# Patient Record
Sex: MALE | Race: WHITE | NOT HISPANIC OR LATINO | URBAN - METROPOLITAN AREA
[De-identification: names, ages, dates, MRNs, and addresses within clinical notes are randomized per-mention and may not be internally consistent; named-entity substitution may affect disease eponyms.]

---

## 2017-04-11 ENCOUNTER — EMERGENCY (EMERGENCY)
Facility: HOSPITAL | Age: 77
LOS: 0 days | Discharge: HOME | End: 2017-04-11

## 2017-05-31 PROBLEM — Z00.00 ENCOUNTER FOR PREVENTIVE HEALTH EXAMINATION: Status: ACTIVE | Noted: 2017-05-31

## 2017-06-27 DIAGNOSIS — Z79.899 OTHER LONG TERM (CURRENT) DRUG THERAPY: ICD-10-CM

## 2017-06-27 DIAGNOSIS — I10 ESSENTIAL (PRIMARY) HYPERTENSION: ICD-10-CM

## 2017-06-27 DIAGNOSIS — R53.1 WEAKNESS: ICD-10-CM

## 2017-07-13 ENCOUNTER — APPOINTMENT (OUTPATIENT)
Dept: CARDIOLOGY | Facility: CLINIC | Age: 77
End: 2017-07-13

## 2019-04-16 ENCOUNTER — OUTPATIENT (OUTPATIENT)
Dept: OUTPATIENT SERVICES | Facility: HOSPITAL | Age: 79
LOS: 1 days | Discharge: HOME | End: 2019-04-16
Payer: COMMERCIAL

## 2019-04-16 DIAGNOSIS — R05 COUGH: ICD-10-CM

## 2019-04-16 PROCEDURE — 71046 X-RAY EXAM CHEST 2 VIEWS: CPT | Mod: 26

## 2019-05-14 ENCOUNTER — OUTPATIENT (OUTPATIENT)
Dept: OUTPATIENT SERVICES | Facility: HOSPITAL | Age: 79
LOS: 1 days | Discharge: HOME | End: 2019-05-14
Payer: COMMERCIAL

## 2019-05-14 DIAGNOSIS — K76.9 LIVER DISEASE, UNSPECIFIED: ICD-10-CM

## 2019-05-14 PROCEDURE — 76700 US EXAM ABDOM COMPLETE: CPT | Mod: 26

## 2020-01-12 ENCOUNTER — INPATIENT (INPATIENT)
Facility: HOSPITAL | Age: 80
LOS: 59 days | Discharge: NURSING, REHAB OR LONG-TERM CARE | End: 2020-03-12
Attending: THORACIC SURGERY (CARDIOTHORACIC VASCULAR SURGERY) | Admitting: THORACIC SURGERY (CARDIOTHORACIC VASCULAR SURGERY)
Payer: MEDICARE

## 2020-01-12 VITALS
TEMPERATURE: 99 F | DIASTOLIC BLOOD PRESSURE: 136 MMHG | HEART RATE: 82 BPM | RESPIRATION RATE: 18 BRPM | OXYGEN SATURATION: 95 % | SYSTOLIC BLOOD PRESSURE: 177 MMHG

## 2020-01-12 DIAGNOSIS — Z90.49 ACQUIRED ABSENCE OF OTHER SPECIFIED PARTS OF DIGESTIVE TRACT: Chronic | ICD-10-CM

## 2020-01-12 LAB
ALBUMIN SERPL ELPH-MCNC: 4.4 G/DL — SIGNIFICANT CHANGE UP (ref 3.5–5.2)
ALP SERPL-CCNC: 100 U/L — SIGNIFICANT CHANGE UP (ref 30–115)
ALT FLD-CCNC: 24 U/L — SIGNIFICANT CHANGE UP (ref 0–41)
ANION GAP SERPL CALC-SCNC: 21 MMOL/L — HIGH (ref 7–14)
APPEARANCE UR: CLEAR — SIGNIFICANT CHANGE UP
APTT BLD: 30.1 SEC — SIGNIFICANT CHANGE UP (ref 27–39.2)
AST SERPL-CCNC: 34 U/L — SIGNIFICANT CHANGE UP (ref 0–41)
BACTERIA # UR AUTO: NEGATIVE — SIGNIFICANT CHANGE UP
BASE EXCESS BLDV CALC-SCNC: 1.1 MMOL/L — SIGNIFICANT CHANGE UP (ref -2–2)
BASOPHILS # BLD AUTO: 0.1 K/UL — SIGNIFICANT CHANGE UP (ref 0–0.2)
BASOPHILS NFR BLD AUTO: 0.9 % — SIGNIFICANT CHANGE UP (ref 0–1)
BILIRUB SERPL-MCNC: 0.7 MG/DL — SIGNIFICANT CHANGE UP (ref 0.2–1.2)
BILIRUB UR-MCNC: NEGATIVE — SIGNIFICANT CHANGE UP
BUN SERPL-MCNC: 26 MG/DL — HIGH (ref 10–20)
CA-I SERPL-SCNC: 1.16 MMOL/L — SIGNIFICANT CHANGE UP (ref 1.12–1.3)
CALCIUM SERPL-MCNC: 9.1 MG/DL — SIGNIFICANT CHANGE UP (ref 8.5–10.1)
CHLORIDE SERPL-SCNC: 97 MMOL/L — LOW (ref 98–110)
CO2 SERPL-SCNC: 20 MMOL/L — SIGNIFICANT CHANGE UP (ref 17–32)
COLOR SPEC: SIGNIFICANT CHANGE UP
CREAT SERPL-MCNC: 1.1 MG/DL — SIGNIFICANT CHANGE UP (ref 0.7–1.5)
DIFF PNL FLD: SIGNIFICANT CHANGE UP
EOSINOPHIL # BLD AUTO: 0.48 K/UL — SIGNIFICANT CHANGE UP (ref 0–0.7)
EOSINOPHIL NFR BLD AUTO: 4.4 % — SIGNIFICANT CHANGE UP (ref 0–8)
EPI CELLS # UR: 2 /HPF — SIGNIFICANT CHANGE UP (ref 0–5)
ETHANOL SERPL-MCNC: <10 MG/DL — SIGNIFICANT CHANGE UP
GAS PNL BLDV: 139 MMOL/L — SIGNIFICANT CHANGE UP (ref 136–145)
GAS PNL BLDV: SIGNIFICANT CHANGE UP
GIANT PLATELETS BLD QL SMEAR: PRESENT — SIGNIFICANT CHANGE UP
GLUCOSE SERPL-MCNC: 190 MG/DL — HIGH (ref 70–99)
GLUCOSE UR QL: NEGATIVE — SIGNIFICANT CHANGE UP
HCO3 BLDV-SCNC: 28 MMOL/L — SIGNIFICANT CHANGE UP (ref 22–29)
HCT VFR BLD CALC: 45.1 % — SIGNIFICANT CHANGE UP (ref 42–52)
HCT VFR BLDA CALC: 36.8 % — SIGNIFICANT CHANGE UP (ref 34–44)
HGB BLD CALC-MCNC: 12 G/DL — LOW (ref 14–18)
HGB BLD-MCNC: 14.2 G/DL — SIGNIFICANT CHANGE UP (ref 14–18)
HYALINE CASTS # UR AUTO: 5 /LPF — SIGNIFICANT CHANGE UP (ref 0–7)
INR BLD: 1.58 RATIO — HIGH (ref 0.65–1.3)
KETONES UR-MCNC: NEGATIVE — SIGNIFICANT CHANGE UP
LACTATE BLDV-MCNC: 3.9 MMOL/L — HIGH (ref 0.5–1.6)
LACTATE SERPL-SCNC: 4.9 MMOL/L — CRITICAL HIGH (ref 0.7–2)
LEUKOCYTE ESTERASE UR-ACNC: NEGATIVE — SIGNIFICANT CHANGE UP
LIDOCAIN IGE QN: 108 U/L — HIGH (ref 7–60)
LYMPHOCYTES # BLD AUTO: 1.74 K/UL — SIGNIFICANT CHANGE UP (ref 1.2–3.4)
LYMPHOCYTES # BLD AUTO: 15.9 % — LOW (ref 20.5–51.1)
MAGNESIUM SERPL-MCNC: 1.9 MG/DL — SIGNIFICANT CHANGE UP (ref 1.8–2.4)
MANUAL SMEAR VERIFICATION: SIGNIFICANT CHANGE UP
MCHC RBC-ENTMCNC: 31.5 G/DL — LOW (ref 32–37)
MCHC RBC-ENTMCNC: 31.8 PG — HIGH (ref 27–31)
MCV RBC AUTO: 101.1 FL — HIGH (ref 80–94)
METAMYELOCYTES # FLD: 0.9 % — HIGH (ref 0–0)
MONOCYTES # BLD AUTO: 0.97 K/UL — HIGH (ref 0.1–0.6)
MONOCYTES NFR BLD AUTO: 8.8 % — SIGNIFICANT CHANGE UP (ref 1.7–9.3)
MYELOCYTES NFR BLD: 0.9 % — HIGH (ref 0–0)
NEUTROPHILS # BLD AUTO: 6.02 K/UL — SIGNIFICANT CHANGE UP (ref 1.4–6.5)
NEUTROPHILS NFR BLD AUTO: 54 % — SIGNIFICANT CHANGE UP (ref 42.2–75.2)
NEUTS BAND # BLD: 0.9 % — SIGNIFICANT CHANGE UP (ref 0–6)
NITRITE UR-MCNC: NEGATIVE — SIGNIFICANT CHANGE UP
NRBC # BLD: 1 /100 — HIGH (ref 0–0)
NRBC # BLD: SIGNIFICANT CHANGE UP /100 WBCS (ref 0–0)
NT-PROBNP SERPL-SCNC: 1767 PG/ML — HIGH (ref 0–300)
PCO2 BLDV: 56 MMHG — HIGH (ref 41–51)
PH BLDV: 7.31 — SIGNIFICANT CHANGE UP (ref 7.26–7.43)
PH UR: 6.5 — SIGNIFICANT CHANGE UP (ref 5–8)
PLAT MORPH BLD: NORMAL — SIGNIFICANT CHANGE UP
PLATELET # BLD AUTO: 218 K/UL — SIGNIFICANT CHANGE UP (ref 130–400)
PO2 BLDV: 69 MMHG — HIGH (ref 20–40)
POLYCHROMASIA BLD QL SMEAR: SLIGHT — SIGNIFICANT CHANGE UP
POTASSIUM BLDV-SCNC: 4.2 MMOL/L — SIGNIFICANT CHANGE UP (ref 3.3–5.6)
POTASSIUM SERPL-MCNC: 4.4 MMOL/L — SIGNIFICANT CHANGE UP (ref 3.5–5)
POTASSIUM SERPL-SCNC: 4.4 MMOL/L — SIGNIFICANT CHANGE UP (ref 3.5–5)
PROT SERPL-MCNC: 7.6 G/DL — SIGNIFICANT CHANGE UP (ref 6–8)
PROT UR-MCNC: ABNORMAL
PROTHROM AB SERPL-ACNC: 18.1 SEC — HIGH (ref 9.95–12.87)
RBC # BLD: 4.46 M/UL — LOW (ref 4.7–6.1)
RBC # FLD: 14.7 % — HIGH (ref 11.5–14.5)
RBC BLD AUTO: ABNORMAL
RBC CASTS # UR COMP ASSIST: 2 /HPF — SIGNIFICANT CHANGE UP (ref 0–4)
SAO2 % BLDV: 92 % — SIGNIFICANT CHANGE UP
SMUDGE CELLS # BLD: PRESENT — SIGNIFICANT CHANGE UP
SODIUM SERPL-SCNC: 138 MMOL/L — SIGNIFICANT CHANGE UP (ref 135–146)
SP GR SPEC: 1.01 — SIGNIFICANT CHANGE UP (ref 1.01–1.02)
TROPONIN T SERPL-MCNC: <0.01 NG/ML — SIGNIFICANT CHANGE UP
UROBILINOGEN FLD QL: SIGNIFICANT CHANGE UP
VARIANT LYMPHS # BLD: 13.3 % — HIGH (ref 0–5)
WBC # BLD: 10.97 K/UL — HIGH (ref 4.8–10.8)
WBC # FLD AUTO: 10.97 K/UL — HIGH (ref 4.8–10.8)
WBC UR QL: 3 /HPF — SIGNIFICANT CHANGE UP (ref 0–5)

## 2020-01-12 PROCEDURE — 99291 CRITICAL CARE FIRST HOUR: CPT | Mod: 25

## 2020-01-12 PROCEDURE — 71045 X-RAY EXAM CHEST 1 VIEW: CPT | Mod: 26

## 2020-01-12 PROCEDURE — 93010 ELECTROCARDIOGRAM REPORT: CPT

## 2020-01-12 PROCEDURE — 36556 INSERT NON-TUNNEL CV CATH: CPT

## 2020-01-12 PROCEDURE — 31500 INSERT EMERGENCY AIRWAY: CPT

## 2020-01-12 PROCEDURE — 99232 SBSQ HOSP IP/OBS MODERATE 35: CPT

## 2020-01-12 PROCEDURE — 93010 ELECTROCARDIOGRAM REPORT: CPT | Mod: 77

## 2020-01-12 PROCEDURE — 70450 CT HEAD/BRAIN W/O DYE: CPT | Mod: 26

## 2020-01-12 PROCEDURE — 99292 CRITICAL CARE ADDL 30 MIN: CPT | Mod: 25

## 2020-01-12 PROCEDURE — 71045 X-RAY EXAM CHEST 1 VIEW: CPT | Mod: 26,77

## 2020-01-12 RX ORDER — FENTANYL CITRATE 50 UG/ML
0.5 INJECTION INTRAVENOUS
Qty: 2500 | Refills: 0 | Status: DISCONTINUED | OUTPATIENT
Start: 2020-01-12 | End: 2020-01-14

## 2020-01-12 RX ORDER — ATROPINE SULFATE 0.1 MG/ML
1 SYRINGE (ML) INJECTION ONCE
Refills: 0 | Status: COMPLETED | OUTPATIENT
Start: 2020-01-12 | End: 2020-01-12

## 2020-01-12 RX ORDER — MAGNESIUM SULFATE 500 MG/ML
2 VIAL (ML) INJECTION ONCE
Refills: 0 | Status: COMPLETED | OUTPATIENT
Start: 2020-01-12 | End: 2020-01-12

## 2020-01-12 RX ORDER — NOREPINEPHRINE BITARTRATE/D5W 8 MG/250ML
0.05 PLASTIC BAG, INJECTION (ML) INTRAVENOUS
Qty: 16 | Refills: 0 | Status: DISCONTINUED | OUTPATIENT
Start: 2020-01-12 | End: 2020-01-13

## 2020-01-12 RX ORDER — PANTOPRAZOLE SODIUM 20 MG/1
40 TABLET, DELAYED RELEASE ORAL ONCE
Refills: 0 | Status: COMPLETED | OUTPATIENT
Start: 2020-01-12 | End: 2020-01-13

## 2020-01-12 RX ORDER — MIDAZOLAM HYDROCHLORIDE 1 MG/ML
0.02 INJECTION, SOLUTION INTRAMUSCULAR; INTRAVENOUS
Qty: 100 | Refills: 0 | Status: DISCONTINUED | OUTPATIENT
Start: 2020-01-12 | End: 2020-01-13

## 2020-01-12 RX ORDER — ROCURONIUM BROMIDE 10 MG/ML
100 VIAL (ML) INTRAVENOUS ONCE
Refills: 0 | Status: COMPLETED | OUTPATIENT
Start: 2020-01-12 | End: 2020-01-12

## 2020-01-12 RX ORDER — ETOMIDATE 2 MG/ML
20 INJECTION INTRAVENOUS ONCE
Refills: 0 | Status: COMPLETED | OUTPATIENT
Start: 2020-01-12 | End: 2020-01-12

## 2020-01-12 RX ORDER — SODIUM CHLORIDE 9 MG/ML
250 INJECTION, SOLUTION INTRAVENOUS ONCE
Refills: 0 | Status: COMPLETED | OUTPATIENT
Start: 2020-01-12 | End: 2020-01-12

## 2020-01-12 RX ORDER — SODIUM CHLORIDE 9 MG/ML
1000 INJECTION, SOLUTION INTRAVENOUS
Refills: 0 | Status: DISCONTINUED | OUTPATIENT
Start: 2020-01-12 | End: 2020-01-13

## 2020-01-12 RX ADMIN — Medication 100 MILLIGRAM(S): at 17:13

## 2020-01-12 RX ADMIN — FENTANYL CITRATE 4.99 MICROGRAM(S)/KG/HR: 50 INJECTION INTRAVENOUS at 18:39

## 2020-01-12 RX ADMIN — MIDAZOLAM HYDROCHLORIDE 2 MG/KG/HR: 1 INJECTION, SOLUTION INTRAMUSCULAR; INTRAVENOUS at 18:39

## 2020-01-12 RX ADMIN — ETOMIDATE 20 MILLIGRAM(S): 2 INJECTION INTRAVENOUS at 17:13

## 2020-01-12 RX ADMIN — Medication 4.68 MICROGRAM(S)/KG/MIN: at 21:38

## 2020-01-12 RX ADMIN — Medication 50 GRAM(S): at 21:38

## 2020-01-12 RX ADMIN — Medication 1 MILLIGRAM(S): at 19:02

## 2020-01-12 NOTE — H&P ADULT - NSHPPHYSICALEXAM_GEN_ALL_CORE
PHYSICAL EXAM:  GEN: No acute distress  HEENT: No sclera icterus.   LUNGS: Clear to auscultation bilaterally. Breathing thru vent.   HEART: S1/S2 present. RRR. No murmur.   ABD: Soft, non-tender, non-distended. Bowel sounds present.   EXT: No pitting edema.   NEURO: sedated.

## 2020-01-12 NOTE — ED PROVIDER NOTE - PROGRESS NOTE DETAILS
rpt EKG showing diffuse STD and elevation in AVR, STEMI code called cardiology fellow bedside,  Dr. Gr, STEMI code cancelled. VSS.

## 2020-01-12 NOTE — ED PROVIDER NOTE - OBJECTIVE STATEMENT
80 y/o M PMHx HTN, DM, neuropathy presents to ED s/p cardiac arrest. Per EMS pt had syncopal episode at home, 911 was called for unresponsiveness. Witnessed cardiac arrest, initial rhythm showing PEA and then asystole. ROSC achieved after 1 round of compressions and epinephrine. Unable to obtain further history, pt was unresponsive on arrival.

## 2020-01-12 NOTE — ED PROVIDER NOTE - PHYSICAL EXAMINATION
CONSTITUTIONAL: unresponsive s/p cardiac arrest  SKIN: warm, dry  HEAD: Normocephalic; atraumatic.  EYES: no conjunctival injection. EOMI.   ENT: increased secretions   NECK: Supple; non tender.  CARD: S1, S2 normal; no murmurs, gallops, or rubs. code STEMI activated for diffusely depressed ST waves and ST elevation in AVR.    RESP: No wheezes, rales or rhonchi.  ABD: soft ntnd.   EXT: No LE edema. Distal pulses intact b/l.   LYMPH: No acute cervical adenopathy.  NEURO: GCS3. unresponsive.

## 2020-01-12 NOTE — H&P ADULT - NSHPLABSRESULTS_GEN_ALL_CORE
14.2   10.97 )-----------( 218      ( 2020 17:20 )             45.1                 138  |  97<L>  |  26<H>  ----------------------------<  190<H>  4.4   |  20  |  1.1    Ca    9.1      2020 17:20  Mg     1.9         TPro  7.6  /  Alb  4.4  /  TBili  0.7  /  DBili  x   /  AST  34  /  ALT  24  /  AlkPhos  100      LIVER FUNCTIONS - ( 2020 17:20 )  Alb: 4.4 g/dL / Pro: 7.6 g/dL / ALK PHOS: 100 U/L / ALT: 24 U/L / AST: 34 U/L / GGT: x         PT/INR - ( 2020 18:05 )   PT: 18.10 sec;   INR: 1.58 ratio      PTT - ( 2020 18:05 )  PTT:30.1 sec  CARDIAC MARKERS ( 2020 17:20 )  x  / <0.01 ng/mL / x     / x     / x        ABG - ( 2020 20:07 )  pH, Arterial: 7.42  pH, Blood: x     /  pCO2: 45    /  pO2: 237   / HCO3: 29    / Base Excess: ?4.0  /  SaO2: ?98.8     Urinalysis Basic - ( 2020 17:20 )    Color: Light Yellow / Appearance: Clear / S.012 / pH: x  Gluc: x / Ketone: Negative  / Bili: Negative / Urobili: <2 mg/dL   Blood: x / Protein: 100 mg/dL / Nitrite: Negative   Leuk Esterase: Negative / RBC: 2 /HPF / WBC 3 /HPF   Sq Epi: x / Non Sq Epi: 2 /HPF / Bacteria: Negative    Serum Pro-Brain Natriuretic Peptide: 1767 pg/mL (20 @ 17:20)

## 2020-01-12 NOTE — H&P ADULT - NSHPSOCIALHISTORY_GEN_ALL_CORE
Alcohol - a glass of martini per day.   Smoke - 1ppd for 15 years (quitted 40 yrs ago).   Illicit drug - Denied.

## 2020-01-12 NOTE — ED PROVIDER NOTE - CLINICAL SUMMARY MEDICAL DECISION MAKING FREE TEXT BOX
80yo M history of HTN DL neuropathy BIBEMS with cardiac arrest. Per EMS and family, pt was in normal state of health this AM, then this afternoon went up to wife c/o "not feeling well." Witnessed cardiac arrest. +ROSC after 1 round of compressions/epi, initial rhythm PEA then asystole. Arrested again en route but ROSC obtained. Upon arrival, pt not protecting airway, intubated for airway protection. labs ekg imaging reviewed. stemi code cancelled. alton Wright, approved for ICU under Dr. Russell. VSS 80yo M history of HTN DL neuropathy BIBEMS with cardiac arrest. Per EMS and family, pt was in normal state of health this AM, then this afternoon went up to wife c/o "not feeling well." Witnessed cardiac arrest. +ROSC after 1 round of compressions/epi, initial rhythm PEA then asystole. Arrested again en route but ROSC obtained. Upon arrival, pt not protecting airway, intubated for airway protection. labs ekg imaging reviewed. stemi code cancelled. dw Dr. Wright, approved for ICU under Dr. Russell. VSS. alton Wright

## 2020-01-12 NOTE — ED PROVIDER NOTE - ATTENDING CONTRIBUTION TO CARE
78yo M history of HTN DL neuropathy BIBEMS with cardiac arrest. Per EMS and family, pt was in normal state of health this AM, then this afternoon went up to wife c/o "not feeling well" 80yo M history of HTN DL neuropathy BIBEMS with cardiac arrest. Per EMS and family, pt was in normal state of health this AM, then this afternoon went up to wife c/o "not feeling well." Witnessed cardiac arrest. +ROSC after 1 round of compressions/epi, initial rhythm PEA then asystole. Arrested again en route but ROSC obtained. Upon arrival, pt not protecting airway, intubated for airway protection.   Constitutional: intubated  Eyes: PERRLA. Extraocular movements intact, no entrapment. Conjunctiva normal.   ENT: No nasal discharge. Moist mucus membranes. ETT in place  Neck: Supple, non tender, full range of motion.  CV: RRR no murmurs, rubs, or gallops. +S1S2.   Pulm: Clear to auscultation bilaterally with bagging.   Abd: soft  ND +BS.   Ext:  no edema.   Skin: Warm, dry, no rash

## 2020-01-12 NOTE — H&P ADULT - NSICDXPASTMEDICALHX_GEN_ALL_CORE_FT
PAST MEDICAL HISTORY:  Atrial fibrillation     HLD (hyperlipidemia)     HTN (hypertension)     Neuropathy

## 2020-01-12 NOTE — H&P ADULT - ATTENDING COMMENTS
78 yo M with hx of HTN, A.fib (Eliquis), CHF, HLD brought in by EMS post cardiac arrest- now in CCU on vent on pressors with herminia cardiogenic shock    Pt seen and examined in CCU- on vent, sedated, on levo    chart reviewed- agree with above and cardiology note    safe vent settings    assess mental status    daily weaning assessment    pressors to maintain MAP 65    echo    cardiology f/u    plan as per CCU team    DVT proph    skin care- decubitus prevention    clarify goals of care    PCP Dr Durand updated    call or text with any questions or updates

## 2020-01-12 NOTE — H&P ADULT - HISTORY OF PRESENT ILLNESS
80 yo M with hx of HTN, A.fib (Eliquis), CHF, HLD brought in by EMS post cardiac arrest. As per wife at bedside, patient at the basement watching movie and was doing fine. Few mins later, patient came up and told the wife that he wasn't feeling right and that he felt funny. Then he crashed. Wife called the EMS who arrived 5 mins later. Upon EMS arrival, patient was found to be in PEA, s/p resuscitation for 10-15 mins and ROSC achieved. While on the way to hospital, patient had another cardiac arrest in the ambulent s/p CPR and ROSC achieved few mins later. As per family, patient was doing welll and at baseline prior to the episode. As baseline patient is fully functional. Denied any recent infection, recent hospitalization, recent ED visit, fever, chills. 80 yo M with hx of HTN, A.fib (Eliquis), CHF, HLD brought in by EMS post cardiac arrest. As per wife at bedside, patient at the basement watching movie and was doing fine. Few mins later, patient came up and told the wife that he wasn't feeling right and that he felt funny. Then he crashed. Wife called the EMS who arrived 5 mins later. Upon EMS arrival, patient was found to be in PEA then asystole, s/p resuscitation for ~ 10 mins and ROSC achieved after 1 round of compression and epi. While on the way to hospital, patient had another cardiac arrest on the ambulant s/p CPR and ROSC achieved few mins later. As per family, patient was doing welll and at baseline prior to the episode. As baseline patient is fully functional. Denied any recent infection, recent hospitalization, recent ED visit, fever, chills. 80 yo M with hx of HTN, A.fib (Eliquis), CHF, HLD brought in by EMS post cardiac arrest. As per wife at bedside, patient at the basement watching movie and was doing fine. Few mins later, patient came up and told the wife that he wasn't feeling right and that he felt funny. Then he crashed. Wife called the EMS who arrived 5 mins later. Upon EMS arrival, patient was found to be in PEA then asystole, s/p resuscitation for ~ 10 mins and ROSC achieved after 1 round of compression and epi. While on the way to hospital, patient had another cardiac arrest on the ambulant s/p CPR and ROSC achieved few mins later. As per family, patient was doing welll and at baseline prior to the episode. As baseline patient is fully functional. Denied any recent infection, recent hospitalization, recent ED visit, fever, chills.     In ED, patient was found to bradycardic and hypotensive. s/p 1 dose of atropine and patient was started on low dose Levophed with improvement in HR and BP.

## 2020-01-12 NOTE — H&P ADULT - ASSESSMENT
78 yo M with hx of HTN, A.fib (Eliquis), CHF, HLD brought in by EMS post cardiac arrest. As per wife at bedside, patient at the basement watching movie and was doing fine. Few mins later, patient came up and told the wife that he wasn't feeling right and that he felt funny. Then he crashed. Wife called the EMS who arrived 5 mins later. Upon EMS arrival, patient was found to be in PEA, s/p resuscitation for 10-15 mins and ROSC achieved. While on the way to hospital, patient had another cardiac arrest in the ambulent s/p CPR and ROSC achieved few mins later. As per family, patient was doing welll and at baseline prior to the episode. As baseline patient is fully functional. Denied any recent infection, recent hospitalization, recent ED visit, fever, chills.      Cardiac arrest 2/2 PEA  - from unknown etiology   - patient is intubated and sedated.   - check 2nd CE  - check 2d Echo, venous Duplex b/l LE  - check TSH, Cortisol   - monitor electrolytes   - admit to ICU    A.fib  - on Eliquis   - check TSH   - check 2d Echo    HTN  - BP stable curently.     HLD  - check lipid panel     DVT ppx: on Eliquis  GI ppx: PPI   Activity: Best rest  Diet: NPO for now 80 yo M with hx of HTN, A.fib (Eliquis), CHF, HLD brought in by EMS post cardiac arrest. As per wife at bedside, patient at the basement watching movie and was doing fine. Few mins later, patient came up and told the wife that he wasn't feeling right and that he felt funny. Then he crashed. Wife called the EMS who arrived 5 mins later. Upon EMS arrival, patient was found to be in PEA, s/p resuscitation for 10-15 mins and ROSC achieved. While on the way to hospital, patient had another cardiac arrest in the ambulent s/p CPR and ROSC achieved few mins later. As per family, patient was doing welll and at baseline prior to the episode. As baseline patient is fully functional. Denied any recent infection, recent hospitalization, recent ED visit, fever, chills.      Cardiac arrest 2/2 PEA  - from unknown etiology   - patient is intubated and sedated with fentanyl and versed.   - check 2nd and 3rd CE  - check 2d Echo, venous Duplex b/l LE  - check TSH, Cortisol   - monitor electrolytes   - admit to ICU    A.fib  - on Eliquis   - check TSH   - check 2d Echo    HTN  - BP stable currently.     HLD  - check lipid panel     DVT ppx: on Eliquis  GI ppx: PPI   Activity: Best rest  Diet: NPO for now 78 yo M with hx of HTN, A.fib (Eliquis), CHF, HLD brought in by EMS post cardiac arrest. As per wife at bedside, patient at the basement watching movie and was doing fine. Few mins later, patient came up and told the wife that he wasn't feeling right and that he felt funny. Then he crashed. Wife called the EMS who arrived 5 mins later. Upon EMS arrival, patient was found to be in PEA, s/p resuscitation for 10-15 mins and ROSC achieved. While on the way to hospital, patient had another cardiac arrest in the ambulent s/p CPR and ROSC achieved few mins later. As per family, patient was doing welll and at baseline prior to the episode. As baseline patient is fully functional. Denied any recent infection, recent hospitalization, recent ED visit, fever, chills.      Cardiac arrest 2/2 PEA   - from unknown etiology   CTH: No evidence of acute intracranial pathology. Chronic microvascular ischemic changes. 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  - patient is intubated and sedated with fentanyl and versed.   - check 2nd and 3rd CE  - check 2d Echo, venous Duplex b/l LE  - check TSH, Cortisol   - monitor electrolytes   - start on IVF LR @ 200cc/hr   - admit to ICU    A.fib  - on Eliquis   - check TSH   - check 2d Echo    HTN  - BP stable currently.     HLD  - check lipid panel     DVT ppx: on Eliquis  GI ppx: PPI   Activity: Best rest  Diet: NPO for now 78 yo M with hx of HTN, A.fib (Eliquis), CHF, HLD brought in by EMS post cardiac arrest. As per wife at bedside, patient at the basement watching movie and was doing fine. Few mins later, patient came up and told the wife that he wasn't feeling right and that he felt funny. Then he crashed. Wife called the EMS who arrived 5 mins later. Upon EMS arrival, patient was found to be in PEA, s/p resuscitation for 10-15 mins and ROSC achieved. While on the way to hospital, patient had another cardiac arrest in the ambulent s/p CPR and ROSC achieved few mins later. As per family, patient was doing welll and at baseline prior to the episode. As baseline patient is fully functional. Denied any recent infection, recent hospitalization, recent ED visit, fever, chills.      Cardiac arrest 2/2 PEA   - from unknown etiology   CTH: No evidence of acute intracranial pathology. Chronic microvascular ischemic changes. 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  - patient is intubated and sedated with fentanyl and versed.   - check 2nd and 3rd CE  - check 2d Echo, venous Duplex b/l LE  - check TSH, Cortisol   - monitor electrolytes   - start on IVF LR @ 100cc/hr   - admit to ICU    A.fib  - on Eliquis   - check TSH   - check 2d Echo    HTN  - BP stable currently.     HLD  - check lipid panel     DVT ppx: on Eliquis  GI ppx: PPI   Activity: Best rest  Diet: NPO for now 80 yo M with hx of HTN, A.fib (Eliquis), CHF, HLD brought in by EMS post cardiac arrest. As per wife at bedside, patient at the basement watching movie and was doing fine. Few mins later, patient came up and told the wife that he wasn't feeling right and that he felt funny. Then he crashed. Wife called the EMS who arrived 5 mins later. Upon EMS arrival, patient was found to be in PEA, s/p resuscitation for 10-15 mins and ROSC achieved. While on the way to hospital, patient had another cardiac arrest in the ambulent s/p CPR and ROSC achieved few mins later. As per family, patient was doing welll and at baseline prior to the episode. As baseline patient is fully functional. Denied any recent infection, recent hospitalization, recent ED visit, fever, chills.      Cardiac arrest 2/2 PEA   - from unknown etiology   CTH: No evidence of acute intracranial pathology. Chronic microvascular ischemic changes. 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  - patient is intubated and sedated with fentanyl and versed.   - will get CTA chest to r/o PE  - will get CT abdomen/pelvic with IV contrast  - check 2nd and 3rd CE  - check 2d Echo  - check TSH, Cortisol   - monitor electrolytes   - start on IVF LR @ 100cc/hr   - admit to ICU    A.fib  - on Eliquis   - check TSH   - check 2d Echo    HTN  - BP stable currently.     HLD  - check lipid panel     DVT ppx: on Eliquis  GI ppx: PPI   Activity: Best rest  Diet: NPO for now

## 2020-01-12 NOTE — ED PROCEDURE NOTE - CPROC ED INDICATIONS1
cardiac arrest
cardiac arrest/airway protection
intubated
volume resuscitation/emergency venous access

## 2020-01-12 NOTE — CHART NOTE - NSCHARTNOTEFT_GEN_A_CORE
code STEMI code was called in ED to which cardiology team responded immediately and pt was assessed at bedside. Pt presented with cardiac arrest s/p resuscitation and intubation (initial rhythms asystole followed by PEA). ECG- reviewed (no significant ST elevation). Case discussed with interventional cardiologist on call and STEMI code was canceled. Discussed with ED staff. Full consult note to follow. code STEMI code was called in ED to which cardiology team responded immediately and pt was assessed at bedside. Pt presented with cardiac arrest s/p resuscitation and intubation (initial rhythms asystole followed by PEA). ECG- reviewed (no significant ST elevation). Case discussed with interventional cardiologist on call - Dr. Gr and STEMI code was canceled. Discussed with ED staff. Full consult note to follow.

## 2020-01-12 NOTE — ED PROCEDURE NOTE - CPROC ED INFUS LINE DETAIL1
The catheter was placed using sterile technique./The guidewire was recovered./The location was identified, and the area was draped and prepped./All lumen(s) aspirated and flushed without difficulty./Ultrasound guidance was used during placement.

## 2020-01-12 NOTE — ED ADULT NURSE NOTE - OBJECTIVE STATEMENT
80 y/o male BIBA prenotification called by HOLLY witnessed cardiac arrest by wife at 4:45pm. As per wife; patient was stating "im not feeling well" than became having a gaze in his eyes and passed out. Patient was unresponsive on scene by EMS and no pulse was detected CPR was initiated around 5:00 pm. Patient ROSC while in ambulance pre-note vitals 210/120 hr 74 18RR bag assisted. Patient on arrival has faint femoral pulse, placed on cardiac monitor, intubated by MD Albrecht at 17:13. Patient was not complaining of any chest discomfort prior to syncope as per wife.

## 2020-01-12 NOTE — ED ADULT NURSE REASSESSMENT NOTE - NS ED NURSE REASSESS COMMENT FT1
Patient now having purposeful movement. Pupils equal and reactive to light, patient opening eyes spontaneously and moving all extremities involuntary. VSS. On continuous cardiac monitoring. Safety precautions maintained.

## 2020-01-13 LAB
ALBUMIN SERPL ELPH-MCNC: 3.6 G/DL — SIGNIFICANT CHANGE UP (ref 3.5–5.2)
ALP SERPL-CCNC: 72 U/L — SIGNIFICANT CHANGE UP (ref 30–115)
ALT FLD-CCNC: 24 U/L — SIGNIFICANT CHANGE UP (ref 0–41)
AMPHET UR-MCNC: NEGATIVE — SIGNIFICANT CHANGE UP
ANION GAP SERPL CALC-SCNC: 10 MMOL/L — SIGNIFICANT CHANGE UP (ref 7–14)
AST SERPL-CCNC: 32 U/L — SIGNIFICANT CHANGE UP (ref 0–41)
BARBITURATES UR SCN-MCNC: NEGATIVE — SIGNIFICANT CHANGE UP
BASE EXCESS BLDA CALC-SCNC: 4.1 MMOL/L — HIGH (ref -2–2)
BASOPHILS # BLD AUTO: 0.01 K/UL — SIGNIFICANT CHANGE UP (ref 0–0.2)
BASOPHILS NFR BLD AUTO: 0.1 % — SIGNIFICANT CHANGE UP (ref 0–1)
BENZODIAZ UR-MCNC: POSITIVE
BILIRUB SERPL-MCNC: 1.2 MG/DL — SIGNIFICANT CHANGE UP (ref 0.2–1.2)
BUN SERPL-MCNC: 24 MG/DL — HIGH (ref 10–20)
CALCIUM SERPL-MCNC: 8.5 MG/DL — SIGNIFICANT CHANGE UP (ref 8.5–10.1)
CHLORIDE SERPL-SCNC: 102 MMOL/L — SIGNIFICANT CHANGE UP (ref 98–110)
CK MB CFR SERPL CALC: 10 NG/ML — HIGH (ref 0.6–6.3)
CK MB CFR SERPL CALC: 12.3 NG/ML — HIGH (ref 0.6–6.3)
CK SERPL-CCNC: 248 U/L — HIGH (ref 0–225)
CK SERPL-CCNC: 259 U/L — HIGH (ref 0–225)
CO2 SERPL-SCNC: 29 MMOL/L — SIGNIFICANT CHANGE UP (ref 17–32)
COCAINE METAB.OTHER UR-MCNC: NEGATIVE — SIGNIFICANT CHANGE UP
CREAT SERPL-MCNC: 0.9 MG/DL — SIGNIFICANT CHANGE UP (ref 0.7–1.5)
CULTURE RESULTS: NO GROWTH — SIGNIFICANT CHANGE UP
EOSINOPHIL # BLD AUTO: 0.03 K/UL — SIGNIFICANT CHANGE UP (ref 0–0.7)
EOSINOPHIL NFR BLD AUTO: 0.3 % — SIGNIFICANT CHANGE UP (ref 0–8)
GAS PNL BLDA: SIGNIFICANT CHANGE UP
GLUCOSE BLDC GLUCOMTR-MCNC: 113 MG/DL — HIGH (ref 70–99)
GLUCOSE SERPL-MCNC: 131 MG/DL — HIGH (ref 70–99)
HCO3 BLDA-SCNC: 30 MMOL/L — HIGH (ref 23–27)
HCT VFR BLD CALC: 34.7 % — LOW (ref 42–52)
HGB BLD-MCNC: 11.3 G/DL — LOW (ref 14–18)
HOROWITZ INDEX BLDA+IHG-RTO: 50 — SIGNIFICANT CHANGE UP
IMM GRANULOCYTES NFR BLD AUTO: 0.5 % — HIGH (ref 0.1–0.3)
LYMPHOCYTES # BLD AUTO: 0.83 K/UL — LOW (ref 1.2–3.4)
LYMPHOCYTES # BLD AUTO: 8.4 % — LOW (ref 20.5–51.1)
MAGNESIUM SERPL-MCNC: 2 MG/DL — SIGNIFICANT CHANGE UP (ref 1.8–2.4)
MCHC RBC-ENTMCNC: 32.4 PG — HIGH (ref 27–31)
MCHC RBC-ENTMCNC: 32.6 G/DL — SIGNIFICANT CHANGE UP (ref 32–37)
MCV RBC AUTO: 99.4 FL — HIGH (ref 80–94)
METHADONE UR-MCNC: NEGATIVE — SIGNIFICANT CHANGE UP
MONOCYTES # BLD AUTO: 0.67 K/UL — HIGH (ref 0.1–0.6)
MONOCYTES NFR BLD AUTO: 6.8 % — SIGNIFICANT CHANGE UP (ref 1.7–9.3)
MRSA PCR RESULT.: NEGATIVE — SIGNIFICANT CHANGE UP
NEUTROPHILS # BLD AUTO: 8.25 K/UL — HIGH (ref 1.4–6.5)
NEUTROPHILS NFR BLD AUTO: 83.9 % — HIGH (ref 42.2–75.2)
NRBC # BLD: 0 /100 WBCS — SIGNIFICANT CHANGE UP (ref 0–0)
OPIATES UR-MCNC: NEGATIVE — SIGNIFICANT CHANGE UP
PCO2 BLDA: 51 MMHG — HIGH (ref 38–42)
PCP SPEC-MCNC: SIGNIFICANT CHANGE UP
PH BLDA: 7.38 — SIGNIFICANT CHANGE UP (ref 7.38–7.42)
PLATELET # BLD AUTO: 169 K/UL — SIGNIFICANT CHANGE UP (ref 130–400)
PO2 BLDA: 120 MMHG — HIGH (ref 78–95)
POTASSIUM SERPL-MCNC: 4.3 MMOL/L — SIGNIFICANT CHANGE UP (ref 3.5–5)
POTASSIUM SERPL-SCNC: 4.3 MMOL/L — SIGNIFICANT CHANGE UP (ref 3.5–5)
PROPOXYPHENE QUALITATIVE URINE RESULT: NEGATIVE — SIGNIFICANT CHANGE UP
PROT SERPL-MCNC: 6.2 G/DL — SIGNIFICANT CHANGE UP (ref 6–8)
RBC # BLD: 3.49 M/UL — LOW (ref 4.7–6.1)
RBC # FLD: 14.7 % — HIGH (ref 11.5–14.5)
SAO2 % BLDA: 99 % — HIGH (ref 94–98)
SODIUM SERPL-SCNC: 141 MMOL/L — SIGNIFICANT CHANGE UP (ref 135–146)
SPECIMEN SOURCE: SIGNIFICANT CHANGE UP
TROPONIN T SERPL-MCNC: 0.18 NG/ML — CRITICAL HIGH
TROPONIN T SERPL-MCNC: 0.31 NG/ML — CRITICAL HIGH
TSH SERPL-MCNC: 2.55 UIU/ML — SIGNIFICANT CHANGE UP (ref 0.27–4.2)
WBC # BLD: 9.84 K/UL — SIGNIFICANT CHANGE UP (ref 4.8–10.8)
WBC # FLD AUTO: 9.84 K/UL — SIGNIFICANT CHANGE UP (ref 4.8–10.8)

## 2020-01-13 PROCEDURE — 71275 CT ANGIOGRAPHY CHEST: CPT | Mod: 26

## 2020-01-13 PROCEDURE — 93306 TTE W/DOPPLER COMPLETE: CPT | Mod: 26

## 2020-01-13 PROCEDURE — 99221 1ST HOSP IP/OBS SF/LOW 40: CPT

## 2020-01-13 PROCEDURE — 71045 X-RAY EXAM CHEST 1 VIEW: CPT | Mod: 26

## 2020-01-13 PROCEDURE — 74177 CT ABD & PELVIS W/CONTRAST: CPT | Mod: 26

## 2020-01-13 PROCEDURE — 93970 EXTREMITY STUDY: CPT | Mod: 26

## 2020-01-13 RX ORDER — CHLORHEXIDINE GLUCONATE 213 G/1000ML
1 SOLUTION TOPICAL
Refills: 0 | Status: DISCONTINUED | OUTPATIENT
Start: 2020-01-13 | End: 2020-01-31

## 2020-01-13 RX ORDER — DEXMEDETOMIDINE HYDROCHLORIDE IN 0.9% SODIUM CHLORIDE 4 UG/ML
0.2 INJECTION INTRAVENOUS
Qty: 200 | Refills: 0 | Status: DISCONTINUED | OUTPATIENT
Start: 2020-01-13 | End: 2020-01-14

## 2020-01-13 RX ORDER — HEPARIN SODIUM 5000 [USP'U]/ML
5000 INJECTION INTRAVENOUS; SUBCUTANEOUS EVERY 8 HOURS
Refills: 0 | Status: DISCONTINUED | OUTPATIENT
Start: 2020-01-13 | End: 2020-01-16

## 2020-01-13 RX ORDER — SODIUM CHLORIDE 9 MG/ML
1000 INJECTION, SOLUTION INTRAVENOUS
Refills: 0 | Status: DISCONTINUED | OUTPATIENT
Start: 2020-01-13 | End: 2020-01-13

## 2020-01-13 RX ORDER — SODIUM CHLORIDE 9 MG/ML
1000 INJECTION, SOLUTION INTRAVENOUS
Refills: 0 | Status: DISCONTINUED | OUTPATIENT
Start: 2020-01-13 | End: 2020-01-14

## 2020-01-13 RX ORDER — CHLORHEXIDINE GLUCONATE 213 G/1000ML
15 SOLUTION TOPICAL
Refills: 0 | Status: DISCONTINUED | OUTPATIENT
Start: 2020-01-13 | End: 2020-01-15

## 2020-01-13 RX ADMIN — CHLORHEXIDINE GLUCONATE 15 MILLILITER(S): 213 SOLUTION TOPICAL at 17:18

## 2020-01-13 RX ADMIN — CHLORHEXIDINE GLUCONATE 1 APPLICATION(S): 213 SOLUTION TOPICAL at 05:40

## 2020-01-13 RX ADMIN — HEPARIN SODIUM 5000 UNIT(S): 5000 INJECTION INTRAVENOUS; SUBCUTANEOUS at 17:17

## 2020-01-13 RX ADMIN — SODIUM CHLORIDE 100 MILLILITER(S): 9 INJECTION, SOLUTION INTRAVENOUS at 01:20

## 2020-01-13 RX ADMIN — HEPARIN SODIUM 5000 UNIT(S): 5000 INJECTION INTRAVENOUS; SUBCUTANEOUS at 22:06

## 2020-01-13 RX ADMIN — CHLORHEXIDINE GLUCONATE 15 MILLILITER(S): 213 SOLUTION TOPICAL at 05:40

## 2020-01-13 RX ADMIN — MIDAZOLAM HYDROCHLORIDE 2 MG/KG/HR: 1 INJECTION, SOLUTION INTRAMUSCULAR; INTRAVENOUS at 03:17

## 2020-01-13 RX ADMIN — PANTOPRAZOLE SODIUM 40 MILLIGRAM(S): 20 TABLET, DELAYED RELEASE ORAL at 03:18

## 2020-01-13 NOTE — CONSULT NOTE ADULT - SUBJECTIVE AND OBJECTIVE BOX
Patient is a 79y old  Male who presents with a chief complaint of Cardiac arrest (12 Jan 2020 22:13)    HPI:  78 yo M with hx of HTN, A.fib (Eliquis), CHF, HLD brought in by EMS post cardiac arrest. As per wife at bedside, patient at the basement watching movie and was doing fine. Few mins later, patient came up and told the wife that he wasn't feeling right and that he felt funny. Then he crashed. Wife called the EMS who arrived 5 mins later. Upon EMS arrival, patient was found to be in PEA, s/p resuscitation for 10-15 mins and ROSC achieved. While on the way to hospital, patient had another cardiac arrest in the ambulent s/p CPR and ROSC achieved few mins later. As per family, patient was doing welll and at baseline prior to the episode. As baseline patient is fully functional. Denied any recent infection, recent hospitalization, recent ED visit, fever, chills. (12 Jan 2020 22:13)    Cardiology fellow addendum:   As per ED, pts initial rhythm was asystole followed by PEA. STEMI code was called in ED, no ST elevation on ECG and STEMI activation was canceled.     ROS:  All other systems reviewed and are negative    PAST MEDICAL & SURGICAL HISTORY  Atrial fibrillation  Neuropathy  HLD (hyperlipidemia)  HTN (hypertension)  History of cholecystectomy  History of cholecystectomy      FAMILY HISTORY:  FAMILY HISTORY:  [place]    SOCIAL HISTORY:  []smoker- former smoker   []Alcohol  []Drug    ALLERGIES:  No Known Allergies      MEDICATIONS:  MEDICATIONS  (STANDING):  fentaNYL   Infusion. 0.5 MICROgram(s)/kG/Hr (4.99 mL/Hr) IV Continuous <Continuous>  lactated ringers Bolus 250 milliLiter(s) IV Bolus once  lactated ringers. 1000 milliLiter(s) (100 mL/Hr) IV Continuous <Continuous>  midazolam Infusion 0.02 mG/kG/Hr (1.996 mL/Hr) IV Continuous <Continuous>  norepinephrine Infusion 0.05 MICROgram(s)/kG/Min (4.678 mL/Hr) IV Continuous <Continuous>  pantoprazole  Injectable 40 milliGRAM(s) IV Push once    MEDICATIONS  (PRN):      HOME MEDICATIONS:  Home Medications:  Eliquis 5 mg oral tablet: 1 tab(s) orally 2 times a day (12 Jan 2020 17:38)      VITALS:   T(F): 98.6 (01-12 @ 17:13), Max: 98.6 (01-12 @ 17:13)  HR: 65 (01-12 @ 23:00) (45 - 125)  BP: 116/72 (01-12 @ 23:00) (67/51 - 181/127)  BP(mean): 51 (01-12 @ 21:30) (50 - 82)  RR: 16 (01-12 @ 23:00) (14 - 18)  SpO2: 100% (01-12 @ 23:00) (95% - 100%)    I&O's Summary    12 Jan 2020 07:01  -  13 Jan 2020 00:07  --------------------------------------------------------  IN: 69.6 mL / OUT: 450 mL / NET: -380.4 mL        PHYSICAL EXAM:  GEN: intubated   HEENT: no pallor  NECK: no JVD  LUNGS: Clear to auscultation bilaterally  CARDIOVASCULAR: S1/S2 irregular, no murmurs or rubs  ABD: Soft, BS+  EXT: No Lower extremity edema/cyanosis  NEURO: intubated     LABS:                        14.2   10.97 )-----------( 218      ( 12 Jan 2020 17:20 )             45.1     01-12    138  |  97<L>  |  26<H>  ----------------------------<  190<H>  4.4   |  20  |  1.1    Ca    9.1      12 Jan 2020 17:20  Mg     1.9     01-12    TPro  7.6  /  Alb  4.4  /  TBili  0.7  /  DBili  x   /  AST  34  /  ALT  24  /  AlkPhos  100  01-12    PT/INR - ( 12 Jan 2020 18:05 )   PT: 18.10 sec;   INR: 1.58 ratio         PTT - ( 12 Jan 2020 18:05 )  PTT:30.1 sec  Troponin T, Serum: <0.01 ng/mL (01-12-20 @ 17:20)  Lactate, Blood: 4.9 mmol/L <HH> (01-12-20 @ 17:20)    CARDIAC MARKERS ( 12 Jan 2020 17:20 )  x     / <0.01 ng/mL / x     / x     / x            Troponin trend:    Serum Pro-Brain Natriuretic Peptide: 1767 pg/mL (01-12-20 @ 17:20)      Hemoglobin A1C   Thyroid      RADIOLOGY:  < from: Xray Chest 1 View-PORTABLE IMMEDIATE (01.12.20 @ 21:33) >    IMPRESSION:  Support devices as above insatisfactory position.    < end of copied text >    < from: CT Head No Cont (01.12.20 @ 18:59) >  IMPRESSION:  1.  No evidence of acute intracranial pathology.  2.  Chronic microvascular ischemic changes.  3.  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    < end of copied text >        ECG:  < from: 12 Lead ECG (01.12.20 @ 17:13) >  Diagnosis Line Atrial fibrillation with rapid ventricular response with premature ventricular  or aberrantly conducted complexes  Nonspecific ST and T wave abnormality  Abnormal ECG    < end of copied text >

## 2020-01-13 NOTE — CONSULT NOTE ADULT - SUBJECTIVE AND OBJECTIVE BOX
Patient is a 79y old  Male who presents with a chief complaint of Cardiac arrest (2020 07:05)      HPI:  78 yo M with hx of HTN, A.fib (Eliquis), CHF, HLD brought in by EMS post cardiac arrest. As per wife at bedside, patient at the basement watching movie and was doing fine. Few mins later, patient came up and told the wife that he wasn't feeling right and that he felt funny. Then he crashed. Wife called the EMS who arrived 5 mins later. Upon EMS arrival, patient was found to be in PEA then asystole, s/p resuscitation for ~ 10 mins and ROSC achieved after 1 round of compression and epi. While on the way to hospital, patient had another cardiac arrest on the ambulant s/p CPR and ROSC achieved few mins later. As per family, patient was doing welll and at baseline prior to the episode. As baseline patient is fully functional. Denied any recent infection, recent hospitalization, recent ED visit, fever, chills.     In ED, patient was found to bradycardic and hypotensive. s/p 1 dose of atropine and patient was started on low dose Levophed with improvement in HR and BP. (2020 22:13), admitted, off versed, levophed,  CC/H, pan CT  done      PAST MEDICAL & SURGICAL HISTORY:  Atrial fibrillation  Neuropathy  HLD (hyperlipidemia)  HTN (hypertension)  History of cholecystectomy  History of cholecystectomy      SOCIAL HX:   Smoking -    FAMILY HISTORY:      REVIEW OF SYSTEMS see hpi        Allergies    No Known Allergies    Intolerances        chlorhexidine 0.12% Liquid 15 milliLiter(s) Oral Mucosa two times a day  chlorhexidine 4% Liquid 1 Application(s) Topical <User Schedule>  fentaNYL   Infusion. 0.5 MICROgram(s)/kG/Hr IV Continuous <Continuous>  lactated ringers. 1000 milliLiter(s) IV Continuous <Continuous>  midazolam Infusion 0.02 mG/kG/Hr IV Continuous <Continuous>  norepinephrine Infusion 0.05 MICROgram(s)/kG/Min IV Continuous <Continuous>  : Home Meds:      PHYSICAL EXAM    ICU Vital Signs Last 24 Hrs  T(C): 37.1 (2020 08:00), Max: 37.1 (2020 08:00)  T(F): 98.8 (2020 08:00), Max: 98.8 (2020 08:00)  HR: 72 (2020 08:00) (45 - 125)  BP: 122/71 (2020 08:00) (67/51 - 181/127)  BP(mean): 89 (2020 08:00) (50 - 99)  RR: 18 (2020 08:00) (14 - 23)  SpO2: 100% (2020 08:00) (95% - 100%)      General:  HEENT:  NARENDRA              Lymph Nodes: No cervical LN   Lungs: Bilateral BS, wheezing  Cardiovascular: Regular  Abdomen: Soft, Positive BS  Extremities: No clubbing  Skin: Warm  Neurological: Non focal       20 @ 07:  -  20 @ 07:00  --------------------------------------------------------  IN:    fentaNYL Infusion.: 192 mL    lactated ringers.: 600 mL    midazolam Infusion: 55.5 mL    norepinephrine Infusion: 38.4 mL  Total IN: 885.9 mL    OUT:    Indwelling Catheter - Urethral: 435 mL    Voided: 450 mL  Total OUT: 885 mL    Total NET: 0.9 mL      20 @ 07:  -  20 @ 08:49  --------------------------------------------------------  IN:    fentaNYL Infusion.: 7 mL    lactated ringers.: 100 mL    norepinephrine Infusion: 4 mL  Total IN: 111 mL    OUT:  Total OUT: 0 mL    Total NET: 111 mL          LABS:                          11.3   9.84  )-----------( 169      ( 2020 04:30 )             34.7                                                   141  |  102  |  24<H>  ----------------------------<  131<H>  4.3   |  29  |  0.9    Ca    8.5      2020 04:30  Mg     2.0         TPro  6.2  /  Alb  3.6  /  TBili  1.2  /  DBili  x   /  AST  32  /  ALT  24  /  AlkPhos  72  01-13      PT/INR - ( 2020 18:05 )   PT: 18.10 sec;   INR: 1.58 ratio         PTT - ( 2020 18:05 )  PTT:30.1 sec                                       Urinalysis Basic - ( 2020 17:20 )    Color: Light Yellow / Appearance: Clear / S.012 / pH: x  Gluc: x / Ketone: Negative  / Bili: Negative / Urobili: <2 mg/dL   Blood: x / Protein: 100 mg/dL / Nitrite: Negative   Leuk Esterase: Negative / RBC: 2 /HPF / WBC 3 /HPF   Sq Epi: x / Non Sq Epi: 2 /HPF / Bacteria: Negative        CARDIAC MARKERS ( 2020 06:24 )  x     / 0.18 ng/mL / x     / x     / 10.0 ng/mL  CARDIAC MARKERS ( 2020 01:00 )  x     / 0.31 ng/mL / 259 U/L / x     / 12.3 ng/mL  CARDIAC MARKERS ( 2020 17:20 )  x     / <0.01 ng/mL / x     / x     / x                                                LIVER FUNCTIONS - ( 2020 04:30 )  Alb: 3.6 g/dL / Pro: 6.2 g/dL / ALK PHOS: 72 U/L / ALT: 24 U/L / AST: 32 U/L / GGT: x                                                                                               Mode: AC/ CMV (Assist Control/ Continuous Mandatory Ventilation)  RR (machine): 16  TV (machine): 500  FiO2: 40  PEEP: 6  ITime: 1  MAP: 12  PIP: 24                                      ABG - ( 2020 03:46 )  pH, Arterial: 7.38  pH, Blood: x     /  pCO2: 51    /  pO2: 120   / HCO3: 30    / Base Excess: 4.1   /  SaO2: 99                  X-Rays   reviewed    MEDICATIONS  (STANDING):  chlorhexidine 0.12% Liquid 15 milliLiter(s) Oral Mucosa two times a day  chlorhexidine 4% Liquid 1 Application(s) Topical <User Schedule>  fentaNYL   Infusion. 0.5 MICROgram(s)/kG/Hr (4.99 mL/Hr) IV Continuous <Continuous>  lactated ringers. 1000 milliLiter(s) (100 mL/Hr) IV Continuous <Continuous>  midazolam Infusion 0.02 mG/kG/Hr (1.996 mL/Hr) IV Continuous <Continuous>  norepinephrine Infusion 0.05 MICROgram(s)/kG/Min (4.678 mL/Hr) IV Continuous <Continuous>    MEDICATIONS  (PRN):

## 2020-01-13 NOTE — PROGRESS NOTE ADULT - ASSESSMENT
78 yo M with hx of HTN, A.fib (Eliquis), CHF, HLD brought in by EMS post cardiac arrest. As per wife at bedside, patient at the basement watching movie and was doing fine. Few mins later, patient came up and told the wife that he wasn't feeling right and that he felt funny. Then he crashed. Wife called the EMS who arrived 5 mins later. Upon EMS arrival, patient was found to be in PEA, s/p resuscitation for 10-15 mins and ROSC achieved. While on the way to hospital, patient had another cardiac arrest in the ambulent s/p CPR and ROSC achieved few mins later. As per family, patient was doing welll and at baseline prior to the episode. As baseline patient is fully functional. Denied any recent infection, recent hospitalization, recent ED visit, fever, chills.    # Cardiac arrest 2/2 PEA   - from unknown etiology   CTH: No evidence of acute intracranial pathology. Chronic microvascular ischemic changes. 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  - patient is intubated and sedated with fentanyl and versed.   - will get CTA chest to r/o PE  - will get CT abdomen/pelvic with IV contrast  - check 2nd and 3rd CE  - check 2d Echo  - check TSH, Cortisol   - monitor electrolytes   - start on IVF LR @ 100cc/hr   - admit to ICU    # A.fib  - on Eliquis   - check TSH   - check 2d Echo    # HTN  - BP stable currently.     # HLD  - check lipid panel     DVT ppx: on Eliquis  GI ppx: PPI   Activity: Best rest  Diet: NPO for now 78 yo M with hx of HTN, A.fib (Eliquis), CHF, HLD brought in by EMS post cardiac arrest. As per wife at bedside, patient at the basement watching movie and was doing fine. Few mins later, patient came up and told the wife that he wasn't feeling right and that he felt funny. Then he crashed. Wife called the EMS who arrived 5 mins later. Upon EMS arrival, patient was found to be in PEA, s/p resuscitation for 10-15 mins and ROSC achieved. While on the way to hospital, patient had another cardiac arrest in the ambulent s/p CPR and ROSC achieved few mins later. As per family, patient was doing welll and at baseline prior to the episode. As baseline patient is fully functional. Denied any recent infection, recent hospitalization, recent ED visit, fever, chills.    Patient was extubated today and seen by Dr. Bill. Bedside echo shows EF of 60% and normal wall motion. Dr. Bill is of the opinion that the patient likely had Ventricular fibrillation and ended up with PEA and will need a cath once he is stabilized. Will consult CT Surgery for the retroperitoneal suspected hematoma. Will resume heparin DVT prophylaxis and try to wean off levophed with fluid support.    # Cardiac arrest 2/2 PEA likely from arrythmia  - likely from Arrythmia  - CTH: No evidence of acute intracranial pathology. Chronic microvascular ischemic changes. 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  - Extubated  - CTA Chest negative  - Cardiac enzymes trending down  - Echo shows EF of 60-65% with     # Suspected Retroperitoneal Hematoma  - CT Surgery consulted  - Will hold Anticoagulation    # A.fib  - Stopped Eliquis will consider once cleared from suspicion of retroperitoneal hematoma by CT surgery  - check TSH     # HTN  - Holding medications  - On Levophed now    # HLD  - check lipid panel     DVT ppx: on heparin  GI ppx: PPI   Activity: Best rest  Diet: Speech and Swallow assessment 78 yo M with hx of HTN, A.fib (Eliquis), CHF, HLD brought in by EMS post cardiac arrest. As per wife at bedside, patient at the basement watching movie and was doing fine. Few mins later, patient came up and told the wife that he wasn't feeling right and that he felt funny. Then he crashed. Wife called the EMS who arrived 5 mins later. Upon EMS arrival, patient was found to be in PEA, s/p resuscitation for 10-15 mins and ROSC achieved. While on the way to hospital, patient had another cardiac arrest in the ambulent s/p CPR and ROSC achieved few mins later. As per family, patient was doing welll and at baseline prior to the episode. As baseline patient is fully functional. Denied any recent infection, recent hospitalization, recent ED visit, fever, chills.    Patient was extubated today and seen by Dr. Bill. Bedside echo shows EF of 60% and normal wall motion. Dr. Bill is of the opinion that the patient likely had Ventricular fibrillation and ended up with PEA and will need a cath once he is stabilized. Will consult CT Surgery for the retroperitoneal suspected hematoma. Will resume heparin DVT prophylaxis and try to wean off levophed with fluid support.    # Cardiac arrest 2/2 PEA likely from arrythmia  - likely from Arrythmia  - CTH: No evidence of acute intracranial pathology. Chronic microvascular ischemic changes. 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  - Extubated  - CTA Chest negative  - Cardiac enzymes trending down  - Echo shows EF of 60-65% with no valvular and wall abnormalities  - Statin once cleared by speech and swallow    # Suspected Retroperitoneal Hematoma  - CT Surgery consulted  - Will hold Anticoagulation    # A.fib  - Stopped Eliquis will consider once cleared from suspicion of retroperitoneal hematoma by CT surgery  - check TSH     # HTN  - Holding medications  - On Levophed now    # HLD  - check lipid panel     DVT ppx: on heparin  GI ppx: PPI   Activity: Best rest  Diet: Speech and Swallow assessment

## 2020-01-13 NOTE — CONSULT NOTE ADULT - ASSESSMENT
80 yo M with hx of HTN, A.fib (Eliquis), ?CHF, HLD brought in by EMS post cardiac arrest.     Impression:  Cardiac arrest- Asystole/ PEA (unclear etiology) s/p resuscitation and intubation  h/o Afib    Recommendation:  ICU monitoring   cont AC for Afib if no bleeding contraindication  repeat CE  obtain ECHO  vent management per CC team

## 2020-01-13 NOTE — PROGRESS NOTE ADULT - SUBJECTIVE AND OBJECTIVE BOX
SUBJECTIVE:    Patient is a 79y old Male who presents with a chief complaint of Cardiac arrest (2020 00:07)    Currently admitted to medicine with the primary diagnosis of Cardiac arrest     Today is hospital day 1d. This morning he is resting comfortably in bed and reports no new issues or overnight events.     INTERVAL EVENTS:     PAST MEDICAL & SURGICAL HISTORY  Atrial fibrillation  Neuropathy  HLD (hyperlipidemia)  HTN (hypertension)  History of cholecystectomy  History of cholecystectomy      ALLERGIES:  No Known Allergies    MEDICATIONS:  STANDING MEDICATIONS  chlorhexidine 0.12% Liquid 15 milliLiter(s) Oral Mucosa two times a day  chlorhexidine 4% Liquid 1 Application(s) Topical <User Schedule>  fentaNYL   Infusion. 0.5 MICROgram(s)/kG/Hr IV Continuous <Continuous>  lactated ringers. 1000 milliLiter(s) IV Continuous <Continuous>  midazolam Infusion 0.02 mG/kG/Hr IV Continuous <Continuous>  norepinephrine Infusion 0.05 MICROgram(s)/kG/Min IV Continuous <Continuous>    PRN MEDICATIONS    VITALS:   T(F): 97.6  HR: 62  BP: 95/48  RR: 16  SpO2: 100%    LABS:                        11.3   9.84  )-----------( 169      ( 2020 04:30 )             34.7     01-13    141  |  102  |  24<H>  ----------------------------<  131<H>  4.3   |  29  |  0.9    Ca    8.5      2020 04:30  Mg     2.0         TPro  6.2  /  Alb  3.6  /  TBili  1.2  /  DBili  x   /  AST  32  /  ALT  24  /  AlkPhos  72  01-13    PT/INR - ( 2020 18:05 )   PT: 18.10 sec;   INR: 1.58 ratio         PTT - ( 2020 18:05 )  PTT:30.1 sec  Urinalysis Basic - ( 2020 17:20 )    Color: Light Yellow / Appearance: Clear / S.012 / pH: x  Gluc: x / Ketone: Negative  / Bili: Negative / Urobili: <2 mg/dL   Blood: x / Protein: 100 mg/dL / Nitrite: Negative   Leuk Esterase: Negative / RBC: 2 /HPF / WBC 3 /HPF   Sq Epi: x / Non Sq Epi: 2 /HPF / Bacteria: Negative      ABG - ( 2020 03:46 )  pH, Arterial: 7.38  pH, Blood: x     /  pCO2: 51    /  pO2: 120   / HCO3: 30    / Base Excess: 4.1   /  SaO2: 99                Creatine Kinase, Serum: 259 U/L <H> (20 @ 01:00)  Troponin T, Serum: 0.31 ng/mL <HH> (20 @ 01:00)  Troponin T, Serum: <0.01 ng/mL (20 @ 17:20)  Lactate, Blood: 4.9 mmol/L <HH> (20 @ 17:20)      CARDIAC MARKERS ( 2020 01:00 )  x     / 0.31 ng/mL / 259 U/L / x     / 12.3 ng/mL  CARDIAC MARKERS ( 2020 17:20 )  x     / <0.01 ng/mL / x     / x     / x          RADIOLOGY:    PHYSICAL EXAM:  GEN: No acute distress  PULM/CHEST: Clear to auscultation bilaterally, no rales, rhonchi or wheezes   CVS: Regular rate and rhythm, S1-S2, no murmurs  ABD: Soft, non-tender, non-distended, +BS  EXT: No edema  NEURO: AAOx3    Mora Catheter:   Indwelling Urethral Catheter:     Connect To:  Straight Drainage/Gravity    Indication:  Improved Comfort Care (20 @ 18:26) (not performed) [active]  Indwelling Urethral Catheter:     Connect To:  Straight Drainage/Gravity    Indication:  Improved Comfort Care (20 @ 06:14) (not performed) [active] SUBJECTIVE:    Patient is a 79y old Male who presents with a chief complaint of Cardiac arrest (2020 00:07)    Currently admitted to medicine with the primary diagnosis of Cardiac arrest     Today is hospital day 1d. This morning he is resting comfortably in bed and reports no new issues or overnight events.     INTERVAL EVENTS: Patient is wake and alert. Extubated and following commands    PAST MEDICAL & SURGICAL HISTORY  Atrial fibrillation  Neuropathy  HLD (hyperlipidemia)  HTN (hypertension)  History of cholecystectomy  History of cholecystectomy      ALLERGIES:  No Known Allergies    MEDICATIONS:  STANDING MEDICATIONS  chlorhexidine 0.12% Liquid 15 milliLiter(s) Oral Mucosa two times a day  chlorhexidine 4% Liquid 1 Application(s) Topical <User Schedule>  fentaNYL   Infusion. 0.5 MICROgram(s)/kG/Hr IV Continuous <Continuous>  lactated ringers. 1000 milliLiter(s) IV Continuous <Continuous>  midazolam Infusion 0.02 mG/kG/Hr IV Continuous <Continuous>  norepinephrine Infusion 0.05 MICROgram(s)/kG/Min IV Continuous <Continuous>    PRN MEDICATIONS    VITALS:   T(F): 97.6  HR: 62  BP: 95/48  RR: 16  SpO2: 100%    LABS:                        11.3   9.84  )-----------( 169      ( 2020 04:30 )             34.7     01-13    141  |  102  |  24<H>  ----------------------------<  131<H>  4.3   |  29  |  0.9    Ca    8.5      2020 04:30  Mg     2.0     01-13    TPro  6.2  /  Alb  3.6  /  TBili  1.2  /  DBili  x   /  AST  32  /  ALT  24  /  AlkPhos  72  01-13    PT/INR - ( 2020 18:05 )   PT: 18.10 sec;   INR: 1.58 ratio         PTT - ( 2020 18:05 )  PTT:30.1 sec  Urinalysis Basic - ( 2020 17:20 )    Color: Light Yellow / Appearance: Clear / S.012 / pH: x  Gluc: x / Ketone: Negative  / Bili: Negative / Urobili: <2 mg/dL   Blood: x / Protein: 100 mg/dL / Nitrite: Negative   Leuk Esterase: Negative / RBC: 2 /HPF / WBC 3 /HPF   Sq Epi: x / Non Sq Epi: 2 /HPF / Bacteria: Negative      ABG - ( 2020 03:46 )  pH, Arterial: 7.38  pH, Blood: x     /  pCO2: 51    /  pO2: 120   / HCO3: 30    / Base Excess: 4.1   /  SaO2: 99                Creatine Kinase, Serum: 259 U/L <H> (20 @ 01:00)  Troponin T, Serum: 0.31 ng/mL <HH> (20 @ 01:00)  Troponin T, Serum: <0.01 ng/mL (20 @ 17:20)  Lactate, Blood: 4.9 mmol/L <HH> (20 @ 17:20)      CARDIAC MARKERS ( 2020 01:00 )  x     / 0.31 ng/mL / 259 U/L / x     / 12.3 ng/mL  CARDIAC MARKERS ( 2020 17:20 )  x     / <0.01 ng/mL / x     / x     / x          RADIOLOGY:    PHYSICAL EXAM:  GEN: No acute distress  PULM/CHEST: Clear to auscultation bilaterally, no rales, rhonchi or wheezes   CVS: Irregularly irregular rhythm, S1-S2, no murmurs  ABD: Soft, non-tender, non-distended, +BS  EXT: No edema  NEURO: AAOx3    Mora Catheter:   Indwelling Urethral Catheter:     Connect To:  Straight Drainage/Gravity    Indication:  Improved Comfort Care (20 @ 18:26) (not performed) [active]  Indwelling Urethral Catheter:     Connect To:  Straight Drainage/Gravity    Indication:  Improved Comfort Care (20 @ 06:14) (not performed) [active]

## 2020-01-13 NOTE — CONSULT NOTE ADULT - ASSESSMENT
---------------------------------------------------------------------------------------    ASSESSMENT:  79y Male with PMH significant for HTN, atrial fibrillation (on Eliquis), CHF, HLD, was brought in by EMS for cardiac arrest x 2 s/p resuscitation and intubation, with 4.4cm retroesophageal soft tissue density within the thorax, likely hematoma s/p extubation    PLAN:   ·	Keep NPO until esophagram  ·	Attending to see  --------------------------------------------------------------------------------------

## 2020-01-13 NOTE — CONSULT NOTE ADULT - ASSESSMENT
IMPRESSION:  S/P CP arrest/ PEA/ 10 MIN AWAKE FOLLOWS COMMANDS  RETROESOPHAGEAL HEMATOMA??    PLAN:    CNS: SAT, id needed precedex    HEENT:  Oral care    PULMONARY:  HOB @ 45 degrees, SBT, CT sc EVAL    CARDIOVASCULAR: CE, echo, cardio eval, taper pressors    GI: GI prophylaxis                                          Feeding NPO    RENAL:  F/u  lytes.  Correct as needed. accurate I/O    INFECTIOUS DISEASE: PANCX    HEMATOLOGICAL:  DVT prophylaxis.    ENDOCRINE:  Follow up FS.  Insulin protocol if needed.    CODE STATUS: FULL CODE    DISPOSITION: Pt requires continued monitoring in the MICU    poor prognosis

## 2020-01-13 NOTE — CONSULT NOTE ADULT - SUBJECTIVE AND OBJECTIVE BOX
Consultation Note  =====================================================    HPI: 79y Male with PMH significant for HTN, atrial fibrillation (on Eliquis), CHF, HLD, was brought in by EMS for cardiac arrest x 2 s/p resuscitation and intubation (initial rhythms asystole followed by PEA). ECG- reviewed (no significant ST elevation). Patient was extubated today and seen by Dr. Laws. Bedside echo shows EF of 60% and normal wall motion. Dr. Bill is of the opinion that the patient likely had Ventricular fibrillation and ended up with PEA and will need a cath once he is stabilized. He is weaning off levophed support. He was noted to have bloody sputum, CTA was performed which showed a 4.4cm retroesophageal soft tissue density within the thorax, likely hematoma. Thoracic surgery consulted for management. Patient currently denies chest pain, shortness of breath, difficulty swallowing. He is NPO, awaiting esophagram and speech and swallow evaluation. He notes that he has had bloody sputum during the day and phlegm s/p extubation.     PAST MEDICAL & SURGICAL HISTORY:  Atrial fibrillation  Neuropathy  HLD (hyperlipidemia)  HTN (hypertension)  History of cholecystectomy  History of cholecystectomy    Home Meds: Home Medications:  Eliquis 5 mg oral tablet: 1 tab(s) orally 2 times a day (2020 17:38)    Allergies: Allergies  No Known Allergies    ROS:    REVIEW OF SYSTEMS    [X] A ten-point review of systems was otherwise negative except as noted.  [ ] Due to altered mental status/intubation, subjective information were not able to be obtained from the patient. History was obtained, to the extent possible, from review of the chart and collateral sources of information.    CURRENT MEDICATIONS:   --------------------------------------------------------------------------------------  Neurologic Medications  dexMEDEtomidine Infusion 0.2 MICROgram(s)/kG/Hr IV Continuous <Continuous>  fentaNYL   Infusion. 0.5 MICROgram(s)/kG/Hr IV Continuous <Continuous>    Respiratory Medications    Cardiovascular Medications    Gastrointestinal Medications  lactated ringers. 1000 milliLiter(s) IV Continuous <Continuous>    Genitourinary Medications    Hematologic/Oncologic Medications  heparin  Injectable 5000 Unit(s) SubCutaneous every 8 hours    Antimicrobial/Immunologic Medications    Endocrine/Metabolic Medications    Topical/Other Medications  chlorhexidine 0.12% Liquid 15 milliLiter(s) Oral Mucosa two times a day  chlorhexidine 4% Liquid 1 Application(s) Topical <User Schedule>    --------------------------------------------------------------------------------------    VITAL SIGNS, INS/OUTS (last 24 hours):  --------------------------------------------------------------------------------------  T(C): 36.1 (2020 12:00), Max: 37.1 (2020 08:00)  T(F): 97 (2020 12:00), Max: 98.8 (2020 08:00)  HR: 68 (2020 16:00) (45 - 125)  BP: 121/71 (2020 16:00) (67/51 - 181/127)  BP(mean): 90 (2020 16:00) (50 - 100)  RR: 20 (2020 16:00) (14 - 31)  SpO2: 98% (2020 16:00) (96% - 100%)    I&O's Summary    2020 07:01  -  2020 07:00  --------------------------------------------------------  IN: 885.9 mL / OUT: 885 mL / NET: 0.9 mL    2020 07:01  -  2020 17:39  --------------------------------------------------------  IN: 1559 mL / OUT: 350 mL / NET: 1209 mL      --------------------------------------------------------------------------------------  PHYSICAL EXAM  General: NAD, AAOx3, calm and cooperative  HEENT: NCAT, NARENDRA, EOMI, Trachea ML, Neck supple  Cardiac: RRR S1, S2, no Murmurs, rubs or gallops  Respiratory: CTAB, normal respiratory effort, breath sounds equal BL, no wheeze, rhonchi or crackles  Abdomen: Soft, non-distended, non-tender, +bowel sounds  Musculoskeletal: Strength 5/5 BL UE/LE, ROM intact, compartments soft  Neuro: Sensation grossly intact and equal throughout, CN II-XII intact, no focal deficits  Vascular: Pulses 2+ throughout, extremities well perfused      LABS  --------------------------------------------------------------------------------------  Labs:  CAPILLARY BLOOD GLUCOSE      POCT Blood Glucose.: 113 mg/dL (2020 00:16)                          11.3   9.84  )-----------( 169      ( 2020 04:30 )             34.7       Auto Neutrophil %: 83.9 % (20 @ 04:30)  Auto Immature Granulocyte %: 0.5 % (20 @ 04:30)        141  |  102  |  24<H>  ----------------------------<  131<H>  4.3   |  29  |  0.9      Calcium, Total Serum: 8.5 mg/dL (20 @ 04:30)      LFTs:             6.2  | 1.2  | 32       ------------------[72      ( 2020 04:30 )  3.6  | x    | 24             Blood Gas Arterial, Lactate: 1.0 mmoL/L (20 @ 09:34)  Blood Gas Arterial, Lactate: 0.9 mmoL/L (20 @ 03:46)  Blood Gas Arterial, Lactate: 1.5 mmoL/L (20 @ 20:07)  Blood Gas Venous - Lactate: 3.9 mmoL/L (20 @ 18:26)  Lactate, Blood: 4.9 mmol/L (20 @ 17:20)    ABG - ( 2020 09:34 )  pH: 7.38  /  pCO2: 51    /  pO2: 92    / HCO3: 30    / Base Excess: 4.4   /  SaO2: 97        ABG - ( 2020 03:46 )  pH: 7.38  /  pCO2: 51    /  pO2: 120   / HCO3: 30    / Base Excess: 4.1   /  SaO2: 99        ABG - ( 2020 20:07 )  pH: 7.42  /  pCO2: 45    /  pO2: 237   / HCO3: 29    / Base Excess: ?4.0  /  SaO2: ?98.8     Coags:     18.10  ----< 1.58    ( 2020 18:05 )     30.1        CARDIAC MARKERS ( 2020 06:24 )  x     / 0.18 ng/mL / 248 U/L / x     / 10.0 ng/mL  CARDIAC MARKERS ( 2020 01:00 )  x     / 0.31 ng/mL / 259 U/L / x     / 12.3 ng/mL  CARDIAC MARKERS ( 2020 17:20 )  x     / <0.01 ng/mL / x     / x     / x        Serum Pro-Brain Natriuretic Peptide: 1767 pg/mL (20 @ 17:20)    Drug Screen W/PCP, Urine: Done (20 @ 01:00)    Urinalysis Basic - ( 2020 17:20 )    Color: Light Yellow / Appearance: Clear / S.012 / pH: x  Gluc: x / Ketone: Negative  / Bili: Negative / Urobili: <2 mg/dL   Blood: x / Protein: 100 mg/dL / Nitrite: Negative   Leuk Esterase: Negative / RBC: 2 /HPF / WBC 3 /HPF   Sq Epi: x / Non Sq Epi: 2 /HPF / Bacteria: Negative    --------------------------------------------------------------------------------------  IMAGING RESULTS    < from: CT Angio Chest w/ IV Cont (20 @ 03:06) >  1. No central or lobar pulmonary embolus.    2.  Small bibasilar consolidated opacities, possibly representing atelectasis or sequela of prior aspiration event.    3.  A 4.4 cm retroesophageal soft tissue density within the thorax may represent hematoma.    4.  No evidence of acute/inflammatory process within the abdomen or pelvis.    < end of copied text >

## 2020-01-14 LAB
ALBUMIN SERPL ELPH-MCNC: 3.6 G/DL — SIGNIFICANT CHANGE UP (ref 3.5–5.2)
ALP SERPL-CCNC: 74 U/L — SIGNIFICANT CHANGE UP (ref 30–115)
ALT FLD-CCNC: 21 U/L — SIGNIFICANT CHANGE UP (ref 0–41)
ANION GAP SERPL CALC-SCNC: 11 MMOL/L — SIGNIFICANT CHANGE UP (ref 7–14)
AST SERPL-CCNC: 25 U/L — SIGNIFICANT CHANGE UP (ref 0–41)
BASOPHILS # BLD AUTO: 0.01 K/UL — SIGNIFICANT CHANGE UP (ref 0–0.2)
BASOPHILS NFR BLD AUTO: 0.1 % — SIGNIFICANT CHANGE UP (ref 0–1)
BILIRUB SERPL-MCNC: 1.7 MG/DL — HIGH (ref 0.2–1.2)
BUN SERPL-MCNC: 16 MG/DL — SIGNIFICANT CHANGE UP (ref 10–20)
CALCIUM SERPL-MCNC: 8.7 MG/DL — SIGNIFICANT CHANGE UP (ref 8.5–10.1)
CHLORIDE SERPL-SCNC: 102 MMOL/L — SIGNIFICANT CHANGE UP (ref 98–110)
CHOLEST SERPL-MCNC: 129 MG/DL — SIGNIFICANT CHANGE UP (ref 100–200)
CO2 SERPL-SCNC: 29 MMOL/L — SIGNIFICANT CHANGE UP (ref 17–32)
CREAT SERPL-MCNC: 0.8 MG/DL — SIGNIFICANT CHANGE UP (ref 0.7–1.5)
EOSINOPHIL # BLD AUTO: 0.02 K/UL — SIGNIFICANT CHANGE UP (ref 0–0.7)
EOSINOPHIL NFR BLD AUTO: 0.2 % — SIGNIFICANT CHANGE UP (ref 0–8)
GLUCOSE BLDC GLUCOMTR-MCNC: 129 MG/DL — HIGH (ref 70–99)
GLUCOSE SERPL-MCNC: 132 MG/DL — HIGH (ref 70–99)
HCT VFR BLD CALC: 34.2 % — LOW (ref 42–52)
HCT VFR BLD CALC: 34.2 % — LOW (ref 42–52)
HDLC SERPL-MCNC: 45 MG/DL — SIGNIFICANT CHANGE UP
HGB BLD-MCNC: 10.7 G/DL — LOW (ref 14–18)
HGB BLD-MCNC: 10.8 G/DL — LOW (ref 14–18)
IMM GRANULOCYTES NFR BLD AUTO: 0.5 % — HIGH (ref 0.1–0.3)
LIPID PNL WITH DIRECT LDL SERPL: 69 MG/DL — SIGNIFICANT CHANGE UP (ref 4–129)
LYMPHOCYTES # BLD AUTO: 0.51 K/UL — LOW (ref 1.2–3.4)
LYMPHOCYTES # BLD AUTO: 5 % — LOW (ref 20.5–51.1)
MAGNESIUM SERPL-MCNC: 1.9 MG/DL — SIGNIFICANT CHANGE UP (ref 1.8–2.4)
MCHC RBC-ENTMCNC: 31.3 G/DL — LOW (ref 32–37)
MCHC RBC-ENTMCNC: 31.6 G/DL — LOW (ref 32–37)
MCHC RBC-ENTMCNC: 31.9 PG — HIGH (ref 27–31)
MCHC RBC-ENTMCNC: 32.1 PG — HIGH (ref 27–31)
MCV RBC AUTO: 101.8 FL — HIGH (ref 80–94)
MCV RBC AUTO: 102.1 FL — HIGH (ref 80–94)
MONOCYTES # BLD AUTO: 0.81 K/UL — HIGH (ref 0.1–0.6)
MONOCYTES NFR BLD AUTO: 8 % — SIGNIFICANT CHANGE UP (ref 1.7–9.3)
NEUTROPHILS # BLD AUTO: 8.77 K/UL — HIGH (ref 1.4–6.5)
NEUTROPHILS NFR BLD AUTO: 86.2 % — HIGH (ref 42.2–75.2)
NRBC # BLD: 0 /100 WBCS — SIGNIFICANT CHANGE UP (ref 0–0)
NRBC # BLD: 0 /100 WBCS — SIGNIFICANT CHANGE UP (ref 0–0)
PLATELET # BLD AUTO: 124 K/UL — LOW (ref 130–400)
PLATELET # BLD AUTO: 129 K/UL — LOW (ref 130–400)
POTASSIUM SERPL-MCNC: 4.4 MMOL/L — SIGNIFICANT CHANGE UP (ref 3.5–5)
POTASSIUM SERPL-SCNC: 4.4 MMOL/L — SIGNIFICANT CHANGE UP (ref 3.5–5)
PROT SERPL-MCNC: 6.2 G/DL — SIGNIFICANT CHANGE UP (ref 6–8)
RBC # BLD: 3.35 M/UL — LOW (ref 4.7–6.1)
RBC # BLD: 3.36 M/UL — LOW (ref 4.7–6.1)
RBC # FLD: 14.9 % — HIGH (ref 11.5–14.5)
RBC # FLD: 15 % — HIGH (ref 11.5–14.5)
SODIUM SERPL-SCNC: 142 MMOL/L — SIGNIFICANT CHANGE UP (ref 135–146)
TOTAL CHOLESTEROL/HDL RATIO MEASUREMENT: 2.9 RATIO — LOW (ref 4–5.5)
TRIGL SERPL-MCNC: 99 MG/DL — SIGNIFICANT CHANGE UP (ref 10–149)
WBC # BLD: 10.08 K/UL — SIGNIFICANT CHANGE UP (ref 4.8–10.8)
WBC # BLD: 10.17 K/UL — SIGNIFICANT CHANGE UP (ref 4.8–10.8)
WBC # FLD AUTO: 10.08 K/UL — SIGNIFICANT CHANGE UP (ref 4.8–10.8)
WBC # FLD AUTO: 10.17 K/UL — SIGNIFICANT CHANGE UP (ref 4.8–10.8)

## 2020-01-14 PROCEDURE — 72125 CT NECK SPINE W/O DYE: CPT | Mod: 26

## 2020-01-14 PROCEDURE — 71045 X-RAY EXAM CHEST 1 VIEW: CPT | Mod: 26

## 2020-01-14 PROCEDURE — 99232 SBSQ HOSP IP/OBS MODERATE 35: CPT

## 2020-01-14 PROCEDURE — 93010 ELECTROCARDIOGRAM REPORT: CPT

## 2020-01-14 RX ORDER — FUROSEMIDE 40 MG
20 TABLET ORAL ONCE
Refills: 0 | Status: COMPLETED | OUTPATIENT
Start: 2020-01-14 | End: 2020-01-14

## 2020-01-14 RX ORDER — SODIUM CHLORIDE 9 MG/ML
1000 INJECTION, SOLUTION INTRAVENOUS
Refills: 0 | Status: DISCONTINUED | OUTPATIENT
Start: 2020-01-14 | End: 2020-01-15

## 2020-01-14 RX ADMIN — CHLORHEXIDINE GLUCONATE 1 APPLICATION(S): 213 SOLUTION TOPICAL at 05:49

## 2020-01-14 RX ADMIN — HEPARIN SODIUM 5000 UNIT(S): 5000 INJECTION INTRAVENOUS; SUBCUTANEOUS at 05:49

## 2020-01-14 RX ADMIN — HEPARIN SODIUM 5000 UNIT(S): 5000 INJECTION INTRAVENOUS; SUBCUTANEOUS at 14:39

## 2020-01-14 RX ADMIN — Medication 20 MILLIGRAM(S): at 18:34

## 2020-01-14 RX ADMIN — HEPARIN SODIUM 5000 UNIT(S): 5000 INJECTION INTRAVENOUS; SUBCUTANEOUS at 21:48

## 2020-01-14 RX ADMIN — Medication 1 MILLIGRAM(S): at 16:05

## 2020-01-14 NOTE — PROGRESS NOTE ADULT - ASSESSMENT
IMPRESSION:  S/P CP arrest/ PEA/ 10 MIN AWAKE FOLLOWS COMMANDS  RETROESOPHAGEAL HEMATOMA possible fracture of cervical spine?  s/p extubation on 1/13/2020    PLAN:    CNS: avoid CNS depressants, CT cervical spine. consult neurosurgery if +v ct cervical spine.    HEENT:  Oral care    PULMONARY:  HOB @ 45 degrees, O2 keep sats>94%,  NIV at night.    CARDIOVASCULAR: off pressors, restart LR 50, EF normal.    GI: GI prophylaxis                                          Feeding NPO, esophagogram    RENAL:  F/u  lytes.  Correct as needed. accurate I/O    INFECTIOUS DISEASE: no abx    HEMATOLOGICAL:  DVT prophylaxis.    ENDOCRINE:  Follow up FS.  Insulin protocol if needed.    CODE STATUS: FULL CODE    DISPOSITION: downgrade to tele if OK by cardiology.    poor prognosis

## 2020-01-14 NOTE — PROGRESS NOTE ADULT - SUBJECTIVE AND OBJECTIVE BOX
SUBJECTIVE:    Patient is a 79y old Male who presents with a chief complaint of Cardiac arrest (2020 07:02)    Currently admitted to medicine with the primary diagnosis of Cardiac arrest     Today is hospital day 2d. This morning he is resting comfortably in bed and reports no new issues or overnight events.     INTERVAL EVENTS: Patient is alert and oriented. Will be kept NPO till esophagogram    PAST MEDICAL & SURGICAL HISTORY  Atrial fibrillation  Neuropathy  HLD (hyperlipidemia)  HTN (hypertension)  History of cholecystectomy  History of cholecystectomy      ALLERGIES:  No Known Allergies    MEDICATIONS:  STANDING MEDICATIONS  chlorhexidine 0.12% Liquid 15 milliLiter(s) Oral Mucosa two times a day  chlorhexidine 4% Liquid 1 Application(s) Topical <User Schedule>  heparin  Injectable 5000 Unit(s) SubCutaneous every 8 hours  lactated ringers. 1000 milliLiter(s) IV Continuous <Continuous>    PRN MEDICATIONS    VITALS:   T(F): 99.2  HR: 68  BP: 132/73  RR: 20  SpO2: 98%    LABS:                        10.7   10.17 )-----------( 129      ( 2020 04:20 )             34.2     01-14    142  |  102  |  16  ----------------------------<  132<H>  4.4   |  29  |  0.8    Ca    8.7      2020 04:20  Mg     1.9     01-14    TPro  6.2  /  Alb  3.6  /  TBili  1.7<H>  /  DBili  x   /  AST  25  /  ALT  21  /  AlkPhos  74  01-14    PT/INR - ( 2020 18:05 )   PT: 18.10 sec;   INR: 1.58 ratio         PTT - ( 2020 18:05 )  PTT:30.1 sec  Urinalysis Basic - ( 2020 17:20 )    Color: Light Yellow / Appearance: Clear / S.012 / pH: x  Gluc: x / Ketone: Negative  / Bili: Negative / Urobili: <2 mg/dL   Blood: x / Protein: 100 mg/dL / Nitrite: Negative   Leuk Esterase: Negative / RBC: 2 /HPF / WBC 3 /HPF   Sq Epi: x / Non Sq Epi: 2 /HPF / Bacteria: Negative      ABG - ( 2020 09:34 )  pH, Arterial: 7.38  pH, Blood: x     /  pCO2: 51    /  pO2: 92    / HCO3: 30    / Base Excess: 4.4   /  SaO2: 97                    Culture - Urine (collected 2020 17:20)  Source: .Urine Clean Catch (Midstream)  Final Report (2020 20:01):    No growth      CARDIAC MARKERS ( 2020 06:24 )  x     / 0.18 ng/mL / 248 U/L / x     / 10.0 ng/mL  CARDIAC MARKERS ( 2020 01:00 )  x     / 0.31 ng/mL / 259 U/L / x     / 12.3 ng/mL  CARDIAC MARKERS ( 2020 17:20 )  x     / <0.01 ng/mL / x     / x     / x          RADIOLOGY:    PHYSICAL EXAM:  GEN: No acute distress  PULM/CHEST: Clear to auscultation bilaterally, no rales, rhonchi or wheezes   CVS: Regular rate and rhythm, S1-S2, no murmurs  ABD: Soft, non-tender, non-distended, +BS  EXT: No edema  NEURO: AAOx3    Mora Catheter:   Indwelling Urethral Catheter:     Connect To:  Straight Drainage/Gravity    Indication:  Improved Comfort Care (20 @ 18:26) (not performed) [active]

## 2020-01-14 NOTE — SBIRT NOTE ADULT - NSSBIRTALCPOSREINDET_GEN_A_CORE
SW spoke with patient about the negative effects on his health as a result of drinking alcohol in excess.

## 2020-01-14 NOTE — PROGRESS NOTE ADULT - SUBJECTIVE AND OBJECTIVE BOX
Patient was seen and examined in CCU. Spoke with RN. Chart reviewed.  Off vent; awake and alert, in good spirits  Vital Signs Last 24 Hrs  T(F): 99.2 (2020 04:00), Max: 100 (2020 20:00)  HR: 68 (2020 06:00) (64 - 78)  BP: 132/73 (2020 06:00) (96/56 - 140/71)  SpO2: 98% (2020 06:00) (95% - 100%)  MEDICATIONS  (STANDING):  chlorhexidine 0.12% Liquid 15 milliLiter(s) Oral Mucosa two times a day  chlorhexidine 4% Liquid 1 Application(s) Topical <User Schedule>  heparin  Injectable 5000 Unit(s) SubCutaneous every 8 hours  lactated ringers. 1000 milliLiter(s) (50 mL/Hr) IV Continuous <Continuous>    MEDICATIONS  (PRN):    Labs:                        10.7   10.17 )-----------( 129      ( 2020 04:20 )             34.2                         10.8   10.08 )-----------( 124      ( 2020 23:50 )             34.2     2020 04:20    142    |  102    |  16     ----------------------------<  132    4.4     |  29     |  0.8    2020 04:30    141    |  102    |  24     ----------------------------<  131    4.3     |  29     |  0.9      Ca    8.7        2020 04:20  Ca    8.5        2020 04:30  Mg     1.9       2020 04:20  Mg     2.0       2020 04:30    TPro  6.2    /  Alb  3.6    /  TBili  1.7    /  DBili  x      /  AST  25     /  ALT  21     /  AlkPhos  74     2020 04:20  TPro  6.2    /  Alb  3.6    /  TBili  1.2    /  DBili  x      /  AST  32     /  ALT  24     /  AlkPhos  72     2020 04:30    PT/INR - ( 2020 18:05 )   PT: 18.10 sec;   INR: 1.58 ratio         PTT - ( 2020 18:05 )  PTT:30.1 sec  Urinalysis Basic - ( 2020 17:20 )    Color: Light Yellow / Appearance: Clear / S.012 / pH: x  Gluc: x / Ketone: Negative  / Bili: Negative / Urobili: <2 mg/dL   Blood: x / Protein: 100 mg/dL / Nitrite: Negative   Leuk Esterase: Negative / RBC: 2 /HPF / WBC 3 /HPF   Sq Epi: x / Non Sq Epi: 2 /HPF / Bacteria: Negative        Culture - Urine (collected 2020 17:20)  Source: .Urine Clean Catch (Midstream)  Final Report (2020 20:01):    No growth      General: comfortable, NAD  Neurology: A&Ox3, nonfocal  Head:  Normocephalic, atraumatic  ENT:  Mucosa moist, no ulcerations  Neck:  Supple, no JVD,   Skin: no breakdowns (as per RN)  Resp: CTA B/L  CV: RRR, S1S2,   GI: Soft, NT, bowel sounds  MS: No edema, + peripheral pulses, FROM all 4 extremity      A/P:  80 yo M with hx of HTN, A.fib (Eliquis), CHF, HLD brought in by EMS post cardiac arrest- now in CCU on vent on pressors with likley cardiogenic shock; now with remarkable improvement- off vent, off pressors, awake and alert noted with 4.4cm retroesophageal soft tissue density within the thorax, likely hematoma s/p extubation    esophagram today    CTS f/u    bipap as needed    echo    cardiology f/u     plan as per cardio/ CCU team    PCP Dr Durand updated  DVT prophylaxis  Decubitus prevention- all measures as per RN protocol  Please call or text me with any questions or updates

## 2020-01-14 NOTE — PROGRESS NOTE ADULT - SUBJECTIVE AND OBJECTIVE BOX
Over Night Events:  no events overnight.      ROS:  See HPI    PHYSICAL EXAM    ICU Vital Signs Last 24 Hrs  T(C): 36.7 (2020 08:00), Max: 37.8 (2020 20:00)  T(F): 98 (2020 08:00), Max: 100 (2020 20:00)  HR: 74 (2020 08:00) (64 - 78)  BP: 134/78 (2020 08:00) (96/56 - 134/78)  BP(mean): 104 (2020 08:00) (68 - 105)  RR: 21 (2020 08:00) (15 - 31)  SpO2: 97% (2020 08:00) (95% - 100%)      General: NARD  HEENT: NARENDRA             Lungs: Bilateral BS  Cardiovascular: irregular   Abdomen: Soft, Positive BS  Extremities: No clubbing   Skin: Warm  Neurological: Non focal, AAOx3      20 @ 07:01  -  20 @ 07:00  --------------------------------------------------------  IN:    dextrose 5%.: 225 mL    fentaNYL Infusion.: 14 mL    lactated ringers.: 600 mL    lactated ringers.: 1300 mL    norepinephrine Infusion: 20 mL  Total IN: 2159 mL    OUT:    Incontinent per Collection Ba mL    Indwelling Catheter - Urethral: 350 mL    Voided: 200 mL  Total OUT: 950 mL    Total NET: 1209 mL          LABS:                          10.7   10.17 )-----------( 129      ( 2020 04:20 )             34.2                                                   142  |  102  |  16  ----------------------------<  132<H>  4.4   |  29  |  0.8    Ca    8.7      2020 04:20  Mg     1.9         TPro  6.2  /  Alb  3.6  /  TBili  1.7<H>  /  DBili  x   /  AST  25  /  ALT  21  /  AlkPhos  74        PT/INR - ( 2020 18:05 )   PT: 18.10 sec;   INR: 1.58 ratio         PTT - ( 2020 18:05 )  PTT:30.1 sec                                       Urinalysis Basic - ( 2020 17:20 )    Color: Light Yellow / Appearance: Clear / S.012 / pH: x  Gluc: x / Ketone: Negative  / Bili: Negative / Urobili: <2 mg/dL   Blood: x / Protein: 100 mg/dL / Nitrite: Negative   Leuk Esterase: Negative / RBC: 2 /HPF / WBC 3 /HPF   Sq Epi: x / Non Sq Epi: 2 /HPF / Bacteria: Negative        CARDIAC MARKERS ( 2020 06:24 )  x     / 0.18 ng/mL / 248 U/L / x     / 10.0 ng/mL  CARDIAC MARKERS ( 2020 01:00 )  x     / 0.31 ng/mL / 259 U/L / x     / 12.3 ng/mL  CARDIAC MARKERS ( 2020 17:20 )  x     / <0.01 ng/mL / x     / x     / x                                                LIVER FUNCTIONS - ( 2020 04:20 )  Alb: 3.6 g/dL / Pro: 6.2 g/dL / ALK PHOS: 74 U/L / ALT: 21 U/L / AST: 25 U/L / GGT: x                                                  Culture - Urine (collected 2020 17:20)  Source: .Urine Clean Catch (Midstream)  Final Report (2020 20:01):    No growth                                                                                       ABG - ( 2020 09:34 )  pH, Arterial: 7.38  pH, Blood: x     /  pCO2: 51    /  pO2: 92    / HCO3: 30    / Base Excess: 4.4   /  SaO2: 97                  MEDICATIONS  (STANDING):  chlorhexidine 0.12% Liquid 15 milliLiter(s) Oral Mucosa two times a day  chlorhexidine 4% Liquid 1 Application(s) Topical <User Schedule>  heparin  Injectable 5000 Unit(s) SubCutaneous every 8 hours  lactated ringers. 1000 milliLiter(s) (50 mL/Hr) IV Continuous <Continuous>    MEDICATIONS  (PRN):      Xrays:                                                                                     ECHO Over Night Events:  no events overnight. s/p extubation      ROS:  See HPI    PHYSICAL EXAM    ICU Vital Signs Last 24 Hrs  T(C): 36.7 (2020 08:00), Max: 37.8 (2020 20:00)  T(F): 98 (2020 08:00), Max: 100 (2020 20:00)  HR: 74 (2020 08:00) (64 - 78)  BP: 134/78 (2020 08:00) (96/56 - 134/78)  BP(mean): 104 (2020 08:00) (68 - 105)  RR: 21 (2020 08:00) (15 - 31)  SpO2: 97% (2020 08:00) (95% - 100%)      General: NARD  HEENT: NARENDRA             Lungs: Bilateral BS, dec bs both bases  Cardiovascular: irregular , EDMUND 3/6  Abdomen: Soft, Positive BS  Extremities: No clubbing   Skin: Warm  Neurological: Non focal, AAOx3      20 @ 07:01  -  20 @ 07:00  --------------------------------------------------------  IN:    dextrose 5%.: 225 mL    fentaNYL Infusion.: 14 mL    lactated ringers.: 600 mL    lactated ringers.: 1300 mL    norepinephrine Infusion: 20 mL  Total IN: 2159 mL    OUT:    Incontinent per Collection Ba mL    Indwelling Catheter - Urethral: 350 mL    Voided: 200 mL  Total OUT: 950 mL    Total NET: 1209 mL          LABS:                          10.7   10.17 )-----------( 129      ( 2020 04:20 )             34.2                                               -14    142  |  102  |  16  ----------------------------<  132<H>  4.4   |  29  |  0.8    Ca    8.7      2020 04:20  Mg     1.9         TPro  6.2  /  Alb  3.6  /  TBili  1.7<H>  /  DBili  x   /  AST  25  /  ALT  21  /  AlkPhos  74        PT/INR - ( 2020 18:05 )   PT: 18.10 sec;   INR: 1.58 ratio         PTT - ( 2020 18:05 )  PTT:30.1 sec                                       Urinalysis Basic - ( 2020 17:20 )    Color: Light Yellow / Appearance: Clear / S.012 / pH: x  Gluc: x / Ketone: Negative  / Bili: Negative / Urobili: <2 mg/dL   Blood: x / Protein: 100 mg/dL / Nitrite: Negative   Leuk Esterase: Negative / RBC: 2 /HPF / WBC 3 /HPF   Sq Epi: x / Non Sq Epi: 2 /HPF / Bacteria: Negative        CARDIAC MARKERS ( 2020 06:24 )  x     / 0.18 ng/mL / 248 U/L / x     / 10.0 ng/mL  CARDIAC MARKERS ( 2020 01:00 )  x     / 0.31 ng/mL / 259 U/L / x     / 12.3 ng/mL  CARDIAC MARKERS ( 2020 17:20 )  x     / <0.01 ng/mL / x     / x     / x                                                LIVER FUNCTIONS - ( 2020 04:20 )  Alb: 3.6 g/dL / Pro: 6.2 g/dL / ALK PHOS: 74 U/L / ALT: 21 U/L / AST: 25 U/L / GGT: x                                                  Culture - Urine (collected 2020 17:20)  Source: .Urine Clean Catch (Midstream)  Final Report (2020 20:01):    No growth                                                                                       ABG - ( 2020 09:34 )  pH, Arterial: 7.38  pH, Blood: x     /  pCO2: 51    /  pO2: 92    / HCO3: 30    / Base Excess: 4.4   /  SaO2: 97                  MEDICATIONS  (STANDING):  chlorhexidine 0.12% Liquid 15 milliLiter(s) Oral Mucosa two times a day  chlorhexidine 4% Liquid 1 Application(s) Topical <User Schedule>  heparin  Injectable 5000 Unit(s) SubCutaneous every 8 hours  lactated ringers. 1000 milliLiter(s) (50 mL/Hr) IV Continuous <Continuous>    MEDICATIONS  (PRN):      Xrays: reviewed

## 2020-01-14 NOTE — PROGRESS NOTE ADULT - SUBJECTIVE AND OBJECTIVE BOX
GENERAL SURGERY PROGRESS NOTE     MED IVORY  79y  Male  Hospital day :2d  POD:  Procedure:   OVERNIGHT EVENTS: Uneventful, no additional episodes of hemoptasis    T(F): 99.2 (20 @ 04:00), Max: 100 (20 @ 20:00)  HR: 72 (20 @ 06:00) (62 - 78)  BP: 132/73 (20 @ 06:00) (82/46 - 140/71)  ABP: --  ABP(mean): --  RR: 20 (20 @ 06:00) (15 - 31)  SpO2: 99% (20 @ 06:00) (95% - 100%)    DIET/FLUIDS: lactated ringers. 1000 milliLiter(s) IV Continuous <Continuous>    NG:                                                                                DRAINS:     BM:     EMESIS:     URINE:   20 @ 07:01  -  20 @ 07:00  --------------------------------------------------------  OUT: 435 mL       GI proph:    AC/ proph: heparin  Injectable 5000 Unit(s) SubCutaneous every 8 hours    ABx:     PHYSICAL EXAM:  GENERAL: NAD  CHEST/LUNG: Clear to auscultation bilaterally  HEART: Regular rate and rhythm  ABDOMEN: Soft, Nontender, Nondistended;   EXTREMITIES:  No clubbing, cyanosis, or edema      LABS  Labs:  CAPILLARY BLOOD GLUCOSE      POCT Blood Glucose.: 129 mg/dL (2020 04:18)                          10.7   10.17 )-----------( 129      ( 2020 04:20 )             34.2       Auto Immature Granulocyte %: 0.5 % (20 @ 04:20)  Auto Neutrophil %: 86.2 % (20 @ 04:20)        142  |  102  |  16  ----------------------------<  132<H>  4.4   |  29  |  0.8      Calcium, Total Serum: 8.7 mg/dL (01-14-20 @ 04:20)      LFTs:             6.2  | 1.7  | 25       ------------------[74      ( 2020 04:20 )  3.6  | x    | 21          Lipase:x      Amylase:x         Blood Gas Arterial, Lactate: 1.0 mmoL/L (20 @ 09:34)  Blood Gas Arterial, Lactate: 0.9 mmoL/L (20 @ 03:46)  Blood Gas Arterial, Lactate: 1.5 mmoL/L (20 @ 20:07)  Blood Gas Venous - Lactate: 3.9 mmoL/L (20 @ 18:26)  Lactate, Blood: 4.9 mmol/L (20 @ 17:20)    ABG - ( 2020 09:34 )  pH: 7.38  /  pCO2: 51    /  pO2: 92    / HCO3: 30    / Base Excess: 4.4   /  SaO2: 97              ABG - ( 2020 03:46 )  pH: 7.38  /  pCO2: 51    /  pO2: 120   / HCO3: 30    / Base Excess: 4.1   /  SaO2: 99              ABG - ( 2020 20:07 )  pH: 7.42  /  pCO2: 45    /  pO2: 237   / HCO3: 29    / Base Excess: ?4.0  /  SaO2: ?98.8             Coags:     18.10  ----< 1.58    ( 2020 18:05 )     30.1        CARDIAC MARKERS ( 2020 06:24 )  x     / 0.18 ng/mL / 248 U/L / x     / 10.0 ng/mL  CARDIAC MARKERS ( 2020 01:00 )  x     / 0.31 ng/mL / 259 U/L / x     / 12.3 ng/mL  CARDIAC MARKERS ( 2020 17:20 )  x     / <0.01 ng/mL / x     / x     / x          Serum Pro-Brain Natriuretic Peptide: 1767 pg/mL (20 @ 17:20)      Urinalysis Basic - ( 2020 17:20 )    Color: Light Yellow / Appearance: Clear / S.012 / pH: x  Gluc: x / Ketone: Negative  / Bili: Negative / Urobili: <2 mg/dL   Blood: x / Protein: 100 mg/dL / Nitrite: Negative   Leuk Esterase: Negative / RBC: 2 /HPF / WBC 3 /HPF   Sq Epi: x / Non Sq Epi: 2 /HPF / Bacteria: Negative        Culture - Urine (collected 2020 17:20)  Source: .Urine Clean Catch (Midstream)  Final Report (2020 20:01):    No growth          RADIOLOGY & ADDITIONAL TESTS:    PENDING CXR READ

## 2020-01-14 NOTE — CHART NOTE - NSCHARTNOTEFT_GEN_A_CORE
HPI:  78 yo M with hx of HTN, A.fib (Eliquis), CHF, HLD brought in by EMS post cardiac arrest. As per wife at bedside, patient at the basement watching movie and was doing fine. Few mins later, patient came up and told the wife that he wasn't feeling right and that he felt funny. Then he crashed. Wife called the EMS who arrived 5 mins later. Upon EMS arrival, patient was found to be in PEA then asystole, s/p resuscitation for ~ 10 mins and ROSC achieved after 1 round of compression and epi. While on the way to hospital, patient had another cardiac arrest on the ambulant s/p CPR and ROSC achieved few mins later. As per family, patient was doing welll and at baseline prior to the episode. As baseline patient is fully functional. Denied any recent infection, recent hospitalization, recent ED visit, fever, chills.     In ED, patient was found to bradycardic and hypotensive. s/p 1 dose of atropine and patient was started on low dose Levophed with improvement in HR and BP.    CCU Course:  Patient was intubated and shifted to CCU and in the morning of 01/13 patient was found to be alert and oriented x4 and was extubated. Patient was weaned off Levophed and started on fluids. Echo showed EF of 60-65% and patient is planned for a cath prior to being discharged. Patient was found to have a hematoma on the Chest CT for which MR Spine is ordered and patient will be kept NPO as per CT surgery till an esophagogram is done to rule out if hematoma is from esophageal perf. Patient is otherwise hemodynamically stable and alert and oriented.    Follow up:  - Cardiology follow up  - Follow MR Spine without contrast  - Esophagogram prior to Feeding  - Add Lipitor, Metoprolol once patient is cleared for esophageal perforation.  - Patient is not anticoagulated due to hematoma, if stable can resume after consultation with cardio for Afib HPI:  78 yo M with hx of HTN, A.fib (Eliquis), CHF, HLD brought in by EMS post cardiac arrest. As per wife at bedside, patient at the basement watching movie and was doing fine. Few mins later, patient came up and told the wife that he wasn't feeling right and that he felt funny. Then he crashed. Wife called the EMS who arrived 5 mins later. Upon EMS arrival, patient was found to be in PEA then asystole, s/p resuscitation for ~ 10 mins and ROSC achieved after 1 round of compression and epi. While on the way to hospital, patient had another cardiac arrest on the ambulant s/p CPR and ROSC achieved few mins later. As per family, patient was doing welll and at baseline prior to the episode. As baseline patient is fully functional. Denied any recent infection, recent hospitalization, recent ED visit, fever, chills.     In ED, patient was found to bradycardic and hypotensive. s/p 1 dose of atropine and patient was started on low dose Levophed with improvement in HR and BP.    CCU Course:  Patient was intubated and shifted to CCU and in the morning of 01/13 patient was found to be alert and oriented x4 and was extubated. Patient was weaned off Levophed and started on fluids. Echo showed EF of 60-65% and patient is planned for a cath prior to being discharged. Patient was found to have a fracture of cervical spine on CT Chest and  MR was recommended but the patient could not tolerate it due to anxiety. Neurosurgery recommend no further workup and want the patient to wear [place] J collar for a couple of weeks. Lovenox is restarted after clearance from Neurosurgery and patient will undergo Cath on Monday. Patient is clear for liquid diet and advance as tolerated as per CT Surgery.    Follow up:  - Cardiology follow up  - Catheterization on Monday  - Reorient for confusion, patient gets confused at nights  - Follow Na levels

## 2020-01-14 NOTE — PROGRESS NOTE ADULT - ASSESSMENT
78 yo M with hx of HTN, A.fib (Eliquis), CHF, HLD brought in by EMS post cardiac arrest. As per wife at bedside, patient at the basement watching movie and was doing fine. Few mins later, patient came up and told the wife that he wasn't feeling right and that he felt funny. Then he crashed. Wife called the EMS who arrived 5 mins later. Upon EMS arrival, patient was found to be in PEA, s/p resuscitation for 10-15 mins and ROSC achieved. While on the way to hospital, patient had another cardiac arrest in the ambulent s/p CPR and ROSC achieved few mins later. As per family, patient was doing welll and at baseline prior to the episode. As baseline patient is fully functional. Denied any recent infection, recent hospitalization, recent ED visit, fever, chills.    Patient was extubated today and seen by Dr. Bill. Bedside echo shows EF of 60% and normal wall motion. Dr. Bill is of the opinion that the patient likely had Ventricular fibrillation and ended up with PEA and will need a cath once he is stabilized. Resumed DVT prophylaxis    Will order barium esophagogram as per CT surgery before starting feeding to rule out any esophageal perforation.    # Cardiac arrest 2/2 PEA likely from arrythmia  - likely from Arrythmia  - CTH: No evidence of acute intracranial pathology. Chronic microvascular ischemic changes. 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  - CTA Chest negative  - Cardiac enzymes trending down  - Echo shows EF of 60-65% with no valvular and wall abnormalities  - Statin once cleared by speech and swallow    # Suspected Retroperitoneal Hematoma  - CT Surgery consulted and recommend Esophagogram  - Will hold Anticoagulation    # A.fib  - Stopped Eliquis will consider once cleared from suspicion of retroperitoneal hematoma by CT surgery  - check TSH    # HTN  - Holding medications  - On Levophed now    # HLD  -Will order lipid panel    DVT ppx: on heparin  GI ppx: PPI   Activity: Best rest  Diet: Speech and Swallow assessment 80 yo M with hx of HTN, A.fib (Eliquis), CHF, HLD brought in by EMS post cardiac arrest. As per wife at bedside, patient at the basement watching movie and was doing fine. Few mins later, patient came up and told the wife that he wasn't feeling right and that he felt funny. Then he crashed. Wife called the EMS who arrived 5 mins later. Upon EMS arrival, patient was found to be in PEA, s/p resuscitation for 10-15 mins and ROSC achieved. While on the way to hospital, patient had another cardiac arrest in the ambulent s/p CPR and ROSC achieved few mins later. As per family, patient was doing welll and at baseline prior to the episode. As baseline patient is fully functional. Denied any recent infection, recent hospitalization, recent ED visit, fever, chills.  Patient was extubated yesterday 1/13 and seen by Dr. Bill. Bedside echo shows EF of 60% and normal wall motion. Dr. Bill is of the opinion that the patient likely had Ventricular fibrillation and ended up with PEA and will need a cath once he is stabilized. Resumed DVT prophylaxis    Will order barium esophagogram as per CT surgery before starting feeding to rule out any esophageal perforation. Radiologist is of the opinion that the patient may have a cervical spine fracture and extravasation of blood from paraspinal vessels. So will order CT Cervical spine with IV contrast to rule that out.    Will consult neurosurgery if cervical spine fracture present. If cleared by Cardio will downgrade to the telemetry.    # Cardiac arrest 2/2 PEA likely from arrythmia  - likely from Arrythmia  - CTH: No evidence of acute intracranial pathology. Chronic microvascular ischemic changes. 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  - CTA Chest negative  - Cardiac enzymes trending down  - Echo shows EF of 60-65% with no valvular and wall abnormalities  - Statin once cleared by speech and swallow    # Suspected Retroperitoneal Hematoma  - CT Surgery consulted and recommend Esophagogram  - Will hold Anticoagulation  - Radiologist is of the opinion that the patient may have a cervical spine fracture and extravasation of blood from paraspinal vessels. - - CT Cervical spine with IV contrast ordered    # A.fib on eliquis  - Stopped Eliquis will consider once cleared from suspicion of retroperitoneal hematoma by CT surgery  - will check TSH    # HTN  - Holding medications  - Off pressors    # HLD  - Will order lipid panel    DVT ppx: on heparin 5000 q8  GI ppx: PPI   Activity: Best rest  Diet: Speech and Swallow assessment and esophagogram

## 2020-01-14 NOTE — PROGRESS NOTE ADULT - ASSESSMENT
79y Male with PMH significant for HTN, atrial fibrillation (on Eliquis), CHF, HLD, was brought in by EMS for cardiac arrest x 2 s/p resuscitation and intubation, with 4.4cm retroesophageal soft tissue density within the thorax, likely hematoma s/p extubation    PLAN:   Keep NPO until esophagram

## 2020-01-15 LAB
ALBUMIN SERPL ELPH-MCNC: 3.8 G/DL — SIGNIFICANT CHANGE UP (ref 3.5–5.2)
ALP SERPL-CCNC: 82 U/L — SIGNIFICANT CHANGE UP (ref 30–115)
ALT FLD-CCNC: 18 U/L — SIGNIFICANT CHANGE UP (ref 0–41)
ANION GAP SERPL CALC-SCNC: 16 MMOL/L — HIGH (ref 7–14)
APTT BLD: 32.1 SEC — SIGNIFICANT CHANGE UP (ref 27–39.2)
AST SERPL-CCNC: 22 U/L — SIGNIFICANT CHANGE UP (ref 0–41)
BASOPHILS # BLD AUTO: 0.01 K/UL — SIGNIFICANT CHANGE UP (ref 0–0.2)
BASOPHILS NFR BLD AUTO: 0.1 % — SIGNIFICANT CHANGE UP (ref 0–1)
BILIRUB SERPL-MCNC: 2.1 MG/DL — HIGH (ref 0.2–1.2)
BUN SERPL-MCNC: 24 MG/DL — HIGH (ref 10–20)
CALCIUM SERPL-MCNC: 9.3 MG/DL — SIGNIFICANT CHANGE UP (ref 8.5–10.1)
CHLORIDE SERPL-SCNC: 104 MMOL/L — SIGNIFICANT CHANGE UP (ref 98–110)
CO2 SERPL-SCNC: 27 MMOL/L — SIGNIFICANT CHANGE UP (ref 17–32)
CREAT SERPL-MCNC: 0.9 MG/DL — SIGNIFICANT CHANGE UP (ref 0.7–1.5)
EOSINOPHIL # BLD AUTO: 0 K/UL — SIGNIFICANT CHANGE UP (ref 0–0.7)
EOSINOPHIL NFR BLD AUTO: 0 % — SIGNIFICANT CHANGE UP (ref 0–8)
GLUCOSE BLDC GLUCOMTR-MCNC: 122 MG/DL — HIGH (ref 70–99)
GLUCOSE BLDC GLUCOMTR-MCNC: 128 MG/DL — HIGH (ref 70–99)
GLUCOSE SERPL-MCNC: 123 MG/DL — HIGH (ref 70–99)
HCT VFR BLD CALC: 35.3 % — LOW (ref 42–52)
HGB BLD-MCNC: 10.8 G/DL — LOW (ref 14–18)
IMM GRANULOCYTES NFR BLD AUTO: 0.6 % — HIGH (ref 0.1–0.3)
INR BLD: 1.57 RATIO — HIGH (ref 0.65–1.3)
LYMPHOCYTES # BLD AUTO: 0.51 K/UL — LOW (ref 1.2–3.4)
LYMPHOCYTES # BLD AUTO: 5.3 % — LOW (ref 20.5–51.1)
MAGNESIUM SERPL-MCNC: 1.9 MG/DL — SIGNIFICANT CHANGE UP (ref 1.8–2.4)
MCHC RBC-ENTMCNC: 30.6 G/DL — LOW (ref 32–37)
MCHC RBC-ENTMCNC: 31.7 PG — HIGH (ref 27–31)
MCV RBC AUTO: 103.5 FL — HIGH (ref 80–94)
MONOCYTES # BLD AUTO: 0.79 K/UL — HIGH (ref 0.1–0.6)
MONOCYTES NFR BLD AUTO: 8.2 % — SIGNIFICANT CHANGE UP (ref 1.7–9.3)
NEUTROPHILS # BLD AUTO: 8.32 K/UL — HIGH (ref 1.4–6.5)
NEUTROPHILS NFR BLD AUTO: 85.8 % — HIGH (ref 42.2–75.2)
NRBC # BLD: 0 /100 WBCS — SIGNIFICANT CHANGE UP (ref 0–0)
PLATELET # BLD AUTO: 134 K/UL — SIGNIFICANT CHANGE UP (ref 130–400)
POTASSIUM SERPL-MCNC: 4.3 MMOL/L — SIGNIFICANT CHANGE UP (ref 3.5–5)
POTASSIUM SERPL-SCNC: 4.3 MMOL/L — SIGNIFICANT CHANGE UP (ref 3.5–5)
PROT SERPL-MCNC: 6.7 G/DL — SIGNIFICANT CHANGE UP (ref 6–8)
PROTHROM AB SERPL-ACNC: 18 SEC — HIGH (ref 9.95–12.87)
RBC # BLD: 3.41 M/UL — LOW (ref 4.7–6.1)
RBC # FLD: 14.9 % — HIGH (ref 11.5–14.5)
SODIUM SERPL-SCNC: 147 MMOL/L — HIGH (ref 135–146)
TSH SERPL-MCNC: 0.97 UIU/ML — SIGNIFICANT CHANGE UP (ref 0.27–4.2)
WBC # BLD: 9.69 K/UL — SIGNIFICANT CHANGE UP (ref 4.8–10.8)
WBC # FLD AUTO: 9.69 K/UL — SIGNIFICANT CHANGE UP (ref 4.8–10.8)

## 2020-01-15 PROCEDURE — 99232 SBSQ HOSP IP/OBS MODERATE 35: CPT

## 2020-01-15 PROCEDURE — 71045 X-RAY EXAM CHEST 1 VIEW: CPT | Mod: 26

## 2020-01-15 PROCEDURE — 93010 ELECTROCARDIOGRAM REPORT: CPT

## 2020-01-15 PROCEDURE — 99221 1ST HOSP IP/OBS SF/LOW 40: CPT

## 2020-01-15 RX ORDER — SODIUM CHLORIDE 9 MG/ML
1000 INJECTION, SOLUTION INTRAVENOUS
Refills: 0 | Status: DISCONTINUED | OUTPATIENT
Start: 2020-01-15 | End: 2020-01-16

## 2020-01-15 RX ORDER — LOSARTAN POTASSIUM 100 MG/1
50 TABLET, FILM COATED ORAL DAILY
Refills: 0 | Status: DISCONTINUED | OUTPATIENT
Start: 2020-01-15 | End: 2020-01-16

## 2020-01-15 RX ORDER — METOPROLOL TARTRATE 50 MG
12.5 TABLET ORAL THREE TIMES A DAY
Refills: 0 | Status: DISCONTINUED | OUTPATIENT
Start: 2020-01-15 | End: 2020-01-16

## 2020-01-15 RX ORDER — CHLORHEXIDINE GLUCONATE 213 G/1000ML
15 SOLUTION TOPICAL
Refills: 0 | Status: DISCONTINUED | OUTPATIENT
Start: 2020-01-15 | End: 2020-01-15

## 2020-01-15 RX ORDER — ATORVASTATIN CALCIUM 80 MG/1
80 TABLET, FILM COATED ORAL AT BEDTIME
Refills: 0 | Status: DISCONTINUED | OUTPATIENT
Start: 2020-01-15 | End: 2020-01-31

## 2020-01-15 RX ADMIN — HEPARIN SODIUM 5000 UNIT(S): 5000 INJECTION INTRAVENOUS; SUBCUTANEOUS at 14:30

## 2020-01-15 RX ADMIN — Medication 2 MILLIGRAM(S): at 19:00

## 2020-01-15 RX ADMIN — Medication 1 MILLIGRAM(S): at 16:17

## 2020-01-15 RX ADMIN — SODIUM CHLORIDE 75 MILLILITER(S): 9 INJECTION, SOLUTION INTRAVENOUS at 14:30

## 2020-01-15 RX ADMIN — Medication 12.5 MILLIGRAM(S): at 21:26

## 2020-01-15 RX ADMIN — Medication 1 MILLIGRAM(S): at 12:08

## 2020-01-15 RX ADMIN — LOSARTAN POTASSIUM 50 MILLIGRAM(S): 100 TABLET, FILM COATED ORAL at 16:16

## 2020-01-15 RX ADMIN — HEPARIN SODIUM 5000 UNIT(S): 5000 INJECTION INTRAVENOUS; SUBCUTANEOUS at 05:12

## 2020-01-15 RX ADMIN — HEPARIN SODIUM 5000 UNIT(S): 5000 INJECTION INTRAVENOUS; SUBCUTANEOUS at 21:26

## 2020-01-15 RX ADMIN — ATORVASTATIN CALCIUM 80 MILLIGRAM(S): 80 TABLET, FILM COATED ORAL at 21:26

## 2020-01-15 RX ADMIN — Medication 1 MILLIGRAM(S): at 21:26

## 2020-01-15 RX ADMIN — CHLORHEXIDINE GLUCONATE 1 APPLICATION(S): 213 SOLUTION TOPICAL at 05:13

## 2020-01-15 RX ADMIN — CHLORHEXIDINE GLUCONATE 15 MILLILITER(S): 213 SOLUTION TOPICAL at 06:04

## 2020-01-15 NOTE — PROGRESS NOTE ADULT - SUBJECTIVE AND OBJECTIVE BOX
Hospital Day: 4  Post Operative Day:  Procedure:  Patient is a 79y old  Male who presents with a chief complaint of Cardiac arrest, retropharyngeal hemtoma  (2020 10:30)    PAST MEDICAL & SURGICAL HISTORY:  Atrial fibrillation  Neuropathy  HLD (hyperlipidemia)  HTN (hypertension)  History of cholecystectomy  History of cholecystectomy      Events of the Last 24h:none   Vital Signs Last 24 Hrs  T(C): 37.8 (2020 20:00), Max: 37.8 (2020 20:00)  T(F): 100 (2020 20:00), Max: 100 (2020 20:00)  HR: 74 (2020 22:00) (68 - 80)  BP: 163/78 (2020 22:00) (112/68 - 163/94)  BP(mean): 104 (2020 22:00) (79 - 114)  RR: 23 (2020 22:00) (18 - 28)  SpO2: 100% (2020 22:) (95% - 100%)        Diet, NPO (20 @ 23:08)      I&O's Summary    2020 07:  -  2020 07:00  --------------------------------------------------------  IN: 2159 mL / OUT: 950 mL / NET: 1209 mL    2020 07:  -  15 Samson 2020 00:11  --------------------------------------------------------  IN: 350 mL / OUT: 925 mL / NET: -575 mL     I&O's Detail    2020 07:  -  2020 07:00  --------------------------------------------------------  IN:    dextrose 5%.: 225 mL    fentaNYL Infusion.: 14 mL    lactated ringers.: 1300 mL    lactated ringers.: 600 mL    norepinephrine Infusion: 20 mL  Total IN: 2159 mL    OUT:    Incontinent per Collection Ba mL    Indwelling Catheter - Urethral: 350 mL    Voided: 200 mL  Total OUT: 950 mL    Total NET: 1209 mL      2020 07:01  -  15 Samson 2020 00:11  --------------------------------------------------------  IN:    lactated ringers.: 350 mL  Total IN: 350 mL    OUT:    Incontinent per Collection Ba mL  Total OUT: 925 mL    Total NET: -575 mL          MEDICATIONS  (STANDING):  chlorhexidine 0.12% Liquid 15 milliLiter(s) Oral Mucosa two times a day  chlorhexidine 4% Liquid 1 Application(s) Topical <User Schedule>  heparin  Injectable 5000 Unit(s) SubCutaneous every 8 hours  lactated ringers. 1000 milliLiter(s) (50 mL/Hr) IV Continuous <Continuous>    MEDICATIONS  (PRN):  LORazepam   Injectable 2 milliGRAM(s) IV Push once PRN Agitation      PHYSICAL EXAM:    GENERAL: NAD    HEENT: NCAT    CHEST/LUNGS: CTAB    HEART: RRR,  No murmurs, rubs, or gallops    ABDOMEN: SNTND +BS    EXTREMITIES:  FROM, No clubbing, cyanosis, or edema, palpable pulse    NEURO: No focal neurological deficits    SKIN: No rashes or lesions    INCISION/WOUNDS:                          10.7   10.17 )-----------( 129      ( 2020 04:20 )             34.2        CBC Full  -  ( 2020 04:20 )  WBC Count : 10.17 K/uL  RBC Count : 3.35 M/uL  Hemoglobin : 10.7 g/dL  Hematocrit : 34.2 %  Platelet Count - Automated : 129 K/uL  Mean Cell Volume : 102.1 fL  Mean Cell Hemoglobin : 31.9 pg  Mean Cell Hemoglobin Concentration : 31.3 g/dL  Auto Neutrophil # : 8.77 K/uL  Auto Lymphocyte # : 0.51 K/uL  Auto Monocyte # : 0.81 K/uL  Auto Eosinophil # : 0.02 K/uL  Auto Basophil # : 0.01 K/uL  Auto Neutrophil % : 86.2 %  Auto Lymphocyte % : 5.0 %  Auto Monocyte % : 8.0 %  Auto Eosinophil % : 0.2 %  Auto Basophil % : 0.1 %               142   |  102   |  16                 Ca: 8.7    BMP:   ----------------------------< 132    M.9   (20 @ 04:20)             4.4    |  29    | 0.8                Ph: x        LFT:     TPro: 6.2 / Alb: 3.6 / TBili: 1.7 / DBili: x / AST: 25 / ALT: 21 / AlkPhos: 74   (20 @ 04:20)    LIVER FUNCTIONS - ( 2020 04:20 )  Alb: 3.6 g/dL / Pro: 6.2 g/dL / ALK PHOS: 74 U/L / ALT: 21 U/L / AST: 25 U/L / GGT: x             CARDIAC MARKERS ( 2020 06:24 )  x     / 0.18 ng/mL / 248 U/L / x     / 10.0 ng/mL  CARDIAC MARKERS ( 2020 01:00 )  x     / 0.31 ng/mL / 259 U/L / x     / 12.3 ng/mL          Culture - Blood (collected 2020 11:26)  Source: .Blood None  Preliminary Report (2020 23:02):    No growth to date.    Culture - Urine (collected 2020 17:20)  Source: .Urine Clean Catch (Midstream)  Final Report (2020 20:01):    No growth Hospital Day: 4  Post Operative Day:  Procedure:  Patient is a 79y old  Male who presents with a chief complaint of Cardiac arrest, retropharyngeal hemtoma  (2020 10:30)    PAST MEDICAL & SURGICAL HISTORY:  Atrial fibrillation  Neuropathy  HLD (hyperlipidemia)  HTN (hypertension)  History of cholecystectomy  History of cholecystectomy      Events of the Last 24h:none   Vital Signs Last 24 Hrs  T(C): 37.8 (2020 20:00), Max: 37.8 (2020 20:00)  T(F): 100 (2020 20:00), Max: 100 (2020 20:00)  HR: 74 (2020 22:00) (68 - 80)  BP: 163/78 (2020 22:00) (112/68 - 163/94)  BP(mean): 104 (2020 22:00) (79 - 114)  RR: 23 (2020 22:00) (18 - 28)  SpO2: 100% (2020 22:) (95% - 100%)        Diet, NPO (20 @ 23:08)      I&O's Summary    2020 07:  -  2020 07:00  --------------------------------------------------------  IN: 2159 mL / OUT: 950 mL / NET: 1209 mL    2020 07:  -  15 Samson 2020 00:11  --------------------------------------------------------  IN: 350 mL / OUT: 925 mL / NET: -575 mL     I&O's Detail    2020 07:  -  2020 07:00  --------------------------------------------------------  IN:    dextrose 5%.: 225 mL    fentaNYL Infusion.: 14 mL    lactated ringers.: 1300 mL    lactated ringers.: 600 mL    norepinephrine Infusion: 20 mL  Total IN: 2159 mL    OUT:    Incontinent per Collection Ba mL    Indwelling Catheter - Urethral: 350 mL    Voided: 200 mL  Total OUT: 950 mL    Total NET: 1209 mL      2020 07:01  -  15 Samson 2020 00:11  --------------------------------------------------------  IN:    lactated ringers.: 350 mL  Total IN: 350 mL    OUT:    Incontinent per Collection Ba mL  Total OUT: 925 mL    Total NET: -575 mL          MEDICATIONS  (STANDING):  chlorhexidine 0.12% Liquid 15 milliLiter(s) Oral Mucosa two times a day  chlorhexidine 4% Liquid 1 Application(s) Topical <User Schedule>  heparin  Injectable 5000 Unit(s) SubCutaneous every 8 hours  lactated ringers. 1000 milliLiter(s) (50 mL/Hr) IV Continuous <Continuous>    MEDICATIONS  (PRN):  LORazepam   Injectable 2 milliGRAM(s) IV Push once PRN Agitation      PHYSICAL EXAM:    GENERAL: NAD    HEENT: NCAT    CHEST/LUNGS: CTAB    HEART: RRR,  No murmurs, rubs, or gallops    ABDOMEN: SNTND +BS    EXTREMITIES:  FROM, No clubbing, cyanosis, or edema, palpable pulse    NEURO: No focal neurological deficits    SKIN: No rashes or lesions    INCISION/WOUNDS:                          10.7   10.17 )-----------( 129      ( 2020 04:20 )             34.2        CBC Full  -  ( 2020 04:20 )  WBC Count : 10.17 K/uL  RBC Count : 3.35 M/uL  Hemoglobin : 10.7 g/dL  Hematocrit : 34.2 %  Platelet Count - Automated : 129 K/uL  Mean Cell Volume : 102.1 fL  Mean Cell Hemoglobin : 31.9 pg  Mean Cell Hemoglobin Concentration : 31.3 g/dL  Auto Neutrophil # : 8.77 K/uL  Auto Lymphocyte # : 0.51 K/uL  Auto Monocyte # : 0.81 K/uL  Auto Eosinophil # : 0.02 K/uL  Auto Basophil # : 0.01 K/uL  Auto Neutrophil % : 86.2 %  Auto Lymphocyte % : 5.0 %  Auto Monocyte % : 8.0 %  Auto Eosinophil % : 0.2 %  Auto Basophil % : 0.1 %               142   |  102   |  16                 Ca: 8.7    BMP:   ----------------------------< 132    M.9   (20 @ 04:20)             4.4    |  29    | 0.8                Ph: x        LFT:     TPro: 6.2 / Alb: 3.6 / TBili: 1.7 / DBili: x / AST: 25 / ALT: 21 / AlkPhos: 74   (20 @ 04:20)    LIVER FUNCTIONS - ( 2020 04:20 )  Alb: 3.6 g/dL / Pro: 6.2 g/dL / ALK PHOS: 74 U/L / ALT: 21 U/L / AST: 25 U/L / GGT: x             CARDIAC MARKERS ( 2020 06:24 )  x     / 0.18 ng/mL / 248 U/L / x     / 10.0 ng/mL  CARDIAC MARKERS ( 2020 01:00 )  x     / 0.31 ng/mL / 259 U/L / x     / 12.3 ng/mL          Culture - Blood (collected 2020 11:26)  Source: .Blood None  Preliminary Report (2020 23:02):    No growth to date.    Culture - Urine (collected 2020 17:20)  Source: .Urine Clean Catch (Midstream)  Final Report (2020 20:01):    No growth    RADIOLOGY:  < from: CT Cervical Spine No Cont (20 @ 16:48) >  IMPRESSION:    1.  Widening of the C5-6 disc space and fragmentation of the anterior bridging osteophyte at this level. Findings are suspicious for distraction injury through the C5-6 disc with C5-6 anterior osteophyte fracture. Recommend further evaluation with MRI.    2.  Diffuse prevertebral edema as well as edema within the left anterolateral neck soft tissues and supraclavicular region.    3.  Stranding and possible hematoma behind the esophagus, as previously described.    4.  Multilevel anterior bridging osteophytes/syndesmophytes.    5.  Multilevel degenerative changes as described.    < end of copied text >

## 2020-01-15 NOTE — PROGRESS NOTE ADULT - ASSESSMENT
78 yo M with hx of HTN, A.fib (Eliquis), CHF, HLD brought in by EMS post cardiac arrest. As per wife at bedside, patient at the basement watching movie and was doing fine. Few mins later, patient came up and told the wife that he wasn't feeling right and that he felt funny. Then he crashed. Wife called the EMS who arrived 5 mins later. Upon EMS arrival, patient was found to be in PEA, s/p resuscitation for 10-15 mins and ROSC achieved. While on the way to hospital, patient had another cardiac arrest in the ambulent s/p CPR and ROSC achieved few mins later. As per family, patient was doing welll and at baseline prior to the episode. As baseline patient is fully functional. Denied any recent infection, recent hospitalization, recent ED visit, fever, chills.  Patient was extubated yesterday 1/13 and seen by Dr. Bill. Bedside echo shows EF of 60% and normal wall motion. Dr. Bill is of the opinion that the patient likely had Ventricular fibrillation and ended up with PEA and will need a cath once he is stabilized. Resumed DVT prophylaxis    Will order barium esophagogram as per CT surgery before starting feeding to rule out any esophageal perforation. Radiologist is of the opinion that the patient may have a cervical spine fracture and extravasation of blood from paraspinal vessels. So will order CT Cervical spine with IV contrast to rule that out.    Will consult neurosurgery if cervical spine fracture present. If cleared by Cardio will downgrade to the telemetry.    # Cardiac arrest 2/2 PEA likely from arrythmia  - likely from Arrythmia  - CTH: No evidence of acute intracranial pathology. Chronic microvascular ischemic changes. 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  - CTA Chest negative  - Cardiac enzymes trending down  - Echo shows EF of 60-65% with no valvular and wall abnormalities  - Statin once cleared by speech and swallow    # Suspected Retroperitoneal Hematoma  - CT Surgery consulted and recommend Esophagogram  - Will hold Anticoagulation  - Radiologist is of the opinion that the patient may have a cervical spine fracture and extravasation of blood from paraspinal vessels. - - CT Cervical spine with IV contrast shows possible fracture  - MR Cervical Spine ordered    # A.fib on eliquis  - Stopped Eliquis will consider once cleared from suspicion of retroperitoneal hematoma by CT surgery  - TSH normal    # HTN  - Holding medications  - Off pressors    # HLD  - Will order lipid panel    DVT ppx: on heparin 5000 q8  GI ppx: PPI   Activity: Best rest  Diet: Speech and Swallow assessment and esophagogram 78 yo M with hx of HTN, A.fib (Eliquis), CHF, HLD brought in by EMS post cardiac arrest. As per wife at bedside, patient at the basement watching movie and was doing fine. Few mins later, patient came up and told the wife that he wasn't feeling right and that he felt funny. Then he crashed. Wife called the EMS who arrived 5 mins later. Upon EMS arrival, patient was found to be in PEA, s/p resuscitation for 10-15 mins and ROSC achieved. While on the way to hospital, patient had another cardiac arrest in the ambulent s/p CPR and ROSC achieved few mins later. As per family, patient was doing welll and at baseline prior to the episode. As baseline patient is fully functional. Denied any recent infection, recent hospitalization, recent ED visit, fever, chills.  Patient was extubated 1/13 and seen by Dr. Bill. Bedside echo shows EF of 60% and normal wall motion. Dr. Bill is of the opinion that the patient likely had Ventricular fibrillation and ended up with PEA and will need a cath once he is stabilized. Resumed DVT prophylaxis    Will order barium esophagogram as per CT surgery before starting feeding to rule out any esophageal perforation. Radiologist is of the opinion that the patient may have a cervical spine fracture and extravasation of blood from paraspinal vessels. So will order CT Cervical spine with IV contrast to rule that out.    Neurosurgery consulted as there is prevertebral edema and a C5-6 disc space and fragmentation of the anterior osteophyte at this level. Patient is restarted on Ativan for anxiety.    # Cardiac arrest 2/2 PEA likely from arrythmia  - likely from Arrythmia  - CTH: No evidence of acute intracranial pathology. Chronic microvascular ischemic changes. 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  - CTA Chest negative  - Cardiac enzymes trending down  - Echo shows EF of 60-65% with no valvular and wall abnormalities  - Statin once cleared by speech and swallow    # Suspected Retroperitoneal Hematoma likely from Cervical Fracture  - CT Surgery consulted and recommend Esophagogram  - Will hold Anticoagulation  - Radiologist is of the opinion that the patient may have a cervical spine fracture and extravasation of blood from paraspinal vessels. - - CT Cervical spine with IV contrast shows possible fracture  - MR Cervical Spine ordered  - Neurosurgery following    # A.fib on eliquis  - Stopped Eliquis will consider once cleared from suspicion of retroperitoneal hematoma by CT surgery  - TSH normal    # Anxiety  - On Clonazepam 1 q8 at home  - Will start Ativan 1 q8 and then increase dose as tolerated respiratory wise    # HTN  - Holding medications  - Off pressors  - Was on Carvedilol 12.5 twice daily + Telmisartan 80mg at bedtime    # HLD  - Will order lipid panel    DVT ppx: on heparin 5000 q8  GI ppx: PPI   Activity: Best rest  Diet: Speech and Swallow assessment and esophagogram 80 yo M with hx of HTN, A.fib (Eliquis), CHF, HLD brought in by EMS post cardiac arrest. As per wife at bedside, patient at the basement watching movie and was doing fine. Few mins later, patient came up and told the wife that he wasn't feeling right and that he felt funny. Then he crashed. Wife called the EMS who arrived 5 mins later. Upon EMS arrival, patient was found to be in PEA, s/p resuscitation for 10-15 mins and ROSC achieved. While on the way to hospital, patient had another cardiac arrest in the ambulent s/p CPR and ROSC achieved few mins later. As per family, patient was doing welll and at baseline prior to the episode. As baseline patient is fully functional. Denied any recent infection, recent hospitalization, recent ED visit, fever, chills.  Patient was extubated 1/13 and seen by Dr. Bill. Bedside echo shows EF of 60% and normal wall motion. Dr. Bill is of the opinion that the patient likely had Ventricular fibrillation and ended up with PEA and will need a cath once he is stabilized. Resumed DVT prophylaxis    Will order barium esophagogram as per CT surgery before starting feeding to rule out any esophageal perforation. Radiologist is of the opinion that the patient may have a cervical spine fracture and extravasation of blood from paraspinal vessels. So will order CT Cervical spine with IV contrast to rule that out.    Neurosurgery consulted as there is prevertebral edema and a C5-6 disc space and fragmentation of the anterior osteophyte at this level. Patient is restarted on Ativan for anxiety. Patient was given a cervical collar but unable to tolerate it. Explained it to the family and the patients that non compliance can lead to permanent neurological damage but the patient refused despite understanding    # Cardiac arrest 2/2 PEA likely from arrythmia  - likely from Arrythmia  - CTH: No evidence of acute intracranial pathology. Chronic microvascular ischemic changes. 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  - CTA Chest negative  - Cardiac enzymes trending down  - Echo shows EF of 60-65% with no valvular and wall abnormalities  - Statin once cleared by speech and swallow    # Suspected Retroperitoneal Hematoma likely from Cervical Fracture  - CT Surgery consulted and recommend Esophagogram  - Will hold Anticoagulation  - Radiologist is of the opinion that the patient may have a cervical spine fracture and extravasation of blood from paraspinal vessels. - - CT Cervical spine with IV contrast shows possible fracture  - MR Cervical Spine ordered  - Neurosurgery following    # A.fib on eliquis  - Stopped Eliquis will consider once cleared from suspicion of retroperitoneal hematoma by CT surgery  - TSH normal    # Anxiety  - On Clonazepam 1 q8 at home  - Will start Ativan 1 q8 and then increase dose as tolerated respiratory wise    # HTN  - Holding medications  - Off pressors  - Was on Carvedilol 12.5 twice daily + Telmisartan 80mg at bedtime    # HLD  - Will order lipid panel    DVT ppx: on heparin 5000 q8  GI ppx: PPI   Activity: Best rest  Diet: Speech and Swallow assessment and esophagogram 78 yo M with hx of HTN, A.fib (Eliquis), CHF, HLD brought in by EMS post cardiac arrest. As per wife at bedside, patient at the basement watching movie and was doing fine. Few mins later, patient came up and told the wife that he wasn't feeling right and that he felt funny. Then he crashed. Wife called the EMS who arrived 5 mins later. Upon EMS arrival, patient was found to be in PEA, s/p resuscitation for 10-15 mins and ROSC achieved. While on the way to hospital, patient had another cardiac arrest in the ambulent s/p CPR and ROSC achieved few mins later. As per family, patient was doing welll and at baseline prior to the episode. As baseline patient is fully functional. Denied any recent infection, recent hospitalization, recent ED visit, fever, chills.  Patient was extubated 1/13 and seen by Dr. Bill. Bedside echo shows EF of 60% and normal wall motion. Dr. Bill is of the opinion that the patient likely had Ventricular fibrillation and ended up with PEA and will need a cath once he is stabilized. Resumed DVT prophylaxis    Will order barium esophagogram as per CT surgery before starting feeding to rule out any esophageal perforation. Radiologist is of the opinion that the patient may have a cervical spine fracture and extravasation of blood from paraspinal vessels. So will order CT Cervical spine with IV contrast to rule that out.    Neurosurgery consulted as there is prevertebral edema and a C5-6 disc space and fragmentation of the anterior osteophyte at this level. Patient is restarted on Ativan for anxiety. Patient was given a cervical collar but unable to tolerate it. Explained it to the family and the patients that non compliance can lead to permanent neurological damage but the patient refused despite understanding    # Cardiac arrest 2/2 PEA likely from arrythmia  - likely from Arrythmia  - CTH: No evidence of acute intracranial pathology. Chronic microvascular ischemic changes. 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  - CTA Chest negative  - Cardiac enzymes trending down  - Echo shows EF of 60-65% with no valvular and wall abnormalities  - Statin once cleared by speech and swallow    # Suspected Retroperitoneal Hematoma likely from Cervical Fracture  - CT Surgery consulted and recommend Esophagogram  - Will hold Anticoagulation  - Radiologist is of the opinion that the patient may have a cervical spine fracture and extravasation of blood from paraspinal vessels. - - CT Cervical spine with IV contrast shows possible fracture  - MR Cervical Spine ordered  - Neurosurgery following    # A.fib on eliquis  - Stopped Eliquis will consider once cleared from suspicion of retroperitoneal hematoma by CT surgery  - TSH normal    # Anxiety  - On Clonazepam 1 q8 at home  - Will start Ativan 1 q8 and then increase dose as tolerated respiratory wise    # HTN  - Started Losartan 50 equivalent to 40mg of Telmisartan, will titrate as needed  - Was on Carvedilol 12.5 twice daily + Telmisartan 80mg at bedtime    # HLD  - Lipid panel with in normal range    DVT ppx: on heparin 5000 q8  GI ppx: PPI   Activity: Best rest  Diet: Speech and Swallow assessment and esophagogram

## 2020-01-15 NOTE — PROGRESS NOTE ADULT - SUBJECTIVE AND OBJECTIVE BOX
OVERNIGHT EVENTS: events noted. s/p ct neck, refused t collar bipap    Vital Signs Last 24 Hrs  T(C): 37.3 (15 Samson 2020 08:00), Max: 37.8 (14 Jan 2020 20:00)  T(F): 99.2 (15 Samson 2020 08:00), Max: 100 (14 Jan 2020 20:00)  HR: 72 (15 Samson 2020 10:00) (64 - 80)  BP: 149/88 (15 Samson 2020 10:00) (122/61 - 168/84)  BP(mean): 108 (15 Samson 2020 10:00) (79 - 119)  RR: 20 (15 Samson 2020 10:00) (18 - 30)  SpO2: 98% (15 Samson 2020 10:00) (96% - 100%)    PHYSICAL EXAMINATION:    GENERAL: The patient is awake and alert in no apparent distress.     HEENT: Head is normocephalic and atraumatic. Extraocular muscles are intact. Mucous membranes are moist.    NECK: Supple.    LUNGS: dec bs both bases    HEART: Regular rate and rhythm without murmur.    ABDOMEN: Soft, nontender, and nondistended.      EXTREMITIES: Without any cyanosis, clubbing, rash, lesions or edema.    NEUROLOGIC: no pain on palpation    SKIN: No ulceration or induration present.      LABS:                        10.8   9.69  )-----------( 134      ( 15 Samson 2020 04:30 )             35.3     01-15    147<H>  |  104  |  24<H>  ----------------------------<  123<H>  4.3   |  27  |  0.9    Ca    9.3      15 Samson 2020 04:30  Mg     1.9     01-15    TPro  6.7  /  Alb  3.8  /  TBili  2.1<H>  /  DBili  x   /  AST  22  /  ALT  18  /  AlkPhos  82  01-15    PT/INR - ( 15 Samson 2020 04:30 )   PT: 18.00 sec;   INR: 1.57 ratio         PTT - ( 15 Samson 2020 04:30 )  PTT:32.1 sec            Serum Pro-Brain Natriuretic Peptide: 1767 pg/mL (01-12-20 @ 17:20)            01-14-20 @ 07:01  -  01-15-20 @ 07:00  --------------------------------------------------------  IN: 350 mL / OUT: 1375 mL / NET: -1025 mL        MICROBIOLOGY:  Culture Results:   No growth to date. (01-13 @ 11:26)  Culture Results:   No growth (01-12 @ 17:20)      MEDICATIONS  (STANDING):  chlorhexidine 0.12% Liquid 15 milliLiter(s) Oral Mucosa two times a day  chlorhexidine 0.12% Liquid 15 milliLiter(s) Oral Mucosa two times a day  chlorhexidine 4% Liquid 1 Application(s) Topical <User Schedule>  heparin  Injectable 5000 Unit(s) SubCutaneous every 8 hours    MEDICATIONS  (PRN):  LORazepam   Injectable 2 milliGRAM(s) IV Push once PRN Agitation      RADIOLOGY & ADDITIONAL STUDIES:

## 2020-01-15 NOTE — PROGRESS NOTE ADULT - SUBJECTIVE AND OBJECTIVE BOX
Subjective:  General condition improved, off iv medicine, hemodynamically stable, today was confused with some forgetfulness, no complaint, HTN and Afib encountered.  still cervical spine collar was in placed, he supposed to have cervical MRI.  No chest pain, sob, palpitation, but on O2 via N/C pulse ox was 97%, but off O2 pulse Ox would drop.    MEDICATIONS  (STANDING):  atorvastatin 80 milliGRAM(s) Oral at bedtime  chlorhexidine 4% Liquid 1 Application(s) Topical <User Schedule>  dextrose 5% + sodium chloride 0.45%. 1000 milliLiter(s) (75 mL/Hr) IV Continuous <Continuous>  heparin  Injectable 5000 Unit(s) SubCutaneous every 8 hours  LORazepam   Injectable 1 milliGRAM(s) IV Push every 8 hours  losartan 50 milliGRAM(s) Oral daily  metoprolol tartrate 12.5 milliGRAM(s) Oral three times a day    MEDICATIONS  (PRN):  LORazepam   Injectable 2 milliGRAM(s) IV Push once PRN Agitation            Vital Signs Last 24 Hrs  T(C): 37.3 (15 Samson 2020 20:00), Max: 37.5 (15 Samson 2020 04:00)  T(F): 99.2 (15 Samson 2020 20:00), Max: 99.5 (15 Samson 2020 04:00)  HR: 74 (15 Samson 2020 22:00) (64 - 86)  BP: 145/77 (15 Samson 2020 22:00) (141/91 - 181/101)  BP(mean): 94 (15 Samson 2020 22:00) (11 - 130)  RR: 22 (15 Samson 2020 22:00) (17 - 34)  SpO2: 99% (15 Samson 2020 22:00) (90% - 100%)             REVIEW OF SYSTEMS:  CONSTITUTIONAL: no fever, no chills, no diaphoresis  CARDIOLOGY: no chest pain, no SOB, no palpitation, no diaphoresis,  RESPIRATORY: no dyspnea, no wheeze, no orthopnea,  NEUROLOGICAL: no headache, focal deficits to report.  GI: no abdominal pain, no dyspepsia, no nausea, no vomiting, no diarrhea.    SKIN: no ecchymosis, no petechia             PHYSICAL EXAM:  · CONSTITUTIONAL: Looks stable, in no respiratory distress  . NECK: immobilized by cervical collar   · RESPIRATORY: Normal air entry to lung base, no wheeze, no crackle, no wet rales  · CARDIOVASCULAR: S1, A2, P2, no murmur, no click, irregular rate,  no rub,  · EXTREMITIES: No cyanosis, no clubbing, no edema  · VASCULAR: Pulses are irregular, equal  	  TELEMETRY: afib, rate at 70-80    ECG: < from: 12 Lead ECG (01.15.20 @ 06:58) >  nterpretation may be adversely affected  Atrial fibrillation  Nonspecific T wave abnormality    < end of copied text >  < from: 12 Lead ECG (01.14.20 @ 07:51) >  Atrial fibrillation  Left axis deviation  Incomplete right bundle branch block  Left anterior fascicular block  Septal infarct , age undetermined    < end of copied text >      TTE: < from: Transthoracic Echocardiogram (01.13.20 @ 12:02) >   1. Left ventricular ejection fraction, by visual estimation, is 60 to 65%.   2. Normal global left ventricular systolic function.   3. Mild left ventricular hypertrophy.   4. Sclerotic aortic valve with normal opening.   5. Mild aortic regurgitation.   6. Mild-to-moderate tricuspid regurgitation.   7. Estimated pulmonary artery systolic pressure is 42.3 mmHg assuming a right atrial pressure of 8 mmHg, which is consistent with mild pulmonary hypertension.    < end of copied text >      LABS:                        10.8   9.69  )-----------( 134      ( 15 Samson 2020 04:30 )             35.3     01-15    147<H>  |  104  |  24<H>  ----------------------------<  123<H>  4.3   |  27  |  0.9    Ca    9.3      15 Samson 2020 04:30  Mg     1.9     01-15    TPro  6.7  /  Alb  3.8  /  TBili  2.1<H>  /  DBili  x   /  AST  22  /  ALT  18  /  AlkPhos  82  01-15        PT/INR - ( 15 Samosn 2020 04:30 )   PT: 18.00 sec;   INR: 1.57 ratio         PTT - ( 15 Samson 2020 04:30 )  PTT:32.1 sec    I&O's Summary    14 Jan 2020 07:01  -  15 Samson 2020 07:00  --------------------------------------------------------  IN: 350 mL / OUT: 1375 mL / NET: -1025 mL    15 Samson 2020 07:01  -  15 Samson 2020 23:31  --------------------------------------------------------  IN: 600 mL / OUT: 400 mL / NET: 200 mL      BNP  RADIOLOGY & ADDITIONAL STUDIES: < from: Xray Chest 1 View- PORTABLE-Routine (01.15.20 @ 04:48) >    Improving left basilar opacity. No pneumothorax.    < end of copied text >  < from: CT Cervical Spine No Cont (01.14.20 @ 16:48) >  1.  Widening of the C5-6 disc space and fragmentation of the anterior bridging osteophyte at this level. Findings are suspicious for distraction injury through the C5-6 disc with C5-6 anterior osteophyte fracture. Recommend further evaluation with MRI.    2.  Diffuse prevertebral edema as well as edema within the left anterolateral neck soft tissues and supraclavicular region.    3.  Stranding and possible hematoma behind the esophagus, as previously described.    4.  Multilevel anterior bridging osteophytes/syndesmophytes.    5.  Multilevel degenerative changes as described.      < end of copied text >      IMPRESSION AND PLAN:

## 2020-01-15 NOTE — PROGRESS NOTE ADULT - ASSESSMENT
Needs MRI of cervical spine  Needs esophogram 79y Male with PMH significant for HTN, atrial fibrillation (on Eliquis), CHF, HLD, was brought in by EMS for cardiac arrest x 2 s/p resuscitation and intubation, with 4.4cm retroesophageal soft tissue density within the thorax, likely hematoma s/p extubation. Esophogeal perforation unlikely considering current presentation, physical exam and lack of pneumomediastinum on imaging.    PLAN:   Diet: clear liquid diet okay  F/U MRI

## 2020-01-15 NOTE — PROGRESS NOTE ADULT - SUBJECTIVE AND OBJECTIVE BOX
SUBJECTIVE:    Patient is a 79y old Male who presents with a chief complaint of Cardiac arrest (15 Samson 2020 00:10)    Currently admitted to medicine with the primary diagnosis of Cardiac arrest     Today is hospital day 3d. This morning he is resting comfortably in bed and reports no new issues or overnight events.     INTERVAL EVENTS:     PAST MEDICAL & SURGICAL HISTORY  Atrial fibrillation  Neuropathy  HLD (hyperlipidemia)  HTN (hypertension)  History of cholecystectomy  History of cholecystectomy      ALLERGIES:  No Known Allergies    MEDICATIONS:  STANDING MEDICATIONS  chlorhexidine 0.12% Liquid 15 milliLiter(s) Oral Mucosa two times a day  chlorhexidine 0.12% Liquid 15 milliLiter(s) Oral Mucosa two times a day  chlorhexidine 4% Liquid 1 Application(s) Topical <User Schedule>  heparin  Injectable 5000 Unit(s) SubCutaneous every 8 hours    PRN MEDICATIONS  LORazepam   Injectable 2 milliGRAM(s) IV Push once PRN    VITALS:   T(F): 99.5  HR: 64  BP: 145/82  RR: 18  SpO2: 98%    LABS:                        10.8   9.69  )-----------( 134      ( 15 Samson 2020 04:30 )             35.3     01-15    147<H>  |  104  |  24<H>  ----------------------------<  123<H>  4.3   |  27  |  0.9    Ca    9.3      15 Samson 2020 04:30  Mg     1.9     01-15    TPro  6.7  /  Alb  3.8  /  TBili  2.1<H>  /  DBili  x   /  AST  22  /  ALT  18  /  AlkPhos  82  01-15    PT/INR - ( 15 Samson 2020 04:30 )   PT: 18.00 sec;   INR: 1.57 ratio         PTT - ( 15 Samson 2020 04:30 )  PTT:32.1 sec    ABG - ( 13 Jan 2020 09:34 )  pH, Arterial: 7.38  pH, Blood: x     /  pCO2: 51    /  pO2: 92    / HCO3: 30    / Base Excess: 4.4   /  SaO2: 97                    Culture - Blood (collected 13 Jan 2020 11:26)  Source: .Blood None  Preliminary Report (14 Jan 2020 23:02):    No growth to date.    Culture - Urine (collected 12 Jan 2020 17:20)  Source: .Urine Clean Catch (Midstream)  Final Report (13 Jan 2020 20:01):    No growth          RADIOLOGY:    PHYSICAL EXAM:  GEN: No acute distress  PULM/CHEST: Clear to auscultation bilaterally, no rales, rhonchi or wheezes   CVS: Irregularly irregular  ABD: Soft, non-tender, non-distended, +BS  EXT: No edema  NEURO: AAOx3    Mora Catheter:   Indwelling Urethral Catheter:     Connect To:  Straight Drainage/Gravity    Indication:  Improved Comfort Care (01-12-20 @ 18:26) (not performed) [active] SUBJECTIVE:    Patient is a 79y old Male who presents with a chief complaint of Cardiac arrest (15 Samson 2020 00:10)    Currently admitted to medicine with the primary diagnosis of Cardiac arrest     Today is hospital day 3d. This morning he is resting comfortably in bed and reports no new issues or overnight events.     INTERVAL EVENTS: Patient is a little agitated.    PAST MEDICAL & SURGICAL HISTORY  Atrial fibrillation  Neuropathy  HLD (hyperlipidemia)  HTN (hypertension)  History of cholecystectomy  History of cholecystectomy      ALLERGIES:  No Known Allergies    MEDICATIONS:  STANDING MEDICATIONS  chlorhexidine 0.12% Liquid 15 milliLiter(s) Oral Mucosa two times a day  chlorhexidine 0.12% Liquid 15 milliLiter(s) Oral Mucosa two times a day  chlorhexidine 4% Liquid 1 Application(s) Topical <User Schedule>  heparin  Injectable 5000 Unit(s) SubCutaneous every 8 hours    PRN MEDICATIONS  LORazepam   Injectable 2 milliGRAM(s) IV Push once PRN    VITALS:   T(F): 99.5  HR: 64  BP: 145/82  RR: 18  SpO2: 98%    LABS:                        10.8   9.69  )-----------( 134      ( 15 Samson 2020 04:30 )             35.3     01-15    147<H>  |  104  |  24<H>  ----------------------------<  123<H>  4.3   |  27  |  0.9    Ca    9.3      15 Samson 2020 04:30  Mg     1.9     01-15    TPro  6.7  /  Alb  3.8  /  TBili  2.1<H>  /  DBili  x   /  AST  22  /  ALT  18  /  AlkPhos  82  01-15    PT/INR - ( 15 Samson 2020 04:30 )   PT: 18.00 sec;   INR: 1.57 ratio         PTT - ( 15 Samson 2020 04:30 )  PTT:32.1 sec    ABG - ( 13 Jan 2020 09:34 )  pH, Arterial: 7.38  pH, Blood: x     /  pCO2: 51    /  pO2: 92    / HCO3: 30    / Base Excess: 4.4   /  SaO2: 97                    Culture - Blood (collected 13 Jan 2020 11:26)  Source: .Blood None  Preliminary Report (14 Jan 2020 23:02):    No growth to date.    Culture - Urine (collected 12 Jan 2020 17:20)  Source: .Urine Clean Catch (Midstream)  Final Report (13 Jan 2020 20:01):    No growth          RADIOLOGY:    PHYSICAL EXAM:  GEN: No acute distress  PULM/CHEST: Clear to auscultation bilaterally, no rales, rhonchi or wheezes   CVS: Irregularly irregular  ABD: Soft, non-tender, non-distended, +BS  EXT: No edema  NEURO: AAOx3    Mora Catheter:   Indwelling Urethral Catheter:     Connect To:  Straight Drainage/Gravity    Indication:  Improved Comfort Care (01-12-20 @ 18:26) (not performed) [active] SUBJECTIVE:    Patient is a 79y old Male who presents with a chief complaint of Cardiac arrest (15 Samson 2020 00:10)    Currently admitted to medicine with the primary diagnosis of Cardiac arrest     Today is hospital day 3d. This morning he is resting comfortably in bed and reports no new issues or overnight events.     INTERVAL EVENTS: Patient is a little agitated but otherwise medically stable.    PAST MEDICAL & SURGICAL HISTORY  Atrial fibrillation  Neuropathy  HLD (hyperlipidemia)  HTN (hypertension)  History of cholecystectomy  History of cholecystectomy      ALLERGIES:  No Known Allergies    MEDICATIONS:  STANDING MEDICATIONS  chlorhexidine 0.12% Liquid 15 milliLiter(s) Oral Mucosa two times a day  chlorhexidine 0.12% Liquid 15 milliLiter(s) Oral Mucosa two times a day  chlorhexidine 4% Liquid 1 Application(s) Topical <User Schedule>  heparin  Injectable 5000 Unit(s) SubCutaneous every 8 hours    PRN MEDICATIONS  LORazepam   Injectable 2 milliGRAM(s) IV Push once PRN    VITALS:   T(F): 99.5  HR: 64  BP: 145/82  RR: 18  SpO2: 98%    LABS:                        10.8   9.69  )-----------( 134      ( 15 Samson 2020 04:30 )             35.3     01-15    147<H>  |  104  |  24<H>  ----------------------------<  123<H>  4.3   |  27  |  0.9    Ca    9.3      15 Samson 2020 04:30  Mg     1.9     01-15    TPro  6.7  /  Alb  3.8  /  TBili  2.1<H>  /  DBili  x   /  AST  22  /  ALT  18  /  AlkPhos  82  01-15    PT/INR - ( 15 Samson 2020 04:30 )   PT: 18.00 sec;   INR: 1.57 ratio         PTT - ( 15 Samson 2020 04:30 )  PTT:32.1 sec    ABG - ( 13 Jan 2020 09:34 )  pH, Arterial: 7.38  pH, Blood: x     /  pCO2: 51    /  pO2: 92    / HCO3: 30    / Base Excess: 4.4   /  SaO2: 97                    Culture - Blood (collected 13 Jan 2020 11:26)  Source: .Blood None  Preliminary Report (14 Jan 2020 23:02):    No growth to date.    Culture - Urine (collected 12 Jan 2020 17:20)  Source: .Urine Clean Catch (Midstream)  Final Report (13 Jan 2020 20:01):    No growth          RADIOLOGY:    PHYSICAL EXAM:  GEN: No acute distress  PULM/CHEST: Clear to auscultation bilaterally, no rales, rhonchi or wheezes   CVS: Irregularly irregular  ABD: Soft, non-tender, non-distended, +BS  EXT: No edema  NEURO: AAOx3    Mora Catheter:   Indwelling Urethral Catheter:     Connect To:  Straight Drainage/Gravity    Indication:  Improved Comfort Care (01-12-20 @ 18:26) (not performed) [active]

## 2020-01-15 NOTE — PROGRESS NOTE ADULT - ASSESSMENT
post sudden cardiopulmonary arrest, unknown etiology, possible VT resulted in VFib? no ventricular arrhythmia in the hospital  chronic A.fib, rate controlled  slight rise in troponin with normal EF, normal wall motion, most likely due to CPR and cardiopulmonary arrest and not an acute MI  cervical spinal fx, spinal edema, hypoxia, possibly aspiration   this evening had confusion and disorientation (partial), r/o cerebral edema or sun downing?    If there is no CNS pathology suggest to start Lovenox 1mg/kg bid or iv heparin to keep PTT at 50-60 for Afib  will await for cervical spine and CNS evaluation and improvement, stabilizing the patient more, ambulating him, then will attempt cardiac Cath, most likely on Monday, when he is stable enough and risk is low enough

## 2020-01-15 NOTE — PROGRESS NOTE ADULT - ASSESSMENT
IMPRESSION:  S/P CP arrest/ PEA/ 10 MIN AWAKE FOLLOWS COMMANDS  RETROESOPHAGEAL HEMATOMA possible fracture of cervical spine?/ ct neck reviewed  s/p extubation on 1/13/2020    PLAN:    CNS: avoid CNS depressants,  consult neurosurgery , avoid ativan    HEENT:  Oral care    PULMONARY:  HOB @ 45 degrees, O2 keep sats>94%,  NIV at night.    CARDIOVASCULAR: off pressors cardio f/up    GI: GI prophylaxis                                          Feeding clear liquid    RENAL:  F/u  lytes.  Correct as needed. accurate I/O    INFECTIOUS DISEASE: pancx    HEMATOLOGICAL:  DVT prophylaxis.    ENDOCRINE:  Follow up FS.  Insulin protocol if needed.    CODE STATUS: FULL CODE    DISPOSITION: downgrade to tele if OK by cardiology.    poor prognosis

## 2020-01-15 NOTE — PROGRESS NOTE ADULT - SUBJECTIVE AND OBJECTIVE BOX
Patient was seen and examined in CCU. Spoke with RN. Chart reviewed.  No events overnight; on bipap; in good spirits  Vital Signs Last 24 Hrs  T(F): 99.2 (15 Samson 2020 08:00), Max: 100 (14 Jan 2020 20:00)  HR: 78 (15 Samson 2020 08:00) (64 - 80)  BP: 155/103 (15 Samson 2020 08:00) (122/61 - 168/84)  SpO2: 96% (15 Samson 2020 08:00) (96% - 100%)  MEDICATIONS  (STANDING):  chlorhexidine 0.12% Liquid 15 milliLiter(s) Oral Mucosa two times a day  chlorhexidine 0.12% Liquid 15 milliLiter(s) Oral Mucosa two times a day  chlorhexidine 4% Liquid 1 Application(s) Topical <User Schedule>  heparin  Injectable 5000 Unit(s) SubCutaneous every 8 hours    MEDICATIONS  (PRN):  LORazepam   Injectable 2 milliGRAM(s) IV Push once PRN Agitation    Labs:                        10.8   9.69  )-----------( 134      ( 15 Samson 2020 04:30 )             35.3                         10.7   10.17 )-----------( 129      ( 14 Jan 2020 04:20 )             34.2     15 Samson 2020 04:30    147    |  104    |  24     ----------------------------<  123    4.3     |  27     |  0.9    14 Jan 2020 04:20    142    |  102    |  16     ----------------------------<  132    4.4     |  29     |  0.8      Ca    9.3        15 Samson 2020 04:30  Ca    8.7        14 Jan 2020 04:20  Mg     1.9       15 Samson 2020 04:30  Mg     1.9       14 Jan 2020 04:20    TPro  6.7    /  Alb  3.8    /  TBili  2.1    /  DBili  x      /  AST  22     /  ALT  18     /  AlkPhos  82     15 Samson 2020 04:30  TPro  6.2    /  Alb  3.6    /  TBili  1.7    /  DBili  x      /  AST  25     /  ALT  21     /  AlkPhos  74     14 Jan 2020 04:20    PT/INR - ( 15 Samson 2020 04:30 )   PT: 18.00 sec;   INR: 1.57 ratio         PTT - ( 15 Samson 2020 04:30 )  PTT:32.1 sec      Culture - Blood (collected 13 Jan 2020 11:26)  Source: .Blood None  Preliminary Report (14 Jan 2020 23:02):    No growth to date.    Culture - Urine (collected 12 Jan 2020 17:20)  Source: .Urine Clean Catch (Midstream)  Final Report (13 Jan 2020 20:01):    No growth      General: comfortable, NAD, on bipap  Neurology: A&Ox3, nonfocal  Head:  Normocephalic, atraumatic  ENT:  Mucosa moist, no ulcerations  Resp: CTA B/L  CV: RRR, S1S2,   GI: Soft, NT, bowel sounds  MS: No edema, + peripheral pulses,     CT results noted; on chart      A/P:  80 yo M with hx of HTN, A.fib (Eliquis), CHF, HLD brought in by EMS post cardiac arrest- now in CCU on vent on pressors with likley cardiogenic shock; now with remarkable improvement- off vent, off pressors, awake and alert noted with 4.4cm retroesophageal soft tissue density within the thorax, likely hematoma s/p extubation    MRI today    CTS f/u, possible NS f/u- after MRI resulted    bipap as needed    cardiology f/u to decide on timing of cath    plan as per cardio/ CCU team  DVT prophylaxis  Decubitus prevention- all measures as per RN protocol  Please call or text me with any questions or updates

## 2020-01-15 NOTE — CONSULT NOTE ADULT - SUBJECTIVE AND OBJECTIVE BOX
HPI:  Patient is a 78 yo male with a past hx of HTN, a.fib on eliquis, CHF, HLD was brought in by EMS post cardiac arrest. Neurosurgery was consulted to eval possible cervical spine fracture. Pt's wife was at bedside and reports pt told her he was not feeling well and shortly after pt collapsed forward, +LOC. Wife stated attempted to catch pt but unsuccesful. Unknown if impact to face, no visible bruising to chin area. Wife called EMS and needed resuscitation. Pt denies remembering falling or having any other previous falls in the past. Pt reports having neck pain for the past month and has bought himself a neck brace for relief of symptoms. Did not visit a physician for new symptoms prior to arrival.    Pt refuses to wear his c-collar due to it being uncomfortable. Pt denies neck pain at this time, HAs, numbness/tingling, weakness, previous trauma to back/neck, or dizziness.     PAST MEDICAL & SURGICAL HISTORY:  Atrial fibrillation  Neuropathy  HLD (hyperlipidemia)  HTN (hypertension)  History of cholecystectomy  History of cholecystectomy      Home Medications:  Eliquis 5 mg oral tablet: 1 tab(s) orally 2 times a day (2020 17:38)    Allergies    No Known Allergies    Intolerances        MEDICATIONS  (STANDING):  chlorhexidine 0.12% Liquid 15 milliLiter(s) Oral Mucosa two times a day  chlorhexidine 0.12% Liquid 15 milliLiter(s) Oral Mucosa two times a day  chlorhexidine 4% Liquid 1 Application(s) Topical <User Schedule>  heparin  Injectable 5000 Unit(s) SubCutaneous every 8 hours  LORazepam   Injectable 1 milliGRAM(s) IV Push every 8 hours    MEDICATIONS  (PRN):      ICU Vital Signs Last 24 Hrs  T(C): 37.4 (15 Samson 2020 12:00), Max: 37.8 (2020 20:00)  T(F): 99.4 (15 Samson 2020 12:00), Max: 100 (2020 20:00)  HR: 72 (15 Samson 2020 12:00) (64 - 80)  BP: 168/88 (15 Samson 2020 12:00) (122/61 - 180/93)  BP(mean): 120 (15 Samson 2020 12:00) (79 - 123)  RR: 33 (15 Samson 2020 12:00) (17 - 34)  SpO2: 98% (15 Samson 2020 12:00) (96% - 100%)      I&O's Detail    2020 07:01  -  15 Samson 2020 07:00  --------------------------------------------------------  IN:    lactated ringers.: 350 mL  Total IN: 350 mL    OUT:    Incontinent per Collection Ba mL    Voided: 150 mL  Total OUT: 1375 mL    Total NET: -1025 mL          CBC Full  -  ( 15 Samson 2020 04:30 )  WBC Count : 9.69 K/uL  RBC Count : 3.41 M/uL  Hemoglobin : 10.8 g/dL  Hematocrit : 35.3 %  Platelet Count - Automated : 134 K/uL  Mean Cell Volume : 103.5 fL  Mean Cell Hemoglobin : 31.7 pg  Mean Cell Hemoglobin Concentration : 30.6 g/dL  Auto Neutrophil # : 8.32 K/uL  Auto Lymphocyte # : 0.51 K/uL  Auto Monocyte # : 0.79 K/uL  Auto Eosinophil # : 0.00 K/uL  Auto Basophil # : 0.01 K/uL  Auto Neutrophil % : 85.8 %  Auto Lymphocyte % : 5.3 %  Auto Monocyte % : 8.2 %  Auto Eosinophil % : 0.0 %  Auto Basophil % : 0.1 %    01-15    147<H>  |  104  |  24<H>  ----------------------------<  123<H>  4.3   |  27  |  0.9    Ca    9.3      15 Samson 2020 04:30  Mg     1.9     01-15    TPro  6.7  /  Alb  3.8  /  TBili  2.1<H>  /  DBili  x   /  AST  22  /  ALT  18  /  AlkPhos  82  01-15        Physical Exam:  AAOX3. Verbal function intact, follows commands  facial motions symmetric  PERRLA, EOMI  Pronator Drift: none  Finger to Nose intact  Motor: MAEx4, good motor strength b/l UE and LE 5/5, 2+ & symmetrical reflexes B/L, no Anna's reflex, no TTP upon posterior cervical region, no step-offs  Sensation: intact to touch in all extremities      Wound: none noted    Imaging:  < from: CT Cervical Spine No Cont (20 @ 16:48) >  IMPRESSION:    1.  Widening of the C5-6 disc space and fragmentation of the anterior bridging osteophyte at this level. Findings are suspicious for distraction injury through the C5-6 disc with C5-6 anterior osteophyte fracture. Recommend further evaluation with MRI.    2.  Diffuse prevertebral edema as well as edema within the left anterolateral neck soft tissues and supraclavicular region.    3.  Stranding and possible hematoma behind the esophagus, as previously described.    4.  Multilevel anterior bridging osteophytes/syndesmophytes.    5.  Multilevel degenerative changes as described.    Discussed with Dr. Dunbar 4:57 PM 2020.        JEN MAGAÑA M.D., ATTENDING RADIOLOGIST  This document has been electronically signed. 2020  4:58PM    < end of copied text >    Assessment/Plan  78 yo male with a past hx of HTN, a.fib on eliquis, CHF, HLD was brought in by EMS post cardiac arrest. - suspicion for injury through C5-6 disc w. anterior osteophyte fx     Plan  ·	Neurosurgery team examined patient and placed pt back on c-collar  ·	Pt was advised to keep c-collar on at all times  ·	F/U with MRI results- awaiting   ·	will discuss with attending

## 2020-01-16 LAB
ALBUMIN SERPL ELPH-MCNC: 3.3 G/DL — LOW (ref 3.5–5.2)
ALP SERPL-CCNC: 84 U/L — SIGNIFICANT CHANGE UP (ref 30–115)
ALT FLD-CCNC: 28 U/L — SIGNIFICANT CHANGE UP (ref 0–41)
ANION GAP SERPL CALC-SCNC: 12 MMOL/L — SIGNIFICANT CHANGE UP (ref 7–14)
ANION GAP SERPL CALC-SCNC: 13 MMOL/L — SIGNIFICANT CHANGE UP (ref 7–14)
ANION GAP SERPL CALC-SCNC: 13 MMOL/L — SIGNIFICANT CHANGE UP (ref 7–14)
AST SERPL-CCNC: 43 U/L — HIGH (ref 0–41)
BASOPHILS # BLD AUTO: 0.01 K/UL — SIGNIFICANT CHANGE UP (ref 0–0.2)
BASOPHILS NFR BLD AUTO: 0.1 % — SIGNIFICANT CHANGE UP (ref 0–1)
BILIRUB SERPL-MCNC: 2.2 MG/DL — HIGH (ref 0.2–1.2)
BUN SERPL-MCNC: 26 MG/DL — HIGH (ref 10–20)
BUN SERPL-MCNC: 28 MG/DL — HIGH (ref 10–20)
BUN SERPL-MCNC: 29 MG/DL — HIGH (ref 10–20)
CALCIUM SERPL-MCNC: 8.9 MG/DL — SIGNIFICANT CHANGE UP (ref 8.5–10.1)
CALCIUM SERPL-MCNC: 8.9 MG/DL — SIGNIFICANT CHANGE UP (ref 8.5–10.1)
CALCIUM SERPL-MCNC: 9 MG/DL — SIGNIFICANT CHANGE UP (ref 8.5–10.1)
CHLORIDE SERPL-SCNC: 106 MMOL/L — SIGNIFICANT CHANGE UP (ref 98–110)
CHLORIDE SERPL-SCNC: 107 MMOL/L — SIGNIFICANT CHANGE UP (ref 98–110)
CHLORIDE SERPL-SCNC: 109 MMOL/L — SIGNIFICANT CHANGE UP (ref 98–110)
CO2 SERPL-SCNC: 28 MMOL/L — SIGNIFICANT CHANGE UP (ref 17–32)
CO2 SERPL-SCNC: 29 MMOL/L — SIGNIFICANT CHANGE UP (ref 17–32)
CO2 SERPL-SCNC: 29 MMOL/L — SIGNIFICANT CHANGE UP (ref 17–32)
CREAT SERPL-MCNC: 0.8 MG/DL — SIGNIFICANT CHANGE UP (ref 0.7–1.5)
EOSINOPHIL # BLD AUTO: 0.02 K/UL — SIGNIFICANT CHANGE UP (ref 0–0.7)
EOSINOPHIL NFR BLD AUTO: 0.2 % — SIGNIFICANT CHANGE UP (ref 0–8)
GLUCOSE BLDC GLUCOMTR-MCNC: 129 MG/DL — HIGH (ref 70–99)
GLUCOSE SERPL-MCNC: 128 MG/DL — HIGH (ref 70–99)
GLUCOSE SERPL-MCNC: 135 MG/DL — HIGH (ref 70–99)
GLUCOSE SERPL-MCNC: 141 MG/DL — HIGH (ref 70–99)
HCT VFR BLD CALC: 32.9 % — LOW (ref 42–52)
HGB BLD-MCNC: 10.2 G/DL — LOW (ref 14–18)
IMM GRANULOCYTES NFR BLD AUTO: 0.3 % — SIGNIFICANT CHANGE UP (ref 0.1–0.3)
LYMPHOCYTES # BLD AUTO: 0.57 K/UL — LOW (ref 1.2–3.4)
LYMPHOCYTES # BLD AUTO: 6.6 % — LOW (ref 20.5–51.1)
MAGNESIUM SERPL-MCNC: 2 MG/DL — SIGNIFICANT CHANGE UP (ref 1.8–2.4)
MCHC RBC-ENTMCNC: 31 G/DL — LOW (ref 32–37)
MCHC RBC-ENTMCNC: 31.4 PG — HIGH (ref 27–31)
MCV RBC AUTO: 101.2 FL — HIGH (ref 80–94)
MONOCYTES # BLD AUTO: 0.85 K/UL — HIGH (ref 0.1–0.6)
MONOCYTES NFR BLD AUTO: 9.9 % — HIGH (ref 1.7–9.3)
NEUTROPHILS # BLD AUTO: 7.1 K/UL — HIGH (ref 1.4–6.5)
NEUTROPHILS NFR BLD AUTO: 82.9 % — HIGH (ref 42.2–75.2)
NRBC # BLD: 0 /100 WBCS — SIGNIFICANT CHANGE UP (ref 0–0)
PLATELET # BLD AUTO: 144 K/UL — SIGNIFICANT CHANGE UP (ref 130–400)
POTASSIUM SERPL-MCNC: 3.7 MMOL/L — SIGNIFICANT CHANGE UP (ref 3.5–5)
POTASSIUM SERPL-MCNC: 4 MMOL/L — SIGNIFICANT CHANGE UP (ref 3.5–5)
POTASSIUM SERPL-MCNC: 4 MMOL/L — SIGNIFICANT CHANGE UP (ref 3.5–5)
POTASSIUM SERPL-SCNC: 3.7 MMOL/L — SIGNIFICANT CHANGE UP (ref 3.5–5)
POTASSIUM SERPL-SCNC: 4 MMOL/L — SIGNIFICANT CHANGE UP (ref 3.5–5)
POTASSIUM SERPL-SCNC: 4 MMOL/L — SIGNIFICANT CHANGE UP (ref 3.5–5)
PROT SERPL-MCNC: 6 G/DL — SIGNIFICANT CHANGE UP (ref 6–8)
RBC # BLD: 3.25 M/UL — LOW (ref 4.7–6.1)
RBC # FLD: 14.7 % — HIGH (ref 11.5–14.5)
SODIUM SERPL-SCNC: 148 MMOL/L — HIGH (ref 135–146)
SODIUM SERPL-SCNC: 148 MMOL/L — HIGH (ref 135–146)
SODIUM SERPL-SCNC: 150 MMOL/L — HIGH (ref 135–146)
WBC # BLD: 8.58 K/UL — SIGNIFICANT CHANGE UP (ref 4.8–10.8)
WBC # FLD AUTO: 8.58 K/UL — SIGNIFICANT CHANGE UP (ref 4.8–10.8)

## 2020-01-16 PROCEDURE — 99232 SBSQ HOSP IP/OBS MODERATE 35: CPT

## 2020-01-16 PROCEDURE — 93010 ELECTROCARDIOGRAM REPORT: CPT

## 2020-01-16 PROCEDURE — 71045 X-RAY EXAM CHEST 1 VIEW: CPT | Mod: 26

## 2020-01-16 RX ORDER — LOSARTAN POTASSIUM 100 MG/1
75 TABLET, FILM COATED ORAL DAILY
Refills: 0 | Status: DISCONTINUED | OUTPATIENT
Start: 2020-01-17 | End: 2020-01-18

## 2020-01-16 RX ORDER — CHOLECALCIFEROL (VITAMIN D3) 125 MCG
0 CAPSULE ORAL
Qty: 0 | Refills: 0 | DISCHARGE

## 2020-01-16 RX ORDER — PANTOPRAZOLE SODIUM 20 MG/1
40 TABLET, DELAYED RELEASE ORAL
Refills: 0 | Status: DISCONTINUED | OUTPATIENT
Start: 2020-01-16 | End: 2020-01-21

## 2020-01-16 RX ORDER — LOSARTAN POTASSIUM 100 MG/1
75 TABLET, FILM COATED ORAL DAILY
Refills: 0 | Status: DISCONTINUED | OUTPATIENT
Start: 2020-01-16 | End: 2020-01-16

## 2020-01-16 RX ORDER — CLONAZEPAM 1 MG
1 TABLET ORAL EVERY 8 HOURS
Refills: 0 | Status: DISCONTINUED | OUTPATIENT
Start: 2020-01-16 | End: 2020-01-20

## 2020-01-16 RX ORDER — LOSARTAN POTASSIUM 100 MG/1
25 TABLET, FILM COATED ORAL ONCE
Refills: 0 | Status: COMPLETED | OUTPATIENT
Start: 2020-01-16 | End: 2020-01-16

## 2020-01-16 RX ORDER — ENOXAPARIN SODIUM 100 MG/ML
100 INJECTION SUBCUTANEOUS
Refills: 0 | Status: DISCONTINUED | OUTPATIENT
Start: 2020-01-16 | End: 2020-01-31

## 2020-01-16 RX ORDER — SODIUM CHLORIDE 9 MG/ML
1000 INJECTION, SOLUTION INTRAVENOUS
Refills: 0 | Status: DISCONTINUED | OUTPATIENT
Start: 2020-01-16 | End: 2020-01-17

## 2020-01-16 RX ORDER — METOPROLOL TARTRATE 50 MG
25 TABLET ORAL
Refills: 0 | Status: DISCONTINUED | OUTPATIENT
Start: 2020-01-16 | End: 2020-01-16

## 2020-01-16 RX ADMIN — PANTOPRAZOLE SODIUM 40 MILLIGRAM(S): 20 TABLET, DELAYED RELEASE ORAL at 17:47

## 2020-01-16 RX ADMIN — LOSARTAN POTASSIUM 25 MILLIGRAM(S): 100 TABLET, FILM COATED ORAL at 11:01

## 2020-01-16 RX ADMIN — Medication 12.5 MILLIGRAM(S): at 05:51

## 2020-01-16 RX ADMIN — CHLORHEXIDINE GLUCONATE 1 APPLICATION(S): 213 SOLUTION TOPICAL at 05:52

## 2020-01-16 RX ADMIN — HEPARIN SODIUM 5000 UNIT(S): 5000 INJECTION INTRAVENOUS; SUBCUTANEOUS at 13:41

## 2020-01-16 RX ADMIN — LOSARTAN POTASSIUM 50 MILLIGRAM(S): 100 TABLET, FILM COATED ORAL at 05:51

## 2020-01-16 RX ADMIN — ATORVASTATIN CALCIUM 80 MILLIGRAM(S): 80 TABLET, FILM COATED ORAL at 21:01

## 2020-01-16 RX ADMIN — SODIUM CHLORIDE 80 MILLILITER(S): 9 INJECTION, SOLUTION INTRAVENOUS at 08:56

## 2020-01-16 RX ADMIN — Medication 1 MILLIGRAM(S): at 21:01

## 2020-01-16 RX ADMIN — Medication 1 MILLIGRAM(S): at 05:51

## 2020-01-16 RX ADMIN — Medication 1 MILLIGRAM(S): at 13:40

## 2020-01-16 RX ADMIN — HEPARIN SODIUM 5000 UNIT(S): 5000 INJECTION INTRAVENOUS; SUBCUTANEOUS at 05:52

## 2020-01-16 RX ADMIN — Medication 12.5 MILLIGRAM(S): at 13:40

## 2020-01-16 RX ADMIN — ENOXAPARIN SODIUM 100 MILLIGRAM(S): 100 INJECTION SUBCUTANEOUS at 17:47

## 2020-01-16 NOTE — PROGRESS NOTE ADULT - SUBJECTIVE AND OBJECTIVE BOX
Subjective:  could not do the cervical MRI, it was cancelled and few weeks of cervical collar was advised by neurosurgery. he has no cardiac complaint  remained in Afib HR controlled, high BP, Losartan started  Na is high at 150 now? glucose at 120, anemia with hgb at 10 range   no ventricular arrhythmia on the monitor    MEDICATIONS  (STANDING):  atorvastatin 80 milliGRAM(s) Oral at bedtime  chlorhexidine 4% Liquid 1 Application(s) Topical <User Schedule>  clonazePAM  Tablet 1 milliGRAM(s) Oral every 8 hours  dextrose 5%. 1000 milliLiter(s) (80 mL/Hr) IV Continuous <Continuous>  heparin  Injectable 5000 Unit(s) SubCutaneous every 8 hours  metoprolol tartrate 12.5 milliGRAM(s) Oral three times a day  pantoprazole    Tablet 40 milliGRAM(s) Oral before breakfast    MEDICATIONS  (PRN):            Vital Signs Last 24 Hrs  T(C): 36.6 (16 Jan 2020 12:00), Max: 37.4 (15 Samson 2020 16:00)  T(F): 97.8 (16 Jan 2020 12:00), Max: 99.3 (15 Samson 2020 16:00)  HR: 70 (16 Jan 2020 12:00) (64 - 86)  BP: 146/93 (16 Jan 2020 12:00) (145/77 - 181/101)  BP(mean): 120 (16 Jan 2020 12:00) (11 - 133)  RR: 29 (16 Jan 2020 12:00) (20 - 31)  SpO2: 93% (16 Jan 2020 12:00) (90% - 100%)             REVIEW OF SYSTEMS:  CONSTITUTIONAL: no fever, no chills, no diaphoresis  CARDIOLOGY: no chest pain, no SOB, no palpitation, no diaphoresis, no faint   RESPIRATORY: no dyspnea, no wheeze, no orthopnea, no PND   NEUROLOGICAL: no dizziness, headache, focal deficits to report.  GI: no abdominal pain, no dyspepsia, no nausea, no vomiting, no diarrhea.    SKIN: no ecchymosis, no petechia             PHYSICAL EXAM:  · CONSTITUTIONAL: Looks stable, sitting on the chair next to bed  . NECK: Supple, on cervical collar   · RESPIRATORY: Normal air entry to lung base, no wheeze, no crackle, no wet rales  · CARDIOVASCULAR: S1, A2, P2, no murmur, irregular rate,  no rub,  · EXTREMITIES: No cyanosis, no clubbing, no edema  · VASCULAR: Pulses are irregular, equal, no leg edema, rate controlled  	  TELEMETRY: coarse afib HR at 65-75    LABS:                        10.2   8.58  )-----------( 144      ( 16 Jan 2020 04:12 )             32.9     01-16    148<H>  |  106  |  29<H>  ----------------------------<  141<H>  4.0   |  29  |  0.8    Ca    9.0      16 Jan 2020 10:58  Mg     2.0     01-16    TPro  6.0  /  Alb  3.3<L>  /  TBili  2.2<H>  /  DBili  x   /  AST  43<H>  /  ALT  28  /  AlkPhos  84  01-16        PT/INR - ( 15 Samson 2020 04:30 )   PT: 18.00 sec;   INR: 1.57 ratio         PTT - ( 15 Samson 2020 04:30 )  PTT:32.1 sec    I&O's Summary    15 Samson 2020 07:01  -  16 Jan 2020 07:00  --------------------------------------------------------  IN: 1275 mL / OUT: 800 mL / NET: 475 mL    16 Jan 2020 07:01  -  16 Jan 2020 12:59  --------------------------------------------------------  IN: 395 mL / OUT: 0 mL / NET: 395 mL      BNP  RADIOLOGY & ADDITIONAL STUDIES:    IMPRESSION AND PLAN:

## 2020-01-16 NOTE — PROGRESS NOTE ADULT - SUBJECTIVE AND OBJECTIVE BOX
SUBJECTIVE:    Patient is a 79y old Male who presents with a chief complaint of Cardiac arrest (16 Jan 2020 10:15)    Currently admitted to medicine with the primary diagnosis of Cardiac arrest     Today is hospital day 4d. This morning he is resting comfortably in bed and reports no new issues or overnight events.     INTERVAL EVENTS: Patient unable to get MRI yesterday due to agitation.    PAST MEDICAL & SURGICAL HISTORY  Atrial fibrillation  Neuropathy  HLD (hyperlipidemia)  HTN (hypertension)  History of cholecystectomy  History of cholecystectomy      ALLERGIES:  No Known Allergies    MEDICATIONS:  STANDING MEDICATIONS  atorvastatin 80 milliGRAM(s) Oral at bedtime  chlorhexidine 4% Liquid 1 Application(s) Topical <User Schedule>  dextrose 5%. 1000 milliLiter(s) IV Continuous <Continuous>  heparin  Injectable 5000 Unit(s) SubCutaneous every 8 hours  LORazepam   Injectable 1 milliGRAM(s) IV Push every 8 hours  losartan 50 milliGRAM(s) Oral daily  losartan 25 milliGRAM(s) Oral once  losartan 75 milliGRAM(s) Oral daily  metoprolol tartrate 12.5 milliGRAM(s) Oral three times a day    PRN MEDICATIONS  LORazepam   Injectable 2 milliGRAM(s) IV Push once PRN    VITALS:   T(F): 97.7  HR: 70  BP: 174/98  RR: 29  SpO2: 96%    LABS:                        10.2   8.58  )-----------( 144      ( 16 Jan 2020 04:12 )             32.9     01-16    150<H>  |  109  |  28<H>  ----------------------------<  135<H>  4.0   |  28  |  0.8    Ca    8.9      16 Jan 2020 04:12  Mg     2.0     01-16    TPro  6.0  /  Alb  3.3<L>  /  TBili  2.2<H>  /  DBili  x   /  AST  43<H>  /  ALT  28  /  AlkPhos  84  01-16    PT/INR - ( 15 Samson 2020 04:30 )   PT: 18.00 sec;   INR: 1.57 ratio         PTT - ( 15 Samson 2020 04:30 )  PTT:32.1 sec          Culture - Blood (collected 13 Jan 2020 11:26)  Source: .Blood None  Preliminary Report (14 Jan 2020 23:02):    No growth to date.          RADIOLOGY:    PHYSICAL EXAM:  GEN: No acute distress  PULM/CHEST: Clear to auscultation bilaterally, no rales, rhonchi or wheezes   CVS: Regular rate and rhythm, S1-S2, no murmurs  ABD: Soft, non-tender, non-distended, +BS  EXT: No edema  NEURO: AAOx3    Mora Catheter:   Indwelling Urethral Catheter:     Connect To:  Straight Drainage/Gravity    Indication:  Improved Comfort Care (01-12-20 @ 18:26) (not performed) [active] 2019 22:20

## 2020-01-16 NOTE — PROGRESS NOTE ADULT - ASSESSMENT
80 yo M with hx of HTN, A.fib (Eliquis), CHF, HLD brought in by EMS post cardiac arrest. As per wife at bedside, patient at the basement watching movie and was doing fine. Few mins later, patient came up and told the wife that he wasn't feeling right and that he felt funny. Then he crashed. Wife called the EMS who arrived 5 mins later. Upon EMS arrival, patient was found to be in PEA, s/p resuscitation for 10-15 mins and ROSC achieved. While on the way to hospital, patient had another cardiac arrest in the ambulent s/p CPR and ROSC achieved few mins later. As per family, patient was doing welll and at baseline prior to the episode. As baseline patient is fully functional. Denied any recent infection, recent hospitalization, recent ED visit, fever, chills. Patient was extubated 1/13 and seen by Dr. Bill. Bedside echo shows EF of 60% and normal wall motion. Dr. Bill is of the opinion that the patient likely had Ventricular fibrillation and ended up with PEA and will need a cath once he is stabilized. Resumed DVT prophylaxis  Will order barium esophagogram as per CT surgery before starting feeding to rule out any esophageal perforation. Radiologist is of the opinion that the patient may have a cervical spine fracture and extravasation of blood from paraspinal vessels. So will order CT Cervical spine with IV contrast to rule that out. Neurosurgery was consulted as there is prevertebral edema and a C5-6 disc space and fragmentation of the anterior osteophyte at this level.     Unable to obtain MRI head due to persistent agitation. Called Neurosurgery who recommend just using a [place] J collar which the patient already has and not repeating the MRI. Otherwise patient will have a cath once he is medically clear from all aspects. Patient to undergo speech and swallow assessment and then start Clear diet as per CT surgery. No need for esophagogram as suspicion of esophageal damage is minimal as per CT Surgery team.    # Cardiac arrest 2/2 PEA likely from arrythmia  - likely from Arrythmia as per Cardio  - CTH: No evidence of acute intracranial pathology. Chronic microvascular ischemic changes. 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  - CTA Chest negative  - Cardiac enzymes trending down  - Continue Metoprolol 12.5 q8  - Echo shows EF of 60-65% with no valvular and wall abnormalities  - Continue Lipitor 80    # Suspected Retroperitoneal Hematoma likely from Cervical Fracture  - CT Surgery consulted and recommend Esophagogram  - Will hold Anticoagulation  - Radiologist is of the opinion that the patient may have a cervical spine fracture and extravasation of blood from paraspinal vessels.   - CT Cervical spine with IV contrast shows possible fracture  - MR Cervical Spine was not tolerated by the patient and study was terminated  - Neurosurgery following and recommend [place] J collar long term and no more MRI    # A.fib on Eliquis  - Stopped Eliquis will consider once cleared from CT Surgery  - TSH normal    # Anxiety  - On Clonazepam 1 q8 at home  - Will D/C Ativan and restart Clonazepam home dose    # HTN  - Started Losartan 50 equivalent to 40mg of Telmisartan on 01/15.  - Increased to losartan 75 today, will monitor for uptitration  - Was on Carvedilol 12.5 twice daily + Telmisartan 80mg at bedtime at home    # HLD  - Lipid panel with in normal range    DVT ppx: on heparin 5000 q8  GI ppx: PPI   Activity: Best rest  Diet: Speech and Swallow assessment 78 yo M with hx of HTN, A.fib (Eliquis), CHF, HLD brought in by EMS post cardiac arrest. As per wife at bedside, patient at the basement watching movie and was doing fine. Few mins later, patient came up and told the wife that he wasn't feeling right and that he felt funny. Then he crashed. Wife called the EMS who arrived 5 mins later. Upon EMS arrival, patient was found to be in PEA, s/p resuscitation for 10-15 mins and ROSC achieved. While on the way to hospital, patient had another cardiac arrest in the ambulent s/p CPR and ROSC achieved few mins later. As per family, patient was doing welll and at baseline prior to the episode. As baseline patient is fully functional. Denied any recent infection, recent hospitalization, recent ED visit, fever, chills. Patient was extubated 1/13 and seen by Dr. Bill. Bedside echo shows EF of 60% and normal wall motion. Dr. Bill is of the opinion that the patient likely had Ventricular fibrillation and ended up with PEA and will need a cath once he is stabilized. Resumed DVT prophylaxis  Will order barium esophagogram as per CT surgery before starting feeding to rule out any esophageal perforation. Radiologist is of the opinion that the patient may have a cervical spine fracture and extravasation of blood from paraspinal vessels. So will order CT Cervical spine with IV contrast to rule that out. Neurosurgery was consulted as there is prevertebral edema and a C5-6 disc space and fragmentation of the anterior osteophyte at this level.     Unable to obtain MRI head due to persistent agitation. Called Neurosurgery who recommend just using a [place] J collar which the patient already has and not repeating the MRI. Otherwise patient will have a cath once he is medically clear from all aspects. Patient to undergo speech and swallow assessment and then start Clear diet as per CT surgery. No need for esophagogram as suspicion of esophageal damage is minimal as per CT Surgery team.    # Cardiac arrest 2/2 PEA likely from arrythmia  - likely from Arrythmia as per Cardio  - CTH: No evidence of acute intracranial pathology. Chronic microvascular ischemic changes. 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  - CTA Chest negative  - Cardiac enzymes trending down  - Continue Metoprolol 12.5 q8  - Echo shows EF of 60-65% with no valvular and wall abnormalities  - Continue Lipitor 80    # Suspected Retroperitoneal Hematoma likely from Cervical Fracture  - CT Surgery consulted and recommend Esophagogram  - Will hold Anticoagulation  - Radiologist is of the opinion that the patient may have a cervical spine fracture and extravasation of blood from paraspinal vessels.   - CT Cervical spine with IV contrast shows possible fracture  - MR Cervical Spine was not tolerated by the patient and study was terminated  - Neurosurgery following and recommend [place] J collar long term and no more MRI    # A.fib on Eliquis  - Stopped Eliquis will consider once cleared from CT Surgery  - TSH normal    # Anxiety  - On Clonazepam 1 q8 at home  - Will D/C Ativan and restart Clonazepam home dose    # HTN  - Started Losartan 50 equivalent to 40mg of Telmisartan on 01/15.  - Increased to losartan 75 today, will monitor for uptitration  - Was on Carvedilol 12.5 twice daily + Telmisartan 80mg at bedtime at home    # HLD  - Lipid panel with in normal range    # Hypernatremia  - On Dextrose 5 @ 80/h    DVT ppx: on heparin 5000 q8  GI ppx: PPI   Activity: Best rest  Diet: Speech and Swallow assessment 80 yo M with hx of HTN, A.fib (Eliquis), CHF, HLD brought in by EMS post cardiac arrest. As per wife at bedside, patient at the basement watching movie and was doing fine. Few mins later, patient came up and told the wife that he wasn't feeling right and that he felt funny. Then he crashed. Wife called the EMS who arrived 5 mins later. Upon EMS arrival, patient was found to be in PEA, s/p resuscitation for 10-15 mins and ROSC achieved. While on the way to hospital, patient had another cardiac arrest in the ambulent s/p CPR and ROSC achieved few mins later. As per family, patient was doing welll and at baseline prior to the episode. As baseline patient is fully functional. Denied any recent infection, recent hospitalization, recent ED visit, fever, chills. Patient was extubated 1/13 and seen by Dr. Bill. Bedside echo shows EF of 60% and normal wall motion. Dr. Bill is of the opinion that the patient likely had Ventricular fibrillation and ended up with PEA and will need a cath once he is stabilized. Resumed DVT prophylaxis  Will order barium esophagogram as per CT surgery before starting feeding to rule out any esophageal perforation. Radiologist is of the opinion that the patient may have a cervical spine fracture and extravasation of blood from paraspinal vessels. So will order CT Cervical spine with IV contrast to rule that out. Neurosurgery was consulted as there is prevertebral edema and a C5-6 disc space and fragmentation of the anterior osteophyte at this level.     Unable to obtain MRI head due to persistent agitation. Called Neurosurgery who recommend just using a [place] J collar which the patient already has and not repeating the MRI. Otherwise patient will have a cath once he is medically clear from all aspects. Patient to undergo speech and swallow assessment and then start Clear diet as per CT surgery. No need for esophagogram as suspicion of esophageal damage is minimal as per CT Surgery team.    # Cardiac arrest 2/2 PEA likely from arrythmia  - likely from Arrythmia as per Cardio  - CTH: No evidence of acute intracranial pathology. Chronic microvascular ischemic changes. 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  - CTA Chest negative  - Cardiac enzymes trending down  - Continue Metoprolol 12.5 q8  - Echo shows EF of 60-65% with no valvular and wall abnormalities  - Continue Lipitor 80    # Suspected Retroperitoneal Hematoma likely from Cervical Fracture  - CT Surgery consulted and recommend Esophagogram  - Will hold Anticoagulation  - Radiologist is of the opinion that the patient may have a cervical spine fracture and extravasation of blood from paraspinal vessels.   - CT Cervical spine with IV contrast shows possible fracture  - MR Cervical Spine was not tolerated by the patient and study was terminated  - Neurosurgery following and recommend [place] J collar long term and no more MRI    # A.fib on Eliquis  - Restarted Eliquis 100 twice daily  - TSH normal    # Anxiety  - On Clonazepam 1 q8 at home  - Will D/C Ativan and restart Clonazepam home dose    # HTN  - Started Losartan 50 equivalent to 40mg of Telmisartan on 01/15.  - Increased to losartan 75 today, will monitor for uptitration  - Was on Carvedilol 12.5 twice daily + Telmisartan 80mg at bedtime at home    # HLD  - Lipid panel with in normal range    # Hypernatremia  - On Dextrose 5 @ 80/h    DVT ppx: on heparin 5000 q8  GI ppx: PPI   Activity: Best rest  Diet: Speech and Swallow assessment

## 2020-01-16 NOTE — PROGRESS NOTE ADULT - ASSESSMENT
IMPRESSION:  S/P CP arrest/ PEA/ 10 MIN AWAKE FOLLOWS COMMANDS  RETROESOPHAGEAL HEMATOMA possible fracture of cervical spine?/ ct neck reviewed for MRI  s/p extubation on 1/13/2020    PLAN:    CNS: avoid CNS depressants, MRI, restart klonopin    HEENT:  Oral care    PULMONARY:  HOB @ 45 degrees, O2 keep sats>94%,  NIV at night.    CARDIOVASCULAR: off pressors cardio f/up    GI: GI prophylaxis                                          Feeding clear liquid    RENAL:  F/u  lytes.  Correct as needed. accurate I/O    INFECTIOUS DISEASE: pancx    HEMATOLOGICAL:  DVT prophylaxis.    ENDOCRINE:  Follow up FS.  Insulin protocol if needed.    CODE STATUS: FULL CODE    poor prognosis

## 2020-01-16 NOTE — DIETITIAN INITIAL EVALUATION ADULT. - ENERGY NEEDS
calorie 1818-2169kcal (25-30kcal/kg IBW) for BMI >30  protein 72-86g (1-1.2g/kg IBW) as above  fluid per CCU team

## 2020-01-16 NOTE — PROGRESS NOTE ADULT - SUBJECTIVE AND OBJECTIVE BOX
Patient was seen and examined. Spoke with RN. Chart reviewed.  No events overnight.  Vital Signs Last 24 Hrs  T(F): 97.7 (16 Jan 2020 09:00), Max: 99.4 (15 Samson 2020 12:00)  HR: 70 (16 Jan 2020 09:00) (64 - 86)  BP: 174/98 (16 Jan 2020 09:00) (145/77 - 181/101)  SpO2: 96% (16 Jan 2020 09:00) (90% - 100%)  MEDICATIONS  (STANDING):  atorvastatin 80 milliGRAM(s) Oral at bedtime  chlorhexidine 4% Liquid 1 Application(s) Topical <User Schedule>  dextrose 5%. 1000 milliLiter(s) (80 mL/Hr) IV Continuous <Continuous>  heparin  Injectable 5000 Unit(s) SubCutaneous every 8 hours  LORazepam   Injectable 1 milliGRAM(s) IV Push every 8 hours  losartan 50 milliGRAM(s) Oral daily  metoprolol tartrate 12.5 milliGRAM(s) Oral three times a day    MEDICATIONS  (PRN):  LORazepam   Injectable 2 milliGRAM(s) IV Push once PRN Agitation    Labs:                        10.2   8.58  )-----------( 144      ( 16 Jan 2020 04:12 )             32.9                         10.8   9.69  )-----------( 134      ( 15 Samson 2020 04:30 )             35.3     16 Jan 2020 04:12    150    |  109    |  28     ----------------------------<  135    4.0     |  28     |  0.8    15 Samson 2020 04:30    147    |  104    |  24     ----------------------------<  123    4.3     |  27     |  0.9      Ca    8.9        16 Jan 2020 04:12  Ca    9.3        15 Samson 2020 04:30  Mg     2.0       16 Jan 2020 04:12  Mg     1.9       15 Samson 2020 04:30    TPro  6.0    /  Alb  3.3    /  TBili  2.2    /  DBili  x      /  AST  43     /  ALT  28     /  AlkPhos  84     16 Jan 2020 04:12  TPro  6.7    /  Alb  3.8    /  TBili  2.1    /  DBili  x      /  AST  22     /  ALT  18     /  AlkPhos  82     15 Samson 2020 04:30    PT/INR - ( 15 Samson 2020 04:30 )   PT: 18.00 sec;   INR: 1.57 ratio         PTT - ( 15 Samson 2020 04:30 )  PTT:32.1 sec      Culture - Blood (collected 13 Jan 2020 11:26)  Source: .Blood None  Preliminary Report (14 Jan 2020 23:02):    No growth to date.    General: comfortable, NAD,  Neurology: A&Ox3, nonfocal, neck collar   Head:  Normocephalic, atraumatic  ENT:  Mucosa moist, no ulcerations  Resp: CTA B/L  CV: IR, S1S2,   GI: Soft, NT, bowel sounds  MS: No edema, + peripheral pulses,       A/P:  78 yo M with hx of HTN, A.fib (Eliquis), CHF, HLD brought in by EMS post cardiac arrest- now in CCU on vent on pressors with likley cardiogenic shock; now with remarkable improvement- off vent, off pressors, awake and alert noted with 4.4cm retroesophageal soft tissue density within the thorax, likely hematoma s/p extubation    MRI   bipap as needed  cardiology following   NS, CTS f/u  cleer liquid   plan as per cardio/ CCU team  DVT prophylaxis  Decubitus prevention- all measures as per RN protocol  Please call or text me with any questions or

## 2020-01-16 NOTE — PROGRESS NOTE ADULT - ASSESSMENT
79y Male with PMH significant for HTN, atrial fibrillation (on Eliquis), CHF, HLD, was brought in by EMS for cardiac arrest x 2 s/p resuscitation and intubation, with 4.4cm retroesophageal soft tissue density within the thorax, likely hematoma s/p extubation. Esophogeal perforation unlikely considering current presentation, physical exam and lack of pneumomediastinum on imaging.    PLAN:   Diet: clear liquid diet okay  F/U MRI

## 2020-01-16 NOTE — PROGRESS NOTE ADULT - SUBJECTIVE AND OBJECTIVE BOX
OVERNIGHT EVENTS: events noted, could not tolerate MRI. S/P NEURO SX, t collar    Vital Signs Last 24 Hrs  T(C): 37 (16 Jan 2020 04:00), Max: 37.4 (15 Samson 2020 12:00)  T(F): 98.6 (16 Jan 2020 04:00), Max: 99.4 (15 Samson 2020 12:00)  HR: 72 (16 Jan 2020 06:00) (64 - 86)  BP: 163/105 (16 Jan 2020 06:00) (145/77 - 181/101)  BP(mean): 126 (16 Jan 2020 06:00) (11 - 133)  RR: 26 (16 Jan 2020 06:00) (17 - 34)  SpO2: 100% (16 Jan 2020 06:00) (90% - 100%)    PHYSICAL EXAMINATION:    GENERAL: Awake, follows commands    HEENT: Head is normocephalic and atraumatic. Extraocular muscles are intact. Mucous membranes are moist.    NECK: Supple.    LUNGS: good air entry    HEART: Regular rate and rhythm without murmur.    ABDOMEN: Soft, nontender, and nondistended.      EXTREMITIES: Without any cyanosis, clubbing, rash, lesions or edema.    NEUROLOGIC: Grossly intact.    SKIN: No ulceration or induration present.      LABS:                        10.2   8.58  )-----------( 144      ( 16 Jan 2020 04:12 )             32.9     01-16    150<H>  |  109  |  28<H>  ----------------------------<  135<H>  4.0   |  28  |  0.8    Ca    8.9      16 Jan 2020 04:12  Mg     2.0     01-16    TPro  6.0  /  Alb  3.3<L>  /  TBili  2.2<H>  /  DBili  x   /  AST  43<H>  /  ALT  28  /  AlkPhos  84  01-16    PT/INR - ( 15 Samson 2020 04:30 )   PT: 18.00 sec;   INR: 1.57 ratio         PTT - ( 15 Samson 2020 04:30 )  PTT:32.1 sec                      01-15-20 @ 07:01  -  01-16-20 @ 07:00  --------------------------------------------------------  IN: 1200 mL / OUT: 800 mL / NET: 400 mL        MICROBIOLOGY:  Culture Results:   No growth to date. (01-13 @ 11:26)  Culture Results:   No growth (01-12 @ 17:20)      MEDICATIONS  (STANDING):  atorvastatin 80 milliGRAM(s) Oral at bedtime  chlorhexidine 4% Liquid 1 Application(s) Topical <User Schedule>  dextrose 5%. 1000 milliLiter(s) (80 mL/Hr) IV Continuous <Continuous>  heparin  Injectable 5000 Unit(s) SubCutaneous every 8 hours  LORazepam   Injectable 1 milliGRAM(s) IV Push every 8 hours  losartan 50 milliGRAM(s) Oral daily  metoprolol tartrate 12.5 milliGRAM(s) Oral three times a day    MEDICATIONS  (PRN):  LORazepam   Injectable 2 milliGRAM(s) IV Push once PRN Agitation      RADIOLOGY & ADDITIONAL STUDIES:

## 2020-01-16 NOTE — DIETITIAN INITIAL EVALUATION ADULT. - OTHER INFO
P/w: cardiac arrest.  Cardiac arrest 2/2 PEA likely from arrythmia- Cardiac enzymes trending down.  Suspected Retroperitoneal Hematoma likely from Cervical Fracture- Neurosurgery following and recommend [place] J collar long term. Hypernatremia - On Dextrose 5 @ 80/h (326kcal/day). Awaiting SLP eval.

## 2020-01-16 NOTE — PROGRESS NOTE ADULT - SUBJECTIVE AND OBJECTIVE BOX
GENERAL SURGERY PROGRESS NOTE     MED IVORY  79y  Male  Hospital day :4d  POD:  Procedure:   OVERNIGHT EVENTS: Uneventful    T(F): 98.6 (01-16-20 @ 04:00), Max: 99.4 (01-15-20 @ 12:00)  HR: 72 (01-16-20 @ 06:00) (64 - 86)  BP: 163/105 (01-16-20 @ 06:00) (145/77 - 181/101)  ABP: --  ABP(mean): --  RR: 26 (01-16-20 @ 06:00) (17 - 34)  SpO2: 100% (01-16-20 @ 06:00) (90% - 100%)    DIET/FLUIDS: dextrose 5% + sodium chloride 0.45%. 1000 milliLiter(s) IV Continuous <Continuous>      URINE:   01-14-20 @ 07:01  -  01-15-20 @ 07:00  --------------------------------------------------------  OUT: 1225 mL       GI proph:    AC/ proph: heparin  Injectable 5000 Unit(s) SubCutaneous every 8 hours    ABx:     PHYSICAL EXAM:  GENERAL: NAD, cervical collar in place  CHEST/LUNG: Clear to auscultation bilaterally  HEART: Regular rate and rhythm  ABDOMEN: Soft, Nontender, Nondistended;   EXTREMITIES:  No clubbing, cyanosis, or edema      LABS  Labs:  CAPILLARY BLOOD GLUCOSE      POCT Blood Glucose.: 128 mg/dL (15 Samson 2020 23:25)                          10.2   8.58  )-----------( 144      ( 16 Jan 2020 04:12 )             32.9       Auto Immature Granulocyte %: 0.3 % (01-16-20 @ 04:12)  Auto Neutrophil %: 82.9 % (01-16-20 @ 04:12)    01-16    150<H>  |  109  |  28<H>  ----------------------------<  135<H>  4.0   |  28  |  0.8      Calcium, Total Serum: 8.9 mg/dL (01-16-20 @ 04:12)      LFTs:             6.0  | 2.2  | 43       ------------------[84      ( 16 Jan 2020 04:12 )  3.3  | x    | 28          Lipase:x      Amylase:x         Blood Gas Arterial, Lactate: 1.0 mmoL/L (01-13-20 @ 09:34)    ABG - ( 13 Jan 2020 09:34 )  pH: 7.38  /  pCO2: 51    /  pO2: 92    / HCO3: 30    / Base Excess: 4.4   /  SaO2: 97              ABG - ( 13 Jan 2020 03:46 )  pH: 7.38  /  pCO2: 51    /  pO2: 120   / HCO3: 30    / Base Excess: 4.1   /  SaO2: 99              ABG - ( 12 Jan 2020 20:07 )  pH: 7.42  /  pCO2: 45    /  pO2: 237   / HCO3: 29    / Base Excess: ?4.0  /  SaO2: ?98.8             Coags:     18.00  ----< 1.57    ( 15 Samson 2020 04:30 )     32.1            Serum Pro-Brain Natriuretic Peptide: 1767 pg/mL (01-12-20 @ 17:20)          Culture - Blood (collected 13 Jan 2020 11:26)  Source: .Blood None  Preliminary Report (14 Jan 2020 23:02):    No growth to date.          RADIOLOGY & ADDITIONAL TESTS:      < from: CT Cervical Spine No Cont (01.14.20 @ 16:48) >  IMPRESSION:    1.  Widening of the C5-6 disc space and fragmentation of the anterior bridging osteophyte at this level. Findings are suspicious for distraction injury through the C5-6 disc with C5-6 anterior osteophyte fracture. Recommend further evaluation with MRI.    2.  Diffuse prevertebral edema as well as edema within the left anterolateral neck soft tissues and supraclavicular region.    3.  Stranding and possible hematoma behind the esophagus, as previously described.    4.  Multilevel anterior bridging osteophytes/syndesmophytes.    5.  Multilevel degenerative changes as described.    < end of copied text >    < from: Xray Chest 1 View- PORTABLE-Routine (01.15.20 @ 04:48) >    Impression:     Improving left basilar opacity. No pneumothorax.    < end of copied text >

## 2020-01-16 NOTE — DIETITIAN INITIAL EVALUATION ADULT. - DIET TYPE
Pt. reports good appetite PTP, regular meals and snacks. Regular diet, does not use salt. NKFA, Takes vit D and B12 supplement. No nutritional shakes use. No cultural/Buddhist dietary restr, but prefers home cooking. Stable weight trends ~240-245lbs.

## 2020-01-17 LAB
ALBUMIN SERPL ELPH-MCNC: 3.2 G/DL — LOW (ref 3.5–5.2)
ALP SERPL-CCNC: 88 U/L — SIGNIFICANT CHANGE UP (ref 30–115)
ALT FLD-CCNC: 42 U/L — HIGH (ref 0–41)
ANION GAP SERPL CALC-SCNC: 12 MMOL/L — SIGNIFICANT CHANGE UP (ref 7–14)
AST SERPL-CCNC: 54 U/L — HIGH (ref 0–41)
BASOPHILS # BLD AUTO: 0.01 K/UL — SIGNIFICANT CHANGE UP (ref 0–0.2)
BASOPHILS NFR BLD AUTO: 0.1 % — SIGNIFICANT CHANGE UP (ref 0–1)
BILIRUB SERPL-MCNC: 2.5 MG/DL — HIGH (ref 0.2–1.2)
BUN SERPL-MCNC: 23 MG/DL — HIGH (ref 10–20)
CALCIUM SERPL-MCNC: 8.5 MG/DL — SIGNIFICANT CHANGE UP (ref 8.5–10.1)
CHLORIDE SERPL-SCNC: 108 MMOL/L — SIGNIFICANT CHANGE UP (ref 98–110)
CO2 SERPL-SCNC: 28 MMOL/L — SIGNIFICANT CHANGE UP (ref 17–32)
CREAT SERPL-MCNC: 0.7 MG/DL — SIGNIFICANT CHANGE UP (ref 0.7–1.5)
EOSINOPHIL # BLD AUTO: 0.08 K/UL — SIGNIFICANT CHANGE UP (ref 0–0.7)
EOSINOPHIL NFR BLD AUTO: 1.1 % — SIGNIFICANT CHANGE UP (ref 0–8)
GLUCOSE SERPL-MCNC: 122 MG/DL — HIGH (ref 70–99)
HCT VFR BLD CALC: 31.4 % — LOW (ref 42–52)
HGB BLD-MCNC: 9.7 G/DL — LOW (ref 14–18)
IMM GRANULOCYTES NFR BLD AUTO: 0.4 % — HIGH (ref 0.1–0.3)
LYMPHOCYTES # BLD AUTO: 0.62 K/UL — LOW (ref 1.2–3.4)
LYMPHOCYTES # BLD AUTO: 8.5 % — LOW (ref 20.5–51.1)
MAGNESIUM SERPL-MCNC: 1.9 MG/DL — SIGNIFICANT CHANGE UP (ref 1.8–2.4)
MCHC RBC-ENTMCNC: 30.9 G/DL — LOW (ref 32–37)
MCHC RBC-ENTMCNC: 31.1 PG — HIGH (ref 27–31)
MCV RBC AUTO: 100.6 FL — HIGH (ref 80–94)
MONOCYTES # BLD AUTO: 0.71 K/UL — HIGH (ref 0.1–0.6)
MONOCYTES NFR BLD AUTO: 9.8 % — HIGH (ref 1.7–9.3)
NEUTROPHILS # BLD AUTO: 5.83 K/UL — SIGNIFICANT CHANGE UP (ref 1.4–6.5)
NEUTROPHILS NFR BLD AUTO: 80.1 % — HIGH (ref 42.2–75.2)
NRBC # BLD: 0 /100 WBCS — SIGNIFICANT CHANGE UP (ref 0–0)
PLATELET # BLD AUTO: 141 K/UL — SIGNIFICANT CHANGE UP (ref 130–400)
POTASSIUM SERPL-MCNC: 3.8 MMOL/L — SIGNIFICANT CHANGE UP (ref 3.5–5)
POTASSIUM SERPL-SCNC: 3.8 MMOL/L — SIGNIFICANT CHANGE UP (ref 3.5–5)
PROT SERPL-MCNC: 5.6 G/DL — LOW (ref 6–8)
RBC # BLD: 3.12 M/UL — LOW (ref 4.7–6.1)
RBC # FLD: 15 % — HIGH (ref 11.5–14.5)
SODIUM SERPL-SCNC: 148 MMOL/L — HIGH (ref 135–146)
WBC # BLD: 7.28 K/UL — SIGNIFICANT CHANGE UP (ref 4.8–10.8)
WBC # FLD AUTO: 7.28 K/UL — SIGNIFICANT CHANGE UP (ref 4.8–10.8)

## 2020-01-17 PROCEDURE — 71045 X-RAY EXAM CHEST 1 VIEW: CPT | Mod: 26

## 2020-01-17 PROCEDURE — 99291 CRITICAL CARE FIRST HOUR: CPT

## 2020-01-17 RX ORDER — INSULIN LISPRO 100/ML
5 VIAL (ML) SUBCUTANEOUS ONCE
Refills: 0 | Status: DISCONTINUED | OUTPATIENT
Start: 2020-01-17 | End: 2020-01-17

## 2020-01-17 RX ORDER — ACETAMINOPHEN 500 MG
650 TABLET ORAL ONCE
Refills: 0 | Status: COMPLETED | OUTPATIENT
Start: 2020-01-17 | End: 2020-01-17

## 2020-01-17 RX ADMIN — ENOXAPARIN SODIUM 100 MILLIGRAM(S): 100 INJECTION SUBCUTANEOUS at 05:07

## 2020-01-17 RX ADMIN — Medication 650 MILLIGRAM(S): at 09:59

## 2020-01-17 RX ADMIN — Medication 1 MILLIGRAM(S): at 05:06

## 2020-01-17 RX ADMIN — CHLORHEXIDINE GLUCONATE 1 APPLICATION(S): 213 SOLUTION TOPICAL at 05:06

## 2020-01-17 RX ADMIN — LOSARTAN POTASSIUM 75 MILLIGRAM(S): 100 TABLET, FILM COATED ORAL at 05:06

## 2020-01-17 RX ADMIN — PANTOPRAZOLE SODIUM 40 MILLIGRAM(S): 20 TABLET, DELAYED RELEASE ORAL at 06:49

## 2020-01-17 RX ADMIN — Medication 1 MILLIGRAM(S): at 14:21

## 2020-01-17 RX ADMIN — ENOXAPARIN SODIUM 100 MILLIGRAM(S): 100 INJECTION SUBCUTANEOUS at 17:53

## 2020-01-17 NOTE — PROGRESS NOTE ADULT - ASSESSMENT
IMPRESSION:  S/P CP arrest/ PEA/ 10 MIN AWAKE FOLLOWS COMMANDS  RETROESOPHAGEAL HEMATOMA possible fracture of cervical spine?/ ct neck reviewed for MRI  s/p extubation on 1/13/2020    PLAN:    CNS: avoid CNS depressants, on klonopin, collar for at least 6 weeks(due to distraction injury)    HEENT:  Oral care    PULMONARY:  HOB @ 45 degrees, O2 keep sats>94%,  NIV at night.    CARDIOVASCULAR: off pressors cardio f/up, s/p mx pause while awake.    GI: GI prophylaxis                                          Feeding npo for possible procedure.    RENAL:  F/u  lytes.  Correct as needed. accurate I/O    INFECTIOUS DISEASE: no abx    HEMATOLOGICAL:  DVT prophylaxis.    ENDOCRINE:  Follow up FS.  Insulin protocol if needed.    CODE STATUS: FULL CODE    transfer to hospitalist. IMPRESSION:  S/P CP arrest/ PEA/ 10 MIN AWAKE FOLLOWS COMMANDS   RETROESOPHAGEAL HEMATOMA possible fracture of cervical spine?/ ct neck reviewed for MRI  s/p extubation on 1/13/2020  cardiac pauses    PLAN:    CNS: avoid CNS depressants, on klonopin, collar for at least 6 weeks(due to distraction injury), no MRI    HEENT:  Oral care    PULMONARY:  HOB @ 45 degrees, O2 keep sats>94%,  NIV at night.    CARDIOVASCULAR: off pressors cardio f/up, s/p  pause while awake. eps EVAL, restart ac    GI: GI prophylaxis                                          Feeding npo for possible procedure.    RENAL:  F/u  lytes.  Correct as needed. accurate I/O    INFECTIOUS DISEASE: no abx    HEMATOLOGICAL:  DVT prophylaxis.    ENDOCRINE:  Follow up FS.  Insulin protocol if needed.    CODE STATUS: FULL CODE

## 2020-01-17 NOTE — PROGRESS NOTE ADULT - SUBJECTIVE AND OBJECTIVE BOX
Patient was seen and examined in ICU. Spoke with RN. Chart reviewed.  No events overnight.  Vital Signs Last 24 Hrs  T(F): 98.7 (17 Jan 2020 04:00), Max: 98.7 (17 Jan 2020 04:00)  HR: 62 (17 Jan 2020 06:00) (62 - 80)  BP: 113/68 (17 Jan 2020 06:00) (113/68 - 174/98)  SpO2: 100% (17 Jan 2020 06:00) (93% - 100%)  MEDICATIONS  (STANDING):  atorvastatin 80 milliGRAM(s) Oral at bedtime  chlorhexidine 4% Liquid 1 Application(s) Topical <User Schedule>  clonazePAM  Tablet 1 milliGRAM(s) Oral every 8 hours  dextrose 5%. 1000 milliLiter(s) (80 mL/Hr) IV Continuous <Continuous>  enoxaparin Injectable 100 milliGRAM(s) SubCutaneous two times a day  losartan 75 milliGRAM(s) Oral daily  pantoprazole    Tablet 40 milliGRAM(s) Oral before breakfast    MEDICATIONS  (PRN):    Labs:                        9.7    7.28  )-----------( 141      ( 17 Jan 2020 04:15 )             31.4                         10.2   8.58  )-----------( 144      ( 16 Jan 2020 04:12 )             32.9     17 Jan 2020 04:15    148    |  108    |  23     ----------------------------<  122    3.8     |  28     |  0.7    16 Jan 2020 17:12    148    |  107    |  26     ----------------------------<  128    3.7     |  29     |  0.8      Ca    8.5        17 Jan 2020 04:15  Ca    8.9        16 Jan 2020 17:12  Mg     1.9       17 Jan 2020 04:15  Mg     2.0       16 Jan 2020 04:12    TPro  5.6    /  Alb  3.2    /  TBili  2.5    /  DBili  x      /  AST  54     /  ALT  42     /  AlkPhos  88     17 Jan 2020 04:15  TPro  6.0    /  Alb  3.3    /  TBili  2.2    /  DBili  x      /  AST  43     /  ALT  28     /  AlkPhos  84     16 Jan 2020 04:12      on bipap  neck brace in place      A/P:  78 yo M with hx of HTN, A.fib (Eliquis), CHF, HLD brought in by EMS post cardiac arrest- now in CCU on vent on pressors with likley cardiogenic shock; now with remarkable improvement- off vent, off pressors, awake and alert noted with 4.4cm retroesophageal soft tissue density within the thorax, likely hematoma s/p extubation; noted with pauses on tele; in [place] J collar as per NS    [place] J collar    CCU monitoring    EP f/u today- ?PPM/AICD    bipap as needed    cardiology f/u - cath scheduled for Monday    plan as per cardio/ CCU team  DVT prophylaxis  Decubitus prevention- all measures as per RN protocol  Please call or text me with any questions or updates

## 2020-01-17 NOTE — PROGRESS NOTE ADULT - SUBJECTIVE AND OBJECTIVE BOX
SUBJECTIVE:    Patient is a 79y old Male who presents with a chief complaint of Cardiac arrest (16 Jan 2020 12:59)    Currently admitted to medicine with the primary diagnosis of Cardiac arrest     Today is hospital day 5d. This morning he is resting comfortably in bed and reports no new issues or overnight events.     INTERVAL EVENTS: Patient has no active complaints    PAST MEDICAL & SURGICAL HISTORY  Atrial fibrillation  Neuropathy  HLD (hyperlipidemia)  HTN (hypertension)  History of cholecystectomy  History of cholecystectomy      ALLERGIES:  No Known Allergies    MEDICATIONS:  STANDING MEDICATIONS  atorvastatin 80 milliGRAM(s) Oral at bedtime  chlorhexidine 4% Liquid 1 Application(s) Topical <User Schedule>  clonazePAM  Tablet 1 milliGRAM(s) Oral every 8 hours  dextrose 5%. 1000 milliLiter(s) IV Continuous <Continuous>  enoxaparin Injectable 100 milliGRAM(s) SubCutaneous two times a day  losartan 75 milliGRAM(s) Oral daily  pantoprazole    Tablet 40 milliGRAM(s) Oral before breakfast    PRN MEDICATIONS    VITALS:   T(F): 98.7  HR: 62  BP: 113/68  RR: 19  SpO2: 100%    LABS:                        9.7    7.28  )-----------( 141      ( 17 Jan 2020 04:15 )             31.4     01-17    148<H>  |  108  |  23<H>  ----------------------------<  122<H>  3.8   |  28  |  0.7    Ca    8.5      17 Jan 2020 04:15  Mg     1.9     01-17    TPro  5.6<L>  /  Alb  3.2<L>  /  TBili  2.5<H>  /  DBili  x   /  AST  54<H>  /  ALT  42<H>  /  AlkPhos  88  01-17                  RADIOLOGY:    PHYSICAL EXAM:  GEN: No acute distress  PULM/CHEST: Clear to auscultation bilaterally, no rales, rhonchi or wheezes   CVS: Irregularly irregular S1-S2, no murmurs  ABD: Soft, non-tender, non-distended, +BS  EXT: No edema  NEURO: AAOx3    Mora Catheter:

## 2020-01-17 NOTE — PROGRESS NOTE ADULT - SUBJECTIVE AND OBJECTIVE BOX
Over Night Events:  s/p pause(4 sec, 3 secs).      ROS:  See HPI    PHYSICAL EXAM    ICU Vital Signs Last 24 Hrs  T(C): 37.1 (2020 04:00), Max: 37.1 (2020 04:00)  T(F): 98.7 (2020 04:00), Max: 98.7 (2020 04:00)  HR: 62 (2020 06:00) (62 - 80)  BP: 113/68 (2020 06:00) (113/68 - 166/88)  BP(mean): 84 (2020 06:00) (84 - 123)  RR: 19 (2020 06:00) (19 - 34)  SpO2: 100% (2020 06:00) (93% - 100%)      General: NARD  HEENT: NARENDRA             Lymph Nodes: No cervical LN   Lungs: Bilateral BS  Cardiovascular: irregular   Abdomen: Soft, Positive BS  Extremities: No clubbing   Skin: Warm  Neurological: Non focal       20 @ 07:01  -  20 @ 07:00  --------------------------------------------------------  IN:    dextrose 5% + sodium chloride 0.45%.: 75 mL    dextrose 5%.: 1760 mL    Oral Fluid: 220 mL  Total IN: 2055 mL    OUT:    Incontinent per Collection Ba mL  Total OUT: 1100 mL    Total NET: 955 mL          LABS:                          9.7    7.28  )-----------( 141      ( 2020 04:15 )             31.4                                                   148<H>  |  108  |  23<H>  ----------------------------<  122<H>  3.8   |  28  |  0.7    Ca    8.5      2020 04:15  Mg     1.9         TPro  5.6<L>  /  Alb  3.2<L>  /  TBili  2.5<H>  /  DBili  x   /  AST  54<H>  /  ALT  42<H>  /  AlkPhos  88  01-17                                                                                           LIVER FUNCTIONS - ( 2020 04:15 )  Alb: 3.2 g/dL / Pro: 5.6 g/dL / ALK PHOS: 88 U/L / ALT: 42 U/L / AST: 54 U/L / GGT: x                                                                                                                                       MEDICATIONS  (STANDING):  atorvastatin 80 milliGRAM(s) Oral at bedtime  chlorhexidine 4% Liquid 1 Application(s) Topical <User Schedule>  clonazePAM  Tablet 1 milliGRAM(s) Oral every 8 hours  dextrose 5%. 1000 milliLiter(s) (80 mL/Hr) IV Continuous <Continuous>  enoxaparin Injectable 100 milliGRAM(s) SubCutaneous two times a day  losartan 75 milliGRAM(s) Oral daily  pantoprazole    Tablet 40 milliGRAM(s) Oral before breakfast    MEDICATIONS  (PRN):      Xrays:                                                                                     ECHO Over Night Events:  s/p pause(4 sec, 3 secs). BIPAP at night      ROS:  See HPI    PHYSICAL EXAM    ICU Vital Signs Last 24 Hrs  T(C): 37.1 (2020 04:00), Max: 37.1 (2020 04:00)  T(F): 98.7 (2020 04:00), Max: 98.7 (2020 04:00)  HR: 62 (2020 06:00) (62 - 80)  BP: 113/68 (2020 06:00) (113/68 - 166/88)  BP(mean): 84 (2020 06:00) (84 - 123)  RR: 19 (2020 06:00) (19 - 34)  SpO2: 100% (2020 06:00) (93% - 100%)      General: NARD  HEENT: NARENDRA             Lymph Nodes: No cervical LN   Lungs: Bilateral BS, dec both abses  Cardiovascular: irregular   Abdomen: Soft, Positive BS  Extremities: No clubbing   Skin: Warm  Neurological: Non focal       20 @ 07:01  -  20 @ 07:00  --------------------------------------------------------  IN:    dextrose 5% + sodium chloride 0.45%.: 75 mL    dextrose 5%.: 1760 mL    Oral Fluid: 220 mL  Total IN: 2055 mL    OUT:    Incontinent per Collection Ba mL  Total OUT: 1100 mL    Total NET: 955 mL          LABS:                          9.7    7.28  )-----------( 141      ( 2020 04:15 )             31.4                                                   148<H>  |  108  |  23<H>  ----------------------------<  122<H>  3.8   |  28  |  0.7    Ca    8.5      2020 04:15  Mg     1.9         TPro  5.6<L>  /  Alb  3.2<L>  /  TBili  2.5<H>  /  DBili  x   /  AST  54<H>  /  ALT  42<H>  /  AlkPhos  88                                                                                             LIVER FUNCTIONS - ( 2020 04:15 )  Alb: 3.2 g/dL / Pro: 5.6 g/dL / ALK PHOS: 88 U/L / ALT: 42 U/L / AST: 54 U/L / GGT: x                                                                                                                                       MEDICATIONS  (STANDING):  atorvastatin 80 milliGRAM(s) Oral at bedtime  chlorhexidine 4% Liquid 1 Application(s) Topical <User Schedule>  clonazePAM  Tablet 1 milliGRAM(s) Oral every 8 hours  dextrose 5%. 1000 milliLiter(s) (80 mL/Hr) IV Continuous <Continuous>  enoxaparin Injectable 100 milliGRAM(s) SubCutaneous two times a day  losartan 75 milliGRAM(s) Oral daily  pantoprazole    Tablet 40 milliGRAM(s) Oral before breakfast    MEDICATIONS  (PRN):      Xrays:   reviewed

## 2020-01-17 NOTE — CONSULT NOTE ADULT - ASSESSMENT
Patient is a 79y old  Male who presents with a chief complaint of Cardiac arrest.     Cardiac arrest with PEA rhythm on first encounter; however patient reports not feeling well for about a minute before he collapses.   Acute MI ruled out, ROSC EKG was non-ischemic. Acute PE ruled-out. Electrolytes WNL on presentation. Duloxetine could cause PEA arrest. No V-tach or ectopy on telemetry.     Persistent atrial fibrillation  Neuropathy  HLD (hyperlipidemia)  HTN (hypertension)    Recommendations:   Ischemic work-up as planned on Monday   Monitor electrolytes, keep K~4 and Mg~2  Keep metoprolol on hold for now   Will re-evaluate after [facility]

## 2020-01-17 NOTE — PROGRESS NOTE ADULT - ASSESSMENT
80 yo M with hx of HTN, A.fib (Eliquis), CHF, HLD brought in by EMS post cardiac arrest. As per wife at bedside, patient at the basement watching movie and was doing fine. Few mins later, patient came up and told the wife that he wasn't feeling right and that he felt funny. Then he crashed. Wife called the EMS who arrived 5 mins later. Upon EMS arrival, patient was found to be in PEA, s/p resuscitation for 10-15 mins and ROSC achieved. While on the way to hospital, patient had another cardiac arrest in the ambulent s/p CPR and ROSC achieved few mins later. As per family, patient was doing welll and at baseline prior to the episode. As baseline patient is fully functional. Denied any recent infection, recent hospitalization, recent ED visit, fever, chills. Patient was extubated 1/13 and seen by Dr. Bill. Bedside echo shows EF of 60% and normal wall motion. Dr. Bill is of the opinion that the patient likely had Ventricular fibrillation and ended up with PEA and will need a cath once he is stabilized. Resumed DVT prophylaxis  Will order barium esophagogram as per CT surgery before starting feeding to rule out any esophageal perforation. Radiologist is of the opinion that the patient may have a cervical spine fracture and extravasation of blood from paraspinal vessels. So will order CT Cervical spine with IV contrast to rule that out. Neurosurgery was consulted as there is prevertebral edema and a C5-6 disc space and fragmentation of the anterior osteophyte at this level. Unable to obtain MRI head due to persistent agitation. Called Neurosurgery who recommend just using a [place] J collar which the patient already has and not repeating the MRI.     Patient found to have pauses on the telemetry lasting 4 and 3s. Cardiac fellow informed and EP consulted. Downgrade was cancelled.    # Cardiac arrest 2/2 PEA likely from arrythmia  - likely from Arrythmia as per Cardio  - CTH: No evidence of acute intracranial pathology. Chronic microvascular ischemic changes. 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  - CTA Chest negative  - Cardiac enzymes trending down  - Continue Metoprolol 12.5 q8  - Echo shows EF of 60-65% with no valvular and wall abnormalities  - Continue Lipitor 80  - Pauses on telemetry 3s and 4s. EP Consulted.    # Suspected Retroperitoneal Hematoma likely from Cervical Fracture  - CT Surgery consulted and recommend Esophagogram  - Will hold Anticoagulation  - Radiologist is of the opinion that the patient may have a cervical spine fracture and extravasation of blood from paraspinal vessels.   - CT Cervical spine with IV contrast shows possible fracture  - MR Cervical Spine was not tolerated by the patient and study was terminated  - Neurosurgery following and recommend [place] J collar long term and no more MRI    # A.fib on Eliquis  - Restarted Eliquis 100 twice daily  - TSH normal    # Anxiety  - On Clonazepam 1 q8 at home  - Will D/C Ativan and restart Clonazepam home dose    # HTN  - Started Losartan 50 equivalent to 40mg of Telmisartan on 01/15.  - Increased to losartan 75 today, will monitor for uptitration  - Was on Carvedilol 12.5 twice daily + Telmisartan 80mg at bedtime at home    # HLD  - Lipid panel with in normal range    # Hypernatremia  - Na still 148  - On Dextrose 5 @ 80/h    DVT ppx: on heparin 5000 q8  GI ppx: PPI   Activity: Best rest  Diet: Clears now 80 yo M with hx of HTN, A.fib (Eliquis), CHF, HLD brought in by EMS post cardiac arrest. As per wife at bedside, patient at the basement watching movie and was doing fine. Few mins later, patient came up and told the wife that he wasn't feeling right and that he felt funny. Then he crashed. Wife called the EMS who arrived 5 mins later. Upon EMS arrival, patient was found to be in PEA, s/p resuscitation for 10-15 mins and ROSC achieved. While on the way to hospital, patient had another cardiac arrest in the ambulent s/p CPR and ROSC achieved few mins later. As per family, patient was doing welll and at baseline prior to the episode. As baseline patient is fully functional. Denied any recent infection, recent hospitalization, recent ED visit, fever, chills. Patient was extubated 1/13 and seen by Dr. Bill. Bedside echo shows EF of 60% and normal wall motion. Dr. Bill is of the opinion that the patient likely had Ventricular fibrillation and ended up with PEA and will need a cath once he is stabilized. Resumed DVT prophylaxis  Will order barium esophagogram as per CT surgery before starting feeding to rule out any esophageal perforation. Radiologist is of the opinion that the patient may have a cervical spine fracture and extravasation of blood from paraspinal vessels. So will order CT Cervical spine with IV contrast to rule that out. Neurosurgery was consulted as there is prevertebral edema and a C5-6 disc space and fragmentation of the anterior osteophyte at this level. Unable to obtain MRI head due to persistent agitation. Called Neurosurgery who recommend just using a [place] J collar which the patient already has and not repeating the MRI.     Patient found to have pauses on the telemetry lasting 4 and 3s. Cardiac fellow informed and EP consulted. Downgrade was cancelled. Patient planned for a possible procedure today.    # Cardiac arrest 2/2 PEA likely from arrythmia  - likely from Arrythmia as per Cardio  - CTH: No evidence of acute intracranial pathology. Chronic microvascular ischemic changes. 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  - CTA Chest negative  - Cardiac enzymes trending down  - Continue Metoprolol 12.5 q8  - Echo shows EF of 60-65% with no valvular and wall abnormalities  - Continue Lipitor 80  - Pauses on telemetry 3s and 4s. EP Consulted. Possible procedure today.    # Suspected Retroperitoneal Hematoma likely from Cervical Fracture  - CT Surgery consulted and recommend Esophagogram  - Will hold Anticoagulation  - Radiologist is of the opinion that the patient may have a cervical spine fracture and extravasation of blood from paraspinal vessels.   - CT Cervical spine with IV contrast shows possible fracture  - MR Cervical Spine was not tolerated by the patient and study was terminated  - Neurosurgery following and recommend [place] J collar long term and no more MRI    # A.fib on Eliquis  - Restarted Eliquis 100 twice daily  - TSH normal    # Anxiety  - On Clonazepam 1 q8 at home  - Will D/C Ativan and restart Clonazepam home dose    # HTN  - Started Losartan 50 equivalent to 40mg of Telmisartan on 01/15.  - Increased to losartan 75 today, will monitor for uptitration  - Was on Carvedilol 12.5 twice daily + Telmisartan 80mg at bedtime at home    # HLD  - Lipid panel with in normal range    # Hypernatremia  - Na still 148  - On Dextrose 5 @ 80/h    DVT ppx: on heparin 5000 q8  GI ppx: PPI   Activity: Best rest  Diet: Clears now 80 yo M with hx of HTN, A.fib (Eliquis), CHF, HLD brought in by EMS post cardiac arrest. As per wife at bedside, patient at the basement watching movie and was doing fine. Few mins later, patient came up and told the wife that he wasn't feeling right and that he felt funny. Then he crashed. Wife called the EMS who arrived 5 mins later. Upon EMS arrival, patient was found to be in PEA, s/p resuscitation for 10-15 mins and ROSC achieved. While on the way to hospital, patient had another cardiac arrest in the ambulent s/p CPR and ROSC achieved few mins later. As per family, patient was doing welll and at baseline prior to the episode. As baseline patient is fully functional. Denied any recent infection, recent hospitalization, recent ED visit, fever, chills. Patient was extubated 1/13 and seen by Dr. Bill. Bedside echo shows EF of 60% and normal wall motion. Dr. Bill is of the opinion that the patient likely had Ventricular fibrillation and ended up with PEA and will need a cath once he is stabilized. Resumed DVT prophylaxis  Will order barium esophagogram as per CT surgery before starting feeding to rule out any esophageal perforation. Radiologist is of the opinion that the patient may have a cervical spine fracture and extravasation of blood from paraspinal vessels. So will order CT Cervical spine with IV contrast to rule that out. Neurosurgery was consulted as there is prevertebral edema and a C5-6 disc space and fragmentation of the anterior osteophyte at this level. Unable to obtain MRI head due to persistent agitation. Called Neurosurgery who recommend just using a [place] J collar which the patient already has and not repeating the MRI.     Patient found to have pauses on the telemetry lasting 4 and 3s. Cardiac fellow informed and EP consulted. Downgrade was cancelled. Dr. Jeffries office called to know if patient can be downgraded. EP would consider ICD after Left heart Cath    # Cardiac arrest 2/2 PEA likely from arrythmia  - likely from Arrythmia as per Cardio  - CTH: No evidence of acute intracranial pathology. Chronic microvascular ischemic changes. 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  - CTA Chest negative  - Cardiac enzymes trending down  - Continue Metoprolol 12.5 q8  - Echo shows EF of 60-65% with no valvular and wall abnormalities  - Continue Lipitor 80  - Pauses on telemetry 3s and 4s. EP Consulted and would consider an ICD after left heart cath    # Suspected Retroperitoneal Hematoma likely from Cervical Fracture  - CT Surgery consulted and recommend Esophagogram  - Will hold Anticoagulation  - Radiologist is of the opinion that the patient may have a cervical spine fracture and extravasation of blood from paraspinal vessels.   - CT Cervical spine with IV contrast shows possible fracture  - MR Cervical Spine was not tolerated by the patient and study was terminated  - Neurosurgery following and recommend [place] J collar long term and no more MRI    # A.fib on Eliquis  - Continue Eliquis 100 twice daily  - TSH normal    # Anxiety  - On Clonazepam 1 q8 at home  - Continue Clonazepam home dose    # HTN  - Started Losartan 50 equivalent to 40mg of Telmisartan on 01/15.  - Increased to losartan 75 today, will monitor for uptitration  - Was on Carvedilol 12.5 twice daily + Telmisartan 80mg at bedtime at home    # HLD  - Lipid panel with in normal range    # Hypernatremia  - Na still 148  - On Dextrose 5 @ 80/h    DVT ppx: on heparin 5000 q8  GI ppx: PPI   Activity: Best rest  Diet: Dysphagia diet

## 2020-01-17 NOTE — CONSULT NOTE ADULT - SUBJECTIVE AND OBJECTIVE BOX
Patient is a 79y old  Male who presents with a chief complaint of Cardiac arrest (2020 09:40)    HPI:  80 yo M with hx of HTN, A.fib (Eliquis), CHF, HLD brought in by EMS post cardiac arrest. As per wife at bedside, patient at the basement watching movie and was doing fine. Few mins later, patient came up and told the wife that he wasn't feeling right and that he felt funny. Then he crashed. Wife called the EMS who arrived 5 mins later. Upon EMS arrival, patient was found to be in PEA then asystole, s/p resuscitation for ~ 10 mins and ROSC achieved after 1 round of compression and epi. While on the way to hospital, patient had another cardiac arrest on the ambulant s/p CPR and ROSC achieved few mins later. As per family, patient was doing welll and at baseline prior to the episode. As baseline patient is fully functional. Denied any recent infection, recent hospitalization, recent ED visit, fever, chills.     In ED, patient was found to bradycardic and hypotensive. s/p 1 dose of atropine and patient was started on low dose Levophed with improvement in HR and BP.     During his stay in CCU telemetry showing short episodes of pauses (3 to 4 s)        PAST MEDICAL & SURGICAL HISTORY:  Atrial fibrillation  Neuropathy  HLD (hyperlipidemia)  HTN (hypertension)  History of cholecystectomy  History of cholecystectomy          PREVIOUS DIAGNOSTIC TESTING:      ECHO  FINDINGS:  < from: Transthoracic Echocardiogram (20 @ 12:02) >  Summary:   1. Left ventricular ejection fraction, by visual estimation, is 60 to 65%.   2. Normal global left ventricular systolic function.   3. Mild left ventricular hypertrophy.   4. Sclerotic aortic valve with normal opening.   5. Mild aortic regurgitation.   6. Mild-to-moderate tricuspid regurgitation.   7. Estimated pulmonary artery systolic pressure is 42.3 mmHg assuming a right atrial pressure of 8 mmHg, which is consistent with mild pulmonary hypertension.    < end of copied text >    VENOUS DUPLEX SCAN:  FINDINGS:  < from: [facility] Duplex Lower Ext Vein Scan, Bilat (20 @ 18:48) >  Impression:    No evidence of deep venous thrombosis in the bilateral lower extremities.    < end of copied text >    MEDICATIONS  (STANDING):  atorvastatin 80 milliGRAM(s) Oral at bedtime  chlorhexidine 4% Liquid 1 Application(s) Topical <User Schedule>  clonazePAM  Tablet 1 milliGRAM(s) Oral every 8 hours  dextrose 5%. 1000 milliLiter(s) (80 mL/Hr) IV Continuous <Continuous>  enoxaparin Injectable 100 milliGRAM(s) SubCutaneous two times a day  losartan 75 milliGRAM(s) Oral daily  pantoprazole    Tablet 40 milliGRAM(s) Oral before breakfast    MEDICATIONS  (PRN):      FAMILY HISTORY:  No significant SCD    SOCIAL HISTORY:    CIGARETTES: former smoker    ALCOHOL: daily glass       Allergies:    No Known Allergies      REVIEW OF SYSTEMS:    CONSTITUTIONAL: No fever, weight loss, chills, shakes, or fatigue  EYES: No eye pain, visual disturbances, or discharge  ENMT:  No difficulty hearing, tinnitus, vertigo; No sinus or throat pain  NECK: No pain or stiffness  RESPIRATORY: No cough, wheezing, hemoptysis, or shortness of breath  CARDIOVASCULAR: see HPI  GASTROINTESTINAL: No abdominal  or epigastric pain, nausea, vomiting, hematemesis, diarrhea, constipation, melena or bright red blood.  GENITOURINARY: No dysuria, nocturia, hematuria, or urinary incontinence  NEUROLOGICAL: No headaches, memory loss, slurred speech, limb weakness, loss of strength, numbness, or tremors  SKIN: No itching, burning, rashes, or lesions   LYMPH NODES: No enlarged glands  ENDOCRINE: No heat or cold intolerance, or hair loss  MUSCULOSKELETAL: No joint pain or swelling, muscle, back, or extremity pain  PSYCHIATRIC: No depression, anxiety, or difficulty sleeping  HEME/LYMPH: No easy bruising or bleeding gums  ALLERY AND IMMUNOLOGIC: No hives or rash.      Vital Signs Last 24 Hrs  T(C): 37 (2020 08:14), Max: 37.1 (2020 04:00)  T(F): 98.6 (2020 08:14), Max: 98.7 (2020 04:00)  HR: 68 (2020 10:14) (58 - 80)  BP: 150/79 (2020 10:14) (113/68 - 158/87)  BP(mean): 115 (2020 10:14) (84 - 123)  RR: 25 (2020 10:14) (18 - 34)  SpO2: 100% (2020 10:14) (93% - 100%)    PHYSICAL EXAM:        GENERAL: In no apparent distress, well nourished, and hydrated.  HEAD:  infraorbital ecchymosis  EYES: EOMI, PERRLA, conjunctiva and sclera clear  ENMT: No tonsillar erythema, exudates, or enlargements; moist mucous membranes, Good dentition, No lesions  NECK: Supple and normal thyroid.  No JVD or carotid bruit.  Carotid pulse is 2+ bilaterally.  HEART: irregularly irregular; No murmurs, rubs, or gallops.  PULMONARY: Clear to auscultation and perfusion.  No rales, wheezing, or rhonchi bilaterally.  ABDOMEN: Soft, Nontender, Nondistended; Bowel sounds present  EXTREMITIES:  2+ Peripheral Pulses, No clubbing, cyanosis, or edema  LYMPH: No lymphadenopathy noted  NEUROLOGICAL: Grossly nonfocal      INTERPRETATION OF TELEMETRY: A-fib rate-controlled, pauses 3-4 sec (x2)    ECG: A-fib at 82 bpm     I&O's Detail    2020 07:  -  2020 07:00  --------------------------------------------------------  IN:    dextrose 5% + sodium chloride 0.45%.: 75 mL    dextrose 5%.: 1760 mL    Oral Fluid: 220 mL  Total IN: 2055 mL    OUT:    Incontinent per Collection Ba mL  Total OUT: 1100 mL    Total NET: 955 mL          LABS:                        9.7    7.28  )-----------( 141      ( 2020 04:15 )             31.4         148<H>  |  108  |  23<H>  ----------------------------<  122<H>  3.8   |  28  |  0.7    Ca    8.5      2020 04:15  Mg     1.9         TPro  5.6<L>  /  Alb  3.2<L>  /  TBili  2.5<H>  /  DBili  x   /  AST  54<H>  /  ALT  42<H>  /  AlkPhos  88              BNP  I&O's Detail    2020 07:01  -  2020 07:00  --------------------------------------------------------  IN:    dextrose 5% + sodium chloride 0.45%.: 75 mL    dextrose 5%.: 1760 mL    Oral Fluid: 220 mL  Total IN: 2055 mL    OUT:    Incontinent per Collection Ba mL  Total OUT: 1100 mL    Total NET: 955 mL        Daily     Daily Weight in k.8 (2020 06:00)    RADIOLOGY & ADDITIONAL STUDIES: Patient is a 79y old  Male who presents with a chief complaint of Cardiac arrest (2020 09:40)    HPI:  78 yo M with hx of HTN, A.fib (Eliquis), CHF, HLD brought in by EMS post cardiac arrest. As per wife at bedside, patient at the basement watching movie and was doing fine. Few mins later, patient came up and told the wife that he wasn't feeling right and that he felt funny. Then he crashed. Wife called the EMS who arrived 5 mins later. Upon EMS arrival, patient was found to be in PEA then asystole, s/p resuscitation for ~ 10 mins and ROSC achieved after 1 round of compression and epi. While on the way to hospital, patient had another cardiac arrest on the ambulant s/p CPR and ROSC achieved few mins later. As per family, patient was doing welll and at baseline prior to the episode. As baseline patient is fully functional. Denied any recent infection, recent hospitalization, recent ED visit, fever, chills.     In ED, patient was found to bradycardic and hypotensive. s/p 1 dose of atropine and patient was started on low dose Levophed with improvement in HR and BP.     During his stay in CCU telemetry showing short episodes of pauses (3 to 4 s).        PAST MEDICAL & SURGICAL HISTORY:  Atrial fibrillation  Neuropathy  HLD (hyperlipidemia)  HTN (hypertension)  History of cholecystectomy  History of cholecystectomy          PREVIOUS DIAGNOSTIC TESTING:      ECHO  FINDINGS:  < from: Transthoracic Echocardiogram (20 @ 12:02) >  Summary:   1. Left ventricular ejection fraction, by visual estimation, is 60 to 65%.   2. Normal global left ventricular systolic function.   3. Mild left ventricular hypertrophy.   4. Sclerotic aortic valve with normal opening.   5. Mild aortic regurgitation.   6. Mild-to-moderate tricuspid regurgitation.   7. Estimated pulmonary artery systolic pressure is 42.3 mmHg assuming a right atrial pressure of 8 mmHg, which is consistent with mild pulmonary hypertension.    < end of copied text >    VENOUS DUPLEX SCAN:  FINDINGS:  < from: [facility] Duplex Lower Ext Vein Scan, Bilat (20 @ 18:48) >  Impression:    No evidence of deep venous thrombosis in the bilateral lower extremities.    < end of copied text >    MEDICATIONS  (STANDING):  atorvastatin 80 milliGRAM(s) Oral at bedtime  chlorhexidine 4% Liquid 1 Application(s) Topical <User Schedule>  clonazePAM  Tablet 1 milliGRAM(s) Oral every 8 hours  dextrose 5%. 1000 milliLiter(s) (80 mL/Hr) IV Continuous <Continuous>  enoxaparin Injectable 100 milliGRAM(s) SubCutaneous two times a day  losartan 75 milliGRAM(s) Oral daily  pantoprazole    Tablet 40 milliGRAM(s) Oral before breakfast    MEDICATIONS  (PRN):      FAMILY HISTORY:  No significant SCD    SOCIAL HISTORY:    CIGARETTES: former smoker    ALCOHOL: daily glass       Allergies:    No Known Allergies      REVIEW OF SYSTEMS:    CONSTITUTIONAL: No fever, weight loss, chills, shakes, or fatigue  EYES: No eye pain, visual disturbances, or discharge  ENMT:  No difficulty hearing, tinnitus, vertigo; No sinus or throat pain  NECK: No pain or stiffness  RESPIRATORY: No cough, wheezing, hemoptysis, or shortness of breath  CARDIOVASCULAR: see HPI  GASTROINTESTINAL: No abdominal  or epigastric pain, nausea, vomiting, hematemesis, diarrhea, constipation, melena or bright red blood.  GENITOURINARY: No dysuria, nocturia, hematuria, or urinary incontinence  NEUROLOGICAL: No headaches, memory loss, slurred speech, limb weakness, loss of strength, numbness, or tremors  SKIN: No itching, burning, rashes, or lesions   LYMPH NODES: No enlarged glands  ENDOCRINE: No heat or cold intolerance, or hair loss  MUSCULOSKELETAL: No joint pain or swelling, muscle, back, or extremity pain  PSYCHIATRIC: No depression, anxiety, or difficulty sleeping  HEME/LYMPH: No easy bruising or bleeding gums  ALLERY AND IMMUNOLOGIC: No hives or rash.      Vital Signs Last 24 Hrs  T(C): 37 (2020 08:14), Max: 37.1 (2020 04:00)  T(F): 98.6 (2020 08:14), Max: 98.7 (2020 04:00)  HR: 68 (2020 10:14) (58 - 80)  BP: 150/79 (2020 10:14) (113/68 - 158/87)  BP(mean): 115 (2020 10:14) (84 - 123)  RR: 25 (2020 10:14) (18 - 34)  SpO2: 100% (2020 10:14) (93% - 100%)    PHYSICAL EXAM:        GENERAL: In no apparent distress, well nourished, and hydrated.  HEAD:  infraorbital ecchymosis  EYES: EOMI, PERRLA, conjunctiva and sclera clear  ENMT: No tonsillar erythema, exudates, or enlargements; moist mucous membranes, Good dentition, No lesions  NECK: Supple and normal thyroid.  No JVD or carotid bruit.  Carotid pulse is 2+ bilaterally.  HEART: irregularly irregular; No murmurs, rubs, or gallops.  PULMONARY: Clear to auscultation and perfusion.  No rales, wheezing, or rhonchi bilaterally.  ABDOMEN: Soft, Nontender, Nondistended; Bowel sounds present  EXTREMITIES:  2+ Peripheral Pulses, No clubbing, cyanosis, or edema  LYMPH: No lymphadenopathy noted  NEUROLOGICAL: Grossly nonfocal      INTERPRETATION OF TELEMETRY: A-fib rate-controlled, pauses 3-4 sec (x2)    ECG: A-fib at 82 bpm     I&O's Detail    2020 07:01  -  2020 07:00  --------------------------------------------------------  IN:    dextrose 5% + sodium chloride 0.45%.: 75 mL    dextrose 5%.: 1760 mL    Oral Fluid: 220 mL  Total IN: 2055 mL    OUT:    Incontinent per Collection Ba mL  Total OUT: 1100 mL    Total NET: 955 mL          LABS:                        9.7    7.28  )-----------( 141      ( 2020 04:15 )             31.4         148<H>  |  108  |  23<H>  ----------------------------<  122<H>  3.8   |  28  |  0.7    Ca    8.5      2020 04:15  Mg     1.9         TPro  5.6<L>  /  Alb  3.2<L>  /  TBili  2.5<H>  /  DBili  x   /  AST  54<H>  /  ALT  42<H>  /  AlkPhos  88              BNP  I&O's Detail    2020 07:01  -  2020 07:00  --------------------------------------------------------  IN:    dextrose 5% + sodium chloride 0.45%.: 75 mL    dextrose 5%.: 1760 mL    Oral Fluid: 220 mL  Total IN: 2055 mL    OUT:    Incontinent per Collection Ba mL  Total OUT: 1100 mL    Total NET: 955 mL        Daily     Daily Weight in k.8 (2020 06:00)    RADIOLOGY & ADDITIONAL STUDIES: Patient is a 79y old  Male who presents with a chief complaint of Cardiac arrest (2020 09:40)    HPI:  78 yo M with hx of HTN, A.fib (Eliquis), CHF, HLD brought in by EMS post cardiac arrest. As per wife at bedside, patient at the basement watching movie and was doing fine. Few mins later, patient came up and told the wife that he wasn't feeling right and that he felt funny. Then he crashed. Wife called the EMS who arrived 5 mins later. Upon EMS arrival, patient was found to be in PEA then asystole, s/p resuscitation for ~ 10 mins and ROSC achieved after 1 round of compression and epi. While on the way to hospital, patient had another cardiac arrest on the ambulant s/p CPR and ROSC achieved few mins later. As per family, patient was doing welll and at baseline prior to the episode. As baseline patient is fully functional. Denied any recent infection, recent hospitalization, recent ED visit, fever, chills.     In ED, patient was found to bradycardic and hypotensive. s/p 1 dose of atropine and patient was started on low dose Levophed with improvement in HR and BP.     During his stay in CCU telemetry showing short episodes of pauses (3 to 4 s).        PAST MEDICAL & SURGICAL HISTORY:  Atrial fibrillation  Neuropathy  HLD (hyperlipidemia)  HTN (hypertension)  History of cholecystectomy  History of cholecystectomy    PREVIOUS DIAGNOSTIC TESTING:      ECHO FINDINGS:  < from: Transthoracic Echocardiogram (20 @ 12:02) >  Summary:   1. Left ventricular ejection fraction, by visual estimation, is 60 to 65%.   2. Normal global left ventricular systolic function.   3. Mild left ventricular hypertrophy.   4. Sclerotic aortic valve with normal opening.   5. Mild aortic regurgitation.   6. Mild-to-moderate tricuspid regurgitation.   7. Estimated pulmonary artery systolic pressure is 42.3 mmHg assuming a right atrial pressure of 8 mmHg, which is consistent with mild pulmonary hypertension.    < end of copied text >    VENOUS DUPLEX SCAN:  < from: [facility] Duplex Lower Ext Vein Scan, Bilat (20 @ 18:48) >  Impression:  No evidence of deep venous thrombosis in the bilateral lower extremities.    < end of copied text >    MEDICATIONS  (STANDING):  atorvastatin 80 milliGRAM(s) Oral at bedtime  chlorhexidine 4% Liquid 1 Application(s) Topical <User Schedule>  clonazePAM  Tablet 1 milliGRAM(s) Oral every 8 hours  dextrose 5%. 1000 milliLiter(s) (80 mL/Hr) IV Continuous <Continuous>  enoxaparin Injectable 100 milliGRAM(s) SubCutaneous two times a day  losartan 75 milliGRAM(s) Oral daily  pantoprazole    Tablet 40 milliGRAM(s) Oral before breakfast    MEDICATIONS  (PRN):      FAMILY HISTORY:  No significant SCD    SOCIAL HISTORY:  CIGARETTES: former smoker  ALCOHOL: daily glass     Allergies:  No Known Allergies      REVIEW OF SYSTEMS:  CONSTITUTIONAL: No fever, weight loss, chills, shakes, or fatigue  EYES: No eye pain, visual disturbances, or discharge  ENMT:  No difficulty hearing, tinnitus, vertigo; No sinus or throat pain  NECK: No pain or stiffness  RESPIRATORY: No cough, wheezing, hemoptysis, or shortness of breath  CARDIOVASCULAR: see HPI  GASTROINTESTINAL: No abdominal  or epigastric pain, nausea, vomiting, hematemesis, diarrhea, constipation, melena or bright red blood.  GENITOURINARY: No dysuria, nocturia, hematuria, or urinary incontinence  NEUROLOGICAL: No headaches, memory loss, slurred speech, limb weakness, loss of strength, numbness, or tremors  SKIN: No itching, burning, rashes, or lesions   LYMPH NODES: No enlarged glands  ENDOCRINE: No heat or cold intolerance, or hair loss  MUSCULOSKELETAL: No joint pain or swelling, muscle, back, or extremity pain  PSYCHIATRIC: No depression, anxiety, or difficulty sleeping  HEME/LYMPH: No easy bruising or bleeding gums  ALLERY AND IMMUNOLOGIC: No hives or rash.      Vital Signs Last 24 Hrs  T(C): 37 (2020 08:14), Max: 37.1 (2020 04:00)  T(F): 98.6 (2020 08:14), Max: 98.7 (2020 04:00)  HR: 68 (2020 10:14) (58 - 80)  BP: 150/79 (2020 10:14) (113/68 - 158/87)  BP(mean): 115 (2020 10:14) (84 - 123)  RR: 25 (2020 10:14) (18 - 34)  SpO2: 100% (2020 10:14) (93% - 100%)    PHYSICAL EXAM:  GENERAL: In no apparent distress, well nourished, and hydrated.  HEAD:  infraorbital ecchymosis  EYES: EOMI, PERRLA, conjunctiva and sclera clear  ENMT: moist mucous membranes, Good dentition, No lesions  NECK: Supple and normal thyroid.  No JVD or carotid bruit.  HEART: irregularly irregular; No murmurs, rubs, or gallops.  PULMONARY: Clear to auscultation and perfusion.  No rales, wheezing, or rhonchi bilaterally.  ABDOMEN: Soft, Nontender, Nondistended; Bowel sounds present  EXTREMITIES:  2+ Peripheral Pulses, No clubbing, cyanosis, or edema  NEUROLOGICAL: Grossly nonfocal      INTERPRETATION OF TELEMETRY: A-fib rate-controlled, pauses 3-4 sec (x2)    ECG: A-fib at 82 bpm     I&O's Detail    2020 07:  -  2020 07:00  --------------------------------------------------------  IN:    dextrose 5% + sodium chloride 0.45%.: 75 mL    dextrose 5%.: 1760 mL    Oral Fluid: 220 mL  Total IN: 2055 mL    OUT:    Incontinent per Collection Ba mL  Total OUT: 1100 mL    Total NET: 955 mL          LABS:                        9.7    7.28  )-----------( 141      ( 2020 04:15 )             31.4         148<H>  |  108  |  23<H>  ----------------------------<  122<H>  3.8   |  28  |  0.7    Ca    8.5      2020 04:15  Mg     1.9         TPro  5.6<L>  /  Alb  3.2<L>  /  TBili  2.5<H>  /  DBili  x   /  AST  54<H>  /  ALT  42<H>  /  AlkPhos  88        BNP  I&O's Detail    2020 07:01  -  2020 07:00  --------------------------------------------------------  IN:    dextrose 5% + sodium chloride 0.45%.: 75 mL    dextrose 5%.: 1760 mL    Oral Fluid: 220 mL  Total IN: 2055 mL    OUT:    Incontinent per Collection Ba mL  Total OUT: 1100 mL    Total NET: 955 mL        Daily     Daily Weight in k.8 (2020 06:00)    RADIOLOGY & ADDITIONAL STUDIES:

## 2020-01-18 LAB
ALBUMIN SERPL ELPH-MCNC: 3.5 G/DL — SIGNIFICANT CHANGE UP (ref 3.5–5.2)
ALP SERPL-CCNC: 115 U/L — SIGNIFICANT CHANGE UP (ref 30–115)
ALT FLD-CCNC: 40 U/L — SIGNIFICANT CHANGE UP (ref 0–41)
ANION GAP SERPL CALC-SCNC: 17 MMOL/L — HIGH (ref 7–14)
AST SERPL-CCNC: 43 U/L — HIGH (ref 0–41)
BASOPHILS # BLD AUTO: 0.01 K/UL — SIGNIFICANT CHANGE UP (ref 0–0.2)
BASOPHILS NFR BLD AUTO: 0.1 % — SIGNIFICANT CHANGE UP (ref 0–1)
BILIRUB SERPL-MCNC: 2.6 MG/DL — HIGH (ref 0.2–1.2)
BUN SERPL-MCNC: 18 MG/DL — SIGNIFICANT CHANGE UP (ref 10–20)
CALCIUM SERPL-MCNC: 8.8 MG/DL — SIGNIFICANT CHANGE UP (ref 8.5–10.1)
CHLORIDE SERPL-SCNC: 107 MMOL/L — SIGNIFICANT CHANGE UP (ref 98–110)
CO2 SERPL-SCNC: 24 MMOL/L — SIGNIFICANT CHANGE UP (ref 17–32)
CREAT SERPL-MCNC: 0.7 MG/DL — SIGNIFICANT CHANGE UP (ref 0.7–1.5)
CULTURE RESULTS: SIGNIFICANT CHANGE UP
EOSINOPHIL # BLD AUTO: 0.14 K/UL — SIGNIFICANT CHANGE UP (ref 0–0.7)
EOSINOPHIL NFR BLD AUTO: 1.6 % — SIGNIFICANT CHANGE UP (ref 0–8)
GLUCOSE SERPL-MCNC: 135 MG/DL — HIGH (ref 70–99)
HCT VFR BLD CALC: 37.7 % — LOW (ref 42–52)
HGB BLD-MCNC: 11.6 G/DL — LOW (ref 14–18)
IMM GRANULOCYTES NFR BLD AUTO: 0.6 % — HIGH (ref 0.1–0.3)
LYMPHOCYTES # BLD AUTO: 0.74 K/UL — LOW (ref 1.2–3.4)
LYMPHOCYTES # BLD AUTO: 8.3 % — LOW (ref 20.5–51.1)
MAGNESIUM SERPL-MCNC: 2 MG/DL — SIGNIFICANT CHANGE UP (ref 1.8–2.4)
MCHC RBC-ENTMCNC: 30.8 G/DL — LOW (ref 32–37)
MCHC RBC-ENTMCNC: 32 PG — HIGH (ref 27–31)
MCV RBC AUTO: 104.1 FL — HIGH (ref 80–94)
MONOCYTES # BLD AUTO: 0.63 K/UL — HIGH (ref 0.1–0.6)
MONOCYTES NFR BLD AUTO: 7 % — SIGNIFICANT CHANGE UP (ref 1.7–9.3)
NEUTROPHILS # BLD AUTO: 7.37 K/UL — HIGH (ref 1.4–6.5)
NEUTROPHILS NFR BLD AUTO: 82.4 % — HIGH (ref 42.2–75.2)
NRBC # BLD: 0 /100 WBCS — SIGNIFICANT CHANGE UP (ref 0–0)
PLATELET # BLD AUTO: 142 K/UL — SIGNIFICANT CHANGE UP (ref 130–400)
POTASSIUM SERPL-MCNC: 4.1 MMOL/L — SIGNIFICANT CHANGE UP (ref 3.5–5)
POTASSIUM SERPL-SCNC: 4.1 MMOL/L — SIGNIFICANT CHANGE UP (ref 3.5–5)
PROT SERPL-MCNC: 6.4 G/DL — SIGNIFICANT CHANGE UP (ref 6–8)
RBC # BLD: 3.62 M/UL — LOW (ref 4.7–6.1)
RBC # FLD: 14.9 % — HIGH (ref 11.5–14.5)
SODIUM SERPL-SCNC: 148 MMOL/L — HIGH (ref 135–146)
SPECIMEN SOURCE: SIGNIFICANT CHANGE UP
T4 AB SER-ACNC: 5.6 UG/DL — SIGNIFICANT CHANGE UP (ref 4.6–12)
TSH SERPL-MCNC: 1.04 UIU/ML — SIGNIFICANT CHANGE UP (ref 0.27–4.2)
WBC # BLD: 8.94 K/UL — SIGNIFICANT CHANGE UP (ref 4.8–10.8)
WBC # FLD AUTO: 8.94 K/UL — SIGNIFICANT CHANGE UP (ref 4.8–10.8)

## 2020-01-18 PROCEDURE — 93010 ELECTROCARDIOGRAM REPORT: CPT

## 2020-01-18 RX ORDER — LOSARTAN POTASSIUM 100 MG/1
25 TABLET, FILM COATED ORAL ONCE
Refills: 0 | Status: COMPLETED | OUTPATIENT
Start: 2020-01-18 | End: 2020-01-18

## 2020-01-18 RX ORDER — ONDANSETRON 8 MG/1
4 TABLET, FILM COATED ORAL ONCE
Refills: 0 | Status: COMPLETED | OUTPATIENT
Start: 2020-01-18 | End: 2020-01-18

## 2020-01-18 RX ORDER — LOSARTAN POTASSIUM 100 MG/1
100 TABLET, FILM COATED ORAL DAILY
Refills: 0 | Status: DISCONTINUED | OUTPATIENT
Start: 2020-01-19 | End: 2020-01-31

## 2020-01-18 RX ADMIN — PANTOPRAZOLE SODIUM 40 MILLIGRAM(S): 20 TABLET, DELAYED RELEASE ORAL at 11:56

## 2020-01-18 RX ADMIN — Medication 1 MILLIGRAM(S): at 05:24

## 2020-01-18 RX ADMIN — ATORVASTATIN CALCIUM 80 MILLIGRAM(S): 80 TABLET, FILM COATED ORAL at 22:16

## 2020-01-18 RX ADMIN — Medication 1 MILLIGRAM(S): at 22:19

## 2020-01-18 RX ADMIN — LOSARTAN POTASSIUM 75 MILLIGRAM(S): 100 TABLET, FILM COATED ORAL at 05:24

## 2020-01-18 RX ADMIN — CHLORHEXIDINE GLUCONATE 1 APPLICATION(S): 213 SOLUTION TOPICAL at 05:25

## 2020-01-18 RX ADMIN — ONDANSETRON 4 MILLIGRAM(S): 8 TABLET, FILM COATED ORAL at 05:04

## 2020-01-18 RX ADMIN — Medication 1 MILLIGRAM(S): at 00:21

## 2020-01-18 RX ADMIN — ATORVASTATIN CALCIUM 80 MILLIGRAM(S): 80 TABLET, FILM COATED ORAL at 00:21

## 2020-01-18 RX ADMIN — ENOXAPARIN SODIUM 100 MILLIGRAM(S): 100 INJECTION SUBCUTANEOUS at 18:53

## 2020-01-18 RX ADMIN — ENOXAPARIN SODIUM 100 MILLIGRAM(S): 100 INJECTION SUBCUTANEOUS at 05:24

## 2020-01-18 NOTE — PROGRESS NOTE ADULT - ASSESSMENT
s/p cpa/pea  cerv fractire/retroperitineal hematoma  a fib  anxiety  htn    cardio fu  prob cath/icd  hold    neuro checks freq  eliquis hold  continue bp control  supp care

## 2020-01-18 NOTE — PROGRESS NOTE ADULT - SUBJECTIVE AND OBJECTIVE BOX
SUBJECTIVE:    Patient is a 79y old Male who presents with a chief complaint of Cardiac arrest (17 Jan 2020 11:05)    Currently admitted to medicine with the primary diagnosis of Cardiac arrest     Today is hospital day 6d. This morning he is resting comfortably in bed and reports no new issues or overnight events.     INTERVAL EVENTS: Patient is comfortable and was unable to sleep at night due to frequent disturbances. Sleeping in the morning.    PAST MEDICAL & SURGICAL HISTORY  Atrial fibrillation  Neuropathy  HLD (hyperlipidemia)  HTN (hypertension)  History of cholecystectomy  History of cholecystectomy      ALLERGIES:  No Known Allergies    MEDICATIONS:  STANDING MEDICATIONS  atorvastatin 80 milliGRAM(s) Oral at bedtime  chlorhexidine 4% Liquid 1 Application(s) Topical <User Schedule>  clonazePAM  Tablet 1 milliGRAM(s) Oral every 8 hours  enoxaparin Injectable 100 milliGRAM(s) SubCutaneous two times a day  losartan 75 milliGRAM(s) Oral daily  pantoprazole    Tablet 40 milliGRAM(s) Oral before breakfast    PRN MEDICATIONS    VITALS:   T(F): 97.8  HR: 76  BP: 143/70  RR: 22  SpO2: 96%    LABS:                        11.6   8.94  )-----------( 142      ( 18 Jan 2020 05:43 )             37.7     01-18    148<H>  |  107  |  18  ----------------------------<  135<H>  4.1   |  24  |  0.7    Ca    8.8      18 Jan 2020 05:43  Mg     2.0     01-18    TPro  6.4  /  Alb  3.5  /  TBili  2.6<H>  /  DBili  x   /  AST  43<H>  /  ALT  40  /  AlkPhos  115  01-18                  RADIOLOGY:    PHYSICAL EXAM:  GEN: No acute distress  PULM/CHEST: Clear to auscultation bilaterally, no rales, rhonchi or wheezes   CVS: Regular rate and rhythm, S1-S2, no murmurs  ABD: Soft, non-tender, non-distended, +BS  EXT: No edema  NEURO: AAOx3    Mora Catheter:

## 2020-01-18 NOTE — PROGRESS NOTE ADULT - ASSESSMENT
78 yo M with hx of HTN, A.fib (Eliquis), CHF, HLD brought in by EMS post cardiac arrest. As per wife at bedside, patient at the basement watching movie and was doing fine. Few mins later, patient came up and told the wife that he wasn't feeling right and that he felt funny. Then he crashed. Wife called the EMS who arrived 5 mins later. Upon EMS arrival, patient was found to be in PEA, s/p resuscitation for 10-15 mins and ROSC achieved. While on the way to hospital, patient had another cardiac arrest in the ambulent s/p CPR and ROSC achieved few mins later. As per family, patient was doing welll and at baseline prior to the episode. As baseline patient is fully functional. Denied any recent infection, recent hospitalization, recent ED visit, fever, chills. Patient was extubated 1/13 and seen by Dr. Bill. Bedside echo shows EF of 60% and normal wall motion. Dr. Bill is of the opinion that the patient likely had Ventricular fibrillation and ended up with PEA and will need a cath once he is stabilized. Resumed DVT prophylaxis  Will order barium esophagogram as per CT surgery before starting feeding to rule out any esophageal perforation. Radiologist is of the opinion that the patient may have a cervical spine fracture and extravasation of blood from paraspinal vessels. So will order CT Cervical spine with IV contrast to rule that out. Neurosurgery was consulted as there is prevertebral edema and a C5-6 disc space and fragmentation of the anterior osteophyte at this level. Unable to obtain MRI head due to persistent agitation. Called Neurosurgery who recommend just using a Beaver J collar which the patient already has and not repeating the MRI.     Patient found to have pauses on the telemetry lasting 4 and 3s. Cardiac fellow informed and EP consulted. Downgrade was cancelled. EP would consider ICD after Left heart Cath.    # Cardiac arrest 2/2 PEA likely from arrythmia  - likely from Arrythmia as per Cardio  - CTH: No evidence of acute intracranial pathology. Chronic microvascular ischemic changes. 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  - CTA Chest negative  - Cardiac enzymes trending down  - Continue Metoprolol 12.5 q8  - Echo shows EF of 60-65% with no valvular and wall abnormalities  - Continue Lipitor 80  - Pauses on telemetry 3s and 4s. EP Consulted and would consider an ICD after left heart cath    # Suspected Retroperitoneal Hematoma likely from Cervical Fracture  - CT Surgery consulted and recommend Esophagogram  - Will hold Anticoagulation  - Radiologist is of the opinion that the patient may have a cervical spine fracture and extravasation of blood from paraspinal vessels.   - CT Cervical spine with IV contrast shows possible fracture  - MR Cervical Spine was not tolerated by the patient and study was terminated  - Neurosurgery following and recommend Beaver J collar long term and no more MRI    # A.fib on Eliquis  - Continue Eliquis 100 twice daily  - TSH normal    # Anxiety  - On Clonazepam 1 q8 at home  - Continue Clonazepam home dose    # HTN  - Started Losartan 50 equivalent to 40mg of Telmisartan on 01/15.  - Increased to losartan 75 today, will monitor for uptitration  - Was on Carvedilol 12.5 twice daily + Telmisartan 80mg at bedtime at home    # HLD  - Lipid panel with in normal range    # Hypernatremia  - Na still 148  - On Dextrose 5 @ 80/h    DVT ppx: on heparin 5000 q8  GI ppx: PPI   Activity: Best rest  Diet: Dysphagia diet

## 2020-01-18 NOTE — PROGRESS NOTE ADULT - SUBJECTIVE AND OBJECTIVE BOX
Patient was seen and examined. Spoke with RN. Chart reviewed.  c/o sleepiness  family at bedside    No events overnight.  Vital Signs Last 24 Hrs  T(F): 97.8 (18 Jan 2020 08:00), Max: 98 (17 Jan 2020 16:00)  HR: 76 (18 Jan 2020 10:00) (64 - 85)  BP: 156/86 (18 Jan 2020 10:00) (120/74 - 168/84)  SpO2: 96% (18 Jan 2020 10:00) (94% - 99%)  MEDICATIONS  (STANDING):  atorvastatin 80 milliGRAM(s) Oral at bedtime  chlorhexidine 4% Liquid 1 Application(s) Topical <User Schedule>  clonazePAM  Tablet 1 milliGRAM(s) Oral every 8 hours  enoxaparin Injectable 100 milliGRAM(s) SubCutaneous two times a day  losartan 75 milliGRAM(s) Oral daily  pantoprazole    Tablet 40 milliGRAM(s) Oral before breakfast    MEDICATIONS  (PRN):    Labs:                        11.6   8.94  )-----------( 142      ( 18 Jan 2020 05:43 )             37.7                         9.7    7.28  )-----------( 141      ( 17 Jan 2020 04:15 )             31.4     18 Jan 2020 05:43    148    |  107    |  18     ----------------------------<  135    4.1     |  24     |  0.7    17 Jan 2020 04:15    148    |  108    |  23     ----------------------------<  122    3.8     |  28     |  0.7      Ca    8.8        18 Jan 2020 05:43  Ca    8.5        17 Jan 2020 04:15  Mg     2.0       18 Jan 2020 05:43  Mg     1.9       17 Jan 2020 04:15    TPro  6.4    /  Alb  3.5    /  TBili  2.6    /  DBili  x      /  AST  43     /  ALT  40     /  AlkPhos  115    18 Jan 2020 05:43  TPro  5.6    /  Alb  3.2    /  TBili  2.5    /  DBili  x      /  AST  54     /  ALT  42     /  AlkPhos  88     17 Jan 2020 04:15            Radiology:    General: comfortable, NAD  Neurology: Awake responsive  Head:  Normocephalic, atraumatic  ENT:  Mucosa moist, no ulcerations  Neck:  Supple, no JVD,   Skin: facial ecchymosis  Resp: CTA B/L  CV: RRR, S1S2,   GI: Soft, NT, bowel sounds  MS: No edema, + peripheral pulses, FROM all 4 extremity

## 2020-01-19 LAB
ALBUMIN SERPL ELPH-MCNC: 3.2 G/DL — LOW (ref 3.5–5.2)
ALP SERPL-CCNC: 114 U/L — SIGNIFICANT CHANGE UP (ref 30–115)
ALT FLD-CCNC: 32 U/L — SIGNIFICANT CHANGE UP (ref 0–41)
ANION GAP SERPL CALC-SCNC: 10 MMOL/L — SIGNIFICANT CHANGE UP (ref 7–14)
AST SERPL-CCNC: 26 U/L — SIGNIFICANT CHANGE UP (ref 0–41)
BASOPHILS # BLD AUTO: 0.01 K/UL — SIGNIFICANT CHANGE UP (ref 0–0.2)
BASOPHILS NFR BLD AUTO: 0.1 % — SIGNIFICANT CHANGE UP (ref 0–1)
BILIRUB SERPL-MCNC: 2 MG/DL — HIGH (ref 0.2–1.2)
BUN SERPL-MCNC: 16 MG/DL — SIGNIFICANT CHANGE UP (ref 10–20)
CALCIUM SERPL-MCNC: 8.6 MG/DL — SIGNIFICANT CHANGE UP (ref 8.5–10.1)
CHLORIDE SERPL-SCNC: 108 MMOL/L — SIGNIFICANT CHANGE UP (ref 98–110)
CK MB CFR SERPL CALC: 1.7 NG/ML — SIGNIFICANT CHANGE UP (ref 0.6–6.3)
CK SERPL-CCNC: 24 U/L — SIGNIFICANT CHANGE UP (ref 0–225)
CO2 SERPL-SCNC: 33 MMOL/L — HIGH (ref 17–32)
CREAT SERPL-MCNC: 0.7 MG/DL — SIGNIFICANT CHANGE UP (ref 0.7–1.5)
EOSINOPHIL # BLD AUTO: 0.18 K/UL — SIGNIFICANT CHANGE UP (ref 0–0.7)
EOSINOPHIL NFR BLD AUTO: 1.9 % — SIGNIFICANT CHANGE UP (ref 0–8)
GLUCOSE SERPL-MCNC: 106 MG/DL — HIGH (ref 70–99)
HCT VFR BLD CALC: 35 % — LOW (ref 42–52)
HGB BLD-MCNC: 10.7 G/DL — LOW (ref 14–18)
IMM GRANULOCYTES NFR BLD AUTO: 0.5 % — HIGH (ref 0.1–0.3)
LYMPHOCYTES # BLD AUTO: 0.79 K/UL — LOW (ref 1.2–3.4)
LYMPHOCYTES # BLD AUTO: 8.5 % — LOW (ref 20.5–51.1)
MAGNESIUM SERPL-MCNC: 1.9 MG/DL — SIGNIFICANT CHANGE UP (ref 1.8–2.4)
MCHC RBC-ENTMCNC: 30.6 G/DL — LOW (ref 32–37)
MCHC RBC-ENTMCNC: 31.7 PG — HIGH (ref 27–31)
MCV RBC AUTO: 103.6 FL — HIGH (ref 80–94)
MONOCYTES # BLD AUTO: 0.72 K/UL — HIGH (ref 0.1–0.6)
MONOCYTES NFR BLD AUTO: 7.8 % — SIGNIFICANT CHANGE UP (ref 1.7–9.3)
NEUTROPHILS # BLD AUTO: 7.51 K/UL — HIGH (ref 1.4–6.5)
NEUTROPHILS NFR BLD AUTO: 81.2 % — HIGH (ref 42.2–75.2)
NRBC # BLD: 0 /100 WBCS — SIGNIFICANT CHANGE UP (ref 0–0)
PHOSPHATE SERPL-MCNC: 3 MG/DL — SIGNIFICANT CHANGE UP (ref 2.1–4.9)
PLATELET # BLD AUTO: 141 K/UL — SIGNIFICANT CHANGE UP (ref 130–400)
POTASSIUM SERPL-MCNC: 4.5 MMOL/L — SIGNIFICANT CHANGE UP (ref 3.5–5)
POTASSIUM SERPL-SCNC: 4.5 MMOL/L — SIGNIFICANT CHANGE UP (ref 3.5–5)
PROT SERPL-MCNC: 5.8 G/DL — LOW (ref 6–8)
RBC # BLD: 3.38 M/UL — LOW (ref 4.7–6.1)
RBC # FLD: 15.3 % — HIGH (ref 11.5–14.5)
SODIUM SERPL-SCNC: 151 MMOL/L — HIGH (ref 135–146)
TROPONIN T SERPL-MCNC: <0.01 NG/ML — SIGNIFICANT CHANGE UP
WBC # BLD: 9.26 K/UL — SIGNIFICANT CHANGE UP (ref 4.8–10.8)
WBC # FLD AUTO: 9.26 K/UL — SIGNIFICANT CHANGE UP (ref 4.8–10.8)

## 2020-01-19 PROCEDURE — 93010 ELECTROCARDIOGRAM REPORT: CPT

## 2020-01-19 PROCEDURE — 71045 X-RAY EXAM CHEST 1 VIEW: CPT | Mod: 26

## 2020-01-19 RX ORDER — ACETAMINOPHEN 500 MG
650 TABLET ORAL ONCE
Refills: 0 | Status: COMPLETED | OUTPATIENT
Start: 2020-01-19 | End: 2020-01-19

## 2020-01-19 RX ADMIN — CHLORHEXIDINE GLUCONATE 1 APPLICATION(S): 213 SOLUTION TOPICAL at 05:03

## 2020-01-19 RX ADMIN — Medication 1 MILLIGRAM(S): at 05:01

## 2020-01-19 RX ADMIN — ENOXAPARIN SODIUM 100 MILLIGRAM(S): 100 INJECTION SUBCUTANEOUS at 05:02

## 2020-01-19 RX ADMIN — ATORVASTATIN CALCIUM 80 MILLIGRAM(S): 80 TABLET, FILM COATED ORAL at 21:32

## 2020-01-19 RX ADMIN — PANTOPRAZOLE SODIUM 40 MILLIGRAM(S): 20 TABLET, DELAYED RELEASE ORAL at 06:33

## 2020-01-19 RX ADMIN — Medication 650 MILLIGRAM(S): at 10:30

## 2020-01-19 RX ADMIN — Medication 650 MILLIGRAM(S): at 09:24

## 2020-01-19 RX ADMIN — LOSARTAN POTASSIUM 100 MILLIGRAM(S): 100 TABLET, FILM COATED ORAL at 05:01

## 2020-01-19 RX ADMIN — Medication 1 MILLIGRAM(S): at 13:09

## 2020-01-19 RX ADMIN — Medication 1 MILLIGRAM(S): at 21:32

## 2020-01-19 RX ADMIN — ENOXAPARIN SODIUM 100 MILLIGRAM(S): 100 INJECTION SUBCUTANEOUS at 17:21

## 2020-01-19 NOTE — PROGRESS NOTE ADULT - SUBJECTIVE AND OBJECTIVE BOX
Patient was seen and examined in CCU. Spoke with RN. Chart reviewed.  Pt noncompliant with bipap and neck collar- aware of risks; RN repeatedly requested that he wear it  Vital Signs Last 24 Hrs  T(F): 98.1 (19 Jan 2020 08:00), Max: 99.2 (18 Jan 2020 20:00)  HR: 78 (19 Jan 2020 08:00) (66 - 78)  BP: 139/83 (19 Jan 2020 08:00) (118/64 - 156/86)  SpO2: 96% (19 Jan 2020 08:00) (92% - 99%)  MEDICATIONS  (STANDING):  atorvastatin 80 milliGRAM(s) Oral at bedtime  chlorhexidine 4% Liquid 1 Application(s) Topical <User Schedule>  clonazePAM  Tablet 1 milliGRAM(s) Oral every 8 hours  enoxaparin Injectable 100 milliGRAM(s) SubCutaneous two times a day  losartan 100 milliGRAM(s) Oral daily  pantoprazole    Tablet 40 milliGRAM(s) Oral before breakfast    MEDICATIONS  (PRN):    Labs:                        10.7   9.26  )-----------( 141      ( 19 Jan 2020 04:13 )             35.0                         11.6   8.94  )-----------( 142      ( 18 Jan 2020 05:43 )             37.7     19 Jan 2020 04:13    151    |  108    |  16     ----------------------------<  106    4.5     |  33     |  0.7    18 Jan 2020 05:43    148    |  107    |  18     ----------------------------<  135    4.1     |  24     |  0.7      Ca    8.6        19 Jan 2020 04:13  Ca    8.8        18 Jan 2020 05:43  Phos  3.0       19 Jan 2020 04:13  Mg     1.9       19 Jan 2020 04:13  Mg     2.0       18 Jan 2020 05:43    TPro  5.8    /  Alb  3.2    /  TBili  2.0    /  DBili  x      /  AST  26     /  ALT  32     /  AlkPhos  114    19 Jan 2020 04:13  TPro  6.4    /  Alb  3.5    /  TBili  2.6    /  DBili  x      /  AST  43     /  ALT  40     /  AlkPhos  115    18 Jan 2020 05:43          General: comfortable, NAD  Neurology: nonfocal  Head:  Normocephalic, atraumatic  ENT:  Mucosa moist, no ulcerations  Neck:  Supple, no JVD,   Resp: CTA B/L  CV: RRR, S1S2,   GI: Soft, NT, bowel sounds  MS: No edema, + peripheral pulses,      A/P:  78 yo M with hx of HTN, A.fib (Eliquis), CHF, HLD brought in by EMS post cardiac arrest- now in CCU on vent on pressors with likley cardiogenic shock; now with remarkable improvement- off vent, off pressors, awake and alert noted with 4.4cm retroesophageal soft tissue density within the thorax, likely hematoma s/p extubation; noted with pauses on tele; in [place] J collar as per NS    [place] J collar- encourage compliance     CCU monitoring    O2, bipap as needed    cardiology f/u - cath scheduled for tomorrow (monday)    plan as per cardio/ CCU team after cath    DVT prophylaxis  Decubitus prevention- all measures as per RN protocol  Please call or text me with any questions or updates

## 2020-01-19 NOTE — PROGRESS NOTE ADULT - ASSESSMENT
80 yo M with hx of HTN, A.fib (Eliquis), CHF, HLD brought in by EMS post cardiac arrest. As per wife at bedside, patient at the basement watching movie and was doing fine. Few mins later, patient came up and told the wife that he wasn't feeling right and that he felt funny. Then he crashed. Wife called the EMS who arrived 5 mins later. Upon EMS arrival, patient was found to be in PEA, s/p resuscitation for 10-15 mins and ROSC achieved. While on the way to hospital, patient had another cardiac arrest in the ambulent s/p CPR and ROSC achieved few mins later. As per family, patient was doing welll and at baseline prior to the episode. As baseline patient is fully functional. Denied any recent infection, recent hospitalization, recent ED visit, fever, chills. Patient was extubated 1/13 and seen by Dr. Bill. Bedside echo shows EF of 60% and normal wall motion. Dr. Bill is of the opinion that the patient likely had Ventricular fibrillation and ended up with PEA and will need a cath once he is stabilized. Resumed DVT prophylaxis  Will order barium esophagogram as per CT surgery before starting feeding to rule out any esophageal perforation. Radiologist is of the opinion that the patient may have a cervical spine fracture and extravasation of blood from paraspinal vessels. So will order CT Cervical spine with IV contrast to rule that out. Neurosurgery was consulted as there is prevertebral edema and a C5-6 disc space and fragmentation of the anterior osteophyte at this level. Unable to obtain MRI head due to persistent agitation. Called Neurosurgery who recommend just using a [place] J collar which the patient already has and not repeating the MRI.     Patient found to have pauses on the telemetry lasting 4 and 3s. Cardiac fellow informed and EP consulted. Downgrade was cancelled. EP would consider ICD after Left heart Cath.    # Cardiac arrest 2/2 PEA likely from arrythmia  - likely from Arrythmia as per Cardio  - CTH: No evidence of acute intracranial pathology. Chronic microvascular ischemic changes. 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  - CTA Chest negative  - Cardiac enzymes trending down  - Continue Metoprolol 12.5 q8  - Echo shows EF of 60-65% with no valvular and wall abnormalities  - Continue Lipitor 80  - Pauses on telemetry 3s and 4s. EP Consulted and would consider an ICD after left heart cath    # Suspected Retroperitoneal Hematoma likely from Cervical Fracture  - CT Surgery consulted and recommend Esophagogram  - Will hold Anticoagulation  - Radiologist is of the opinion that the patient may have a cervical spine fracture and extravasation of blood from paraspinal vessels.   - CT Cervical spine with IV contrast shows possible fracture  - MR Cervical Spine was not tolerated by the patient and study was terminated  - Neurosurgery following and recommend [place] J collar long term and no more MRI    # A.fib on Eliquis  - Continue Eliquis 100 twice daily  - TSH normal    # Anxiety  - On Clonazepam 1 q8 at home  - Continue Clonazepam home dose    # HTN  - Started Losartan 50 equivalent to 40mg of Telmisartan on 01/15.  - Increased to losartan 75 today, will monitor for uptitration  - Was on Carvedilol 12.5 twice daily + Telmisartan 80mg at bedtime at home    # HLD  - Lipid panel with in normal range    # Hypernatremia  - Na still 148  - On Dextrose 5 @ 80/h    DVT ppx: on heparin 5000 q8  GI ppx: PPI   Activity: Best rest  Diet: Dysphagia diet

## 2020-01-19 NOTE — PROGRESS NOTE ADULT - SUBJECTIVE AND OBJECTIVE BOX
Patient is a 79y old  Male who presents with a chief complaint of Cardiac arrest (18 Jan 2020 12:07)      INTERVAL HPI/OVERNIGHT EVENTS:  ICU Vital Signs Last 24 Hrs  T(C): 36.7 (19 Jan 2020 05:00), Max: 37.3 (18 Jan 2020 20:00)  T(F): 98.1 (19 Jan 2020 05:00), Max: 99.2 (18 Jan 2020 20:00)  HR: 74 (19 Jan 2020 05:00) (66 - 78)  BP: 118/64 (19 Jan 2020 05:00) (118/64 - 156/86)  BP(mean): 83 (19 Jan 2020 05:00) (80 - 118)  ABP: --  ABP(mean): --  RR: 27 (19 Jan 2020 05:00) (14 - 29)  SpO2: 95% (19 Jan 2020 05:00) (92% - 99%)    I&O's Summary    18 Jan 2020 07:01  -  19 Jan 2020 07:00  --------------------------------------------------------  IN: 1158 mL / OUT: 615 mL / NET: 543 mL          LABS:                        10.7   9.26  )-----------( 141      ( 19 Jan 2020 04:13 )             35.0     01-19    151<H>  |  108  |  16  ----------------------------<  106<H>  4.5   |  33<H>  |  0.7    Ca    8.6      19 Jan 2020 04:13  Phos  3.0     01-19  Mg     1.9     01-19    TPro  5.8<L>  /  Alb  3.2<L>  /  TBili  2.0<H>  /  DBili  x   /  AST  26  /  ALT  32  /  AlkPhos  114  01-19        CAPILLARY BLOOD GLUCOSE            RADIOLOGY & ADDITIONAL TESTS:    Consultant(s) Notes Reviewed:  [x ] YES  [ ] NO    MEDICATIONS  (STANDING):  atorvastatin 80 milliGRAM(s) Oral at bedtime  chlorhexidine 4% Liquid 1 Application(s) Topical <User Schedule>  clonazePAM  Tablet 1 milliGRAM(s) Oral every 8 hours  enoxaparin Injectable 100 milliGRAM(s) SubCutaneous two times a day  losartan 100 milliGRAM(s) Oral daily  pantoprazole    Tablet 40 milliGRAM(s) Oral before breakfast    MEDICATIONS  (PRN):      PHYSICAL EXAM:  GEN: No acute distress  PULM/CHEST: Clear to auscultation bilaterally, no rales, rhonchi or wheezes   CVS: Regular rate and rhythm, S1-S2, no murmurs  ABD: Soft, non-tender, non-distended, +BS  EXT: No edema  NEURO: AAox3    Care Discussed with Consultants/Other Providers [ x] YES  [ ] NO

## 2020-01-20 LAB
ALBUMIN SERPL ELPH-MCNC: 3.6 G/DL — SIGNIFICANT CHANGE UP (ref 3.5–5.2)
ALP SERPL-CCNC: 133 U/L — HIGH (ref 30–115)
ALT FLD-CCNC: 28 U/L — SIGNIFICANT CHANGE UP (ref 0–41)
ANION GAP SERPL CALC-SCNC: 11 MMOL/L — SIGNIFICANT CHANGE UP (ref 7–14)
APTT BLD: 34.5 SEC — SIGNIFICANT CHANGE UP (ref 27–39.2)
AST SERPL-CCNC: 24 U/L — SIGNIFICANT CHANGE UP (ref 0–41)
BILIRUB SERPL-MCNC: 1.9 MG/DL — HIGH (ref 0.2–1.2)
BUN SERPL-MCNC: 15 MG/DL — SIGNIFICANT CHANGE UP (ref 10–20)
CALCIUM SERPL-MCNC: 8.8 MG/DL — SIGNIFICANT CHANGE UP (ref 8.5–10.1)
CHLORIDE SERPL-SCNC: 104 MMOL/L — SIGNIFICANT CHANGE UP (ref 98–110)
CK MB CFR SERPL CALC: 8.4 NG/ML — HIGH (ref 0.6–6.3)
CK SERPL-CCNC: 61 U/L — SIGNIFICANT CHANGE UP (ref 0–225)
CO2 SERPL-SCNC: 32 MMOL/L — SIGNIFICANT CHANGE UP (ref 17–32)
CREAT SERPL-MCNC: 0.7 MG/DL — SIGNIFICANT CHANGE UP (ref 0.7–1.5)
GLUCOSE BLDC GLUCOMTR-MCNC: 108 MG/DL — HIGH (ref 70–99)
GLUCOSE SERPL-MCNC: 154 MG/DL — HIGH (ref 70–99)
HCT VFR BLD CALC: 39.2 % — LOW (ref 42–52)
HGB BLD-MCNC: 11.8 G/DL — LOW (ref 14–18)
INR BLD: 1.25 RATIO — SIGNIFICANT CHANGE UP (ref 0.65–1.3)
MAGNESIUM SERPL-MCNC: 1.9 MG/DL — SIGNIFICANT CHANGE UP (ref 1.8–2.4)
MCHC RBC-ENTMCNC: 30.1 G/DL — LOW (ref 32–37)
MCHC RBC-ENTMCNC: 31.4 PG — HIGH (ref 27–31)
MCV RBC AUTO: 104.3 FL — HIGH (ref 80–94)
NRBC # BLD: 0 /100 WBCS — SIGNIFICANT CHANGE UP (ref 0–0)
PHOSPHATE SERPL-MCNC: 3.4 MG/DL — SIGNIFICANT CHANGE UP (ref 2.1–4.9)
PLATELET # BLD AUTO: 169 K/UL — SIGNIFICANT CHANGE UP (ref 130–400)
POTASSIUM SERPL-MCNC: 3.7 MMOL/L — SIGNIFICANT CHANGE UP (ref 3.5–5)
POTASSIUM SERPL-SCNC: 3.7 MMOL/L — SIGNIFICANT CHANGE UP (ref 3.5–5)
PROT SERPL-MCNC: 6.4 G/DL — SIGNIFICANT CHANGE UP (ref 6–8)
PROTHROM AB SERPL-ACNC: 14.3 SEC — HIGH (ref 9.95–12.87)
RBC # BLD: 3.76 M/UL — LOW (ref 4.7–6.1)
RBC # FLD: 15.5 % — HIGH (ref 11.5–14.5)
SODIUM SERPL-SCNC: 147 MMOL/L — HIGH (ref 135–146)
TROPONIN T SERPL-MCNC: 0.03 NG/ML — CRITICAL HIGH
WBC # BLD: 11.84 K/UL — HIGH (ref 4.8–10.8)
WBC # FLD AUTO: 11.84 K/UL — HIGH (ref 4.8–10.8)

## 2020-01-20 PROCEDURE — 71045 X-RAY EXAM CHEST 1 VIEW: CPT | Mod: 26

## 2020-01-20 PROCEDURE — 93010 ELECTROCARDIOGRAM REPORT: CPT

## 2020-01-20 PROCEDURE — 99221 1ST HOSP IP/OBS SF/LOW 40: CPT

## 2020-01-20 PROCEDURE — 93880 EXTRACRANIAL BILAT STUDY: CPT | Mod: 26

## 2020-01-20 RX ORDER — ALPRAZOLAM 0.25 MG
0.25 TABLET ORAL ONCE
Refills: 0 | Status: DISCONTINUED | OUTPATIENT
Start: 2020-01-20 | End: 2020-01-20

## 2020-01-20 RX ORDER — CLONAZEPAM 1 MG
1 TABLET ORAL AT BEDTIME
Refills: 0 | Status: DISCONTINUED | OUTPATIENT
Start: 2020-01-20 | End: 2020-01-20

## 2020-01-20 RX ADMIN — ENOXAPARIN SODIUM 100 MILLIGRAM(S): 100 INJECTION SUBCUTANEOUS at 17:12

## 2020-01-20 RX ADMIN — CHLORHEXIDINE GLUCONATE 1 APPLICATION(S): 213 SOLUTION TOPICAL at 05:31

## 2020-01-20 RX ADMIN — Medication 650 MILLIGRAM(S): at 21:03

## 2020-01-20 RX ADMIN — Medication 1 MILLIGRAM(S): at 05:38

## 2020-01-20 RX ADMIN — PANTOPRAZOLE SODIUM 40 MILLIGRAM(S): 20 TABLET, DELAYED RELEASE ORAL at 07:03

## 2020-01-20 RX ADMIN — ATORVASTATIN CALCIUM 80 MILLIGRAM(S): 80 TABLET, FILM COATED ORAL at 21:04

## 2020-01-20 RX ADMIN — LOSARTAN POTASSIUM 100 MILLIGRAM(S): 100 TABLET, FILM COATED ORAL at 05:32

## 2020-01-20 NOTE — PROGRESS NOTE ADULT - SUBJECTIVE AND OBJECTIVE BOX
Subjective:  patient is sleepy, easily falls asleep, speaks slowly and calmly, no neurological deficit, has been on 3 times daily clonazepam.  he got a cardiac cath today to assess for the cause of cardiac arrest and possible VT vfib he had    MEDICATIONS  (STANDING):  atorvastatin 80 milliGRAM(s) Oral at bedtime  chlorhexidine 4% Liquid 1 Application(s) Topical <User Schedule>  enoxaparin Injectable 100 milliGRAM(s) SubCutaneous two times a day  losartan 100 milliGRAM(s) Oral daily  pantoprazole    Tablet 40 milliGRAM(s) Oral before breakfast    MEDICATIONS  (PRN):            Vital Signs Last 24 Hrs  T(C): 36.3 (20 Jan 2020 08:00), Max: 37.2 (20 Jan 2020 04:00)  T(F): 97.4 (20 Jan 2020 08:00), Max: 98.9 (20 Jan 2020 04:00)  HR: 76 (20 Jan 2020 08:00) (66 - 82)  BP: 124/67 (20 Jan 2020 08:00) (108/49 - 180/97)  BP(mean): 102 (20 Jan 2020 08:00) (66 - 136)  RR: 28 (20 Jan 2020 08:00) (20 - 34)  SpO2: 99% (20 Jan 2020 08:00) (91% - 100%)        VIEW OF SYSTEMS:  CONSTITUTIONAL: no fever, no chills, no diaphoresis  CARDIOLOGY: no chest pain, no SOB, no palpitation, no diaphoresis,  RESPIRATORY: no dyspnea, no wheeze, no orthopnea   NEUROLOGICAL: no dizziness, headache, no focal deficits to report.  GI: no abdominal pain, no dyspepsia, no nausea, no vomiting, no diarrhea.    SKIN: no ecchymosis, no petechia             PHYSICAL EXAM:  · CONSTITUTIONAL: Looks sleepy, but able to talk and communicate and sign the consent  . NECK: Supple, no JVD, refused to wear cervical collar   · RESPIRATORY: Normal air entry to lung base, no wheeze, no crackle, no wet rales  · CARDIOVASCULAR: Normal S1, A2, P2, no murmur, no gallop no s3,  · EXTREMITIES: No cyanosis, no clubbing, no edema  · VASCULAR: Pulses are irregular, equal  	  TELEMETRY: Afib rate controlled    ECG: < from: 12 Lead ECG (01.20.20 @ 07:39) >  Atrial fibrillation  Nonspecific ST and T wave abnormality    < end of copied text >      TTE: < from: Transthoracic Echocardiogram (01.13.20 @ 12:02) >   1. Left ventricular ejection fraction, by visual estimation, is 60 to 65%.   2. Normal global left ventricular systolic function.   3. Mild left ventricular hypertrophy.   4. Sclerotic aortic valve with normal opening.   5. Mild aortic regurgitation.   6. Mild-to-moderate tricuspid regurgitation.   7. Estimated pulmonary artery systolic pressure is 42.3 mmHg assuming a right atrial pressure of 8 mmHg, which is consistent with mild pulmonary hypertension.    < end of copied text >      LABS:                        11.8   11.84 )-----------( 169      ( 20 Jan 2020 04:15 )             39.2     01-20    147<H>  |  104  |  15  ----------------------------<  154<H>  3.7   |  32  |  0.7    Ca    8.8      20 Jan 2020 04:15  Phos  3.4     01-20  Mg     1.9     01-20    TPro  6.4  /  Alb  3.6  /  TBili  1.9<H>  /  DBili  x   /  AST  24  /  ALT  28  /  AlkPhos  133<H>  01-20    CARDIAC MARKERS ( 20 Jan 2020 04:15 )  x     / 0.03 ng/mL / 61 U/L / x     / 8.4 ng/mL  CARDIAC MARKERS ( 19 Jan 2020 04:13 )  x     / <0.01 ng/mL / 24 U/L / x     / 1.7 ng/mL      PT/INR - ( 20 Jan 2020 04:15 )   PT: 14.30 sec;   INR: 1.25 ratio         PTT - ( 20 Jan 2020 04:15 )  PTT:34.5 sec    I&O's Summary    19 Jan 2020 07:01  -  20 Jan 2020 07:00  --------------------------------------------------------  IN: 810 mL / OUT: 850 mL / NET: -40 mL    CARDIAC CATHETERIZATION: Coronary circulation: The coronary circulation is right dominant. Left main: The vessel was large sized. Angiography showed a single discrete lesion. Distal left main: There was a 20 % stenosis. LAD: The vessel was large sized. Angiography showed the vessel to wrap around the cardiac apex and multiple discrete lesions. Ostial LAD: There was a 90 % stenosis. This is a likely culprit for the patient's clinical presentation. Proximal LAD: There was a tubular 80 % stenosis. Circumflex: Angiography showed minor luminal irregularities with no flow limiting lesions. RCA: Angiography showed minor luminal irregularities with no flow limiting lesions.       IMPRESSION AND PLAN:

## 2020-01-20 NOTE — CONSULT NOTE ADULT - SUBJECTIVE AND OBJECTIVE BOX
Surgeon: Dr. Shaw/Samir/ Rodríguez    Consult requesting by: Dr. Laws    HISTORY OF PRESENT ILLNESS: 79 year old M with hx of HTN, A.fib (Eliquis), CHF, HLD brought in by EMS post cardiac arrest. As per wife at bedside, patient at the basement watching movie and was doing fine. Few mins later, patient came up and told the wife that he wasn't feeling right and that he felt funny. Then he crashed. Wife called the EMS who arrived 5 mins later. Upon EMS arrival, patient was found to be in PEA then asystole, s/p resuscitation for ~ 10 mins and ROSC achieved after 1 round of compression and epi. While on the way to hospital, patient had another cardiac arrest on the ambulant s/p CPR and ROSC achieved few mins later. As per family, patient was doing welll and at baseline prior to the episode. As baseline patient is fully functional. Denied any recent infection, recent hospitalization, recent ED visit, fever, chills.     In ED, patient was found to bradycardic and hypotensive. S/p 1 dose of atropine and patient was started on low dose Levophed with improvement in HR and BP. (12 Jan 2020 22:13)    Hospital course significant for being quickly weaned off pressors and extubated successfully. Patient required c-collar for suspected fracture of c5-c6.     Patient with bradycardia on telemetry and subsequent cardiac cath revealed single vessel CAD of LAD. CT surgery called for possible surgical intervention.      NYHA functional class    [ ] Class I (no limitation) [ x] Class II (slight limitation) [ ] Class III (marked limitation) [ ] Class IV (symptoms at rest)    PAST MEDICAL & SURGICAL HISTORY:  Atrial fibrillation  Neuropathy  HLD (hyperlipidemia)  HTN (hypertension)  History of cholecystectomy  History of cholecystectomy      MEDICATIONS  (STANDING):  atorvastatin 80 milliGRAM(s) Oral at bedtime  chlorhexidine 4% Liquid 1 Application(s) Topical <User Schedule>  enoxaparin Injectable 100 milliGRAM(s) SubCutaneous two times a day  losartan 100 milliGRAM(s) Oral daily  pantoprazole    Tablet 40 milliGRAM(s) Oral before breakfast    MEDICATIONS  (PRN):    Antiplatelet therapy:   enoxaparin Injectable 100 milliGRAM(s) SubCutaneous two times a day                         Home Medications:  carvedilol 12.5 mg oral tablet: 1 tab(s) orally 2 times a day (16 Jan 2020 12:31)  clonazePAM 1 mg oral tablet: 1 tab(s) orally 3 times a day (16 Jan 2020 12:31)  DULoxetine 30 mg oral delayed release capsule: 1 cap(s) orally once a day (16 Jan 2020 12:31)  Eliquis 5 mg oral tablet: 1 tab(s) orally 2 times a day (16 Jan 2020 12:31)  furosemide 40 mg oral tablet: 1 tab(s) orally once a day (16 Jan 2020 12:31)  gabapentin 300 mg oral capsule: 1 cap(s) orally 2 times a day (16 Jan 2020 12:31)  omeprazole 20 mg oral delayed release capsule: 1 cap(s) orally once a day (16 Jan 2020 12:31)  potassium chloride 10 mEq oral capsule, extended release: 1 cap(s) orally once a day (16 Jan 2020 12:31)  simvastatin 40 mg oral tablet: 1 tab(s) orally once a day (at bedtime) (16 Jan 2020 12:31)  telmisartan 80 mg oral tablet: 1 tab(s) orally once a day (16 Jan 2020 12:31)  Vitamin D3: cap(s) orally once a day (16 Jan 2020 12:31)       Allergies  No Known Allergies  Intolerances      SOCIAL HISTORY:  Smoker: [x ] Yes  [ ] No        PACK YEARS:                         WHEN QUIT? 1990  ETOH use: [ x] Yes  [ ] No              FREQUENCY / QUANTITY: 2 drinks per night   Illicit Drug use:  [ ] Yes  [x ] No  Occupation: retired security gaurd  Lives with: wife  Assisted device use: n/a  5 meter walk test: 1____sec, 2____sec, 3___sec: patient recovering from light sedation     FAMILY HISTORY: n/a      Review of Systems  CONSTITUTIONAL:  Fevers[ ] chills[ ] sweats[ ] fatigue[x ] weight loss[ ] weight gain [ ]                                      NEURO:  paresthesias[ ] seizures [ ]  syncope [ ]  confusion [ ]                                                                                NEGATIVE[ X]   EYES: glasses[ ]  blurry vision[ ]  discharge[ ] pain[ ] glaucoma [ ]                                                                          NEGATIVE[X ]   ENMT:  difficulty hearing [ ]  vertigo[ ]  dysphagia[ ] epistaxis[ ] recent dental work [ ]                                    NEGATIVE[ X]   CV:  chest pain[x ] palpitations[ ] JENSEN [ ] diaphoresis [ ]                                                                                           RESPIRATORY:  wheezing[ ] SOB[ ] cough [ ] sputum[ ] hemoptysis[ ]                                                                  NEGATIVE[ ]   GI:  nausea[ ]  vomiting [ ]  diarrhea[ ] constipation [ ] melena [ ]                                                                         NEGATIVE[ X]   : hematuria[ ]  dysuria[ ] urgency[ ] incontinence[ ]                                                                                            NEGATIVE[ X]   MUSKULOSKELETAL:  arthritis[ ]  joint swelling [ ] muscle weakness [ ] Hx vein stripping [ ]                             NEGATIVE[X ]   SKIN/BREAST:  rash[ ] itching [ ]  hair loss[ ] masses[ ]                                                                                              NEGATIVE[ X]   PSYCH:  dementia [ ] depression [ ] anxiety[ ]                                                                                                               NEGATIVE[X ]   HEME/LYMPH:  bruises easily[ ] enlarged lymph nodes[ ] tender lymph nodes[ ]                                               NEGATIVE[ X]   ENDOCRINE:  cold intolerance[ ] heat intolerance[ ] polydipsia[ ]                                                                          NEGATIVE[ X]     PHYSICAL EXAM  Vital Signs Last 24 Hrs  T(C): 36.3 (20 Jan 2020 08:00), Max: 37.2 (20 Jan 2020 04:00)  T(F): 97.4 (20 Jan 2020 08:00), Max: 98.9 (20 Jan 2020 04:00)  HR: 76 (20 Jan 2020 08:00) (66 - 82)  BP: 124/67 (20 Jan 2020 08:00) (108/49 - 180/97)  BP(mean): 102 (20 Jan 2020 08:00) (66 - 136)  RR: 28 (20 Jan 2020 08:00) (20 - 34)  SpO2: 99% (20 Jan 2020 08:00) (91% - 100%)      CONSTITUTIONAL:  WNL[ x]   Neuro: WNL [x ] Normal exam oriented to person/place & time with no focal motor or sensory  deficits. Other                     Eyes:    WNL [x ] Normal exam of conjunctiva & lids, pupils equally reactive. Other     ENT:     WNL [x ] Normal exam of nasal/oral mucosa with absence of cyanosis. Other  Neck:   WNL [ x] Normal exam of jugular veins, trachea & thyroid. Other  Chest:  WNL [x ] Normal lung exam with good air movement absence of wheezes, rales, or rhonchi: Other                                                                                CV:  Auscultation: normal [x ] S3[ ] S4[ ] Irregular [ ] Rub[ ] Clicks[ ]    Murmurs none:[x ]systolic [ ]  diastolic [ ] holosystolic [ ]  Carotids: No Bruits[x ] Other                 Abdominal Aorta: normal [ ] nonpalpable[ ]Other                                                                                      GI:           WNL[ x] Normal exam of abdomen, liver & spleen with no noted masses or tenderness. Other                                                                                                        Extremities: + cyanosis to multiple toes b/l LEs, diminished pedal pulses b/l. + trace edema                                                             LABS:                        11.8   11.84 )-----------( 169      ( 20 Jan 2020 04:15 )             39.2     01-20    147<H>  |  104  |  15  ----------------------------<  154<H>  3.7   |  32  |  0.7    Ca    8.8      20 Jan 2020 04:15  Phos  3.4     01-20  Mg     1.9     01-20    TPro  6.4  /  Alb  3.6  /  TBili  1.9<H>  /  DBili  x   /  AST  24  /  ALT  28  /  AlkPhos  133<H>  01-20    PT/INR - ( 20 Jan 2020 04:15 )   PT: 14.30 sec;   INR: 1.25 ratio       PTT - ( 20 Jan 2020 04:15 )  PTT:34.5 sec    CARDIAC MARKERS ( 20 Jan 2020 04:15 )  x     / 0.03 ng/mL / 61 U/L / x     / 8.4 ng/mL  CARDIAC MARKERS ( 19 Jan 2020 04:13 )  x     / <0.01 ng/mL / 24 U/L / x     / 1.7 ng/mL    Serum Pro-Brain Natriuretic Peptide (01.12.20 @ 17:20)    Serum Pro-Brain Natriuretic Peptide: 1767 pg/mL      Cardiac Cath: Coronary circulation: The coronary circulation is right dominant. Left main: The vessel was large sized. Angiography showed a single discrete lesion. Distal left main: There was a 20 % stenosis. LAD: The vessel was large sized. Angiography showed the vessel to wrap around the cardiac apex and multiple discrete lesions. Ostial LAD: There was a 90 % stenosis. This is a likely culprit for the patient's clinical presentation. Proximal LAD: There was a tubular 80 % stenosis. Circumflex: Angiography showed minor luminal irregularities with no flow limiting lesions. RCA: Angiography showed minor luminal irregularities with no flow limiting lesions.       TTE: < from: Transthoracic Echocardiogram (01.13.20 @ 12:02) >  Summary:   1. Left ventricular ejection fraction, by visual estimation, is 60 to 65%.   2. Normal global left ventricular systolic function.   3. Mild left ventricular hypertrophy.   4. Sclerotic aortic valve with normal opening.   5. Mild aortic regurgitation.   6. Mild-to-moderate tricuspid regurgitation.   7. Estimated pulmonary artery systolic pressure is 42.3 mmHg assuming a right atrial pressure of 8 mmHg, which is consistent with mild pulmonary hypertension.    PHYSICIAN INTERPRETATION:  Left Ventricle: The left ventricular internal cavity size is normal. Left ventricular wall thickness is mildly increased. There is mild left ventricular hypertrophy. Global LV systolic function was normal. Left ventricular ejection fraction, by visual estimation, is 60 to 65%. The left ventricular diastolic function could not be assessed in this study.  Right Ventricle: Normal right ventricular size and function.  Left Atrium: Mildly enlarged left atrium.  Right Atrium: Mild to moderately enlarged right atrium.  Pericardium: There is no evidence of pericardial effusion.  Mitral Valve: The mitral valve is normal in structure. No evidence of mitral stenosis. Trace mitral valve regurgitation is seen.  Tricuspid Valve: The tricuspid valve is normal in structure. Mild-moderate tricuspid regurgitation is visualized. Estimated pulmonary artery systolic pressure is 42.3 mmHg assuming a right atrial pressure of 8 mmHg, which is consistent with mild pulmonary hypertension.  Aortic Valve: The aortic valve is trileaflet. No evidence of aortic stenosis. Sclerotic aortic valve with normal opening. Peak transaortic gradient equals 12.6 mmHg, mean transaortic gradient equals 7.0 mmHg, the calculated aortic valve area equals 2.66 cm² by the continuity equation consistent with normally opening aortic valve. Mild aortic regurgitation.  Pulmonic Valve: The pulmonic valve was not well visualized.  Aorta: Aortic root measured at sinotubular junction is normal.  Venous: The inferior vena cava was dilated, with respiratory size variation less than 50%.    < end of copied text >    CT head: < from: CT Head No Cont (01.12.20 @ 18:59) >  IMPRESSION:  1.  No evidence of acute intracranial pathology.  2.  Chronic microvascular ischemic changes.  3.  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    < end of copied text >     CT neck: < from: CT Cervical Spine No Cont (01.14.20 @ 16:48) >  IMPRESSION:  1.  Widening of the C5-6 disc space and fragmentation of the anterior bridging osteophyte at this level. Findings are suspicious for distraction injury through the C5-6 disc with C5-6 anterior osteophyte fracture. Recommend further evaluation with MRI.  2.  Diffuse prevertebral edema as well as edema within the left anterolateral neck soft tissues and supraclavicular region.  3.  Stranding and possible hematoma behind the esophagus, as previously described.  4.  Multilevel anterior bridging osteophytes/syndesmophytes.  5.  Multilevel degenerative changes as described.  < end of copied text >      CT CHEST: < from: CT Angio Chest w/ IV Cont (01.13.20 @ 03:06) >  IMPRESSION:   1. No central or lobar pulmonary embolus.  2.  Small bibasilar consolidated opacities, possibly representing atelectasis or sequela of prior aspiration event.  3.  A 4.4 cm retroesophageal soft tissue density within the thorax may represent hematoma.  4.  No evidence of acute/inflammatory process within the abdomen or pelvis.  < end of copied text >    C T ABD: < from: CT Abdomen and Pelvis w/ IV Cont (01.13.20 @ 03:08) >  IMPRESSION:   1. No central or lobar pulmonary embolus.  2.  Small bibasilar consolidated opacities, possibly representing atelectasis or sequela of prior aspiration event.  3.  A 4.4 cm retroesophageal soft tissue density within the thorax may represent hematoma.  4.  No evidence of acute/inflammatory process within the abdomen or pelvis.  < end of copied text >       LE Venous duplex: < from: [facility] Duplex Lower Ext Vein Scan, Bilat (01.13.20 @ 18:48) >  Impression:  No evidence of deep venous thrombosis in the bilateral lower extremities.  < end of copied text >      Carotid duplex: PENDING      ABIs: PENDING       PFTs: PENDING       STS Score:     Impression:  CAD [x ]  Valvular  disease [ ]   Aortic Disease [ ]   ELMO: Yes[ ] No [ ]   CKD Stage I [ ] , Stage II [ ] , Stage III [ ], Stage IV [ ]   Anemia: Yes [ ], No [ ]  Diabetes :Yes [ ], No [ ]  Acute MI: Yes [ ], No [ ]   Heart Failure: Yes [x] , No: Acute on chronic diastolic Heart Failure      Assessment/ Plan: 78 y/o M w/PMHx: HTN, A.fib (Eliquis), CHF, HLD brought in by EMS s/p witnessed PEA cardiac arrest progressed to w/CPR x 10 min and ROSC w/epi. Patient arrived to ED intubated and hypotensive requiring vasopressors. Patient weaned off pressors and extubated successfully. Patient required c-collar for suspected fracture of c5-c6. Subsequent cardiac cath revealed single vessel CAD of LAD.      -Cases and plan discussed with CT surgeon Dr. Dawson. Initial STS risk assessed and discussed with patient. Evaluation by full heart team pending. Attending note to follow. Pre-op for: CABG    Recommendations:  [] hold Plavix  [] hold ASA if Pre-op Cardiac Valve surgery and patient without CAD  [x] hold ACEI/ARB/CCB 24 hours prior to planned procedure   [] LUE/RUE precaution for possible radial artery harvest      Labs:  [] CBC  [] CMP  [] PT/INR/PTT  [] BNP  [x] HgA1c  [x] Type and screen  [x] Urinalysis  [x] MRSA     Diagnostic studies  [] CT HEAD Non-Contrast  [] CT Chest with/without contrast   [x] Carotid Duplex  [x] PFT: Simple PFT [ x]  Full [ ]  [x] MYRNA/PVR    Consultations/Evaluations   [] Renal Consult  [] Pulmonary Consult  [] Vascular Consult  [] Dental Consult   [] Hem-Onc Consult   [] GI Consult   [] Other Consultations : Surgeon: Dr. Shaw/Samir/ Rodríguez    Consult requesting by: Dr. Laws    HISTORY OF PRESENT ILLNESS: 79 year old M with hx of HTN, A.fib (Eliquis), CHF, HLD brought in by EMS post cardiac arrest. As per wife at bedside, patient at the basement watching movie and was doing fine. Few mins later, patient came up and told the wife that he wasn't feeling right and that he felt funny. Then he crashed. Wife called the EMS who arrived 5 mins later. Upon EMS arrival, patient was found to be in PEA then asystole, s/p resuscitation for ~ 10 mins and ROSC achieved after 1 round of compression and epi. While on the way to hospital, patient had another cardiac arrest on the ambulant s/p CPR and ROSC achieved few mins later. As per family, patient was doing welll and at baseline prior to the episode. As baseline patient is fully functional. Denied any recent infection, recent hospitalization, recent ED visit, fever, chills.     In ED, patient was found to bradycardic and hypotensive. S/p 1 dose of atropine and patient was started on low dose Levophed with improvement in HR and BP. (12 Jan 2020 22:13)    Hospital course significant for being quickly weaned off pressors and extubated successfully. Patient required c-collar for suspected fracture of c5-c6.     Patient with bradycardia on telemetry and subsequent cardiac cath revealed single vessel CAD of LAD. CT surgery called for possible surgical intervention.      NYHA functional class    [ ] Class I (no limitation) [ x] Class II (slight limitation) [ ] Class III (marked limitation) [ ] Class IV (symptoms at rest)    PAST MEDICAL & SURGICAL HISTORY:  Atrial fibrillation  Neuropathy  HLD (hyperlipidemia)  HTN (hypertension)  History of cholecystectomy  History of cholecystectomy      MEDICATIONS  (STANDING):  atorvastatin 80 milliGRAM(s) Oral at bedtime  chlorhexidine 4% Liquid 1 Application(s) Topical <User Schedule>  enoxaparin Injectable 100 milliGRAM(s) SubCutaneous two times a day  losartan 100 milliGRAM(s) Oral daily  pantoprazole    Tablet 40 milliGRAM(s) Oral before breakfast    MEDICATIONS  (PRN):    Antiplatelet therapy:   enoxaparin Injectable 100 milliGRAM(s) SubCutaneous two times a day                         Home Medications:  carvedilol 12.5 mg oral tablet: 1 tab(s) orally 2 times a day (16 Jan 2020 12:31)  clonazePAM 1 mg oral tablet: 1 tab(s) orally 3 times a day (16 Jan 2020 12:31)  DULoxetine 30 mg oral delayed release capsule: 1 cap(s) orally once a day (16 Jan 2020 12:31)  Eliquis 5 mg oral tablet: 1 tab(s) orally 2 times a day (16 Jan 2020 12:31)  furosemide 40 mg oral tablet: 1 tab(s) orally once a day (16 Jan 2020 12:31)  gabapentin 300 mg oral capsule: 1 cap(s) orally 2 times a day (16 Jan 2020 12:31)  omeprazole 20 mg oral delayed release capsule: 1 cap(s) orally once a day (16 Jan 2020 12:31)  potassium chloride 10 mEq oral capsule, extended release: 1 cap(s) orally once a day (16 Jan 2020 12:31)  simvastatin 40 mg oral tablet: 1 tab(s) orally once a day (at bedtime) (16 Jan 2020 12:31)  telmisartan 80 mg oral tablet: 1 tab(s) orally once a day (16 Jan 2020 12:31)  Vitamin D3: cap(s) orally once a day (16 Jan 2020 12:31)       Allergies  No Known Allergies  Intolerances      SOCIAL HISTORY:  Smoker: [x ] Yes  [ ] No        PACK YEARS:                         WHEN QUIT? 1990  ETOH use: [ x] Yes  [ ] No              FREQUENCY / QUANTITY: 2 drinks per night   Illicit Drug use:  [ ] Yes  [x ] No  Occupation: retired security gaurd  Lives with: wife  Assisted device use: n/a  5 meter walk test: 1____sec, 2____sec, 3___sec: patient recovering from light sedation     FAMILY HISTORY: n/a      Review of Systems  CONSTITUTIONAL:  Fevers[ ] chills[ ] sweats[ ] fatigue[x ] weight loss[ ] weight gain [ ]                                      NEURO:  paresthesias[ ] seizures [ ]  syncope [ ]  confusion [ ]                                                                                NEGATIVE[ X]   EYES: glasses[ ]  blurry vision[ ]  discharge[ ] pain[ ] glaucoma [ ]                                                                          NEGATIVE[X ]   ENMT:  difficulty hearing [ ]  vertigo[ ]  dysphagia[ ] epistaxis[ ] recent dental work [ ]                                    NEGATIVE[ X]   CV:  chest pain[x ] palpitations[ ] JENSEN [ ] diaphoresis [ ]                                                                                           RESPIRATORY:  wheezing[ ] SOB[ ] cough [ ] sputum[ ] hemoptysis[ ]                                                                  NEGATIVE[ ]   GI:  nausea[ ]  vomiting [ ]  diarrhea[ ] constipation [ ] melena [ ]                                                                         NEGATIVE[ X]   : hematuria[ ]  dysuria[ ] urgency[ ] incontinence[ ]                                                                                            NEGATIVE[ X]   MUSKULOSKELETAL:  arthritis[ ]  joint swelling [ ] muscle weakness [ ] Hx vein stripping [ ]                             NEGATIVE[X ]   SKIN/BREAST:  rash[ ] itching [ ]  hair loss[ ] masses[ ]                                                                                              NEGATIVE[ X]   PSYCH:  dementia [ ] depression [ ] anxiety[ ]                                                                                                               NEGATIVE[X ]   HEME/LYMPH:  bruises easily[ ] enlarged lymph nodes[ ] tender lymph nodes[ ]                                               NEGATIVE[ X]   ENDOCRINE:  cold intolerance[ ] heat intolerance[ ] polydipsia[ ]                                                                          NEGATIVE[ X]     PHYSICAL EXAM  Vital Signs Last 24 Hrs  T(C): 36.3 (20 Jan 2020 08:00), Max: 37.2 (20 Jan 2020 04:00)  T(F): 97.4 (20 Jan 2020 08:00), Max: 98.9 (20 Jan 2020 04:00)  HR: 76 (20 Jan 2020 08:00) (66 - 82)  BP: 124/67 (20 Jan 2020 08:00) (108/49 - 180/97)  BP(mean): 102 (20 Jan 2020 08:00) (66 - 136)  RR: 28 (20 Jan 2020 08:00) (20 - 34)  SpO2: 99% (20 Jan 2020 08:00) (91% - 100%)      CONSTITUTIONAL:  WNL[ x]   Neuro: WNL [x ] Normal exam oriented to person/place & time with no focal motor or sensory  deficits. Other                     Eyes:    WNL [x ] Normal exam of conjunctiva & lids, pupils equally reactive. Other     ENT:     WNL [x ] Normal exam of nasal/oral mucosa with absence of cyanosis. Other  Neck:   WNL [ x] Normal exam of jugular veins, trachea & thyroid. Other  Chest:  WNL [x ] Normal lung exam with good air movement absence of wheezes, rales, or rhonchi: Other                                                                                CV:  Auscultation: normal [x ] S3[ ] S4[ ] Irregular [ ] Rub[ ] Clicks[ ]    Murmurs none:[x ]systolic [ ]  diastolic [ ] holosystolic [ ]  Carotids: No Bruits[x ] Other                 Abdominal Aorta: normal [ ] nonpalpable[ ]Other                                                                                      GI:           WNL[ x] Normal exam of abdomen, liver & spleen with no noted masses or tenderness. Other                                                                                                        Extremities: + cyanosis to multiple toes b/l LEs, diminished pedal pulses b/l. + trace edema                                                             LABS:                        11.8   11.84 )-----------( 169      ( 20 Jan 2020 04:15 )             39.2     01-20    147<H>  |  104  |  15  ----------------------------<  154<H>  3.7   |  32  |  0.7    Ca    8.8      20 Jan 2020 04:15  Phos  3.4     01-20  Mg     1.9     01-20    TPro  6.4  /  Alb  3.6  /  TBili  1.9<H>  /  DBili  x   /  AST  24  /  ALT  28  /  AlkPhos  133<H>  01-20    PT/INR - ( 20 Jan 2020 04:15 )   PT: 14.30 sec;   INR: 1.25 ratio       PTT - ( 20 Jan 2020 04:15 )  PTT:34.5 sec    CARDIAC MARKERS ( 20 Jan 2020 04:15 )  x     / 0.03 ng/mL / 61 U/L / x     / 8.4 ng/mL  CARDIAC MARKERS ( 19 Jan 2020 04:13 )  x     / <0.01 ng/mL / 24 U/L / x     / 1.7 ng/mL    Serum Pro-Brain Natriuretic Peptide (01.12.20 @ 17:20)    Serum Pro-Brain Natriuretic Peptide: 1767 pg/mL      Cardiac Cath: Coronary circulation: The coronary circulation is right dominant. Left main: The vessel was large sized. Angiography showed a single discrete lesion. Distal left main: There was a 20 % stenosis. LAD: The vessel was large sized. Angiography showed the vessel to wrap around the cardiac apex and multiple discrete lesions. Ostial LAD: There was a 90 % stenosis. This is a likely culprit for the patient's clinical presentation. Proximal LAD: There was a tubular 80 % stenosis. Circumflex: Angiography showed minor luminal irregularities with no flow limiting lesions. RCA: Angiography showed minor luminal irregularities with no flow limiting lesions.       TTE: < from: Transthoracic Echocardiogram (01.13.20 @ 12:02) >  Summary:   1. Left ventricular ejection fraction, by visual estimation, is 60 to 65%.   2. Normal global left ventricular systolic function.   3. Mild left ventricular hypertrophy.   4. Sclerotic aortic valve with normal opening.   5. Mild aortic regurgitation.   6. Mild-to-moderate tricuspid regurgitation.   7. Estimated pulmonary artery systolic pressure is 42.3 mmHg assuming a right atrial pressure of 8 mmHg, which is consistent with mild pulmonary hypertension.    PHYSICIAN INTERPRETATION:  Left Ventricle: The left ventricular internal cavity size is normal. Left ventricular wall thickness is mildly increased. There is mild left ventricular hypertrophy. Global LV systolic function was normal. Left ventricular ejection fraction, by visual estimation, is 60 to 65%. The left ventricular diastolic function could not be assessed in this study.  Right Ventricle: Normal right ventricular size and function.  Left Atrium: Mildly enlarged left atrium.  Right Atrium: Mild to moderately enlarged right atrium.  Pericardium: There is no evidence of pericardial effusion.  Mitral Valve: The mitral valve is normal in structure. No evidence of mitral stenosis. Trace mitral valve regurgitation is seen.  Tricuspid Valve: The tricuspid valve is normal in structure. Mild-moderate tricuspid regurgitation is visualized. Estimated pulmonary artery systolic pressure is 42.3 mmHg assuming a right atrial pressure of 8 mmHg, which is consistent with mild pulmonary hypertension.  Aortic Valve: The aortic valve is trileaflet. No evidence of aortic stenosis. Sclerotic aortic valve with normal opening. Peak transaortic gradient equals 12.6 mmHg, mean transaortic gradient equals 7.0 mmHg, the calculated aortic valve area equals 2.66 cm² by the continuity equation consistent with normally opening aortic valve. Mild aortic regurgitation.  Pulmonic Valve: The pulmonic valve was not well visualized.  Aorta: Aortic root measured at sinotubular junction is normal.  Venous: The inferior vena cava was dilated, with respiratory size variation less than 50%.    < end of copied text >    CT head: < from: CT Head No Cont (01.12.20 @ 18:59) >  IMPRESSION:  1.  No evidence of acute intracranial pathology.  2.  Chronic microvascular ischemic changes.  3.  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    < end of copied text >     CT neck: < from: CT Cervical Spine No Cont (01.14.20 @ 16:48) >  IMPRESSION:  1.  Widening of the C5-6 disc space and fragmentation of the anterior bridging osteophyte at this level. Findings are suspicious for distraction injury through the C5-6 disc with C5-6 anterior osteophyte fracture. Recommend further evaluation with MRI.  2.  Diffuse prevertebral edema as well as edema within the left anterolateral neck soft tissues and supraclavicular region.  3.  Stranding and possible hematoma behind the esophagus, as previously described.  4.  Multilevel anterior bridging osteophytes/syndesmophytes.  5.  Multilevel degenerative changes as described.  < end of copied text >      CT CHEST: < from: CT Angio Chest w/ IV Cont (01.13.20 @ 03:06) >  IMPRESSION:   1. No central or lobar pulmonary embolus.  2.  Small bibasilar consolidated opacities, possibly representing atelectasis or sequela of prior aspiration event.  3.  A 4.4 cm retroesophageal soft tissue density within the thorax may represent hematoma.  4.  No evidence of acute/inflammatory process within the abdomen or pelvis.  < end of copied text >    C T ABD: < from: CT Abdomen and Pelvis w/ IV Cont (01.13.20 @ 03:08) >  IMPRESSION:   1. No central or lobar pulmonary embolus.  2.  Small bibasilar consolidated opacities, possibly representing atelectasis or sequela of prior aspiration event.  3.  A 4.4 cm retroesophageal soft tissue density within the thorax may represent hematoma.  4.  No evidence of acute/inflammatory process within the abdomen or pelvis.  < end of copied text >       LE Venous duplex: < from: [facility] Duplex Lower Ext Vein Scan, Bilat (01.13.20 @ 18:48) >  Impression:  No evidence of deep venous thrombosis in the bilateral lower extremities.  < end of copied text >      Carotid duplex: PENDING      ABIs: PENDING       PFTs: PENDING       STS Score: Isolated CAB  Risk of Mortality: 1.139%  Renal Failure: 1.223%  Permanent Stroke: 0.617%  Prolonged Ventilation: 5.880%  DSW Infection: 0.297%  Reoperation: 1.887%  Morbidity or Mortality: 8.967%  Short Length of Stay: 48.735%  Long Length of Stay: 4.938%      Impression:  CAD [x ]  Valvular  disease [ ]   Aortic Disease [ ]   ELMO: Yes[ ] No [ ]   CKD Stage I [ ] , Stage II [ ] , Stage III [ ], Stage IV [ ]   Anemia: Yes [ ], No [ ]  Diabetes :Yes [ ], No [ ]  Acute MI: Yes [ ], No [ ]   Heart Failure: Yes [x] , No: Acute on chronic diastolic Heart Failure      Assessment/ Plan: 80 y/o M w/PMHx: HTN, A.fib (Eliquis), CHF, HLD brought in by EMS s/p witnessed PEA cardiac arrest progressed to w/CPR x 10 min and ROSC w/epi. Patient arrived to ED intubated and hypotensive requiring vasopressors. Patient weaned off pressors and extubated successfully. Patient required c-collar for suspected fracture of c5-c6. Subsequent cardiac cath revealed single vessel CAD of LAD.      -Cases and plan discussed with CT surgeon Dr. Dawson. Initial STS risk assessed and discussed with patient. Evaluation by full heart team pending. Attending note to follow. Pre-op for: CABG    Recommendations:  [] hold Plavix  [] hold ASA if Pre-op Cardiac Valve surgery and patient without CAD  [x] hold ACEI/ARB/CCB 24 hours prior to planned procedure   [] LUE/RUE precaution for possible radial artery harvest      Labs:  [] CBC  [] CMP  [] PT/INR/PTT  [] BNP  [x] HgA1c  [x] Type and screen  [x] Urinalysis  [x] MRSA     Diagnostic studies  [] CT HEAD Non-Contrast  [] CT Chest with/without contrast   [x] Carotid Duplex  [x] PFT: Simple PFT [ x]  Full [ ]  [x] MYRNA/PVR    Consultations/Evaluations   [] Renal Consult  [] Pulmonary Consult  [] Vascular Consult  [] Dental Consult   [] Hem-Onc Consult   [] GI Consult   [] Other Consultations :

## 2020-01-20 NOTE — CHART NOTE - NSCHARTNOTEFT_GEN_A_CORE
Registered Dietitian Follow-Up     Patient Profile Reviewed                           Yes [x]   No []     Nutrition History Previously Obtained        Yes [x]  No []       Pertinent Subjective Information: Pt. off unit for cardiac cath, noted diet advanced s/p SLP soto. Currently NPO.      Pertinent Medical Interventions:  Cardiac arrest 2/2 PEA likely from arrythmia- CTA Chest negative. Suspected Retroperitoneal Hematoma likely from Cervical Fracture- Neurosurgery following. HTN: meds adjusted. SLP rec dysphagia 2 mech soft thin liquid          Diet order: NPO for test/procedure, dysphagia 2 mech soft thin liquid, DASH/TLC also active      Anthropometrics:  - Ht. 175.3cm  - Wt. 105.8kg on 1/20 vs. 108.1kg on 1/18 will continue to monitor wt trends during LOS   - %wt change  - BMI 35.2  - IBW 72.3kg     Pertinent Lab Data: (1/20) WBC 11.84, RBC 3.76, Hg 11.8, Hct 39.2, Na 147, glu 154, bilirubin 1.9     Pertinent Meds: Protonix, Atorvastatin      Physical Findings:  - Appearance: off unit, lethargic per EMR  - GI function: no symptoms noted, last BM 1/18   - Tubes: none noted  - Oral/Mouth cavity: dysphagia diet   - Skin: ecchymosis (BS 15)      Nutrition Requirements (from RD note on 1/16)   Weight Used: 72.3kg     Estimated Energy Needs    Continue [x]  Adjust []  1818-2169kcal (25-30kcal/kg IBW) for BMI >30  Adjusted Energy Recommendations:   kcal/day        Estimated Protein Needs    Continue [x]  Adjust []  72-86g (1-1.2g/kg IBW) as above  Adjusted Protein Recommendations:   gm/day        Estimated Fluid Needs        Continue [x]  Adjust [] per CCU team   Adjusted Fluid Recommendations:   mL/day     Nutrient Intake: NPO        [] Previous Nutrition Diagnosis:  Inadequate protein energy intake            [x] Ongoing          [] Resolved       Nutrition Intervention: meals and snacks    Rec: When medically feasible resume dysphagia 2 mech soft thin liquid, DASH/TLC     Goal/Expected Outcome: In 3 days pt. to resume solid diet with at elast 75% PO intake at meals      Indicator/Monitoring: diet order, energy intake, body composition

## 2020-01-20 NOTE — PROGRESS NOTE ADULT - SUBJECTIVE AND OBJECTIVE BOX
SUBJECTIVE:    Patient is a 79y old Male who presents with a chief complaint of Cardiac arrest (19 Jan 2020 08:39)    Currently admitted to medicine with the primary diagnosis of Cardiac arrest     Today is hospital day 8d. This morning he is resting comfortably in bed and reports no new issues or overnight events.     INTERVAL EVENTS: Agitated overnight and anxious about the procedure.    PAST MEDICAL & SURGICAL HISTORY  Atrial fibrillation  Neuropathy  HLD (hyperlipidemia)  HTN (hypertension)  History of cholecystectomy  History of cholecystectomy      ALLERGIES:  No Known Allergies    MEDICATIONS:  STANDING MEDICATIONS  atorvastatin 80 milliGRAM(s) Oral at bedtime  chlorhexidine 4% Liquid 1 Application(s) Topical <User Schedule>  clonazePAM  Tablet 1 milliGRAM(s) Oral every 8 hours  enoxaparin Injectable 100 milliGRAM(s) SubCutaneous two times a day  losartan 100 milliGRAM(s) Oral daily  pantoprazole    Tablet 40 milliGRAM(s) Oral before breakfast    PRN MEDICATIONS    VITALS:   T(F): 98.9  HR: 74  BP: 108/49  RR: 20  SpO2: 98%    LABS:                        11.8   11.84 )-----------( 169      ( 20 Jan 2020 04:15 )             39.2     01-20    147<H>  |  104  |  15  ----------------------------<  154<H>  3.7   |  32  |  0.7    Ca    8.8      20 Jan 2020 04:15  Phos  3.4     01-20  Mg     1.9     01-20    TPro  6.4  /  Alb  3.6  /  TBili  1.9<H>  /  DBili  x   /  AST  24  /  ALT  28  /  AlkPhos  133<H>  01-20    PT/INR - ( 20 Jan 2020 04:15 )   PT: 14.30 sec;   INR: 1.25 ratio         PTT - ( 20 Jan 2020 04:15 )  PTT:34.5 sec      Creatine Kinase, Serum: 61 U/L (01-20-20 @ 04:15)  Troponin T, Serum: 0.03 ng/mL <HH> (01-20-20 @ 04:15)      CARDIAC MARKERS ( 20 Jan 2020 04:15 )  x     / 0.03 ng/mL / 61 U/L / x     / 8.4 ng/mL  CARDIAC MARKERS ( 19 Jan 2020 04:13 )  x     / <0.01 ng/mL / 24 U/L / x     / 1.7 ng/mL      RADIOLOGY:    PHYSICAL EXAM:  GEN: No acute distress  PULM/CHEST: Clear to auscultation bilaterally, no rales, rhonchi or wheezes   CVS: Regular rate and rhythm, S1-S2, no murmurs  ABD: Soft, non-tender, non-distended, +BS  EXT: No edema  NEURO: AAOx3    Mora Catheter:

## 2020-01-20 NOTE — CHART NOTE - NSCHARTNOTEFT_GEN_A_CORE
Preliminary Cardiac Catheterization Post-Procedure Report:01-20-20 @ 12:16    After risks, benefits and alternatives of the procedure were explained, consent was signed and placed in the medical record prior to initiation of procedure.  Procedural timeout was performed prior to initiation of procedure in presence of cath lab staff. Cardiac catheterization was performed without any significant complications. Post-procedure vital signs were stable.    Procedure Performed:  [x ] Left Heart Catheterization  [ ] Right Heart Catheterization  [ ] Percutaneous Coronary Intervention    Primary Physician:  Dr. Eusebia MD  Assistant(s): Dr. Devorah MD and  Dr. Sergey MD    Preliminary Procedure Summary (Official full report to follow)    Pre-procedure diagnosis: cardiac arrest  Post Procedure Diagnosis/Impression: 1 V CAD (LAD)    Left Heart Catheterization:  approximate EF%: n/a  [ ] Normal Coronary Arteries  [ ] Luminal Irregularities  [ ] non-obstructive CAD  [x] 1 vessel coronary artery disease       Anesthesia Type  [x] conscious sedation  [x] local/regional anesthesia  [  ] general anesthesia    Estimated Blood Loss  [x ] less than 30 ml    Amount of Contrast used:  80 ml    Access  [ ] Rt. Femoral A  [ ] Rt. Femoral V  [x ] Rt. Radial A --> closed with D-stat  [ ] Rt. Brachial V    Condition of patient after procedure  [x] stable  [  ] guarded  [  ] satisfactory     CATH SUMMARY/FINDINGS:  Coronary circulation: The coronary circulation is right dominant. Left main: The vessel was large sized. Angiography showed a single discrete lesion. Distal left main: There was a 40 % stenosis. LAD: The vessel was large sized. Angiography showed the vessel to wrap around the cardiac apex and multiple discrete lesions. Ostial LAD: There was a 90 % stenosis. This is a likely culprit for the patient's clinical presentation. Proximal LAD: There was a tubular 80 % stenosis. Circumflex: Angiography showed minor luminal irregularities with no flow limiting lesions. RCA: Angiography showed minor luminal irregularities with no flow limiting lesions.     Specimens obtained: n/a    Implants: n/a    Follow-up Care:  [ ] D/C Home today  [x ] Return to In-patient bed  [ ] Admit to:  [ ] Return for staged procedure:  [x ] CT Surgery consult called  [ ] DAPT, statin, BB, ACEI  [x ] intensive medical management  [ ] ** EPS/nephrology evaluation requested Preliminary Cardiac Catheterization Post-Procedure Report:01-20-20 @ 12:16    After risks, benefits and alternatives of the procedure were explained, consent was signed and placed in the medical record prior to initiation of procedure.  Procedural timeout was performed prior to initiation of procedure in presence of cath lab staff. Cardiac catheterization was performed without any significant complications. Post-procedure vital signs were stable.    Procedure Performed:  [x ] Left Heart Catheterization  [ ] Right Heart Catheterization  [ ] Percutaneous Coronary Intervention    Primary Physician:  Dr. Eusebia MD  Assistant(s): Dr. Devorah MD and  Dr. Sergey MD    Preliminary Procedure Summary (Official full report to follow)    Pre-procedure diagnosis: cardiac arrest  Post Procedure Diagnosis/Impression: 1 V CAD (LAD)    Left Heart Catheterization:  approximate EF%: n/a  [ ] Normal Coronary Arteries  [ ] Luminal Irregularities  [ ] non-obstructive CAD  [x] 1 vessel coronary artery disease       Anesthesia Type  [x] conscious sedation  [x] local/regional anesthesia  [  ] general anesthesia    Estimated Blood Loss  [x ] less than 30 ml    Amount of Contrast used:  80 ml    Access  [ ] Rt. Femoral A  [ ] Rt. Femoral V  [x ] Rt. Radial A --> closed with D-stat  [ ] Rt. Brachial V    Condition of patient after procedure  [x] stable  [  ] guarded  [  ] satisfactory     CATH SUMMARY/FINDINGS:  Coronary circulation: The coronary circulation is right dominant. Left main: The vessel was large sized. Angiography showed a single discrete lesion. Distal left main: There was a 20 % stenosis. LAD: The vessel was large sized. Angiography showed the vessel to wrap around the cardiac apex and multiple discrete lesions. Ostial LAD: There was a 90 % stenosis. This is a likely culprit for the patient's clinical presentation. Proximal LAD: There was a tubular 80 % stenosis. Circumflex: Angiography showed minor luminal irregularities with no flow limiting lesions. RCA: Angiography showed minor luminal irregularities with no flow limiting lesions.     Specimens obtained: n/a    Implants: n/a    Follow-up Care:  [ ] D/C Home today  [x ] Return to In-patient bed  [ ] Admit to:  [ ] Return for staged procedure:  [x ] CT Surgery consult called  [ ] DAPT, statin, BB, ACEI  [x ] intensive medical management  [ ] ** EPS/nephrology evaluation requested

## 2020-01-20 NOTE — CHART NOTE - NSCHARTNOTEFT_GEN_A_CORE
PREOPERATIVE DAY OF PROCEDURE EVALUATION:  I have personally seen and examined the patient.  I agree with the history and physical which I have reviewed and noted any changes below.  (Signed electronically by Dr Laws)  01-20-20 @ 08:19 PREOPERATIVE DAY OF PROCEDURE EVALUATION:  I have personally seen and examined the patient.  I agree with the history and physical which I have reviewed and noted any changes below.  (Signed electronically by Dr Laws)  01-20-20 @ 08:19  Patient is alert and able to answer the question and sign the consult, but is sleepy and speaks slowly, different than his usual, I attributed it to Clonazepam tid.

## 2020-01-20 NOTE — PROGRESS NOTE ADULT - ASSESSMENT
post cardiac arrest, most likely arrhythmogenic  prior to giving Clonazepam tid his mental status and memory and speech and communication was almost normal  post cardiac cath: 90% ostial & 80% proximal LAD, not a proper location for the PCI     considering the location of the LAD stenosis, normal EF, normal renal function, chronic Afib that requires life long DOAC (giving ASA and Plavix plus Eliquis would increase bleeding risk significantly) CT surgery consult requested for the single vessel CABG (LIMA to LAD)  carotid duplex  Lovenox (for afib), ASA 81 mg daily  stop Clonazepam, let the patient gain his mental function

## 2020-01-20 NOTE — PROGRESS NOTE ADULT - ASSESSMENT
80 yo M with hx of HTN, A.fib (Eliquis), CHF, HLD brought in by EMS post cardiac arrest. As per wife at bedside, patient at the basement watching movie and was doing fine. Few mins later, patient came up and told the wife that he wasn't feeling right and that he felt funny. Then he crashed. Wife called the EMS who arrived 5 mins later. Upon EMS arrival, patient was found to be in PEA, s/p resuscitation for 10-15 mins and ROSC achieved. While on the way to hospital, patient had another cardiac arrest in the ambulent s/p CPR and ROSC achieved few mins later. As per family, patient was doing welll and at baseline prior to the episode. As baseline patient is fully functional. Denied any recent infection, recent hospitalization, recent ED visit, fever, chills. Patient was extubated 1/13 and seen by Dr. Bill. Bedside echo shows EF of 60% and normal wall motion. Dr. Bill is of the opinion that the patient likely had Ventricular fibrillation and ended up with PEA and will need a cath once he is stabilized. Resumed DVT prophylaxis  Will order barium esophagogram as per CT surgery before starting feeding to rule out any esophageal perforation. Radiologist is of the opinion that the patient may have a cervical spine fracture and extravasation of blood from paraspinal vessels. So will order CT Cervical spine with IV contrast to rule that out. Neurosurgery was consulted as there is prevertebral edema and a C5-6 disc space and fragmentation of the anterior osteophyte at this level. Unable to obtain MRI head due to persistent agitation. Called Neurosurgery who recommend just using a [place] J collar which the patient already has and not repeating the MRI.     Patient found to have pauses on the telemetry lasting 4 and 3s. Cardiac fellow informed and EP consulted. Downgrade was cancelled. EP would consider ICD after Left heart Cath today.    # Cardiac arrest 2/2 PEA likely from arrythmia  - likely from Arrythmia as per Cardio  - CTH: No evidence of acute intracranial pathology. Chronic microvascular ischemic changes. 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  - CTA Chest negative  - Cardiac enzymes show slight elevation today  - Continue Metoprolol 12.5 q8  - Echo shows EF of 60-65% with no valvular and wall abnormalities  - Continue Lipitor 80  - Pauses on telemetry 3s and 4s. EP Consulted and would consider an ICD after left heart cath today    # Suspected Retroperitoneal Hematoma likely from Cervical Fracture  - CT Surgery consulted and recommend Esophagogram  - Will hold Anticoagulation  - Radiologist is of the opinion that the patient may have a cervical spine fracture and extravasation of blood from paraspinal vessels.   - CT Cervical spine with IV contrast shows possible fracture  - MR Cervical Spine was not tolerated by the patient and study was terminated  - Neurosurgery following and recommend [place] J collar long term and no more MRI    # A.fib on Eliquis  - Continue Eliquis 100 twice daily  - TSH normal    # Anxiety  - On Clonazepam 1 q8 at home  - Continue Clonazepam home dose    # HTN  - Started Losartan 50 equivalent to 40mg of Telmisartan on 01/15.  - Increased to losartan 100 daily  - Was on Carvedilol 12.5 twice daily + Telmisartan 80mg at bedtime at home    # HLD  - Lipid panel with in normal range    # Hypernatremia  - Na still 147  - On Dextrose 5 @ 80/h    DVT ppx: Lovenox  GI ppx: PPI   Activity: Best rest  Diet: Dysphagia diet

## 2020-01-20 NOTE — PROGRESS NOTE ADULT - SUBJECTIVE AND OBJECTIVE BOX
Patient was seen and examined in CCU. Spoke with RN. Chart reviewed.  No events overnight. Not wearing neck brace or bipap  Vital Signs Last 24 Hrs  T(F): 98.9 (20 Jan 2020 04:00), Max: 98.9 (20 Jan 2020 04:00)  HR: 74 (20 Jan 2020 06:00) (64 - 82)  BP: 108/49 (20 Jan 2020 06:00) (108/49 - 180/97)  SpO2: 98% (20 Jan 2020 06:00) (91% - 100%)  MEDICATIONS  (STANDING):  atorvastatin 80 milliGRAM(s) Oral at bedtime  chlorhexidine 4% Liquid 1 Application(s) Topical <User Schedule>  clonazePAM  Tablet 1 milliGRAM(s) Oral every 8 hours  enoxaparin Injectable 100 milliGRAM(s) SubCutaneous two times a day  losartan 100 milliGRAM(s) Oral daily  pantoprazole    Tablet 40 milliGRAM(s) Oral before breakfast    MEDICATIONS  (PRN):    Labs:                        11.8   11.84 )-----------( 169      ( 20 Jan 2020 04:15 )             39.2                         10.7   9.26  )-----------( 141      ( 19 Jan 2020 04:13 )             35.0     20 Jan 2020 04:15    147    |  104    |  15     ----------------------------<  154    3.7     |  32     |  0.7    19 Jan 2020 04:13    151    |  108    |  16     ----------------------------<  106    4.5     |  33     |  0.7      Ca    8.8        20 Jan 2020 04:15  Ca    8.6        19 Jan 2020 04:13  Phos  3.4       20 Jan 2020 04:15  Phos  3.0       19 Jan 2020 04:13  Mg     1.9       20 Jan 2020 04:15  Mg     1.9       19 Jan 2020 04:13    TPro  6.4    /  Alb  3.6    /  TBili  1.9    /  DBili  x      /  AST  24     /  ALT  28     /  AlkPhos  133    20 Jan 2020 04:15  TPro  5.8    /  Alb  3.2    /  TBili  2.0    /  DBili  x      /  AST  26     /  ALT  32     /  AlkPhos  114    19 Jan 2020 04:13    PT/INR - ( 20 Jan 2020 04:15 )   PT: 14.30 sec;   INR: 1.25 ratio         PTT - ( 20 Jan 2020 04:15 )  PTT:34.5 sec      comfortable, NAD      A/P:  80 yo M with hx of HTN, A.fib (Eliquis), CHF, HLD brought in by EMS post cardiac arrest- now in CCU on vent on pressors with likley cardiogenic shock; now with remarkable improvement- off vent, off pressors, awake and alert noted with 4.4cm retroesophageal soft tissue density within the thorax, likely hematoma s/p extubation; noted with pauses on tele; in [place] J collar as per NS    [place] J collar- encourage compliance     CCU monitoring    O2, bipap as needed    monitor WBC trend    cardiology f/u - cath scheduled for today    plan as per cardio/ CCU team after cath    EP f/u after cath to decide on AICD    DVT prophylaxis  Decubitus prevention- all measures as per RN protocol  Please call or text me with any questions or updates

## 2020-01-21 LAB
ALBUMIN SERPL ELPH-MCNC: 3.1 G/DL — LOW (ref 3.5–5.2)
ALP SERPL-CCNC: 122 U/L — HIGH (ref 30–115)
ALT FLD-CCNC: 21 U/L — SIGNIFICANT CHANGE UP (ref 0–41)
AMMONIA BLD-MCNC: 20 UMOL/L — SIGNIFICANT CHANGE UP (ref 11–55)
ANION GAP SERPL CALC-SCNC: 11 MMOL/L — SIGNIFICANT CHANGE UP (ref 7–14)
APTT BLD: 36.2 SEC — SIGNIFICANT CHANGE UP (ref 27–39.2)
AST SERPL-CCNC: 26 U/L — SIGNIFICANT CHANGE UP (ref 0–41)
BILIRUB SERPL-MCNC: 1.4 MG/DL — HIGH (ref 0.2–1.2)
BUN SERPL-MCNC: 15 MG/DL — SIGNIFICANT CHANGE UP (ref 10–20)
CALCIUM SERPL-MCNC: 8.2 MG/DL — LOW (ref 8.5–10.1)
CHLORIDE SERPL-SCNC: 106 MMOL/L — SIGNIFICANT CHANGE UP (ref 98–110)
CO2 SERPL-SCNC: 31 MMOL/L — SIGNIFICANT CHANGE UP (ref 17–32)
CREAT SERPL-MCNC: 0.7 MG/DL — SIGNIFICANT CHANGE UP (ref 0.7–1.5)
ESTIMATED AVERAGE GLUCOSE: 117 MG/DL — HIGH (ref 68–114)
GAS PNL BLDA: SIGNIFICANT CHANGE UP
GLUCOSE BLDC GLUCOMTR-MCNC: 144 MG/DL — HIGH (ref 70–99)
GLUCOSE SERPL-MCNC: 140 MG/DL — HIGH (ref 70–99)
HBA1C BLD-MCNC: 5.7 % — HIGH (ref 4–5.6)
HCT VFR BLD CALC: 37.3 % — LOW (ref 42–52)
HGB BLD-MCNC: 10.9 G/DL — LOW (ref 14–18)
INR BLD: 1.32 RATIO — HIGH (ref 0.65–1.3)
MAGNESIUM SERPL-MCNC: 1.9 MG/DL — SIGNIFICANT CHANGE UP (ref 1.8–2.4)
MCHC RBC-ENTMCNC: 29.2 G/DL — LOW (ref 32–37)
MCHC RBC-ENTMCNC: 31.2 PG — HIGH (ref 27–31)
MCV RBC AUTO: 106.9 FL — HIGH (ref 80–94)
NRBC # BLD: 0 /100 WBCS — SIGNIFICANT CHANGE UP (ref 0–0)
PLATELET # BLD AUTO: 188 K/UL — SIGNIFICANT CHANGE UP (ref 130–400)
POTASSIUM SERPL-MCNC: 4.5 MMOL/L — SIGNIFICANT CHANGE UP (ref 3.5–5)
POTASSIUM SERPL-SCNC: 4.5 MMOL/L — SIGNIFICANT CHANGE UP (ref 3.5–5)
PROT SERPL-MCNC: 5.7 G/DL — LOW (ref 6–8)
PROTHROM AB SERPL-ACNC: 15.1 SEC — HIGH (ref 9.95–12.87)
RBC # BLD: 3.49 M/UL — LOW (ref 4.7–6.1)
RBC # FLD: 15.6 % — HIGH (ref 11.5–14.5)
SODIUM SERPL-SCNC: 148 MMOL/L — HIGH (ref 135–146)
WBC # BLD: 10.57 K/UL — SIGNIFICANT CHANGE UP (ref 4.8–10.8)
WBC # FLD AUTO: 10.57 K/UL — SIGNIFICANT CHANGE UP (ref 4.8–10.8)

## 2020-01-21 PROCEDURE — 36620 INSERTION CATHETER ARTERY: CPT

## 2020-01-21 PROCEDURE — 99232 SBSQ HOSP IP/OBS MODERATE 35: CPT

## 2020-01-21 PROCEDURE — 36573 INSJ PICC RS&I 5 YR+: CPT

## 2020-01-21 PROCEDURE — 99291 CRITICAL CARE FIRST HOUR: CPT

## 2020-01-21 PROCEDURE — 0042T: CPT

## 2020-01-21 PROCEDURE — 71045 X-RAY EXAM CHEST 1 VIEW: CPT | Mod: 26

## 2020-01-21 PROCEDURE — 93010 ELECTROCARDIOGRAM REPORT: CPT

## 2020-01-21 PROCEDURE — 70450 CT HEAD/BRAIN W/O DYE: CPT | Mod: 26

## 2020-01-21 RX ORDER — POTASSIUM CHLORIDE 20 MEQ
20 PACKET (EA) ORAL ONCE
Refills: 0 | Status: COMPLETED | OUTPATIENT
Start: 2020-01-21 | End: 2020-01-21

## 2020-01-21 RX ORDER — DULOXETINE HYDROCHLORIDE 30 MG/1
30 CAPSULE, DELAYED RELEASE ORAL DAILY
Refills: 0 | Status: DISCONTINUED | OUTPATIENT
Start: 2020-01-21 | End: 2020-01-31

## 2020-01-21 RX ORDER — ASPIRIN/CALCIUM CARB/MAGNESIUM 324 MG
81 TABLET ORAL DAILY
Refills: 0 | Status: DISCONTINUED | OUTPATIENT
Start: 2020-01-21 | End: 2020-01-31

## 2020-01-21 RX ORDER — FAMOTIDINE 10 MG/ML
20 INJECTION INTRAVENOUS
Refills: 0 | Status: DISCONTINUED | OUTPATIENT
Start: 2020-01-21 | End: 2020-01-24

## 2020-01-21 RX ORDER — SODIUM CHLORIDE 9 MG/ML
1000 INJECTION INTRAMUSCULAR; INTRAVENOUS; SUBCUTANEOUS
Refills: 0 | Status: DISCONTINUED | OUTPATIENT
Start: 2020-01-21 | End: 2020-01-24

## 2020-01-21 RX ORDER — CHLORHEXIDINE GLUCONATE 213 G/1000ML
15 SOLUTION TOPICAL EVERY 12 HOURS
Refills: 0 | Status: DISCONTINUED | OUTPATIENT
Start: 2020-01-21 | End: 2020-01-23

## 2020-01-21 RX ORDER — PROPOFOL 10 MG/ML
5 INJECTION, EMULSION INTRAVENOUS
Qty: 1000 | Refills: 0 | Status: DISCONTINUED | OUTPATIENT
Start: 2020-01-21 | End: 2020-01-24

## 2020-01-21 RX ORDER — PHENYLEPHRINE HYDROCHLORIDE 10 MG/ML
1 INJECTION INTRAVENOUS
Qty: 160 | Refills: 0 | Status: DISCONTINUED | OUTPATIENT
Start: 2020-01-21 | End: 2020-01-24

## 2020-01-21 RX ORDER — DEXMEDETOMIDINE HYDROCHLORIDE IN 0.9% SODIUM CHLORIDE 4 UG/ML
0.2 INJECTION INTRAVENOUS
Qty: 200 | Refills: 0 | Status: DISCONTINUED | OUTPATIENT
Start: 2020-01-21 | End: 2020-01-24

## 2020-01-21 RX ADMIN — CHLORHEXIDINE GLUCONATE 1 APPLICATION(S): 213 SOLUTION TOPICAL at 05:54

## 2020-01-21 RX ADMIN — Medication 20 MILLIEQUIVALENT(S): at 05:54

## 2020-01-21 RX ADMIN — ENOXAPARIN SODIUM 100 MILLIGRAM(S): 100 INJECTION SUBCUTANEOUS at 18:50

## 2020-01-21 RX ADMIN — SODIUM CHLORIDE 50 MILLILITER(S): 9 INJECTION INTRAMUSCULAR; INTRAVENOUS; SUBCUTANEOUS at 20:21

## 2020-01-21 RX ADMIN — CHLORHEXIDINE GLUCONATE 15 MILLILITER(S): 213 SOLUTION TOPICAL at 18:50

## 2020-01-21 RX ADMIN — ENOXAPARIN SODIUM 100 MILLIGRAM(S): 100 INJECTION SUBCUTANEOUS at 05:55

## 2020-01-21 RX ADMIN — Medication 81 MILLIGRAM(S): at 05:54

## 2020-01-21 RX ADMIN — PANTOPRAZOLE SODIUM 40 MILLIGRAM(S): 20 TABLET, DELAYED RELEASE ORAL at 05:56

## 2020-01-21 RX ADMIN — LOSARTAN POTASSIUM 100 MILLIGRAM(S): 100 TABLET, FILM COATED ORAL at 05:56

## 2020-01-21 NOTE — PHYSICAL THERAPY INITIAL EVALUATION ADULT - IMPAIRMENTS FOUND, PT EVAL
ROM/anthropometric characteristics/aerobic capacity/endurance/gait, locomotion, and balance/poor safety awareness/posture

## 2020-01-21 NOTE — PROCEDURE NOTE - NSICDXPROCEDURE_GEN_ALL_CORE_FT
PROCEDURES:  Percutaneous insertion of arterial line 21-Jan-2020 16:17:04  Mekhi Madden
PROCEDURES:  Midline catheter insertion 21-Jan-2020 16:25:22 utilizing ultrasound Mekhi Madden  Percutaneous insertion of arterial line 21-Jan-2020 16:17:04  Mekhi Madden

## 2020-01-21 NOTE — PHYSICAL THERAPY INITIAL EVALUATION ADULT - GENERAL OBSERVATIONS, REHAB EVAL
Chart reviewed. Pt. in bed upon PT arrival with DILLAN Benjamin present throughout. Pt. adamant that he needs to go to toilet. Cervical collar applied by PT. C/o weakness, no c/o pain with movement. Agreeable to PT.

## 2020-01-21 NOTE — PHYSICAL THERAPY INITIAL EVALUATION ADULT - GAIT DEVIATIONS NOTED, PT EVAL
decreased kimmie/increased time in double stance/decreased weight-shifting ability/decreased step length/decreased velocity of limb motion

## 2020-01-21 NOTE — PROGRESS NOTE ADULT - SUBJECTIVE AND OBJECTIVE BOX
OPERATIVE PROCEDURE(s):                POD #                       79yMale  SURGEON(s): LEVI Levy  SUBJECTIVE ASSESSMENT:   Vital Signs Last 24 Hrs  T(F): 97.5 (21 Jan 2020 08:00), Max: 97.5 (21 Jan 2020 08:00)  HR: 66 (21 Jan 2020 09:00) (54 - 71)  BP: 151/74 (21 Jan 2020 09:00) (138/66 - 173/88)  BP(mean): 106 (21 Jan 2020 09:00) (92 - 122  RR: 26 (21 Jan 2020 09:00) (20 - 42)  SpO2: 98% (21 Jan 2020 09:00) (91% - 99%)  I&O's Detail    20 Jan 2020 07:01  -  21 Jan 2020 07:00  --------------------------------------------------------  IN:    Oral Fluid: 340 mL  Total IN: 340 mL    OUT:    Voided: 530 mL  Total OUT: 530 mL        Net:   I&O's Detail    19 Jan 2020 07:01  -  20 Jan 2020 07:00  --------------------------------------------------------  Total NET: -40 mL      20 Jan 2020 07:01  -  21 Jan 2020 07:00  --------------------------------------------------------  Total NET: -190 mL        CAPILLARY BLOOD GLUCOSE        Physical Exam:  General: NAD; A&Ox3/Patient is intubated and sedated  Cardiac: S1/S2, RRR, no murmur, no rubs  Lungs: unlabored respirations, CTA b/l, no wheeze, no rales, no crackles  Abdomen: Soft/NT/ND; positive bowel sounds x 4  Sternum: Intact, no click, incision healing well with no drainage  Incisions: Incisions clean/dry/intact  Extremities: No edema b/l lower extremities; good capillary refill; no cyanosis; palpable 1+ pedal pulses b/l    Central Venous Catheter: Yes[]  No[] , If Yes indication:           Day #  Mora Catheter: Yes  [] , No  [] , If yes indication:                      Day #  NGT: Yes [] No [] ,    If Yes Placement:                                     Day #  EPICARDIAL WIRES:  [] YES [] NO                                              Day #  BOWEL MOVEMENT:  [] YES [] NO, If No, Timing since last BM:      Day #  CHEST TUBE(Left/Right):  [] YES [] NO, If yes -  AIR LEAKS:  [] YES [] NO        LABS:                        11.8<L>  11.84<H> )-----------( 169      ( 20 Jan 2020 04:15 )             39.2<L>                        10.7<L>  9.26  )-----------( 141      ( 19 Jan 2020 04:13 )             35.0<L>    01-20    147<H>  |  104  |  15  ----------------------------<  154<H>  3.7   |  32  |  0.7  01-19    151<H>  |  108  |  16  ----------------------------<  106<H>  4.5   |  33<H>  |  0.7    Ca    8.8      20 Jan 2020 04:15  Phos  3.4     01-20  Mg     1.9     01-20    TPro  6.4 [6.0 - 8.0]  /  Alb  3.6 [3.5 - 5.2]  /  TBili  1.9<H> [0.2 - 1.2]  /  DBili  x   /  AST  24 [0 - 41]  /  ALT  28 [0 - 41]  /  AlkPhos  133<H> [30 - 115]  01-20    PT/INR - ( 20 Jan 2020 04:15 )   PT: ;   INR: 1.25 ratio         PTT - ( 20 Jan 2020 04:15 )  PTT:34.5 sec      RADIOLOGY & ADDITIONAL TESTS:  CXR:  EKG:  MEDICATIONS  (STANDING):  aspirin  chewable 81 milliGRAM(s) Oral daily  atorvastatin 80 milliGRAM(s) Oral at bedtime  chlorhexidine 4% Liquid 1 Application(s) Topical <User Schedule>  enoxaparin Injectable 100 milliGRAM(s) SubCutaneous two times a day  losartan 100 milliGRAM(s) Oral daily  pantoprazole    Tablet 40 milliGRAM(s) Oral before breakfast    MEDICATIONS  (PRN):    HEPARIN:  [] YES [] NO  Dose: XX UNITS/HR UNITS Q8H  LOVENOX:[] YES [] NO  Dose: XX mg Q24H  COUMADIN: []  YES [] NO  Dose: XX mg  Q24H  SCD's: YES b/l  GI Prophylaxis: Protonix [], Pepcid [], None [], (Contra-indication:.....)    Post-Op Beta-Blockers: Yes [], No[], If No, then contraindication:  Post-Op Aspirin: Yes [],  No [], If No, then contraindication:  Post-Op Statin: Yes [], No[], If No, then contraindication:  Allergies    No Known Allergies    Intolerances      Ambulation/Activity Status:    Assessment/Plan:  79y Male status-post .....  - Case and plan discussed with CTU Intensivist and CT Surgeon - Dr. Shaw/Rodríguez/Samir   - Continue CTU supportive care    - Continue DVT/GI prophylaxis  - Incentive Spirometry 10 times an hour  - Continue to advance physical activity as tolerated and continue PT/OT as directed  1. CAD: Continue ASA, statin, BB  2. HTN:   3. A. Fib:   4. COPD/Hypoxia:   5. DM/Glucose Control:     Social Service Disposition: SUBJECTIVE ASSESSMENT:   Vital Signs Last 24 Hrs  T(F): 97.5 (21 Jan 2020 08:00), Max: 97.5 (21 Jan 2020 08:00)  HR: 66 (21 Jan 2020 09:00) (54 - 71)  BP: 151/74 (21 Jan 2020 09:00) (138/66 - 173/88)  BP(mean): 106 (21 Jan 2020 09:00) (92 - 122  RR: 26 (21 Jan 2020 09:00) (20 - 42)  SpO2: 98% (21 Jan 2020 09:00) (91% - 99%)  I&O's Detail    20 Jan 2020 07:01  -  21 Jan 2020 07:00  --------------------------------------------------------  IN:    Oral Fluid: 340 mL  Total IN: 340 mL    OUT:    Voided: 530 mL  Total OUT: 530 mL    Net:   I&O's Detail    19 Jan 2020 07:01  -  20 Jan 2020 07:00  --------------------------------------------------------  Total NET: -40 mL      20 Jan 2020 07:01  -  21 Jan 2020 07:00  --------------------------------------------------------  Total NET: -190 mL    CAPILLARY BLOOD GLUCOSE  Physical Exam:  General: NAD; A&Ox3/Patient is intubated and sedated  Cardiac: S1/S2, RRR, no murmur, no rubs  Lungs: unlabored respirations, CTA b/l, no wheeze, no rales, no crackles  Abdomen: Soft/NT/ND; positive bowel sounds x 4  Sternum: Intact, no click, incision healing well with no drainage  Incisions: Incisions clean/dry/intact  Extremities: No edema b/l lower extremities; good capillary refill; no cyanosis; palpable 1+ pedal pulses b/l    Central Venous Catheter: Yes[]  No[] , If Yes indication:           Day #  Mora Catheter: Yes  [] , No  [] , If yes indication:                      Day #  NGT: Yes [] No [] ,    If Yes Placement:                                     Day #  EPICARDIAL WIRES:  [] YES [] NO                                              Day #  BOWEL MOVEMENT:  [] YES [] NO, If No, Timing since last BM:      Day #  CHEST TUBE(Left/Right):  [] YES [] NO, If yes -  AIR LEAKS:  [] YES [] NO        LABS:                        11.8<L>  11.84<H> )-----------( 169      ( 20 Jan 2020 04:15 )             39.2<L>                        10.7<L>  9.26  )-----------( 141      ( 19 Jan 2020 04:13 )             35.0<L>    01-20    147<H>  |  104  |  15  ----------------------------<  154<H>  3.7   |  32  |  0.7  01-19    151<H>  |  108  |  16  ----------------------------<  106<H>  4.5   |  33<H>  |  0.7    Ca    8.8      20 Jan 2020 04:15  Phos  3.4     01-20  Mg     1.9     01-20    TPro  6.4 [6.0 - 8.0]  /  Alb  3.6 [3.5 - 5.2]  /  TBili  1.9<H> [0.2 - 1.2]  /  DBili  x   /  AST  24 [0 - 41]  /  ALT  28 [0 - 41]  /  AlkPhos  133<H> [30 - 115]  01-20  PT/INR - ( 20 Jan 2020 04:15 )   PT: ;   INR: 1.25 ratio    PTT - ( 20 Jan 2020 04:15 )  PTT:34.5 sec    MEDICATIONS  (STANDING):  aspirin  chewable 81 milliGRAM(s) Oral daily  atorvastatin 80 milliGRAM(s) Oral at bedtime  chlorhexidine 4% Liquid 1 Application(s) Topical <User Schedule>  enoxaparin Injectable 100 milliGRAM(s) SubCutaneous two times a day  losartan 100 milliGRAM(s) Oral daily  pantoprazole    Tablet 40 milliGRAM(s) Oral before breakfast    Post-Op Beta-Blockers: Yes [], No[], If No, then contraindication:  Post-Op Aspirin: Yes [],  No [], If No, then contraindication:  Post-Op Statin: Yes [], No[], If No, then contraindication:  Allergies  No Known Allergies    Assessment/Plan:  79y Male status-post .....  - Case and plan discussed with CTU Intensivist and CT Surgeon - Dr. Shaw/Rodríguez/Samir   - Continue CTU supportive care    - Continue DVT/GI prophylaxis  - Incentive Spirometry 10 times an hour  - Continue to advance physical activity as tolerated and continue PT/OT as directed  1. CAD: Continue ASA, statin, BB  2. HTN:   3. A. Fib:   4. COPD/Hypoxia:   5. DM/Glucose Control:     Social Service Disposition: SUBJECTIVE ASSESSMENT: pre-op cabg  Vital Signs Last 24 Hrs  T(F): 97.5 (21 Jan 2020 08:00), Max: 97.5 (21 Jan 2020 08:00)  HR: 66 (21 Jan 2020 09:00) (54 - 71)  BP: 151/74 (21 Jan 2020 09:00) (138/66 - 173/88)  BP(mean): 106 (21 Jan 2020 09:00) (92 - 122  RR: 26 (21 Jan 2020 09:00) (20 - 42)  SpO2: 98% (21 Jan 2020 09:00) (91% - 99%)  I&O's Detail    20 Jan 2020 07:01  -  21 Jan 2020 07:00  --------------------------------------------------------  IN:    Oral Fluid: 340 mL  Total IN: 340 mL    OUT:    Voided: 530 mL  Total OUT: 530 mL    Net:   I&O's Detail    19 Jan 2020 07:01  -  20 Jan 2020 07:00  --------------------------------------------------------  Total NET: -40 mL      20 Jan 2020 07:01  -  21 Jan 2020 07:00  --------------------------------------------------------  Total NET: -190 mL    CAPILLARY BLOOD GLUCOSE  Physical Exam:  General: intubated  Cardiac: S1/S2, RRR, no murmur, no rubs  Lungs: unlabored respirations, CTA b/l, no wheeze, no rales, no crackles  Abdomen: Soft/NT/ND; positive bowel sounds x 4  Extremities: No edema b/l lower extremities; good capillary refill; no cyanosis; palpable 1+ pedal pulses b/l         LABS:                        11.8<L>  11.84<H> )-----------( 169      ( 20 Jan 2020 04:15 )             39.2<L>                        10.7<L>  9.26  )-----------( 141      ( 19 Jan 2020 04:13 )             35.0<L>    01-20    147<H>  |  104  |  15  ----------------------------<  154<H>  3.7   |  32  |  0.7  01-19    151<H>  |  108  |  16  ----------------------------<  106<H>  4.5   |  33<H>  |  0.7    Ca    8.8      20 Jan 2020 04:15  Phos  3.4     01-20  Mg     1.9     01-20    TPro  6.4 [6.0 - 8.0]  /  Alb  3.6 [3.5 - 5.2]  /  TBili  1.9<H> [0.2 - 1.2]  /  DBili  x   /  AST  24 [0 - 41]  /  ALT  28 [0 - 41]  /  AlkPhos  133<H> [30 - 115]  01-20  PT/INR - ( 20 Jan 2020 04:15 )   PT: ;   INR: 1.25 ratio    PTT - ( 20 Jan 2020 04:15 )  PTT:34.5 sec    MEDICATIONS  (STANDING):  aspirin  chewable 81 milliGRAM(s) Oral daily  atorvastatin 80 milliGRAM(s) Oral at bedtime  chlorhexidine 4% Liquid 1 Application(s) Topical <User Schedule>  enoxaparin Injectable 100 milliGRAM(s) SubCutaneous two times a day  losartan 100 milliGRAM(s) Oral daily  pantoprazole    Tablet 40 milliGRAM(s) Oral before breakfast      Allergies  No Known Allergies    Assessment/Plan:  79y Male pre-op cabg, patient was unresponsive this am, code stroke called, pt was intubated down in ct scan for hypoxia  - Case and plan discussed with CTU Intensivist and CT Surgeon - Dr. Shaw/Rodríguez/Samir   - Continue CTU supportive care    - Continue DVT/GI prophylaxis  - Incentive Spirometry 10 times an hour  - Continue to advance physical activity as tolerated and continue PT/OT as directed  -pre-op work up in progress  - f/u neuro- ct head prelim neg, eeg pending

## 2020-01-21 NOTE — AIRWAY PLACEMENT NOTE ADULT - AIRWAY COMMENTS:
Easy intubation with No complication. The cervical collar was still in-place during the intubation and the neck was NOT extended during the intubation.

## 2020-01-21 NOTE — PROGRESS NOTE ADULT - SUBJECTIVE AND OBJECTIVE BOX
CTU Attending Progress Daily Note     21 Jan 2020 07:47    Patient seen as pre-op critical care follow-up    HPI:  80 yo M with hx of HTN, A.fib (Eliquis), CHF, HLD brought in by EMS post cardiac arrest. As per wife at bedside, patient at the basement watching movie and was doing fine. Few mins later, patient came up and told the wife that he wasn't feeling right and that he felt funny. Then he crashed. Wife called the EMS who arrived 5 mins later. Upon EMS arrival, patient was found to be in PEA then asystole, s/p resuscitation for ~ 10 mins and ROSC achieved after 1 round of compression and epi. While on the way to hospital, patient had another cardiac arrest on the ambulant s/p CPR and ROSC achieved few mins later. As per family, patient was doing welll and at baseline prior to the episode. As baseline patient is fully functional. Denied any recent infection, recent hospitalization, recent ED visit, fever, chills.     In ED, patient was found to bradycardic and hypotensive. s/p 1 dose of atropine and patient was started on low dose Levophed with improvement in HR and BP. (12 Jan 2020 22:13)    See preop testing chart H&P    Interval event for past 24 hr:  MED IVORY  79y had no event.     Current Complains:  MED IVORY has no new complaints    REVIEW OF SYSTEMS:  CONSTITUTIONAL:  [-] weakness, [-] fevers, [-] chills  EYES/ENT: [-] visual changes, [-] vertigo, [-] throat pain   NECK: [-] pain, [-] stiffness  RESPIRATORY: [-] cough, [-] wheezing, [-] hemoptysis, [-] shortness of breath  CARDIOVASCULAR: [-] chest pain, [-] palpitations, [-] orthopnea  GASTROINTESTINAL:    [-]abdominal pain, [-] nausea, [-] vomiting, [-] hematemesis, [-] diarrhea, [-] constipation, [-] melena, [-] hematochezia.  GENITOURINARY: [-] dysuria, [-] frequency, [-] hematuria  NEUROLOGICAL: [-] numbness, [-] weakness  SKIN: [-] itching, [-] burning, [-] rashes, [-] lesions   All other review of systems is negative unless indicated above.    [  ] Unable to assess ROS because :    OBJECTIVE:  ICU Vital Signs Last 24 Hrs  T(C): 36.1 (20 Jan 2020 21:33), Max: 36.3 (20 Jan 2020 08:00)  T(F): 97 (20 Jan 2020 21:33), Max: 97.4 (20 Jan 2020 08:00)  HR: 62 (21 Jan 2020 07:00) (54 - 76)  BP: 160/72 (21 Jan 2020 07:00) (124/67 - 173/88)  BP(mean): 103 (21 Jan 2020 07:00) (92 - 122)  ABP: --  ABP(mean): --  RR: 36 (21 Jan 2020 07:00) (20 - 42)  SpO2: 96% (21 Jan 2020 07:00) (91% - 99%)      I&O's Summary    20 Jan 2020 07:01  -  21 Jan 2020 07:00  --------------------------------------------------------  IN: 340 mL / OUT: 530 mL / NET: -190 mL      Adult Advanced Hemodynamics Last 24 Hrs  CVP(mm Hg): --  CVP(cm H2O): --  CO: --  CI: --  PA: --  PA(mean): --  PCWP: --  SVR: --  SVRI: --  PVR: --  PVRI: --      PHYSICAL EXAM:  General: WN/WD NAD    HEENT:     [+] NCAT  [+] EOMI  [-] Conjuctival edema   [-] Icterus   [-] Thrush   [-] ETT  [-] NGT/OGT    Neck:         [+] FROM   [-] JVD     [-] Nodes     [-] Masses    [+] Mid-line trachea    [-] Tracheostomy    Chest:        [-] SubQ emphysema    Lungs:          [+] CTA   [-] Rhonchi   [-] Rales    [-] Wheezing    [-] Decreased BS    [-] Dullness R L    Cardiac:       [+] S1 [+] S2    [+] RRR   [-] Irregular   [-] S3   [-] S4    [-] Murmurs    [-] Rub    Abdomen:    [+] BS    [+] Soft    [+] Non-tender     [-] Distended    [-] Organomegaly  [-] PEG    Extremities:   [-] Cyanosis U/L   [-] Clubbing  U/L  [-] LE/UE Edema   [+] Capillary refill    [+] Pulses     Neuro:        [+] Awake   [+]  Alert   [-] Confused   [-] Lethargic   [-] Sedated   [-] Generalized Weakness    Skin:        [-] Rashes    [-] Erythema    [+] IV sites intact   [-] Sacral decubitus      LINES:    CAPILLARY BLOOD GLUCOSE      POCT Blood Glucose.: 108 mg/dL (20 Jan 2020 07:43)    CAPILLARY BLOOD GLUCOSE          HOSPITAL MEDICATIONS:  MEDICATIONS  (STANDING):  aspirin  chewable 81 milliGRAM(s) Oral daily  atorvastatin 80 milliGRAM(s) Oral at bedtime  chlorhexidine 4% Liquid 1 Application(s) Topical <User Schedule>  enoxaparin Injectable 100 milliGRAM(s) SubCutaneous two times a day  losartan 100 milliGRAM(s) Oral daily  pantoprazole    Tablet 40 milliGRAM(s) Oral before breakfast    MEDICATIONS  (PRN):      LABS:                          11.8   11.84 )-----------( 169      ( 20 Jan 2020 04:15 )             39.2     01-20    147<H>  |  104  |  15  ----------------------------<  154<H>  3.7   |  32  |  0.7    Ca    8.8      20 Jan 2020 04:15  Phos  3.4     01-20  Mg     1.9     01-20    TPro  6.4  /  Alb  3.6  /  TBili  1.9<H>  /  DBili  x   /  AST  24  /  ALT  28  /  AlkPhos  133<H>  01-20    PT/INR - ( 20 Jan 2020 04:15 )   PT: 14.30 sec;   INR: 1.25 ratio         PTT - ( 20 Jan 2020 04:15 )  PTT:34.5 sec        RADIOLOGY:  Reviewed and interpreted by me  CXR from 01-21-20 shows [+] mild congestion, [-] pneumothorax, [-] R/L effusion, [-] cardiomegaly,   NGT in place, S-G Catheter in place, R/L TLC in place, R/L Chest Tubes in place    ECG:  Reviewed and interpreted by me:   QTC:    preop workup results    echo:    carotids:    CT chest:    UA:    MRSA nasal swab:    A1C:    Verify Now:      Assessment:      PAST MEDICAL & SURGICAL HISTORY:  Atrial fibrillation  Neuropathy  HLD (hyperlipidemia)  HTN (hypertension)  History of cholecystectomy  History of cholecystectomy      PLAN:  OR planning per CTS  Pulm: Encourage coughing, deep breathing and use of incentive spirometry.   Cardio: Monitor telemetry/alarms. Continue cardiac meds  GI: Tolerating diet. Continue stool softeners. Continue GI prophylaxis  Renal: monitor urine output, supplement electrolytes as needed  Vasc: Heparin SC/SCDs for DVT prophylaxis  Heme: Monitor H/H.   ID: Off antibiotics. Stable.  Endocrine: Monitor finger stick blood sugar and control hyperglycemia with insulin  Physical Therapy: OOB/ambulate        Discussed with Cardiothoracic Team at AM rounds. CTU Attending Progress Daily Note     21 Jan 2020 07:47    Patient seen as pre-op critical care follow-up    HPI:  78 yo M with hx of HTN, A.fib (Eliquis), CHF, HLD brought in by EMS post cardiac arrest. As per wife at bedside, patient at the basement watching movie and was doing fine. Few mins later, patient came up and told the wife that he wasn't feeling right and that he felt funny. Then he crashed. Wife called the EMS who arrived 5 mins later. Upon EMS arrival, patient was found to be in PEA then asystole, s/p resuscitation for ~ 10 mins and ROSC achieved after 1 round of compression and epi. While on the way to hospital, patient had another cardiac arrest on the ambulant s/p CPR and ROSC achieved few mins later. As per family, patient was doing welll and at baseline prior to the episode. As baseline patient is fully functional. Denied any recent infection, recent hospitalization, recent ED visit, fever, chills.     In ED, patient was found to bradycardic and hypotensive. s/p 1 dose of atropine and patient was started on low dose Levophed with improvement in HR and BP. (12 Jan 2020 22:13)    See preop testing chart H&P    Interval event for past 24 hr:  MED IVORY  79y had A. flutter  Current Complains:  MED IVORY has no new complaints    REVIEW OF SYSTEMS:  CONSTITUTIONAL:  [-] weakness, [-] fevers, [-] chills  EYES/ENT: [-] visual changes, [-] vertigo, [-] throat pain   NECK: [-] pain, [-] stiffness  RESPIRATORY: [-] cough, [-] wheezing, [-] hemoptysis, [-] shortness of breath  CARDIOVASCULAR: [-] chest pain, [-] palpitations, [-] orthopnea  GASTROINTESTINAL:    [-]abdominal pain, [-] nausea, [-] vomiting, [-] hematemesis, [-] diarrhea, [-] constipation, [-] melena, [-] hematochezia.  GENITOURINARY: [-] dysuria, [-] frequency, [-] hematuria  NEUROLOGICAL: [-] numbness, [-] weakness  SKIN: [-] itching, [-] burning, [-] rashes, [-] lesions   All other review of systems is negative unless indicated above.    [  ] Unable to assess ROS because :    OBJECTIVE:  ICU Vital Signs Last 24 Hrs  T(C): 36.1 (20 Jan 2020 21:33), Max: 36.3 (20 Jan 2020 08:00)  T(F): 97 (20 Jan 2020 21:33), Max: 97.4 (20 Jan 2020 08:00)  HR: 62 (21 Jan 2020 07:00) (54 - 76)  BP: 160/72 (21 Jan 2020 07:00) (124/67 - 173/88)  BP(mean): 103 (21 Jan 2020 07:00) (92 - 122)  ABP: --  ABP(mean): --  RR: 36 (21 Jan 2020 07:00) (20 - 42)  SpO2: 96% (21 Jan 2020 07:00) (91% - 99%)      I&O's Summary    20 Jan 2020 07:01  -  21 Jan 2020 07:00  --------------------------------------------------------  IN: 340 mL / OUT: 530 mL / NET: -190 mL        PHYSICAL EXAM:  General: WN/WD NAD    HEENT:     [+] NCAT  [+] EOMI  [-] Conjuctival edema   [-] Icterus   [-] Thrush   [-] ETT  [-] NGT/OGT    Neck:         [+] FROM   [-] JVD     [-] Nodes     [-] Masses    [+] Mid-line trachea    [-] Tracheostomy    Chest:        [-] SubQ emphysema    Lungs:          [+] CTA   [-] Rhonchi   [-] Rales    [-] Wheezing    [-] Decreased BS    [-] Dullness R L    Cardiac:       [+] S1 [+] S2    [+] RRR   [-] Irregular   [-] S3   [-] S4    [-] Murmurs    [-] Rub    Abdomen:    [+] BS    [+] Soft    [+] Non-tender     [-] Distended    [-] Organomegaly  [-] PEG    Extremities:   [-] Cyanosis U/L   [-] Clubbing  U/L  [-] LE/UE Edema   [+] Capillary refill    [+] Pulses     Neuro:        [+] Awake   [+]  Alert   [-] Confused   [-] Lethargic   [-] Sedated   [-] Generalized Weakness    Skin:        [-] Rashes    [-] Erythema    [+] IV sites intact   [-] Sacral decubitus      LINES:    CAPILLARY BLOOD GLUCOSE      POCT Blood Glucose.: 108 mg/dL (20 Jan 2020 07:43)    CAPILLARY BLOOD GLUCOSE          HOSPITAL MEDICATIONS:  MEDICATIONS  (STANDING):  aspirin  chewable 81 milliGRAM(s) Oral daily  atorvastatin 80 milliGRAM(s) Oral at bedtime  chlorhexidine 4% Liquid 1 Application(s) Topical <User Schedule>  enoxaparin Injectable 100 milliGRAM(s) SubCutaneous two times a day  losartan 100 milliGRAM(s) Oral daily  pantoprazole    Tablet 40 milliGRAM(s) Oral before breakfast    MEDICATIONS  (PRN):      LABS:                          11.8   11.84 )-----------( 169      ( 20 Jan 2020 04:15 )             39.2     01-20    147<H>  |  104  |  15  ----------------------------<  154<H>  3.7   |  32  |  0.7    Ca    8.8      20 Jan 2020 04:15  Phos  3.4     01-20  Mg     1.9     01-20    TPro  6.4  /  Alb  3.6  /  TBili  1.9<H>  /  DBili  x   /  AST  24  /  ALT  28  /  AlkPhos  133<H>  01-20    PT/INR - ( 20 Jan 2020 04:15 )   PT: 14.30 sec;   INR: 1.25 ratio         PTT - ( 20 Jan 2020 04:15 )  PTT:34.5 sec        RADIOLOGY:  Reviewed and interpreted by me  CXR from 01-21-20 shows [+] mild congestion, [-] pneumothorax, [-] R/L effusion, [-] cardiomegaly,     ECG:  Reviewed and interpreted by me: MARY CARMEN richmond [email or web address]  QTC: 452    preop workup results    echo:  < from: Transthoracic Echocardiogram (01.13.20 @ 12:02) >  Summary:   1. Left ventricular ejection fraction, by visual estimation, is 60 to 65%.   2. Normal global left ventricular systolic function.   3. Mild left ventricular hypertrophy.   4. Sclerotic aortic valve with normal opening.   5. Mild aortic regurgitation.   6. Mild-to-moderate tricuspid regurgitation.   7. Estimated pulmonary artery systolic pressure is 42.3 mmHg assuming a right atrial pressure of 8 mmHg, which is consistent with mild pulmonary hypertension.    < end of copied text >    carotids:  < from: [facility] Duplex Carotid, Bilat (01.20.20 @ 20:33) >    Impression:    Less than 50% stenosis of the right internal carotid artery.  Less than 50% stenosis of the left internal carotid artery.    < end of copied text >    CT chest:  < from: CT Angio Chest w/ IV Cont (01.13.20 @ 03:06) >  IMPRESSION:     1. No central or lobar pulmonary embolus.    2.  Small bibasilar consolidated opacities, possibly representing atelectasis or sequela of prior aspiration event.    3.  A 4.4 cm retroesophageal soft tissue density within the thorax may represent hematoma.    4.  No evidence of acute/inflammatory process within the abdomen or pelvis.    < end of copied text >      UA:    Urinalysis (01.12.20 @ 17:20)    Glucose Qualitative, Urine: Negative    Blood, Urine: Trace    pH Urine: 6.5    Color: Light Yellow    Urine Appearance: Clear    Bilirubin: Negative    Ketone - Urine: Negative    Specific Gravity: 1.012    Protein, Urine: 100 mg/dL    Urobilinogen: <2 mg/dL    Nitrite: Negative    Leukocyte Esterase Concentration: Negative        MRSA nasal swab:    MRSA/MSSA PCR (01.13.20 @ 01:06)    MRSA PCR Result.: Negative: By: Real-Time PCR (Polymerase Reaction Method)      A1C:      Verify Now: NA      Assessment:    CAD Preop CABG  A flutter    PAST MEDICAL & SURGICAL HISTORY:  Atrial fibrillation  Neuropathy  HLD (hyperlipidemia)  HTN (hypertension)  History of cholecystectomy  History of cholecystectomy      PLAN:  OR planning per CTS  Pulm: Encourage coughing, deep breathing and use of incentive spirometry.   Cardio: Monitor telemetry/alarms. Continue cardiac meds  GI: Tolerating diet. Continue stool softeners. Continue GI prophylaxis  Renal: monitor urine output, supplement electrolytes as needed  Vasc: Lovenox SC/SCDs for DVT prophylaxis  Heme: Monitor H/H.   ID: Off antibiotics. Stable.  Endocrine: Monitor finger stick blood sugar and control hyperglycemia with insulin  Physical Therapy: OOB/ambulate        Discussed with Cardiothoracic Team at AM rounds.

## 2020-01-21 NOTE — PHYSICAL THERAPY INITIAL EVALUATION ADULT - PLANNED THERAPY INTERVENTIONS, PT EVAL
balance training/bed mobility training/strengthening/gait training/stretching/postural re-education/ROM/transfer training

## 2020-01-21 NOTE — PHYSICAL THERAPY INITIAL EVALUATION ADULT - PERTINENT HX OF CURRENT PROBLEM, REHAB EVAL
Admitted s/p cardiac arrest at home, witnessed by spouse, with subsequent fall; additional arrest in ambulance;

## 2020-01-21 NOTE — CONSULT NOTE ADULT - SUBJECTIVE AND OBJECTIVE BOX
***STROKE ALERT CONSULT NOTE    Chief Complaint: stroke alert    *Last known normal time: 10 minutes prior to stroke code activation  *Stroke Team Contact Time: 5 minutes after stroke code activation    HPI:  78 yo M with hx of HTN, A.fib (Eliquis), CHF, HLD brought in by EMS post cardiac arrest. As per wife at bedside, patient at the basement watching movie and was doing fine. Few mins later, patient came up and told the wife that he wasn't feeling right and that he felt funny. Then he crashed. Wife called the EMS who arrived 5 mins later. Upon EMS arrival, patient was found to be in PEA then asystole, s/p resuscitation for ~ 10 mins and ROSC achieved after 1 round of compression and epi. While on the way to hospital, patient had another cardiac arrest on the ambulant s/p CPR and ROSC achieved few mins later. As per family, patient was doing welll and at baseline prior to the episode. As baseline patient is fully functional. Denied any recent infection, recent hospitalization, recent ED visit, fever, chills.     In ED, patient was found to bradycardic and hypotensive. s/p 1 dose of atropine and patient was started on low dose Levophed with improvement in HR and BP. (12 Jan 2020 22:13)    Patient was in normal state this morning and after returning from attempted walk with nurse patient was then found in bed not responsive with eyes open.  When I arrived patient was looking at me when called but not answering questiosn of speaking.  wouldnt move extremties but had more tone in L>R arm and leg .  Respodned to noxiosu stimuli.  Was taken to CT because he was on lovenox 100mg BID and presented initially with a fall.  Has a cervical collar in place     PAST MEDICAL & SURGICAL HISTORY:  Atrial fibrillation  Neuropathy  HLD (hyperlipidemia)  HTN (hypertension)  History of cholecystectomy  History of cholecystectomy      FAMILY HISTORY:      Social History: (-) x 3    Allergies    No Known Allergies    Intolerances        MEDICATIONS  (STANDING):  aspirin  chewable 81 milliGRAM(s) Oral daily  atorvastatin 80 milliGRAM(s) Oral at bedtime  chlorhexidine 4% Liquid 1 Application(s) Topical <User Schedule>  DULoxetine 30 milliGRAM(s) Oral daily  enoxaparin Injectable 100 milliGRAM(s) SubCutaneous two times a day  losartan 100 milliGRAM(s) Oral daily  pantoprazole    Tablet 40 milliGRAM(s) Oral before breakfast  phenylephrine    Infusion 1 MICROgram(s)/kG/Min (20.269 mL/Hr) IV Continuous <Continuous>  propofol Infusion 5 MICROgram(s)/kG/Min (3.243 mL/Hr) IV Continuous <Continuous>  sodium chloride 0.9%. 1000 milliLiter(s) (50 mL/Hr) IV Continuous <Continuous>    MEDICATIONS  (PRN):      Review of systems:    unable to obtain    Vital Signs Last 24 Hrs  T(C): 36.4 (21 Jan 2020 08:00), Max: 36.4 (21 Jan 2020 08:00)  T(F): 97.5 (21 Jan 2020 08:00), Max: 97.5 (21 Jan 2020 08:00)  HR: 66 (21 Jan 2020 09:00) (54 - 71)  BP: 151/74 (21 Jan 2020 09:00) (138/66 - 173/88)  BP(mean): 106 (21 Jan 2020 09:00) (92 - 122)  RR: 26 (21 Jan 2020 09:00) (20 - 42)  SpO2: 98% (21 Jan 2020 09:00) (91% - 99%)    Neurologic Examination:  Drowsy  Not following commands, no speaking  moves L>R extremities and no movement RLE and little movement LLE  Responds to noxious stimuli b/l with symmetric grimacing  plantars up b/l  No obvious neglect and attend to examiner on both side.  BTT b/l      *NIH Stroke Scale (required): 19  LOC (1); LOC Ques (2); LOC Comm (2); Gaze (0); Vision (0); Face (0); LUE (2); RUE (3); LLE (3); RLE (4); Ataxia (0); Sensory (0);  Lang (3); Dys (0); Neglect (0)    *Modified [place] Score (required): 1  0 - No symptoms.  1 - No significant disability. Able to carry out all usual activities, despite some symptoms.  2 - Slight disability. Able to look after own affairs without assistance, but unable to carry out all previous activities.  3 - Moderate disability. Requires some help, but able to walk unassisted.  4 - Moderately severe disability. Unable to attend to own bodily needs without assistance, and unable to walk unassisted.  5 - Severe disability. Requires constant nursing care and attention, bedridden, incontinent.  6 - Dead.      Labs:   CBC Full  -  ( 20 Jan 2020 04:15 )  WBC Count : 11.84 K/uL  RBC Count : 3.76 M/uL  Hemoglobin : 11.8 g/dL  Hematocrit : 39.2 %  Platelet Count - Automated : 169 K/uL  Mean Cell Volume : 104.3 fL  Mean Cell Hemoglobin : 31.4 pg  Mean Cell Hemoglobin Concentration : 30.1 g/dL  Auto Neutrophil # : x  Auto Lymphocyte # : x  Auto Monocyte # : x  Auto Eosinophil # : x  Auto Basophil # : x  Auto Neutrophil % : x  Auto Lymphocyte % : x  Auto Monocyte % : x  Auto Eosinophil % : x  Auto Basophil % : x    01-20    147<H>  |  104  |  15  ----------------------------<  154<H>  3.7   |  32  |  0.7    Ca    8.8      20 Jan 2020 04:15  Phos  3.4     01-20  Mg     1.9     01-20    TPro  6.4  /  Alb  3.6  /  TBili  1.9<H>  /  DBili  x   /  AST  24  /  ALT  28  /  AlkPhos  133<H>  01-20    LIVER FUNCTIONS - ( 20 Jan 2020 04:15 )  Alb: 3.6 g/dL / Pro: 6.4 g/dL / ALK PHOS: 133 U/L / ALT: 28 U/L / AST: 24 U/L / GGT: x           PT/INR - ( 20 Jan 2020 04:15 )   PT: 14.30 sec;   INR: 1.25 ratio         PTT - ( 20 Jan 2020 04:15 )  PTT:34.5 sec    POCT Blood Glucose.: 144 mg/dL (01-21-20 @ 10:42)      Neuroimaging:  NCHCT: < from: CT Brain Stroke Protocol (01.21.20 @ 11:25) >    IMPRESSION:     No gross evidence of evidence of acute intracranial hemorrhage or large territory infarct. Study limitations as above.      Spoke with OZ BLANCO on 1/21/2020 11:29 AM with readback.    < end of copied text >      < from: CT Perfusion w/ Maps w/ IV Cont (01.21.20 @ 11:59) >    IMPRESSION:  CT brain perfusion:   No evidence of acute infarct or ischemia.    CTA neck:   No stenosis. No dissection.  Bilateral upper lobe pneumonia. Left pleural effusion. Heterogeneous thyroid gland.    CTA brain:   No stenosis or aneurysm.    COMMENT:   Reference per NASCET criteria for degree of stenosis: Mild: less than 50%stenosis. Moderate: 50-69% stenosis. Severe: 70-94% stenosis. Near occlusion: 95-99% stenosis.    Per RAPID assessment for acute infarct, threshold for ischemic tissue volume is Tmax > 6 sec. Threshold for infarct core volume estimate is CBF < 30 percent. Threshold for poor collateral flow or severe delayed perfusion is Tmax > 10 sec.          < end of copied text >        Assessment:  This is a 79y Male with h/o severe cardiac and respiratory issues with acute poor responsiveness.  No streok of hemorrhage on CTH and CTA fails to show any stenosis of oclussion.  Still possible stroke however other etiologies should be pursued such as CO2 narcosis, toxic metabolic and medication effects  No TPA candidate as on full dose anticoagulation.  Not interventional candidate as No LVO    Plan:   - Toxic.metabolic workup  - ABG  - MRI brain w/o ARELI  - REEG  - Will follow  - Consider CT chest or repeat CXR as medically warranted.    01-21-20 @ 12:23

## 2020-01-22 LAB
ANION GAP SERPL CALC-SCNC: 11 MMOL/L — SIGNIFICANT CHANGE UP (ref 7–14)
BUN SERPL-MCNC: 18 MG/DL — SIGNIFICANT CHANGE UP (ref 10–20)
CALCIUM SERPL-MCNC: 8.3 MG/DL — LOW (ref 8.5–10.1)
CHLORIDE SERPL-SCNC: 107 MMOL/L — SIGNIFICANT CHANGE UP (ref 98–110)
CO2 SERPL-SCNC: 30 MMOL/L — SIGNIFICANT CHANGE UP (ref 17–32)
CREAT SERPL-MCNC: 0.7 MG/DL — SIGNIFICANT CHANGE UP (ref 0.7–1.5)
GLUCOSE BLDC GLUCOMTR-MCNC: 97 MG/DL — SIGNIFICANT CHANGE UP (ref 70–99)
GLUCOSE SERPL-MCNC: 114 MG/DL — HIGH (ref 70–99)
HCT VFR BLD CALC: 33.2 % — LOW (ref 42–52)
HGB BLD-MCNC: 10.1 G/DL — LOW (ref 14–18)
MAGNESIUM SERPL-MCNC: 1.7 MG/DL — LOW (ref 1.8–2.4)
MCHC RBC-ENTMCNC: 30.4 G/DL — LOW (ref 32–37)
MCHC RBC-ENTMCNC: 31.2 PG — HIGH (ref 27–31)
MCV RBC AUTO: 102.5 FL — HIGH (ref 80–94)
NRBC # BLD: 0 /100 WBCS — SIGNIFICANT CHANGE UP (ref 0–0)
PLATELET # BLD AUTO: 160 K/UL — SIGNIFICANT CHANGE UP (ref 130–400)
POTASSIUM SERPL-MCNC: 3.9 MMOL/L — SIGNIFICANT CHANGE UP (ref 3.5–5)
POTASSIUM SERPL-SCNC: 3.9 MMOL/L — SIGNIFICANT CHANGE UP (ref 3.5–5)
PREALB SERPL-MCNC: 10 MG/DL — LOW (ref 20–40)
RBC # BLD: 3.24 M/UL — LOW (ref 4.7–6.1)
RBC # FLD: 15.6 % — HIGH (ref 11.5–14.5)
SODIUM SERPL-SCNC: 148 MMOL/L — HIGH (ref 135–146)
WBC # BLD: 10.08 K/UL — SIGNIFICANT CHANGE UP (ref 4.8–10.8)
WBC # FLD AUTO: 10.08 K/UL — SIGNIFICANT CHANGE UP (ref 4.8–10.8)

## 2020-01-22 PROCEDURE — 99291 CRITICAL CARE FIRST HOUR: CPT

## 2020-01-22 PROCEDURE — 93971 EXTREMITY STUDY: CPT | Mod: 26

## 2020-01-22 PROCEDURE — 99231 SBSQ HOSP IP/OBS SF/LOW 25: CPT

## 2020-01-22 PROCEDURE — 93306 TTE W/DOPPLER COMPLETE: CPT | Mod: 26

## 2020-01-22 PROCEDURE — 72141 MRI NECK SPINE W/O DYE: CPT | Mod: 26

## 2020-01-22 PROCEDURE — 70551 MRI BRAIN STEM W/O DYE: CPT | Mod: 26

## 2020-01-22 PROCEDURE — 93010 ELECTROCARDIOGRAM REPORT: CPT

## 2020-01-22 PROCEDURE — 93923 UPR/LXTR ART STDY 3+ LVLS: CPT | Mod: 26

## 2020-01-22 PROCEDURE — 71045 X-RAY EXAM CHEST 1 VIEW: CPT | Mod: 26,76

## 2020-01-22 RX ORDER — MAGNESIUM SULFATE 500 MG/ML
2 VIAL (ML) INJECTION ONCE
Refills: 0 | Status: COMPLETED | OUTPATIENT
Start: 2020-01-22 | End: 2020-01-22

## 2020-01-22 RX ORDER — NITROGLYCERIN 6.5 MG
5 CAPSULE, EXTENDED RELEASE ORAL
Qty: 50 | Refills: 0 | Status: DISCONTINUED | OUTPATIENT
Start: 2020-01-22 | End: 2020-01-26

## 2020-01-22 RX ORDER — NICARDIPINE HYDROCHLORIDE 30 MG/1
5 CAPSULE, EXTENDED RELEASE ORAL
Qty: 40 | Refills: 0 | Status: DISCONTINUED | OUTPATIENT
Start: 2020-01-22 | End: 2020-01-24

## 2020-01-22 RX ORDER — POTASSIUM CHLORIDE 20 MEQ
20 PACKET (EA) ORAL ONCE
Refills: 0 | Status: COMPLETED | OUTPATIENT
Start: 2020-01-22 | End: 2020-01-22

## 2020-01-22 RX ADMIN — PROPOFOL 3.24 MICROGRAM(S)/KG/MIN: 10 INJECTION, EMULSION INTRAVENOUS at 07:58

## 2020-01-22 RX ADMIN — FAMOTIDINE 104 MILLIGRAM(S): 10 INJECTION INTRAVENOUS at 05:29

## 2020-01-22 RX ADMIN — CHLORHEXIDINE GLUCONATE 1 APPLICATION(S): 213 SOLUTION TOPICAL at 05:00

## 2020-01-22 RX ADMIN — Medication 100 MILLIEQUIVALENT(S): at 05:29

## 2020-01-22 RX ADMIN — ENOXAPARIN SODIUM 100 MILLIGRAM(S): 100 INJECTION SUBCUTANEOUS at 17:30

## 2020-01-22 RX ADMIN — ENOXAPARIN SODIUM 100 MILLIGRAM(S): 100 INJECTION SUBCUTANEOUS at 05:29

## 2020-01-22 RX ADMIN — Medication 50 GRAM(S): at 05:28

## 2020-01-22 RX ADMIN — CHLORHEXIDINE GLUCONATE 15 MILLILITER(S): 213 SOLUTION TOPICAL at 17:25

## 2020-01-22 RX ADMIN — PHENYLEPHRINE HYDROCHLORIDE 20.27 MICROGRAM(S)/KG/MIN: 10 INJECTION INTRAVENOUS at 07:58

## 2020-01-22 RX ADMIN — CHLORHEXIDINE GLUCONATE 15 MILLILITER(S): 213 SOLUTION TOPICAL at 05:29

## 2020-01-22 RX ADMIN — FAMOTIDINE 104 MILLIGRAM(S): 10 INJECTION INTRAVENOUS at 18:23

## 2020-01-22 NOTE — PROGRESS NOTE ADULT - SUBJECTIVE AND OBJECTIVE BOX
OPERATIVE PROCEDURE(s):                POD #                       79yMale  SURGEON(s): LEVI Levy  SUBJECTIVE ASSESSMENT:   Vital Signs Last 24 Hrs  T(F): 98.8 (22 Jan 2020 04:00), Max: 98.8 (22 Jan 2020 04:00)  HR: 70 (22 Jan 2020 07:00) (58 - 104)  BP: 107/62 (21 Jan 2020 22:30) (70/41 - 195/79)  BP(mean): 80 (21 Jan 2020 22:30) (51 - 126)  ABP: 110/55 (22 Jan 2020 07:00) (98/47 - 151/66)  ABP(mean): 74 (22 Jan 2020 07:00)  RR: 18 (22 Jan 2020 07:00) (3 - 819)  SpO2: 98% (22 Jan 2020 07:00) (59% - 100%)  CVP(mm Hg): --  CVP(cm H2O): --  CO: --  CI: --  PA: --  SVR: --  Mode: AC/ CMV (Assist Control/ Continuous Mandatory Ventilation)  RR (machine): 18  TV (machine): 500  FiO2: 50  PEEP: 5  MAP: 11    I&O's Detail    21 Jan 2020 07:01  -  22 Jan 2020 07:00  --------------------------------------------------------  IN:    dexmedetomidine Infusion: 8 mL    IV PiggyBack: 200 mL    Oral Fluid: 240 mL    phenylephrine   Infusion: 127 mL    propofol Infusion: 142 mL    sodium chloride 0.9%.: 532.5 mL  Total IN: 1249.5 mL    OUT:    Indwelling Catheter - Urethral: 995 mL  Total OUT: 995 mL        Net:   I&O's Detail    20 Jan 2020 07:01  -  21 Jan 2020 07:00  --------------------------------------------------------  Total NET: -190 mL      21 Jan 2020 07:01  -  22 Jan 2020 07:00  --------------------------------------------------------  Total NET: 254.5 mL        CAPILLARY BLOOD GLUCOSE      POCT Blood Glucose.: 144 mg/dL (21 Jan 2020 10:42)    Physical Exam:  General: NAD; A&Ox3/Patient is intubated and sedated  Cardiac: S1/S2, RRR, no murmur, no rubs  Lungs: unlabored respirations, CTA b/l, no wheeze, no rales, no crackles  Abdomen: Soft/NT/ND; positive bowel sounds x 4  Sternum: Intact, no click, incision healing well with no drainage  Incisions: Incisions clean/dry/intact  Extremities: No edema b/l lower extremities; good capillary refill; no cyanosis; palpable 1+ pedal pulses b/l    Central Venous Catheter: Yes[]  No[] , If Yes indication:           Day #  Mora Catheter: Yes  [] , No  [] , If yes indication:                      Day #  NGT: Yes [] No [] ,    If Yes Placement:                                     Day #  EPICARDIAL WIRES:  [] YES [] NO                                              Day #  BOWEL MOVEMENT:  [] YES [] NO, If No, Timing since last BM:      Day #  CHEST TUBE(Left/Right):  [] YES [] NO, If yes -  AIR LEAKS:  [] YES [] NO        LABS:                        10.1<L>  10.08 )-----------( 160      ( 22 Jan 2020 00:50 )             33.2<L>                        10.9<L>  10.57 )-----------( 188      ( 21 Jan 2020 12:20 )             37.3<L>    01-22    148<H>  |  107  |  18  ----------------------------<  114<H>  3.9   |  30  |  0.7  01-21    148<H>  |  106  |  15  ----------------------------<  140<H>  4.5   |  31  |  0.7    Ca    8.3<L>      22 Jan 2020 00:50  Phos  3.4     01-20  Mg     1.7     01-22    TPro  5.7<L> [6.0 - 8.0]  /  Alb  3.1<L> [3.5 - 5.2]  /  TBili  1.4<H> [0.2 - 1.2]  /  DBili  x   /  AST  26 [0 - 41]  /  ALT  21 [0 - 41]  /  AlkPhos  122<H> [30 - 115]  01-21    PT/INR - ( 21 Jan 2020 12:20 )   PT: ;   INR: 1.32 ratio         PTT - ( 21 Jan 2020 12:20 )  PTT:36.2 sec    ABG - ( 22 Jan 2020 03:40 )  pH: 7.52  /  pCO2: 42    /  pO2: 70    / HCO3: 34    / Base Excess: 10.6  /  SaO2: 96    /  LA: 1.0              RADIOLOGY & ADDITIONAL TESTS:  CXR:  EKG:  MEDICATIONS  (STANDING):  aspirin  chewable 81 milliGRAM(s) Oral daily  atorvastatin 80 milliGRAM(s) Oral at bedtime  chlorhexidine 0.12% Liquid 15 milliLiter(s) Oral Mucosa every 12 hours  chlorhexidine 4% Liquid 1 Application(s) Topical <User Schedule>  dexMEDEtomidine Infusion 0.2 MICROgram(s)/kG/Hr (5.405 mL/Hr) IV Continuous <Continuous>  DULoxetine 30 milliGRAM(s) Oral daily  enoxaparin Injectable 100 milliGRAM(s) SubCutaneous two times a day  famotidine  IVPB 20 milliGRAM(s) IV Intermittent two times a day  losartan 100 milliGRAM(s) Oral daily  phenylephrine    Infusion 1 MICROgram(s)/kG/Min (20.269 mL/Hr) IV Continuous <Continuous>  propofol Infusion 5 MICROgram(s)/kG/Min (3.243 mL/Hr) IV Continuous <Continuous>  sodium chloride 0.9%. 1000 milliLiter(s) (50 mL/Hr) IV Continuous <Continuous>    MEDICATIONS  (PRN):    HEPARIN:  [] YES [] NO  Dose: XX UNITS/HR UNITS Q8H  LOVENOX:[] YES [] NO  Dose: XX mg Q24H  COUMADIN: []  YES [] NO  Dose: XX mg  Q24H  SCD's: YES b/l  GI Prophylaxis: Protonix [], Pepcid [], None [], (Contra-indication:.....)    Post-Op Beta-Blockers: Yes [], No[], If No, then contraindication:  Post-Op Aspirin: Yes [],  No [], If No, then contraindication:  Post-Op Statin: Yes [], No[], If No, then contraindication:  Allergies    No Known Allergies    Intolerances      Ambulation/Activity Status:    Assessment/Plan:  79y Male status-post .....  - Case and plan discussed with CTU Intensivist and CT Surgeon - Dr. Shaw/Rodríguez/Samir   - Continue CTU supportive care    - Continue DVT/GI prophylaxis  - Incentive Spirometry 10 times an hour  - Continue to advance physical activity as tolerated and continue PT/OT as directed  1. CAD: Continue ASA, statin, BB  2. HTN:   3. A. Fib:   4. COPD/Hypoxia:   5. DM/Glucose Control:     Social Service Disposition: OPERATIVE PROCEDURE(s):  pre-op cabg              POD #                       79yMale  SURGEON(s): LEVI Levy  SUBJECTIVE ASSESSMENT: pt seen and examined.   Vital Signs Last 24 Hrs  T(F): 98.8 (22 Jan 2020 04:00), Max: 98.8 (22 Jan 2020 04:00)  HR: 70 (22 Jan 2020 07:00) (58 - 104)  BP: 107/62 (21 Jan 2020 22:30) (70/41 - 195/79)  BP(mean): 80 (21 Jan 2020 22:30) (51 - 126)  ABP: 110/55 (22 Jan 2020 07:00) (98/47 - 151/66)  ABP(mean): 74 (22 Jan 2020 07:00)  RR: 18 (22 Jan 2020 07:00) (3 - 819)  SpO2: 98% (22 Jan 2020 07:00) (59% - 100%)  Mode: AC/ CMV (Assist Control/ Continuous Mandatory Ventilation)  RR (machine): 18  TV (machine): 500  FiO2: 50  PEEP: 5  MAP: 11    I&O's Detail    21 Jan 2020 07:01  -  22 Jan 2020 07:00  --------------------------------------------------------  IN:    dexmedetomidine Infusion: 8 mL    IV PiggyBack: 200 mL    Oral Fluid: 240 mL    phenylephrine   Infusion: 127 mL    propofol Infusion: 142 mL    sodium chloride 0.9%.: 532.5 mL  Total IN: 1249.5 mL    OUT:    Indwelling Catheter - Urethral: 995 mL  Total OUT: 995 mL        Net:   I&O's Detail    20 Jan 2020 07:01  -  21 Jan 2020 07:00  --------------------------------------------------------  Total NET: -190 mL      21 Jan 2020 07:01  -  22 Jan 2020 07:00  --------------------------------------------------------  Total NET: 254.5 mL        CAPILLARY BLOOD GLUCOSE      POCT Blood Glucose.: 144 mg/dL (21 Jan 2020 10:42)      Physical Exam:  General: intubated, following commands  Cardiac: S1/S2, RRR, no murmur, no rubs  Lungs: unlabored respirations, CTA b/l, no wheeze, no rales, no crackles  Abdomen: Soft/NT/ND; positive bowel sounds x 4  Extremities: No edema b/l lower extremities; good capillary refill; no cyanosis; palpable 1+ pedal pulses b/l          LABS:                        10.1<L>  10.08 )-----------( 160      ( 22 Jan 2020 00:50 )             33.2<L>                        10.9<L>  10.57 )-----------( 188      ( 21 Jan 2020 12:20 )             37.3<L>    01-22    148<H>  |  107  |  18  ----------------------------<  114<H>  3.9   |  30  |  0.7  01-21    148<H>  |  106  |  15  ----------------------------<  140<H>  4.5   |  31  |  0.7    Ca    8.3<L>      22 Jan 2020 00:50  Phos  3.4     01-20  Mg     1.7     01-22    TPro  5.7<L> [6.0 - 8.0]  /  Alb  3.1<L> [3.5 - 5.2]  /  TBili  1.4<H> [0.2 - 1.2]  /  DBili  x   /  AST  26 [0 - 41]  /  ALT  21 [0 - 41]  /  AlkPhos  122<H> [30 - 115]  01-21    PT/INR - ( 21 Jan 2020 12:20 )   PT: ;   INR: 1.32 ratio         PTT - ( 21 Jan 2020 12:20 )  PTT:36.2 sec    ABG - ( 22 Jan 2020 03:40 )  pH: 7.52  /  pCO2: 42    /  pO2: 70    / HCO3: 34    / Base Excess: 10.6  /  SaO2: 96    /  LA: 1.0              RADIOLOGY & ADDITIONAL TESTS:  CXR: < from: Xray Chest 1 View- PORTABLE-Routine (01.22.20 @ 03:58) >  Impression:      Bilateral opacifications with minimal improvement.    Follow-up as needed.    < end of copied text >    EKG: < from: 12 Lead ECG (01.22.20 @ 08:11) >    Ventricular Rate 74 BPM    Atrial Rate 384 BPM    QRS Duration 100 ms    Q-T Interval 444 ms    QTC Calculation(Bezet) 492 ms    R Axis 2 degrees    T Axis -9 degrees    Diagnosis Line Atrial fibrillation  Nonspecific ST and T wave abnormality  Prolonged QT  Abnormal ECG    < end of copied text >    MEDICATIONS  (STANDING):  aspirin  chewable 81 milliGRAM(s) Oral daily  atorvastatin 80 milliGRAM(s) Oral at bedtime  chlorhexidine 0.12% Liquid 15 milliLiter(s) Oral Mucosa every 12 hours  chlorhexidine 4% Liquid 1 Application(s) Topical <User Schedule>  dexMEDEtomidine Infusion 0.2 MICROgram(s)/kG/Hr (5.405 mL/Hr) IV Continuous <Continuous>  DULoxetine 30 milliGRAM(s) Oral daily  enoxaparin Injectable 100 milliGRAM(s) SubCutaneous two times a day  famotidine  IVPB 20 milliGRAM(s) IV Intermittent two times a day  losartan 100 milliGRAM(s) Oral daily  phenylephrine    Infusion 1 MICROgram(s)/kG/Min (20.269 mL/Hr) IV Continuous <Continuous>  propofol Infusion 5 MICROgram(s)/kG/Min (3.243 mL/Hr) IV Continuous <Continuous>  sodium chloride 0.9%. 1000 milliLiter(s) (50 mL/Hr) IV Continuous <Continuous>    MEDICATIONS  (PRN):    HEPARIN:  [] YES [x] NO  Dose: XX UNITS/HR UNITS Q8H  LOVENOX:[x] YES [] NO  Dose: 100 mg Q12H  COUMADIN: []  YES [x] NO  Dose: XX mg  Q24H  SCD's: YES b/l  GI Prophylaxis: Protonix [], Pepcid [x], None [], (Contra-indication:.....)      Allergies    No Known Allergies    Intolerances      Ambulation/Activity Status: bedrest    Assessment/Plan:  79y Male pre-op cabg, patient still intubated, but following commands today  - Case and plan discussed with CTU Intensivist and CT Surgeon - Dr. Shaw/Rodríguez/Samir   - Continue CTU supportive care    - Continue DVT/GI prophylaxis  - Incentive Spirometry 10 times an hour  - Continue to advance physical activity as tolerated and continue PT/OT as directed  -pre-op work up in progress  - f/u neuro- ct head prelim neg, eeg pending, mri brain and neck w/o bethany

## 2020-01-22 NOTE — PROGRESS NOTE ADULT - SUBJECTIVE AND OBJECTIVE BOX
CTU Attending Progress Daily Note     22 Jan 2020 07:59    Patient seen as pre-op critical care follow-up    HPI:  78 yo M with hx of HTN, A.fib (Eliquis), CHF, HLD brought in by EMS post cardiac arrest. As per wife at bedside, patient at the basement watching movie and was doing fine. Few mins later, patient came up and told the wife that he wasn't feeling right and that he felt funny. Then he crashed. Wife called the EMS who arrived 5 mins later. Upon EMS arrival, patient was found to be in PEA then asystole, s/p resuscitation for ~ 10 mins and ROSC achieved after 1 round of compression and epi. While on the way to hospital, patient had another cardiac arrest on the ambulant s/p CPR and ROSC achieved few mins later. As per family, patient was doing welll and at baseline prior to the episode. As baseline patient is fully functional. Denied any recent infection, recent hospitalization, recent ED visit, fever, chills.     In ED, patient was found to bradycardic and hypotensive. s/p 1 dose of atropine and patient was started on low dose Levophed with improvement in HR and BP. (12 Jan 2020 22:13)    See preop testing chart H&P    Interval event for past 24 hr:  MED IVORY  79y had no event.     Current Complains:  MED IVORY has no new complaints    REVIEW OF SYSTEMS:  CONSTITUTIONAL:  [-] weakness, [-] fevers, [-] chills  EYES/ENT: [-] visual changes, [-] vertigo, [-] throat pain   NECK: [-] pain, [-] stiffness  RESPIRATORY: [-] cough, [-] wheezing, [-] hemoptysis, [-] shortness of breath  CARDIOVASCULAR: [-] chest pain, [-] palpitations, [-] orthopnea  GASTROINTESTINAL:    [-]abdominal pain, [-] nausea, [-] vomiting, [-] hematemesis, [-] diarrhea, [-] constipation, [-] melena, [-] hematochezia.  GENITOURINARY: [-] dysuria, [-] frequency, [-] hematuria  NEUROLOGICAL: [-] numbness, [-] weakness  SKIN: [-] itching, [-] burning, [-] rashes, [-] lesions   All other review of systems is negative unless indicated above.    [  ] Unable to assess ROS because :    OBJECTIVE:  ICU Vital Signs Last 24 Hrs  T(C): 37.1 (22 Jan 2020 04:00), Max: 37.1 (22 Jan 2020 04:00)  T(F): 98.8 (22 Jan 2020 04:00), Max: 98.8 (22 Jan 2020 04:00)  HR: 70 (22 Jan 2020 07:00) (58 - 104)  BP: 107/62 (21 Jan 2020 22:30) (70/41 - 195/79)  BP(mean): 80 (21 Jan 2020 22:30) (51 - 126)  ABP: 110/55 (22 Jan 2020 07:00) (98/47 - 151/66)  ABP(mean): 74 (22 Jan 2020 07:00) (60 - 92)  RR: 18 (22 Jan 2020 07:00) (3 - 819)  SpO2: 98% (22 Jan 2020 07:00) (59% - 100%)      I&O's Summary    21 Jan 2020 07:01  -  22 Jan 2020 07:00  --------------------------------------------------------  IN: 1249.5 mL / OUT: 995 mL / NET: 254.5 mL      Adult Advanced Hemodynamics Last 24 Hrs  CVP(mm Hg): --  CVP(cm H2O): --  CO: --  CI: --  PA: --  PA(mean): --  PCWP: --  SVR: --  SVRI: --  PVR: --  PVRI: --  Mode: AC/ CMV (Assist Control/ Continuous Mandatory Ventilation)  RR (machine): 18  TV (machine): 500  FiO2: 50  PEEP: 5  ITime: 1  MAP: 11  PIP: 23      PHYSICAL EXAM:  General: WN/WD NAD    HEENT:     [+] NCAT  [+] EOMI  [-] Conjuctival edema   [-] Icterus   [-] Thrush   [-] ETT  [-] NGT/OGT    Neck:         [+] FROM   [-] JVD     [-] Nodes     [-] Masses    [+] Mid-line trachea    [-] Tracheostomy    Chest:        [-] SubQ emphysema    Lungs:          [+] CTA   [-] Rhonchi   [-] Rales    [-] Wheezing    [-] Decreased BS    [-] Dullness R L    Cardiac:       [+] S1 [+] S2    [+] RRR   [-] Irregular   [-] S3   [-] S4    [-] Murmurs    [-] Rub    Abdomen:    [+] BS    [+] Soft    [+] Non-tender     [-] Distended    [-] Organomegaly  [-] PEG    Extremities:   [-] Cyanosis U/L   [-] Clubbing  U/L  [-] LE/UE Edema   [+] Capillary refill    [+] Pulses     Neuro:        [+] Awake   [+]  Alert   [-] Confused   [-] Lethargic   [-] Sedated   [-] Generalized Weakness    Skin:        [-] Rashes    [-] Erythema    [+] IV sites intact   [-] Sacral decubitus      LINES:    CAPILLARY BLOOD GLUCOSE      POCT Blood Glucose.: 144 mg/dL (21 Jan 2020 10:42)    CAPILLARY BLOOD GLUCOSE      POCT Blood Glucose.: 144 mg/dL (21 Jan 2020 10:42)      HOSPITAL MEDICATIONS:  MEDICATIONS  (STANDING):  aspirin  chewable 81 milliGRAM(s) Oral daily  atorvastatin 80 milliGRAM(s) Oral at bedtime  chlorhexidine 0.12% Liquid 15 milliLiter(s) Oral Mucosa every 12 hours  chlorhexidine 4% Liquid 1 Application(s) Topical <User Schedule>  dexMEDEtomidine Infusion 0.2 MICROgram(s)/kG/Hr (5.405 mL/Hr) IV Continuous <Continuous>  DULoxetine 30 milliGRAM(s) Oral daily  enoxaparin Injectable 100 milliGRAM(s) SubCutaneous two times a day  famotidine  IVPB 20 milliGRAM(s) IV Intermittent two times a day  losartan 100 milliGRAM(s) Oral daily  phenylephrine    Infusion 1 MICROgram(s)/kG/Min (20.269 mL/Hr) IV Continuous <Continuous>  propofol Infusion 5 MICROgram(s)/kG/Min (3.243 mL/Hr) IV Continuous <Continuous>  sodium chloride 0.9%. 1000 milliLiter(s) (50 mL/Hr) IV Continuous <Continuous>    MEDICATIONS  (PRN):      LABS:  ABG - ( 22 Jan 2020 03:40 )  pH, Arterial: 7.52  pH, Blood: x     /  pCO2: 42    /  pO2: 70    / HCO3: 34    / Base Excess: 10.6  /  SaO2: 96                                      10.1   10.08 )-----------( 160      ( 22 Jan 2020 00:50 )             33.2     01-22    148<H>  |  107  |  18  ----------------------------<  114<H>  3.9   |  30  |  0.7    Ca    8.3<L>      22 Jan 2020 00:50  Mg     1.7     01-22    TPro  5.7<L>  /  Alb  3.1<L>  /  TBili  1.4<H>  /  DBili  x   /  AST  26  /  ALT  21  /  AlkPhos  122<H>  01-21    PT/INR - ( 21 Jan 2020 12:20 )   PT: 15.10 sec;   INR: 1.32 ratio         PTT - ( 21 Jan 2020 12:20 )  PTT:36.2 sec        RADIOLOGY:  Reviewed and interpreted by me  CXR from 01-22-20 shows [+] mild congestion, [-] pneumothorax, [-] R/L effusion, [-] cardiomegaly,   NGT in place, S-G Catheter in place, R/L TLC in place, R/L Chest Tubes in place    ECG:  Reviewed and interpreted by me:   QTC:    preop workup results    echo:    carotids:    CT chest:    UA:    MRSA nasal swab:    A1C:    Verify Now:      Assessment:      PAST MEDICAL & SURGICAL HISTORY:  Atrial fibrillation  Neuropathy  HLD (hyperlipidemia)  HTN (hypertension)  History of cholecystectomy  History of cholecystectomy      PLAN:  OR planning per CTS  Pulm: Encourage coughing, deep breathing and use of incentive spirometry.   Cardio: Monitor telemetry/alarms. Continue cardiac meds  GI: Tolerating diet. Continue stool softeners. Continue GI prophylaxis  Renal: monitor urine output, supplement electrolytes as needed  Vasc: Heparin SC/SCDs for DVT prophylaxis  Heme: Monitor H/H.   ID: Off antibiotics. Stable.  Endocrine: Monitor finger stick blood sugar and control hyperglycemia with insulin  Physical Therapy: OOB/ambulate        Discussed with Cardiothoracic Team at AM rounds. CTU Attending Progress Daily Note     22 Jan 2020 07:59    Patient seen as pre-op critical care follow-up    HPI:  78 yo M with hx of HTN, A.fib (Eliquis), CHF, HLD brought in by EMS post cardiac arrest. As per wife at bedside, patient at the basement watching movie and was doing fine. Few mins later, patient came up and told the wife that he wasn't feeling right and that he felt funny. Then he crashed. Wife called the EMS who arrived 5 mins later. Upon EMS arrival, patient was found to be in PEA then asystole, s/p resuscitation for ~ 10 mins and ROSC achieved after 1 round of compression and epi. While on the way to hospital, patient had another cardiac arrest on the ambulant s/p CPR and ROSC achieved few mins later. As per family, patient was doing welll and at baseline prior to the episode. As baseline patient is fully functional. Denied any recent infection, recent hospitalization, recent ED visit, fever, chills.     In ED, patient was found to bradycardic and hypotensive. s/p 1 dose of atropine and patient was started on low dose Levophed with improvement in HR and BP. (12 Jan 2020 22:13)    See preop testing chart H&P    Interval event for past 24 hr:  MED IVORY  79y was intubated and placed on mechanical ventilation for hypoxemia and unresponsiveness    REVIEW OF SYSTEMS:  [x  ] Unable to assess ROS because : sedated    OBJECTIVE:  ICU Vital Signs Last 24 Hrs  T(C): 37.1 (22 Jan 2020 04:00), Max: 37.1 (22 Jan 2020 04:00)  T(F): 98.8 (22 Jan 2020 04:00), Max: 98.8 (22 Jan 2020 04:00)  HR: 70 (22 Jan 2020 07:00) (58 - 104)  BP: 107/62 (21 Jan 2020 22:30) (70/41 - 195/79)  BP(mean): 80 (21 Jan 2020 22:30) (51 - 126)  ABP: 110/55 (22 Jan 2020 07:00) (98/47 - 151/66)  ABP(mean): 74 (22 Jan 2020 07:00) (60 - 92)  RR: 18 (22 Jan 2020 07:00) (3 - 819)  SpO2: 98% (22 Jan 2020 07:00) (59% - 100%)      I&O's Summary    21 Jan 2020 07:01  -  22 Jan 2020 07:00  --------------------------------------------------------  IN: 1249.5 mL / OUT: 995 mL / NET: 254.5 mL      Adult Advanced Hemodynamics Last 24 Hrs  CVP(mm Hg): --  CVP(cm H2O): --  CO: --  CI: --  PA: --  PA(mean): --  PCWP: --  SVR: --  SVRI: --  PVR: --  PVRI: --  Mode: AC/ CMV (Assist Control/ Continuous Mandatory Ventilation)  RR (machine): 18  TV (machine): 500  FiO2: 50  PEEP: 5  ITime: 1  MAP: 11  PIP: 23      PHYSICAL EXAM:  General: WN/WD NAD    HEENT:     [+] NCAT  [+] EOMI  [-] Conjuctival edema   [-] Icterus   [-] Thrush   [+] ETT  [++-] OGT    Neck:         [+] FROM   [-] JVD     [-] Nodes     [-] Masses    [+] Mid-line trachea    [-] Tracheostomy    Chest:        [-] SubQ emphysema    Lungs:          [+] CTA   [-] Rhonchi   [-] Rales    [-] Wheezing    [-] Decreased BS    [-] Dullness R L    Cardiac:       [+] S1 [+] S2    [+] RRR   [-] Irregular   [-] S3   [-] S4    [-] Murmurs    [-] Rub    Abdomen:    [+] BS    [+] Soft    [+] Non-tender     [-] Distended    [-] Organomegaly  [-] PEG    Extremities:   [-] Cyanosis U/L   [-] Clubbing  U/L  [-] LE/UE Edema   [+] Capillary refill    [+] Pulses     Neuro:          [+] Sedated       Skin:        [-] Rashes    [-] Erythema    [+] IV sites intact   [-] Sacral decubitus      LINES: midline    CAPILLARY BLOOD GLUCOSE      POCT Blood Glucose.: 144 mg/dL (21 Jan 2020 10:42)    CAPILLARY BLOOD GLUCOSE      POCT Blood Glucose.: 144 mg/dL (21 Jan 2020 10:42)      HOSPITAL MEDICATIONS:  MEDICATIONS  (STANDING):  aspirin  chewable 81 milliGRAM(s) Oral daily  atorvastatin 80 milliGRAM(s) Oral at bedtime  chlorhexidine 0.12% Liquid 15 milliLiter(s) Oral Mucosa every 12 hours  chlorhexidine 4% Liquid 1 Application(s) Topical <User Schedule>  dexMEDEtomidine Infusion 0.2 MICROgram(s)/kG/Hr (5.405 mL/Hr) IV Continuous <Continuous>  DULoxetine 30 milliGRAM(s) Oral daily  enoxaparin Injectable 100 milliGRAM(s) SubCutaneous two times a day  famotidine  IVPB 20 milliGRAM(s) IV Intermittent two times a day  losartan 100 milliGRAM(s) Oral daily  phenylephrine    Infusion 1 MICROgram(s)/kG/Min (20.269 mL/Hr) IV Continuous <Continuous>  propofol Infusion 5 MICROgram(s)/kG/Min (3.243 mL/Hr) IV Continuous <Continuous>  sodium chloride 0.9%. 1000 milliLiter(s) (50 mL/Hr) IV Continuous <Continuous>    MEDICATIONS  (PRN):      LABS:  ABG - ( 22 Jan 2020 03:40 )  pH, Arterial: 7.52  pH, Blood: x     /  pCO2: 42    /  pO2: 70    / HCO3: 34    / Base Excess: 10.6  /  SaO2: 96                                      10.1   10.08 )-----------( 160      ( 22 Jan 2020 00:50 )             33.2     01-22    148<H>  |  107  |  18  ----------------------------<  114<H>  3.9   |  30  |  0.7    Ca    8.3<L>      22 Jan 2020 00:50  Mg     1.7     01-22    TPro  5.7<L>  /  Alb  3.1<L>  /  TBili  1.4<H>  /  DBili  x   /  AST  26  /  ALT  21  /  AlkPhos  122<H>  01-21    PT/INR - ( 21 Jan 2020 12:20 )   PT: 15.10 sec;   INR: 1.32 ratio         PTT - ( 21 Jan 2020 12:20 )  PTT:36.2 sec        RADIOLOGY:  Reviewed and interpreted by me  CXR from 01-22-20 shows [+] mild congestion, [-] pneumothorax, [-] R/L effusion, [-] cardiomegaly,       ECG:  Reviewed and interpreted by me: MADDY [email or web address] 74  QTC:c 492    Assessment:  CAD preop CABG  Acute hypoxemic respiratory failure  Altered mental status  SP stroke code yesterday    PAST MEDICAL & SURGICAL HISTORY:  Atrial fibrillation  Neuropathy  HLD (hyperlipidemia)  HTN (hypertension)  History of cholecystectomy  History of cholecystectomy      PLAN:  Neuro: SAT after MRI  Pulm: SBT  Cardio: Monitor telemetry/alarms. Continue cardiac meds  GI: OGT diet. Continue stool softeners. Continue GI prophylaxis  Renal: monitor urine output, supplement electrolytes as needed  Vasc: Lovenox SC/SCDs for DVT prophylaxis  Heme: Monitor H/H.   ID: Off antibiotics. Stable.  Endocrine: Monitor finger stick blood sugar and control hyperglycemia with insulin          Discussed with Cardiothoracic Team at AM rounds. CTU Attending Progress Daily Note     22 Jan 2020 07:59    Patient seen as pre-op critical care follow-up    HPI:  78 yo M with hx of HTN, A.fib (Eliquis), CHF, HLD brought in by EMS post cardiac arrest. As per wife at bedside, patient at the basement watching movie and was doing fine. Few mins later, patient came up and told the wife that he wasn't feeling right and that he felt funny. Then he crashed. Wife called the EMS who arrived 5 mins later. Upon EMS arrival, patient was found to be in PEA then asystole, s/p resuscitation for ~ 10 mins and ROSC achieved after 1 round of compression and epi. While on the way to hospital, patient had another cardiac arrest on the ambulant s/p CPR and ROSC achieved few mins later. As per family, patient was doing welll and at baseline prior to the episode. As baseline patient is fully functional. Denied any recent infection, recent hospitalization, recent ED visit, fever, chills.     In ED, patient was found to bradycardic and hypotensive. s/p 1 dose of atropine and patient was started on low dose Levophed with improvement in HR and BP. (12 Jan 2020 22:13)    See preop testing chart H&P    Interval event for past 24 hr:  MED IVORY  79y was intubated and placed on mechanical ventilation for hypoxemia and unresponsiveness    REVIEW OF SYSTEMS:  [x  ] Unable to assess ROS because : sedated    OBJECTIVE:  ICU Vital Signs Last 24 Hrs  T(C): 37.1 (22 Jan 2020 04:00), Max: 37.1 (22 Jan 2020 04:00)  T(F): 98.8 (22 Jan 2020 04:00), Max: 98.8 (22 Jan 2020 04:00)  HR: 70 (22 Jan 2020 07:00) (58 - 104)  BP: 107/62 (21 Jan 2020 22:30) (70/41 - 195/79)  BP(mean): 80 (21 Jan 2020 22:30) (51 - 126)  ABP: 110/55 (22 Jan 2020 07:00) (98/47 - 151/66)  ABP(mean): 74 (22 Jan 2020 07:00) (60 - 92)  RR: 18 (22 Jan 2020 07:00) (3 - 819)  SpO2: 98% (22 Jan 2020 07:00) (59% - 100%)      I&O's Summary    21 Jan 2020 07:01  -  22 Jan 2020 07:00  --------------------------------------------------------  IN: 1249.5 mL / OUT: 995 mL / NET: 254.5 mL      Adult Advanced Hemodynamics Last 24 Hrs  CVP(mm Hg): --  CVP(cm H2O): --  CO: --  CI: --  PA: --  PA(mean): --  PCWP: --  SVR: --  SVRI: --  PVR: --  PVRI: --  Mode: AC/ CMV (Assist Control/ Continuous Mandatory Ventilation)  RR (machine): 18  TV (machine): 500  FiO2: 50  PEEP: 5  ITime: 1  MAP: 11  PIP: 23      PHYSICAL EXAM:  General: WN/WD NAD    HEENT:     [+] NCAT  [+] EOMI  [-] Conjuctival edema   [-] Icterus   [-] Thrush   [+] ETT  [++-] OGT    Neck:         [+] FROM   [-] JVD     [-] Nodes     [-] Masses    [+] Mid-line trachea    [-] Tracheostomy    Chest:        [-] SubQ emphysema    Lungs:          [+] CTA   [-] Rhonchi   [-] Rales    [-] Wheezing    [-] Decreased BS    [-] Dullness R L    Cardiac:       [+] S1 [+] S2    [+] RRR   [-] Irregular   [-] S3   [-] S4    [-] Murmurs    [-] Rub    Abdomen:    [+] BS    [+] Soft    [+] Non-tender     [-] Distended    [-] Organomegaly  [-] PEG    Extremities:   [-] Cyanosis U/L   [-] Clubbing  U/L  [-] LE/UE Edema   [+] Capillary refill    [+] Pulses     Neuro:          [+] Sedated       Skin:        [-] Rashes    [-] Erythema    [+] IV sites intact   [-] Sacral decubitus      LINES: midline    CAPILLARY BLOOD GLUCOSE      POCT Blood Glucose.: 144 mg/dL (21 Jan 2020 10:42)    CAPILLARY BLOOD GLUCOSE      POCT Blood Glucose.: 144 mg/dL (21 Jan 2020 10:42)      HOSPITAL MEDICATIONS:  MEDICATIONS  (STANDING):  aspirin  chewable 81 milliGRAM(s) Oral daily  atorvastatin 80 milliGRAM(s) Oral at bedtime  chlorhexidine 0.12% Liquid 15 milliLiter(s) Oral Mucosa every 12 hours  chlorhexidine 4% Liquid 1 Application(s) Topical <User Schedule>  dexMEDEtomidine Infusion 0.2 MICROgram(s)/kG/Hr (5.405 mL/Hr) IV Continuous <Continuous>  DULoxetine 30 milliGRAM(s) Oral daily  enoxaparin Injectable 100 milliGRAM(s) SubCutaneous two times a day  famotidine  IVPB 20 milliGRAM(s) IV Intermittent two times a day  losartan 100 milliGRAM(s) Oral daily  phenylephrine    Infusion 1 MICROgram(s)/kG/Min (20.269 mL/Hr) IV Continuous <Continuous>  propofol Infusion 5 MICROgram(s)/kG/Min (3.243 mL/Hr) IV Continuous <Continuous>  sodium chloride 0.9%. 1000 milliLiter(s) (50 mL/Hr) IV Continuous <Continuous>    MEDICATIONS  (PRN):      LABS:  ABG - ( 22 Jan 2020 03:40 )  pH, Arterial: 7.52  pH, Blood: x     /  pCO2: 42    /  pO2: 70    / HCO3: 34    / Base Excess: 10.6  /  SaO2: 96                                      10.1   10.08 )-----------( 160      ( 22 Jan 2020 00:50 )             33.2     01-22    148<H>  |  107  |  18  ----------------------------<  114<H>  3.9   |  30  |  0.7    Ca    8.3<L>      22 Jan 2020 00:50  Mg     1.7     01-22    TPro  5.7<L>  /  Alb  3.1<L>  /  TBili  1.4<H>  /  DBili  x   /  AST  26  /  ALT  21  /  AlkPhos  122<H>  01-21    PT/INR - ( 21 Jan 2020 12:20 )   PT: 15.10 sec;   INR: 1.32 ratio         PTT - ( 21 Jan 2020 12:20 )  PTT:36.2 sec        RADIOLOGY:  Reviewed and interpreted by me  CXR from 01-22-20 shows [+] mild congestion, [-] pneumothorax, [-] R/L effusion, [-] cardiomegaly,       ECG:  Reviewed and interpreted by me: MADDY richmond 74  QTC:c 492    Assessment:  CAD preop CABG  Acute hypoxemic respiratory failure  Altered mental status  SP stroke code yesterday    PAST MEDICAL & SURGICAL HISTORY:  Atrial fibrillation  Neuropathy  HLD (hyperlipidemia)  HTN (hypertension)  History of cholecystectomy  History of cholecystectomy      PLAN:  Neuro: SAT after MRI  Pulm: SBT  Cardio: Monitor telemetry/alarms. Continue cardiac meds  GI: OGT diet. Continue stool softeners. Continue GI prophylaxis  Renal: monitor urine output, supplement electrolytes as needed  Vasc: Lovenox SC/SCDs for DVT prophylaxis  Heme: Monitor H/H.   ID: Off antibiotics. Stable.  Endocrine: Monitor finger stick blood sugar and control hyperglycemia with insulin          Discussed with Cardiothoracic Team at AM rounds.    45 minutes of critical care time spent providing medical care for patient's acute illness/conditions that impairs at least one vital organ system and/or poses a high risk of imminent or life threatening deterioration in the patient's condition. It includes time spent evaluating and treating the patient's acute illness as well as time spent reviewing labs, radiology, discussing goals of care with patient and/or patient's family, and discussing the case with a multidisciplinary team in an effort to prevent further life threatening deterioration or end organ damage. This time is independent of any procedures performed.

## 2020-01-23 LAB
ANION GAP SERPL CALC-SCNC: 10 MMOL/L — SIGNIFICANT CHANGE UP (ref 7–14)
ANION GAP SERPL CALC-SCNC: 11 MMOL/L — SIGNIFICANT CHANGE UP (ref 7–14)
BUN SERPL-MCNC: 16 MG/DL — SIGNIFICANT CHANGE UP (ref 10–20)
BUN SERPL-MCNC: 19 MG/DL — SIGNIFICANT CHANGE UP (ref 10–20)
CALCIUM SERPL-MCNC: 7.9 MG/DL — LOW (ref 8.5–10.1)
CALCIUM SERPL-MCNC: 8.1 MG/DL — LOW (ref 8.5–10.1)
CHLORIDE SERPL-SCNC: 110 MMOL/L — SIGNIFICANT CHANGE UP (ref 98–110)
CHLORIDE SERPL-SCNC: 111 MMOL/L — HIGH (ref 98–110)
CO2 SERPL-SCNC: 27 MMOL/L — SIGNIFICANT CHANGE UP (ref 17–32)
CO2 SERPL-SCNC: 29 MMOL/L — SIGNIFICANT CHANGE UP (ref 17–32)
CREAT SERPL-MCNC: 0.7 MG/DL — SIGNIFICANT CHANGE UP (ref 0.7–1.5)
CREAT SERPL-MCNC: 0.8 MG/DL — SIGNIFICANT CHANGE UP (ref 0.7–1.5)
GLUCOSE SERPL-MCNC: 128 MG/DL — HIGH (ref 70–99)
GLUCOSE SERPL-MCNC: 144 MG/DL — HIGH (ref 70–99)
HCT VFR BLD CALC: 30 % — LOW (ref 42–52)
HGB BLD-MCNC: 9.2 G/DL — LOW (ref 14–18)
MAGNESIUM SERPL-MCNC: 2 MG/DL — SIGNIFICANT CHANGE UP (ref 1.8–2.4)
MCHC RBC-ENTMCNC: 30.7 G/DL — LOW (ref 32–37)
MCHC RBC-ENTMCNC: 31.5 PG — HIGH (ref 27–31)
MCV RBC AUTO: 102.7 FL — HIGH (ref 80–94)
NRBC # BLD: 0 /100 WBCS — SIGNIFICANT CHANGE UP (ref 0–0)
PLATELET # BLD AUTO: 145 K/UL — SIGNIFICANT CHANGE UP (ref 130–400)
POTASSIUM SERPL-MCNC: 4 MMOL/L — SIGNIFICANT CHANGE UP (ref 3.5–5)
POTASSIUM SERPL-MCNC: 4 MMOL/L — SIGNIFICANT CHANGE UP (ref 3.5–5)
POTASSIUM SERPL-SCNC: 4 MMOL/L — SIGNIFICANT CHANGE UP (ref 3.5–5)
POTASSIUM SERPL-SCNC: 4 MMOL/L — SIGNIFICANT CHANGE UP (ref 3.5–5)
RBC # BLD: 2.92 M/UL — LOW (ref 4.7–6.1)
RBC # FLD: 15.9 % — HIGH (ref 11.5–14.5)
SODIUM SERPL-SCNC: 148 MMOL/L — HIGH (ref 135–146)
SODIUM SERPL-SCNC: 150 MMOL/L — HIGH (ref 135–146)
WBC # BLD: 9.23 K/UL — SIGNIFICANT CHANGE UP (ref 4.8–10.8)
WBC # FLD AUTO: 9.23 K/UL — SIGNIFICANT CHANGE UP (ref 4.8–10.8)

## 2020-01-23 PROCEDURE — 99233 SBSQ HOSP IP/OBS HIGH 50: CPT

## 2020-01-23 PROCEDURE — 99232 SBSQ HOSP IP/OBS MODERATE 35: CPT

## 2020-01-23 PROCEDURE — 71045 X-RAY EXAM CHEST 1 VIEW: CPT | Mod: 26

## 2020-01-23 PROCEDURE — 93010 ELECTROCARDIOGRAM REPORT: CPT

## 2020-01-23 RX ORDER — THIAMINE MONONITRATE (VIT B1) 100 MG
100 TABLET ORAL DAILY
Refills: 0 | Status: DISCONTINUED | OUTPATIENT
Start: 2020-01-23 | End: 2020-01-31

## 2020-01-23 RX ORDER — METOPROLOL TARTRATE 50 MG
12.5 TABLET ORAL EVERY 12 HOURS
Refills: 0 | Status: DISCONTINUED | OUTPATIENT
Start: 2020-01-23 | End: 2020-01-24

## 2020-01-23 RX ORDER — METOPROLOL TARTRATE 50 MG
12.5 TABLET ORAL ONCE
Refills: 0 | Status: COMPLETED | OUTPATIENT
Start: 2020-01-23 | End: 2020-01-23

## 2020-01-23 RX ORDER — SODIUM CHLORIDE 9 MG/ML
1000 INJECTION, SOLUTION INTRAVENOUS
Refills: 0 | Status: DISCONTINUED | OUTPATIENT
Start: 2020-01-23 | End: 2020-01-24

## 2020-01-23 RX ADMIN — Medication 81 MILLIGRAM(S): at 16:52

## 2020-01-23 RX ADMIN — ENOXAPARIN SODIUM 100 MILLIGRAM(S): 100 INJECTION SUBCUTANEOUS at 06:05

## 2020-01-23 RX ADMIN — ATORVASTATIN CALCIUM 80 MILLIGRAM(S): 80 TABLET, FILM COATED ORAL at 21:01

## 2020-01-23 RX ADMIN — CHLORHEXIDINE GLUCONATE 1 APPLICATION(S): 213 SOLUTION TOPICAL at 06:05

## 2020-01-23 RX ADMIN — ENOXAPARIN SODIUM 100 MILLIGRAM(S): 100 INJECTION SUBCUTANEOUS at 17:05

## 2020-01-23 RX ADMIN — Medication 12.5 MILLIGRAM(S): at 21:46

## 2020-01-23 RX ADMIN — FAMOTIDINE 104 MILLIGRAM(S): 10 INJECTION INTRAVENOUS at 17:05

## 2020-01-23 RX ADMIN — Medication 100 MILLIGRAM(S): at 21:01

## 2020-01-23 RX ADMIN — Medication 12.5 MILLIGRAM(S): at 17:04

## 2020-01-23 RX ADMIN — CHLORHEXIDINE GLUCONATE 15 MILLILITER(S): 213 SOLUTION TOPICAL at 06:05

## 2020-01-23 RX ADMIN — DULOXETINE HYDROCHLORIDE 30 MILLIGRAM(S): 30 CAPSULE, DELAYED RELEASE ORAL at 16:53

## 2020-01-23 RX ADMIN — FAMOTIDINE 104 MILLIGRAM(S): 10 INJECTION INTRAVENOUS at 06:05

## 2020-01-23 RX ADMIN — Medication 1 TABLET(S): at 16:53

## 2020-01-23 NOTE — CONSULT NOTE ADULT - SUBJECTIVE AND OBJECTIVE BOX
VASCULAR SURGERY CONSULT NOTE      HPI:  78 yo M with hx of HTN, A.fib (Eliquis), CHF, HLD brought in by EMS post cardiac arrest. As per wife at bedside, patient at the basement watching movie and was doing fine. Few mins later, patient came up and told the wife that he wasn't feeling right and that he felt funny. Then he crashed. Wife called the EMS who arrived 5 mins later. Upon EMS arrival, patient was found to be in PEA then asystole, s/p resuscitation for ~ 10 mins and ROSC achieved after 1 round of compression and epi. While on the way to hospital, patient had another cardiac arrest on the ambulant s/p CPR and ROSC achieved few mins later. As per family, patient was doing welll and at baseline prior to the episode. As baseline patient is fully functional. Denied any recent infection, recent hospitalization, recent ED visit, fever, chills.     In ED, patient was found to bradycardic and hypotensive. s/p 1 dose of atropine and patient was started on low dose Levophed with improvement in HR and BP. (12 Jan 2020 22:13)        PAST MEDICAL & SURGICAL HISTORY:  Atrial fibrillation  Neuropathy  HLD (hyperlipidemia)  HTN (hypertension)  History of cholecystectomy  History of cholecystectomy    No Known Allergies    Home Medications:  carvedilol 12.5 mg oral tablet: 1 tab(s) orally 2 times a day (16 Jan 2020 12:31)  clonazePAM 1 mg oral tablet: 1 tab(s) orally 3 times a day (16 Jan 2020 12:31)  DULoxetine 30 mg oral delayed release capsule: 1 cap(s) orally once a day (16 Jan 2020 12:31)  Eliquis 5 mg oral tablet: 1 tab(s) orally 2 times a day (16 Jan 2020 12:31)  furosemide 40 mg oral tablet: 1 tab(s) orally once a day (16 Jan 2020 12:31)  gabapentin 300 mg oral capsule: 1 cap(s) orally 2 times a day (16 Jan 2020 12:31)  omeprazole 20 mg oral delayed release capsule: 1 cap(s) orally once a day (16 Jan 2020 12:31)  potassium chloride 10 mEq oral capsule, extended release: 1 cap(s) orally once a day (16 Jan 2020 12:31)  simvastatin 40 mg oral tablet: 1 tab(s) orally once a day (at bedtime) (16 Jan 2020 12:31)  telmisartan 80 mg oral tablet: 1 tab(s) orally once a day (16 Jan 2020 12:31)  Vitamin D3: cap(s) orally once a day (16 Jan 2020 12:31)    No permtinent family history of PVD    REVIEW OF SYSTEMS:  GENERAL:                                         negative  SKIN:                                                 see HPI  OPTHALMOLOGIC:                          negative  ENMT:                                               negative  RESPIRATORY AND THORAX:        see HPI  CARDIOVASCULAR:                         see HPI  GASTROINTESTINAL:                       negative  NEPHROLOGY:                                  negative  MUSCULOSKELETAL:                       negative  NEUROLOGIC:                                   see HPI  PSYCHIATRIC:                                    negative  HEMATOLOGY/LYMPHATICS:         negative  ENDOCRINE:                                     negative  ALLERGIC/IMMUNOLOGIC:            negative    12 point ROS otherwise normal except as stated in HPI    PHYSICAL EXAM  Vital Signs Last 24 Hrs  T(C): 37.4 (23 Jan 2020 08:00), Max: 37.4 (23 Jan 2020 08:00)  T(F): 99.4 (23 Jan 2020 08:00), Max: 99.4 (23 Jan 2020 08:00)  HR: 87 (23 Jan 2020 10:45) (65 - 91)  BP: 167/80 (23 Jan 2020 08:00) (119/68 - 167/80)  BP(mean): 112 (23 Jan 2020 08:00) (88 - 112)  RR: 33 (23 Jan 2020 10:45) (11 - 40)  SpO2: 98% (23 Jan 2020 10:45) (95% - 100%)    Appearance: Normal	  HEENT:   Normal oral mucosa, PERRL, EOMI	  Neck: Supple, - JVD;  Cardiovascular: Normal S1 S2, No JVD, No murmurs,   Respiratory: Lungs clear to auscultation, No Rales, Rhonchi, Wheezing	  Gastrointestinal:  Soft, Non-tender, positive BS	  Skin: No rashes, No ecchymoses, No cyanosis  Extremities: Normal range of motion, No clubbing, cyanosis. Right upper extremity: ecchymotic at the wrist, swelling, no pulsatile mass   Neurologic: Non-focal  Psychiatry: Pt is alert      PULSES:  Right radial: palpable     MEDICATIONS:   MEDICATIONS  (STANDING):  aspirin  chewable 81 milliGRAM(s) Oral daily  atorvastatin 80 milliGRAM(s) Oral at bedtime  chlorhexidine 0.12% Liquid 15 milliLiter(s) Oral Mucosa every 12 hours  chlorhexidine 4% Liquid 1 Application(s) Topical <User Schedule>  dexMEDEtomidine Infusion 0.2 MICROgram(s)/kG/Hr (5.405 mL/Hr) IV Continuous <Continuous>  dextrose 5%. 1000 milliLiter(s) (50 mL/Hr) IV Continuous <Continuous>  DULoxetine 30 milliGRAM(s) Oral daily  enoxaparin Injectable 100 milliGRAM(s) SubCutaneous two times a day  famotidine  IVPB 20 milliGRAM(s) IV Intermittent two times a day  losartan 100 milliGRAM(s) Oral daily  metoprolol tartrate 12.5 milliGRAM(s) Oral every 12 hours  multivitamin 1 Tablet(s) Oral daily  niCARdipine Infusion 5 mG/Hr (25 mL/Hr) IV Continuous <Continuous>  nitroglycerin  Infusion 5 MICROgram(s)/Min (1.5 mL/Hr) IV Continuous <Continuous>  phenylephrine    Infusion 1 MICROgram(s)/kG/Min (20.269 mL/Hr) IV Continuous <Continuous>  propofol Infusion 5 MICROgram(s)/kG/Min (3.243 mL/Hr) IV Continuous <Continuous>  sodium chloride 0.9%. 1000 milliLiter(s) (50 mL/Hr) IV Continuous <Continuous>  thiamine 100 milliGRAM(s) Oral daily    MEDICATIONS  (PRN):      LAB/STUDIES:                        9.2    9.23  )-----------( 145      ( 23 Jan 2020 03:30 )             30.0     01-23    150<H>  |  111<H>  |  19  ----------------------------<  128<H>  4.0   |  29  |  0.8    Ca    7.9<L>      23 Jan 2020 03:30  Mg     2.0     01-23    TPro  5.7<L>  /  Alb  3.1<L>  /  TBili  1.4<H>  /  DBili  x   /  AST  26  /  ALT  21  /  AlkPhos  122<H>  01-21    PT/INR - ( 21 Jan 2020 12:20 )   PT: 15.10 sec;   INR: 1.32 ratio         PTT - ( 21 Jan 2020 12:20 )  PTT:36.2 sec  LIVER FUNCTIONS - ( 21 Jan 2020 12:20 )  Alb: 3.1 g/dL / Pro: 5.7 g/dL / ALK PHOS: 122 U/L / ALT: 21 U/L / AST: 26 U/L / GGT: x               ABG - ( 23 Jan 2020 04:00 )  pH, Arterial: 7.48  pH, Blood: x     /  pCO2: 44    /  pO2: 94    / HCO3: 32    / Base Excess: 7.8   /  SaO2: 98          IMAGING:    < from: [facility] Duplex Upper Ext Vein Scan, Right (01.22.20 @ 18:30) >  There is a right radial pseudoaneurysm measuring 4.5 x 0.8 cm arising from the radial artery near the bifurcation with the ulnar artery.    The right cephalic vein appears thrombosed.

## 2020-01-23 NOTE — PROGRESS NOTE ADULT - SUBJECTIVE AND OBJECTIVE BOX
OPERATIVE PROCEDURE(s):                POD #                       79yMale  SURGEON(s): LEVI Levy  SUBJECTIVE ASSESSMENT:   Vital Signs Last 24 Hrs  T(F): 98.6 (23 Jan 2020 04:00), Max: 98.6 (23 Jan 2020 04:00)  HR: 83 (23 Jan 2020 07:00) (62 - 91)  BP: 148/83 (22 Jan 2020 16:45) (119/68 - 148/83)  BP(mean): 108 (22 Jan 2020 16:45) (88 - 109)  ABP: 147/64 (23 Jan 2020 07:00) (100/50 - 166/72)  ABP(mean): 88 (23 Jan 2020 07:00)  RR: 30 (23 Jan 2020 07:00) (11 - 40)  SpO2: 97% (23 Jan 2020 07:00) (95% - 100%)  CVP(mm Hg): --  CVP(cm H2O): --  CO: --  CI: --  PA: --  SVR: --  Mode: CPAP with PS  FiO2: 40  PEEP: 5  PS: 5    I&O's Detail    22 Jan 2020 07:01  -  23 Jan 2020 07:00  --------------------------------------------------------  IN:    nitroglycerin  Infusion: 465 mL    phenylephrine   Infusion: 28 mL    propofol Infusion: 94.1 mL    sodium chloride 0.9%.: 780 mL  Total IN: 1367.1 mL    OUT:    Indwelling Catheter - Urethral: 1155 mL    Nasoenteral Tube: 60 mL  Total OUT: 1215 mL        Net:   I&O's Detail    21 Jan 2020 07:01  -  22 Jan 2020 07:00  --------------------------------------------------------  Total NET: 254.5 mL      22 Jan 2020 07:01  -  23 Jan 2020 07:00  --------------------------------------------------------  Total NET: 152.1 mL        CAPILLARY BLOOD GLUCOSE      POCT Blood Glucose.: 97 mg/dL (22 Jan 2020 12:32)    Physical Exam:  General: NAD; A&Ox3/Patient is intubated and sedated  Cardiac: S1/S2, RRR, no murmur, no rubs  Lungs: unlabored respirations, CTA b/l, no wheeze, no rales, no crackles  Abdomen: Soft/NT/ND; positive bowel sounds x 4  Sternum: Intact, no click, incision healing well with no drainage  Incisions: Incisions clean/dry/intact  Extremities: No edema b/l lower extremities; good capillary refill; no cyanosis; palpable 1+ pedal pulses b/l    Central Venous Catheter: Yes[]  No[] , If Yes indication:           Day #  Mora Catheter: Yes  [] , No  [] , If yes indication:                      Day #  NGT: Yes [] No [] ,    If Yes Placement:                                     Day #  EPICARDIAL WIRES:  [] YES [] NO                                              Day #  BOWEL MOVEMENT:  [] YES [] NO, If No, Timing since last BM:      Day #  CHEST TUBE(Left/Right):  [] YES [] NO, If yes -  AIR LEAKS:  [] YES [] NO        LABS:                        9.2<L>  9.23  )-----------( 145      ( 23 Jan 2020 03:30 )             30.0<L>                        10.1<L>  10.08 )-----------( 160      ( 22 Jan 2020 00:50 )             33.2<L>    01-23    150<H>  |  111<H>  |  19  ----------------------------<  128<H>  4.0   |  29  |  0.8  01-22    148<H>  |  107  |  18  ----------------------------<  114<H>  3.9   |  30  |  0.7    Ca    7.9<L>      23 Jan 2020 03:30  Mg     2.0     01-23    TPro  5.7<L> [6.0 - 8.0]  /  Alb  3.1<L> [3.5 - 5.2]  /  TBili  1.4<H> [0.2 - 1.2]  /  DBili  x   /  AST  26 [0 - 41]  /  ALT  21 [0 - 41]  /  AlkPhos  122<H> [30 - 115]  01-21    PT/INR - ( 21 Jan 2020 12:20 )   PT: ;   INR: 1.32 ratio         PTT - ( 21 Jan 2020 12:20 )  PTT:36.2 sec    ABG - ( 23 Jan 2020 04:00 )  pH: 7.48  /  pCO2: 44    /  pO2: 94    / HCO3: 32    / Base Excess: 7.8   /  SaO2: 98    /  LA: 0.9              RADIOLOGY & ADDITIONAL TESTS:  CXR:  EKG:  MEDICATIONS  (STANDING):  aspirin  chewable 81 milliGRAM(s) Oral daily  atorvastatin 80 milliGRAM(s) Oral at bedtime  chlorhexidine 0.12% Liquid 15 milliLiter(s) Oral Mucosa every 12 hours  chlorhexidine 4% Liquid 1 Application(s) Topical <User Schedule>  dexMEDEtomidine Infusion 0.2 MICROgram(s)/kG/Hr (5.405 mL/Hr) IV Continuous <Continuous>  DULoxetine 30 milliGRAM(s) Oral daily  enoxaparin Injectable 100 milliGRAM(s) SubCutaneous two times a day  famotidine  IVPB 20 milliGRAM(s) IV Intermittent two times a day  losartan 100 milliGRAM(s) Oral daily  niCARdipine Infusion 5 mG/Hr (25 mL/Hr) IV Continuous <Continuous>  nitroglycerin  Infusion 5 MICROgram(s)/Min (1.5 mL/Hr) IV Continuous <Continuous>  phenylephrine    Infusion 1 MICROgram(s)/kG/Min (20.269 mL/Hr) IV Continuous <Continuous>  propofol Infusion 5 MICROgram(s)/kG/Min (3.243 mL/Hr) IV Continuous <Continuous>  sodium chloride 0.9%. 1000 milliLiter(s) (50 mL/Hr) IV Continuous <Continuous>    MEDICATIONS  (PRN):    HEPARIN:  [] YES [] NO  Dose: XX UNITS/HR UNITS Q8H  LOVENOX:[] YES [] NO  Dose: XX mg Q24H  COUMADIN: []  YES [] NO  Dose: XX mg  Q24H  SCD's: YES b/l  GI Prophylaxis: Protonix [], Pepcid [], None [], (Contra-indication:.....)    Post-Op Beta-Blockers: Yes [], No[], If No, then contraindication:  Post-Op Aspirin: Yes [],  No [], If No, then contraindication:  Post-Op Statin: Yes [], No[], If No, then contraindication:  Allergies    No Known Allergies    Intolerances      Ambulation/Activity Status:    Assessment/Plan:  79y Male status-post .....  - Case and plan discussed with CTU Intensivist and CT Surgeon - Dr. Shaw/Rodríguez/Samir   - Continue CTU supportive care    - Continue DVT/GI prophylaxis  - Incentive Spirometry 10 times an hour  - Continue to advance physical activity as tolerated and continue PT/OT as directed  1. CAD: Continue ASA, statin, BB  2. HTN:   3. A. Fib:   4. COPD/Hypoxia:   5. DM/Glucose Control:     Social Service Disposition: OPERATIVE PROCEDURE(s): Pre-op cabg               POD #                       79yMale  SURGEON(s): Dr. Dawson  SUBJECTIVE ASSESSMENT: pt seen and examined. pt extubated last night   Vital Signs Last 24 Hrs  T(F): 98.6 (23 Jan 2020 04:00), Max: 98.6 (23 Jan 2020 04:00)  HR: 83 (23 Jan 2020 07:00) (62 - 91)  BP: 148/83 (22 Jan 2020 16:45) (119/68 - 148/83)  BP(mean): 108 (22 Jan 2020 16:45) (88 - 109)  ABP: 147/64 (23 Jan 2020 07:00) (100/50 - 166/72)  ABP(mean): 88 (23 Jan 2020 07:00)  RR: 30 (23 Jan 2020 07:00) (11 - 40)  SpO2: 97% (23 Jan 2020 07:00) (95% - 100%)  Mode: CPAP with PS  FiO2: 40  PEEP: 5  PS: 5    I&O's Detail    22 Jan 2020 07:01  -  23 Jan 2020 07:00  --------------------------------------------------------  IN:    nitroglycerin  Infusion: 465 mL    phenylephrine   Infusion: 28 mL    propofol Infusion: 94.1 mL    sodium chloride 0.9%.: 780 mL  Total IN: 1367.1 mL    OUT:    Indwelling Catheter - Urethral: 1155 mL    Nasoenteral Tube: 60 mL  Total OUT: 1215 mL        Net:   I&O's Detail    21 Jan 2020 07:01  -  22 Jan 2020 07:00  --------------------------------------------------------  Total NET: 254.5 mL      22 Jan 2020 07:01  -  23 Jan 2020 07:00  --------------------------------------------------------  Total NET: 152.1 mL        CAPILLARY BLOOD GLUCOSE      POCT Blood Glucose.: 97 mg/dL (22 Jan 2020 12:32)      Physical Exam:  General: intubated, following commands  Cardiac: S1/S2, RRR, no murmur, no rubs  Lungs: unlabored respirations, CTA b/l, no wheeze, no rales, no crackles  Abdomen: Soft/NT/ND; positive bowel sounds x 4  Extremities: No edema b/l lower extremities; good capillary refill; no cyanosis; palpable 1+ pedal pulses b/l      LABS:                        9.2<L>  9.23  )-----------( 145      ( 23 Jan 2020 03:30 )             30.0<L>                        10.1<L>  10.08 )-----------( 160      ( 22 Jan 2020 00:50 )             33.2<L>    01-23    150<H>  |  111<H>  |  19  ----------------------------<  128<H>  4.0   |  29  |  0.8  01-22    148<H>  |  107  |  18  ----------------------------<  114<H>  3.9   |  30  |  0.7    Ca    7.9<L>      23 Jan 2020 03:30  Mg     2.0     01-23    TPro  5.7<L> [6.0 - 8.0]  /  Alb  3.1<L> [3.5 - 5.2]  /  TBili  1.4<H> [0.2 - 1.2]  /  DBili  x   /  AST  26 [0 - 41]  /  ALT  21 [0 - 41]  /  AlkPhos  122<H> [30 - 115]  01-21    PT/INR - ( 21 Jan 2020 12:20 )   PT: ;   INR: 1.32 ratio         PTT - ( 21 Jan 2020 12:20 )  PTT:36.2 sec    ABG - ( 23 Jan 2020 04:00 )  pH: 7.48  /  pCO2: 44    /  pO2: 94    / HCO3: 32    / Base Excess: 7.8   /  SaO2: 98    /  LA: 0.9              RADIOLOGY & ADDITIONAL TESTS:  CXR: < from: Xray Chest 1 View- PORTABLE-Routine (01.23.20 @ 04:51) >  Impression:      Bilateral opacifications without difference.    < end of copied text >    < from: MR Head No Cont (01.22.20 @ 14:58) >  IMPRESSION:   No acute infarct, acute intracranial hemorrhage, or mass effect.     < end of copied text >      < from: MR Cervical Spine No Cont (01.22.20 @ 14:53) >  IMPRESSION:  Hyperextension injury at C5/C6 with widening of theanterior intervertebral disc space and fracture of the C5 posterior spinous process. 3 column involvement with disruption of the anterior and posterior longitudinal ligaments as well as the ligamentum flavum. Redemonstrated reactive prevertebral softtissue edema. No evidence of epidural hematoma.    No cord contusion or cord compression.    < end of copied text >      MEDICATIONS  (STANDING):  aspirin  chewable 81 milliGRAM(s) Oral daily  atorvastatin 80 milliGRAM(s) Oral at bedtime  chlorhexidine 0.12% Liquid 15 milliLiter(s) Oral Mucosa every 12 hours  chlorhexidine 4% Liquid 1 Application(s) Topical <User Schedule>  dexMEDEtomidine Infusion 0.2 MICROgram(s)/kG/Hr (5.405 mL/Hr) IV Continuous <Continuous>  DULoxetine 30 milliGRAM(s) Oral daily  enoxaparin Injectable 100 milliGRAM(s) SubCutaneous two times a day  famotidine  IVPB 20 milliGRAM(s) IV Intermittent two times a day  losartan 100 milliGRAM(s) Oral daily  niCARdipine Infusion 5 mG/Hr (25 mL/Hr) IV Continuous <Continuous>  nitroglycerin  Infusion 5 MICROgram(s)/Min (1.5 mL/Hr) IV Continuous <Continuous>  phenylephrine    Infusion 1 MICROgram(s)/kG/Min (20.269 mL/Hr) IV Continuous <Continuous>  propofol Infusion 5 MICROgram(s)/kG/Min (3.243 mL/Hr) IV Continuous <Continuous>  sodium chloride 0.9%. 1000 milliLiter(s) (50 mL/Hr) IV Continuous <Continuous>    MEDICATIONS  (PRN):    HEPARIN:  [] YES [x] NO  Dose: XX UNITS/HR UNITS Q8H  LOVENOX:[x] YES [] NO  Dose: 100 mg Q12H  COUMADIN: []  YES [x] NO  Dose: XX mg  Q24H  SCD's: YES b/l  GI Prophylaxis: Protonix [], Pepcid [x], None [], (Contra-indication:.....)      Allergies    No Known Allergies    Intolerances      Ambulation/Activity Status: ambulate     Assessment/Plan:  79y Male pre-op cabg, patient now extubated.  - Case and plan discussed with CTU Intensivist and CT Surgeon - Dr. Shaw/Rodríguez/Samir   - Continue CTU supportive care    - Continue DVT/GI prophylaxis  - Incentive Spirometry 10 times an hour  - Continue to advance physical activity as tolerated and continue PT/OT as directed  -cad: cont asa, statin, lopressor  - f/u neuro- mri head neg for acute cva, eeg neg for seizure  - f/u neuro sx- pt has c5 spinous process fx, will need neuro sx follow, clearance for activity  - right arm swelling- poss pseudoaneurysm- vascular follow up OPERATIVE PROCEDURE(s): Pre-op cabg               POD #                       79yMale  SURGEON(s): Dr. Dawson  SUBJECTIVE ASSESSMENT: pt seen and examined. pt extubated last night   Vital Signs Last 24 Hrs  T(F): 98.6 (23 Jan 2020 04:00), Max: 98.6 (23 Jan 2020 04:00)  HR: 83 (23 Jan 2020 07:00) (62 - 91)  BP: 148/83 (22 Jan 2020 16:45) (119/68 - 148/83)  BP(mean): 108 (22 Jan 2020 16:45) (88 - 109)  ABP: 147/64 (23 Jan 2020 07:00) (100/50 - 166/72)  ABP(mean): 88 (23 Jan 2020 07:00)  RR: 30 (23 Jan 2020 07:00) (11 - 40)  SpO2: 97% (23 Jan 2020 07:00) (95% - 100%)  Mode: CPAP with PS  FiO2: 40  PEEP: 5  PS: 5    I&O's Detail    22 Jan 2020 07:01  -  23 Jan 2020 07:00  --------------------------------------------------------  IN:    nitroglycerin  Infusion: 465 mL    phenylephrine   Infusion: 28 mL    propofol Infusion: 94.1 mL    sodium chloride 0.9%.: 780 mL  Total IN: 1367.1 mL    OUT:    Indwelling Catheter - Urethral: 1155 mL    Nasoenteral Tube: 60 mL  Total OUT: 1215 mL        Net:   I&O's Detail    21 Jan 2020 07:01  -  22 Jan 2020 07:00  --------------------------------------------------------  Total NET: 254.5 mL      22 Jan 2020 07:01  -  23 Jan 2020 07:00  --------------------------------------------------------  Total NET: 152.1 mL    CAPILLARY BLOOD GLUCOSE    POCT Blood Glucose.: 97 mg/dL (22 Jan 2020 12:32)    Physical Exam:  General: intubated, following commands  Cardiac: S1/S2, RRR, no murmur, no rubs  Lungs: unlabored respirations, CTA b/l, no wheeze, no rales, no crackles  Abdomen: Soft/NT/ND; positive bowel sounds x 4  Extremities: No edema b/l lower extremities; good capillary refill; no cyanosis; palpable 1+ pedal pulses b/l      LABS:                        9.2<L>  9.23  )-----------( 145      ( 23 Jan 2020 03:30 )             30.0<L>                        10.1<L>  10.08 )-----------( 160      ( 22 Jan 2020 00:50 )             33.2<L>    01-23    150<H>  |  111<H>  |  19  ----------------------------<  128<H>  4.0   |  29  |  0.8  01-22    148<H>  |  107  |  18  ----------------------------<  114<H>  3.9   |  30  |  0.7    Ca    7.9<L>      23 Jan 2020 03:30  Mg     2.0     01-23    TPro  5.7<L> [6.0 - 8.0]  /  Alb  3.1<L> [3.5 - 5.2]  /  TBili  1.4<H> [0.2 - 1.2]  /  DBili  x   /  AST  26 [0 - 41]  /  ALT  21 [0 - 41]  /  AlkPhos  122<H> [30 - 115]  01-21    PT/INR - ( 21 Jan 2020 12:20 )   PT: ;   INR: 1.32 ratio         PTT - ( 21 Jan 2020 12:20 )  PTT:36.2 sec    ABG - ( 23 Jan 2020 04:00 )  pH: 7.48  /  pCO2: 44    /  pO2: 94    / HCO3: 32    / Base Excess: 7.8   /  SaO2: 98    /  LA: 0.9      RADIOLOGY & ADDITIONAL TESTS:  CXR: < from: Xray Chest 1 View- PORTABLE-Routine (01.23.20 @ 04:51) >  Impression:      Bilateral opacifications without difference.    < from: MR Head No Cont (01.22.20 @ 14:58) >  IMPRESSION:   No acute infarct, acute intracranial hemorrhage, or mass effect.       < from: MR Cervical Spine No Cont (01.22.20 @ 14:53) >  IMPRESSION:  Hyperextension injury at C5/C6 with widening of theanterior intervertebral disc space and fracture of the C5 posterior spinous process. 3 column involvement with disruption of the anterior and posterior longitudinal ligaments as well as the ligamentum flavum. Redemonstrated reactive prevertebral softtissue edema. No evidence of epidural hematoma.  No cord contusion or cord compression.    MEDICATIONS  (STANDING):  aspirin  chewable 81 milliGRAM(s) Oral daily  atorvastatin 80 milliGRAM(s) Oral at bedtime  chlorhexidine 0.12% Liquid 15 milliLiter(s) Oral Mucosa every 12 hours  chlorhexidine 4% Liquid 1 Application(s) Topical <User Schedule>  dexMEDEtomidine Infusion 0.2 MICROgram(s)/kG/Hr (5.405 mL/Hr) IV Continuous <Continuous>  DULoxetine 30 milliGRAM(s) Oral daily  enoxaparin Injectable 100 milliGRAM(s) SubCutaneous two times a day  famotidine  IVPB 20 milliGRAM(s) IV Intermittent two times a day  losartan 100 milliGRAM(s) Oral daily    MEDICATIONS  (PRN):  HEPARIN:  [] YES [x] NO  Dose: XX UNITS/HR UNITS Q8H  LOVENOX:[x] YES [] NO  Dose: 100 mg Q12H  COUMADIN: []  YES [x] NO  Dose: XX mg  Q24H  SCD's: YES b/l  GI Prophylaxis: Protonix [], Pepcid [x], None [], (Contra-indication:.....)    Allergies  No Known Allergies  Intolerances  Ambulation/Activity Status: ambulate     Assessment/Plan:  79y Male pre-op cabg, patient now extubated.  - Case and plan discussed with CTU Intensivist and CT Surgeon - Dr. Shaw/Rodríguez/Samir   - Continue CTU supportive care    - Continue DVT/GI prophylaxis  - Incentive Spirometry 10 times an hour  - Continue to advance physical activity as tolerated and continue PT/OT as directed  -cad: cont asa, statin, lopressor  - f/u neuro- mri head neg for acute cva, eeg neg for seizure  - f/u neuro sx- pt has c5 spinous process fx, will need neuro sx follow, clearance for activity  - right arm swelling- poss pseudoaneurysm- vascular follow up

## 2020-01-23 NOTE — CHART NOTE - NSCHARTNOTEFT_GEN_A_CORE
Registered Dietitian Follow-Up     Patient Profile Reviewed                           Yes [x]   No []     Nutrition History Previously Obtained        Yes [x]  No []       Pertinent Subjective Information: Pt. was intubated since last follow up, extubated yesterday, no enteral nutrition during intubation. Currently, awaiting SLP recs.      Pertinent Medical Interventions: Acute hypoxemic respiratory failure, extubated yesterday. Altered mental status, resolved. CAD preop CABG.     Diet order: NPO     Anthropometrics:  - Ht. 175.3cm  - Wt. 106.2kg on 1/22 vs. 105.8kg on 1/20 vs. 108.1kg on 1/18, will continue to monitor wt trends during LOS   - %wt change  - BMI 35.2  - IBW 72.3kg     Pertinent Lab Data: (1/23) RBC 2.92, Hg 9.2, Hct 30.0, Na 150, glu 128     Pertinent Meds: Pepcid, Atorvastatin      Physical Findings:  - Appearance: confused, disoriented, 2+ generalized edema  - GI function: no symptoms noted, last BM 1/19  - Tubes: none noted  - Oral/Mouth cavity: NPO  - Skin: ecchymosis      Nutrition Requirements (from RD note on 1/20)   Weight Used: 72.3kg      Estimated Energy Needs    Continue [x]  Adjust [] []  1818-2169kcal (25-30kcal/kg IBW) for BMI >30  Adjusted Energy Recommendations:   kcal/day        Estimated Protein Needs    Continue []  Adjust []   72-86g (1-1.2g/kg IBW) as above  Adjusted Protein Recommendations:   gm/day        Estimated Fluid Needs        Continue [x]  Adjust []  per CCU team   Adjusted Fluid Recommendations:   mL/day     Nutrient Intake: NPO        [] Previous Nutrition Diagnosis:  Inadequate protein energy intake            [x] Ongoing          [] Resolved       Nutrition Intervention: meals and snacks    Rec: Advance diet per SLP recs, add DASH/TLC modifier      Goal/Expected Outcome: In 4 days pt. to advance to solid diet with at least 50-75% PO intake at meals      Indicator/Monitoring: diet order, energy intake, body composition,

## 2020-01-23 NOTE — PROGRESS NOTE ADULT - SUBJECTIVE AND OBJECTIVE BOX
CTU Attending Progress Daily Note     23 Jan 2020 07:47    Patient seen as pre-op critical care follow-up    HPI:  80 yo M with hx of HTN, A.fib (Eliquis), CHF, HLD brought in by EMS post cardiac arrest. As per wife at bedside, patient at the basement watching movie and was doing fine. Few mins later, patient came up and told the wife that he wasn't feeling right and that he felt funny. Then he crashed. Wife called the EMS who arrived 5 mins later. Upon EMS arrival, patient was found to be in PEA then asystole, s/p resuscitation for ~ 10 mins and ROSC achieved after 1 round of compression and epi. While on the way to hospital, patient had another cardiac arrest on the ambulant s/p CPR and ROSC achieved few mins later. As per family, patient was doing welll and at baseline prior to the episode. As baseline patient is fully functional. Denied any recent infection, recent hospitalization, recent ED visit, fever, chills.     In ED, patient was found to bradycardic and hypotensive. s/p 1 dose of atropine and patient was started on low dose Levophed with improvement in HR and BP. (12 Jan 2020 22:13)    See preop testing chart H&P    Interval event for past 24 hr:  MED IVORY  79y had no event.     Current Complains:  MED IVORY has no new complaints    REVIEW OF SYSTEMS:  CONSTITUTIONAL:  [-] weakness, [-] fevers, [-] chills  EYES/ENT: [-] visual changes, [-] vertigo, [-] throat pain   NECK: [-] pain, [-] stiffness  RESPIRATORY: [-] cough, [-] wheezing, [-] hemoptysis, [-] shortness of breath  CARDIOVASCULAR: [-] chest pain, [-] palpitations, [-] orthopnea  GASTROINTESTINAL:    [-]abdominal pain, [-] nausea, [-] vomiting, [-] hematemesis, [-] diarrhea, [-] constipation, [-] melena, [-] hematochezia.  GENITOURINARY: [-] dysuria, [-] frequency, [-] hematuria  NEUROLOGICAL: [-] numbness, [-] weakness  SKIN: [-] itching, [-] burning, [-] rashes, [-] lesions   All other review of systems is negative unless indicated above.    [  ] Unable to assess ROS because :    OBJECTIVE:  ICU Vital Signs Last 24 Hrs  T(C): 37 (23 Jan 2020 04:00), Max: 37 (23 Jan 2020 04:00)  T(F): 98.6 (23 Jan 2020 04:00), Max: 98.6 (23 Jan 2020 04:00)  HR: 83 (23 Jan 2020 07:00) (62 - 91)  BP: 148/83 (22 Jan 2020 16:45) (119/68 - 148/83)  BP(mean): 108 (22 Jan 2020 16:45) (88 - 109)  ABP: 147/64 (23 Jan 2020 07:00) (100/50 - 166/72)  ABP(mean): 88 (23 Jan 2020 07:00) (67 - 101)  RR: 30 (23 Jan 2020 07:00) (11 - 40)  SpO2: 97% (23 Jan 2020 07:00) (95% - 100%)      I&O's Summary    22 Jan 2020 07:01  -  23 Jan 2020 07:00  --------------------------------------------------------  IN: 1367.1 mL / OUT: 1215 mL / NET: 152.1 mL      Adult Advanced Hemodynamics Last 24 Hrs  CVP(mm Hg): --  CVP(cm H2O): --  CO: --  CI: --  PA: --  PA(mean): --  PCWP: --  SVR: --  SVRI: --  PVR: --  PVRI: --  Mode: CPAP with PS  FiO2: 40  PEEP: 5  PS: 5      PHYSICAL EXAM:  General: WN/WD NAD    HEENT:     [+] NCAT  [+] EOMI  [-] Conjuctival edema   [-] Icterus   [-] Thrush   [-] ETT  [-] NGT/OGT    Neck:         [+] FROM   [-] JVD     [-] Nodes     [-] Masses    [+] Mid-line trachea    [-] Tracheostomy    Chest:        [-] SubQ emphysema    Lungs:          [+] CTA   [-] Rhonchi   [-] Rales    [-] Wheezing    [-] Decreased BS    [-] Dullness R L    Cardiac:       [+] S1 [+] S2    [+] RRR   [-] Irregular   [-] S3   [-] S4    [-] Murmurs    [-] Rub    Abdomen:    [+] BS    [+] Soft    [+] Non-tender     [-] Distended    [-] Organomegaly  [-] PEG    Extremities:   [-] Cyanosis U/L   [-] Clubbing  U/L  [-] LE/UE Edema   [+] Capillary refill    [+] Pulses     Neuro:        [+] Awake   [+]  Alert   [-] Confused   [-] Lethargic   [-] Sedated   [-] Generalized Weakness    Skin:        [-] Rashes    [-] Erythema    [+] IV sites intact   [-] Sacral decubitus      LINES:    CAPILLARY BLOOD GLUCOSE      POCT Blood Glucose.: 97 mg/dL (22 Jan 2020 12:32)    CAPILLARY BLOOD GLUCOSE      POCT Blood Glucose.: 97 mg/dL (22 Jan 2020 12:32)      HOSPITAL MEDICATIONS:  MEDICATIONS  (STANDING):  aspirin  chewable 81 milliGRAM(s) Oral daily  atorvastatin 80 milliGRAM(s) Oral at bedtime  chlorhexidine 0.12% Liquid 15 milliLiter(s) Oral Mucosa every 12 hours  chlorhexidine 4% Liquid 1 Application(s) Topical <User Schedule>  dexMEDEtomidine Infusion 0.2 MICROgram(s)/kG/Hr (5.405 mL/Hr) IV Continuous <Continuous>  DULoxetine 30 milliGRAM(s) Oral daily  enoxaparin Injectable 100 milliGRAM(s) SubCutaneous two times a day  famotidine  IVPB 20 milliGRAM(s) IV Intermittent two times a day  losartan 100 milliGRAM(s) Oral daily  niCARdipine Infusion 5 mG/Hr (25 mL/Hr) IV Continuous <Continuous>  nitroglycerin  Infusion 5 MICROgram(s)/Min (1.5 mL/Hr) IV Continuous <Continuous>  phenylephrine    Infusion 1 MICROgram(s)/kG/Min (20.269 mL/Hr) IV Continuous <Continuous>  propofol Infusion 5 MICROgram(s)/kG/Min (3.243 mL/Hr) IV Continuous <Continuous>  sodium chloride 0.9%. 1000 milliLiter(s) (50 mL/Hr) IV Continuous <Continuous>    MEDICATIONS  (PRN):      LABS:  ABG - ( 23 Jan 2020 04:00 )  pH, Arterial: 7.48  pH, Blood: x     /  pCO2: 44    /  pO2: 94    / HCO3: 32    / Base Excess: 7.8   /  SaO2: 98                                      9.2    9.23  )-----------( 145      ( 23 Jan 2020 03:30 )             30.0     01-23    150<H>  |  111<H>  |  19  ----------------------------<  128<H>  4.0   |  29  |  0.8    Ca    7.9<L>      23 Jan 2020 03:30  Mg     2.0     01-23    TPro  5.7<L>  /  Alb  3.1<L>  /  TBili  1.4<H>  /  DBili  x   /  AST  26  /  ALT  21  /  AlkPhos  122<H>  01-21    PT/INR - ( 21 Jan 2020 12:20 )   PT: 15.10 sec;   INR: 1.32 ratio         PTT - ( 21 Jan 2020 12:20 )  PTT:36.2 sec        RADIOLOGY:  Reviewed and interpreted by me  CXR from 01-23-20 shows [+] mild congestion, [-] pneumothorax, [-] R/L effusion, [-] cardiomegaly,   NGT in place, S-G Catheter in place, R/L TLC in place, R/L Chest Tubes in place    ECG:  Reviewed and interpreted by me:   QTC:    preop workup results    echo:    carotids:    CT chest:    UA:    MRSA nasal swab:    A1C:    Verify Now:      Assessment:      PAST MEDICAL & SURGICAL HISTORY:  Atrial fibrillation  Neuropathy  HLD (hyperlipidemia)  HTN (hypertension)  History of cholecystectomy  History of cholecystectomy      PLAN:  OR planning per CTS  Pulm: Encourage coughing, deep breathing and use of incentive spirometry.   Cardio: Monitor telemetry/alarms. Continue cardiac meds  GI: Tolerating diet. Continue stool softeners. Continue GI prophylaxis  Renal: monitor urine output, supplement electrolytes as needed  Vasc: Heparin SC/SCDs for DVT prophylaxis  Heme: Monitor H/H.   ID: Off antibiotics. Stable.  Endocrine: Monitor finger stick blood sugar and control hyperglycemia with insulin  Physical Therapy: OOB/ambulate        Discussed with Cardiothoracic Team at AM rounds. CTU Attending Progress Daily Note     23 Jan 2020 07:47    Patient seen as pre-op critical care follow-up    HPI:  80 yo M with hx of HTN, A.fib (Eliquis), CHF, HLD brought in by EMS post cardiac arrest. As per wife at bedside, patient at the basement watching movie and was doing fine. Few mins later, patient came up and told the wife that he wasn't feeling right and that he felt funny. Then he crashed. Wife called the EMS who arrived 5 mins later. Upon EMS arrival, patient was found to be in PEA then asystole, s/p resuscitation for ~ 10 mins and ROSC achieved after 1 round of compression and epi. While on the way to hospital, patient had another cardiac arrest on the ambulant s/p CPR and ROSC achieved few mins later. As per family, patient was doing welll and at baseline prior to the episode. As baseline patient is fully functional. Denied any recent infection, recent hospitalization, recent ED visit, fever, chills.     In ED, patient was found to bradycardic and hypotensive. s/p 1 dose of atropine and patient was started on low dose Levophed with improvement in HR and BP. (12 Jan 2020 22:13)    See preop testing chart H&P    Interval event for past 24 hr:  MED IVORY  79y extubated last night, HTN on NTG IV gtt    Current Complains:  MED IVORY has no new complaints    REVIEW OF SYSTEMS:  CONSTITUTIONAL:  [-] weakness, [-] fevers, [-] chills  EYES/ENT: [-] visual changes, [-] vertigo, [-] throat pain   NECK: [-] pain, [-] stiffness  RESPIRATORY: [-] cough, [-] wheezing, [-] hemoptysis, [-] shortness of breath  CARDIOVASCULAR: [-] chest pain, [-] palpitations, [-] orthopnea  GASTROINTESTINAL:    [-]abdominal pain, [-] nausea, [-] vomiting, [-] hematemesis, [-] diarrhea, [-] constipation, [-] melena, [-] hematochezia.  GENITOURINARY: [-] dysuria, [-] frequency, [-] hematuria  NEUROLOGICAL: [-] numbness, [-] weakness  SKIN: [-] itching, [-] burning, [-] rashes, [-] lesions   All other review of systems is negative unless indicated above.    [  ] Unable to assess ROS because :    OBJECTIVE:  ICU Vital Signs Last 24 Hrs  T(C): 37 (23 Jan 2020 04:00), Max: 37 (23 Jan 2020 04:00)  T(F): 98.6 (23 Jan 2020 04:00), Max: 98.6 (23 Jan 2020 04:00)  HR: 83 (23 Jan 2020 07:00) (62 - 91)  BP: 148/83 (22 Jan 2020 16:45) (119/68 - 148/83)  BP(mean): 108 (22 Jan 2020 16:45) (88 - 109)  ABP: 147/64 (23 Jan 2020 07:00) (100/50 - 166/72)  ABP(mean): 88 (23 Jan 2020 07:00) (67 - 101)  RR: 30 (23 Jan 2020 07:00) (11 - 40)  SpO2: 97% (23 Jan 2020 07:00) (95% - 100%)      I&O's Summary    22 Jan 2020 07:01  -  23 Jan 2020 07:00  --------------------------------------------------------  IN: 1367.1 mL / OUT: 1215 mL / NET: 152.1 mL      Adult Advanced Hemodynamics Last 24 Hrs  CVP(mm Hg): --  CVP(cm H2O): --  CO: --  CI: --  PA: --  PA(mean): --  PCWP: --  SVR: --  SVRI: --  PVR: --  PVRI: --  Mode: CPAP with PS  FiO2: 40  PEEP: 5  PS: 5      PHYSICAL EXAM:  General: WN/WD NAD    HEENT:     [+] NCAT  [+] EOMI  [-] Conjuctival edema   [-] Icterus   [-] Thrush   [-] ETT  [-] NGT/OGT    Neck:         [+] FROM   [-] JVD     [-] Nodes     [-] Masses    [+] Mid-line trachea    [-] Tracheostomy    Chest:        [-] SubQ emphysema    Lungs:          [+] CTA   [-] Rhonchi   [-] Rales    [-] Wheezing    [-] Decreased BS    [-] Dullness R L    Cardiac:       [+] S1 [+] S2    [+] RRR   [-] Irregular   [-] S3   [-] S4    [-] Murmurs    [-] Rub    Abdomen:    [+] BS    [+] Soft    [+] Non-tender     [-] Distended    [-] Organomegaly  [-] PEG    Extremities:   [-] Cyanosis U/L   [-] Clubbing  U/L  [+\] LE/UE Edema   [+] Capillary refill    [+] Pulses     Neuro:        [+] Awake   [+]  Alert   [-] Confused   [-] Lethargic   [-] Sedated   [+] Generalized Weakness    Skin:        [-] Rashes    [-] Erythema    [+] IV sites intact   [-] Sacral decubitus      LINES:    CAPILLARY BLOOD GLUCOSE      POCT Blood Glucose.: 97 mg/dL (22 Jan 2020 12:32)    CAPILLARY BLOOD GLUCOSE      POCT Blood Glucose.: 97 mg/dL (22 Jan 2020 12:32)      HOSPITAL MEDICATIONS:  MEDICATIONS  (STANDING):  aspirin  chewable 81 milliGRAM(s) Oral daily  atorvastatin 80 milliGRAM(s) Oral at bedtime  chlorhexidine 0.12% Liquid 15 milliLiter(s) Oral Mucosa every 12 hours  chlorhexidine 4% Liquid 1 Application(s) Topical <User Schedule>  dexMEDEtomidine Infusion 0.2 MICROgram(s)/kG/Hr (5.405 mL/Hr) IV Continuous <Continuous>  DULoxetine 30 milliGRAM(s) Oral daily  enoxaparin Injectable 100 milliGRAM(s) SubCutaneous two times a day  famotidine  IVPB 20 milliGRAM(s) IV Intermittent two times a day  losartan 100 milliGRAM(s) Oral daily  niCARdipine Infusion 5 mG/Hr (25 mL/Hr) IV Continuous <Continuous>  nitroglycerin  Infusion 5 MICROgram(s)/Min (1.5 mL/Hr) IV Continuous <Continuous>  phenylephrine    Infusion 1 MICROgram(s)/kG/Min (20.269 mL/Hr) IV Continuous <Continuous>  propofol Infusion 5 MICROgram(s)/kG/Min (3.243 mL/Hr) IV Continuous <Continuous>  sodium chloride 0.9%. 1000 milliLiter(s) (50 mL/Hr) IV Continuous <Continuous>    MEDICATIONS  (PRN):      LABS:  ABG - ( 23 Jan 2020 04:00 )  pH, Arterial: 7.48  pH, Blood: x     /  pCO2: 44    /  pO2: 94    / HCO3: 32    / Base Excess: 7.8   /  SaO2: 98                                      9.2    9.23  )-----------( 145      ( 23 Jan 2020 03:30 )             30.0     01-23    150<H>  |  111<H>  |  19  ----------------------------<  128<H>  4.0   |  29  |  0.8    Ca    7.9<L>      23 Jan 2020 03:30  Mg     2.0     01-23    TPro  5.7<L>  /  Alb  3.1<L>  /  TBili  1.4<H>  /  DBili  x   /  AST  26  /  ALT  21  /  AlkPhos  122<H>  01-21    PT/INR - ( 21 Jan 2020 12:20 )   PT: 15.10 sec;   INR: 1.32 ratio         PTT - ( 21 Jan 2020 12:20 )  PTT:36.2 sec        RADIOLOGY:  Reviewed and interpreted by me  CXR from 01-23-20 shows [+] mild congestion, [-] pneumothorax, [-] R/L effusion, [-] cardiomegaly,       ECG:  Reviewed and interpreted by me: Afib 91  QTC: 489    < from: MR Head No Cont (01.22.20 @ 14:58) >    IMPRESSION:   No acute infarct, acute intracranial hemorrhage, or mass effect.     < end of copied text >      < from: MR Cervical Spine No Cont (01.22.20 @ 14:53) >  IMPRESSION:  Hyperextension injury at C5/C6 with widening of theanterior intervertebral disc space and fracture of the C5 posterior spinous process. 3 column involvement with disruption of the anterior and posterior longitudinal ligaments as well as the ligamentum flavum. Redemonstrated reactive prevertebral softtissue edema. No evidence of epidural hematoma.    No cord contusion or cord compression.    < end of copied text >      < from: EEG (01.22.20 @ 10:30) >  Impression  -  Abnormal due to the presence of: generalized slowing as above  -    Clinical Correlation & Recommendations     < end of copied text >    Assessment:  Acute hypoxemic respiratory failure, extubated  Altered mental status, resolved  Hypernatremia  CAD preop CABG    PAST MEDICAL & SURGICAL HISTORY:  Atrial fibrillation  Neuropathy  HLD (hyperlipidemia)  HTN (hypertension)  History of cholecystectomy  History of cholecystectomy      PLAN:  Neuro no sedatives  OR planning per CTS  Pulm: Encourage coughing, deep breathing and use of incentive spirometry.   Cardio: Monitor telemetry/alarms. Continue cardiac meds  GI: PO diet. Continue stool softeners. Continue GI prophylaxis  Renal: monitor urine output, supplement electrolytes as needed  Vasc: Heparin SC/SCDs for DVT prophylaxis  Heme: Monitor H/H.   ID: Off antibiotics. Stable.  Endocrine: Monitor finger stick blood sugar and control hyperglycemia with insulin  Physical Therapy: OOB/ambulate        Discussed with Cardiothoracic Team at AM rounds.

## 2020-01-23 NOTE — CONSULT NOTE ADULT - ASSESSMENT
80 yo M with hx of HTN, A.fib (Eliquis), CHF, HLD brought in by EMS post cardiac arrest. As per wife at bedside, patient at the basement watching movie and was doing fine. Few mins later, patient came up and told the wife that he wasn't feeling right and that he felt funny. Then he crashed. Wife called the EMS who arrived 5 mins later. Upon EMS arrival, patient was found to be in PEA then asystole, s/p resuscitation for ~ 10 mins and ROSC achieved after 1 round of compression and epi. While on the way to hospital, patient had another cardiac arrest on the ambulant s/p CPR and ROSC achieved few mins later.  Acute hypoxemic respiratory failure, extubated  Altered mental status, resolved  Hypernatremia  CAD preop CABG    Vascular surgery called for right arm swelling and a new PSA measuring 4.5 x 0.8 cm at radial artery likely related to card cath access  to be evaluated for thrombin injection    SPECTRA [id number] 78 yo M with hx of HTN, A.fib (Eliquis), CHF, HLD brought in by EMS post cardiac arrest. As per wife at bedside, patient at the basement watching movie and was doing fine. Few mins later, patient came up and told the wife that he wasn't feeling right and that he felt funny. Then he crashed. Wife called the EMS who arrived 5 mins later. Upon EMS arrival, patient was found to be in PEA then asystole, s/p resuscitation for ~ 10 mins and ROSC achieved after 1 round of compression and epi. While on the way to hospital, patient had another cardiac arrest on the ambulant s/p CPR and ROSC achieved few mins later.  Acute hypoxemic respiratory failure, extubated  Altered mental status, resolved  Hypernatremia  CAD preop CABG    Vascular surgery called for right arm swelling and a new PSA measuring 4.5 x 0.8 cm at radial artery likely related to card cath access  to be evaluated for thrombin injection vs repair in OR    Seen and evaluated by Dr. Gary. Pt needs repair in OR. To be done at some point next week when pt is more stabelized    SPECTRA [id number]

## 2020-01-24 LAB
ANION GAP SERPL CALC-SCNC: 9 MMOL/L — SIGNIFICANT CHANGE UP (ref 7–14)
BUN SERPL-MCNC: 15 MG/DL — SIGNIFICANT CHANGE UP (ref 10–20)
CALCIUM SERPL-MCNC: 7.7 MG/DL — LOW (ref 8.5–10.1)
CHLORIDE SERPL-SCNC: 108 MMOL/L — SIGNIFICANT CHANGE UP (ref 98–110)
CO2 SERPL-SCNC: 27 MMOL/L — SIGNIFICANT CHANGE UP (ref 17–32)
CREAT SERPL-MCNC: 0.6 MG/DL — LOW (ref 0.7–1.5)
GLUCOSE BLDC GLUCOMTR-MCNC: 108 MG/DL — HIGH (ref 70–99)
GLUCOSE SERPL-MCNC: 144 MG/DL — HIGH (ref 70–99)
HCT VFR BLD CALC: 28.1 % — LOW (ref 42–52)
HGB BLD-MCNC: 8.6 G/DL — LOW (ref 14–18)
MAGNESIUM SERPL-MCNC: 1.8 MG/DL — SIGNIFICANT CHANGE UP (ref 1.8–2.4)
MCHC RBC-ENTMCNC: 30.6 G/DL — LOW (ref 32–37)
MCHC RBC-ENTMCNC: 31.2 PG — HIGH (ref 27–31)
MCV RBC AUTO: 101.8 FL — HIGH (ref 80–94)
NRBC # BLD: 0 /100 WBCS — SIGNIFICANT CHANGE UP (ref 0–0)
PLATELET # BLD AUTO: 145 K/UL — SIGNIFICANT CHANGE UP (ref 130–400)
POTASSIUM SERPL-MCNC: 3.8 MMOL/L — SIGNIFICANT CHANGE UP (ref 3.5–5)
POTASSIUM SERPL-SCNC: 3.8 MMOL/L — SIGNIFICANT CHANGE UP (ref 3.5–5)
RBC # BLD: 2.76 M/UL — LOW (ref 4.7–6.1)
RBC # FLD: 15.6 % — HIGH (ref 11.5–14.5)
SODIUM SERPL-SCNC: 144 MMOL/L — SIGNIFICANT CHANGE UP (ref 135–146)
WBC # BLD: 10.2 K/UL — SIGNIFICANT CHANGE UP (ref 4.8–10.8)
WBC # FLD AUTO: 10.2 K/UL — SIGNIFICANT CHANGE UP (ref 4.8–10.8)

## 2020-01-24 PROCEDURE — 71045 X-RAY EXAM CHEST 1 VIEW: CPT | Mod: 26

## 2020-01-24 PROCEDURE — 99231 SBSQ HOSP IP/OBS SF/LOW 25: CPT

## 2020-01-24 PROCEDURE — 99233 SBSQ HOSP IP/OBS HIGH 50: CPT

## 2020-01-24 PROCEDURE — 93931 UPPER EXTREMITY STUDY: CPT | Mod: 26,RT

## 2020-01-24 RX ORDER — POTASSIUM CHLORIDE 20 MEQ
20 PACKET (EA) ORAL ONCE
Refills: 0 | Status: COMPLETED | OUTPATIENT
Start: 2020-01-24 | End: 2020-01-24

## 2020-01-24 RX ORDER — METOPROLOL TARTRATE 50 MG
25 TABLET ORAL ONCE
Refills: 0 | Status: COMPLETED | OUTPATIENT
Start: 2020-01-24 | End: 2020-01-24

## 2020-01-24 RX ORDER — FAMOTIDINE 10 MG/ML
20 INJECTION INTRAVENOUS
Refills: 0 | Status: DISCONTINUED | OUTPATIENT
Start: 2020-01-24 | End: 2020-01-31

## 2020-01-24 RX ORDER — METOPROLOL TARTRATE 50 MG
50 TABLET ORAL
Refills: 0 | Status: DISCONTINUED | OUTPATIENT
Start: 2020-01-24 | End: 2020-01-29

## 2020-01-24 RX ORDER — FUROSEMIDE 40 MG
40 TABLET ORAL ONCE
Refills: 0 | Status: COMPLETED | OUTPATIENT
Start: 2020-01-24 | End: 2020-01-24

## 2020-01-24 RX ORDER — IBUPROFEN 200 MG
400 TABLET ORAL ONCE
Refills: 0 | Status: COMPLETED | OUTPATIENT
Start: 2020-01-24 | End: 2020-01-24

## 2020-01-24 RX ADMIN — Medication 100 MILLIGRAM(S): at 11:51

## 2020-01-24 RX ADMIN — FAMOTIDINE 20 MILLIGRAM(S): 10 INJECTION INTRAVENOUS at 17:39

## 2020-01-24 RX ADMIN — Medication 20 MILLIEQUIVALENT(S): at 05:59

## 2020-01-24 RX ADMIN — CHLORHEXIDINE GLUCONATE 1 APPLICATION(S): 213 SOLUTION TOPICAL at 05:47

## 2020-01-24 RX ADMIN — Medication 12.5 MILLIGRAM(S): at 05:59

## 2020-01-24 RX ADMIN — ENOXAPARIN SODIUM 100 MILLIGRAM(S): 100 INJECTION SUBCUTANEOUS at 06:02

## 2020-01-24 RX ADMIN — ATORVASTATIN CALCIUM 80 MILLIGRAM(S): 80 TABLET, FILM COATED ORAL at 22:27

## 2020-01-24 RX ADMIN — LOSARTAN POTASSIUM 100 MILLIGRAM(S): 100 TABLET, FILM COATED ORAL at 05:59

## 2020-01-24 RX ADMIN — Medication 81 MILLIGRAM(S): at 11:51

## 2020-01-24 RX ADMIN — Medication 25 MILLIGRAM(S): at 09:59

## 2020-01-24 RX ADMIN — ENOXAPARIN SODIUM 100 MILLIGRAM(S): 100 INJECTION SUBCUTANEOUS at 17:39

## 2020-01-24 RX ADMIN — Medication 1 TABLET(S): at 11:51

## 2020-01-24 RX ADMIN — Medication 400 MILLIGRAM(S): at 13:13

## 2020-01-24 RX ADMIN — FAMOTIDINE 104 MILLIGRAM(S): 10 INJECTION INTRAVENOUS at 06:01

## 2020-01-24 RX ADMIN — Medication 40 MILLIGRAM(S): at 09:58

## 2020-01-24 RX ADMIN — DULOXETINE HYDROCHLORIDE 30 MILLIGRAM(S): 30 CAPSULE, DELAYED RELEASE ORAL at 11:51

## 2020-01-24 RX ADMIN — Medication 50 MILLIEQUIVALENT(S): at 09:59

## 2020-01-24 NOTE — PROGRESS NOTE ADULT - SUBJECTIVE AND OBJECTIVE BOX
CTU Attending Progress Daily Note     24 Jan 2020 07:14    Patient seen as pre-op critical care follow-up    HPI:  80 yo M with hx of HTN, A.fib (Eliquis), CHF, HLD brought in by EMS post cardiac arrest. As per wife at bedside, patient at the basement watching movie and was doing fine. Few mins later, patient came up and told the wife that he wasn't feeling right and that he felt funny. Then he crashed. Wife called the EMS who arrived 5 mins later. Upon EMS arrival, patient was found to be in PEA then asystole, s/p resuscitation for ~ 10 mins and ROSC achieved after 1 round of compression and epi. While on the way to hospital, patient had another cardiac arrest on the ambulant s/p CPR and ROSC achieved few mins later. As per family, patient was doing welll and at baseline prior to the episode. As baseline patient is fully functional. Denied any recent infection, recent hospitalization, recent ED visit, fever, chills.     In ED, patient was found to bradycardic and hypotensive. s/p 1 dose of atropine and patient was started on low dose Levophed with improvement in HR and BP. (12 Jan 2020 22:13)    See preop testing chart H&P    Interval event for past 24 hr:  MED IVORY  79y had no event.     Current Complains:  MED IVORY has no new complaints    REVIEW OF SYSTEMS:  CONSTITUTIONAL:  [-] weakness, [-] fevers, [-] chills  EYES/ENT: [-] visual changes, [-] vertigo, [-] throat pain   NECK: [-] pain, [-] stiffness  RESPIRATORY: [-] cough, [-] wheezing, [-] hemoptysis, [-] shortness of breath  CARDIOVASCULAR: [-] chest pain, [-] palpitations, [-] orthopnea  GASTROINTESTINAL:    [-]abdominal pain, [-] nausea, [-] vomiting, [-] hematemesis, [-] diarrhea, [-] constipation, [-] melena, [-] hematochezia.  GENITOURINARY: [-] dysuria, [-] frequency, [-] hematuria  NEUROLOGICAL: [-] numbness, [-] weakness  SKIN: [-] itching, [-] burning, [-] rashes, [-] lesions   All other review of systems is negative unless indicated above.    [  ] Unable to assess ROS because :    OBJECTIVE:  ICU Vital Signs Last 24 Hrs  T(C): 36.9 (24 Jan 2020 00:00), Max: 37.6 (23 Jan 2020 12:00)  T(F): 98.4 (24 Jan 2020 00:00), Max: 99.6 (23 Jan 2020 12:00)  HR: 79 (24 Jan 2020 06:00) (72 - 91)  BP: 167/80 (23 Jan 2020 08:00) (167/80 - 167/80)  BP(mean): 112 (23 Jan 2020 08:00) (112 - 112)  ABP: 133/49 (24 Jan 2020 06:00) (44/22 - 166/79)  ABP(mean): 71 (24 Jan 2020 06:00) (28 - 104)  RR: 32 (24 Jan 2020 06:00) (12 - 56)  SpO2: 99% (24 Jan 2020 06:00) (95% - 100%)      I&O's Summary    23 Jan 2020 07:01  -  24 Jan 2020 07:00  --------------------------------------------------------  IN: 2432 mL / OUT: 1090 mL / NET: 1342 mL      Adult Advanced Hemodynamics Last 24 Hrs  CVP(mm Hg): --  CVP(cm H2O): --  CO: --  CI: --  PA: --  PA(mean): --  PCWP: --  SVR: --  SVRI: --  PVR: --  PVRI: --      PHYSICAL EXAM:  General: WN/WD NAD    HEENT:     [+] NCAT  [+] EOMI  [-] Conjuctival edema   [-] Icterus   [-] Thrush   [-] ETT  [-] NGT/OGT    Neck:         [+] FROM   [-] JVD     [-] Nodes     [-] Masses    [+] Mid-line trachea    [-] Tracheostomy    Chest:        [-] SubQ emphysema    Lungs:          [+] CTA   [-] Rhonchi   [-] Rales    [-] Wheezing    [-] Decreased BS    [-] Dullness R L    Cardiac:       [+] S1 [+] S2    [+] RRR   [-] Irregular   [-] S3   [-] S4    [-] Murmurs    [-] Rub    Abdomen:    [+] BS    [+] Soft    [+] Non-tender     [-] Distended    [-] Organomegaly  [-] PEG    Extremities:   [-] Cyanosis U/L   [-] Clubbing  U/L  [-] LE/UE Edema   [+] Capillary refill    [+] Pulses     Neuro:        [+] Awake   [+]  Alert   [-] Confused   [-] Lethargic   [-] Sedated   [-] Generalized Weakness    Skin:        [-] Rashes    [-] Erythema    [+] IV sites intact   [-] Sacral decubitus      LINES:    CAPILLARY BLOOD GLUCOSE      POCT Blood Glucose.: 97 mg/dL (22 Jan 2020 12:32)    CAPILLARY BLOOD GLUCOSE          HOSPITAL MEDICATIONS:  MEDICATIONS  (STANDING):  aspirin  chewable 81 milliGRAM(s) Oral daily  atorvastatin 80 milliGRAM(s) Oral at bedtime  chlorhexidine 4% Liquid 1 Application(s) Topical <User Schedule>  dexMEDEtomidine Infusion 0.2 MICROgram(s)/kG/Hr (5.405 mL/Hr) IV Continuous <Continuous>  DULoxetine 30 milliGRAM(s) Oral daily  enoxaparin Injectable 100 milliGRAM(s) SubCutaneous two times a day  famotidine  IVPB 20 milliGRAM(s) IV Intermittent two times a day  losartan 100 milliGRAM(s) Oral daily  metoprolol tartrate 12.5 milliGRAM(s) Oral every 12 hours  multivitamin 1 Tablet(s) Oral daily  niCARdipine Infusion 5 mG/Hr (25 mL/Hr) IV Continuous <Continuous>  nitroglycerin  Infusion 5 MICROgram(s)/Min (1.5 mL/Hr) IV Continuous <Continuous>  phenylephrine    Infusion 1 MICROgram(s)/kG/Min (20.269 mL/Hr) IV Continuous <Continuous>  propofol Infusion 5 MICROgram(s)/kG/Min (3.243 mL/Hr) IV Continuous <Continuous>  thiamine 100 milliGRAM(s) Oral daily    MEDICATIONS  (PRN):      LABS:  ABG - ( 24 Jan 2020 04:17 )  pH, Arterial: 7.51  pH, Blood: x     /  pCO2: 36    /  pO2: 61    / HCO3: 28    / Base Excess: 5.2   /  SaO2: 94                                      8.6    10.20 )-----------( 145      ( 24 Jan 2020 02:00 )             28.1     01-24    144  |  108  |  15  ----------------------------<  144<H>  3.8   |  27  |  0.6<L>    Ca    7.7<L>      24 Jan 2020 02:00  Mg     1.8     01-24              RADIOLOGY:  Reviewed and interpreted by me  CXR from 01-24-20 shows [+] mild congestion, [-] pneumothorax, [-] R/L effusion, [-] cardiomegaly,   NGT in place, S-G Catheter in place, R/L TLC in place, R/L Chest Tubes in place    ECG:  Reviewed and interpreted by me:   QTC:    preop workup results    echo:    carotids:    CT chest:    UA:    MRSA nasal swab:    A1C:    Verify Now:      Assessment:      PAST MEDICAL & SURGICAL HISTORY:  Atrial fibrillation  Neuropathy  HLD (hyperlipidemia)  HTN (hypertension)  History of cholecystectomy  History of cholecystectomy      PLAN:  OR planning per CTS  Pulm: Encourage coughing, deep breathing and use of incentive spirometry.   Cardio: Monitor telemetry/alarms. Continue cardiac meds  GI: Tolerating diet. Continue stool softeners. Continue GI prophylaxis  Renal: monitor urine output, supplement electrolytes as needed  Vasc: Heparin SC/SCDs for DVT prophylaxis  Heme: Monitor H/H.   ID: Off antibiotics. Stable.  Endocrine: Monitor finger stick blood sugar and control hyperglycemia with insulin  Physical Therapy: OOB/ambulate        Discussed with Cardiothoracic Team at AM rounds. CTU Attending Progress Daily Note     24 Jan 2020 07:14    Patient seen as pre-op critical care follow-up    HPI:  78 yo M with hx of HTN, A.fib (Eliquis), CHF, HLD brought in by EMS post cardiac arrest. As per wife at bedside, patient at the basement watching movie and was doing fine. Few mins later, patient came up and told the wife that he wasn't feeling right and that he felt funny. Then he crashed. Wife called the EMS who arrived 5 mins later. Upon EMS arrival, patient was found to be in PEA then asystole, s/p resuscitation for ~ 10 mins and ROSC achieved after 1 round of compression and epi. While on the way to hospital, patient had another cardiac arrest on the ambulant s/p CPR and ROSC achieved few mins later. As per family, patient was doing welll and at baseline prior to the episode. As baseline patient is fully functional. Denied any recent infection, recent hospitalization, recent ED visit, fever, chills.     In ED, patient was found to bradycardic and hypotensive. s/p 1 dose of atropine and patient was started on low dose Levophed with improvement in HR and BP. (12 Jan 2020 22:13)    See preop testing chart H&P    Interval event for past 24 hr:  [facility]  79y had HTN on NTG     Current Complains:  MED IVORY has no new complaints    REVIEW OF SYSTEMS:  CONSTITUTIONAL:  [-] weakness, [-] fevers, [-] chills  EYES/ENT: [-] visual changes, [-] vertigo, [-] throat pain   NECK: [-] pain, [-] stiffness  RESPIRATORY: [-] cough, [-] wheezing, [-] hemoptysis, [-] shortness of breath  CARDIOVASCULAR: [-] chest pain, [-] palpitations, [-] orthopnea  GASTROINTESTINAL:    [-]abdominal pain, [-] nausea, [-] vomiting, [-] hematemesis, [-] diarrhea, [-] constipation, [-] melena, [-] hematochezia.  GENITOURINARY: [-] dysuria, [-] frequency, [-] hematuria  NEUROLOGICAL: [-] numbness, [+] weakness  SKIN: [-] itching, [-] burning, [-] rashes, [-] lesions   All other review of systems is negative unless indicated above.    [  ] Unable to assess ROS because :    OBJECTIVE:  ICU Vital Signs Last 24 Hrs  T(C): 36.9 (24 Jan 2020 00:00), Max: 37.6 (23 Jan 2020 12:00)  T(F): 98.4 (24 Jan 2020 00:00), Max: 99.6 (23 Jan 2020 12:00)  HR: 79 (24 Jan 2020 06:00) (72 - 91)  BP: 167/80 (23 Jan 2020 08:00) (167/80 - 167/80)  BP(mean): 112 (23 Jan 2020 08:00) (112 - 112)  ABP: 133/49 (24 Jan 2020 06:00) (44/22 - 166/79)  ABP(mean): 71 (24 Jan 2020 06:00) (28 - 104)  RR: 32 (24 Jan 2020 06:00) (12 - 56)  SpO2: 99% (24 Jan 2020 06:00) (95% - 100%)      I&O's Summary    23 Jan 2020 07:01  -  24 Jan 2020 07:00  --------------------------------------------------------  IN: 2432 mL / OUT: 1090 mL / NET: 1342 mL        PHYSICAL EXAM:  General: WN/WD NAD    HEENT:     [+] NCAT  [+] EOMI  [-] Conjuctival edema   [-] Icterus   [-] Thrush   [-] ETT  [-] NGT/OGT    Neck:         [+] FROM   [-] JVD     [-] Nodes     [-] Masses    [+] Mid-line trachea    [-] Tracheostomy    Chest:        [-] SubQ emphysema    Lungs:          [+] CTA   [-] Rhonchi   [-] Rales    [-] Wheezing    [-] Decreased BS    [-] Dullness R L    Cardiac:       [+] S1 [+] S2    [+] RRR   [-] Irregular   [-] S3   [-] S4    [-] Murmurs    [-] Rub    Abdomen:    [+] BS    [+] Soft    [+] Non-tender     [-] Distended    [-] Organomegaly  [-] PEG    Extremities:   [-] Cyanosis U/L   [-] Clubbing  U/L  [+] LE/UE Edema   [+] Capillary refill    [+] Pulses     Neuro:        [+] Awake   [+]  Alert   [+] Confused   [-] Lethargic   [-] Sedated   [+] Generalized Weakness    Skin:        [-] Rashes    [-] Erythema    [+] IV sites intact   [-] Sacral decubitus      LINES:    CAPILLARY BLOOD GLUCOSE      POCT Blood Glucose.: 97 mg/dL (22 Jan 2020 12:32)    CAPILLARY BLOOD GLUCOSE          HOSPITAL MEDICATIONS:  MEDICATIONS  (STANDING):  aspirin  chewable 81 milliGRAM(s) Oral daily  atorvastatin 80 milliGRAM(s) Oral at bedtime  chlorhexidine 4% Liquid 1 Application(s) Topical <User Schedule>  dexMEDEtomidine Infusion 0.2 MICROgram(s)/kG/Hr (5.405 mL/Hr) IV Continuous <Continuous>  DULoxetine 30 milliGRAM(s) Oral daily  enoxaparin Injectable 100 milliGRAM(s) SubCutaneous two times a day  famotidine  IVPB 20 milliGRAM(s) IV Intermittent two times a day  losartan 100 milliGRAM(s) Oral daily  metoprolol tartrate 12.5 milliGRAM(s) Oral every 12 hours  multivitamin 1 Tablet(s) Oral daily  niCARdipine Infusion 5 mG/Hr (25 mL/Hr) IV Continuous <Continuous>  nitroglycerin  Infusion 5 MICROgram(s)/Min (1.5 mL/Hr) IV Continuous <Continuous>  phenylephrine    Infusion 1 MICROgram(s)/kG/Min (20.269 mL/Hr) IV Continuous <Continuous>  propofol Infusion 5 MICROgram(s)/kG/Min (3.243 mL/Hr) IV Continuous <Continuous>  thiamine 100 milliGRAM(s) Oral daily    MEDICATIONS  (PRN):      LABS:  ABG - ( 24 Jan 2020 04:17 )  pH, Arterial: 7.51  pH, Blood: x     /  pCO2: 36    /  pO2: 61    / HCO3: 28    / Base Excess: 5.2   /  SaO2: 94                                      8.6    10.20 )-----------( 145      ( 24 Jan 2020 02:00 )             28.1     01-24    144  |  108  |  15  ----------------------------<  144<H>  3.8   |  27  |  0.6<L>    Ca    7.7<L>      24 Jan 2020 02:00  Mg     1.8     01-24        RADIOLOGY:  Reviewed and interpreted by me  CXR from 01-24-20 shows [+] mild congestion, [-] pneumothorax, [-] R/L effusion, [-] cardiomegaly,         Assessment:  CAD preop CABG  acute hypoxemic respiratory failure  HTN    PAST MEDICAL & SURGICAL HISTORY:  Atrial fibrillation  Neuropathy  HLD (hyperlipidemia)  HTN (hypertension)  History of cholecystectomy  History of cholecystectomy      PLAN:  OR planning per CTS  Pulm: Encourage coughing, deep breathing and use of incentive spirometry.   Cardio: Monitor telemetry/alarms. Continue cardiac meds  GI: Tolerating diet. Continue stool softeners. Continue GI prophylaxis  Renal: monitor urine output, supplement electrolytes as needed  Vasc: Heparin SC/SCDs for DVT prophylaxis  Heme: Monitor H/H.   ID: Off antibiotics. Stable.  Endocrine: Monitor finger stick blood sugar and control hyperglycemia with insulin  Physical Therapy: OOB/ambulate        Discussed with Cardiothoracic Team at AM rounds.

## 2020-01-24 NOTE — PROGRESS NOTE ADULT - SUBJECTIVE AND OBJECTIVE BOX
SUBJECTIVE ASSESSMENT:  pt has no major complaints at this time  upset about not having a procedure today    Vital Signs Last 24 Hrs  T(C): 35.8 (24 Jan 2020 07:00), Max: 37.1 (23 Jan 2020 20:00)  T(F): 96.5 (24 Jan 2020 07:00), Max: 98.7 (23 Jan 2020 20:00)  HR: 70 (24 Jan 2020 13:45) (61 - 89)  BP: 103/65 (24 Jan 2020 08:45) (103/65 - 170/71)  BP(mean): 79 (24 Jan 2020 08:45) (79 - 102)  RR: 20 (24 Jan 2020 13:45) (12 - 56)  SpO2: 96% (24 Jan 2020 13:45) (96% - 100%)  01-23-20 @ 07:01  -  01-24-20 @ 07:00  --------------------------------------------------------  IN: 2432 mL / OUT: 1090 mL / NET: 1342 mL    01-24-20 @ 07:01  -  01-24-20 @ 14:55  --------------------------------------------------------  IN: 163 mL / OUT: 1080 mL / NET: -917 mL    A&OX3 in NAD  decreased bs at the bases  sternum stable, no drainage  nl s1, s2  abd soft/NT/ND  +2 peripheral edema  blue finger tips    LABS:                        8.6    10.20 )-----------( 145      ( 24 Jan 2020 02:00 )             28.1     01-24    144  |  108  |  15  ----------------------------<  144<H>  3.8   |  27  |  0.6<L>    Ca    7.7<L>      24 Jan 2020 02:00  Mg     1.8     01-24    MEDICATIONS  (STANDING):  aspirin  chewable 81 milliGRAM(s) Oral daily  atorvastatin 80 milliGRAM(s) Oral at bedtime  chlorhexidine 4% Liquid 1 Application(s) Topical <User Schedule>  DULoxetine 30 milliGRAM(s) Oral daily  enoxaparin Injectable 100 milliGRAM(s) SubCutaneous two times a day  famotidine    Tablet 20 milliGRAM(s) Oral two times a day  losartan 100 milliGRAM(s) Oral daily  metoprolol tartrate 50 milliGRAM(s) Oral two times a day  multivitamin 1 Tablet(s) Oral daily  nitroglycerin  Infusion 5 MICROgram(s)/Min (1.5 mL/Hr) IV Continuous <Continuous>  thiamine 100 milliGRAM(s) Oral daily

## 2020-01-25 LAB
ANION GAP SERPL CALC-SCNC: 12 MMOL/L — SIGNIFICANT CHANGE UP (ref 7–14)
BUN SERPL-MCNC: 16 MG/DL — SIGNIFICANT CHANGE UP (ref 10–20)
CALCIUM SERPL-MCNC: 8.2 MG/DL — LOW (ref 8.5–10.1)
CHLORIDE SERPL-SCNC: 107 MMOL/L — SIGNIFICANT CHANGE UP (ref 98–110)
CO2 SERPL-SCNC: 27 MMOL/L — SIGNIFICANT CHANGE UP (ref 17–32)
CORTICOSTEROID BINDING GLOBULIN RESULT: 1.8 MG/DL — SIGNIFICANT CHANGE UP
CORTIS F/TOTAL MFR SERPL: 32 % — SIGNIFICANT CHANGE UP
CORTIS SERPL-MCNC: 17 UG/DL — SIGNIFICANT CHANGE UP
CORTISOL, FREE RESULT: 5.4 UG/DL — HIGH
CREAT SERPL-MCNC: 0.6 MG/DL — LOW (ref 0.7–1.5)
GLUCOSE SERPL-MCNC: 105 MG/DL — HIGH (ref 70–99)
HCT VFR BLD CALC: 32.1 % — LOW (ref 42–52)
HGB BLD-MCNC: 10 G/DL — LOW (ref 14–18)
MAGNESIUM SERPL-MCNC: 1.8 MG/DL — SIGNIFICANT CHANGE UP (ref 1.8–2.4)
MCHC RBC-ENTMCNC: 31.2 G/DL — LOW (ref 32–37)
MCHC RBC-ENTMCNC: 31.7 PG — HIGH (ref 27–31)
MCV RBC AUTO: 101.9 FL — HIGH (ref 80–94)
NRBC # BLD: 0 /100 WBCS — SIGNIFICANT CHANGE UP (ref 0–0)
PLATELET # BLD AUTO: 167 K/UL — SIGNIFICANT CHANGE UP (ref 130–400)
POTASSIUM SERPL-MCNC: 3.9 MMOL/L — SIGNIFICANT CHANGE UP (ref 3.5–5)
POTASSIUM SERPL-SCNC: 3.9 MMOL/L — SIGNIFICANT CHANGE UP (ref 3.5–5)
PREALB SERPL-MCNC: 8 MG/DL — LOW (ref 20–40)
RBC # BLD: 3.15 M/UL — LOW (ref 4.7–6.1)
RBC # FLD: 15.7 % — HIGH (ref 11.5–14.5)
SODIUM SERPL-SCNC: 146 MMOL/L — SIGNIFICANT CHANGE UP (ref 135–146)
WBC # BLD: 9.36 K/UL — SIGNIFICANT CHANGE UP (ref 4.8–10.8)
WBC # FLD AUTO: 9.36 K/UL — SIGNIFICANT CHANGE UP (ref 4.8–10.8)

## 2020-01-25 PROCEDURE — 99233 SBSQ HOSP IP/OBS HIGH 50: CPT

## 2020-01-25 PROCEDURE — 99232 SBSQ HOSP IP/OBS MODERATE 35: CPT

## 2020-01-25 PROCEDURE — 99231 SBSQ HOSP IP/OBS SF/LOW 25: CPT

## 2020-01-25 RX ORDER — AMLODIPINE BESYLATE 2.5 MG/1
5 TABLET ORAL DAILY
Refills: 0 | Status: DISCONTINUED | OUTPATIENT
Start: 2020-01-25 | End: 2020-01-25

## 2020-01-25 RX ORDER — ALBUMIN HUMAN 25 %
250 VIAL (ML) INTRAVENOUS ONCE
Refills: 0 | Status: COMPLETED | OUTPATIENT
Start: 2020-01-25 | End: 2020-01-25

## 2020-01-25 RX ORDER — AMLODIPINE BESYLATE 2.5 MG/1
2.5 TABLET ORAL DAILY
Refills: 0 | Status: DISCONTINUED | OUTPATIENT
Start: 2020-01-26 | End: 2020-01-31

## 2020-01-25 RX ORDER — SENNA PLUS 8.6 MG/1
1 TABLET ORAL
Refills: 0 | Status: DISCONTINUED | OUTPATIENT
Start: 2020-01-25 | End: 2020-01-31

## 2020-01-25 RX ORDER — DRONABINOL 2.5 MG
2.5 CAPSULE ORAL DAILY
Refills: 0 | Status: DISCONTINUED | OUTPATIENT
Start: 2020-01-25 | End: 2020-01-31

## 2020-01-25 RX ADMIN — Medication 50 MILLIGRAM(S): at 06:46

## 2020-01-25 RX ADMIN — FAMOTIDINE 20 MILLIGRAM(S): 10 INJECTION INTRAVENOUS at 06:46

## 2020-01-25 RX ADMIN — Medication 50 MILLIGRAM(S): at 17:09

## 2020-01-25 RX ADMIN — DULOXETINE HYDROCHLORIDE 30 MILLIGRAM(S): 30 CAPSULE, DELAYED RELEASE ORAL at 11:31

## 2020-01-25 RX ADMIN — SENNA PLUS 1 TABLET(S): 8.6 TABLET ORAL at 17:09

## 2020-01-25 RX ADMIN — Medication 1 TABLET(S): at 11:31

## 2020-01-25 RX ADMIN — AMLODIPINE BESYLATE 5 MILLIGRAM(S): 2.5 TABLET ORAL at 11:31

## 2020-01-25 RX ADMIN — Medication 125 MILLILITER(S): at 12:26

## 2020-01-25 RX ADMIN — FAMOTIDINE 20 MILLIGRAM(S): 10 INJECTION INTRAVENOUS at 17:09

## 2020-01-25 RX ADMIN — Medication 81 MILLIGRAM(S): at 11:31

## 2020-01-25 RX ADMIN — CHLORHEXIDINE GLUCONATE 1 APPLICATION(S): 213 SOLUTION TOPICAL at 06:46

## 2020-01-25 RX ADMIN — ENOXAPARIN SODIUM 100 MILLIGRAM(S): 100 INJECTION SUBCUTANEOUS at 17:09

## 2020-01-25 RX ADMIN — ATORVASTATIN CALCIUM 80 MILLIGRAM(S): 80 TABLET, FILM COATED ORAL at 21:45

## 2020-01-25 RX ADMIN — Medication 2.5 MILLIGRAM(S): at 11:48

## 2020-01-25 RX ADMIN — LOSARTAN POTASSIUM 100 MILLIGRAM(S): 100 TABLET, FILM COATED ORAL at 06:46

## 2020-01-25 RX ADMIN — Medication 100 MILLIGRAM(S): at 11:31

## 2020-01-25 RX ADMIN — ENOXAPARIN SODIUM 100 MILLIGRAM(S): 100 INJECTION SUBCUTANEOUS at 06:46

## 2020-01-25 NOTE — PROGRESS NOTE ADULT - SUBJECTIVE AND OBJECTIVE BOX
SUBJECTIVE ASSESSMENT:  pt has no major complaints    Vital Signs Last 24 Hrs  T(C): 36.7 (25 Jan 2020 08:00), Max: 36.7 (25 Jan 2020 08:00)  T(F): 98.1 (25 Jan 2020 08:00), Max: 98.1 (25 Jan 2020 08:00)  HR: 75 (25 Jan 2020 08:00) (56 - 88)  BP: --  BP(mean): --  RR: 20 (25 Jan 2020 08:00) (10 - 44)  SpO2: 94% (25 Jan 2020 08:00) (92% - 100%)  01-24-20 @ 07:01  -  01-25-20 @ 07:00  --------------------------------------------------------  IN: 458 mL / OUT: 2025 mL / NET: -1567 mL    01-25-20 @ 07:01  -  01-25-20 @ 09:27  --------------------------------------------------------  IN: 35 mL / OUT: 110 mL / NET: -75 mL    Alert, oriented to person  CTA bilat  neck collar is on  nl s1, s2  abd soft/NT/ND  no peripheral edema    LABS:                        10.0   9.36  )-----------( 167      ( 25 Jan 2020 03:15 )             32.1     01-25    146  |  107  |  16  ----------------------------<  105<H>  3.9   |  27  |  0.6<L>    Ca    8.2<L>      25 Jan 2020 03:15  Mg     1.8     01-25    MEDICATIONS  (STANDING):  aspirin  chewable 81 milliGRAM(s) Oral daily  atorvastatin 80 milliGRAM(s) Oral at bedtime  chlorhexidine 4% Liquid 1 Application(s) Topical <User Schedule>  dronabinol 2.5 milliGRAM(s) Oral daily  DULoxetine 30 milliGRAM(s) Oral daily  enoxaparin Injectable 100 milliGRAM(s) SubCutaneous two times a day  famotidine    Tablet 20 milliGRAM(s) Oral two times a day  losartan 100 milliGRAM(s) Oral daily  metoprolol tartrate 50 milliGRAM(s) Oral two times a day  multivitamin 1 Tablet(s) Oral daily  nitroglycerin  Infusion 5 MICROgram(s)/Min (1.5 mL/Hr) IV Continuous <Continuous>  thiamine 100 milliGRAM(s) Oral daily

## 2020-01-25 NOTE — PROGRESS NOTE ADULT - SUBJECTIVE AND OBJECTIVE BOX
CTU Attending Progress Daily Note     25 Jan 2020 11:39  Admited 01-12-20, Hospital Day 13d    HPI:  78 yo M with hx of HTN, A.fib (Eliquis), CHF, HLD brought in by EMS post cardiac arrest. As per wife at bedside, patient at the basement watching movie and was doing fine. Few mins later, patient came up and told the wife that he wasn't feeling right and that he felt funny. Then he crashed. Wife called the EMS who arrived 5 mins later. Upon EMS arrival, patient was found to be in PEA then asystole, s/p resuscitation for ~ 10 mins and ROSC achieved after 1 round of compression and epi. While on the way to hospital, patient had another cardiac arrest on the ambulant s/p CPR and ROSC achieved few mins later. As per family, patient was doing welll and at baseline prior to the episode. As baseline patient is fully functional. Denied any recent infection, recent hospitalization, recent ED visit, fever, chills.     In ED, patient was found to bradycardic and hypotensive. s/p 1 dose of atropine and patient was started on low dose Levophed with improvement in HR and BP. (12 Jan 2020 22:13)    Home Medications:  carvedilol 12.5 mg oral tablet: 1 tab(s) orally 2 times a day (16 Jan 2020 12:31)  clonazePAM 1 mg oral tablet: 1 tab(s) orally 3 times a day (16 Jan 2020 12:31)  DULoxetine 30 mg oral delayed release capsule: 1 cap(s) orally once a day (16 Jan 2020 12:31)  Eliquis 5 mg oral tablet: 1 tab(s) orally 2 times a day (16 Jan 2020 12:31)  furosemide 40 mg oral tablet: 1 tab(s) orally once a day (16 Jan 2020 12:31)  gabapentin 300 mg oral capsule: 1 cap(s) orally 2 times a day (16 Jan 2020 12:31)  omeprazole 20 mg oral delayed release capsule: 1 cap(s) orally once a day (16 Jan 2020 12:31)  potassium chloride 10 mEq oral capsule, extended release: 1 cap(s) orally once a day (16 Jan 2020 12:31)  simvastatin 40 mg oral tablet: 1 tab(s) orally once a day (at bedtime) (16 Jan 2020 12:31)  telmisartan 80 mg oral tablet: 1 tab(s) orally once a day (16 Jan 2020 12:31)  Vitamin D3: cap(s) orally once a day (16 Jan 2020 12:31)    FAMILY HISTORY:    PAST MEDICAL & SURGICAL HISTORY:  Atrial fibrillation  Neuropathy  HLD (hyperlipidemia)  HTN (hypertension)  History of cholecystectomy  History of cholecystectomy    Interval event for past 24 hr:  MED IVORY  79y had no event.   Current Complains:  MED IVORY has no new complains  Allergies    No Known Allergies    Intolerances      OBJECTIVE:  Vitals last 24 hrs  T(C): 36.7 (01-25-20 @ 08:00), Max: 36.7 (01-25-20 @ 08:00)  T(F): 98.1 (01-25-20 @ 08:00), Max: 98.1 (01-25-20 @ 08:00)  HR: 56 (01-25-20 @ 11:00) (56 - 76)  BP: --  ABP: 113/49 (01-25-20 @ 11:00) (105/48 - 156/66)  ABP(mean): 68 (01-25-20 @ 11:00) (65 - 98)  RR: 22 (01-25-20 @ 11:00) (10 - 35)  SpO2: 100% (01-25-20 @ 11:00) (92% - 100%)      01-24-20 @ 07:01  -  01-25-20 @ 07:00  --------------------------------------------------------  IN:    IV PiggyBack: 100 mL    nitroglycerin  Infusion: 238 mL    Oral Fluid: 120 mL  Total IN: 458 mL    OUT:    Indwelling Catheter - Urethral: 2025 mL  Total OUT: 2025 mL    Total NET: -1567 mL          CAPILLARY BLOOD GLUCOSE      POCT Blood Glucose.: 108 mg/dL (24 Jan 2020 16:20)    LABS:  ABG - ( 25 Jan 2020 04:48 )  pH, Arterial: 7.46  pH, Blood: x     /  pCO2: 43    /  pO2: 82    / HCO3: 30    / Base Excess: 6.1   /  SaO2: 97              Blood Gas Arterial, Lactate: 0.7 mmoL/L (01-25-20 @ 04:48)                          10.0   9.36  )-----------( 167      ( 25 Jan 2020 03:15 )             32.1     Hemoglobin: 10.0 g/dL (01-25 @ 03:15)  Hemoglobin: 8.6 g/dL (01-24 @ 02:00)  Hemoglobin: 9.2 g/dL (01-23 @ 03:30)    01-25    146  |  107  |  16  ----------------------------<  105<H>  3.9   |  27  |  0.6<L>    Ca    8.2<L>      25 Jan 2020 03:15  Mg     1.8     01-25      Creatinine, Serum: 0.6 mg/dL (01-25 @ 03:15)  Creatinine, Serum: 0.6 mg/dL (01-24 @ 02:00)  Creatinine, Serum: 0.7 mg/dL (01-23 @ 16:36)  Creatinine, Serum: 0.8 mg/dL (01-23 @ 03:30)  Creatinine, Serum: 0.7 mg/dL (01-22 @ 00:50)  Creatinine, Serum: 0.7 mg/dL (01-21 @ 12:20)          HOSPITAL MEDICATIONS:  MEDICATIONS  (STANDING):  amLODIPine   Tablet 5 milliGRAM(s) Oral daily  aspirin  chewable 81 milliGRAM(s) Oral daily  atorvastatin 80 milliGRAM(s) Oral at bedtime  chlorhexidine 4% Liquid 1 Application(s) Topical <User Schedule>  dronabinol 2.5 milliGRAM(s) Oral daily  DULoxetine 30 milliGRAM(s) Oral daily  enoxaparin Injectable 100 milliGRAM(s) SubCutaneous two times a day  famotidine    Tablet 20 milliGRAM(s) Oral two times a day  losartan 100 milliGRAM(s) Oral daily  metoprolol tartrate 50 milliGRAM(s) Oral two times a day  multivitamin 1 Tablet(s) Oral daily  nitroglycerin  Infusion 5 MICROgram(s)/Min (1.5 mL/Hr) IV Continuous <Continuous>  senna 1 Tablet(s) Oral two times a day  thiamine 100 milliGRAM(s) Oral daily    MEDICATIONS  (PRN):  bisacodyl 5 milliGRAM(s) Oral every 12 hours PRN Constipation      REVIEW OF SYSTEMS:  CONSTITUTIONAL: [X] all negative; [ ] weakness, [ ] fevers, [ ] chills  EYES/ENT: [X] all negative; [ ] visual changes, [ ] vertigo, [ ] throat pain, [ ] eye pain  NECK: [X] all negative; [ ] pain, [ ] stiffness  RESPIRATORY: [ ] all negative, [ ] cough, [ ] wheezing, [ ] hemoptysis, [ ] shortness of breath  CARDIOVASCULAR: [ ] all negative; [ ] chest pain, [ ] palpitations, [ ] orthopnea  GASTROINTESTINAL: [X] all negative; [ ]abdominal pain, [ ] nausea, [ ] vomiting, [ ] hematemesis, [ ] diarrhea, [ ] constipation, [ ] melena, [ ] hematochezia.  GENITOURINARY: [X] all negative; [ ] dysuria, [ ] frequency, [ ] hematuria  NEUROLOGICAL: [X] all negative; [ ] numbness, [ ] weakness, [ ] paresthesias  MUSCULOSKELETAL: [X] all negative, [ ] joint pain, [ ] joint swelling, [ ] joint redness, [ ] bone pain  SKIN: [X] all negative; [ ] itching, [ ] burning, [ ] rashes, [ ] lesions   All other review of systems is negative unless indicated above.    [  ] Unable to assess ROS because     PHYSICAL EXAM:          CONSTITUTIONAL: Well-developed; well-nourished; in no acute distress.   	SKIN: warm, dry, no rashes or lesions  	HEENT: Atraumatic. Normocephalic. PERRL. Moist membranes, no conj injection, sclera clear  	NECK: Supple; non tender.  No JVD. No lymphadenopathy.  	CARD: Normal S1, S2. Rate and Rhythm are regular. No murmurs.  	RESP: Good air entry bilaterally, no wheezes, occ rales no rhonchi.  	ABD: Soft, not tender, not distended, no CVA ttp no rebound no guarding, bowel sounds present  	EXT: Normal ROM.  No clubbing, no cyanosis, no pedal edema, no calf pain b/l, Peripheral pulses intact.  	LYMPH: No acute cervical adenopathy.  	NEURO: Alert, awake, motor 5/5 R, 5/5 L, sensation intact bilat, CN 2-12 intact, mild confusion          PSYCH: Cooperative, appropriate.     RADIOLOGY:  X Reviewed and interpreted by me  CxR from 01-25-20 shows mild congestion, no pneumothorax, no effusion, no cardiomegaly,

## 2020-01-25 NOTE — PROGRESS NOTE ADULT - ASSESSMENT
PROBLEMS:  I spent 45 minutes of time examining patient, reviewing vitals, labs, medications, imaging and discussing with the team goals of care to prevent life-threatening in this patient who is at high risk for deterioration or death due to:      1.	CAD - awaiting surgery - metoprolol 50 and Lovenox 100 q12, ASA 81, lipitor 80   2.	s/p cardiac arrest    3.	chronic A Fib  4.	chronic encephalopathy   5.	right radial pseudoaneurysm   6.	hypertension - add norvasc 5 , wean off NTG  7.	constipation - senna, dulcolax  8.	PUD prophylaxis     PLAN  Neuro: move all 4 extremities. no sensory or motor deficits  Pain management.   dronabinol 2.5 milliGRAM(s) Oral daily  DULoxetine 30 milliGRAM(s) Oral daily    Pulm: Wean off supplemental oxygen as able. Daily CXR. Encourage coughing, deep breathing and use of incentive spirometry.     Cardio: Monitor telemetry/alarms. Continue supportive care   amLODIPine   Tablet 5 milliGRAM(s) Oral daily  losartan 100 milliGRAM(s) Oral daily  metoprolol tartrate 50 milliGRAM(s) Oral two times a day  nitroglycerin  Infusion 5 MICROgram(s)/Min IV Continuous <Continuous>    GI: Continue stool softeners.    bisacodyl 5 milliGRAM(s) Oral every 12 hours PRN  famotidine    Tablet 20 milliGRAM(s) Oral two times a day  senna 1 Tablet(s) Oral two times a day    Nutrition: Continue present diet  Endocrine and glucose control:   atorvastatin 80 milliGRAM(s) Oral at bedtime    Renal: monitor urine output, supplement electrolytes as needed,     Vasc: Heparin SC and/or SCDs for DVT prophylaxis  aspirin  chewable 81 milliGRAM(s) Oral daily  enoxaparin Injectable 100 milliGRAM(s) SubCutaneous two times a day    ID: Stable, no fever , no chills. Off antibiotics.    Therapy: OOB/ambulate  Disposition: start planing discharge home or placement    Pertinent clinical, laboratory, radiographic, hemodynamic, echocardiographic, respiratory data, microbiologic data and chart were reviewed and analyzed frequently throughout the course of the day and night. GI and DVT prophylaxis, glycemic control, head of bed elevation and skin care issues were addressed.  Patient seen, examined and plan discussed with CT Surgery / CTICU team during rounds.    [ ] The patient remains in critical and unstable condition, and requires ICU care and monitoring  [x ] The patient is improving but requires continued monitoring and adjustment of therapy

## 2020-01-26 LAB
ANION GAP SERPL CALC-SCNC: 12 MMOL/L — SIGNIFICANT CHANGE UP (ref 7–14)
BUN SERPL-MCNC: 17 MG/DL — SIGNIFICANT CHANGE UP (ref 10–20)
CALCIUM SERPL-MCNC: 8.3 MG/DL — LOW (ref 8.5–10.1)
CHLORIDE SERPL-SCNC: 106 MMOL/L — SIGNIFICANT CHANGE UP (ref 98–110)
CO2 SERPL-SCNC: 28 MMOL/L — SIGNIFICANT CHANGE UP (ref 17–32)
CREAT SERPL-MCNC: 0.7 MG/DL — SIGNIFICANT CHANGE UP (ref 0.7–1.5)
GLUCOSE SERPL-MCNC: 96 MG/DL — SIGNIFICANT CHANGE UP (ref 70–99)
HCT VFR BLD CALC: 32.4 % — LOW (ref 42–52)
HGB BLD-MCNC: 10 G/DL — LOW (ref 14–18)
MAGNESIUM SERPL-MCNC: 1.9 MG/DL — SIGNIFICANT CHANGE UP (ref 1.8–2.4)
MCHC RBC-ENTMCNC: 30.9 G/DL — LOW (ref 32–37)
MCHC RBC-ENTMCNC: 31.6 PG — HIGH (ref 27–31)
MCV RBC AUTO: 102.5 FL — HIGH (ref 80–94)
NRBC # BLD: 0 /100 WBCS — SIGNIFICANT CHANGE UP (ref 0–0)
PLATELET # BLD AUTO: 200 K/UL — SIGNIFICANT CHANGE UP (ref 130–400)
POTASSIUM SERPL-MCNC: 3.9 MMOL/L — SIGNIFICANT CHANGE UP (ref 3.5–5)
POTASSIUM SERPL-SCNC: 3.9 MMOL/L — SIGNIFICANT CHANGE UP (ref 3.5–5)
RBC # BLD: 3.16 M/UL — LOW (ref 4.7–6.1)
RBC # FLD: 15.7 % — HIGH (ref 11.5–14.5)
SODIUM SERPL-SCNC: 146 MMOL/L — SIGNIFICANT CHANGE UP (ref 135–146)
WBC # BLD: 8.82 K/UL — SIGNIFICANT CHANGE UP (ref 4.8–10.8)
WBC # FLD AUTO: 8.82 K/UL — SIGNIFICANT CHANGE UP (ref 4.8–10.8)

## 2020-01-26 PROCEDURE — 99233 SBSQ HOSP IP/OBS HIGH 50: CPT

## 2020-01-26 PROCEDURE — 99231 SBSQ HOSP IP/OBS SF/LOW 25: CPT

## 2020-01-26 RX ORDER — POTASSIUM CHLORIDE 20 MEQ
20 PACKET (EA) ORAL ONCE
Refills: 0 | Status: COMPLETED | OUTPATIENT
Start: 2020-01-26 | End: 2020-01-26

## 2020-01-26 RX ORDER — PREGABALIN 225 MG/1
1000 CAPSULE ORAL DAILY
Refills: 0 | Status: DISCONTINUED | OUTPATIENT
Start: 2020-01-26 | End: 2020-01-31

## 2020-01-26 RX ORDER — MAGNESIUM SULFATE 500 MG/ML
2 VIAL (ML) INJECTION ONCE
Refills: 0 | Status: COMPLETED | OUTPATIENT
Start: 2020-01-26 | End: 2020-01-26

## 2020-01-26 RX ORDER — HYDRALAZINE HCL 50 MG
10 TABLET ORAL ONCE
Refills: 0 | Status: COMPLETED | OUTPATIENT
Start: 2020-01-26 | End: 2020-01-26

## 2020-01-26 RX ADMIN — PREGABALIN 1000 MICROGRAM(S): 225 CAPSULE ORAL at 11:46

## 2020-01-26 RX ADMIN — AMLODIPINE BESYLATE 2.5 MILLIGRAM(S): 2.5 TABLET ORAL at 06:55

## 2020-01-26 RX ADMIN — ATORVASTATIN CALCIUM 80 MILLIGRAM(S): 80 TABLET, FILM COATED ORAL at 21:27

## 2020-01-26 RX ADMIN — FAMOTIDINE 20 MILLIGRAM(S): 10 INJECTION INTRAVENOUS at 18:22

## 2020-01-26 RX ADMIN — Medication 20 MILLIEQUIVALENT(S): at 05:37

## 2020-01-26 RX ADMIN — Medication 50 GRAM(S): at 11:46

## 2020-01-26 RX ADMIN — FAMOTIDINE 20 MILLIGRAM(S): 10 INJECTION INTRAVENOUS at 05:37

## 2020-01-26 RX ADMIN — DULOXETINE HYDROCHLORIDE 30 MILLIGRAM(S): 30 CAPSULE, DELAYED RELEASE ORAL at 11:47

## 2020-01-26 RX ADMIN — Medication 2.5 MILLIGRAM(S): at 12:05

## 2020-01-26 RX ADMIN — CHLORHEXIDINE GLUCONATE 1 APPLICATION(S): 213 SOLUTION TOPICAL at 05:38

## 2020-01-26 RX ADMIN — Medication 50 MILLIGRAM(S): at 18:22

## 2020-01-26 RX ADMIN — Medication 1 TABLET(S): at 11:46

## 2020-01-26 RX ADMIN — ENOXAPARIN SODIUM 100 MILLIGRAM(S): 100 INJECTION SUBCUTANEOUS at 05:37

## 2020-01-26 RX ADMIN — Medication 81 MILLIGRAM(S): at 11:46

## 2020-01-26 RX ADMIN — Medication 50 MILLIGRAM(S): at 05:37

## 2020-01-26 RX ADMIN — LOSARTAN POTASSIUM 100 MILLIGRAM(S): 100 TABLET, FILM COATED ORAL at 05:37

## 2020-01-26 RX ADMIN — Medication 100 MILLIGRAM(S): at 11:46

## 2020-01-26 RX ADMIN — Medication 10 MILLIGRAM(S): at 22:15

## 2020-01-26 RX ADMIN — SENNA PLUS 1 TABLET(S): 8.6 TABLET ORAL at 05:37

## 2020-01-26 RX ADMIN — ENOXAPARIN SODIUM 100 MILLIGRAM(S): 100 INJECTION SUBCUTANEOUS at 18:21

## 2020-01-26 NOTE — PROGRESS NOTE ADULT - SUBJECTIVE AND OBJECTIVE BOX
80 yo M with hx of HTN, A.fib (Eliquis), CHF, HLD brought in by EMS post cardiac arrest. As per wife at bedside, patient at the basement watching movie and was doing fine. Few mins later, patient came up and told the wife that he wasn't feeling right and that he felt funny. Then he crashed. Wife called the EMS who arrived 5 mins later. Upon EMS arrival, patient was found to be in PEA then asystole, s/p resuscitation for ~ 10 mins and ROSC achieved after 1 round of compression and epi. While on the way to hospital, patient had another cardiac arrest on the ambulant s/p CPR and ROSC achieved few mins later. As per family, patient was doing welll and at baseline prior to the episode. As baseline patient is fully functional. Denied any recent infection, recent hospitalization, recent ED visit, fever, chills.     Cardiac Cath with CAD    Vital Signs Last 24 Hrs  T(C): 36.7 (26 Jan 2020 04:00), Max: 37.1 (25 Jan 2020 20:00)  T(F): 98.1 (26 Jan 2020 04:00), Max: 98.7 (25 Jan 2020 20:00)  HR: 57 (26 Jan 2020 07:00) (51 - 72)  BP: 142/63 (26 Jan 2020 07:00) (94/55 - 172/83)  BP(mean): 107 (26 Jan 2020 07:00) (69 - 119)  RR: 21 (26 Jan 2020 07:00) (15 - 22)  SpO2: 99% (26 Jan 2020 07:00) (92% - 100%)    alert, awake, verbal  follows commands  neck collar  S1S2 reg rate  b/a air entry, no wheeze  soft abdomen, non distended  moves all 4 ext, power is 4/5 b/l in upper and lower ext    WBC 8.8, Hg 10, plt 200  BUN 17, Cr 0.7    CXR  from 1/24 reviewed.    a/p:    CAD s/p Cardiac arrest  Cervical spine injury post fall at home  Chr. A fib  HTN    ASA 81 mg PO, Lovenox 100 mg BID  BP controlled with Oral meds - losartan, norvasc  Rate control - metoprolol  PO Statin  Keep neck collar for now  OOB to chair with fall precaution  PreOp w/up as per CT surgery

## 2020-01-26 NOTE — PROGRESS NOTE ADULT - SUBJECTIVE AND OBJECTIVE BOX
SUBJECTIVE ASSESSMENT:  pt feels better    Vital Signs Last 24 Hrs  T(C): 36.7 (26 Jan 2020 04:00), Max: 37.1 (25 Jan 2020 20:00)  T(F): 98.1 (26 Jan 2020 04:00), Max: 98.7 (25 Jan 2020 20:00)  HR: 57 (26 Jan 2020 07:00) (51 - 74)  BP: 142/63 (26 Jan 2020 07:00) (94/55 - 172/83)  BP(mean): 107 (26 Jan 2020 07:00) (67 - 119)  RR: 21 (26 Jan 2020 07:00) (15 - 22)  SpO2: 99% (26 Jan 2020 07:00) (92% - 100%)  01-25-20 @ 07:01  -  01-26-20 @ 07:00  --------------------------------------------------------  IN: 1205 mL / OUT: 642 mL / NET: 563 mL    LABS:                        10.0   8.82  )-----------( 200      ( 26 Jan 2020 04:00 )             32.4     01-26    146  |  106  |  17  ----------------------------<  96  3.9   |  28  |  0.7    Ca    8.3<L>      26 Jan 2020 03:44  Mg     1.9     01-26    MEDICATIONS  (STANDING):  amLODIPine   Tablet 2.5 milliGRAM(s) Oral daily  aspirin  chewable 81 milliGRAM(s) Oral daily  atorvastatin 80 milliGRAM(s) Oral at bedtime  chlorhexidine 4% Liquid 1 Application(s) Topical <User Schedule>  dronabinol 2.5 milliGRAM(s) Oral daily  DULoxetine 30 milliGRAM(s) Oral daily  enoxaparin Injectable 100 milliGRAM(s) SubCutaneous two times a day  famotidine    Tablet 20 milliGRAM(s) Oral two times a day  losartan 100 milliGRAM(s) Oral daily  metoprolol tartrate 50 milliGRAM(s) Oral two times a day  multivitamin 1 Tablet(s) Oral daily  nitroglycerin  Infusion 5 MICROgram(s)/Min (1.5 mL/Hr) IV Continuous <Continuous>  senna 1 Tablet(s) Oral two times a day  thiamine 100 milliGRAM(s) Oral daily    MEDICATIONS  (PRN):  bisacodyl 5 milliGRAM(s) Oral every 12 hours PRN Constipation

## 2020-01-27 LAB — MAGNESIUM SERPL-MCNC: 2.2 MG/DL — SIGNIFICANT CHANGE UP (ref 1.8–2.4)

## 2020-01-27 PROCEDURE — 99231 SBSQ HOSP IP/OBS SF/LOW 25: CPT

## 2020-01-27 PROCEDURE — 99233 SBSQ HOSP IP/OBS HIGH 50: CPT

## 2020-01-27 PROCEDURE — 71045 X-RAY EXAM CHEST 1 VIEW: CPT | Mod: 26

## 2020-01-27 RX ADMIN — Medication 81 MILLIGRAM(S): at 13:22

## 2020-01-27 RX ADMIN — SENNA PLUS 1 TABLET(S): 8.6 TABLET ORAL at 05:32

## 2020-01-27 RX ADMIN — ATORVASTATIN CALCIUM 80 MILLIGRAM(S): 80 TABLET, FILM COATED ORAL at 21:47

## 2020-01-27 RX ADMIN — Medication 50 MILLIGRAM(S): at 05:34

## 2020-01-27 RX ADMIN — Medication 50 MILLIGRAM(S): at 18:04

## 2020-01-27 RX ADMIN — AMLODIPINE BESYLATE 2.5 MILLIGRAM(S): 2.5 TABLET ORAL at 05:32

## 2020-01-27 RX ADMIN — CHLORHEXIDINE GLUCONATE 1 APPLICATION(S): 213 SOLUTION TOPICAL at 05:35

## 2020-01-27 RX ADMIN — DULOXETINE HYDROCHLORIDE 30 MILLIGRAM(S): 30 CAPSULE, DELAYED RELEASE ORAL at 13:23

## 2020-01-27 RX ADMIN — Medication 100 MILLIGRAM(S): at 13:24

## 2020-01-27 RX ADMIN — Medication 1 TABLET(S): at 13:21

## 2020-01-27 RX ADMIN — ENOXAPARIN SODIUM 100 MILLIGRAM(S): 100 INJECTION SUBCUTANEOUS at 05:35

## 2020-01-27 RX ADMIN — FAMOTIDINE 20 MILLIGRAM(S): 10 INJECTION INTRAVENOUS at 18:04

## 2020-01-27 RX ADMIN — PREGABALIN 1000 MICROGRAM(S): 225 CAPSULE ORAL at 13:22

## 2020-01-27 RX ADMIN — ENOXAPARIN SODIUM 100 MILLIGRAM(S): 100 INJECTION SUBCUTANEOUS at 17:57

## 2020-01-27 RX ADMIN — Medication 2.5 MILLIGRAM(S): at 13:24

## 2020-01-27 RX ADMIN — FAMOTIDINE 20 MILLIGRAM(S): 10 INJECTION INTRAVENOUS at 05:32

## 2020-01-27 RX ADMIN — LOSARTAN POTASSIUM 100 MILLIGRAM(S): 100 TABLET, FILM COATED ORAL at 05:34

## 2020-01-27 NOTE — PROGRESS NOTE ADULT - SUBJECTIVE AND OBJECTIVE BOX
OPERATIVE PROCEDURE(s):      pre-op for cabg           SURGEON(s): lynn    SUBJECTIVE ASSESSMENT: pt is complaining of wanting to go back in bed and did walk today with nursing staff with walker    Vital Signs Last 24 Hrs  T(C): 36.8 (27 Jan 2020 05:35), Max: 37.1 (26 Jan 2020 16:00)  T(F): 98.2 (27 Jan 2020 05:35), Max: 98.7 (26 Jan 2020 16:00)  HR: 64 (27 Jan 2020 07:09) (53 - 79)  BP: 141/66 (27 Jan 2020 07:09) (92/72 - 158/79)  BP(mean): 95 (27 Jan 2020 07:09) (78 - 112)  RR: 25 (27 Jan 2020 07:09) (13 - 32)  SpO2: 99% (27 Jan 2020 07:09) (92% - 100%)  01-26-20 @ 07:01  -  01-27-20 @ 07:00  --------------------------------------------------------  IN: 850 mL / OUT: 400 mL / NET: 450 mL    Physical Exam  General: alert and oriented x 3  Chest: equal bs at bl bases  CVS: s1s2  Abd: pos bs soft nt  GI/ :  Ext: trace edema; pos ecchymosis and mild edema of right forearm at the site of radial pseudoaneurysm    Central Venous Catheter: Yes[ ]  No[x ] , If Yes indication:           Day #    Mora Catheter: Yes  [ ] , No [ x] : If yes indication:                         Day #    NGT: Yes [ ] No [  x]     If Yes Placement:                                          Day #    LABS:                        10.0   8.82  )-----------( 200      ( 26 Jan 2020 04:00 )             32.4     COUMADIN:   [ ] YES [x ] NO      01-26    146  |  106  |  17  ----------------------------<  96  3.9   |  28  |  0.7    Ca    8.3<L>      26 Jan 2020 03:44  Mg     2.2     01-27    MEDICATIONS  (STANDING):  amLODIPine   Tablet 2.5 milliGRAM(s) Oral daily  aspirin  chewable 81 milliGRAM(s) Oral daily  atorvastatin 80 milliGRAM(s) Oral at bedtime  chlorhexidine 4% Liquid 1 Application(s) Topical <User Schedule>  cyanocobalamin 1000 MICROGram(s) Oral daily  dronabinol 2.5 milliGRAM(s) Oral daily  DULoxetine 30 milliGRAM(s) Oral daily  enoxaparin Injectable 100 milliGRAM(s) SubCutaneous two times a day  famotidine    Tablet 20 milliGRAM(s) Oral two times a day  losartan 100 milliGRAM(s) Oral daily  metoprolol tartrate 50 milliGRAM(s) Oral two times a day  multivitamin 1 Tablet(s) Oral daily  senna 1 Tablet(s) Oral two times a day  thiamine 100 milliGRAM(s) Oral daily    MEDICATIONS  (PRN):  bisacodyl 5 milliGRAM(s) Oral every 12 hours PRN Constipation      Allergies    No Known Allergies    Intolerances    Ambulation/Activity Status:  ambulated well with walker    RADIOLOGY & ADDITIONAL TESTS:  Xray Chest 1 View- PORTABLE-Routine: AM   Indication: Shortness of Breath  Transport: Portable,  w/ Monitor  Provider's Contact #: [phone] (01-27-20 @ 09:02)    EXAM:  XR CHEST PORTABLE ROUTINE 1V            PROCEDURE DATE:  01/27/2020      INTERPRETATION:  Clinical History / Reason for exam: Shortness of breath..    Comparison : Chest radiograph January 24, 2020.    Technique/Positioning: Single frontal chest x-ray obtained..    Findings:    Support devices: None.    Cardiac/mediastinum/hilum: Unchanged.    Lung parenchyma/Pleura: Stable bilateral opacities. No pneumothorax.    Skeleton/soft tissues: Unchanged.    Impression:      Stable bilateral opacities.      Assessment/Plan: Patient is a 78 yo male pre-op for cabg   cont present tx as per ct surgeon  pre-op for cabg- cont asa, statin and lopressor  a. fib- pt is rate controlled ; cont therapeutic lovenox  encourage is and ambulation  cont cervical collar for support  right radial pseudoaneurysm- pt may need surgical repair by vascular most likely at time of cabg    Social Service Disposition: OPERATIVE PROCEDURE(s):      pre-op for cabg           SURGEON(s): lynn    SUBJECTIVE ASSESSMENT: pt is complaining of wanting to go back in bed and did walk today with nursing staff with walker    Vital Signs Last 24 Hrs  T(C): 36.8 (27 Jan 2020 05:35), Max: 37.1 (26 Jan 2020 16:00)  T(F): 98.2 (27 Jan 2020 05:35), Max: 98.7 (26 Jan 2020 16:00)  HR: 64 (27 Jan 2020 07:09) (53 - 79)  BP: 141/66 (27 Jan 2020 07:09) (92/72 - 158/79)  BP(mean): 95 (27 Jan 2020 07:09) (78 - 112)  RR: 25 (27 Jan 2020 07:09) (13 - 32)  SpO2: 99% (27 Jan 2020 07:09) (92% - 100%)  01-26-20 @ 07:01  -  01-27-20 @ 07:00  --------------------------------------------------------  IN: 850 mL / OUT: 400 mL / NET: 450 mL    Physical Exam  General: alert and oriented x 3  Chest: equal bs at bl bases  CVS: s1s2  Abd: pos bs soft nt  GI/ :  Ext: trace edema; pos ecchymosis and mild edema of right forearm at the site of radial pseudoaneurysm    Central Venous Catheter: Yes[ ]  No[x ] , If Yes indication:           Day #    Mora Catheter: Yes  [ ] , No [ x] : If yes indication:                         Day #    NGT: Yes [ ] No [  x]     If Yes Placement:                                          Day #    LABS:                        10.0   8.82  )-----------( 200      ( 26 Jan 2020 04:00 )             32.4     01-26    146  |  106  |  17  ----------------------------<  96  3.9   |  28  |  0.7    Ca    8.3<L>      26 Jan 2020 03:44  Mg     2.2     01-27    MEDICATIONS  (STANDING):  amLODIPine   Tablet 2.5 milliGRAM(s) Oral daily  aspirin  chewable 81 milliGRAM(s) Oral daily  atorvastatin 80 milliGRAM(s) Oral at bedtime  chlorhexidine 4% Liquid 1 Application(s) Topical <User Schedule>  cyanocobalamin 1000 MICROGram(s) Oral daily  dronabinol 2.5 milliGRAM(s) Oral daily  DULoxetine 30 milliGRAM(s) Oral daily  enoxaparin Injectable 100 milliGRAM(s) SubCutaneous two times a day  famotidine    Tablet 20 milliGRAM(s) Oral two times a day  losartan 100 milliGRAM(s) Oral daily  metoprolol tartrate 50 milliGRAM(s) Oral two times a day  multivitamin 1 Tablet(s) Oral daily  senna 1 Tablet(s) Oral two times a day  thiamine 100 milliGRAM(s) Oral daily    MEDICATIONS  (PRN):  bisacodyl 5 milliGRAM(s) Oral every 12 hours PRN Constipation    Allergies  No Known Allergies    Intolerances  Ambulation/Activity Status:  ambulated well with walker    RADIOLOGY & ADDITIONAL TESTS:  Xray Chest 1 View- PORTABLE-Routine: AM   Indication: Shortness of Breath  Transport: Portable,  w/ Monitor  Provider's Contact #: [phone] (01-27-20 @ 09:02)    EXAM:  XR CHEST PORTABLE ROUTINE 1V          PROCEDURE DATE:  01/27/2020      INTERPRETATION:  Clinical History / Reason for exam: Shortness of breath..    Comparison : Chest radiograph January 24, 2020.    Technique/Positioning: Single frontal chest x-ray obtained..    Findings:    Support devices: None.    Cardiac/mediastinum/hilum: Unchanged.    Lung parenchyma/Pleura: Stable bilateral opacities. No pneumothorax.    Skeleton/soft tissues: Unchanged.    Impression:      Stable bilateral opacities.    Assessment/Plan: Patient is a 80 yo male pre-op for cabg   cont present tx as per ct surgeon  pre-op for cabg- cont asa, statin and lopressor  a. fib- pt is rate controlled ; cont therapeutic lovenox  encourage is and ambulation  cont cervical collar for support  right radial pseudoaneurysm- pt may need surgical repair by vascular most likely at time of cabg    Social Service Disposition:

## 2020-01-27 NOTE — CHART NOTE - NSCHARTNOTEFT_GEN_A_CORE
Registered Dietitian Follow-Up     Patient Profile Reviewed                           Yes [x]   No []     Nutrition History Previously Obtained        Yes [x]  No []       Pertinent Subjective Information:      Pertinent Medical Interventions: CAD s/p Cardiac arrest. PreOp w/up as per CT surgery. Cervical spine injury post fall at home: cervical collar. OOB to chair with fall precaution.  BP controlled.      Diet order: dysphagia 3 soft nectar consistency fluid, DASH/TLC, no carb Prosource BID, Ensure enlive BID     Anthropometrics:  - Ht. 175.3cm   - Wt. 101.2kg on 1/26 vs. 108.1kg   - %wt change  - BMI 35.2  - IBW 160lbs      Pertinent Lab Data: (1/26) RBC 3.16, Hg 10.0, Hct 32.4     Pertinent Meds: Cyanocobalamin, Dulcolax, Senna, Pepcid, MVI, vit B1, Atorvastatin      Physical Findings:  - Appearance: confused, 1+ L foot edema, 2+ to R arm  - GI function: no symptoms noted, last BM 1/26  - Tubes: none noted   - Oral/Mouth cavity: no symptoms noted  - Skin: incision (BS 19)      Nutrition Requirements (from RD note on 1/20)   Weight Used: 72.3kg     Estimated Energy Needs    Continue [x]  Adjust []  1818-2169kcal (25-30kcal/kg IBW) for BMI >30  Adjusted Energy Recommendations:   kcal/day        Estimated Protein Needs    Continue [x]  Adjust [] 72-86g (1-1.2g/kg IBW) as above  Adjusted Protein Recommendations:   gm/day        Estimated Fluid Needs        Continue []  Adjust [x] per CTU team   Adjusted Fluid Recommendations:   mL/day     Nutrient Intake: ~75% PO at meals         [] Previous Nutrition Diagnosis:  Inadequate protein energy intake            [] Ongoing          [] Resolved       Nutrition Intervention: meals and snacks    Rec: Continue dysphagia 3 soft nectar consistency fluid per SLP recs, DASH/TLC, discontinue no carb Prosource BID, continue Ensure enlive BID       Goal/Expected Outcome:      Indicator/Monitoring:  diet order, energy intake, body composition, NFPF Registered Dietitian Follow-Up     Patient Profile Reviewed                           Yes [x]   No []     Nutrition History Previously Obtained        Yes [x]  No []       Pertinent Subjective Information: Pt. dozing off in chair during visit,. spoke to wife at bedside. Per wife pt. with minimal PO intake at this time, likes the food, but has no appetite. Ensure enlive about 1 bottle daily thickened to nectar. Per wife pt. likes pudding, provided sample of Ensure pudding today.      Pertinent Medical Interventions: CAD s/p Cardiac arrest. PreOp w/up as per CT surgery. Cervical spine injury post fall at home: cervical collar. OOB to chair with fall precaution.  BP controlled. SLP rec: dysphagia 3 mech soft nectar consistency fluid      Diet order: dysphagia 3 soft nectar consistency fluid, DASH/TLC, no carb Prosource BID, Ensure enlive BID     Anthropometrics:  - Ht. 175.3cm   - Wt. 101.2kg on 1/26 vs. 108.1kg ?bed scale discrepancy vs weight trending down, will continue to monitor wt trends   - %wt change  - BMI 35.2  - IBW 160lbs      Pertinent Lab Data: (1/26) RBC 3.16, Hg 10.0, Hct 32.4     Pertinent Meds: Cyanocobalamin, Dulcolax, Senna, Pepcid, MVI, vit B1, Atorvastatin      Physical Findings:  - Appearance: confused, 1+ L foot edema, 2+ to R arm  - GI function: no symptoms noted, last BM 1/26  - Tubes: none noted   - Oral/Mouth cavity: no symptoms noted  - Skin: incision (BS 19)      Nutrition Requirements (from RD note on 1/20)   Weight Used: 72.3kg     Estimated Energy Needs    Continue [x]  Adjust []  1818-2169kcal (25-30kcal/kg IBW) for BMI >30  Adjusted Energy Recommendations:   kcal/day        Estimated Protein Needs    Continue [x]  Adjust [] 72-86g (1-1.2g/kg IBW) as above  Adjusted Protein Recommendations:   gm/day        Estimated Fluid Needs        Continue []  Adjust [x] per CTU team   Adjusted Fluid Recommendations:   mL/day     Nutrient Intake: ~75% PO at meals         [] Previous Nutrition Diagnosis:  Inadequate protein energy intake            [x] Ongoing          [] Resolved       Nutrition Intervention: meals and snacks, medical food supplement    Rec: Continue dysphagia 3 soft nectar consistency fluid per SLP recs, DASH/TLC, discontinue no carb Prosource BID, continue Ensure enlive BID  and add Ensure pudding TID     Goal/Expected Outcome: In 3 days pt. to consume at least 25-50% PO at meals + supplement       Indicator/Monitoring:  diet order, energy intake, body composition, NFPF

## 2020-01-27 NOTE — PROGRESS NOTE ADULT - SUBJECTIVE AND OBJECTIVE BOX
CTU Attending Progress Daily Note     27 Jan 2020 11:49  preop CABG  He has history of Atrial fibrillation  Neuropathy  HLD (hyperlipidemia)  HTN (hypertension)    Interval event for past 24 hr:  MED IVORY  79y had no event.   Current Complains:  MED IVORY has no new complains  HPI:  80 yo M with hx of HTN, A.fib (Eliquis), CHF, HLD brought in by EMS post cardiac arrest. As per wife at bedside, patient at the basement watching movie and was doing fine. Few mins later, patient came up and told the wife that he wasn't feeling right and that he felt funny. Then he crashed. Wife called the EMS who arrived 5 mins later. Upon EMS arrival, patient was found to be in PEA then asystole, s/p resuscitation for ~ 10 mins and ROSC achieved after 1 round of compression and epi. While on the way to hospital, patient had another cardiac arrest on the ambulant s/p CPR and ROSC achieved few mins later. As per family, patient was doing welll and at baseline prior to the episode. As baseline patient is fully functional. Denied any recent infection, recent hospitalization, recent ED visit, fever, chills.     In ED, patient was found to bradycardic and hypotensive. s/p 1 dose of atropine and patient was started on low dose Levophed with improvement in HR and BP. (12 Jan 2020 22:13)    OBJECTIVE:  ICU Vital Signs Last 24 Hrs  T(C): 36.8 (27 Jan 2020 05:35), Max: 37.1 (26 Jan 2020 12:00)  T(F): 98.2 (27 Jan 2020 05:35), Max: 98.7 (26 Jan 2020 12:00)  HR: 64 (27 Jan 2020 07:09) (53 - 79)  BP: 141/66 (27 Jan 2020 07:09) (78/49 - 158/79)  BP(mean): 95 (27 Jan 2020 07:09) (58 - 112)  ABP: --  ABP(mean): --  RR: 25 (27 Jan 2020 07:09) (13 - 32)  SpO2: 99% (27 Jan 2020 07:09) (92% - 100%)    I&O's Summary    26 Jan 2020 07:01  -  27 Jan 2020 07:00  --------------------------------------------------------  IN: 850 mL / OUT: 400 mL / NET: 450 mL      I&O's Detail    26 Jan 2020 07:01  -  27 Jan 2020 07:00  --------------------------------------------------------  IN:    IV PiggyBack: 50 mL    Oral Fluid: 800 mL  Total IN: 850 mL    OUT:    Voided: 400 mL  Total OUT: 400 mL    Total NET: 450 mL        Adult Advanced Hemodynamics Last 24 Hrs  CVP(mm Hg): --  CVP(cm H2O): --  CO: --  CI: --  PA: --  PA(mean): --  PCWP: --  SVR: --  SVRI: --  PVR: --  PVRI: --    CAPILLARY BLOOD GLUCOSE        LABS:                          10.0   8.82  )-----------( 200      ( 26 Jan 2020 04:00 )             32.4     01-26    146  |  106  |  17  ----------------------------<  96  3.9   |  28  |  0.7    Ca    8.3<L>      26 Jan 2020 03:44  Mg     2.2     01-27            Home Medications:  carvedilol 12.5 mg oral tablet: 1 tab(s) orally 2 times a day (16 Jan 2020 12:31)  clonazePAM 1 mg oral tablet: 1 tab(s) orally 3 times a day (16 Jan 2020 12:31)  DULoxetine 30 mg oral delayed release capsule: 1 cap(s) orally once a day (16 Jan 2020 12:31)  Eliquis 5 mg oral tablet: 1 tab(s) orally 2 times a day (16 Jan 2020 12:31)  furosemide 40 mg oral tablet: 1 tab(s) orally once a day (16 Jan 2020 12:31)  gabapentin 300 mg oral capsule: 1 cap(s) orally 2 times a day (16 Jan 2020 12:31)  omeprazole 20 mg oral delayed release capsule: 1 cap(s) orally once a day (16 Jan 2020 12:31)  potassium chloride 10 mEq oral capsule, extended release: 1 cap(s) orally once a day (16 Jan 2020 12:31)  simvastatin 40 mg oral tablet: 1 tab(s) orally once a day (at bedtime) (16 Jan 2020 12:31)  telmisartan 80 mg oral tablet: 1 tab(s) orally once a day (16 Jan 2020 12:31)  Vitamin D3: cap(s) orally once a day (16 Jan 2020 12:31)    HOSPITAL MEDICATIONS:  MEDICATIONS  (STANDING):  amLODIPine   Tablet 2.5 milliGRAM(s) Oral daily  aspirin  chewable 81 milliGRAM(s) Oral daily  atorvastatin 80 milliGRAM(s) Oral at bedtime  chlorhexidine 4% Liquid 1 Application(s) Topical <User Schedule>  cyanocobalamin 1000 MICROGram(s) Oral daily  dronabinol 2.5 milliGRAM(s) Oral daily  DULoxetine 30 milliGRAM(s) Oral daily  enoxaparin Injectable 100 milliGRAM(s) SubCutaneous two times a day  famotidine    Tablet 20 milliGRAM(s) Oral two times a day  losartan 100 milliGRAM(s) Oral daily  metoprolol tartrate 50 milliGRAM(s) Oral two times a day  multivitamin 1 Tablet(s) Oral daily  senna 1 Tablet(s) Oral two times a day  thiamine 100 milliGRAM(s) Oral daily    MEDICATIONS  (PRN):  bisacodyl 5 milliGRAM(s) Oral every 12 hours PRN Constipation      REVIEW OF SYSTEMS:  CONSTITUTIONAL: [X] all negative; [ ] weakness, [ ] fevers, [ ] chills  EYES/ENT: [X] all negative; [ ] visual changes, [ ] vertigo, [ ] throat pain   NECK: [X] all negative; [ ] pain, [ ] stiffness  RESPIRATORY: [] all negative, [ ] cough, [ ] wheezing, [ ] hemoptysis, [ ] shortness of breath  CARDIOVASCULAR: [] all negative; [ ] chest pain, [ ] palpitations, [ ] orthopnea  GASTROINTESTINAL: [X] all negative; [ ]abdominal pain, [ ] nausea, [ ] vomiting, [ ] hematemesis, [ ] diarrhea, [ ] constipation, [ ] melena, [ ] hematochezia.  GENITOURINARY: [X] all negative; [ ] dysuria, [ ] frequency, [ ] hematuria  NEUROLOGICAL: [X] all negative; [ ] numbness, [ ] weakness  SKIN: [X] all negative; [ ] itching, [ ] burning, [ ] rashes, [ ] lesions   All other review of systems is negative unless indicated above.    [  ] Unable to assess ROS because     PHYSICAL EXAM:          CONSTITUTIONAL: Well-developed; well-nourished; in no acute distress.   	SKIN: warm, dry  	HEAD: Normocephalic; atraumatic.  	EYES: PERRL, EOM, no conj injection, sclera clear  	ENT: No nasal discharge; airway clear.  	NECK: Supple; non tender.  No midline ttp ctls  	CARD: S1, S2 normal; no murmurs, gallops, or rubs. Regular rate and rhythm. 2+ RPs and DPs bilat, no carotid bruits, no pedal   edema, no calf pain b/l  	RESP: CTA  bilat good air movement No wheezes, rales or rhonchi.  	ABD: Soft, not tender, not distended, no CVA ttp no rebound or guarding, bowel sounds present  	EXT: Normal ROM.  No clubbing, cyanosis or edema.   	  	NEURO: Alert, awake, motor 5/5 R, 5/5 L        RADIOLOGY:  xray  < from: Xray Chest 1 View- PORTABLE-Routine (01.27.20 @ 04:20) >  Impression:      Stable bilateral opacities.    < end of copied text >    I spent 45 minutes of critical care time examining patient, reviewing vitals, labs, medications, imaging and discussing with the team goals of care to prevent life-threatening in this patient who is at high risk for deterioration or death due to:

## 2020-01-28 PROCEDURE — 71045 X-RAY EXAM CHEST 1 VIEW: CPT | Mod: 26

## 2020-01-28 PROCEDURE — 99231 SBSQ HOSP IP/OBS SF/LOW 25: CPT

## 2020-01-28 PROCEDURE — 99233 SBSQ HOSP IP/OBS HIGH 50: CPT

## 2020-01-28 RX ORDER — FUROSEMIDE 40 MG
40 TABLET ORAL ONCE
Refills: 0 | Status: COMPLETED | OUTPATIENT
Start: 2020-01-28 | End: 2020-01-28

## 2020-01-28 RX ORDER — POTASSIUM CHLORIDE 20 MEQ
40 PACKET (EA) ORAL ONCE
Refills: 0 | Status: COMPLETED | OUTPATIENT
Start: 2020-01-28 | End: 2020-01-28

## 2020-01-28 RX ADMIN — FAMOTIDINE 20 MILLIGRAM(S): 10 INJECTION INTRAVENOUS at 05:47

## 2020-01-28 RX ADMIN — Medication 100 MILLIGRAM(S): at 13:22

## 2020-01-28 RX ADMIN — Medication 1 TABLET(S): at 13:22

## 2020-01-28 RX ADMIN — PREGABALIN 1000 MICROGRAM(S): 225 CAPSULE ORAL at 13:22

## 2020-01-28 RX ADMIN — ATORVASTATIN CALCIUM 80 MILLIGRAM(S): 80 TABLET, FILM COATED ORAL at 21:51

## 2020-01-28 RX ADMIN — LOSARTAN POTASSIUM 100 MILLIGRAM(S): 100 TABLET, FILM COATED ORAL at 05:46

## 2020-01-28 RX ADMIN — AMLODIPINE BESYLATE 2.5 MILLIGRAM(S): 2.5 TABLET ORAL at 05:46

## 2020-01-28 RX ADMIN — Medication 40 MILLIEQUIVALENT(S): at 10:32

## 2020-01-28 RX ADMIN — FAMOTIDINE 20 MILLIGRAM(S): 10 INJECTION INTRAVENOUS at 18:09

## 2020-01-28 RX ADMIN — Medication 50 MILLIGRAM(S): at 18:09

## 2020-01-28 RX ADMIN — Medication 50 MILLIGRAM(S): at 05:46

## 2020-01-28 RX ADMIN — Medication 40 MILLIGRAM(S): at 10:32

## 2020-01-28 RX ADMIN — SENNA PLUS 1 TABLET(S): 8.6 TABLET ORAL at 05:46

## 2020-01-28 RX ADMIN — Medication 2.5 MILLIGRAM(S): at 13:40

## 2020-01-28 RX ADMIN — ENOXAPARIN SODIUM 100 MILLIGRAM(S): 100 INJECTION SUBCUTANEOUS at 18:09

## 2020-01-28 RX ADMIN — CHLORHEXIDINE GLUCONATE 1 APPLICATION(S): 213 SOLUTION TOPICAL at 05:33

## 2020-01-28 RX ADMIN — Medication 81 MILLIGRAM(S): at 13:23

## 2020-01-28 RX ADMIN — DULOXETINE HYDROCHLORIDE 30 MILLIGRAM(S): 30 CAPSULE, DELAYED RELEASE ORAL at 13:22

## 2020-01-28 RX ADMIN — ENOXAPARIN SODIUM 100 MILLIGRAM(S): 100 INJECTION SUBCUTANEOUS at 05:47

## 2020-01-28 RX ADMIN — CHLORHEXIDINE GLUCONATE 1 APPLICATION(S): 213 SOLUTION TOPICAL at 19:30

## 2020-01-28 NOTE — PROGRESS NOTE ADULT - SUBJECTIVE AND OBJECTIVE BOX
CTU Attending Progress Daily Note     28 Jan 2020 09:53  POD# -   He has history of Atrial fibrillation  Neuropathy  HLD (hyperlipidemia)  HTN (hypertension)    Interval event for past 24 hr:  MED IVORY  79y had no event.   Current Complains:  MED IVORY has no new complains  HPI:  78 yo M with hx of HTN, A.fib (Eliquis), CHF, HLD brought in by EMS post cardiac arrest. As per wife at bedside, patient at the basement watching movie and was doing fine. Few mins later, patient came up and told the wife that he wasn't feeling right and that he felt funny. Then he crashed. Wife called the EMS who arrived 5 mins later. Upon EMS arrival, patient was found to be in PEA then asystole, s/p resuscitation for ~ 10 mins and ROSC achieved after 1 round of compression and epi. While on the way to hospital, patient had another cardiac arrest on the ambulant s/p CPR and ROSC achieved few mins later. As per family, patient was doing welll and at baseline prior to the episode. As baseline patient is fully functional. Denied any recent infection, recent hospitalization, recent ED visit, fever, chills.     In ED, patient was found to bradycardic and hypotensive. s/p 1 dose of atropine and patient was started on low dose Levophed with improvement in HR and BP. (12 Jan 2020 22:13)    OBJECTIVE:  ICU Vital Signs Last 24 Hrs  T(C): 36.1 (28 Jan 2020 08:00), Max: 37.1 (27 Jan 2020 12:00)  T(F): 96.9 (28 Jan 2020 08:00), Max: 98.7 (27 Jan 2020 12:00)  HR: 52 (28 Jan 2020 08:00) (52 - 75)  BP: 125/70 (28 Jan 2020 08:00) (84/51 - 179/92)  BP(mean): 91 (28 Jan 2020 08:00) (62 - 127)  ABP: --  ABP(mean): --  RR: 18 (28 Jan 2020 08:00) (15 - 28)  SpO2: 99% (28 Jan 2020 08:00) (95% - 100%)    I&O's Summary    27 Jan 2020 07:01  -  28 Jan 2020 07:00  --------------------------------------------------------  IN: 960 mL / OUT: 1150 mL / NET: -190 mL      I&O's Detail    27 Jan 2020 07:01  -  28 Jan 2020 07:00  --------------------------------------------------------  IN:    Oral Fluid: 960 mL  Total IN: 960 mL    OUT:    Voided: 1150 mL  Total OUT: 1150 mL    Total NET: -190 mL        Adult Advanced Hemodynamics Last 24 Hrs  CVP(mm Hg): --  CVP(cm H2O): --  CO: --  CI: --  PA: --  PA(mean): --  PCWP: --  SVR: --  SVRI: --  PVR: --  PVRI: --    CAPILLARY BLOOD GLUCOSE        LABS:        Mg     2.2     01-27            Home Medications:  carvedilol 12.5 mg oral tablet: 1 tab(s) orally 2 times a day (16 Jan 2020 12:31)  clonazePAM 1 mg oral tablet: 1 tab(s) orally 3 times a day (16 Jan 2020 12:31)  DULoxetine 30 mg oral delayed release capsule: 1 cap(s) orally once a day (16 Jan 2020 12:31)  Eliquis 5 mg oral tablet: 1 tab(s) orally 2 times a day (16 Jan 2020 12:31)  furosemide 40 mg oral tablet: 1 tab(s) orally once a day (16 Jan 2020 12:31)  gabapentin 300 mg oral capsule: 1 cap(s) orally 2 times a day (16 Jan 2020 12:31)  omeprazole 20 mg oral delayed release capsule: 1 cap(s) orally once a day (16 Jan 2020 12:31)  potassium chloride 10 mEq oral capsule, extended release: 1 cap(s) orally once a day (16 Jan 2020 12:31)  simvastatin 40 mg oral tablet: 1 tab(s) orally once a day (at bedtime) (16 Jan 2020 12:31)  telmisartan 80 mg oral tablet: 1 tab(s) orally once a day (16 Jan 2020 12:31)  Vitamin D3: cap(s) orally once a day (16 Jan 2020 12:31)    HOSPITAL MEDICATIONS:  MEDICATIONS  (STANDING):  amLODIPine   Tablet 2.5 milliGRAM(s) Oral daily  aspirin  chewable 81 milliGRAM(s) Oral daily  atorvastatin 80 milliGRAM(s) Oral at bedtime  chlorhexidine 4% Liquid 1 Application(s) Topical <User Schedule>  cyanocobalamin 1000 MICROGram(s) Oral daily  dronabinol 2.5 milliGRAM(s) Oral daily  DULoxetine 30 milliGRAM(s) Oral daily  enoxaparin Injectable 100 milliGRAM(s) SubCutaneous two times a day  famotidine    Tablet 20 milliGRAM(s) Oral two times a day  furosemide   Injectable 40 milliGRAM(s) IV Push once  losartan 100 milliGRAM(s) Oral daily  metoprolol tartrate 50 milliGRAM(s) Oral two times a day  multivitamin 1 Tablet(s) Oral daily  potassium chloride    Tablet ER 40 milliEquivalent(s) Oral once  senna 1 Tablet(s) Oral two times a day  thiamine 100 milliGRAM(s) Oral daily    MEDICATIONS  (PRN):  bisacodyl 5 milliGRAM(s) Oral every 12 hours PRN Constipation      REVIEW OF SYSTEMS:  CONSTITUTIONAL: [X] all negative; [ ] weakness, [ ] fevers, [ ] chills  EYES/ENT: [X] all negative; [ ] visual changes, [ ] vertigo, [ ] throat pain   NECK: [X] all negative; [ ] pain, [ ] stiffness  RESPIRATORY: [] all negative, [ ] cough, [ ] wheezing, [ ] hemoptysis, [ ] shortness of breath  CARDIOVASCULAR: [] all negative; [ ] chest pain, [ ] palpitations, [ ] orthopnea  GASTROINTESTINAL: [X] all negative; [ ]abdominal pain, [ ] nausea, [ ] vomiting, [ ] hematemesis, [ ] diarrhea, [ ] constipation, [ ] melena, [ ] hematochezia.  GENITOURINARY: [X] all negative; [ ] dysuria, [ ] frequency, [ ] hematuria  NEUROLOGICAL: [X] all negative; [ ] numbness, [ ] weakness  SKIN: [X] all negative; [ ] itching, [ ] burning, [ ] rashes, [ ] lesions   All other review of systems is negative unless indicated above.    [  ] Unable to assess ROS because     PHYSICAL EXAM:          CONSTITUTIONAL: Well-developed; well-nourished; in no acute distress.   	SKIN: warm, dry  	HEAD: Normocephalic; atraumatic.  	EYES: PERRL, EOM, no conj injection, sclera clear  	ENT: No nasal discharge; airway clear.  	NECK: Supple; non tender.  No midline ttp ctls  	CARD: S1, S2 normal; no murmurs, gallops, or rubs. Regular rate and rhythm. 2+ RPs and DPs bilat, no carotid bruits, no pedal   edema, no calf pain b/l  	RESP: CTA  bilat good air movement No wheezes, rales or rhonchi.  	ABD: Soft, not tender, not distended, no CVA ttp no rebound or guarding, bowel sounds present  	EXT: Normal ROM.  No clubbing, cyanosis or edema.   	  	NEURO: Alert, awake, motor 5/5 R, 5/5 L        RADIOLOGY:  xray  < from: Xray Chest 1 View- PORTABLE-Routine (01.28.20 @ 03:57) >    Impression:      Unchanged bilateral opacities.    < end of copied text >    I spent 45 minutes of critical care time examining patient, reviewing vitals, labs, medications, imaging and discussing with the team goals of care to prevent life-threatening in this patient who is at high risk for deterioration or death due to:

## 2020-01-28 NOTE — PROGRESS NOTE ADULT - ASSESSMENT
PROBLEMS:  I spent 45 minutes of time examining patient, reviewing vitals, labs, medications, imaging and discussing with the team goals of care to prevent life-threatening in this patient who is at high risk for deterioration or death due to:      1.	CAD - awaiting surgery - metoprolol 50 and Lovenox 100 q12, ASA 81, lipitor 80   2.	s/p cardiac arrest    3.	chronic A Fib  4.	chronic encephalopathy   5.	right radial pseudoaneurysm   6.	hypertension - add norvasc 5 , wean off NTG  7.	constipation - senna, dulcolax  8.	PUD prophylaxis     PLAN  Neuro: move all 4 extremities. no sensory or motor deficits  Pain management.   dronabinol 2.5 milliGRAM(s) Oral daily  DULoxetine 30 milliGRAM(s) Oral daily    Pulm: Wean off supplemental oxygen as able. Daily CXR. Encourage coughing, deep breathing and use of incentive spirometry.     Cardio: Monitor telemetry/alarms. Continue supportive care   amLODIPine   Tablet 5 milliGRAM(s) Oral daily  losartan 100 milliGRAM(s) Oral daily  metoprolol tartrate 50 milliGRAM(s) Oral two times a day  nitroglycerin  Infusion 5 MICROgram(s)/Min IV Continuous <Continuous>    GI: Continue stool softeners.    bisacodyl 5 milliGRAM(s) Oral every 12 hours PRN  famotidine    Tablet 20 milliGRAM(s) Oral two times a day  senna 1 Tablet(s) Oral two times a day    Nutrition: Continue present diet  Endocrine and glucose control:   atorvastatin 80 milliGRAM(s) Oral at bedtime    Renal: monitor urine output, supplement electrolytes as needed,     Vasc: Heparin SC and/or SCDs for DVT prophylaxis  aspirin  chewable 81 milliGRAM(s) Oral daily  enoxaparin Injectable 100 milliGRAM(s) SubCutaneous two times a day    ID: Stable, no fever , no chills. Off antibiotics.    Therapy: OOB/ambulate  Disposition:  possibl CABG thu or Fri    Pertinent clinical, laboratory, radiographic, hemodynamic, echocardiographic, respiratory data, microbiologic data and chart were reviewed and analyzed frequently throughout the course of the day and night. GI and DVT prophylaxis, glycemic control, head of bed elevation and skin care issues were addressed.  Patient seen, examined and plan discussed with CT Surgery / CTICU team during rounds.    [ ] The patient remains in critical and unstable condition, and requires ICU care and monitoring  [x ] The patient is improving but requires continued monitoring and adjustment of therapy

## 2020-01-28 NOTE — PROGRESS NOTE ADULT - SUBJECTIVE AND OBJECTIVE BOX
OPERATIVE PROCEDURE(s):                POD #                       79yMale  SURGEON(s): LEVI Levy  SUBJECTIVE ASSESSMENT:   Vital Signs Last 24 Hrs  T(F): 96.9 (28 Jan 2020 08:00), Max: 98.7 (27 Jan 2020 12:00)  HR: 52 (28 Jan 2020 08:00) (52 - 75)  BP: 125/70 (28 Jan 2020 08:00) (84/51 - 179/92)  BP(mean): 91 (28 Jan 2020 08:00) (62 - 127)  ABP: --  ABP(mean): --  RR: 18 (28 Jan 2020 08:00) (15 - 28)  SpO2: 99% (28 Jan 2020 08:00) (95% - 100%)  CVP(mm Hg): --  CVP(cm H2O): --  CO: --  CI: --  PA: --  SVR: --    I&O's Detail    27 Jan 2020 07:01  -  28 Jan 2020 07:00  --------------------------------------------------------  IN:    Oral Fluid: 960 mL  Total IN: 960 mL    OUT:    Voided: 1150 mL  Total OUT: 1150 mL        Net:   I&O's Detail    26 Jan 2020 07:01  -  27 Jan 2020 07:00  --------------------------------------------------------  Total NET: 450 mL      27 Jan 2020 07:01  -  28 Jan 2020 07:00  --------------------------------------------------------  Total NET: -190 mL        CAPILLARY BLOOD GLUCOSE        Physical Exam:  General: NAD; A&Ox3/Patient is intubated and sedated  Cardiac: S1/S2, RRR, no murmur, no rubs  Lungs: unlabored respirations, CTA b/l, no wheeze, no rales, no crackles  Abdomen: Soft/NT/ND; positive bowel sounds x 4  Sternum: Intact, no click, incision healing well with no drainage  Incisions: Incisions clean/dry/intact  Extremities: No edema b/l lower extremities; good capillary refill; no cyanosis; palpable 1+ pedal pulses b/l    Central Venous Catheter: Yes[]  No[] , If Yes indication:           Day #  Mora Catheter: Yes  [] , No  [] , If yes indication:                      Day #  NGT: Yes [] No [] ,    If Yes Placement:                                     Day #  EPICARDIAL WIRES:  [] YES [] NO                                              Day #  BOWEL MOVEMENT:  [] YES [] NO, If No, Timing since last BM:      Day #  CHEST TUBE(Left/Right):  [] YES [] NO, If yes -  AIR LEAKS:  [] YES [] NO        LABS:                        10.0<L>  8.82  )-----------( 200      ( 26 Jan 2020 04:00 )             32.4<L>    01-26    146  |  106  |  17  ----------------------------<  96  3.9   |  28  |  0.7    Ca    8.3<L>      26 Jan 2020 03:44  Mg     2.2     01-27            RADIOLOGY & ADDITIONAL TESTS:  CXR:  EKG:  MEDICATIONS  (STANDING):  amLODIPine   Tablet 2.5 milliGRAM(s) Oral daily  aspirin  chewable 81 milliGRAM(s) Oral daily  atorvastatin 80 milliGRAM(s) Oral at bedtime  chlorhexidine 4% Liquid 1 Application(s) Topical <User Schedule>  cyanocobalamin 1000 MICROGram(s) Oral daily  dronabinol 2.5 milliGRAM(s) Oral daily  DULoxetine 30 milliGRAM(s) Oral daily  enoxaparin Injectable 100 milliGRAM(s) SubCutaneous two times a day  famotidine    Tablet 20 milliGRAM(s) Oral two times a day  losartan 100 milliGRAM(s) Oral daily  metoprolol tartrate 50 milliGRAM(s) Oral two times a day  multivitamin 1 Tablet(s) Oral daily  senna 1 Tablet(s) Oral two times a day  thiamine 100 milliGRAM(s) Oral daily    MEDICATIONS  (PRN):  bisacodyl 5 milliGRAM(s) Oral every 12 hours PRN Constipation    HEPARIN:  [] YES [] NO  Dose: XX UNITS/HR UNITS Q8H  LOVENOX:[] YES [] NO  Dose: XX mg Q24H  COUMADIN: []  YES [] NO  Dose: XX mg  Q24H  SCD's: YES b/l  GI Prophylaxis: Protonix [], Pepcid [], None [], (Contra-indication:.....)    Post-Op Beta-Blockers: Yes [], No[], If No, then contraindication:  Post-Op Aspirin: Yes [],  No [], If No, then contraindication:  Post-Op Statin: Yes [], No[], If No, then contraindication:  Allergies    No Known Allergies    Intolerances      Ambulation/Activity Status:    Assessment/Plan:  79y Male status-post .....  - Case and plan discussed with CTU Intensivist and CT Surgeon - Dr. Shaw/Rodríguez/Samir   - Continue CTU supportive care    - Continue DVT/GI prophylaxis  - Incentive Spirometry 10 times an hour  - Continue to advance physical activity as tolerated and continue PT/OT as directed  1. CAD: Continue ASA, statin, BB  2. HTN:   3. A. Fib:   4. COPD/Hypoxia:   5. DM/Glucose Control:     Social Service Disposition: SUBJECTIVE ASSESSMENT:   pt has no major complaints    Vital Signs Last 24 Hrs  T(F): 96.9 (28 Jan 2020 08:00), Max: 98.7 (27 Jan 2020 12:00)  HR: 52 (28 Jan 2020 08:00) (52 - 75)  BP: 125/70 (28 Jan 2020 08:00) (84/51 - 179/92)  BP(mean): 91 (28 Jan 2020 08:00) (62 - 127)  RR: 18 (28 Jan 2020 08:00) (15 - 28)  SpO2: 99% (28 Jan 2020 08:00) (95% - 100%)    I&O's Detail    27 Jan 2020 07:01  -  28 Jan 2020 07:00  --------------------------------------------------------  IN:    Oral Fluid: 960 mL  Total IN: 960 mL    OUT:    Voided: 1150 mL  Total OUT: 1150 mL    Net:   I&O's Detail    26 Jan 2020 07:01  -  27 Jan 2020 07:00  --------------------------------------------------------  Total NET: 450 mL      27 Jan 2020 07:01  -  28 Jan 2020 07:00  --------------------------------------------------------  Total NET: -190 mL    CAPILLARY BLOOD GLUCOSE    Physical Exam:  General: NAD; A&Ox3/Patient is intubated and sedated  Cardiac: S1/S2, RRR, no murmur, no rubs  Lungs: unlabored respirations, CTA b/l, no wheeze, no rales, no crackles  Abdomen: Soft/NT/ND; positive bowel sounds x 4  Sternum: Intact, no click, incision healing well with no drainage  Incisions: Incisions clean/dry/intact  Extremities: No edema b/l lower extremities; good capillary refill; no cyanosis; palpable 1+ pedal pulses b/l    LABS:                        10.0<L>  8.82  )-----------( 200      ( 26 Jan 2020 04:00 )             32.4<L>    01-26    146  |  106  |  17  ----------------------------<  96  3.9   |  28  |  0.7    Ca    8.3<L>      26 Jan 2020 03:44  Mg     2.2     01-27    MEDICATIONS  (STANDING):  amLODIPine   Tablet 2.5 milliGRAM(s) Oral daily  aspirin  chewable 81 milliGRAM(s) Oral daily  atorvastatin 80 milliGRAM(s) Oral at bedtime  chlorhexidine 4% Liquid 1 Application(s) Topical <User Schedule>  cyanocobalamin 1000 MICROGram(s) Oral daily  dronabinol 2.5 milliGRAM(s) Oral daily  DULoxetine 30 milliGRAM(s) Oral daily  enoxaparin Injectable 100 milliGRAM(s) SubCutaneous two times a day  famotidine    Tablet 20 milliGRAM(s) Oral two times a day  losartan 100 milliGRAM(s) Oral daily  metoprolol tartrate 50 milliGRAM(s) Oral two times a day  multivitamin 1 Tablet(s) Oral daily  senna 1 Tablet(s) Oral two times a day  thiamine 100 milliGRAM(s) Oral daily    MEDICATIONS  (PRN):  bisacodyl 5 milliGRAM(s) Oral every 12 hours PRN Constipation    HEPARIN:  [] YES [] NO  Dose: XX UNITS/HR UNITS Q8H  LOVENOX:[] YES [] NO  Dose: XX mg Q24H  COUMADIN: []  YES [] NO  Dose: XX mg  Q24H  SCD's: YES b/l  GI Prophylaxis: Protonix [], Pepcid [], None [], (Contra-indication:.....)    Post-Op Beta-Blockers: Yes [], No[], If No, then contraindication:  Post-Op Aspirin: Yes [],  No [], If No, then contraindication:  Post-Op Statin: Yes [], No[], If No, then contraindication:    Assessment/Plan:  79y Male status-post .....  - Case and plan discussed with CTU Intensivist and CT Surgeon - Dr. Shaw/Rodríguez/Samir   - Continue CTU supportive care    - Continue DVT/GI prophylaxis  - Incentive Spirometry 10 times an hour  - Continue to advance physical activity as tolerated and continue PT/OT as directed  1. CAD: Continue ASA, statin, BB  2. HTN:   3. A. Fib:   4. COPD/Hypoxia:   5. DM/Glucose Control:     Social Service Disposition: SUBJECTIVE ASSESSMENT:   pt has no major complaints    Vital Signs Last 24 Hrs  T(F): 96.9 (28 Jan 2020 08:00), Max: 98.7 (27 Jan 2020 12:00)  HR: 52 (28 Jan 2020 08:00) (52 - 75)  BP: 125/70 (28 Jan 2020 08:00) (84/51 - 179/92)  BP(mean): 91 (28 Jan 2020 08:00) (62 - 127)  RR: 18 (28 Jan 2020 08:00) (15 - 28)  SpO2: 99% (28 Jan 2020 08:00) (95% - 100%)    I&O's Detail    27 Jan 2020 07:01  -  28 Jan 2020 07:00  --------------------------------------------------------  IN:    Oral Fluid: 960 mL  Total IN: 960 mL    OUT:    Voided: 1150 mL  Total OUT: 1150 mL    Net:   I&O's Detail    26 Jan 2020 07:01  -  27 Jan 2020 07:00  --------------------------------------------------------  Total NET: 450 mL      27 Jan 2020 07:01  -  28 Jan 2020 07:00  --------------------------------------------------------  Total NET: -190 mL    CAPILLARY BLOOD GLUCOSE    Physical Exam:  General: NAD; A&Ox3  Cardiac: S1/S2, RRR, no murmur, no rubs  Lungs: unlabored respirations, CTA b/l, no wheeze, no rales, no crackles  Abdomen: Soft/NT/ND; positive bowel sounds x 4  Sternum: Intact, no click, incision healing well with no drainage  Incisions: Incisions clean/dry/intact  Extremities: No edema b/l lower extremities; good capillary refill; no cyanosis; palpable 1+ pedal pulses b/l    LABS:                        10.0<L>  8.82  )-----------( 200      ( 26 Jan 2020 04:00 )             32.4<L>    01-26    146  |  106  |  17  ----------------------------<  96  3.9   |  28  |  0.7    Ca    8.3<L>      26 Jan 2020 03:44  Mg     2.2     01-27    MEDICATIONS  (STANDING):  amLODIPine   Tablet 2.5 milliGRAM(s) Oral daily  aspirin  chewable 81 milliGRAM(s) Oral daily  atorvastatin 80 milliGRAM(s) Oral at bedtime  chlorhexidine 4% Liquid 1 Application(s) Topical <User Schedule>  cyanocobalamin 1000 MICROGram(s) Oral daily  dronabinol 2.5 milliGRAM(s) Oral daily  DULoxetine 30 milliGRAM(s) Oral daily  enoxaparin Injectable 100 milliGRAM(s) SubCutaneous two times a day  famotidine    Tablet 20 milliGRAM(s) Oral two times a day  losartan 100 milliGRAM(s) Oral daily  metoprolol tartrate 50 milliGRAM(s) Oral two times a day  multivitamin 1 Tablet(s) Oral daily  senna 1 Tablet(s) Oral two times a day  thiamine 100 milliGRAM(s) Oral daily    MEDICATIONS  (PRN):  bisacodyl 5 milliGRAM(s) Oral every 12 hours PRN Constipation    HEPARIN:  [] YES [x] NO  Dose: XX UNITS/HR UNITS Q8H  LOVENOX:[x] YES [] NO  Dose: 100 mg Q12H  COUMADIN: []  YES [x] NO  Dose: XX mg  Q24H  SCD's: YES b/l  GI Prophylaxis: Protonix [], Pepcid [x], None [], (Contra-indication:.....)    Post-Op Beta-Blockers: Yes [x], No[], If No, then contraindication:  Post-Op Aspirin: Yes [x],  No [], If No, then contraindication:  Post-Op Statin: Yes [x], No[], If No, then contraindication:    Assessment/Plan:  79y Male pre-op cabg  - Case and plan discussed with CTU Intensivist and CT Surgeon - Dr. Shaw/Rodríguez/Samir   - Continue CTU supportive care    - Continue DVT/GI prophylaxis  - Incentive Spirometry 10 times an hour  - Continue to advance physical activity as tolerated and continue PT/OT as directed  1. CAD: Continue ASA, statin, BB  2. follow up pre-op work up  3. plan for OR later this week SUBJECTIVE ASSESSMENT:   pt has no major complaints    Vital Signs Last 24 Hrs  T(F): 96.9 (28 Jan 2020 08:00), Max: 98.7 (27 Jan 2020 12:00)  HR: 52 (28 Jan 2020 08:00) (52 - 75)  BP: 125/70 (28 Jan 2020 08:00) (84/51 - 179/92)  BP(mean): 91 (28 Jan 2020 08:00) (62 - 127)  RR: 18 (28 Jan 2020 08:00) (15 - 28)  SpO2: 99% (28 Jan 2020 08:00) (95% - 100%)    I&O's Detail    27 Jan 2020 07:01  -  28 Jan 2020 07:00  --------------------------------------------------------  IN:    Oral Fluid: 960 mL  Total IN: 960 mL    OUT:    Voided: 1150 mL  Total OUT: 1150 mL    Net:   I&O's Detail    26 Jan 2020 07:01  -  27 Jan 2020 07:00  --------------------------------------------------------  Total NET: 450 mL      27 Jan 2020 07:01  -  28 Jan 2020 07:00  --------------------------------------------------------  Total NET: -190 mL    CAPILLARY BLOOD GLUCOSE    Physical Exam:  General: NAD; A&Ox3  Cardiac: S1/S2, RRR, no murmur, no rubs  Lungs: unlabored respirations, CTA b/l, no wheeze, no rales, no crackles  Abdomen: Soft/NT/ND; positive bowel sounds x 4  Extremities: No edema b/l lower extremities; good capillary refill; no cyanosis; palpable 1+ pedal pulses b/l    LABS:                        10.0<L>  8.82  )-----------( 200      ( 26 Jan 2020 04:00 )             32.4<L>    01-26    146  |  106  |  17  ----------------------------<  96  3.9   |  28  |  0.7    Ca    8.3<L>      26 Jan 2020 03:44  Mg     2.2     01-27    MEDICATIONS  (STANDING):  amLODIPine   Tablet 2.5 milliGRAM(s) Oral daily  aspirin  chewable 81 milliGRAM(s) Oral daily  atorvastatin 80 milliGRAM(s) Oral at bedtime  chlorhexidine 4% Liquid 1 Application(s) Topical <User Schedule>  cyanocobalamin 1000 MICROGram(s) Oral daily  dronabinol 2.5 milliGRAM(s) Oral daily  DULoxetine 30 milliGRAM(s) Oral daily  enoxaparin Injectable 100 milliGRAM(s) SubCutaneous two times a day  famotidine    Tablet 20 milliGRAM(s) Oral two times a day  losartan 100 milliGRAM(s) Oral daily  metoprolol tartrate 50 milliGRAM(s) Oral two times a day  multivitamin 1 Tablet(s) Oral daily  senna 1 Tablet(s) Oral two times a day  thiamine 100 milliGRAM(s) Oral daily    MEDICATIONS  (PRN):  bisacodyl 5 milliGRAM(s) Oral every 12 hours PRN Constipation    HEPARIN:  [] YES [x] NO  Dose: XX UNITS/HR UNITS Q8H  LOVENOX:[x] YES [] NO  Dose: 100 mg Q12H  COUMADIN: []  YES [x] NO  Dose: XX mg  Q24H  SCD's: YES b/l  GI Prophylaxis: Protonix [], Pepcid [x], None [], (Contra-indication:.....)        Assessment/Plan:  79y Male pre-op cabg  - Case and plan discussed with CTU Intensivist and CT Surgeon - Dr. Shaw/Rodríguez/Samir   - Continue CTU supportive care    - Continue DVT/GI prophylaxis  - Incentive Spirometry 10 times an hour  - Continue to advance physical activity as tolerated and continue PT/OT as directed  1. CAD: Continue ASA, statin, BB  2. follow up pre-op work up  3. plan for OR later this week

## 2020-01-29 LAB
ALBUMIN SERPL ELPH-MCNC: 3.2 G/DL — LOW (ref 3.5–5.2)
ALBUMIN SERPL ELPH-MCNC: 3.2 G/DL — LOW (ref 3.5–5.2)
ALP SERPL-CCNC: 144 U/L — HIGH (ref 30–115)
ALP SERPL-CCNC: 155 U/L — HIGH (ref 30–115)
ALT FLD-CCNC: 25 U/L — SIGNIFICANT CHANGE UP (ref 0–41)
ALT FLD-CCNC: 27 U/L — SIGNIFICANT CHANGE UP (ref 0–41)
ANION GAP SERPL CALC-SCNC: 12 MMOL/L — SIGNIFICANT CHANGE UP (ref 7–14)
ANION GAP SERPL CALC-SCNC: 13 MMOL/L — SIGNIFICANT CHANGE UP (ref 7–14)
APTT BLD: 35.5 SEC — SIGNIFICANT CHANGE UP (ref 27–39.2)
APTT BLD: 36.3 SEC — SIGNIFICANT CHANGE UP (ref 27–39.2)
AST SERPL-CCNC: 29 U/L — SIGNIFICANT CHANGE UP (ref 0–41)
AST SERPL-CCNC: 29 U/L — SIGNIFICANT CHANGE UP (ref 0–41)
BILIRUB DIRECT SERPL-MCNC: 0.4 MG/DL — HIGH (ref 0–0.2)
BILIRUB INDIRECT FLD-MCNC: 1 MG/DL — SIGNIFICANT CHANGE UP (ref 0.2–1.2)
BILIRUB SERPL-MCNC: 1.4 MG/DL — HIGH (ref 0.2–1.2)
BILIRUB SERPL-MCNC: 1.5 MG/DL — HIGH (ref 0.2–1.2)
BLD GP AB SCN SERPL QL: SIGNIFICANT CHANGE UP
BUN SERPL-MCNC: 24 MG/DL — HIGH (ref 10–20)
BUN SERPL-MCNC: 26 MG/DL — HIGH (ref 10–20)
CALCIUM SERPL-MCNC: 8.8 MG/DL — SIGNIFICANT CHANGE UP (ref 8.5–10.1)
CALCIUM SERPL-MCNC: 8.8 MG/DL — SIGNIFICANT CHANGE UP (ref 8.5–10.1)
CHLORIDE SERPL-SCNC: 111 MMOL/L — HIGH (ref 98–110)
CHLORIDE SERPL-SCNC: 111 MMOL/L — HIGH (ref 98–110)
CO2 SERPL-SCNC: 25 MMOL/L — SIGNIFICANT CHANGE UP (ref 17–32)
CO2 SERPL-SCNC: 28 MMOL/L — SIGNIFICANT CHANGE UP (ref 17–32)
CREAT SERPL-MCNC: 0.8 MG/DL — SIGNIFICANT CHANGE UP (ref 0.7–1.5)
CREAT SERPL-MCNC: 0.8 MG/DL — SIGNIFICANT CHANGE UP (ref 0.7–1.5)
GLUCOSE SERPL-MCNC: 104 MG/DL — HIGH (ref 70–99)
GLUCOSE SERPL-MCNC: 99 MG/DL — SIGNIFICANT CHANGE UP (ref 70–99)
HCT VFR BLD CALC: 34.3 % — LOW (ref 42–52)
HCT VFR BLD CALC: 34.5 % — LOW (ref 42–52)
HGB BLD-MCNC: 10.4 G/DL — LOW (ref 14–18)
HGB BLD-MCNC: 10.9 G/DL — LOW (ref 14–18)
INR BLD: 1.36 RATIO — HIGH (ref 0.65–1.3)
INR BLD: 1.39 RATIO — HIGH (ref 0.65–1.3)
MAGNESIUM SERPL-MCNC: 2 MG/DL — SIGNIFICANT CHANGE UP (ref 1.8–2.4)
MAGNESIUM SERPL-MCNC: 2 MG/DL — SIGNIFICANT CHANGE UP (ref 1.8–2.4)
MCHC RBC-ENTMCNC: 30.3 G/DL — LOW (ref 32–37)
MCHC RBC-ENTMCNC: 30.5 PG — SIGNIFICANT CHANGE UP (ref 27–31)
MCHC RBC-ENTMCNC: 31.6 G/DL — LOW (ref 32–37)
MCHC RBC-ENTMCNC: 32.2 PG — HIGH (ref 27–31)
MCV RBC AUTO: 100.6 FL — HIGH (ref 80–94)
MCV RBC AUTO: 101.8 FL — HIGH (ref 80–94)
NRBC # BLD: 0 /100 WBCS — SIGNIFICANT CHANGE UP (ref 0–0)
NRBC # BLD: 0 /100 WBCS — SIGNIFICANT CHANGE UP (ref 0–0)
PLATELET # BLD AUTO: 247 K/UL — SIGNIFICANT CHANGE UP (ref 130–400)
PLATELET # BLD AUTO: 256 K/UL — SIGNIFICANT CHANGE UP (ref 130–400)
POTASSIUM SERPL-MCNC: 4.1 MMOL/L — SIGNIFICANT CHANGE UP (ref 3.5–5)
POTASSIUM SERPL-MCNC: 4.3 MMOL/L — SIGNIFICANT CHANGE UP (ref 3.5–5)
POTASSIUM SERPL-SCNC: 4.1 MMOL/L — SIGNIFICANT CHANGE UP (ref 3.5–5)
POTASSIUM SERPL-SCNC: 4.3 MMOL/L — SIGNIFICANT CHANGE UP (ref 3.5–5)
PROT SERPL-MCNC: 6.1 G/DL — SIGNIFICANT CHANGE UP (ref 6–8)
PROT SERPL-MCNC: 6.3 G/DL — SIGNIFICANT CHANGE UP (ref 6–8)
PROTHROM AB SERPL-ACNC: 15.6 SEC — HIGH (ref 9.95–12.87)
PROTHROM AB SERPL-ACNC: 16 SEC — HIGH (ref 9.95–12.87)
RBC # BLD: 3.39 M/UL — LOW (ref 4.7–6.1)
RBC # BLD: 3.41 M/UL — LOW (ref 4.7–6.1)
RBC # FLD: 15.9 % — HIGH (ref 11.5–14.5)
RBC # FLD: 16 % — HIGH (ref 11.5–14.5)
SODIUM SERPL-SCNC: 148 MMOL/L — HIGH (ref 135–146)
SODIUM SERPL-SCNC: 152 MMOL/L — HIGH (ref 135–146)
WBC # BLD: 8.69 K/UL — SIGNIFICANT CHANGE UP (ref 4.8–10.8)
WBC # BLD: 9.62 K/UL — SIGNIFICANT CHANGE UP (ref 4.8–10.8)
WBC # FLD AUTO: 8.69 K/UL — SIGNIFICANT CHANGE UP (ref 4.8–10.8)
WBC # FLD AUTO: 9.62 K/UL — SIGNIFICANT CHANGE UP (ref 4.8–10.8)

## 2020-01-29 PROCEDURE — 93970 EXTREMITY STUDY: CPT | Mod: 26

## 2020-01-29 PROCEDURE — 99233 SBSQ HOSP IP/OBS HIGH 50: CPT

## 2020-01-29 PROCEDURE — 93306 TTE W/DOPPLER COMPLETE: CPT | Mod: 26

## 2020-01-29 PROCEDURE — 99231 SBSQ HOSP IP/OBS SF/LOW 25: CPT

## 2020-01-29 RX ORDER — CALCIUM GLUCONATE 100 MG/ML
2 VIAL (ML) INTRAVENOUS ONCE
Refills: 0 | Status: COMPLETED | OUTPATIENT
Start: 2020-01-29 | End: 2020-01-29

## 2020-01-29 RX ORDER — HYDRALAZINE HCL 50 MG
10 TABLET ORAL ONCE
Refills: 0 | Status: COMPLETED | OUTPATIENT
Start: 2020-01-29 | End: 2020-01-29

## 2020-01-29 RX ADMIN — Medication 200 GRAM(S): at 09:34

## 2020-01-29 RX ADMIN — SENNA PLUS 1 TABLET(S): 8.6 TABLET ORAL at 06:21

## 2020-01-29 RX ADMIN — AMLODIPINE BESYLATE 2.5 MILLIGRAM(S): 2.5 TABLET ORAL at 06:20

## 2020-01-29 RX ADMIN — Medication 10 MILLIGRAM(S): at 00:00

## 2020-01-29 RX ADMIN — Medication 1 TABLET(S): at 11:23

## 2020-01-29 RX ADMIN — FAMOTIDINE 20 MILLIGRAM(S): 10 INJECTION INTRAVENOUS at 17:31

## 2020-01-29 RX ADMIN — DULOXETINE HYDROCHLORIDE 30 MILLIGRAM(S): 30 CAPSULE, DELAYED RELEASE ORAL at 11:23

## 2020-01-29 RX ADMIN — Medication 2.5 MILLIGRAM(S): at 12:21

## 2020-01-29 RX ADMIN — PREGABALIN 1000 MICROGRAM(S): 225 CAPSULE ORAL at 11:24

## 2020-01-29 RX ADMIN — Medication 100 MILLIGRAM(S): at 11:23

## 2020-01-29 RX ADMIN — FAMOTIDINE 20 MILLIGRAM(S): 10 INJECTION INTRAVENOUS at 06:20

## 2020-01-29 RX ADMIN — Medication 50 MILLIGRAM(S): at 06:21

## 2020-01-29 RX ADMIN — ENOXAPARIN SODIUM 100 MILLIGRAM(S): 100 INJECTION SUBCUTANEOUS at 17:31

## 2020-01-29 RX ADMIN — ENOXAPARIN SODIUM 100 MILLIGRAM(S): 100 INJECTION SUBCUTANEOUS at 06:20

## 2020-01-29 RX ADMIN — Medication 81 MILLIGRAM(S): at 11:23

## 2020-01-29 RX ADMIN — LOSARTAN POTASSIUM 100 MILLIGRAM(S): 100 TABLET, FILM COATED ORAL at 06:20

## 2020-01-29 RX ADMIN — ATORVASTATIN CALCIUM 80 MILLIGRAM(S): 80 TABLET, FILM COATED ORAL at 23:03

## 2020-01-29 NOTE — PROGRESS NOTE ADULT - SUBJECTIVE AND OBJECTIVE BOX
CTU Attending Progress Daily Note     29 Jan 2020 12:40  preop CABG  bradycardic 50 HR  flutter bp soft  BB d/bhupinder  toes blue vascular called  He has history of Atrial fibrillation  Neuropathy  HLD (hyperlipidemia)  HTN (hypertension)    Interval event for past 24 hr:  MED IVORY  79y had no event.   Current Complains:  MED IVORY has no new complains  HPI:  80 yo M with hx of HTN, A.fib (Eliquis), CHF, HLD brought in by EMS post cardiac arrest. As per wife at bedside, patient at the basement watching movie and was doing fine. Few mins later, patient came up and told the wife that he wasn't feeling right and that he felt funny. Then he crashed. Wife called the EMS who arrived 5 mins later. Upon EMS arrival, patient was found to be in PEA then asystole, s/p resuscitation for ~ 10 mins and ROSC achieved after 1 round of compression and epi. While on the way to hospital, patient had another cardiac arrest on the ambulant s/p CPR and ROSC achieved few mins later. As per family, patient was doing welll and at baseline prior to the episode. As baseline patient is fully functional. Denied any recent infection, recent hospitalization, recent ED visit, fever, chills.     In ED, patient was found to bradycardic and hypotensive. s/p 1 dose of atropine and patient was started on low dose Levophed with improvement in HR and BP. (12 Jan 2020 22:13)    OBJECTIVE:  ICU Vital Signs Last 24 Hrs  T(C): 35.9 (29 Jan 2020 08:00), Max: 37.1 (28 Jan 2020 16:00)  T(F): 96.7 (29 Jan 2020 08:00), Max: 98.7 (28 Jan 2020 16:00)  HR: 60 (29 Jan 2020 10:00) (56 - 81)  BP: 120/67 (29 Jan 2020 10:00) (81/52 - 174/90)  BP(mean): 87 (29 Jan 2020 10:00) (63 - 121)  ABP: --  ABP(mean): --  RR: 20 (29 Jan 2020 08:00) (18 - 39)  SpO2: 100% (29 Jan 2020 10:00) (92% - 100%)    I&O's Summary    28 Jan 2020 07:01 - 29 Jan 2020 07:00  --------------------------------------------------------  IN: 860 mL / OUT: 1350 mL / NET: -490 mL    29 Jan 2020 07:01  -  29 Jan 2020 12:40  --------------------------------------------------------  IN: 120 mL / OUT: 200 mL / NET: -80 mL      I&O's Detail    28 Jan 2020 07:01  -  29 Jan 2020 07:00  --------------------------------------------------------  IN:    Oral Fluid: 860 mL  Total IN: 860 mL    OUT:    Voided: 1350 mL  Total OUT: 1350 mL    Total NET: -490 mL      29 Jan 2020 07:01  -  29 Jan 2020 12:40  --------------------------------------------------------  IN:    Oral Fluid: 120 mL  Total IN: 120 mL    OUT:    Voided: 200 mL  Total OUT: 200 mL    Total NET: -80 mL        PVRI: --    CAPILLARY BLOOD GLUCOSE        LABS:                          10.9   9.62  )-----------( 247      ( 29 Jan 2020 00:00 )             34.5     01-29    152<H>  |  111<H>  |  24<H>  ----------------------------<  104<H>  4.1   |  28  |  0.8    Ca    8.8      29 Jan 2020 00:00  Mg     2.0     01-29    TPro  6.3  /  Alb  3.2<L>  /  TBili  1.5<H>  /  DBili  x   /  AST  29  /  ALT  25  /  AlkPhos  155<H>  01-29    PT/INR - ( 29 Jan 2020 00:00 )   PT: 16.00 sec;   INR: 1.39 ratio         PTT - ( 29 Jan 2020 00:00 )  PTT:35.5 sec      Home Medications:  carvedilol 12.5 mg oral tablet: 1 tab(s) orally 2 times a day (16 Jan 2020 12:31)  clonazePAM 1 mg oral tablet: 1 tab(s) orally 3 times a day (16 Jan 2020 12:31)  DULoxetine 30 mg oral delayed release capsule: 1 cap(s) orally once a day (16 Jan 2020 12:31)  Eliquis 5 mg oral tablet: 1 tab(s) orally 2 times a day (16 Jan 2020 12:31)  furosemide 40 mg oral tablet: 1 tab(s) orally once a day (16 Jan 2020 12:31)  gabapentin 300 mg oral capsule: 1 cap(s) orally 2 times a day (16 Jan 2020 12:31)  omeprazole 20 mg oral delayed release capsule: 1 cap(s) orally once a day (16 Jan 2020 12:31)  potassium chloride 10 mEq oral capsule, extended release: 1 cap(s) orally once a day (16 Jan 2020 12:31)  simvastatin 40 mg oral tablet: 1 tab(s) orally once a day (at bedtime) (16 Jan 2020 12:31)  telmisartan 80 mg oral tablet: 1 tab(s) orally once a day (16 Jan 2020 12:31)  Vitamin D3: cap(s) orally once a day (16 Jan 2020 12:31)    HOSPITAL MEDICATIONS:  MEDICATIONS  (STANDING):  amLODIPine   Tablet 2.5 milliGRAM(s) Oral daily  aspirin  chewable 81 milliGRAM(s) Oral daily  atorvastatin 80 milliGRAM(s) Oral at bedtime  chlorhexidine 4% Liquid 1 Application(s) Topical <User Schedule>  cyanocobalamin 1000 MICROGram(s) Oral daily  dronabinol 2.5 milliGRAM(s) Oral daily  DULoxetine 30 milliGRAM(s) Oral daily  enoxaparin Injectable 100 milliGRAM(s) SubCutaneous two times a day  famotidine    Tablet 20 milliGRAM(s) Oral two times a day  losartan 100 milliGRAM(s) Oral daily  multivitamin 1 Tablet(s) Oral daily  senna 1 Tablet(s) Oral two times a day  thiamine 100 milliGRAM(s) Oral daily    MEDICATIONS  (PRN):  bisacodyl 5 milliGRAM(s) Oral every 12 hours PRN Constipation      REVIEW OF SYSTEMS:  CONSTITUTIONAL: [X] all negative; [ ] weakness, [ ] fevers, [ ] chills  EYES/ENT: [X] all negative; [ ] visual changes, [ ] vertigo, [ ] throat pain   NECK: [X] all negative; [ ] pain, [ ] stiffness  RESPIRATORY: [] all negative, [ ] cough, [ ] wheezing, [ ] hemoptysis, [ ] shortness of breath  CARDIOVASCULAR: [] all negative; [ ] chest pain, [ ] palpitations, [ ] orthopnea  GASTROINTESTINAL: [X] all negative; [ ]abdominal pain, [ ] nausea, [ ] vomiting, [ ] hematemesis, [ ] diarrhea, [ ] constipation, [ ] melena, [ ] hematochezia.  GENITOURINARY: [X] all negative; [ ] dysuria, [ ] frequency, [ ] hematuria  NEUROLOGICAL: [X] all negative; [ ] numbness, [ ] weakness  SKIN: [X] all negative; [ ] itching, [ ] burning, [ ] rashes, [ ] lesions   All other review of systems is negative unless indicated above.    [  ] Unable to assess ROS because     PHYSICAL EXAM:          CONSTITUTIONAL: Well-developed; well-nourished; in no acute distress.   	SKIN: warm, dry  	HEAD: Normocephalic; atraumatic.  	EYES: PERRL, EOM, no conj injection, sclera clear  	ENT: No nasal discharge; airway clear.  	NECK: Supple; non tender.  No midline ttp ctls  	CARD: S1, S2 normal; no murmurs, gallops, or rubs. Regular rate and rhythm. 2+ RPs and DPs bilat, no carotid bruits, no pedal   edema, no calf pain b/l  	RESP: CTA  bilat good air movement No wheezes, rales or rhonchi.  	ABD: Soft, not tender, not distended, no CVA ttp no rebound or guarding, bowel sounds present  	EXT: Normal ROM.  No clubbing, cyanosis or edema.   	  	NEURO: Alert, awake, motor 5/5 R, 5/5 L        RADIOLOGY:  xray    I spent 45 minutes of critical care time examining patient, reviewing vitals, labs, medications, imaging and discussing with the team goals of care to prevent life-threatening in this patient who is at high risk for deterioration or death due to:

## 2020-01-29 NOTE — PROGRESS NOTE ADULT - SUBJECTIVE AND OBJECTIVE BOX
VASCULAR SURGERY PROGRESS NOTE    CC: cardiac arrest    Events of past 24 hours: vascular called to evaluate cooler feet and cyanotic toes  Pt found sitting up in a chair. Denies any complaints           ROS otherwise negative except per subjective and HPI      PAST MEDICAL & SURGICAL HISTORY:  Atrial fibrillation  Neuropathy  HLD (hyperlipidemia)  HTN (hypertension)  History of cholecystectomy  History of cholecystectomy      Vital Signs Last 24 Hrs  T(C): 35.9 (29 Jan 2020 08:00), Max: 37.1 (28 Jan 2020 16:00)  T(F): 96.7 (29 Jan 2020 08:00), Max: 98.7 (28 Jan 2020 16:00)  HR: 60 (29 Jan 2020 10:00) (56 - 81)  BP: 120/67 (29 Jan 2020 10:00) (81/52 - 174/90)  BP(mean): 87 (29 Jan 2020 10:00) (63 - 121)  RR: 20 (29 Jan 2020 08:00) (18 - 39)  SpO2: 100% (29 Jan 2020 10:00) (92% - 100%)    Pain (0-10):            Pain Control Adequate: [] YES [] N    Diet:    I&O's Detail    28 Jan 2020 07:01  -  29 Jan 2020 07:00  --------------------------------------------------------  IN:    Oral Fluid: 860 mL  Total IN: 860 mL    OUT:    Voided: 1350 mL  Total OUT: 1350 mL    Total NET: -490 mL      29 Jan 2020 07:01  -  29 Jan 2020 12:22  --------------------------------------------------------  IN:    Oral Fluid: 120 mL  Total IN: 120 mL    OUT:    Voided: 200 mL  Total OUT: 200 mL    Total NET: -80 mL    PHYSICAL EXAM    Appearance: Normal	  HEENT:   Normal oral mucosa, PERRL, EOMI	  Neck: Supple, - JVD; Carotid Bruit   Cardiovascular: Normal S1 S2, No JVD, No murmurs,   Respiratory: Lungs clear to auscultation, No Rales, Rhonchi, Wheezing	  Gastrointestinal:  Soft, Non-tender, + BS	  Skin: No rashes, No ecchymoses, No cyanosis  Extremities: Normal range of motion, No clubbing. bilateral lower extremities mild edema, cyanotic digits, cool to touch feet  Neurologic: Non-focal  Psychiatry: A & O x 3, Mood & affect appropriate    PULSES:  Femoral:  Popliteal:  Dorsal Pedal: dopplerable b/l  Posterior Tibial: dopplerable b/l  Capillary:      MEDICATIONS:   MEDICATIONS  (STANDING):  amLODIPine   Tablet 2.5 milliGRAM(s) Oral daily  aspirin  chewable 81 milliGRAM(s) Oral daily  atorvastatin 80 milliGRAM(s) Oral at bedtime  chlorhexidine 4% Liquid 1 Application(s) Topical <User Schedule>  cyanocobalamin 1000 MICROGram(s) Oral daily  dronabinol 2.5 milliGRAM(s) Oral daily  DULoxetine 30 milliGRAM(s) Oral daily  enoxaparin Injectable 100 milliGRAM(s) SubCutaneous two times a day  famotidine    Tablet 20 milliGRAM(s) Oral two times a day  losartan 100 milliGRAM(s) Oral daily  multivitamin 1 Tablet(s) Oral daily  senna 1 Tablet(s) Oral two times a day  thiamine 100 milliGRAM(s) Oral daily    MEDICATIONS  (PRN):  bisacodyl 5 milliGRAM(s) Oral every 12 hours PRN Constipation      DVT PROPHYLAXIS: [] YES [x] NO  GI PROPHYLAXIS: [x] YES [] NO  ANTIPLATELETS: [x] YES [] NO  ANTICOAGULATION: [x] YES [] NO  ANTIBIOTICS: [] YES [x] NO    LAB/STUDIES:                        10.9   9.62  )-----------( 247      ( 29 Jan 2020 00:00 )             34.5     01-29    152<H>  |  111<H>  |  24<H>  ----------------------------<  104<H>  4.1   |  28  |  0.8    Ca    8.8      29 Jan 2020 00:00  Mg     2.0     01-29    TPro  6.3  /  Alb  3.2<L>  /  TBili  1.5<H>  /  DBili  x   /  AST  29  /  ALT  25  /  AlkPhos  155<H>  01-29    PT/INR - ( 29 Jan 2020 00:00 )   PT: 16.00 sec;   INR: 1.39 ratio         PTT - ( 29 Jan 2020 00:00 )  PTT:35.5 sec  LIVER FUNCTIONS - ( 29 Jan 2020 00:00 )  Alb: 3.2 g/dL / Pro: 6.3 g/dL / ALK PHOS: 155 U/L / ALT: 25 U/L / AST: 29 U/L / GGT: x                             IMAGING:    < from: [facility] Physiol Extremity Lower 3+ Level, BI (01.22.20 @ 16:54) >  Normal arterial hemodynamics in the lower extremities bilaterally.

## 2020-01-29 NOTE — PROGRESS NOTE ADULT - SUBJECTIVE AND OBJECTIVE BOX
OPERATIVE PROCEDURE(s):                POD #                       79yMale  SURGEON(s): LEVI Levy  SUBJECTIVE ASSESSMENT:   Vital Signs Last 24 Hrs  T(F): 97.1 (29 Jan 2020 04:00), Max: 98.7 (28 Jan 2020 12:00)  HR: 71 (29 Jan 2020 06:00) (52 - 81)  BP: 130/70 (29 Jan 2020 06:00) (108/68 - 174/90)  BP(mean): 91 (29 Jan 2020 06:00) (76 - 121)  ABP: --  ABP(mean): --  RR: 23 (29 Jan 2020 06:00) (18 - 39)  SpO2: 97% (29 Jan 2020 06:00) (92% - 100%)  CVP(mm Hg): --  CVP(cm H2O): --  CO: --  CI: --  PA: --  SVR: --    I&O's Detail    28 Jan 2020 07:01  -  29 Jan 2020 07:00  --------------------------------------------------------  IN:    Oral Fluid: 860 mL  Total IN: 860 mL    OUT:    Voided: 1350 mL  Total OUT: 1350 mL        Net:   I&O's Detail    27 Jan 2020 07:01  -  28 Jan 2020 07:00  --------------------------------------------------------  Total NET: -190 mL      28 Jan 2020 07:01  -  29 Jan 2020 07:00  --------------------------------------------------------  Total NET: -490 mL        CAPILLARY BLOOD GLUCOSE        Physical Exam:  General: NAD; A&Ox3/Patient is intubated and sedated  Cardiac: S1/S2, RRR, no murmur, no rubs  Lungs: unlabored respirations, CTA b/l, no wheeze, no rales, no crackles  Abdomen: Soft/NT/ND; positive bowel sounds x 4  Sternum: Intact, no click, incision healing well with no drainage  Incisions: Incisions clean/dry/intact  Extremities: No edema b/l lower extremities; good capillary refill; no cyanosis; palpable 1+ pedal pulses b/l    Central Venous Catheter: Yes[]  No[] , If Yes indication:           Day #  Mora Catheter: Yes  [] , No  [] , If yes indication:                      Day #  NGT: Yes [] No [] ,    If Yes Placement:                                     Day #  EPICARDIAL WIRES:  [] YES [] NO                                              Day #  BOWEL MOVEMENT:  [] YES [] NO, If No, Timing since last BM:      Day #  CHEST TUBE(Left/Right):  [] YES [] NO, If yes -  AIR LEAKS:  [] YES [] NO        LABS:                        10.9<L>  9.62  )-----------( 247      ( 29 Jan 2020 00:00 )             34.5<L>    01-29    152<H>  |  111<H>  |  24<H>  ----------------------------<  104<H>  4.1   |  28  |  0.8    Ca    8.8      29 Jan 2020 00:00  Mg     2.0     01-29    TPro  6.3 [6.0 - 8.0]  /  Alb  3.2<L> [3.5 - 5.2]  /  TBili  1.5<H> [0.2 - 1.2]  /  DBili  x   /  AST  29 [0 - 41]  /  ALT  25 [0 - 41]  /  AlkPhos  155<H> [30 - 115]  01-29    PT/INR - ( 29 Jan 2020 00:00 )   PT: ;   INR: 1.39 ratio         PTT - ( 29 Jan 2020 00:00 )  PTT:35.5 sec      RADIOLOGY & ADDITIONAL TESTS:  CXR:  EKG:  MEDICATIONS  (STANDING):  amLODIPine   Tablet 2.5 milliGRAM(s) Oral daily  aspirin  chewable 81 milliGRAM(s) Oral daily  atorvastatin 80 milliGRAM(s) Oral at bedtime  chlorhexidine 4% Liquid 1 Application(s) Topical <User Schedule>  cyanocobalamin 1000 MICROGram(s) Oral daily  dronabinol 2.5 milliGRAM(s) Oral daily  DULoxetine 30 milliGRAM(s) Oral daily  enoxaparin Injectable 100 milliGRAM(s) SubCutaneous two times a day  famotidine    Tablet 20 milliGRAM(s) Oral two times a day  losartan 100 milliGRAM(s) Oral daily  metoprolol tartrate 50 milliGRAM(s) Oral two times a day  multivitamin 1 Tablet(s) Oral daily  senna 1 Tablet(s) Oral two times a day  thiamine 100 milliGRAM(s) Oral daily    MEDICATIONS  (PRN):  bisacodyl 5 milliGRAM(s) Oral every 12 hours PRN Constipation    HEPARIN:  [] YES [] NO  Dose: XX UNITS/HR UNITS Q8H  LOVENOX:[] YES [] NO  Dose: XX mg Q24H  COUMADIN: []  YES [] NO  Dose: XX mg  Q24H  SCD's: YES b/l  GI Prophylaxis: Protonix [], Pepcid [], None [], (Contra-indication:.....)    Post-Op Beta-Blockers: Yes [], No[], If No, then contraindication:  Post-Op Aspirin: Yes [],  No [], If No, then contraindication:  Post-Op Statin: Yes [], No[], If No, then contraindication:  Allergies    No Known Allergies    Intolerances      Ambulation/Activity Status:    Assessment/Plan:  79y Male status-post .....  - Case and plan discussed with CTU Intensivist and CT Surgeon - Dr. Shaw/Rodríguez/Samir   - Continue CTU supportive care    - Continue DVT/GI prophylaxis  - Incentive Spirometry 10 times an hour  - Continue to advance physical activity as tolerated and continue PT/OT as directed  1. CAD: Continue ASA, statin, BB  2. HTN:   3. A. Fib:   4. COPD/Hypoxia:   5. DM/Glucose Control:     Social Service Disposition: SUBJECTIVE ASSESSMENT:   pt has no major complaints    Vital Signs Last 24 Hrs  T(F): 97.1 (29 Jan 2020 04:00), Max: 98.7 (28 Jan 2020 12:00)  HR: 71 (29 Jan 2020 06:00) (52 - 81)  BP: 130/70 (29 Jan 2020 06:00) (108/68 - 174/90)  BP(mean): 91 (29 Jan 2020 06:00) (76 - 121)  RR: 23 (29 Jan 2020 06:00) (18 - 39)  SpO2: 97% (29 Jan 2020 06:00) (92% - 100%)    I&O's Detail    28 Jan 2020 07:01  -  29 Jan 2020 07:00  --------------------------------------------------------  IN:    Oral Fluid: 860 mL  Total IN: 860 mL    OUT:    Voided: 1350 mL  Total OUT: 1350 mL    Net:   I&O's Detail    27 Jan 2020 07:01  -  28 Jan 2020 07:00  --------------------------------------------------------  Total NET: -190 mL    28 Jan 2020 07:01  -  29 Jan 2020 07:00  --------------------------------------------------------  Total NET: -490 mL    Physical Exam:  General: NAD; A&Ox3/Patient is intubated and sedated  Cardiac: S1/S2, RRR, no murmur, no rubs  Lungs: unlabored respirations, CTA b/l, no wheeze, no rales, no crackles  Abdomen: Soft/NT/ND; positive bowel sounds x 4  Sternum: Intact, no click, incision healing well with no drainage  Incisions: Incisions clean/dry/intact  Extremities: No edema b/l lower extremities; good capillary refill; no cyanosis; palpable 1+ pedal pulses b/l    Central Venous Catheter: Yes[]  No[] , If Yes indication:           Day #  Mora Catheter: Yes  [] , No  [] , If yes indication:                      Day #  NGT: Yes [] No [] ,    If Yes Placement:                                     Day #  EPICARDIAL WIRES:  [] YES [] NO                                              Day #  BOWEL MOVEMENT:  [] YES [] NO, If No, Timing since last BM:      Day #  CHEST TUBE(Left/Right):  [] YES [] NO, If yes -  AIR LEAKS:  [] YES [] NO        LABS:                        10.9<L>  9.62  )-----------( 247      ( 29 Jan 2020 00:00 )             34.5<L>    01-29    152<H>  |  111<H>  |  24<H>  ----------------------------<  104<H>  4.1   |  28  |  0.8    Ca    8.8      29 Jan 2020 00:00  Mg     2.0     01-29    TPro  6.3 [6.0 - 8.0]  /  Alb  3.2<L> [3.5 - 5.2]  /  TBili  1.5<H> [0.2 - 1.2]  /  DBili  x   /  AST  29 [0 - 41]  /  ALT  25 [0 - 41]  /  AlkPhos  155<H> [30 - 115]  01-29    PT/INR - ( 29 Jan 2020 00:00 )   PT: ;   INR: 1.39 ratio      PTT - ( 29 Jan 2020 00:00 )  PTT:35.5 sec    MEDICATIONS  (STANDING):  amLODIPine   Tablet 2.5 milliGRAM(s) Oral daily  aspirin  chewable 81 milliGRAM(s) Oral daily  atorvastatin 80 milliGRAM(s) Oral at bedtime  chlorhexidine 4% Liquid 1 Application(s) Topical <User Schedule>  cyanocobalamin 1000 MICROGram(s) Oral daily  dronabinol 2.5 milliGRAM(s) Oral daily  DULoxetine 30 milliGRAM(s) Oral daily  enoxaparin Injectable 100 milliGRAM(s) SubCutaneous two times a day  famotidine    Tablet 20 milliGRAM(s) Oral two times a day  losartan 100 milliGRAM(s) Oral daily  metoprolol tartrate 50 milliGRAM(s) Oral two times a day  multivitamin 1 Tablet(s) Oral daily  senna 1 Tablet(s) Oral two times a day  thiamine 100 milliGRAM(s) Oral daily    MEDICATIONS  (PRN):  bisacodyl 5 milliGRAM(s) Oral every 12 hours PRN Constipation    Assessment/Plan:  79y Male status-post .....  - Case and plan discussed with CTU Intensivist and CT Surgeon - Dr. Shaw/Rodríguez/Samir   - Continue CTU supportive care    - Continue DVT/GI prophylaxis  - Incentive Spirometry 10 times an hour  - Continue to advance physical activity as tolerated and continue PT/OT as directed  1. CAD: Continue ASA, statin, BB SUBJECTIVE ASSESSMENT:   pt has no major complaints    Vital Signs Last 24 Hrs  T(F): 97.1 (29 Jan 2020 04:00), Max: 98.7 (28 Jan 2020 12:00)  HR: 71 (29 Jan 2020 06:00) (52 - 81)  BP: 130/70 (29 Jan 2020 06:00) (108/68 - 174/90)  BP(mean): 91 (29 Jan 2020 06:00) (76 - 121)  RR: 23 (29 Jan 2020 06:00) (18 - 39)  SpO2: 97% (29 Jan 2020 06:00) (92% - 100%)    I&O's Detail    28 Jan 2020 07:01  -  29 Jan 2020 07:00  --------------------------------------------------------  IN:    Oral Fluid: 860 mL  Total IN: 860 mL    OUT:    Voided: 1350 mL  Total OUT: 1350 mL    Net:   I&O's Detail    27 Jan 2020 07:01  -  28 Jan 2020 07:00  --------------------------------------------------------  Total NET: -190 mL    28 Jan 2020 07:01  -  29 Jan 2020 07:00  --------------------------------------------------------  Total NET: -490 mL    Physical Exam:  General: NAD; A&Ox3  Cardiac: S1/S2, RRR, no murmur, no rubs  Lungs: unlabored respirations, CTA b/l, no wheeze, no rales, no crackles  Abdomen: Soft/NT/ND; positive bowel sounds x 4  Extremities: No edema b/l lower extremities; good capillary refill; no cyanosis; palpable 1+ pedal pulses b/l        LABS:                        10.9<L>  9.62  )-----------( 247      ( 29 Jan 2020 00:00 )             34.5<L>    01-29    152<H>  |  111<H>  |  24<H>  ----------------------------<  104<H>  4.1   |  28  |  0.8    Ca    8.8      29 Jan 2020 00:00  Mg     2.0     01-29    TPro  6.3 [6.0 - 8.0]  /  Alb  3.2<L> [3.5 - 5.2]  /  TBili  1.5<H> [0.2 - 1.2]  /  DBili  x   /  AST  29 [0 - 41]  /  ALT  25 [0 - 41]  /  AlkPhos  155<H> [30 - 115]  01-29    PT/INR - ( 29 Jan 2020 00:00 )   PT: ;   INR: 1.39 ratio      PTT - ( 29 Jan 2020 00:00 )  PTT:35.5 sec    MEDICATIONS  (STANDING):  amLODIPine   Tablet 2.5 milliGRAM(s) Oral daily  aspirin  chewable 81 milliGRAM(s) Oral daily  atorvastatin 80 milliGRAM(s) Oral at bedtime  chlorhexidine 4% Liquid 1 Application(s) Topical <User Schedule>  cyanocobalamin 1000 MICROGram(s) Oral daily  dronabinol 2.5 milliGRAM(s) Oral daily  DULoxetine 30 milliGRAM(s) Oral daily  enoxaparin Injectable 100 milliGRAM(s) SubCutaneous two times a day  famotidine    Tablet 20 milliGRAM(s) Oral two times a day  losartan 100 milliGRAM(s) Oral daily  metoprolol tartrate 50 milliGRAM(s) Oral two times a day  multivitamin 1 Tablet(s) Oral daily  senna 1 Tablet(s) Oral two times a day  thiamine 100 milliGRAM(s) Oral daily    MEDICATIONS  (PRN):  bisacodyl 5 milliGRAM(s) Oral every 12 hours PRN Constipation    Assessment/Plan:  79y Male pre-op cabg  - Case and plan discussed with CTU Intensivist and CT Surgeon - Dr. Shaw/Rodríguez/Samir   - Continue CTU supportive care    - Continue DVT/GI prophylaxis  - Incentive Spirometry 10 times an hour  - Continue to advance physical activity as tolerated and continue PT/OT as directed  1. CAD: Continue ASA, statin, BB  2. follow up pre-op work up   3. plan for OR later this week or early next week  4. vacular follow up

## 2020-01-29 NOTE — PROGRESS NOTE ADULT - ASSESSMENT
78 yo M with hx of HTN, A.fib (Eliquis), CHF, HLD brought in by EMS post cardiac arrest.  Acute hypoxemic respiratory failure, extubated  Altered mental status, resolved  Hypernatremia  CAD preop CABG  Right radial PSA  Vascular following to ligate PSA when stable for OR    Asked to see pt for cyanotic toes in bilateral feet. V dplx negative for DVTs. Pt's MYNRA study from 1/22 shows normal art flow.   would recommend yudy medina for now    For PSA ligation when stable    SPECTRA [id number]

## 2020-01-29 NOTE — PROGRESS NOTE ADULT - ASSESSMENT
PROBLEMS:  I spent 45 minutes of time examining patient, reviewing vitals, labs, medications, imaging and discussing with the team goals of care to prevent life-threatening in this patient who is at high risk for deterioration or death due to:      1.	CAD - awaiting surgery - metoprolol 50 and Lovenox 100 q12, ASA 81, lipitor 80   2.	s/p cardiac arrest    3.	chronic A Fib  4.	chronic encephalopathy   5.	right radial pseudoaneurysm   6.	hypertension - add norvasc 5 , wean off NTG  7.	constipation - senna, dulcolax  8.	PUD prophylaxis     toes blue  vascular called   PLAN  Neuro: move all 4 extremities. no sensory or motor deficits  Pain management.   dronabinol 2.5 milliGRAM(s) Oral daily  DULoxetine 30 milliGRAM(s) Oral daily    Pulm: Wean off supplemental oxygen as able. Daily CXR. Encourage coughing, deep breathing and use of incentive spirometry.     Cardio: Monitor telemetry/alarms. Continue supportive care   amLODIPine   Tablet 5 milliGRAM(s) Oral daily  losartan 100 milliGRAM(s) Oral daily  d/c metoprolol due to bradycardia       GI: Continue stool softeners.    bisacodyl 5 milliGRAM(s) Oral every 12 hours PRN  famotidine    Tablet 20 milliGRAM(s) Oral two times a day  senna 1 Tablet(s) Oral two times a day    Nutrition: Continue present diet  Endocrine and glucose control:   atorvastatin 80 milliGRAM(s) Oral at bedtime    Renal: monitor urine output, supplement electrolytes as needed,     Vasc: Heparin SC and/or SCDs for DVT prophylaxis  aspirin  chewable 81 milliGRAM(s) Oral daily  enoxaparin Injectable 100 milliGRAM(s) SubCutaneous two times a day    ID: Stable, no fever , no chills. Off antibiotics.    Therapy: OOB/ambulate  Disposition:  possibl CABG thu or Fri    Pertinent clinical, laboratory, radiographic, hemodynamic, echocardiographic, respiratory data, microbiologic data and chart were reviewed and analyzed frequently throughout the course of the day and night. GI and DVT prophylaxis, glycemic control, head of bed elevation and skin care issues were addressed.  Patient seen, examined and plan discussed with CT Surgery / CTICU team during rounds.    [ ] The patient remains in critical and unstable condition, and requires ICU care and monitoring  [x ] The patient is improving but requires continued monitoring and adjustment of therapy

## 2020-01-30 LAB
T3 SERPL-MCNC: 102 NG/DL — SIGNIFICANT CHANGE UP (ref 80–200)
T4 AB SER-ACNC: 7.9 UG/DL — SIGNIFICANT CHANGE UP (ref 4.6–12)
TSH SERPL-MCNC: 2.03 UIU/ML — SIGNIFICANT CHANGE UP (ref 0.27–4.2)

## 2020-01-30 PROCEDURE — 99232 SBSQ HOSP IP/OBS MODERATE 35: CPT

## 2020-01-30 PROCEDURE — 99233 SBSQ HOSP IP/OBS HIGH 50: CPT

## 2020-01-30 RX ADMIN — ATORVASTATIN CALCIUM 80 MILLIGRAM(S): 80 TABLET, FILM COATED ORAL at 21:48

## 2020-01-30 RX ADMIN — FAMOTIDINE 20 MILLIGRAM(S): 10 INJECTION INTRAVENOUS at 17:01

## 2020-01-30 RX ADMIN — LOSARTAN POTASSIUM 100 MILLIGRAM(S): 100 TABLET, FILM COATED ORAL at 12:59

## 2020-01-30 RX ADMIN — ENOXAPARIN SODIUM 100 MILLIGRAM(S): 100 INJECTION SUBCUTANEOUS at 17:01

## 2020-01-30 RX ADMIN — CHLORHEXIDINE GLUCONATE 1 APPLICATION(S): 213 SOLUTION TOPICAL at 06:10

## 2020-01-30 RX ADMIN — AMLODIPINE BESYLATE 2.5 MILLIGRAM(S): 2.5 TABLET ORAL at 12:59

## 2020-01-30 NOTE — PROGRESS NOTE ADULT - SUBJECTIVE AND OBJECTIVE BOX
CTU Attending Progress Daily Note     30 Jan 2020 11:14  preop CABG   He has history of Atrial fibrillation  Neuropathy  HLD (hyperlipidemia)  HTN (hypertension)    Interval event for past 24 hr:  MED IVORY  79y had no event.   Current Complains:  MED IVORY has no new complains  HPI:  80 yo M with hx of HTN, A.fib (Eliquis), CHF, HLD brought in by EMS post cardiac arrest. As per wife at bedside, patient at the basement watching movie and was doing fine. Few mins later, patient came up and told the wife that he wasn't feeling right and that he felt funny. Then he crashed. Wife called the EMS who arrived 5 mins later. Upon EMS arrival, patient was found to be in PEA then asystole, s/p resuscitation for ~ 10 mins and ROSC achieved after 1 round of compression and epi. While on the way to hospital, patient had another cardiac arrest on the ambulant s/p CPR and ROSC achieved few mins later. As per family, patient was doing welll and at baseline prior to the episode. As baseline patient is fully functional. Denied any recent infection, recent hospitalization, recent ED visit, fever, chills.     In ED, patient was found to bradycardic and hypotensive. s/p 1 dose of atropine and patient was started on low dose Levophed with improvement in HR and BP. (12 Jan 2020 22:13)    OBJECTIVE:  ICU Vital Signs Last 24 Hrs  T(C): 36.3 (30 Jan 2020 08:28), Max: 37.1 (29 Jan 2020 16:00)  T(F): 97.3 (30 Jan 2020 08:28), Max: 98.8 (29 Jan 2020 16:00)  HR: 76 (30 Jan 2020 11:00) (64 - 84)  BP: 176/81 (30 Jan 2020 11:00) (105/54 - 178/86)  BP(mean): 117 (30 Jan 2020 11:00) (77 - 121)  ABP: --  ABP(mean): --  RR: 19 (30 Jan 2020 11:00) (16 - 34)  SpO2: 97% (30 Jan 2020 11:00) (93% - 99%)    I&O's Summary    29 Jan 2020 07:01  -  30 Jan 2020 07:00  --------------------------------------------------------  IN: 457 mL / OUT: 400 mL / NET: 57 mL    30 Jan 2020 07:01  -  30 Jan 2020 11:14  --------------------------------------------------------  IN: 0 mL / OUT: 300 mL / NET: -300 mL      I&O's Detail    29 Jan 2020 07:01  -  30 Jan 2020 07:00  --------------------------------------------------------  IN:    Oral Fluid: 457 mL  Total IN: 457 mL    OUT:    Voided: 400 mL  Total OUT: 400 mL    Total NET: 57 mL      30 Jan 2020 07:01  -  30 Jan 2020 11:14  --------------------------------------------------------  IN:  Total IN: 0 mL    OUT:    Voided: 300 mL  Total OUT: 300 mL    Total NET: -300 mL            CAPILLARY BLOOD GLUCOSE        LABS:                          10.4   8.69  )-----------( 256      ( 29 Jan 2020 21:49 )             34.3     01-29    148<H>  |  111<H>  |  26<H>  ----------------------------<  99  4.3   |  25  |  0.8    Ca    8.8      29 Jan 2020 21:49  Mg     2.0     01-29    TPro  6.1  /  Alb  3.2<L>  /  TBili  1.4<H>  /  DBili  0.4<H>  /  AST  29  /  ALT  27  /  AlkPhos  144<H>  01-29    PT/INR - ( 29 Jan 2020 21:49 )   PT: 15.60 sec;   INR: 1.36 ratio         PTT - ( 29 Jan 2020 21:49 )  PTT:36.3 sec      Home Medications:  carvedilol 12.5 mg oral tablet: 1 tab(s) orally 2 times a day (16 Jan 2020 12:31)  clonazePAM 1 mg oral tablet: 1 tab(s) orally 3 times a day (16 Jan 2020 12:31)  DULoxetine 30 mg oral delayed release capsule: 1 cap(s) orally once a day (16 Jan 2020 12:31)  Eliquis 5 mg oral tablet: 1 tab(s) orally 2 times a day (16 Jan 2020 12:31)  furosemide 40 mg oral tablet: 1 tab(s) orally once a day (16 Jan 2020 12:31)  gabapentin 300 mg oral capsule: 1 cap(s) orally 2 times a day (16 Jan 2020 12:31)  omeprazole 20 mg oral delayed release capsule: 1 cap(s) orally once a day (16 Jan 2020 12:31)  potassium chloride 10 mEq oral capsule, extended release: 1 cap(s) orally once a day (16 Jan 2020 12:31)  simvastatin 40 mg oral tablet: 1 tab(s) orally once a day (at bedtime) (16 Jan 2020 12:31)  telmisartan 80 mg oral tablet: 1 tab(s) orally once a day (16 Jan 2020 12:31)  Vitamin D3: cap(s) orally once a day (16 Jan 2020 12:31)    HOSPITAL MEDICATIONS:  MEDICATIONS  (STANDING):  amLODIPine   Tablet 2.5 milliGRAM(s) Oral daily  aspirin  chewable 81 milliGRAM(s) Oral daily  atorvastatin 80 milliGRAM(s) Oral at bedtime  chlorhexidine 4% Liquid 1 Application(s) Topical <User Schedule>  cyanocobalamin 1000 MICROGram(s) Oral daily  dronabinol 2.5 milliGRAM(s) Oral daily  DULoxetine 30 milliGRAM(s) Oral daily  enoxaparin Injectable 100 milliGRAM(s) SubCutaneous two times a day  famotidine    Tablet 20 milliGRAM(s) Oral two times a day  losartan 100 milliGRAM(s) Oral daily  multivitamin 1 Tablet(s) Oral daily  senna 1 Tablet(s) Oral two times a day  thiamine 100 milliGRAM(s) Oral daily    MEDICATIONS  (PRN):  bisacodyl 5 milliGRAM(s) Oral every 12 hours PRN Constipation      REVIEW OF SYSTEMS:  CONSTITUTIONAL: [X] all negative; [ ] weakness, [ ] fevers, [ ] chills  EYES/ENT: [X] all negative; [ ] visual changes, [ ] vertigo, [ ] throat pain   NECK: [X] all negative; [ ] pain, [ ] stiffness  RESPIRATORY: [] all negative, [ ] cough, [ ] wheezing, [ ] hemoptysis, [ ] shortness of breath  CARDIOVASCULAR: [] all negative; [ ] chest pain, [ ] palpitations, [ ] orthopnea  GASTROINTESTINAL: [X] all negative; [ ]abdominal pain, [ ] nausea, [ ] vomiting, [ ] hematemesis, [ ] diarrhea, [ ] constipation, [ ] melena, [ ] hematochezia.  GENITOURINARY: [X] all negative; [ ] dysuria, [ ] frequency, [ ] hematuria  NEUROLOGICAL: [X] all negative; [ ] numbness, [ ] weakness  SKIN: [X] all negative; [ ] itching, [ ] burning, [ ] rashes, [ ] lesions   All other review of systems is negative unless indicated above.    [  ] Unable to assess ROS because     PHYSICAL EXAM:          CONSTITUTIONAL: Well-developed; well-nourished; in no acute distress.   	SKIN: warm, dry  	HEAD: Normocephalic; atraumatic.  	EYES: PERRL, EOM, no conj injection, sclera clear  	ENT: No nasal discharge; airway clear.  	NECK: Supple; non tender.  No midline ttp ctls  	CARD: S1, S2 normal; no murmurs, gallops, or rubs. Regular rate and rhythm. 2+ RPs and DPs bilat, no carotid bruits, no pedal   edema, no calf pain b/l  	RESP: CTA  bilat good air movement No wheezes, rales or rhonchi.  	ABD: Soft, not tender, not distended, no CVA ttp no rebound or guarding, bowel sounds present  	EXT: Normal ROM.  No clubbing, cyanosis or edema.   	  	NEURO: Alert, awake, motor 5/5 R, 5/5 L        RADIOLOGY:  xray    I spent 45 minutes of critical care time examining patient, reviewing vitals, labs, medications, imaging and discussing with the team goals of care to prevent life-threatening in this patient who is at high risk for deterioration or death due to:

## 2020-01-30 NOTE — PROGRESS NOTE ADULT - ASSESSMENT
PROBLEMS:  I spent 45 minutes of time examining patient, reviewing vitals, labs, medications, imaging and discussing with the team goals of care to prevent life-threatening in this patient who is at high risk for deterioration or death due to:          pt may go for  psuedo aneurism repar of R radial arety  1.	CAD - awaiting surgery - metoprolol 50 and Lovenox 100 q12, ASA 81, lipitor 80   2.	s/p cardiac arrest    3.	chronic A Fib  4.	chronic encephalopathy   5.	right radial pseudoaneurysm   6.	hypertension - add norvasc 5 , wean off NTG  7.	constipation - senna, dulcolax  8.	PUD prophylaxis     toes blue  vascular called   PLAN  Neuro: move all 4 extremities. no sensory or motor deficits  Pain management.   dronabinol 2.5 milliGRAM(s) Oral daily  DULoxetine 30 milliGRAM(s) Oral daily    Pulm: Wean off supplemental oxygen as able. Daily CXR. Encourage coughing, deep breathing and use of incentive spirometry.     Cardio: Monitor telemetry/alarms. Continue supportive care   amLODIPine   Tablet 5 milliGRAM(s) Oral daily  losartan 100 milliGRAM(s) Oral daily  d/c metoprolol due to bradycardia       GI: Continue stool softeners.    bisacodyl 5 milliGRAM(s) Oral every 12 hours PRN  famotidine    Tablet 20 milliGRAM(s) Oral two times a day  senna 1 Tablet(s) Oral two times a day    Nutrition: Continue present diet  Endocrine and glucose control:   atorvastatin 80 milliGRAM(s) Oral at bedtime    Renal: monitor urine output, supplement electrolytes as needed,     Vasc: Heparin SC and/or SCDs for DVT prophylaxis  aspirin  chewable 81 milliGRAM(s) Oral daily  enoxaparin Injectable 100 milliGRAM(s) SubCutaneous two times a day    ID: Stable, no fever , no chills. Off antibiotics.    Therapy: OOB/ambulate  Disposition:  possibl CABG thu or Fri    Pertinent clinical, laboratory, radiographic, hemodynamic, echocardiographic, respiratory data, microbiologic data and chart were reviewed and analyzed frequently throughout the course of the day and night. GI and DVT prophylaxis, glycemic control, head of bed elevation and skin care issues were addressed.  Patient seen, examined and plan discussed with CT Surgery / CTICU team during rounds.    [ ] The patient remains in critical and unstable condition, and requires ICU care and monitoring  [x ] The patient is improving but requires continued monitoring and adjustment of therapy

## 2020-01-30 NOTE — CHART NOTE - NSCHARTNOTEFT_GEN_A_CORE
Registered Dietitian Follow-Up     Patient Profile Reviewed                           Yes []   No []     Nutrition History Previously Obtained        Yes []  No []       Pertinent Subjective Information: Pt. OOBTC. Wife at bedside reports pt. has not been taking in any solid foods. Drinks 3 bottles of Ensure enlive daily. Pt. reports no appetite, pending surgery. Marinol added. Provided samples of Ensure pudding during last follow up. Pt. likes it. Will rec.      Pertinent Medical Interventions: CAD - awaiting surgery - CABG pending. hypertension: medicated. chronic A Fib.  Monitor telemetry/alarms. Continue supportive ca       Diet order: DASH/TLC, dysphagia 3 soft nectar consistency fluids, Ensure enlive TID, No carb Prosource BID     Anthropometrics:  - Ht. 175.3cm  - Wt. 106kg on 1/30 vs. 101.2kg on 1/26 vs. 108.1kg 1/27 pt. with edema, will continue to monitor wt trends   - %wt change  - BMI 35.2  - IBW 160lbs      Pertinent Lab Data: (1/29) RBC 3.41, Hg 10.1, Hct 34.3, , BUN 26, bilirubin 1.4     Pertinent Meds: Cyanocobalamin, Dulcolax, Marinol, Senna, pepcid, MVI, vit B1      Physical Findings:  - Appearance: alert, somewhat confused, 2+ L, R foot edema  - GI function: no symptoms noted, last BM 1/30  - Tubes: none oted  - Oral/Mouth cavity: denies symptoms   - Skin: ecchymosis (BS 17)      Nutrition Requirements (from RD note on 1/20)  Weight Used: ideal 72.3kg     Estimated Energy Needs    Continue [x]  Adjust [] 818-2169kcal (25-30kcal/kg IBW) for BMI >30  Adjusted Energy Recommendations:   kcal/day        Estimated Protein Needs    Continue [x]  Adjust []  72-86g (1-1.2g/kg IBW) as above  Adjusted Protein Recommendations:   gm/day        Estimated Fluid Needs        Continue [x]  Adjust [] per CTU team   Adjusted Fluid Recommendations:   mL/day     Nutrient Intake: 0% Po at meals, 3 bottles of Ensure enlive daily (1050kcal, 60g protein)         [] Previous Nutrition Diagnosis: Inadequate protein energy intake            [x] Ongoing          [] Resolved       Nutrition Intervention: meals and snacks, medical food supplement     Rec: Continue dysphagia 3 soft nectar consistency fluid per SLP recs, DASH/TLC, discontinue no carb Prosource BID, continue Ensure enlive BID  and add Ensure pudding TID. Continue Marinol daily.      Goal/Expected Outcome: In 4 days pt. to consume at least 25-50% PO at meals + supplement       Indicator/Monitoring: diet order, energy intake, body composition, NFPF

## 2020-01-30 NOTE — PROGRESS NOTE ADULT - SUBJECTIVE AND OBJECTIVE BOX
OPERATIVE PROCEDURE(s): Pre-op CABG        79yMale  SURGEON(s): Dr. Dawson  SUBJECTIVE ASSESSMENT:  Patient has no complaints at this time.    Vital Signs Last 24 Hrs  T(F): 96 (30 Jan 2020 12:00), Max: 98.8 (29 Jan 2020 16:00)  HR: 90 (30 Jan 2020 14:00) (65 - 90)  BP: 144/64 (30 Jan 2020 14:00) (105/54 - 178/86)  BP(mean): 92 (30 Jan 2020 14:00) (77 - 121)  RR: 20 (30 Jan 2020 14:00) (16 - 34)  SpO2: 96% (30 Jan 2020 14:00) (93% - 99%)    I&O's Detail    29 Jan 2020 07:01  -  30 Jan 2020 07:00  --------------------------------------------------------  IN:    Oral Fluid: 457 mL  Total IN: 457 mL    OUT:    Voided: 400 mL  Total OUT: 400 mL        Net:   I&O's Detail    28 Jan 2020 07:01  -  29 Jan 2020 07:00  --------------------------------------------------------  Total NET: -490 mL      29 Jan 2020 07:01  -  30 Jan 2020 07:00  --------------------------------------------------------  Total NET: 57 mL      Physical Exam:  General: NAD; A&Ox3  Cardiac: S1/S2, RRR, no murmur, no rubs  Lungs: unlabored respirations, CTA b/l, no wheeze, no rales, no crackles  Abdomen: Soft/NT/ND; positive bowel sounds x 4  Extremities: No edema b/l lower extremities; good capillary refill; no cyanosis; palpable 1+ pedal pulses b/l        LABS:                        10.4<L>  8.69  )-----------( 256      ( 29 Jan 2020 21:49 )             34.3<L>                        10.9<L>  9.62  )-----------( 247      ( 29 Jan 2020 00:00 )             34.5<L>    01-29    148<H>  |  111<H>  |  26<H>  ----------------------------<  99  4.3   |  25  |  0.8  01-29    152<H>  |  111<H>  |  24<H>  ----------------------------<  104<H>  4.1   |  28  |  0.8    Ca    8.8      29 Jan 2020 21:49  Mg     2.0     01-29    TPro  6.1 [6.0 - 8.0]  /  Alb  3.2<L> [3.5 - 5.2]  /  TBili  1.4<H> [0.2 - 1.2]  /  DBili  0.4<H> [0.0 - 0.2]  /  AST  29 [0 - 41]  /  ALT  27 [0 - 41]  /  AlkPhos  144<H> [30 - 115]  01-29    PT/INR - ( 29 Jan 2020 21:49 )   PT: ;   INR: 1.36 ratio         PT/INR - ( 29 Jan 2020 00:00 )   PT: ;   INR: 1.39 ratio         PTT - ( 29 Jan 2020 21:49 )  PTT:36.3 sec, PTT - ( 29 Jan 2020 00:00 )  PTT:35.5 sec    MEDICATIONS  (STANDING):  amLODIPine   Tablet 2.5 milliGRAM(s) Oral daily  aspirin  chewable 81 milliGRAM(s) Oral daily  atorvastatin 80 milliGRAM(s) Oral at bedtime  chlorhexidine 4% Liquid 1 Application(s) Topical <User Schedule>  cyanocobalamin 1000 MICROGram(s) Oral daily  dronabinol 2.5 milliGRAM(s) Oral daily  DULoxetine 30 milliGRAM(s) Oral daily  enoxaparin Injectable 100 milliGRAM(s) SubCutaneous two times a day  famotidine    Tablet 20 milliGRAM(s) Oral two times a day  losartan 100 milliGRAM(s) Oral daily  multivitamin 1 Tablet(s) Oral daily  senna 1 Tablet(s) Oral two times a day  thiamine 100 milliGRAM(s) Oral daily    MEDICATIONS  (PRN):  bisacodyl 5 milliGRAM(s) Oral every 12 hours PRN Constipation      Allergies:  No Known Allergies      Assessment/Plan:  79y Male pre-op for CABG  - Case and plan discussed with CTU Intensivist and CT Surgeon - Dr. Shaw/Rodríguez/Samir   - Continue CTU supportive care    - Continue DVT/GI prophylaxis  - Incentive Spirometry 10 times an hour  - Continue to advance physical activity as tolerated and continue PT/OT as directed  1. NPO after midnight for vasc repair tomorrow.

## 2020-01-31 LAB
ANION GAP SERPL CALC-SCNC: 13 MMOL/L — SIGNIFICANT CHANGE UP (ref 7–14)
BUN SERPL-MCNC: 22 MG/DL — HIGH (ref 10–20)
CALCIUM SERPL-MCNC: 8.6 MG/DL — SIGNIFICANT CHANGE UP (ref 8.5–10.1)
CHLORIDE SERPL-SCNC: 109 MMOL/L — SIGNIFICANT CHANGE UP (ref 98–110)
CO2 SERPL-SCNC: 26 MMOL/L — SIGNIFICANT CHANGE UP (ref 17–32)
CREAT SERPL-MCNC: 0.7 MG/DL — SIGNIFICANT CHANGE UP (ref 0.7–1.5)
GLUCOSE BLDC GLUCOMTR-MCNC: 100 MG/DL — HIGH (ref 70–99)
GLUCOSE SERPL-MCNC: 112 MG/DL — HIGH (ref 70–99)
HCT VFR BLD CALC: 35.8 % — LOW (ref 42–52)
HGB BLD-MCNC: 10.9 G/DL — LOW (ref 14–18)
MAGNESIUM SERPL-MCNC: 1.9 MG/DL — SIGNIFICANT CHANGE UP (ref 1.8–2.4)
MCHC RBC-ENTMCNC: 30.4 G/DL — LOW (ref 32–37)
MCHC RBC-ENTMCNC: 31 PG — SIGNIFICANT CHANGE UP (ref 27–31)
MCV RBC AUTO: 101.7 FL — HIGH (ref 80–94)
NRBC # BLD: 0 /100 WBCS — SIGNIFICANT CHANGE UP (ref 0–0)
PLATELET # BLD AUTO: 232 K/UL — SIGNIFICANT CHANGE UP (ref 130–400)
POTASSIUM SERPL-MCNC: 4.5 MMOL/L — SIGNIFICANT CHANGE UP (ref 3.5–5)
POTASSIUM SERPL-SCNC: 4.5 MMOL/L — SIGNIFICANT CHANGE UP (ref 3.5–5)
RBC # BLD: 3.52 M/UL — LOW (ref 4.7–6.1)
RBC # FLD: 15.9 % — HIGH (ref 11.5–14.5)
SODIUM SERPL-SCNC: 148 MMOL/L — HIGH (ref 135–146)
WBC # BLD: 8.02 K/UL — SIGNIFICANT CHANGE UP (ref 4.8–10.8)
WBC # FLD AUTO: 8.02 K/UL — SIGNIFICANT CHANGE UP (ref 4.8–10.8)

## 2020-01-31 PROCEDURE — 35045 REPAIR DEFECT OF ARM ARTERY: CPT

## 2020-01-31 RX ORDER — ONDANSETRON 8 MG/1
4 TABLET, FILM COATED ORAL ONCE
Refills: 0 | Status: DISCONTINUED | OUTPATIENT
Start: 2020-01-31 | End: 2020-01-31

## 2020-01-31 RX ORDER — ASPIRIN/CALCIUM CARB/MAGNESIUM 324 MG
81 TABLET ORAL DAILY
Refills: 0 | Status: DISCONTINUED | OUTPATIENT
Start: 2020-01-31 | End: 2020-02-24

## 2020-01-31 RX ORDER — DRONABINOL 2.5 MG
2.5 CAPSULE ORAL DAILY
Refills: 0 | Status: DISCONTINUED | OUTPATIENT
Start: 2020-01-31 | End: 2020-02-01

## 2020-01-31 RX ORDER — SENNA PLUS 8.6 MG/1
1 TABLET ORAL
Refills: 0 | Status: DISCONTINUED | OUTPATIENT
Start: 2020-01-31 | End: 2020-02-24

## 2020-01-31 RX ORDER — HYDROMORPHONE HYDROCHLORIDE 2 MG/ML
0.5 INJECTION INTRAMUSCULAR; INTRAVENOUS; SUBCUTANEOUS
Refills: 0 | Status: DISCONTINUED | OUTPATIENT
Start: 2020-01-31 | End: 2020-01-31

## 2020-01-31 RX ORDER — ENOXAPARIN SODIUM 100 MG/ML
100 INJECTION SUBCUTANEOUS
Refills: 0 | Status: DISCONTINUED | OUTPATIENT
Start: 2020-01-31 | End: 2020-02-03

## 2020-01-31 RX ORDER — HYDROMORPHONE HYDROCHLORIDE 2 MG/ML
0.25 INJECTION INTRAMUSCULAR; INTRAVENOUS; SUBCUTANEOUS
Refills: 0 | Status: DISCONTINUED | OUTPATIENT
Start: 2020-01-31 | End: 2020-01-31

## 2020-01-31 RX ORDER — FAMOTIDINE 10 MG/ML
20 INJECTION INTRAVENOUS
Refills: 0 | Status: DISCONTINUED | OUTPATIENT
Start: 2020-01-31 | End: 2020-02-24

## 2020-01-31 RX ORDER — LOSARTAN POTASSIUM 100 MG/1
100 TABLET, FILM COATED ORAL DAILY
Refills: 0 | Status: DISCONTINUED | OUTPATIENT
Start: 2020-01-31 | End: 2020-02-23

## 2020-01-31 RX ORDER — THIAMINE MONONITRATE (VIT B1) 100 MG
100 TABLET ORAL DAILY
Refills: 0 | Status: DISCONTINUED | OUTPATIENT
Start: 2020-01-31 | End: 2020-02-24

## 2020-01-31 RX ORDER — SODIUM CHLORIDE 9 MG/ML
1000 INJECTION, SOLUTION INTRAVENOUS
Refills: 0 | Status: DISCONTINUED | OUTPATIENT
Start: 2020-01-31 | End: 2020-01-31

## 2020-01-31 RX ORDER — HYDRALAZINE HCL 50 MG
10 TABLET ORAL ONCE
Refills: 0 | Status: COMPLETED | OUTPATIENT
Start: 2020-01-31 | End: 2020-01-31

## 2020-01-31 RX ORDER — AMLODIPINE BESYLATE 2.5 MG/1
2.5 TABLET ORAL DAILY
Refills: 0 | Status: DISCONTINUED | OUTPATIENT
Start: 2020-01-31 | End: 2020-02-24

## 2020-01-31 RX ORDER — ATORVASTATIN CALCIUM 80 MG/1
80 TABLET, FILM COATED ORAL AT BEDTIME
Refills: 0 | Status: DISCONTINUED | OUTPATIENT
Start: 2020-01-31 | End: 2020-02-24

## 2020-01-31 RX ADMIN — Medication 1 TABLET(S): at 12:17

## 2020-01-31 RX ADMIN — Medication 10 MILLIGRAM(S): at 23:05

## 2020-01-31 RX ADMIN — Medication 100 MILLIGRAM(S): at 12:17

## 2020-01-31 RX ADMIN — LOSARTAN POTASSIUM 100 MILLIGRAM(S): 100 TABLET, FILM COATED ORAL at 06:28

## 2020-01-31 RX ADMIN — FAMOTIDINE 20 MILLIGRAM(S): 10 INJECTION INTRAVENOUS at 06:28

## 2020-01-31 RX ADMIN — SENNA PLUS 1 TABLET(S): 8.6 TABLET ORAL at 06:29

## 2020-01-31 RX ADMIN — ATORVASTATIN CALCIUM 80 MILLIGRAM(S): 80 TABLET, FILM COATED ORAL at 21:59

## 2020-01-31 RX ADMIN — PREGABALIN 1000 MICROGRAM(S): 225 CAPSULE ORAL at 12:17

## 2020-01-31 RX ADMIN — ENOXAPARIN SODIUM 100 MILLIGRAM(S): 100 INJECTION SUBCUTANEOUS at 06:29

## 2020-01-31 RX ADMIN — AMLODIPINE BESYLATE 2.5 MILLIGRAM(S): 2.5 TABLET ORAL at 06:28

## 2020-01-31 RX ADMIN — DULOXETINE HYDROCHLORIDE 30 MILLIGRAM(S): 30 CAPSULE, DELAYED RELEASE ORAL at 12:17

## 2020-01-31 RX ADMIN — CHLORHEXIDINE GLUCONATE 1 APPLICATION(S): 213 SOLUTION TOPICAL at 06:29

## 2020-01-31 RX ADMIN — Medication 81 MILLIGRAM(S): at 12:17

## 2020-01-31 NOTE — CHART NOTE - NSCHARTNOTEFT_GEN_A_CORE
Surgeon: Dr. Gary  Procedure: Right radial pseudoaneurysm repair    Vital Signs Last 24 Hrs  T(C): 36.3 (2020 00:01), Max: 36.3 (2020 08:28)  T(F): 97.3 (2020 00:01), Max: 97.3 (2020 08:28)  HR: 79 (2020 02:00) (72 - 90)  BP: 157/74 (2020 02:00) (111/64 - 178/86)  BP(mean): 106 (2020 02:00) (82 - 121)  RR: 23 (2020 02:00) (19 - 29)  SpO2: 96% (2020 02:00) (93% - 98%)                        10.9   8.02  )-----------( 232      ( 2020 02:00 )             35.8     01-30    148<H>  |  109  |  22<H>  ----------------------------<  112<H>  4.5   |  26  |  0.7    Ca    8.6      2020 02:00  Mg     1.9     01-30    TPro  6.1  /  Alb  3.2<L>  /  TBili  1.4<H>  /  DBili  0.4<H>  /  AST  29  /  ALT  27  /  AlkPhos  144<H>  01-29    PT/INR - ( 2020 21:49 )   PT: 15.60 sec;   INR: 1.36 ratio         PTT - ( 2020 21:49 )  PTT:36.3 sec  Daily     Daily Weight in k (2020 06:01)    EKG: Complete  CXR: Complete  Type and Screen: Active    A/P: 79y Male     - OR 20 for Right radial pseudoaneurysm repair with Dr. Gary  - NPO past midnight, except medications  - IVF while NPO  - Consent to be signed

## 2020-01-31 NOTE — PRE-ANESTHESIA EVALUATION ADULT - NSANTHPMHFT_GEN_ALL_CORE
fracture of C% spinous process with injury to ant and post longitudinal lig on CT scan, on neck collar, no weakness to extremities. lungs clear,  Na 148

## 2020-01-31 NOTE — PROGRESS NOTE ADULT - SUBJECTIVE AND OBJECTIVE BOX
OPERATIVE PROCEDURE(s):                POD #                       79yMale  SURGEON(s): LEVI Levy  SUBJECTIVE ASSESSMENT:   Vital Signs Last 24 Hrs  T(F): 96.4 (31 Jan 2020 04:00), Max: 97.3 (31 Jan 2020 00:01)  HR: 78 (31 Jan 2020 06:00) (72 - 90)  BP: 154/89 (31 Jan 2020 06:00) (111/64 - 178/86)  BP(mean): 115 (31 Jan 2020 06:00) (82 - 121)  ABP: --  ABP(mean): --  RR: 28 (31 Jan 2020 06:00) (19 - 28)  SpO2: 96% (31 Jan 2020 06:00) (94% - 97%)  CVP(mm Hg): --  CVP(cm H2O): --  CO: --  CI: --  PA: --  SVR: --    I&O's Detail    30 Jan 2020 07:01  -  31 Jan 2020 07:00  --------------------------------------------------------  IN:    Oral Fluid: 360 mL  Total IN: 360 mL    OUT:    Voided: 500 mL  Total OUT: 500 mL        Net:   I&O's Detail    29 Jan 2020 07:01  -  30 Jan 2020 07:00  --------------------------------------------------------  Total NET: 57 mL      30 Jan 2020 07:01  -  31 Jan 2020 07:00  --------------------------------------------------------  Total NET: -140 mL        CAPILLARY BLOOD GLUCOSE        Physical Exam:  General: NAD; A&Ox3/Patient is intubated and sedated  Cardiac: S1/S2, RRR, no murmur, no rubs  Lungs: unlabored respirations, CTA b/l, no wheeze, no rales, no crackles  Abdomen: Soft/NT/ND; positive bowel sounds x 4  Sternum: Intact, no click, incision healing well with no drainage  Incisions: Incisions clean/dry/intact  Extremities: No edema b/l lower extremities; good capillary refill; no cyanosis; palpable 1+ pedal pulses b/l    Central Venous Catheter: Yes[]  No[] , If Yes indication:           Day #  Mora Catheter: Yes  [] , No  [] , If yes indication:                      Day #  NGT: Yes [] No [] ,    If Yes Placement:                                     Day #  EPICARDIAL WIRES:  [] YES [] NO                                              Day #  BOWEL MOVEMENT:  [] YES [] NO, If No, Timing since last BM:      Day #  CHEST TUBE(Left/Right):  [] YES [] NO, If yes -  AIR LEAKS:  [] YES [] NO        LABS:                        10.9<L>  8.02  )-----------( 232      ( 30 Jan 2020 02:00 )             35.8<L>                        10.4<L>  8.69  )-----------( 256      ( 29 Jan 2020 21:49 )             34.3<L>    01-30    148<H>  |  109  |  22<H>  ----------------------------<  112<H>  4.5   |  26  |  0.7  01-29    148<H>  |  111<H>  |  26<H>  ----------------------------<  99  4.3   |  25  |  0.8    Ca    8.6      30 Jan 2020 02:00  Mg     1.9     01-30    TPro  6.1 [6.0 - 8.0]  /  Alb  3.2<L> [3.5 - 5.2]  /  TBili  1.4<H> [0.2 - 1.2]  /  DBili  0.4<H> [0.0 - 0.2]  /  AST  29 [0 - 41]  /  ALT  27 [0 - 41]  /  AlkPhos  144<H> [30 - 115]  01-29    PT/INR - ( 29 Jan 2020 21:49 )   PT: ;   INR: 1.36 ratio         PTT - ( 29 Jan 2020 21:49 )  PTT:36.3 sec      RADIOLOGY & ADDITIONAL TESTS:  CXR:  EKG:  MEDICATIONS  (STANDING):  amLODIPine   Tablet 2.5 milliGRAM(s) Oral daily  aspirin  chewable 81 milliGRAM(s) Oral daily  atorvastatin 80 milliGRAM(s) Oral at bedtime  chlorhexidine 4% Liquid 1 Application(s) Topical <User Schedule>  cyanocobalamin 1000 MICROGram(s) Oral daily  dronabinol 2.5 milliGRAM(s) Oral daily  DULoxetine 30 milliGRAM(s) Oral daily  enoxaparin Injectable 100 milliGRAM(s) SubCutaneous two times a day  famotidine    Tablet 20 milliGRAM(s) Oral two times a day  losartan 100 milliGRAM(s) Oral daily  multivitamin 1 Tablet(s) Oral daily  senna 1 Tablet(s) Oral two times a day  thiamine 100 milliGRAM(s) Oral daily    MEDICATIONS  (PRN):  bisacodyl 5 milliGRAM(s) Oral every 12 hours PRN Constipation    HEPARIN:  [] YES [] NO  Dose: XX UNITS/HR UNITS Q8H  LOVENOX:[] YES [] NO  Dose: XX mg Q24H  COUMADIN: []  YES [] NO  Dose: XX mg  Q24H  SCD's: YES b/l  GI Prophylaxis: Protonix [], Pepcid [], None [], (Contra-indication:.....)    Post-Op Beta-Blockers: Yes [], No[], If No, then contraindication:  Post-Op Aspirin: Yes [],  No [], If No, then contraindication:  Post-Op Statin: Yes [], No[], If No, then contraindication:  Allergies    No Known Allergies    Intolerances      Ambulation/Activity Status:    Assessment/Plan:  79y Male status-post .....  - Case and plan discussed with CTU Intensivist and CT Surgeon - Dr. Shaw/Rodríguez/Samir   - Continue CTU supportive care    - Continue DVT/GI prophylaxis  - Incentive Spirometry 10 times an hour  - Continue to advance physical activity as tolerated and continue PT/OT as directed  1. CAD: Continue ASA, statin, BB  2. HTN:   3. A. Fib:   4. COPD/Hypoxia:   5. DM/Glucose Control:     Social Service Disposition: OPERATIVE PROCEDURE(s):     pre-op cabg           POD #                       79yMale  SURGEON(s): DR. Dawson  SUBJECTIVE ASSESSMENT: pt seen and examined. no acute complaints at this time.   Vital Signs Last 24 Hrs  T(F): 96.4 (31 Jan 2020 04:00), Max: 97.3 (31 Jan 2020 00:01)  HR: 78 (31 Jan 2020 06:00) (72 - 90)  BP: 154/89 (31 Jan 2020 06:00) (111/64 - 178/86)  BP(mean): 115 (31 Jan 2020 06:00) (82 - 121)  RR: 28 (31 Jan 2020 06:00) (19 - 28)  SpO2: 96% (31 Jan 2020 06:00) (94% - 97%)      I&O's Detail    30 Jan 2020 07:01  -  31 Jan 2020 07:00  --------------------------------------------------------  IN:    Oral Fluid: 360 mL  Total IN: 360 mL    OUT:    Voided: 500 mL  Total OUT: 500 mL        Net:   I&O's Detail    29 Jan 2020 07:01  -  30 Jan 2020 07:00  --------------------------------------------------------  Total NET: 57 mL      30 Jan 2020 07:01  -  31 Jan 2020 07:00  --------------------------------------------------------  Total NET: -140 mL        CAPILLARY BLOOD GLUCOSE        Physical Exam:  General: NAD; A&Ox3  Cardiac: S1/S2, RRR, no murmur, no rubs  Lungs: unlabored respirations, CTA b/l, no wheeze, no rales, no crackles  Abdomen: Soft/NT/ND; positive bowel sounds x 4  Extremities: No edema b/l lower extremities; good capillary refill; no cyanosis; palpable 1+ pedal pulses b/l       LABS:                        10.9<L>  8.02  )-----------( 232      ( 30 Jan 2020 02:00 )             35.8<L>                        10.4<L>  8.69  )-----------( 256      ( 29 Jan 2020 21:49 )             34.3<L>    01-30    148<H>  |  109  |  22<H>  ----------------------------<  112<H>  4.5   |  26  |  0.7  01-29    148<H>  |  111<H>  |  26<H>  ----------------------------<  99  4.3   |  25  |  0.8    Ca    8.6      30 Jan 2020 02:00  Mg     1.9     01-30    TPro  6.1 [6.0 - 8.0]  /  Alb  3.2<L> [3.5 - 5.2]  /  TBili  1.4<H> [0.2 - 1.2]  /  DBili  0.4<H> [0.0 - 0.2]  /  AST  29 [0 - 41]  /  ALT  27 [0 - 41]  /  AlkPhos  144<H> [30 - 115]  01-29    PT/INR - ( 29 Jan 2020 21:49 )   PT: ;   INR: 1.36 ratio         PTT - ( 29 Jan 2020 21:49 )  PTT:36.3 sec      RADIOLOGY & ADDITIONAL TESTS:  CXR: < from: Xray Chest 1 View- PORTABLE-Routine (01.28.20 @ 03:57) >    Impression:      Unchanged bilateral opacities.    < end of copied text >      MEDICATIONS  (STANDING):  amLODIPine   Tablet 2.5 milliGRAM(s) Oral daily  aspirin  chewable 81 milliGRAM(s) Oral daily  atorvastatin 80 milliGRAM(s) Oral at bedtime  chlorhexidine 4% Liquid 1 Application(s) Topical <User Schedule>  cyanocobalamin 1000 MICROGram(s) Oral daily  dronabinol 2.5 milliGRAM(s) Oral daily  DULoxetine 30 milliGRAM(s) Oral daily  enoxaparin Injectable 100 milliGRAM(s) SubCutaneous two times a day  famotidine    Tablet 20 milliGRAM(s) Oral two times a day  losartan 100 milliGRAM(s) Oral daily  multivitamin 1 Tablet(s) Oral daily  senna 1 Tablet(s) Oral two times a day  thiamine 100 milliGRAM(s) Oral daily    MEDICATIONS  (PRN):  bisacodyl 5 milliGRAM(s) Oral every 12 hours PRN Constipation    HEPARIN:  [] YES [x] NO  Dose: XX UNITS/HR UNITS Q8H  LOVENOX:[x] YES [] NO  Dose: 100 mg Q12H  COUMADIN: []  YES [x] NO  Dose: XX mg  Q24H  SCD's: YES b/l  GI Prophylaxis: Protonix [], Pepcid [x], None [], (Contra-indication:.....)    Post-Op Beta-Blockers: Yes [], No[x], If No, then contraindication:  Post-Op Aspirin: Yes [x],  No [], If No, then contraindication:  Post-Op Statin: Yes [x], No[], If No, then contraindication:  Allergies    No Known Allergies    Intolerances      Ambulation/Activity Status: ambulate     Assessment/Plan:  79y Male pre-op cabg  - Case and plan discussed with CTU Intensivist and CT Surgeon - Dr. Shaw/Rodríguez/Samir   - Continue CTU supportive care    - Continue DVT/GI prophylaxis  - Incentive Spirometry 10 times an hour  - Continue to advance physical activity as tolerated and continue PT/OT as directed  1. CAD: Continue ASA, statin, BB  2. follow up pre-op work up   3. plan for OR early next week  4. OR today for right arm psuedoaneurysm

## 2020-02-01 LAB
ANION GAP SERPL CALC-SCNC: 11 MMOL/L — SIGNIFICANT CHANGE UP (ref 7–14)
BUN SERPL-MCNC: 19 MG/DL — SIGNIFICANT CHANGE UP (ref 10–20)
CALCIUM SERPL-MCNC: 8.4 MG/DL — LOW (ref 8.5–10.1)
CHLORIDE SERPL-SCNC: 112 MMOL/L — HIGH (ref 98–110)
CO2 SERPL-SCNC: 27 MMOL/L — SIGNIFICANT CHANGE UP (ref 17–32)
CREAT SERPL-MCNC: 0.8 MG/DL — SIGNIFICANT CHANGE UP (ref 0.7–1.5)
GLUCOSE BLDC GLUCOMTR-MCNC: 99 MG/DL — SIGNIFICANT CHANGE UP (ref 70–99)
GLUCOSE SERPL-MCNC: 110 MG/DL — HIGH (ref 70–99)
HCT VFR BLD CALC: 36.8 % — LOW (ref 42–52)
HGB BLD-MCNC: 11.1 G/DL — LOW (ref 14–18)
MAGNESIUM SERPL-MCNC: 2 MG/DL — SIGNIFICANT CHANGE UP (ref 1.8–2.4)
MCHC RBC-ENTMCNC: 30.2 G/DL — LOW (ref 32–37)
MCHC RBC-ENTMCNC: 31.2 PG — HIGH (ref 27–31)
MCV RBC AUTO: 103.4 FL — HIGH (ref 80–94)
NRBC # BLD: 0 /100 WBCS — SIGNIFICANT CHANGE UP (ref 0–0)
PLATELET # BLD AUTO: 234 K/UL — SIGNIFICANT CHANGE UP (ref 130–400)
POTASSIUM SERPL-MCNC: 4.1 MMOL/L — SIGNIFICANT CHANGE UP (ref 3.5–5)
POTASSIUM SERPL-SCNC: 4.1 MMOL/L — SIGNIFICANT CHANGE UP (ref 3.5–5)
RBC # BLD: 3.56 M/UL — LOW (ref 4.7–6.1)
RBC # FLD: 15.9 % — HIGH (ref 11.5–14.5)
SODIUM SERPL-SCNC: 150 MMOL/L — HIGH (ref 135–146)
WBC # BLD: 7.79 K/UL — SIGNIFICANT CHANGE UP (ref 4.8–10.8)
WBC # FLD AUTO: 7.79 K/UL — SIGNIFICANT CHANGE UP (ref 4.8–10.8)

## 2020-02-01 RX ADMIN — Medication 1 TABLET(S): at 12:23

## 2020-02-01 RX ADMIN — ENOXAPARIN SODIUM 100 MILLIGRAM(S): 100 INJECTION SUBCUTANEOUS at 06:04

## 2020-02-01 RX ADMIN — Medication 100 MILLIGRAM(S): at 12:23

## 2020-02-01 RX ADMIN — ENOXAPARIN SODIUM 100 MILLIGRAM(S): 100 INJECTION SUBCUTANEOUS at 17:06

## 2020-02-01 RX ADMIN — Medication 81 MILLIGRAM(S): at 12:23

## 2020-02-01 RX ADMIN — ATORVASTATIN CALCIUM 80 MILLIGRAM(S): 80 TABLET, FILM COATED ORAL at 22:04

## 2020-02-01 RX ADMIN — FAMOTIDINE 20 MILLIGRAM(S): 10 INJECTION INTRAVENOUS at 17:05

## 2020-02-01 RX ADMIN — SENNA PLUS 1 TABLET(S): 8.6 TABLET ORAL at 17:05

## 2020-02-01 NOTE — PROGRESS NOTE ADULT - SUBJECTIVE AND OBJECTIVE BOX
Procedure: Status post repair of right radial pseudoaneurysm    Operative Findings:  · Operative Findings	right radial artery PSA procedure- rapair od right radial PSA	      Post Operative day #1  Patient resting comfortably no complaints     pmh:  Atrial fibrillation  Neuropathy  HLD (hyperlipidemia)  HTN (hypertension)  Aneurysm of radial artery  Aneurysm of radial artery  Aneurysm of radial artery  Cardiac arrest  Excision, pseudoaneurysm, artery, radial  Midline catheter insertion  Percutaneous insertion of arterial line  History of cholecystectomy  History of cholecystectomy  CARDIAC ARREST  90+    No Known Allergies    amLODIPine   Tablet 2.5 milliGRAM(s) Oral daily  aspirin  chewable 81 milliGRAM(s) Oral daily  atorvastatin 80 milliGRAM(s) Oral at bedtime  bisacodyl 5 milliGRAM(s) Oral every 12 hours PRN  dronabinol 2.5 milliGRAM(s) Oral daily  enoxaparin Injectable 100 milliGRAM(s) SubCutaneous two times a day  famotidine    Tablet 20 milliGRAM(s) Oral two times a day  losartan 100 milliGRAM(s) Oral daily  multivitamin 1 Tablet(s) Oral daily  senna 1 Tablet(s) Oral two times a day  thiamine 100 milliGRAM(s) Oral daily          T(C): 36.6 (20 @ 20:00), Max: 36.6 (20 @ 06:48)  HR: 80 (20 @ 00:00) (71 - 86)  BP: 149/77 (20 @ 00:00) (141/83 - 187/91)  RR: 22 (20 @ 00:00) (14 - 30)  SpO2: 100% (20 @ 00:00) (95% - 100%)    20 @ 07:  -  20 @ 07:00  --------------------------------------------------------  IN: 360 mL / OUT: 500 mL / NET: -140 mL    20 @ 07:01  -  20 @ 02:51  --------------------------------------------------------  IN: 30 mL / OUT: 380 mL / NET: -350 mL        General:Alert and oriented times 3, not in acute distress   Heart: Regular rate and rhythm, no rubs , murmurs or gallops  Lungs: Clear to auscultation bilaterally, no wheezes, rales, rhonci appreciated  Abdomen: Soft , positive bowel sounds, no tenderness, no distention, no peritoneal signs   Exremites: right upper extremity incision clean dry and intact,  warm extremities,  good color, no swelling, motor and sensation , pulses                       148   |  109   |  22                 Ca: 8.6    BMP:   ----------------------------< 112    M.9   (20 @ 02:00)             4.5    |  26    | 0.7                Ph: x        LFT:     TPro: 6.1 / Alb: 3.2 / TBili: 1.4 / DBili: 0.4 / AST: 29 / ALT: 27 / AlkPhos: 144   (20 @ 21:49)                                    Plan and assesment:  Pt. 79yMale status post right radial psa repair     -Pain management  -ctu management  -pulse checks   - Diet       SAVANA Soto   20 @ 02:51  # [id number]/ [id number]

## 2020-02-01 NOTE — PROGRESS NOTE ADULT - SUBJECTIVE AND OBJECTIVE BOX
OPERATIVE PROCEDURE(s):                POD #                       79yMale  SURGEON(s): LEVI Levy  SUBJECTIVE ASSESSMENT: pt seen and examined.   Vital Signs Last 24 Hrs  T(F): 97.2 (01 Feb 2020 08:00), Max: 97.8 (31 Jan 2020 20:00)  HR: 72 (01 Feb 2020 08:00) (72 - 86)  BP: 136/83 (01 Feb 2020 08:00) (136/83 - 187/91)  BP(mean): 103 (01 Feb 2020 08:00) (98 - 119)  RR: 21 (01 Feb 2020 08:00) (14 - 28)  SpO2: 95% (01 Feb 2020 08:00) (95% - 100%)      I&O's Detail    31 Jan 2020 07:01  -  01 Feb 2020 07:00  --------------------------------------------------------  IN:    Oral Fluid: 30 mL  Total IN: 30 mL    OUT:    Voided: 380 mL  Total OUT: 380 mL        Net:   I&O's Detail    30 Jan 2020 07:01  -  31 Jan 2020 07:00  --------------------------------------------------------  Total NET: -140 mL      31 Jan 2020 07:01  -  01 Feb 2020 07:00  --------------------------------------------------------  Total NET: -350 mL        CAPILLARY BLOOD GLUCOSE      POCT Blood Glucose.: 99 mg/dL (01 Feb 2020 06:13)  POCT Blood Glucose.: 100 mg/dL (31 Jan 2020 22:05)    Physical Exam:  General: NAD; A&Ox3/Patient is intubated and sedated  Cardiac: S1/S2, RRR, no murmur, no rubs  Lungs: unlabored respirations, CTA b/l, no wheeze, no rales, no crackles  Abdomen: Soft/NT/ND; positive bowel sounds x 4  Sternum: Intact, no click, incision healing well with no drainage  Incisions: Incisions clean/dry/intact  Extremities: No edema b/l lower extremities; good capillary refill; no cyanosis; palpable 1+ pedal pulses b/l    Central Venous Catheter: Yes[]  No[] , If Yes indication:           Day #  Mora Catheter: Yes  [] , No  [] , If yes indication:                      Day #  NGT: Yes [] No [] ,    If Yes Placement:                                     Day #  EPICARDIAL WIRES:  [] YES [] NO                                              Day #  BOWEL MOVEMENT:  [] YES [] NO, If No, Timing since last BM:      Day #  CHEST TUBE(Left/Right):  [] YES [] NO, If yes -  AIR LEAKS:  [] YES [] NO        LABS:                        11.1<L>  7.79  )-----------( 234      ( 01 Feb 2020 04:21 )             36.8<L>                        10.9<L>  8.02  )-----------( 232      ( 30 Jan 2020 02:00 )             35.8<L>    02-01    150<H>  |  112<H>  |  19  ----------------------------<  110<H>  4.1   |  27  |  0.8  01-30    148<H>  |  109  |  22<H>  ----------------------------<  112<H>  4.5   |  26  |  0.7    Ca    8.4<L>      01 Feb 2020 04:21  Mg     2.0     02-01    TPro  6.1 [6.0 - 8.0]  /  Alb  3.2<L> [3.5 - 5.2]  /  TBili  1.4<H> [0.2 - 1.2]  /  DBili  0.4<H> [0.0 - 0.2]  /  AST  29 [0 - 41]  /  ALT  27 [0 - 41]  /  AlkPhos  144<H> [30 - 115]  01-29          RADIOLOGY & ADDITIONAL TESTS:  CXR:  EKG:  MEDICATIONS  (STANDING):  amLODIPine   Tablet 2.5 milliGRAM(s) Oral daily  aspirin  chewable 81 milliGRAM(s) Oral daily  atorvastatin 80 milliGRAM(s) Oral at bedtime  dronabinol 2.5 milliGRAM(s) Oral daily  enoxaparin Injectable 100 milliGRAM(s) SubCutaneous two times a day  famotidine    Tablet 20 milliGRAM(s) Oral two times a day  losartan 100 milliGRAM(s) Oral daily  multivitamin 1 Tablet(s) Oral daily  senna 1 Tablet(s) Oral two times a day  thiamine 100 milliGRAM(s) Oral daily    MEDICATIONS  (PRN):  bisacodyl 5 milliGRAM(s) Oral every 12 hours PRN Constipation    HEPARIN:  [] YES [] NO  Dose: XX UNITS/HR UNITS Q8H  LOVENOX:[] YES [] NO  Dose: XX mg Q24H  COUMADIN: []  YES [] NO  Dose: XX mg  Q24H  SCD's: YES b/l  GI Prophylaxis: Protonix [], Pepcid [], None [], (Contra-indication:.....)    Post-Op Beta-Blockers: Yes [], No[], If No, then contraindication:  Post-Op Aspirin: Yes [],  No [], If No, then contraindication:  Post-Op Statin: Yes [], No[], If No, then contraindication:  Allergies    No Known Allergies    Intolerances      Ambulation/Activity Status:    Assessment/Plan:  79y Male status-post .....  - Case and plan discussed with CTU Intensivist and CT Surgeon - Dr. Shaw/Rodríguez/Samir   - Continue CTU supportive care    - Continue DVT/GI prophylaxis  - Incentive Spirometry 10 times an hour  - Continue to advance physical activity as tolerated and continue PT/OT as directed  1. CAD: Continue ASA, statin, BB  2. HTN:   3. A. Fib:   4. COPD/Hypoxia:   5. DM/Glucose Control:     Social Service Disposition: OPERATIVE PROCEDURE(s):    pre-op CABG, s/p right radial pseudoaneurysm repair            POD #   1                    79yMale  SURGEON(s): LEVI Levy  SUBJECTIVE ASSESSMENT: pt seen and examined. no acute complaints at this time  Vital Signs Last 24 Hrs  T(F): 97.2 (01 Feb 2020 08:00), Max: 97.8 (31 Jan 2020 20:00)  HR: 72 (01 Feb 2020 08:00) (72 - 86)  BP: 136/83 (01 Feb 2020 08:00) (136/83 - 187/91)  BP(mean): 103 (01 Feb 2020 08:00) (98 - 119)  RR: 21 (01 Feb 2020 08:00) (14 - 28)  SpO2: 95% (01 Feb 2020 08:00) (95% - 100%)      I&O's Detail    31 Jan 2020 07:01  -  01 Feb 2020 07:00  --------------------------------------------------------  IN:    Oral Fluid: 30 mL  Total IN: 30 mL    OUT:    Voided: 380 mL  Total OUT: 380 mL        Net:   I&O's Detail    30 Jan 2020 07:01  -  31 Jan 2020 07:00  --------------------------------------------------------  Total NET: -140 mL      31 Jan 2020 07:01  -  01 Feb 2020 07:00  --------------------------------------------------------  Total NET: -350 mL        CAPILLARY BLOOD GLUCOSE      POCT Blood Glucose.: 99 mg/dL (01 Feb 2020 06:13)  POCT Blood Glucose.: 100 mg/dL (31 Jan 2020 22:05)    Physical Exam:  General: NAD; A&Ox3  Cardiac: S1/S2, RRR, no murmur, no rubs  Lungs: unlabored respirations, CTA b/l, no wheeze, no rales, no crackles  Abdomen: Soft/NT/ND; positive bowel sounds x 4  Extremities: No edema b/l lower extremities; good capillary refill; no cyanosis; palpable 1+ pedal pulses b/l           LABS:                        11.1<L>  7.79  )-----------( 234      ( 01 Feb 2020 04:21 )             36.8<L>                        10.9<L>  8.02  )-----------( 232      ( 30 Jan 2020 02:00 )             35.8<L>    02-01    150<H>  |  112<H>  |  19  ----------------------------<  110<H>  4.1   |  27  |  0.8  01-30    148<H>  |  109  |  22<H>  ----------------------------<  112<H>  4.5   |  26  |  0.7    Ca    8.4<L>      01 Feb 2020 04:21  Mg     2.0     02-01    TPro  6.1 [6.0 - 8.0]  /  Alb  3.2<L> [3.5 - 5.2]  /  TBili  1.4<H> [0.2 - 1.2]  /  DBili  0.4<H> [0.0 - 0.2]  /  AST  29 [0 - 41]  /  ALT  27 [0 - 41]  /  AlkPhos  144<H> [30 - 115]  01-29          RADIOLOGY & ADDITIONAL TESTS:  CXR: < from: Xray Chest 1 View- PORTABLE-Routine (01.28.20 @ 03:57) >  Impression:      Unchanged bilateral opacities.    < end of copied text >    EKG:   MEDICATIONS  (STANDING):  amLODIPine   Tablet 2.5 milliGRAM(s) Oral daily  aspirin  chewable 81 milliGRAM(s) Oral daily  atorvastatin 80 milliGRAM(s) Oral at bedtime  dronabinol 2.5 milliGRAM(s) Oral daily  enoxaparin Injectable 100 milliGRAM(s) SubCutaneous two times a day  famotidine    Tablet 20 milliGRAM(s) Oral two times a day  losartan 100 milliGRAM(s) Oral daily  multivitamin 1 Tablet(s) Oral daily  senna 1 Tablet(s) Oral two times a day  thiamine 100 milliGRAM(s) Oral daily    MEDICATIONS  (PRN):  bisacodyl 5 milliGRAM(s) Oral every 12 hours PRN Constipation    HEPARIN:  [] YES [x] NO  Dose: XX UNITS/HR UNITS Q8H  LOVENOX:[x] YES [] NO  Dose: 100 mg Q12H  COUMADIN: []  YES [x] NO  Dose: XX mg  Q24H  SCD's: YES b/l  GI Prophylaxis: Protonix [], Pepcid [x], None [], (Contra-indication:.....)      Allergies    No Known Allergies    Intolerances      Ambulation/Activity Status:        Assessment/Plan:  79y Male pre-op cabg; s/p right radial pseudoaneurysm repair POD#1  - Case and plan discussed with CTU Intensivist and CT Surgeon - Dr. Shaw/Rodríguez/Samir   - Continue CTU supportive care    - Continue DVT/GI prophylaxis  - Incentive Spirometry 10 times an hour  - Continue to advance physical activity as tolerated and continue PT/OT as directed  1. CAD: Continue ASA, statin, BB  2. follow up pre-op work up   3. plan for OR early next week

## 2020-02-02 LAB
ANION GAP SERPL CALC-SCNC: 11 MMOL/L — SIGNIFICANT CHANGE UP (ref 7–14)
BUN SERPL-MCNC: 18 MG/DL — SIGNIFICANT CHANGE UP (ref 10–20)
CALCIUM SERPL-MCNC: 8.4 MG/DL — LOW (ref 8.5–10.1)
CHLORIDE SERPL-SCNC: 114 MMOL/L — HIGH (ref 98–110)
CO2 SERPL-SCNC: 25 MMOL/L — SIGNIFICANT CHANGE UP (ref 17–32)
CREAT SERPL-MCNC: 0.8 MG/DL — SIGNIFICANT CHANGE UP (ref 0.7–1.5)
GLUCOSE SERPL-MCNC: 108 MG/DL — HIGH (ref 70–99)
HCT VFR BLD CALC: 38.2 % — LOW (ref 42–52)
HGB BLD-MCNC: 11.3 G/DL — LOW (ref 14–18)
MAGNESIUM SERPL-MCNC: 2 MG/DL — SIGNIFICANT CHANGE UP (ref 1.8–2.4)
MCHC RBC-ENTMCNC: 29.6 G/DL — LOW (ref 32–37)
MCHC RBC-ENTMCNC: 30.2 PG — SIGNIFICANT CHANGE UP (ref 27–31)
MCV RBC AUTO: 102.1 FL — HIGH (ref 80–94)
NRBC # BLD: 0 /100 WBCS — SIGNIFICANT CHANGE UP (ref 0–0)
PLATELET # BLD AUTO: 230 K/UL — SIGNIFICANT CHANGE UP (ref 130–400)
POTASSIUM SERPL-MCNC: 4.7 MMOL/L — SIGNIFICANT CHANGE UP (ref 3.5–5)
POTASSIUM SERPL-SCNC: 4.7 MMOL/L — SIGNIFICANT CHANGE UP (ref 3.5–5)
RBC # BLD: 3.74 M/UL — LOW (ref 4.7–6.1)
RBC # FLD: 15.5 % — HIGH (ref 11.5–14.5)
SODIUM SERPL-SCNC: 150 MMOL/L — HIGH (ref 135–146)
WBC # BLD: 8.15 K/UL — SIGNIFICANT CHANGE UP (ref 4.8–10.8)
WBC # FLD AUTO: 8.15 K/UL — SIGNIFICANT CHANGE UP (ref 4.8–10.8)

## 2020-02-02 PROCEDURE — 99232 SBSQ HOSP IP/OBS MODERATE 35: CPT

## 2020-02-02 RX ADMIN — FAMOTIDINE 20 MILLIGRAM(S): 10 INJECTION INTRAVENOUS at 05:44

## 2020-02-02 RX ADMIN — SENNA PLUS 1 TABLET(S): 8.6 TABLET ORAL at 18:11

## 2020-02-02 RX ADMIN — FAMOTIDINE 20 MILLIGRAM(S): 10 INJECTION INTRAVENOUS at 18:11

## 2020-02-02 RX ADMIN — LOSARTAN POTASSIUM 100 MILLIGRAM(S): 100 TABLET, FILM COATED ORAL at 05:44

## 2020-02-02 RX ADMIN — Medication 81 MILLIGRAM(S): at 11:29

## 2020-02-02 RX ADMIN — SENNA PLUS 1 TABLET(S): 8.6 TABLET ORAL at 05:44

## 2020-02-02 RX ADMIN — ENOXAPARIN SODIUM 100 MILLIGRAM(S): 100 INJECTION SUBCUTANEOUS at 05:45

## 2020-02-02 RX ADMIN — Medication 1 TABLET(S): at 11:29

## 2020-02-02 RX ADMIN — Medication 100 MILLIGRAM(S): at 11:29

## 2020-02-02 RX ADMIN — AMLODIPINE BESYLATE 2.5 MILLIGRAM(S): 2.5 TABLET ORAL at 05:44

## 2020-02-02 RX ADMIN — ATORVASTATIN CALCIUM 80 MILLIGRAM(S): 80 TABLET, FILM COATED ORAL at 22:19

## 2020-02-02 RX ADMIN — ENOXAPARIN SODIUM 100 MILLIGRAM(S): 100 INJECTION SUBCUTANEOUS at 18:11

## 2020-02-02 NOTE — PROGRESS NOTE ADULT - SUBJECTIVE AND OBJECTIVE BOX
OPERATIVE PROCEDURE(s):                POD #                       79yMale  SURGEON(s): LEVI Levy  SUBJECTIVE ASSESSMENT:   Vital Signs Last 24 Hrs  T(F): 97.1 (02 Feb 2020 00:00), Max: 97.9 (01 Feb 2020 16:51)  HR: 69 (02 Feb 2020 05:45) (66 - 80)  BP: 154/96 (02 Feb 2020 05:45) (115/59 - 154/96)  BP(mean): 115 (02 Feb 2020 05:45) (80 - 115)  ABP: --  ABP(mean): --  RR: 20 (02 Feb 2020 05:45) (20 - 37)  SpO2: 100% (02 Feb 2020 05:45) (95% - 100%)  CVP(mm Hg): --  CVP(cm H2O): --  CO: --  CI: --  PA: --  SVR: --    I&O's Detail    01 Feb 2020 07:01  -  02 Feb 2020 07:00  --------------------------------------------------------  IN:    Oral Fluid: 120 mL  Total IN: 120 mL    OUT:  Total OUT: 0 mL        Net:   I&O's Detail    31 Jan 2020 07:01  -  01 Feb 2020 07:00  --------------------------------------------------------  Total NET: -350 mL      01 Feb 2020 07:01  -  02 Feb 2020 07:00  --------------------------------------------------------  Total NET: 120 mL        CAPILLARY BLOOD GLUCOSE        Physical Exam:  General: NAD; A&Ox3/Patient is intubated and sedated  Cardiac: S1/S2, RRR, no murmur, no rubs  Lungs: unlabored respirations, CTA b/l, no wheeze, no rales, no crackles  Abdomen: Soft/NT/ND; positive bowel sounds x 4  Sternum: Intact, no click, incision healing well with no drainage  Incisions: Incisions clean/dry/intact  Extremities: No edema b/l lower extremities; good capillary refill; no cyanosis; palpable 1+ pedal pulses b/l    Central Venous Catheter: Yes[]  No[] , If Yes indication:           Day #  Mora Catheter: Yes  [] , No  [] , If yes indication:                      Day #  NGT: Yes [] No [] ,    If Yes Placement:                                     Day #  EPICARDIAL WIRES:  [] YES [] NO                                              Day #  BOWEL MOVEMENT:  [] YES [] NO, If No, Timing since last BM:      Day #  CHEST TUBE(Left/Right):  [] YES [] NO, If yes -  AIR LEAKS:  [] YES [] NO        LABS:                        11.3<L>  8.15  )-----------( 230      ( 02 Feb 2020 04:30 )             38.2<L>                        11.1<L>  7.79  )-----------( 234      ( 01 Feb 2020 04:21 )             36.8<L>    02-02    150<H>  |  114<H>  |  18  ----------------------------<  108<H>  4.7   |  25  |  0.8  02-01    150<H>  |  112<H>  |  19  ----------------------------<  110<H>  4.1   |  27  |  0.8    Ca    8.4<L>      02 Feb 2020 04:30  Mg     2.0     02-02            RADIOLOGY & ADDITIONAL TESTS:  CXR:  EKG:  MEDICATIONS  (STANDING):  amLODIPine   Tablet 2.5 milliGRAM(s) Oral daily  aspirin  chewable 81 milliGRAM(s) Oral daily  atorvastatin 80 milliGRAM(s) Oral at bedtime  enoxaparin Injectable 100 milliGRAM(s) SubCutaneous two times a day  famotidine    Tablet 20 milliGRAM(s) Oral two times a day  losartan 100 milliGRAM(s) Oral daily  multivitamin 1 Tablet(s) Oral daily  senna 1 Tablet(s) Oral two times a day  thiamine 100 milliGRAM(s) Oral daily    MEDICATIONS  (PRN):  bisacodyl 5 milliGRAM(s) Oral every 12 hours PRN Constipation    HEPARIN:  [] YES [] NO  Dose: XX UNITS/HR UNITS Q8H  LOVENOX:[] YES [] NO  Dose: XX mg Q24H  COUMADIN: []  YES [] NO  Dose: XX mg  Q24H  SCD's: YES b/l  GI Prophylaxis: Protonix [], Pepcid [], None [], (Contra-indication:.....)    Post-Op Beta-Blockers: Yes [], No[], If No, then contraindication:  Post-Op Aspirin: Yes [],  No [], If No, then contraindication:  Post-Op Statin: Yes [], No[], If No, then contraindication:  Allergies    No Known Allergies    Intolerances      Ambulation/Activity Status:    Assessment/Plan:  79y Male status-post .....  - Case and plan discussed with CTU Intensivist and CT Surgeon - Dr. Shaw/Rodríguez/Samir   - Continue CTU supportive care    - Continue DVT/GI prophylaxis  - Incentive Spirometry 10 times an hour  - Continue to advance physical activity as tolerated and continue PT/OT as directed  1. CAD: Continue ASA, statin, BB  2. HTN:   3. A. Fib:   4. COPD/Hypoxia:   5. DM/Glucose Control:     Social Service Disposition: OPERATIVE PROCEDURE(s):                POD #                       79yMale  SURGEON(s): LEVI Levy  SUBJECTIVE ASSESSMENT: pt seen and examined. no acute complaints   Vital Signs Last 24 Hrs  T(F): 97.1 (02 Feb 2020 00:00), Max: 97.9 (01 Feb 2020 16:51)  HR: 69 (02 Feb 2020 05:45) (66 - 80)  BP: 154/96 (02 Feb 2020 05:45) (115/59 - 154/96)  BP(mean): 115 (02 Feb 2020 05:45) (80 - 115)  RR: 20 (02 Feb 2020 05:45) (20 - 37)  SpO2: 100% (02 Feb 2020 05:45) (95% - 100%)      I&O's Detail    01 Feb 2020 07:01  -  02 Feb 2020 07:00  --------------------------------------------------------  IN:    Oral Fluid: 120 mL  Total IN: 120 mL    OUT:  Total OUT: 0 mL        Net:   I&O's Detail    31 Jan 2020 07:01  -  01 Feb 2020 07:00  --------------------------------------------------------  Total NET: -350 mL      01 Feb 2020 07:01  -  02 Feb 2020 07:00  --------------------------------------------------------  Total NET: 120 mL        CAPILLARY BLOOD GLUCOSE        Physical Exam:  General: NAD; A&Ox3/Patient is intubated and sedated  Cardiac: S1/S2, RRR, no murmur, no rubs  Lungs: unlabored respirations, CTA b/l, no wheeze, no rales, no crackles  Abdomen: Soft/NT/ND; positive bowel sounds x 4  Sternum: Intact, no click, incision healing well with no drainage  Incisions: Incisions clean/dry/intact  Extremities: No edema b/l lower extremities; good capillary refill; no cyanosis; palpable 1+ pedal pulses b/l    Central Venous Catheter: Yes[]  No[] , If Yes indication:           Day #  Mora Catheter: Yes  [] , No  [] , If yes indication:                      Day #  NGT: Yes [] No [] ,    If Yes Placement:                                     Day #  EPICARDIAL WIRES:  [] YES [] NO                                              Day #  BOWEL MOVEMENT:  [] YES [] NO, If No, Timing since last BM:      Day #  CHEST TUBE(Left/Right):  [] YES [] NO, If yes -  AIR LEAKS:  [] YES [] NO        LABS:                        11.3<L>  8.15  )-----------( 230      ( 02 Feb 2020 04:30 )             38.2<L>                        11.1<L>  7.79  )-----------( 234      ( 01 Feb 2020 04:21 )             36.8<L>    02-02    150<H>  |  114<H>  |  18  ----------------------------<  108<H>  4.7   |  25  |  0.8  02-01    150<H>  |  112<H>  |  19  ----------------------------<  110<H>  4.1   |  27  |  0.8    Ca    8.4<L>      02 Feb 2020 04:30  Mg     2.0     02-02            RADIOLOGY & ADDITIONAL TESTS:  CXR:  EKG:  MEDICATIONS  (STANDING):  amLODIPine   Tablet 2.5 milliGRAM(s) Oral daily  aspirin  chewable 81 milliGRAM(s) Oral daily  atorvastatin 80 milliGRAM(s) Oral at bedtime  enoxaparin Injectable 100 milliGRAM(s) SubCutaneous two times a day  famotidine    Tablet 20 milliGRAM(s) Oral two times a day  losartan 100 milliGRAM(s) Oral daily  multivitamin 1 Tablet(s) Oral daily  senna 1 Tablet(s) Oral two times a day  thiamine 100 milliGRAM(s) Oral daily    MEDICATIONS  (PRN):  bisacodyl 5 milliGRAM(s) Oral every 12 hours PRN Constipation    HEPARIN:  [] YES [] NO  Dose: XX UNITS/HR UNITS Q8H  LOVENOX:[] YES [] NO  Dose: XX mg Q24H  COUMADIN: []  YES [] NO  Dose: XX mg  Q24H  SCD's: YES b/l  GI Prophylaxis: Protonix [], Pepcid [], None [], (Contra-indication:.....)    Post-Op Beta-Blockers: Yes [], No[], If No, then contraindication:  Post-Op Aspirin: Yes [],  No [], If No, then contraindication:  Post-Op Statin: Yes [], No[], If No, then contraindication:  Allergies    No Known Allergies    Intolerances      Ambulation/Activity Status:    Assessment/Plan:  79y Male status-post .....  - Case and plan discussed with CTU Intensivist and CT Surgeon - Dr. Shaw/Rodríguez/Samir   - Continue CTU supportive care    - Continue DVT/GI prophylaxis  - Incentive Spirometry 10 times an hour  - Continue to advance physical activity as tolerated and continue PT/OT as directed  1. CAD: Continue ASA, statin, BB  2. HTN:   3. A. Fib:   4. COPD/Hypoxia:   5. DM/Glucose Control:     Social Service Disposition: OPERATIVE PROCEDURE(s):   pre-op cabg              POD #                       79yMale  SURGEON(s): LEVI Levy  SUBJECTIVE ASSESSMENT: pt seen and examined. no acute complaints   Vital Signs Last 24 Hrs  T(F): 97.1 (02 Feb 2020 00:00), Max: 97.9 (01 Feb 2020 16:51)  HR: 69 (02 Feb 2020 05:45) (66 - 80)  BP: 154/96 (02 Feb 2020 05:45) (115/59 - 154/96)  BP(mean): 115 (02 Feb 2020 05:45) (80 - 115)  RR: 20 (02 Feb 2020 05:45) (20 - 37)  SpO2: 100% (02 Feb 2020 05:45) (95% - 100%)      I&O's Detail    01 Feb 2020 07:01  -  02 Feb 2020 07:00  --------------------------------------------------------  IN:    Oral Fluid: 120 mL  Total IN: 120 mL    OUT:  Total OUT: 0 mL        Net:   I&O's Detail    31 Jan 2020 07:01  -  01 Feb 2020 07:00  --------------------------------------------------------  Total NET: -350 mL      01 Feb 2020 07:01  -  02 Feb 2020 07:00  --------------------------------------------------------  Total NET: 120 mL        CAPILLARY BLOOD GLUCOSE        Physical Exam:  Physical Exam:  General: NAD; A&Ox3, c spine collar in place  Cardiac: S1/S2, RRR, no murmur, no rubs  Lungs: unlabored respirations, CTA b/l, no wheeze, no rales, no crackles  Abdomen: Soft/NT/ND; positive bowel sounds x 4  Extremities: No edema b/l lower extremities; good capillary refill; no cyanosis; palpable 1+ pedal pulses b/l       LABS:                        11.3<L>  8.15  )-----------( 230      ( 02 Feb 2020 04:30 )             38.2<L>                        11.1<L>  7.79  )-----------( 234      ( 01 Feb 2020 04:21 )             36.8<L>    02-02    150<H>  |  114<H>  |  18  ----------------------------<  108<H>  4.7   |  25  |  0.8  02-01    150<H>  |  112<H>  |  19  ----------------------------<  110<H>  4.1   |  27  |  0.8    Ca    8.4<L>      02 Feb 2020 04:30  Mg     2.0     02-02            RADIOLOGY & ADDITIONAL TESTS:  CXR: < from: Xray Chest 1 View- PORTABLE-Routine (01.28.20 @ 03:57) >  Impression:      Unchanged bilateral opacities.    < end of copied text >    EKG:  MEDICATIONS  (STANDING):  amLODIPine   Tablet 2.5 milliGRAM(s) Oral daily  aspirin  chewable 81 milliGRAM(s) Oral daily  atorvastatin 80 milliGRAM(s) Oral at bedtime  enoxaparin Injectable 100 milliGRAM(s) SubCutaneous two times a day  famotidine    Tablet 20 milliGRAM(s) Oral two times a day  losartan 100 milliGRAM(s) Oral daily  multivitamin 1 Tablet(s) Oral daily  senna 1 Tablet(s) Oral two times a day  thiamine 100 milliGRAM(s) Oral daily    MEDICATIONS  (PRN):  bisacodyl 5 milliGRAM(s) Oral every 12 hours PRN Constipation    HEPARIN:  [] YES [x] NO  Dose: XX UNITS/HR UNITS Q8H  LOVENOX:[x] YES [] NO  Dose: 100 mg Q12H  COUMADIN: []  YES [x] NO  Dose: XX mg  Q24H  SCD's: YES b/l  GI Prophylaxis: Protonix [], Pepcid [x], None [], (Contra-indication:.....)      Allergies    No Known Allergies    Intolerances      Ambulation/Activity Status: ambulate     Assessment/Plan:  79y Male pre-op cabg; s/p right radial pseudoaneurysm repair POD#2  - Case and plan discussed with CTU Intensivist and CT Surgeon - Dr. Shaw/Rodríguez/Samir   - Continue CTU supportive care    - Continue DVT/GI prophylaxis  - Incentive Spirometry 10 times an hour  - Continue to advance physical activity as tolerated and continue PT/OT as directed  1. CAD: Continue ASA, statin, BB  2. follow up pre-op work up   3. plan for CABG next week

## 2020-02-03 DIAGNOSIS — R31.9 HEMATURIA, UNSPECIFIED: ICD-10-CM

## 2020-02-03 LAB
ANION GAP SERPL CALC-SCNC: 11 MMOL/L — SIGNIFICANT CHANGE UP (ref 7–14)
APPEARANCE UR: ABNORMAL
BACTERIA # UR AUTO: NEGATIVE — SIGNIFICANT CHANGE UP
BILIRUB UR-MCNC: NEGATIVE — SIGNIFICANT CHANGE UP
BUN SERPL-MCNC: 15 MG/DL — SIGNIFICANT CHANGE UP (ref 10–20)
CALCIUM SERPL-MCNC: 8.2 MG/DL — LOW (ref 8.5–10.1)
CHLORIDE SERPL-SCNC: 106 MMOL/L — SIGNIFICANT CHANGE UP (ref 98–110)
CO2 SERPL-SCNC: 27 MMOL/L — SIGNIFICANT CHANGE UP (ref 17–32)
COLOR SPEC: YELLOW — SIGNIFICANT CHANGE UP
CREAT SERPL-MCNC: 0.7 MG/DL — SIGNIFICANT CHANGE UP (ref 0.7–1.5)
DIFF PNL FLD: ABNORMAL
EPI CELLS # UR: 1 /HPF — SIGNIFICANT CHANGE UP (ref 0–5)
GLUCOSE SERPL-MCNC: 101 MG/DL — HIGH (ref 70–99)
GLUCOSE UR QL: NEGATIVE — SIGNIFICANT CHANGE UP
HCT VFR BLD CALC: 35.8 % — LOW (ref 42–52)
HGB BLD-MCNC: 10.8 G/DL — LOW (ref 14–18)
HYALINE CASTS # UR AUTO: 8 /LPF — HIGH (ref 0–7)
KETONES UR-MCNC: NEGATIVE — SIGNIFICANT CHANGE UP
LEUKOCYTE ESTERASE UR-ACNC: ABNORMAL
MAGNESIUM SERPL-MCNC: 1.9 MG/DL — SIGNIFICANT CHANGE UP (ref 1.8–2.4)
MCHC RBC-ENTMCNC: 30.2 G/DL — LOW (ref 32–37)
MCHC RBC-ENTMCNC: 31.2 PG — HIGH (ref 27–31)
MCV RBC AUTO: 103.5 FL — HIGH (ref 80–94)
NITRITE UR-MCNC: NEGATIVE — SIGNIFICANT CHANGE UP
NRBC # BLD: 0 /100 WBCS — SIGNIFICANT CHANGE UP (ref 0–0)
PH UR: 5.5 — SIGNIFICANT CHANGE UP (ref 5–8)
PLATELET # BLD AUTO: 211 K/UL — SIGNIFICANT CHANGE UP (ref 130–400)
POTASSIUM SERPL-MCNC: 4.2 MMOL/L — SIGNIFICANT CHANGE UP (ref 3.5–5)
POTASSIUM SERPL-SCNC: 4.2 MMOL/L — SIGNIFICANT CHANGE UP (ref 3.5–5)
PREALB SERPL-MCNC: 13 MG/DL — LOW (ref 20–40)
PROT UR-MCNC: SIGNIFICANT CHANGE UP
RBC # BLD: 3.46 M/UL — LOW (ref 4.7–6.1)
RBC # FLD: 15.6 % — HIGH (ref 11.5–14.5)
RBC CASTS # UR COMP ASSIST: 484 /HPF — HIGH (ref 0–4)
SODIUM SERPL-SCNC: 144 MMOL/L — SIGNIFICANT CHANGE UP (ref 135–146)
SP GR SPEC: 1.01 — SIGNIFICANT CHANGE UP (ref 1.01–1.02)
UROBILINOGEN FLD QL: SIGNIFICANT CHANGE UP
WBC # BLD: 9.26 K/UL — SIGNIFICANT CHANGE UP (ref 4.8–10.8)
WBC # FLD AUTO: 9.26 K/UL — SIGNIFICANT CHANGE UP (ref 4.8–10.8)
WBC UR QL: 26 /HPF — HIGH (ref 0–5)

## 2020-02-03 PROCEDURE — 99222 1ST HOSP IP/OBS MODERATE 55: CPT

## 2020-02-03 PROCEDURE — 76770 US EXAM ABDO BACK WALL COMP: CPT | Mod: 26

## 2020-02-03 PROCEDURE — 99232 SBSQ HOSP IP/OBS MODERATE 35: CPT

## 2020-02-03 PROCEDURE — 71045 X-RAY EXAM CHEST 1 VIEW: CPT | Mod: 26

## 2020-02-03 RX ORDER — FUROSEMIDE 40 MG
20 TABLET ORAL ONCE
Refills: 0 | Status: COMPLETED | OUTPATIENT
Start: 2020-02-03 | End: 2020-02-03

## 2020-02-03 RX ORDER — POTASSIUM CHLORIDE 20 MEQ
20 PACKET (EA) ORAL ONCE
Refills: 0 | Status: COMPLETED | OUTPATIENT
Start: 2020-02-03 | End: 2020-02-03

## 2020-02-03 RX ORDER — MAGNESIUM SULFATE 500 MG/ML
1 VIAL (ML) INJECTION ONCE
Refills: 0 | Status: COMPLETED | OUTPATIENT
Start: 2020-02-03 | End: 2020-02-03

## 2020-02-03 RX ADMIN — Medication 1 TABLET(S): at 12:11

## 2020-02-03 RX ADMIN — Medication 20 MILLIEQUIVALENT(S): at 08:56

## 2020-02-03 RX ADMIN — Medication 100 MILLIGRAM(S): at 12:11

## 2020-02-03 RX ADMIN — Medication 81 MILLIGRAM(S): at 12:11

## 2020-02-03 RX ADMIN — ATORVASTATIN CALCIUM 80 MILLIGRAM(S): 80 TABLET, FILM COATED ORAL at 22:06

## 2020-02-03 RX ADMIN — FAMOTIDINE 20 MILLIGRAM(S): 10 INJECTION INTRAVENOUS at 17:44

## 2020-02-03 RX ADMIN — LOSARTAN POTASSIUM 100 MILLIGRAM(S): 100 TABLET, FILM COATED ORAL at 05:59

## 2020-02-03 RX ADMIN — SENNA PLUS 1 TABLET(S): 8.6 TABLET ORAL at 17:44

## 2020-02-03 RX ADMIN — Medication 20 MILLIGRAM(S): at 08:56

## 2020-02-03 RX ADMIN — AMLODIPINE BESYLATE 2.5 MILLIGRAM(S): 2.5 TABLET ORAL at 05:59

## 2020-02-03 RX ADMIN — FAMOTIDINE 20 MILLIGRAM(S): 10 INJECTION INTRAVENOUS at 05:59

## 2020-02-03 RX ADMIN — Medication 100 GRAM(S): at 08:56

## 2020-02-03 NOTE — PROGRESS NOTE ADULT - SUBJECTIVE AND OBJECTIVE BOX
OPERATIVE PROCEDURE(s):                POD #                       79yMale  SURGEON(s): LEVI Levy  SUBJECTIVE ASSESSMENT:   Vital Signs Last 24 Hrs  T(F): 97 (03 Feb 2020 02:00), Max: 97.3 (02 Feb 2020 19:00)  HR: 81 (03 Feb 2020 06:00) (67 - 81)  BP: 131/64 (03 Feb 2020 06:00) (103/57 - 155/87)  BP(mean): 92 (03 Feb 2020 06:00) (74 - 116)  RR: 17 (03 Feb 2020 06:00) (17 - 20)  SpO2: 100% (03 Feb 2020 06:00) (96% - 100%)      I&O's Detail    02 Feb 2020 07:01  -  03 Feb 2020 07:00  --------------------------------------------------------  IN:    Oral Fluid: 1230 mL  Total IN: 1230 mL    OUT:    Voided: 875 mL  Total OUT: 875 mL        Net:   I&O's Detail    01 Feb 2020 07:01  -  02 Feb 2020 07:00  --------------------------------------------------------  Total NET: 120 mL      02 Feb 2020 07:01  -  03 Feb 2020 07:00  --------------------------------------------------------  Total NET: 355 mL        CAPILLARY BLOOD GLUCOSE        Physical Exam:  General: NAD; A&Ox3/Patient is intubated and sedated  Cardiac: S1/S2, RRR, no murmur, no rubs  Lungs: unlabored respirations, CTA b/l, no wheeze, no rales, no crackles  Abdomen: Soft/NT/ND; positive bowel sounds x 4  Sternum: Intact, no click, incision healing well with no drainage  Incisions: Incisions clean/dry/intact  Extremities: No edema b/l lower extremities; good capillary refill; no cyanosis; palpable 1+ pedal pulses b/l    Central Venous Catheter: Yes[]  No[] , If Yes indication:           Day #  Mora Catheter: Yes  [] , No  [] , If yes indication:                      Day #  NGT: Yes [] No [] ,    If Yes Placement:                                     Day #  EPICARDIAL WIRES:  [] YES [] NO                                              Day #  BOWEL MOVEMENT:  [] YES [] NO, If No, Timing since last BM:      Day #  CHEST TUBE(Left/Right):  [] YES [] NO, If yes -  AIR LEAKS:  [] YES [] NO        LABS:                        10.8<L>  9.26  )-----------( 211      ( 03 Feb 2020 04:00 )             35.8<L>                        11.3<L>  8.15  )-----------( 230      ( 02 Feb 2020 04:30 )             38.2<L>    02-03    144  |  106  |  15  ----------------------------<  101<H>  4.2   |  27  |  0.7  02-02    150<H>  |  114<H>  |  18  ----------------------------<  108<H>  4.7   |  25  |  0.8    Ca    8.2<L>      03 Feb 2020 04:00  Mg     1.9     02-03            RADIOLOGY & ADDITIONAL TESTS:  CXR:  EKG:  MEDICATIONS  (STANDING):  amLODIPine   Tablet 2.5 milliGRAM(s) Oral daily  aspirin  chewable 81 milliGRAM(s) Oral daily  atorvastatin 80 milliGRAM(s) Oral at bedtime  enoxaparin Injectable 100 milliGRAM(s) SubCutaneous two times a day  famotidine    Tablet 20 milliGRAM(s) Oral two times a day  losartan 100 milliGRAM(s) Oral daily  multivitamin 1 Tablet(s) Oral daily  senna 1 Tablet(s) Oral two times a day  thiamine 100 milliGRAM(s) Oral daily    MEDICATIONS  (PRN):  bisacodyl 5 milliGRAM(s) Oral every 12 hours PRN Constipation    HEPARIN:  [] YES [] NO  Dose: XX UNITS/HR UNITS Q8H  LOVENOX:[] YES [] NO  Dose: XX mg Q24H  COUMADIN: []  YES [] NO  Dose: XX mg  Q24H  SCD's: YES b/l  GI Prophylaxis: Protonix [], Pepcid [], None [], (Contra-indication:.....)    Post-Op Beta-Blockers: Yes [], No[], If No, then contraindication:  Post-Op Aspirin: Yes [],  No [], If No, then contraindication:  Post-Op Statin: Yes [], No[], If No, then contraindication:  Allergies    No Known Allergies    Intolerances      Ambulation/Activity Status:    Assessment/Plan:  79y Male status-post .....  - Case and plan discussed with CTU Intensivist and CT Surgeon - Dr. Shaw/Rodríguez/Samir   - Continue CTU supportive care    - Continue DVT/GI prophylaxis  - Incentive Spirometry 10 times an hour  - Continue to advance physical activity as tolerated and continue PT/OT as directed  1. CAD: Continue ASA, statin, BB  2. HTN:   3. A. Fib:   4. COPD/Hypoxia:   5. DM/Glucose Control:     Social Service Disposition: OPERATIVE PROCEDURE(s):     pre-op cabg           POD #                       79yMale  SURGEON(s): Dr. Arreguin  SUBJECTIVE ASSESSMENT: pt seen and examined. no acute complaints at this time  Vital Signs Last 24 Hrs  T(F): 97 (03 Feb 2020 02:00), Max: 97.3 (02 Feb 2020 19:00)  HR: 81 (03 Feb 2020 06:00) (67 - 81)  BP: 131/64 (03 Feb 2020 06:00) (103/57 - 155/87)  BP(mean): 92 (03 Feb 2020 06:00) (74 - 116)  RR: 17 (03 Feb 2020 06:00) (17 - 20)  SpO2: 100% (03 Feb 2020 06:00) (96% - 100%)      I&O's Detail    02 Feb 2020 07:01  -  03 Feb 2020 07:00  --------------------------------------------------------  IN:    Oral Fluid: 1230 mL  Total IN: 1230 mL    OUT:    Voided: 875 mL  Total OUT: 875 mL        Net:   I&O's Detail    01 Feb 2020 07:01  -  02 Feb 2020 07:00  --------------------------------------------------------  Total NET: 120 mL      02 Feb 2020 07:01  -  03 Feb 2020 07:00  --------------------------------------------------------  Total NET: 355 mL        CAPILLARY BLOOD GLUCOSE          Physical Exam:  General: NAD; A&Ox3, c spine collar in place  Cardiac: S1/S2, RRR, no murmur, no rubs  Lungs: unlabored respirations, CTA b/l, no wheeze, no rales, no crackles  Abdomen: Soft/NT/ND; positive bowel sounds x 4  Extremities: No edema b/l lower extremities; good capillary refill; no cyanosis; palpable 1+ pedal pulses b/l           LABS:                        10.8<L>  9.26  )-----------( 211      ( 03 Feb 2020 04:00 )             35.8<L>                        11.3<L>  8.15  )-----------( 230      ( 02 Feb 2020 04:30 )             38.2<L>    02-03    144  |  106  |  15  ----------------------------<  101<H>  4.2   |  27  |  0.7  02-02    150<H>  |  114<H>  |  18  ----------------------------<  108<H>  4.7   |  25  |  0.8    Ca    8.2<L>      03 Feb 2020 04:00  Mg     1.9     02-03            RADIOLOGY & ADDITIONAL TESTS:  CXR: < from: Xray Chest 1 View- PORTABLE-Routine (02.03.20 @ 05:35) >  Impression:      Unchanged bilateral opacities, right greater than left.    < end of copied text >    EKG: < from: 12 Lead ECG (01.23.20 @ 08:06) >    Ventricular Rate 91 BPM    Atrial Rate 468 BPM    QRS Duration 98 ms    Q-T Interval 398 ms    QTC Calculation(Bezet) 489 ms    R Axis 6 degrees    T Axis 24 degrees    Diagnosis Line Atrial fibrillation  Prolonged QT  Abnormal ECG    < end of copied text >    MEDICATIONS  (STANDING):  amLODIPine   Tablet 2.5 milliGRAM(s) Oral daily  aspirin  chewable 81 milliGRAM(s) Oral daily  atorvastatin 80 milliGRAM(s) Oral at bedtime  enoxaparin Injectable 100 milliGRAM(s) SubCutaneous two times a day  famotidine    Tablet 20 milliGRAM(s) Oral two times a day  losartan 100 milliGRAM(s) Oral daily  multivitamin 1 Tablet(s) Oral daily  senna 1 Tablet(s) Oral two times a day  thiamine 100 milliGRAM(s) Oral daily    MEDICATIONS  (PRN):  bisacodyl 5 milliGRAM(s) Oral every 12 hours PRN Constipation    HEPARIN:  [] YES [x] NO  Dose: XX UNITS/HR UNITS Q8H  LOVENOX:[x] YES [] NO  Dose: 40 mg Q24H  COUMADIN: []  YES [x] NO  Dose: XX mg  Q24H  SCD's: YES b/l  GI Prophylaxis: Protonix [], Pepcid [x], None [], (Contra-indication:.....)      Allergies    No Known Allergies    Intolerances      Ambulation/Activity Status: ambulate     Assessment/Plan:  79y Male pre-op cabg; s/p right radial pseudoaneurysm repair POD#3  - Case and plan discussed with CTU Intensivist and CT Surgeon - Dr. Shaw/Rodríguez/Samir   - Continue CTU supportive care    - Continue DVT/GI prophylaxis  - Incentive Spirometry 10 times an hour  - Continue to advance physical activity as tolerated and continue PT/OT as directed  1. CAD: Continue ASA, statin, BB  2. follow up pre-op work up   3. plan for CABG most likely this week

## 2020-02-03 NOTE — PROGRESS NOTE ADULT - ASSESSMENT
79y Male status post right radial PSA repair     - dressing changes daily: apply dry gauze and tape  - may wet the area, pat dry  - will need to follow up in the office in a few weeks after discharge      SPECTRA [id number]

## 2020-02-03 NOTE — PROGRESS NOTE ADULT - SUBJECTIVE AND OBJECTIVE BOX
VASCULAR SURGERY PROGRESS NOTE    CC: cardiac arrest    Post-Op Day #3    Procedure: s/p repair of right radial PSA    Events of past 24 hours:  doing well, denies any pain, swelling, numbness         ROS otherwise negative except per subjective and HPI      PAST MEDICAL & SURGICAL HISTORY:  Atrial fibrillation  Neuropathy  HLD (hyperlipidemia)  HTN (hypertension)  History of cholecystectomy  History of cholecystectomy      Vital Signs Last 24 Hrs  T(C): 36.1 (03 Feb 2020 02:00), Max: 36.3 (02 Feb 2020 19:00)  T(F): 97 (03 Feb 2020 02:00), Max: 97.3 (02 Feb 2020 19:00)  HR: 81 (03 Feb 2020 06:00) (67 - 81)  BP: 131/64 (03 Feb 2020 06:00) (103/57 - 155/87)  BP(mean): 92 (03 Feb 2020 06:00) (74 - 116)  RR: 17 (03 Feb 2020 06:00) (17 - 20)  SpO2: 100% (03 Feb 2020 06:00) (96% - 100%)    Pain (0-10):            Pain Control Adequate: [] YES [] N    Diet:    I&O's Detail    02 Feb 2020 07:01  -  03 Feb 2020 07:00  --------------------------------------------------------  IN:    Oral Fluid: 1230 mL  Total IN: 1230 mL    OUT:    Voided: 875 mL  Total OUT: 875 mL    Total NET: 355 mL      PHYSICAL EXAM    Appearance: Normal	  HEENT:   Normal oral mucosa, PERRL, EOMI	  Neck: Supple, - JVD; Carotid Bruit   Cardiovascular: Normal S1 S2, No JVD, No murmurs,   Respiratory: Lungs clear to auscultation/No Rales, Rhonchi, Wheezing	  Gastrointestinal:  Soft, Non-tender, + BS	  Skin: No rashes, No ecchymoses, No cyanosis  Extremities: Normal range of motion, No clubbing, cyanosis. + Right upper extremity: incision @ wrist clean/dry/intact sterri strips  Neurologic: Non-focal  Psychiatry: A & O x 3, Mood & affect appropriate    PULSES:  Radial Right palpable      MEDICATIONS:   MEDICATIONS  (STANDING):  amLODIPine   Tablet 2.5 milliGRAM(s) Oral daily  aspirin  chewable 81 milliGRAM(s) Oral daily  atorvastatin 80 milliGRAM(s) Oral at bedtime  enoxaparin Injectable 100 milliGRAM(s) SubCutaneous two times a day  famotidine    Tablet 20 milliGRAM(s) Oral two times a day  furosemide   Injectable 20 milliGRAM(s) IV Push once  losartan 100 milliGRAM(s) Oral daily  magnesium sulfate  IVPB 1 Gram(s) IV Intermittent once  multivitamin 1 Tablet(s) Oral daily  potassium chloride    Tablet ER 20 milliEquivalent(s) Oral once  senna 1 Tablet(s) Oral two times a day  thiamine 100 milliGRAM(s) Oral daily    MEDICATIONS  (PRN):  bisacodyl 5 milliGRAM(s) Oral every 12 hours PRN Constipation      LAB/STUDIES:                        10.8   9.26  )-----------( 211      ( 03 Feb 2020 04:00 )             35.8     02-03    144  |  106  |  15  ----------------------------<  101<H>  4.2   |  27  |  0.7    Ca    8.2<L>      03 Feb 2020 04:00  Mg     1.9     02-03

## 2020-02-03 NOTE — CHART NOTE - NSCHARTNOTEFT_GEN_A_CORE
Registered Dietitian Follow-Up    ***Scroll to the bottom for RD recommendation***    Patient Profile Reviewed                           Yes [x]   No []  Nutrition History Previously Obtained        Yes [x]  No []          PERTINENT SUBJECTIVE INFORMATION (LATEST AS OF TODAY):  - wife at bedside, s/p SLP eval 2/2, on dysphagia 3 thin diet, pt needs slow chewing and no issue swallowing.  - po has improved and has been drinking ensure enlive and ensure pudding.  - now wife reported pt been eating solids foods as well, + home brought soups and foods.        PERTINENT MEDICAL INFORMATIONS:  (1) p/w cardiac arrest s/p PRE-OP CABG  (2) C/w CTU support. Adv PT/OT. c/w meds  (3) S/p SLP 2/2  (4) S/p Repair of R radial PSA by vascular           DIET ORDER:   Dysphagia 3 thin + Ensure Enlive q8hr.         ANTHROPOMETRICS:  - Ht.  175.3cm  - Wt.   (1/26): 101.2kg  (1/27): 108.1kg  (1/30): 106kg  (1/31): 106.2kg - likely stable, pt does have 1+ edema. will monitor trend.  - BMI. 34.6  - IBW. 72.3kg       PERTINENT LAB DATA:  2/3: h/h 10.8/35.8, Glucose 101, Ca 8.2  PERTINENT MEDS:  aspirin, enoxaparin, lasix, mag-sul, k-chloride, amlodipine, atorvastatin, bisacodyl, famotidine, MVI, b1, senna       PHYSICAL FINDINGS  - APPEARANCE:        alert and oriented on the chair with neck braces  - GI FUNCTION:        LBM 2/3 per pt  - TUBES:                      - ORAL/MOUTH:      per SLP eval  - SKIN:                        Skin intact        NUTRITION REQUIREMENTS  WEIGHT USED:                          IBW of 72.3kg, ABW of 101.2kg (earliest weight)  ESTIMATED ENERGY NEEDS:       CONTINUE [  ]      ADJUST [ x ]    ESTIMATED ENERGY NEEDS:         7970-2274 kcal/day  (MSJ of 1707 x 1.1-1.2) - for BMI33 using earliest weight  ESTIMATED PROTEIN NEEDS:        72-86g/day (1.0-1.2 g/kg of IBW)  ESTIMATED FLUID NEEDS:             fluid per CTU team    CURRENT NUTRIENT NEEDS:     Likely meeting with solid foods + ensure supplements.          [ x ] PREVIOUS NUTRITION DIAGNOSIS:   (1)  Inadequate protein energy intake            [  ] ONGOING        [ x ] RESOLVED          PATIENT INTERVENTION:    [ x ] ORAL        [ ] EN/TF     GOAL/EXPECTED OUTCOME:     pt to continue to consume and tolerate >75% of all meals and snacks through LOS. Pt to have 1BM/day.  INDICATOR/MONITORING:       RD to monitor diet order, energy intake, body composition, nutrition focused physical findings (PO tolerance, apeptite)  NUTRITION INTERVENTION:        Meals and snacks.     RECS: (1) Continue current Dysphagia 3 thin with ENSURE ENLIVE q8hr, even after CABG when medically feasible.

## 2020-02-03 NOTE — CONSULT NOTE ADULT - NSHPATTENDINGPLANDISCUSS_GEN_ALL_CORE
team
I spoke to his resident and spoke to his wife
patient, family, housestaff, Dr. Laws
martín Hidalgo

## 2020-02-03 NOTE — CONSULT NOTE ADULT - SUBJECTIVE AND OBJECTIVE BOX
Patient is a 79y old  Male who presents with a chief complaint of Cardiac arrest (03 Feb 2020 08:42)      HPI:  80 yo M with hx of HTN, A.fib (Eliquis), CHF, HLD brought in by EMS post cardiac arrest. As per wife at bedside, patient at the basement watching movie and was doing fine. Few mins later, patient came up and told the wife that he wasn't feeling right and that he felt funny. Then he crashed. Wife called the EMS who arrived 5 mins later. Upon EMS arrival, patient was found to be in PEA then asystole, s/p resuscitation for ~ 10 mins and ROSC achieved after 1 round of compression and epi. While on the way to hospital, patient had another cardiac arrest on the ambulant s/p CPR and ROSC achieved few mins later. As per family, patient was doing welll and at baseline prior to the episode. As baseline patient is fully functional. Denied any recent infection, recent hospitalization, recent ED visit, fever, chills.     In ED, patient was found to bradycardic and hypotensive. s/p 1 dose of atropine and patient was started on low dose Levophed with improvement in HR and BP. (12 Jan 2020 22:13)    Pt states he had some blood at his meatus and passed a small blood clot in his urine today. Overall his urine color is yellow. Pt has not had a patrick catheter in since early last week. At one time during this admission the patient states that he had patrick removed and then replaced. Pt is voiding well at this time. Other than a weaker urinary stream, pt denies any other LUTS. Pt has hx of Afib and was taking Eliquis at home. Presently pt is on Lovenox 100mg BID and ASA81 mg      PAST MEDICAL & SURGICAL HISTORY:  Atrial fibrillation  Neuropathy  HLD (hyperlipidemia)  HTN (hypertension)  History of cholecystectomy  History of cholecystectomy      REVIEW OF SYSTEMS:    CONSTITUTIONAL:  fevers or chills  HEENT: No visual changes  ENDO: No sweating  NECK: No pain or stiffness  MUSCULOSKELETAL: No back pain, no joint pain  RESPIRATORY: No shortness of breath  CARDIOVASCULAR: No chest pain  GASTROINTESTINAL: No abdominal or epigastric pain. No nausea, vomiting,  No diarrhea or constipation.   NEUROLOGICAL: No mental status changes  PSYCH: No depression, no mood changes  SKIN: No itching      MEDICATIONS  (STANDING):  amLODIPine   Tablet 2.5 milliGRAM(s) Oral daily  aspirin  chewable 81 milliGRAM(s) Oral daily  atorvastatin 80 milliGRAM(s) Oral at bedtime  enoxaparin Injectable 100 milliGRAM(s) SubCutaneous two times a day  famotidine    Tablet 20 milliGRAM(s) Oral two times a day  losartan 100 milliGRAM(s) Oral daily  multivitamin 1 Tablet(s) Oral daily  senna 1 Tablet(s) Oral two times a day  thiamine 100 milliGRAM(s) Oral daily    MEDICATIONS  (PRN):  bisacodyl 5 milliGRAM(s) Oral every 12 hours PRN Constipation      Allergies    No Known Allergies        Vital Signs Last 24 Hrs  T(C): 36.2 (03 Feb 2020 08:00), Max: 36.3 (02 Feb 2020 19:00)  T(F): 97.2 (03 Feb 2020 08:00), Max: 97.3 (02 Feb 2020 19:00)  HR: 70 (03 Feb 2020 10:15) (64 - 83)  BP: 90/50 (03 Feb 2020 10:15) (85/52 - 155/87)  BP(mean): 64 (03 Feb 2020 10:15) (64 - 116)  RR: 17 (03 Feb 2020 06:00) (17 - 20)  SpO2: 96% (03 Feb 2020 10:15) (96% - 100%)    PHYSICAL EXAM:    Constitutional: NAD, well-developed, Cervical Hard collar in place  HEENT: EOMI  Neck: no pain  Back: No CVA tenderness  Respiratory: No accessory respiratory muscle use  Abd: Soft, NT/ND  no organomegally  no hernia  : uncircumcised phallus with a very small amount of dark blood near meatus, b/l descended testes, no edema, non tender, no palpable masses.   Neurological: A/O x 3  Psychiatric: Normal mood, normal affect  Skin: No rashes    I&O's Summary    02 Feb 2020 07:01  -  03 Feb 2020 07:00  --------------------------------------------------------  IN: 1230 mL / OUT: 875 mL / NET: 355 mL    03 Feb 2020 07:01  -  03 Feb 2020 12:00  --------------------------------------------------------  IN: 480 mL / OUT: 100 mL / NET: 380 mL        LABS:                        10.8   9.26  )-----------( 211      ( 03 Feb 2020 04:00 )             35.8     02-03    144  |  106  |  15  ----------------------------<  101<H>  4.2   |  27  |  0.7    Ca    8.2<L>      03 Feb 2020 04:00  Mg     1.9     02-03                RADIOLOGY & ADDITIONAL STUDIES:  < from: CT Abdomen and Pelvis w/ IV Cont (01.13.20 @ 03:08) >  EXAM:  CT ABDOMEN AND PELVIS IC        EXAM:  CT ANGIO CHEST (W)AW IC            PROCEDURE DATE:  01/13/2020            INTERPRETATION:  Clinical History / Reason for exam: Post cardiac arrest.     TECHNIQUE: Pulmonary embolism protocol. Multislicehelical sections were obtained from the thoracic inlet to the lung bases during rapid administration of 100cc Optiray 320 intravenous contrast using a CTA protocol. Subsequently, axial CT sections were obtained from the domes of the diaphragms to thepubic symphysis. Thin sections were reconstructed through the pulmonary vasculature. Coronal and sagittal reformatted images were submitted. 3-D/MIP postprocessing images were obtained.      COMPARISON CT: CT chest 12/1/2012.    OTHER STUDIES USED FOR CORRELATION: None.      FINDINGS:      CHEST:     PULMONARY EMBOLUS: No evidence of pulmonary embolus.    LINES/TUBES: Endotracheal tube terminating 4 cm from the simran.  Enteric tube with tip in the distal stomach.    LUNGS/PLEURA/AIRWAYS: Small bibasilar consolidated opacities represent atelectasis or sequela of prior aspiration event. Trace left pleural effusion.  No pneumothorax.     MEDIASTINUM/THORACIC NODES: No lymphadenopathy. Retroesophageal 4.4 x 1.3 x 3.9 cm soft tissue density within the thorax may represent hematoma.    HEART/GREAT VESSELS: Normal heart size. No pericardial effusion. Normal caliber main pulmonary artery and thoracic aorta. Coronary artery calcifications.      ABDOMEN/PELVIS:    HEPATOBILIARY: Postcholecystectomy. Mild periportal edema.    SPLEEN: Unremarkable.    PANCREAS: Unremarkable.    ADRENAL GLANDS: Unremarkable.    KIDNEYS: Symmetric bilateral enhancement without hydronephrosis. Multiple bilateral renal cysts are noted, as are additional subcentimeter bilateral renal hypodensities, which are too small to further characterize.    ABDOMINOPELVIC NODES: Unremarkable.    PELVIC ORGANS: Decompressed bladder with Patrick catheter. Multiple prostatic calcifications.    PERITONEUM/MESENTERY/BOWEL: No bowelobstruction, pneumoperitoneum, or ascites. The appendix is not identified.    BONES/SOFT TISSUES: Diffuse osteopenia and degenerative changes.    OTHER: Vascular calcifications. Small bilateral fat-containing inguinal hernias.      IMPRESSION:     1. No central or lobar pulmonary embolus.    2.  Small bibasilar consolidated opacities, possibly representing atelectasis or sequela of prior aspiration event.    3.  A 4.4 cm retroesophageal soft tissue density within the thorax may represent hematoma.    4.  No evidence of acute/inflammatory process within the abdomen or pelvis.              JEN ABRAMS M.D., RESIDENT RADIOLOGIST  This document has been electronically signed.  SUDHEER MARROQUIN M.D., ATTENDING RADIOLOGIST  This document has been electronically signed. Jan 13 2020  7:32AM        < end of copied text >

## 2020-02-03 NOTE — CONSULT NOTE ADULT - PROBLEM SELECTOR RECOMMENDATION 9
Send urine for culture and cytology  Renal and Bladder US  Outpatient cystoscopy  Will d/w Attending

## 2020-02-04 LAB
ANION GAP SERPL CALC-SCNC: 11 MMOL/L — SIGNIFICANT CHANGE UP (ref 7–14)
BUN SERPL-MCNC: 16 MG/DL — SIGNIFICANT CHANGE UP (ref 10–20)
CALCIUM SERPL-MCNC: 7.9 MG/DL — LOW (ref 8.5–10.1)
CHLORIDE SERPL-SCNC: 101 MMOL/L — SIGNIFICANT CHANGE UP (ref 98–110)
CO2 SERPL-SCNC: 25 MMOL/L — SIGNIFICANT CHANGE UP (ref 17–32)
CREAT SERPL-MCNC: 0.6 MG/DL — LOW (ref 0.7–1.5)
CULTURE RESULTS: SIGNIFICANT CHANGE UP
GLUCOSE SERPL-MCNC: 111 MG/DL — HIGH (ref 70–99)
HCT VFR BLD CALC: 33.2 % — LOW (ref 42–52)
HGB BLD-MCNC: 10.2 G/DL — LOW (ref 14–18)
MAGNESIUM SERPL-MCNC: 1.9 MG/DL — SIGNIFICANT CHANGE UP (ref 1.8–2.4)
MCHC RBC-ENTMCNC: 30.5 PG — SIGNIFICANT CHANGE UP (ref 27–31)
MCHC RBC-ENTMCNC: 30.7 G/DL — LOW (ref 32–37)
MCV RBC AUTO: 99.4 FL — HIGH (ref 80–94)
NRBC # BLD: 0 /100 WBCS — SIGNIFICANT CHANGE UP (ref 0–0)
PLATELET # BLD AUTO: 178 K/UL — SIGNIFICANT CHANGE UP (ref 130–400)
POTASSIUM SERPL-MCNC: 3.7 MMOL/L — SIGNIFICANT CHANGE UP (ref 3.5–5)
POTASSIUM SERPL-SCNC: 3.7 MMOL/L — SIGNIFICANT CHANGE UP (ref 3.5–5)
RBC # BLD: 3.34 M/UL — LOW (ref 4.7–6.1)
RBC # FLD: 15.1 % — HIGH (ref 11.5–14.5)
SODIUM SERPL-SCNC: 137 MMOL/L — SIGNIFICANT CHANGE UP (ref 135–146)
SPECIMEN SOURCE: SIGNIFICANT CHANGE UP
WBC # BLD: 7.18 K/UL — SIGNIFICANT CHANGE UP (ref 4.8–10.8)
WBC # FLD AUTO: 7.18 K/UL — SIGNIFICANT CHANGE UP (ref 4.8–10.8)

## 2020-02-04 PROCEDURE — 99232 SBSQ HOSP IP/OBS MODERATE 35: CPT

## 2020-02-04 RX ORDER — HEPARIN SODIUM 5000 [USP'U]/ML
5000 INJECTION INTRAVENOUS; SUBCUTANEOUS EVERY 12 HOURS
Refills: 0 | Status: DISCONTINUED | OUTPATIENT
Start: 2020-02-04 | End: 2020-02-14

## 2020-02-04 RX ORDER — POTASSIUM CHLORIDE 20 MEQ
40 PACKET (EA) ORAL ONCE
Refills: 0 | Status: COMPLETED | OUTPATIENT
Start: 2020-02-04 | End: 2020-02-04

## 2020-02-04 RX ADMIN — AMLODIPINE BESYLATE 2.5 MILLIGRAM(S): 2.5 TABLET ORAL at 06:03

## 2020-02-04 RX ADMIN — FAMOTIDINE 20 MILLIGRAM(S): 10 INJECTION INTRAVENOUS at 06:03

## 2020-02-04 RX ADMIN — ATORVASTATIN CALCIUM 80 MILLIGRAM(S): 80 TABLET, FILM COATED ORAL at 21:17

## 2020-02-04 RX ADMIN — Medication 81 MILLIGRAM(S): at 11:07

## 2020-02-04 RX ADMIN — Medication 1 TABLET(S): at 11:07

## 2020-02-04 RX ADMIN — SENNA PLUS 1 TABLET(S): 8.6 TABLET ORAL at 18:00

## 2020-02-04 RX ADMIN — HEPARIN SODIUM 5000 UNIT(S): 5000 INJECTION INTRAVENOUS; SUBCUTANEOUS at 18:00

## 2020-02-04 RX ADMIN — SENNA PLUS 1 TABLET(S): 8.6 TABLET ORAL at 06:03

## 2020-02-04 RX ADMIN — LOSARTAN POTASSIUM 100 MILLIGRAM(S): 100 TABLET, FILM COATED ORAL at 06:03

## 2020-02-04 RX ADMIN — Medication 100 MILLIGRAM(S): at 11:07

## 2020-02-04 RX ADMIN — Medication 40 MILLIEQUIVALENT(S): at 06:03

## 2020-02-04 RX ADMIN — FAMOTIDINE 20 MILLIGRAM(S): 10 INJECTION INTRAVENOUS at 18:00

## 2020-02-04 NOTE — PROGRESS NOTE ADULT - SUBJECTIVE AND OBJECTIVE BOX
OPERATIVE PROCEDURE(s):     pre-op cabg       s/p right arm pseudoaneurysm repair        SURGEON(s): lynn    SUBJECTIVE ASSESSMENT: pt is without complaints    Vital Signs Last 24 Hrs  T(C): 36.6 (2020 12:00), Max: 36.9 (2020 08:00)  T(F): 97.9 (2020 12:00), Max: 98.4 (2020 08:00)  HR: 69 (2020 12:00) (69 - 94)  BP: 99/53 (2020 12:00) (99/53 - 136/76)  BP(mean): 72 (2020 12:00) (64 - 99)  RR: 37 (2020 12:00) (22 - 37)  SpO2: 100% (2020 12:00) (89% - 100%)  20 @ 07:01  -  20 @ 07:00  --------------------------------------------------------  IN: 1440 mL / OUT: 750 mL / NET: 690 mL    Physical Exam  General: alert and oriented x 3  Chest: equal bs bl  CVS: s1s2  Abd: pos bs soft nt  GI/ :  Ext: trace edema    Central Venous Catheter: Yes[ ]  No[x ] , If Yes indication:           Day #    Mora Catheter: Yes  [ ] , No [ x] : If yes indication:                         Day #    NGT: Yes [ ] No [  x]     If Yes Placement:                                          Day #    LABS:                        10.2   7.18  )-----------( 178      ( 2020 04:10 )             33.2     COUMADIN:   [ ] YES [x ] NO          137  |  101  |  16  ----------------------------<  111<H>  3.7   |  25  |  0.6<L>    Ca    7.9<L>      2020 04:10  Mg     1.9           Urinalysis Basic - ( 2020 12:50 )    Color: Yellow / Appearance: Slightly Turbid / S.013 / pH: x  Gluc: x / Ketone: Negative  / Bili: Negative / Urobili: <2 mg/dL   Blood: x / Protein: Trace / Nitrite: Negative   Leuk Esterase: Moderate / RBC: 484 /HPF / WBC 26 /HPF   Sq Epi: x / Non Sq Epi: 1 /HPF / Bacteria: Negative        MEDICATIONS  (STANDING):  amLODIPine   Tablet 2.5 milliGRAM(s) Oral daily  aspirin  chewable 81 milliGRAM(s) Oral daily  atorvastatin 80 milliGRAM(s) Oral at bedtime  famotidine    Tablet 20 milliGRAM(s) Oral two times a day  losartan 100 milliGRAM(s) Oral daily  multivitamin 1 Tablet(s) Oral daily  senna 1 Tablet(s) Oral two times a day  thiamine 100 milliGRAM(s) Oral daily    MEDICATIONS  (PRN):  bisacodyl 5 milliGRAM(s) Oral every 12 hours PRN Constipation      Allergies    No Known Allergies    Intolerances        Ambulation/Activity Status:    ambulated with assistance     RADIOLOGY & ADDITIONAL TESTS:  EXAM:  US KIDNEYS AND BLADDER            PROCEDURE DATE:  2020      INTERPRETATION:  CLINICAL HISTORY: Hematuria    COMPARISON: 2019    PROCEDURE: Retroperitoneal Ultrasound was performed.    FINDINGS:    RIGHT KIDNEY: Measures 11.4 cm in length. Multiple cysts are noted, largest measuring up to 4.5 cm in the lower pole. No hydronephrosis.    LEFT KIDNEY: Measures 12.6 cm in length. Multiple cysts are noted largest measuring up to 5.8 cm in the upper pole. No hydronephrosis.    URINARY BLADDER: Bladder volume of approximately 190 cc. No wall thickening. Dependent bladder calculus/calculi noted. Bilateral ureteral jets are visualized. Patient was unable to void at the time of the study.    PROSTATE: Measures 3.7 x 3.3 x 4.0 cm (25 cc).    IMPRESSION:    Dependent bladder calculus/calculi  No hydronephrosis.    EXAM:  XR CHEST PORTABLE ROUTINE 1V            PROCEDURE DATE:  2020        INTERPRETATION:  Clinical History / Reason for exam: Preop CABG.    Comparison : Chest radiograph dated 2020.    Technique/Positioning: Portable frontal.    Findings:    Support devices: None.    Cardiac/mediastinum/hilum: Stable.    Lung parenchyma/Pleura: Unchanged bilateral opacities, right greater than left. No pneumothorax.    Skeleton/soft tissues: Stable.    Impression:      Unchanged bilateral opacities, right greater than left.      Assessment/Plan: Patient is a 78 yo male s/p right radial pseudoaneurysm repair  cont present tx as per ct surgeon  cad- cont asa and statin and plan for cabg either later this week or early next week  hematuria - send urine for cx and cytology;  consult appreciated- pt most likely will need cystoscopy as an outpatient   will resume low dose heparin SQ for dvt ppx  transfer to ceu  cont pt as tolerated     Social Service Disposition:

## 2020-02-05 LAB
ANION GAP SERPL CALC-SCNC: 10 MMOL/L — SIGNIFICANT CHANGE UP (ref 7–14)
BUN SERPL-MCNC: 13 MG/DL — SIGNIFICANT CHANGE UP (ref 10–20)
CALCIUM SERPL-MCNC: 8.3 MG/DL — LOW (ref 8.5–10.1)
CHLORIDE SERPL-SCNC: 106 MMOL/L — SIGNIFICANT CHANGE UP (ref 98–110)
CO2 SERPL-SCNC: 27 MMOL/L — SIGNIFICANT CHANGE UP (ref 17–32)
CREAT SERPL-MCNC: 0.7 MG/DL — SIGNIFICANT CHANGE UP (ref 0.7–1.5)
GLUCOSE SERPL-MCNC: 115 MG/DL — HIGH (ref 70–99)
HCT VFR BLD CALC: 34.5 % — LOW (ref 42–52)
HGB BLD-MCNC: 10.7 G/DL — LOW (ref 14–18)
MAGNESIUM SERPL-MCNC: 1.9 MG/DL — SIGNIFICANT CHANGE UP (ref 1.8–2.4)
MCHC RBC-ENTMCNC: 31 G/DL — LOW (ref 32–37)
MCHC RBC-ENTMCNC: 31.4 PG — HIGH (ref 27–31)
MCV RBC AUTO: 101.2 FL — HIGH (ref 80–94)
NRBC # BLD: 0 /100 WBCS — SIGNIFICANT CHANGE UP (ref 0–0)
PLATELET # BLD AUTO: 183 K/UL — SIGNIFICANT CHANGE UP (ref 130–400)
POTASSIUM SERPL-MCNC: 4.4 MMOL/L — SIGNIFICANT CHANGE UP (ref 3.5–5)
POTASSIUM SERPL-SCNC: 4.4 MMOL/L — SIGNIFICANT CHANGE UP (ref 3.5–5)
RBC # BLD: 3.41 M/UL — LOW (ref 4.7–6.1)
RBC # FLD: 15.5 % — HIGH (ref 11.5–14.5)
SODIUM SERPL-SCNC: 143 MMOL/L — SIGNIFICANT CHANGE UP (ref 135–146)
WBC # BLD: 6.97 K/UL — SIGNIFICANT CHANGE UP (ref 4.8–10.8)
WBC # FLD AUTO: 6.97 K/UL — SIGNIFICANT CHANGE UP (ref 4.8–10.8)

## 2020-02-05 RX ADMIN — Medication 81 MILLIGRAM(S): at 11:17

## 2020-02-05 RX ADMIN — FAMOTIDINE 20 MILLIGRAM(S): 10 INJECTION INTRAVENOUS at 17:23

## 2020-02-05 RX ADMIN — FAMOTIDINE 20 MILLIGRAM(S): 10 INJECTION INTRAVENOUS at 05:55

## 2020-02-05 RX ADMIN — Medication 100 MILLIGRAM(S): at 11:17

## 2020-02-05 RX ADMIN — SENNA PLUS 1 TABLET(S): 8.6 TABLET ORAL at 05:56

## 2020-02-05 RX ADMIN — HEPARIN SODIUM 5000 UNIT(S): 5000 INJECTION INTRAVENOUS; SUBCUTANEOUS at 17:23

## 2020-02-05 RX ADMIN — ATORVASTATIN CALCIUM 80 MILLIGRAM(S): 80 TABLET, FILM COATED ORAL at 21:58

## 2020-02-05 RX ADMIN — SENNA PLUS 1 TABLET(S): 8.6 TABLET ORAL at 17:23

## 2020-02-05 RX ADMIN — Medication 1 TABLET(S): at 11:17

## 2020-02-05 RX ADMIN — HEPARIN SODIUM 5000 UNIT(S): 5000 INJECTION INTRAVENOUS; SUBCUTANEOUS at 05:55

## 2020-02-05 RX ADMIN — LOSARTAN POTASSIUM 100 MILLIGRAM(S): 100 TABLET, FILM COATED ORAL at 05:55

## 2020-02-05 RX ADMIN — AMLODIPINE BESYLATE 2.5 MILLIGRAM(S): 2.5 TABLET ORAL at 05:55

## 2020-02-05 NOTE — PROGRESS NOTE ADULT - SUBJECTIVE AND OBJECTIVE BOX
Patient was seen and examined. Spoke with RN. Chart reviewed.  No events overnight.  Vital Signs Last 24 Hrs  T(F): 97.2 (2020 05:15), Max: 98 (2020 21:55)  HR: 73 (2020 05:15) (73 - 92)  BP: 154/83 (2020 05:15) (119/57 - 154/83)  SpO2: 97% (2020 05:15) (97% - 100%)  MEDICATIONS  (STANDING):  amLODIPine   Tablet 2.5 milliGRAM(s) Oral daily  aspirin  chewable 81 milliGRAM(s) Oral daily  atorvastatin 80 milliGRAM(s) Oral at bedtime  famotidine    Tablet 20 milliGRAM(s) Oral two times a day  heparin  Injectable 5000 Unit(s) SubCutaneous every 12 hours  losartan 100 milliGRAM(s) Oral daily  multivitamin 1 Tablet(s) Oral daily  senna 1 Tablet(s) Oral two times a day  thiamine 100 milliGRAM(s) Oral daily    MEDICATIONS  (PRN):  bisacodyl 5 milliGRAM(s) Oral every 12 hours PRN Constipation    Labs:                        10.7   6.97  )-----------( 183      ( 2020 04:30 )             34.5                         10.2   7.18  )-----------( 178      ( 2020 04:10 )             33.2     2020 04:30    143    |  106    |  13     ----------------------------<  115    4.4     |  27     |  0.7    2020 04:10    137    |  101    |  16     ----------------------------<  111    3.7     |  25     |  0.6      Ca    8.3        2020 04:30  Ca    7.9        2020 04:10  Mg     1.9       2020 04:30  Mg     1.9       2020 04:10        Urinalysis Basic - ( 2020 12:50 )    Color: Yellow / Appearance: Slightly Turbid / S.013 / pH: x  Gluc: x / Ketone: Negative  / Bili: Negative / Urobili: <2 mg/dL   Blood: x / Protein: Trace / Nitrite: Negative   Leuk Esterase: Moderate / RBC: 484 /HPF / WBC 26 /HPF   Sq Epi: x / Non Sq Epi: 1 /HPF / Bacteria: Negative        Culture - Urine (collected 2020 12:01)  Source: .Urine Clean Catch (Midstream)  Final Report (2020 19:46):    <10,000 CFU/mL Normal Urogenital Leena      General: comfortable, NAD  Neurology: A&Ox3, nonfocal  Head:  Normocephalic, atraumatic  ENT:  Mucosa moist, no ulcerations  Neck:  Supple, no JVD, c collar   Resp: CTA B/L  CV: RRR, S1S2,   GI: Soft, NT, bowel sounds  MS: No edema, + peripheral pulses, FROM all 4 extremity      A/P:  CAD   s/p cardiac arrest    chronic A Fib  right radial pseudoaneurysm s/p right radial PSA repair   hypertension  hematuria     downgradd from CTU  CT f/u pending CABG  care as per CT    dysphagia diet   Neurosurgery f/u has c-collar   ASA/Statin/BB   bowel regimen   cystoscopy as an outpatient as per urology   OOB to chair   PT/Rehab   GI/DVT prophylaxis  Decubitus prevention- all measures as per RN protocol  Please call or text me with any questions or updates

## 2020-02-06 LAB
APPEARANCE UR: CLEAR — SIGNIFICANT CHANGE UP
BILIRUB UR-MCNC: NEGATIVE — SIGNIFICANT CHANGE UP
COLOR SPEC: YELLOW — SIGNIFICANT CHANGE UP
DIFF PNL FLD: SIGNIFICANT CHANGE UP
GLUCOSE UR QL: NEGATIVE — SIGNIFICANT CHANGE UP
KETONES UR-MCNC: NEGATIVE — SIGNIFICANT CHANGE UP
LEUKOCYTE ESTERASE UR-ACNC: NEGATIVE — SIGNIFICANT CHANGE UP
NITRITE UR-MCNC: NEGATIVE — SIGNIFICANT CHANGE UP
PH UR: 6 — SIGNIFICANT CHANGE UP (ref 5–8)
PROT UR-MCNC: SIGNIFICANT CHANGE UP
SP GR SPEC: 1.02 — SIGNIFICANT CHANGE UP (ref 1.01–1.02)
UROBILINOGEN FLD QL: ABNORMAL

## 2020-02-06 PROCEDURE — 99232 SBSQ HOSP IP/OBS MODERATE 35: CPT

## 2020-02-06 RX ADMIN — HEPARIN SODIUM 5000 UNIT(S): 5000 INJECTION INTRAVENOUS; SUBCUTANEOUS at 17:13

## 2020-02-06 RX ADMIN — LOSARTAN POTASSIUM 100 MILLIGRAM(S): 100 TABLET, FILM COATED ORAL at 06:56

## 2020-02-06 RX ADMIN — FAMOTIDINE 20 MILLIGRAM(S): 10 INJECTION INTRAVENOUS at 06:56

## 2020-02-06 RX ADMIN — AMLODIPINE BESYLATE 2.5 MILLIGRAM(S): 2.5 TABLET ORAL at 06:56

## 2020-02-06 RX ADMIN — FAMOTIDINE 20 MILLIGRAM(S): 10 INJECTION INTRAVENOUS at 17:13

## 2020-02-06 RX ADMIN — SENNA PLUS 1 TABLET(S): 8.6 TABLET ORAL at 17:13

## 2020-02-06 RX ADMIN — Medication 1 TABLET(S): at 11:21

## 2020-02-06 RX ADMIN — SENNA PLUS 1 TABLET(S): 8.6 TABLET ORAL at 06:56

## 2020-02-06 RX ADMIN — Medication 100 MILLIGRAM(S): at 11:21

## 2020-02-06 RX ADMIN — ATORVASTATIN CALCIUM 80 MILLIGRAM(S): 80 TABLET, FILM COATED ORAL at 22:05

## 2020-02-06 RX ADMIN — HEPARIN SODIUM 5000 UNIT(S): 5000 INJECTION INTRAVENOUS; SUBCUTANEOUS at 06:57

## 2020-02-06 RX ADMIN — Medication 81 MILLIGRAM(S): at 11:21

## 2020-02-06 NOTE — PROGRESS NOTE ADULT - SUBJECTIVE AND OBJECTIVE BOX
Subjective:  patient is asymptomatic, has no chest pain, sob, palpitation but still mainly bed bound, has not been walking, still gets confused and gets occasions of waxing and waning in memory, Dr Dawson spoke to me after such a long delay in  surgery and lack of improvement he decided to call the surgery off for now.   latest blood test is acceptable, no arrhythmia other than Afib since has been in the hospital    MEDICATIONS  (STANDING):  amLODIPine   Tablet 2.5 milliGRAM(s) Oral daily  aspirin  chewable 81 milliGRAM(s) Oral daily  atorvastatin 80 milliGRAM(s) Oral at bedtime  famotidine    Tablet 20 milliGRAM(s) Oral two times a day  heparin  Injectable 5000 Unit(s) SubCutaneous every 12 hours  losartan 100 milliGRAM(s) Oral daily  multivitamin 1 Tablet(s) Oral daily  senna 1 Tablet(s) Oral two times a day  thiamine 100 milliGRAM(s) Oral daily    MEDICATIONS  (PRN):  bisacodyl 5 milliGRAM(s) Oral every 12 hours PRN Constipation            Vital Signs Last 24 Hrs  T(C): 36.4 (06 Feb 2020 13:44), Max: 37.3 (05 Feb 2020 21:35)  T(F): 97.5 (06 Feb 2020 13:44), Max: 99.2 (05 Feb 2020 21:35)  HR: 77 (06 Feb 2020 13:44) (76 - 77)  BP: 122/70 (06 Feb 2020 13:44) (122/70 - 143/77)  BP(mean): --  RR: 20 (06 Feb 2020 13:44) (18 - 20)  SpO2: --             REVIEW OF SYSTEMS:  CONSTITUTIONAL: no fever, no chills, no diaphoresis  CARDIOLOGY: no chest pain, no SOB, no palpitation, no diaphoresis, no faint   RESPIRATORY: no dyspnea, no wheeze, no orthopnea, no PND   NEUROLOGICAL: confusion, occasions of hallucination, and most of the time alert and oriented to people  GI: no abdominal pain, no dyspepsia, no nausea, no vomiting, no diarrhea.               PHYSICAL EXAM:  · CONSTITUTIONAL: in no distress  . NECK: Supple, no JVD,   · RESPIRATORY: Normal air entry to lung base, no wheeze, no crackle, no wet rales  · CARDIOVASCULAR: S1, A2, P2, no murmur, no click, irregular rate  · EXTREMITIES: No cyanosis, no clubbing, no edema  · VASCULAR: Pulses are irregular, equal, bilateral in upper and lower extremities  	  TELEMETRY:    ECG: < from: 12 Lead ECG (01.23.20 @ 08:06) >   Atrial fibrillation  Prolonged QT  Abnormal ECG    < end of copied text >  no new ecg    TTE: < from: Transthoracic Echocardiogram (01.29.20 @ 13:33) >   1. LV Ejection Fraction by Awan's Method with a biplane EF of 76 %.   2. Mildly increased LV wall thickness.   3. Moderate to severe mitral valve regurgitation.   4. Moderate tricuspid regurgitation.   5. Mild aortic regurgitation.   6. Sclerotic aortic valve with decreased opening.   7. Estimated pulmonary artery systolic pressure is 49.2 mmHg assuming a right atrial pressure of 5 mmHg, which is consistent with mild pulmonary hypertension.      < end of copied text >      LABS:                        10.7   6.97  )-----------( 183      ( 05 Feb 2020 04:30 )             34.5     02-05    143  |  106  |  13  ----------------------------<  115<H>  4.4   |  27  |  0.7    Ca    8.3<L>      05 Feb 2020 04:30  Mg     1.9     02-05              I&O's Summary    BNP  RADIOLOGY & ADDITIONAL STUDIES: < from: Xray Chest 1 View- PORTABLE-Routine (02.03.20 @ 05:35) >  Unchanged bilateral opacities, right greater than left.    < end of copied text >      IMPRESSION AND PLAN:

## 2020-02-06 NOTE — CONSULT NOTE ADULT - SUBJECTIVE AND OBJECTIVE BOX
HPI:  80 yo M with hx of HTN, A.fib (Eliquis), CHF, HLD brought in by EMS post cardiac arrest. As per wife at bedside, patient at the basement watching movie and was doing fine. Few mins later, patient came up and told the wife that he wasn't feeling right and that he felt funny. Then he crashed. Wife called the EMS who arrived 5 mins later. Upon EMS arrival, patient was found to be in PEA then asystole, s/p resuscitation for ~ 10 mins and ROSC achieved after 1 round of compression and epi. While on the way to hospital, patient had another cardiac arrest on the ambulant s/p CPR and ROSC achieved few mins later. As per family, patient was doing welll and at baseline prior to the episode. As baseline patient is fully functional. Denied any recent infection, recent hospitalization, recent ED visit, fever, chills.     In ED, patient was found to bradycardic and hypotensive. s/p 1 dose of atropine and patient was started on low dose Levophed with improvement in HR and BP. (2020 22:13)      PAST MEDICAL & SURGICAL HISTORY:  Atrial fibrillation  Neuropathy  HLD (hyperlipidemia)  HTN (hypertension)  History of cholecystectomy  History of cholecystectomy      Hospital Course: hyperextension cervical injury/neck collar    TODAY'S SUBJECTIVE & REVIEW OF SYMPTOMS:     Constitutional WNL   Cardio WNL   Resp WNL   GI WNL  Heme WNL  Endo WNL  Skin WNL  MSK Weakness  Neuro WNL  Cognitive WNL  Psych WNL      MEDICATIONS  (STANDING):  amLODIPine   Tablet 2.5 milliGRAM(s) Oral daily  aspirin  chewable 81 milliGRAM(s) Oral daily  atorvastatin 80 milliGRAM(s) Oral at bedtime  famotidine    Tablet 20 milliGRAM(s) Oral two times a day  heparin  Injectable 5000 Unit(s) SubCutaneous every 12 hours  losartan 100 milliGRAM(s) Oral daily  multivitamin 1 Tablet(s) Oral daily  senna 1 Tablet(s) Oral two times a day  thiamine 100 milliGRAM(s) Oral daily    MEDICATIONS  (PRN):  bisacodyl 5 milliGRAM(s) Oral every 12 hours PRN Constipation      FAMILY HISTORY:      Allergies    No Known Allergies    Intolerances        SOCIAL HISTORY:    [  ] Etoh  [  ] Smoking  [  ] Substance abuse     Home Environment:  [  ] Home Alone  [ x ] Lives with Family  [  ] Home Health Aid    Dwelling:  [  ] Apartment  [ x ] Private House  [  ] Adult Home  [  ] Skilled Nursing Facility      [  ] Short Term  [  ] Long Term  [ x ] Stairs       Elevator [  ]    FUNCTIONAL STATUS PTA: (Check all that apply)  Ambulation: [  x ]Independent    [  ] Dependent     [  ] Non-Ambulatory  Assistive Device: [  ] SA Cane  [  ]  Q Cane  [  ] Walker  [  ]  Wheelchair  ADL : [x  ] Independent  [  ]  Dependent       Vital Signs Last 24 Hrs  T(C): 36.4 (2020 13:44), Max: 37.3 (2020 21:35)  T(F): 97.5 (2020 13:44), Max: 99.2 (2020 21:35)  HR: 77 (2020 13:44) (76 - 77)  BP: 122/70 (2020 13:44) (122/70 - 143/77)  BP(mean): --  RR: 20 (2020 13:44) (18 - 20)  SpO2: --      PHYSICAL EXAM: Alert & Oriented X3  GENERAL: NAD, well-groomed, well-developed  HEAD:  Atraumatic, Normocephalic  CHEST/LUNG: Clear   HEART: S1S2+  ABDOMEN: Soft, Nontender  EXTREMITIES:  no calf tenderness    NERVOUS SYSTEM:  Cranial Nerves 2-12 intact [  ] Abnormal  [  ]  ROM: WFL all extremities [x  ]  Abnormal [  ]  Motor Strength: WFL all extremities  [  ]  Abnormal [x  ]4/5 all ext  Sensation: intact to light touch [ x ] Abnormal [  ]  Reflexes: Symmetric [  ]  Abnormal [  ]    FUNCTIONAL STATUS:  Bed Mobility: Independent [  ]  Supervision [  ]  Needs Assistance [x  ]  N/A [  ]  Transfers: Independent [  ]  Supervision [  ]  Needs Assistance [x  ]  N/A [  ]   Ambulation: Independent [  ]  Supervision [  ]  Needs Assistance [x  ]  N/A [  ]  ADL: Independent [  ] Requires Assistance [  ] N/A [  ]      LABS:                        10.7   6.97  )-----------( 183      ( 2020 04:30 )             34.5     02-05    143  |  106  |  13  ----------------------------<  115<H>  4.4   |  27  |  0.7    Ca    8.3<L>      2020 04:30  Mg     1.9     02-05        Urinalysis Basic - ( 2020 15:00 )    Color: Yellow / Appearance: Clear / S.019 / pH: x  Gluc: x / Ketone: Negative  / Bili: Negative / Urobili: 3 mg/dL   Blood: x / Protein: Trace / Nitrite: Negative   Leuk Esterase: Negative / RBC: x / WBC x   Sq Epi: x / Non Sq Epi: x / Bacteria: x        RADIOLOGY & ADDITIONAL STUDIES:    Assesment:

## 2020-02-06 NOTE — PROGRESS NOTE ADULT - SUBJECTIVE AND OBJECTIVE BOX
OPERATIVE PROCEDURE(s):                most likely PCI                     79yMale  SURGEON(s): LEVI Levy  SUBJECTIVE ASSESSMENT: patient resting in bed, eating well, significantly physically debilitated to point where PT was concerned.  Hard collar in Place  Vital Signs Last 24 Hrs  T(F): 98.5 (06 Feb 2020 06:12), Max: 99.2 (05 Feb 2020 21:35)  HR: 76 (06 Feb 2020 06:12) (76 - 98)  BP: 129/76 (06 Feb 2020 06:12) (113/80 - 143/77)  RR: 20 (06 Feb 2020 06:12) (18 - 20)    Physical Exam:  General: NAD; A&Ox3  Cardiac: S1/S2, RRR, no murmur, no rubs, hard collar in place  Lungs: unlabored respirations, decreased at bases  Abdomen: Soft/NT/ND;   Extremities: No significant edema b/l lower extremities      LABS:                        10.7<L>  6.97  )-----------( 183      ( 05 Feb 2020 04:30 )             34.5<L>                        10.2<L>  7.18  )-----------( 178      ( 04 Feb 2020 04:10 )             33.2<L>    02-05    143  |  106  |  13  ----------------------------<  115<H>  4.4   |  27  |  0.7  02-04    137  |  101  |  16  ----------------------------<  111<H>  3.7   |  25  |  0.6<L>    Ca    8.3<L>      05 Feb 2020 04:30  Mg     1.9     02-05    MEDICATIONS  (STANDING):  amLODIPine   Tablet 2.5 milliGRAM(s) Oral daily  aspirin  chewable 81 milliGRAM(s) Oral daily  atorvastatin 80 milliGRAM(s) Oral at bedtime  famotidine    Tablet 20 milliGRAM(s) Oral two times a day  heparin  Injectable 5000 Unit(s) SubCutaneous every 12 hours  losartan 100 milliGRAM(s) Oral daily  multivitamin 1 Tablet(s) Oral daily  senna 1 Tablet(s) Oral two times a day  thiamine 100 milliGRAM(s) Oral daily    MEDICATIONS  (PRN):  bisacodyl 5 milliGRAM(s) Oral every 12 hours PRN Constipation    Allergies    No Known Allergies    Intolerances    Ambulation/Activity Status:    Assessment/Plan:  79y Male case discussed in great detail today. Admitted to hospital after cardiac arrest (PEA) and fall.  Found to have on MR Cervical Spine: Hyperextension injury at C5/C6 with widening of theanterior intervertebral disc space and fracture of the C5 posterior spinous process. 3 column involvement with disruption of the anterior and posterior longitudinal ligaments as well as the ligamentum flavum. Redemonstrated reactive prevertebral softtissue edema. Cardiac arrest followed by metabolic encehphalopathy, pseudoaneurysm of right upper extremity (closed by vascular) and CAD on CC. Patient requires a hard collar at all times. Over last few weeks CTS has been trying to optimize patient for single vessel minimally invasive bypass. Clinical status has stabilized but physical status continues to deteriorated.  Disccussion focused around risk benefit ratio of surgery, PCI or medical tx with life vest and rehab for next few weeks. After conversation between Eusebia Manriquez and Simón consideration for PCI is being made. I spoke to Dr. Galan and informed him there would be no surgical intervention at the current time and further treatment as per Medical team and cardiology. CTS will f/u as needed. discussed with Dr. herrera consideration for starting BB in patient with CAD. Dr. Irizarry stated he would f/u with patient today       Social Service Disposition:  as per medicine

## 2020-02-06 NOTE — CONSULT NOTE ADULT - ASSESSMENT
IMPRESSION: Rehab of cardiac arrest / neck hyperextension injury    PRECAUTIONS: [  ] Cardiac  [  ] Respiratory  [  ] Seizures [  ] Contact Isolation  [  ] Droplet Isolation  [  ] Other    Weight Bearing Status:     RECOMMENDATION: neck collar     Out of Bed to Chair     DVT/Decubiti Prophylaxis    REHAB PLAN:     [x   ] Bedside P/T 3-5 times a week   [   ]   Bedside O/T  2-3 times a week             [   ] No Rehab Therapy Indicated                   [   ]  Speech Therapy   Conditioning/ROM                                    ADL  Bed Mobility                                               Conditioning/ROM  Transfers                                                     Bed Mobility  Sitting /Standing Balance                         Transfers                                        Gait Training                                               Sitting/Standing Balance  Stair Training [   ]Applicable                    Home equipment Eval                                                                        Splinting  [   ] Only      GOALS:   ADL   [   ]   Independent                    Transfers  [ x  ] Independent                          Ambulation  [ x  ] Independent     [  x  ] With device                            [   ]  CG                                                         [   ]  CG                                                                  [   ] CG                            [    ] Min A                                                   [   ] Min A                                                              [   ] Min  A          DISCHARGE PLAN:   [   ]  Good candidate for Intensive Rehabilitation/Hospital based                                             Will tolerate 3hrs Intensive Rehab Daily                                       [ x   ]  Short Term Rehab in Skilled Nursing Facility                                       [    ]  Home with Outpatient or  services                                         [    ]  Possible Candidate for Intensive Hospital based Rehab

## 2020-02-06 NOTE — PROGRESS NOTE ADULT - ASSESSMENT
CAD, ostial LAD distal LM disorder  some change of mental status since cardiac arrest  Afib rate well controlled    I had a lengthy discussion with other interventional cardiologists and ct surgery, for surgery his lack of activity and mental status ids the problem for PCI the location, high chance of jailing RI and stent in LM and using DAPT plus DOAC is the main issue    needs life vest  needs rehab consult  must be transferred to rehab floor in the hospital with life vest to get few weeks of PT to get his ambulation back then will reassess him for either bypass in [facility] or other hospitals or PCI by accepting the risk and limitation  he is not suitable to be d/c home   current Rx

## 2020-02-06 NOTE — PROGRESS NOTE ADULT - SUBJECTIVE AND OBJECTIVE BOX
Patient was seen and examined. Spoke with RN. Chart reviewed.  No events overnight.  Vital Signs Last 24 Hrs  T(F): 98.5 (06 Feb 2020 06:12), Max: 99.2 (05 Feb 2020 21:35)  HR: 76 (06 Feb 2020 06:12) (76 - 98)  BP: 129/76 (06 Feb 2020 06:12) (113/80 - 143/77)  SpO2: --  MEDICATIONS  (STANDING):  amLODIPine   Tablet 2.5 milliGRAM(s) Oral daily  aspirin  chewable 81 milliGRAM(s) Oral daily  atorvastatin 80 milliGRAM(s) Oral at bedtime  famotidine    Tablet 20 milliGRAM(s) Oral two times a day  heparin  Injectable 5000 Unit(s) SubCutaneous every 12 hours  losartan 100 milliGRAM(s) Oral daily  multivitamin 1 Tablet(s) Oral daily  senna 1 Tablet(s) Oral two times a day  thiamine 100 milliGRAM(s) Oral daily    MEDICATIONS  (PRN):  bisacodyl 5 milliGRAM(s) Oral every 12 hours PRN Constipation    Labs:                        10.7   6.97  )-----------( 183      ( 05 Feb 2020 04:30 )             34.5     05 Feb 2020 04:30    143    |  106    |  13     ----------------------------<  115    4.4     |  27     |  0.7      Ca    8.3        05 Feb 2020 04:30  Mg     1.9       05 Feb 2020 04:30            Culture - Urine (collected 03 Feb 2020 12:01)  Source: .Urine Clean Catch (Midstream)  Final Report (04 Feb 2020 19:46):    <10,000 CFU/mL Normal Urogenital Leena      General: comfortable, NAD  Neurology: A&Ox3, nonfocal  Head:  Normocephalic, atraumatic  ENT:  Mucosa moist, no ulcerations  Neck:  Supple, no JVD, c collar   Resp: CTA B/L  CV: RRR, S1S2,   GI: Soft, NT, bowel sounds  MS: No edema, + peripheral pulses, FROM all 4 extremity      A/P:  CAD   s/p cardiac arrest    chronic A Fib  right radial pseudoaneurysm s/p right radial PSA repair   hypertension  hematuria     downgraded from CTU  CT f/u daily   pending CABG  care as per CT    dysphagia diet   Neurosurgery f/u has c-collar   ASA/Statin/BB   bowel regimen   cystoscopy as an outpatient as per urology   OOB to chair   PT/Rehab   GI/DVT prophylaxis  d/w resident physician, ]  patient was followed by the CTU team prior to downgrade, plan as per CT team   Decubitus prevention- all measures as per RN protocol  Please call or text me with any questions or updates Patient was seen and examined. Spoke with RN. Chart reviewed.  No events overnight.  Vital Signs Last 24 Hrs  T(F): 98.5 (06 Feb 2020 06:12), Max: 99.2 (05 Feb 2020 21:35)  HR: 76 (06 Feb 2020 06:12) (76 - 98)  BP: 129/76 (06 Feb 2020 06:12) (113/80 - 143/77)  SpO2: --  MEDICATIONS  (STANDING):  amLODIPine   Tablet 2.5 milliGRAM(s) Oral daily  aspirin  chewable 81 milliGRAM(s) Oral daily  atorvastatin 80 milliGRAM(s) Oral at bedtime  famotidine    Tablet 20 milliGRAM(s) Oral two times a day  heparin  Injectable 5000 Unit(s) SubCutaneous every 12 hours  losartan 100 milliGRAM(s) Oral daily  multivitamin 1 Tablet(s) Oral daily  senna 1 Tablet(s) Oral two times a day  thiamine 100 milliGRAM(s) Oral daily    MEDICATIONS  (PRN):  bisacodyl 5 milliGRAM(s) Oral every 12 hours PRN Constipation    Labs:                        10.7   6.97  )-----------( 183      ( 05 Feb 2020 04:30 )             34.5     05 Feb 2020 04:30    143    |  106    |  13     ----------------------------<  115    4.4     |  27     |  0.7      Ca    8.3        05 Feb 2020 04:30  Mg     1.9       05 Feb 2020 04:30            Culture - Urine (collected 03 Feb 2020 12:01)  Source: .Urine Clean Catch (Midstream)  Final Report (04 Feb 2020 19:46):    <10,000 CFU/mL Normal Urogenital Leena      General: comfortable, NAD  Neurology: A&Ox3, nonfocal  Head:  Normocephalic, atraumatic  ENT:  Mucosa moist, no ulcerations  Neck:  Supple, no JVD, c collar   Resp: CTA B/L  CV: RRR, S1S2,   GI: Soft, NT, bowel sounds  MS: No edema, + peripheral pulses, FROM all 4 extremity      A/P:  CAD   s/p cardiac arrest    chronic A Fib  right radial pseudoaneurysm s/p right radial PSA repair   hypertension  hematuria     downgraded from CTU  CT f/u daily   most likely not a CABG candidate due to poor functional status,  pending PCI as per CT  Cardio f/u   dysphagia diet   Neurosurgery f/u has c-collar   ASA/Statin/BB   bowel regimen   cystoscopy as an outpatient as per urology   OOB to chair   PT/Rehab   GI/DVT prophylaxis  d/c planning when cleared by Cardio/CT most likely SNF  d/w resident physician  patient was followed by the CTU team prior to downgrade   Decubitus prevention- all measures as per RN protocol  Please call or text me with any questions or updates

## 2020-02-07 PROCEDURE — 99222 1ST HOSP IP/OBS MODERATE 55: CPT

## 2020-02-07 RX ADMIN — FAMOTIDINE 20 MILLIGRAM(S): 10 INJECTION INTRAVENOUS at 17:20

## 2020-02-07 RX ADMIN — ATORVASTATIN CALCIUM 80 MILLIGRAM(S): 80 TABLET, FILM COATED ORAL at 21:28

## 2020-02-07 RX ADMIN — Medication 81 MILLIGRAM(S): at 11:08

## 2020-02-07 RX ADMIN — HEPARIN SODIUM 5000 UNIT(S): 5000 INJECTION INTRAVENOUS; SUBCUTANEOUS at 17:21

## 2020-02-07 RX ADMIN — HEPARIN SODIUM 5000 UNIT(S): 5000 INJECTION INTRAVENOUS; SUBCUTANEOUS at 07:01

## 2020-02-07 RX ADMIN — SENNA PLUS 1 TABLET(S): 8.6 TABLET ORAL at 07:01

## 2020-02-07 RX ADMIN — LOSARTAN POTASSIUM 100 MILLIGRAM(S): 100 TABLET, FILM COATED ORAL at 07:01

## 2020-02-07 RX ADMIN — FAMOTIDINE 20 MILLIGRAM(S): 10 INJECTION INTRAVENOUS at 07:01

## 2020-02-07 RX ADMIN — Medication 1 TABLET(S): at 11:08

## 2020-02-07 RX ADMIN — AMLODIPINE BESYLATE 2.5 MILLIGRAM(S): 2.5 TABLET ORAL at 07:01

## 2020-02-07 RX ADMIN — Medication 100 MILLIGRAM(S): at 11:08

## 2020-02-07 RX ADMIN — SENNA PLUS 1 TABLET(S): 8.6 TABLET ORAL at 17:20

## 2020-02-07 NOTE — PROGRESS NOTE ADULT - SUBJECTIVE AND OBJECTIVE BOX
LENGTH OF HOSPITAL STAY: 26d    CHIEF COMPLAINT:   Patient is a 79y old  Male who presents with a chief complaint of Cardiac arrest (2020 07:37)      Overnight events:    No acute events overnight    ALLERGIES:  No Known Allergies    MEDICATIONS:  STANDING MEDICATIONS  amLODIPine   Tablet 2.5 milliGRAM(s) Oral daily  aspirin  chewable 81 milliGRAM(s) Oral daily  atorvastatin 80 milliGRAM(s) Oral at bedtime  famotidine    Tablet 20 milliGRAM(s) Oral two times a day  heparin  Injectable 5000 Unit(s) SubCutaneous every 12 hours  losartan 100 milliGRAM(s) Oral daily  multivitamin 1 Tablet(s) Oral daily  senna 1 Tablet(s) Oral two times a day  thiamine 100 milliGRAM(s) Oral daily    PRN MEDICATIONS  bisacodyl 5 milliGRAM(s) Oral every 12 hours PRN    VITALS:   T(F): 97.5  HR: 89  BP: 153/88  RR: 20  SpO2: --    LABS:            Urinalysis Basic - ( 2020 15:00 )    Color: Yellow / Appearance: Clear / S.019 / pH: x  Gluc: x / Ketone: Negative  / Bili: Negative / Urobili: 3 mg/dL   Blood: x / Protein: Trace / Nitrite: Negative   Leuk Esterase: Negative / RBC: x / WBC x   Sq Epi: x / Non Sq Epi: x / Bacteria: x                  Cultures:      RADIOLOGY:    PHYSICAL EXAM:  GEN: No acute distress  HEENT: NARENDRA, with Neck collar  LUNGS: Clear to auscultation bilaterally   HEART: S1/S2 present. RRR.   ABD: Soft, non-tender, non-distended. Bowel sounds present  EXT: non edematous, non erythematous, 1 bruise on R LL  NEURO: AAOX3

## 2020-02-07 NOTE — PROGRESS NOTE ADULT - ASSESSMENT
Patient is a 79y old  Male who presents with a chief complaint of Cardiac arrest, Upon EMS arrival, patient was found to be in PEA, s/p resuscitation for 10-15 mins and ROSC achieved.    Hospital course: Found to have on MR Cervical Spine: Hyperextension injury at C5/C6 with widening of theanterior intervertebral disc space and fracture of the C5 posterior spinous process.  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 Patient requires a hard collar at all times. Over last few weeks CTS has been trying to optimize patient for single vessel minimally invasive bypass. Clinical status has stabilized but physical status continues to deteriorated.  Disccussion focused around risk benefit ratio of surgery, PCI or medical tx with life vest and rehab for next few weeks. After conversation between Bernardo Manriquez and Simón consideration for PCI is being made. CTS will f/u as needed. discussed with Dr. herrera consideration for starting BB in patient with CAD. Dr. Irizarry stated he would f/u with patient today    # Cardiac arrest 2/2 PEA likely from arrythmia  - Cardiologist Dr. Laws - CAD, ostial LAD distal LM disorder  - bernardo Moore had a lengthy discussion with other interventional cardiologists and ct surgery, for surgery his lack of activity and mental status ids the problem for PCI the location, high chance of jailing RI and stent in LM and using DAPT plus DOAC is the main issue  needs life vest  needs rehab consult  must be transferred to rehab floor in the hospital with life vest to get few weeks of PT to get his ambulation back then will reassess him for either bypass in [facility] or other hospitals or PCI by accepting the risk and limitation  he is not suitable to be d/c home      # C5/C6 Intervertebral disk spacings and C5 fracture  - use collar  - sending pt to Rehab 4A    # A.fib- controlled  - was on Eliquis 100 twice daily  - holding because of hemorrhagic risk  - c/w amLODIPine Tablet 2.5 milliGRAM(s) Oral daily    # Anxiety  - On Clonazepam 1 q8 at home  - holding Clonazepam home dose as of risk of fall    # HTN  - c/w amLODIPine Tablet 2.5 milliGRAM(s) Oral daily  - Increased to losartan 100 daily  - Was on Carvedilol 12.5 twice daily + Telmisartan 80mg at bedtime at home    # HLD  atorvastatin 80 milliGRAM(s) Oral at bedtime      # DVTppx: heparin sc  # Diet: Dysphagia 3  # CODE: FULL  # Dispo: 4A rehab

## 2020-02-07 NOTE — PROGRESS NOTE ADULT - SUBJECTIVE AND OBJECTIVE BOX
Subjective:  general condition is unchanged, mental status generally the same hemodynamic and vital stable, still not ambulating     MEDICATIONS  (STANDING):  amLODIPine   Tablet 2.5 milliGRAM(s) Oral daily  aspirin  chewable 81 milliGRAM(s) Oral daily  atorvastatin 80 milliGRAM(s) Oral at bedtime  famotidine    Tablet 20 milliGRAM(s) Oral two times a day  heparin  Injectable 5000 Unit(s) SubCutaneous every 12 hours  losartan 100 milliGRAM(s) Oral daily  multivitamin 1 Tablet(s) Oral daily  senna 1 Tablet(s) Oral two times a day  thiamine 100 milliGRAM(s) Oral daily    MEDICATIONS  (PRN):  bisacodyl 5 milliGRAM(s) Oral every 12 hours PRN Constipation            Vital Signs Last 24 Hrs  T(C): 35.7 (07 Feb 2020 13:49), Max: 36.4 (07 Feb 2020 06:45)  T(F): 96.2 (07 Feb 2020 13:49), Max: 97.5 (07 Feb 2020 06:45)  HR: 84 (07 Feb 2020 17:19) (70 - 89)  BP: 128/75 (07 Feb 2020 17:19) (88/53 - 153/88)  BP(mean): --  RR: 19 (07 Feb 2020 13:49) (19 - 20)  SpO2: --             REVIEW OF SYSTEMS:  CONSTITUTIONAL: no fever, no chills, no diaphoresis  CARDIOLOGY: no chest pain, no SOB, no palpitation, no diaphoresis, no faint   RESPIRATORY: no dyspnea, no wheeze, no orthopnea, no PND   NEUROLOGICAL: occasions of confusions, no focal deficits to report.  GI: no abdominal pain, no dyspepsia, no nausea, no vomiting, no diarrhea.               PHYSICAL EXAM:  · CONSTITUTIONAL: Looks stables  . NECK: Supple, cervical collar  · RESPIRATORY: Normal air entry to lung base, no wheeze, no crackle, no wet rales  · CARDIOVASCULAR:  S1, A2, P2, no murmur,   · EXTREMITIES: No cyanosis, no clubbing, no edema  · VASCULAR: 2+ Pulses  	  TELEMETRY: afib    LABS:                  I&O's Summary    BNP  RADIOLOGY & ADDITIONAL STUDIES:    IMPRESSION AND PLAN:

## 2020-02-07 NOTE — PROGRESS NOTE ADULT - SUBJECTIVE AND OBJECTIVE BOX
Patient was seen and examined in CEU. Spoke with RN. Chart reviewed.  No events overnight. In excellent spirits despite neck brace  Vital Signs Last 24 Hrs  T(F): 97.5 (2020 06:45), Max: 97.5 (2020 13:44)  HR: 89 (2020 06:45) (77 - 89)  BP: 153/88 (2020 06:45) (122/70 - 153/88)  SpO2: --  MEDICATIONS  (STANDING):  amLODIPine   Tablet 2.5 milliGRAM(s) Oral daily  aspirin  chewable 81 milliGRAM(s) Oral daily  atorvastatin 80 milliGRAM(s) Oral at bedtime  famotidine    Tablet 20 milliGRAM(s) Oral two times a day  heparin  Injectable 5000 Unit(s) SubCutaneous every 12 hours  losartan 100 milliGRAM(s) Oral daily  multivitamin 1 Tablet(s) Oral daily  senna 1 Tablet(s) Oral two times a day  thiamine 100 milliGRAM(s) Oral daily    MEDICATIONS  (PRN):  bisacodyl 5 milliGRAM(s) Oral every 12 hours PRN Constipation    Labs:            Urinalysis Basic - ( 2020 15:00 )    Color: Yellow / Appearance: Clear / S.019 / pH: x  Gluc: x / Ketone: Negative  / Bili: Negative / Urobili: 3 mg/dL   Blood: x / Protein: Trace / Nitrite: Negative   Leuk Esterase: Negative / RBC: x / WBC x   Sq Epi: x / Non Sq Epi: x / Bacteria: x        General: comfortable, NAD  Neurology: A&Ox3, nonfocal  Head:  Normocephalic, atraumatic  ENT:  Mucosa moist, no ulcerations  Neck:  brace in place  Resp: CTA B/L  CV: RRR, S1S2,   GI: Soft, NT, bowel sounds  MS: No edema, + peripheral pulses,       A/P:  79 year old M with hx of HTN, A.fib (Eliquis), CHF, HLD brought in by EMS post cardiac arrest and resuscitated; found to have severe CAD of LAD    conservative management as per CTS and cardio- no CABG or PCI is planned    lifevest    PT/rehab    DC planning to 4A as per cardio    neck brace    outpt  f/u    DVT prophylaxis  Decubitus prevention- all measures as per RN protocol  Please call or text me with any questions or updates

## 2020-02-07 NOTE — PROGRESS NOTE ADULT - ASSESSMENT
survivor of cardiac arrest SCD  severe CAD  revascularization is delayed due to CNS abnormality    i had a long talk with dr harvey, necessity of using life vest was discussed in detail  rehab  after proper rehab, will assess him, he needs bypass, if surgery in this hospital or other hospital reject his surgery then will consider pci of the LAD to LM with all the risks

## 2020-02-07 NOTE — PROGRESS NOTE ADULT - ASSESSMENT
78 yo M with history of AFib on Eliquis (diagnosed 2017), HTN, HL, admitted for cardiac arrest (not shocked). Patient felt "funny" prior to passing out. Pt found to have PEA arrest and had ROSC after CPR and Epi. Had another cardiac arrest en route to hospital (again no shocks delivered). On tele here, pt has been found to have persistent AFib with pauses of 3-4 sec  Severe single vessel disease    not good surgical candidate due to comorbidities  Poor PCI candidate at this time due to deconditioning  EF 60-65%  PEA arrest, no documented VT/VF    con't tele  Please obtain cardiac MRI to evaluate LV  for scars and function  May require PCI for secondary prevention.  No indication for wearable defibrillator at this time   Maintain electrolytes K>4.0 Mg >2.0  Will follow up 80 yo M with history of AFib on Eliquis (diagnosed 2017), HTN, HL, admitted for cardiac arrest (not shocked). Patient felt "funny" prior to passing out. Pt found to have PEA arrest and had ROSC after CPR and Epi. Had another cardiac arrest en route to hospital (again no shocks delivered). On tele here, pt has been found to have persistent AFib with pauses of 3-4 sec  Severe single vessel disease    not good surgical candidate due to comorbidities  Poor PCI candidate at this time due to deconditioning  EF 60-65%  PEA arrest, no documented VT/VF    con't tele  Please obtain cardiac MRI to evaluate LV  for scars and function  Please repeat Echo  May require PCI for secondary prevention.  No indication for wearable defibrillator at this time   Maintain electrolytes K>4.0 Mg >2.0  Will follow up 80 yo M with history of AFib on Eliquis (diagnosed 2017), HTN, HL, admitted for cardiac arrest (not shocked). Patient felt "funny" prior to passing out. Pt found to have PEA arrest and had ROSC after CPR and Epi. Had another cardiac arrest en route to hospital (again no shocks delivered). On tele here, pt has been found to have persistent AFib with pauses of 3-4 sec  Severe single vessel disease    not good surgical candidate due to comorbidities  Poor PCI candidate at this time due to deconditioning  EF 60-65%  PEA arrest, no documented VT/VF    con't tele  Please obtain cardiac MRI to evaluate LV  for scars and function  Please repeat Echo  May require ICD for secondary prevention.  Awaiting revascularization  Will review EMS records for indication for wearable defibrillator   Maintain electrolytes K>4.0 Mg >2.0  Will follow up 80 yo M with history of AFib on Eliquis (diagnosed 2017), HTN, HL, admitted for cardiac arrest (not shocked). Patient felt "funny" prior to passing out. Pt found to have PEA arrest and had ROSC after CPR and Epi. Had another cardiac arrest en route to hospital (1 shock delivered). On tele here, pt has been found to have persistent AFib with pauses of 3-4 sec  Severe single vessel disease    not good surgical candidate due to comorbidities  Poor PCI candidate at this time due to deconditioning  EF 60-65%  PEA arrest, no documented VT/VF    con't tele  Please obtain cardiac MRI to evaluate LV  for scars and function  Please repeat Echo  May require ICD for secondary prevention.  Awaiting revascularization  Will review EMS records for indication for wearable defibrillator   Maintain electrolytes K>4.0 Mg >2.0  Will follow up    ADDENDUM  Electrophysiology PA Note    Medical records, EMS records reviewed  Patient was shocked by AED in EMS in route to the hospital during second arrest  Patient will benefit from wearable defibrillator pending revascularization     Please obtain print out of EMS records from medical records 80 yo M with history of AFib on Eliquis (diagnosed 2017), HTN, HL, admitted for cardiac arrest (not shocked). Patient felt "funny" prior to passing out. Pt found to have PEA arrest and had ROSC after CPR and Epi. Had another cardiac arrest en route to hospital (1 shock delivered). On tele here, pt has been found to have persistent AFib with pauses of 3-4 sec  Severe single vessel disease    not good surgical candidate due to comorbidities  Poor PCI candidate at this time due to deconditioning  EF 60-65%  PEA arrest, no documented VT/VF    con't tele  Please obtain cardiac MRI to evaluate LV  for scars and function  Please repeat Echo  May require ICD for secondary prevention.  Awaiting revascularization  Maintain electrolytes K>4.0 Mg >2.0  Will follow up    ADDENDUM  Electrophysiology PA Note    Medical records, EMS records reviewed  Patient was shocked by AED in EMS in route to the hospital during second arrest  Patient will benefit from wearable defibrillator pending revascularization     Please obtain print out of EMS records from medical records

## 2020-02-08 LAB
-  AMIKACIN: SIGNIFICANT CHANGE UP
-  AMPICILLIN/SULBACTAM: SIGNIFICANT CHANGE UP
-  AMPICILLIN: SIGNIFICANT CHANGE UP
-  AZTREONAM: SIGNIFICANT CHANGE UP
-  CEFAZOLIN: SIGNIFICANT CHANGE UP
-  CEFEPIME: SIGNIFICANT CHANGE UP
-  CEFOXITIN: SIGNIFICANT CHANGE UP
-  CEFTRIAXONE: SIGNIFICANT CHANGE UP
-  CIPROFLOXACIN: SIGNIFICANT CHANGE UP
-  GENTAMICIN: SIGNIFICANT CHANGE UP
-  IMIPENEM: SIGNIFICANT CHANGE UP
-  LEVOFLOXACIN: SIGNIFICANT CHANGE UP
-  MEROPENEM: SIGNIFICANT CHANGE UP
-  NITROFURANTOIN: SIGNIFICANT CHANGE UP
-  PIPERACILLIN/TAZOBACTAM: SIGNIFICANT CHANGE UP
-  TIGECYCLINE: SIGNIFICANT CHANGE UP
-  TOBRAMYCIN: SIGNIFICANT CHANGE UP
-  TRIMETHOPRIM/SULFAMETHOXAZOLE: SIGNIFICANT CHANGE UP
METHOD TYPE: SIGNIFICANT CHANGE UP

## 2020-02-08 PROCEDURE — 74018 RADEX ABDOMEN 1 VIEW: CPT | Mod: 26

## 2020-02-08 RX ADMIN — FAMOTIDINE 20 MILLIGRAM(S): 10 INJECTION INTRAVENOUS at 05:36

## 2020-02-08 RX ADMIN — HEPARIN SODIUM 5000 UNIT(S): 5000 INJECTION INTRAVENOUS; SUBCUTANEOUS at 05:36

## 2020-02-08 RX ADMIN — Medication 1 TABLET(S): at 11:29

## 2020-02-08 RX ADMIN — HEPARIN SODIUM 5000 UNIT(S): 5000 INJECTION INTRAVENOUS; SUBCUTANEOUS at 18:41

## 2020-02-08 RX ADMIN — ATORVASTATIN CALCIUM 80 MILLIGRAM(S): 80 TABLET, FILM COATED ORAL at 21:14

## 2020-02-08 RX ADMIN — LOSARTAN POTASSIUM 100 MILLIGRAM(S): 100 TABLET, FILM COATED ORAL at 05:36

## 2020-02-08 RX ADMIN — SENNA PLUS 1 TABLET(S): 8.6 TABLET ORAL at 05:36

## 2020-02-08 RX ADMIN — Medication 100 MILLIGRAM(S): at 11:30

## 2020-02-08 RX ADMIN — Medication 81 MILLIGRAM(S): at 11:30

## 2020-02-08 RX ADMIN — SENNA PLUS 1 TABLET(S): 8.6 TABLET ORAL at 18:42

## 2020-02-08 RX ADMIN — AMLODIPINE BESYLATE 2.5 MILLIGRAM(S): 2.5 TABLET ORAL at 05:36

## 2020-02-08 RX ADMIN — FAMOTIDINE 20 MILLIGRAM(S): 10 INJECTION INTRAVENOUS at 18:41

## 2020-02-08 NOTE — PROGRESS NOTE ADULT - SUBJECTIVE AND OBJECTIVE BOX
LENGTH OF HOSPITAL STAY: 27d    CHIEF COMPLAINT:   Patient is a 79y old  Male who presents with a chief complaint of Cardiac arrest (2020 18:12)      Overnight events:    No acute events overnight    ALLERGIES:  No Known Allergies    MEDICATIONS:  STANDING MEDICATIONS  amLODIPine   Tablet 2.5 milliGRAM(s) Oral daily  aspirin  chewable 81 milliGRAM(s) Oral daily  atorvastatin 80 milliGRAM(s) Oral at bedtime  famotidine    Tablet 20 milliGRAM(s) Oral two times a day  heparin  Injectable 5000 Unit(s) SubCutaneous every 12 hours  losartan 100 milliGRAM(s) Oral daily  multivitamin 1 Tablet(s) Oral daily  senna 1 Tablet(s) Oral two times a day  thiamine 100 milliGRAM(s) Oral daily    PRN MEDICATIONS  bisacodyl 5 milliGRAM(s) Oral every 12 hours PRN    VITALS:   T(F): 97.6  HR: 86  BP: 137/68  RR: 18  SpO2: 96%    LABS:            Urinalysis Basic - ( 2020 15:00 )    Color: Yellow / Appearance: Clear / S.019 / pH: x  Gluc: x / Ketone: Negative  / Bili: Negative / Urobili: 3 mg/dL   Blood: x / Protein: Trace / Nitrite: Negative   Leuk Esterase: Negative / RBC: x / WBC x   Sq Epi: x / Non Sq Epi: x / Bacteria: x            Culture - Urine (collected 2020 15:00)  Source: .Urine Clean Catch (Midstream)  Preliminary Report (2020 20:11):    >100,000 CFU/ml Gram Negative Rods            Cultures:    Culture - Urine (collected 2020 15:00)  Source: .Urine Clean Catch (Midstream)  Preliminary Report (2020 20:11):    >100,000 CFU/ml Gram Negative Rods        RADIOLOGY:    PHYSICAL EXAM:  GEN: No acute distress  HEENT: NARENDRA, with Neck collar  LUNGS: Clear to auscultation bilaterally   HEART: S1/S2 present. RRR.   ABD: Soft, non-tender, non-distended. Bowel sounds present  EXT: non edematous, non erythematous, 1 bruise on R LL  NEURO: AAOX3

## 2020-02-08 NOTE — CHART NOTE - NSCHARTNOTEFT_GEN_A_CORE
Electrophysiology PA Note    Medical records, EMS records reviewed  Patient was shocked by AED in EMS in route to the hospital during second arrest  Patient will benefit from wearable defibrillator pending revascularization     Please obtain print out of EMS records from medical records

## 2020-02-08 NOTE — PROGRESS NOTE ADULT - ASSESSMENT
78 yo M with hx of HTN, A.fib (Eliquis), CHF, HLD brought in by EMS post cardiac arrest. As per wife at bedside, patient at the basement watching movie and was doing fine. Few mins later, patient came up and told the wife that he wasn't feeling right and that he felt funny. Then he crashed. Wife called the EMS who arrived 5 mins later. Upon EMS arrival, patient was found to be in PEA then asystole, s/p resuscitation for ~ 10 mins and ROSC achieved after 1 round of compression and epi. While on the way to hospital, patient had another cardiac arrest on the ambulant s/p CPR and ROSC achieved few mins later. As per family, patient was doing welll and at baseline prior to the episode. As baseline patient is fully functional. Denied any recent infection, recent hospitalization, recent ED visit, fever, chills.     In ED, patient was found to bradycardic and hypotensive. s/p 1 dose of atropine and patient was started on low dose Levophed with improvement in HR and BP. (12 Jan 2020 22:13)      1.cardiac arrest   2.cardiac arrhythmia  3.c5 and c6 intervertebral disc spacing and c5 fracture   4.atrial fibrillation rate controlled  5.anxiety  6.hypertension  7cad ostial LAD and distal LM       plan    patient waiting to get MRI of cardiac and decision from EPS for life west than discharge to rehab get functional status improve     later decision w cardiology top get stent vs CABG     spoke w dr Sinha and RN       #Progress Note Handoff  Pending (specify):  Consults_________, Tests__ MRI of _cardiac _____, Test Results_______, Other____ _life west ____  Family discussion:  Disposition: rehab 4 A_

## 2020-02-08 NOTE — PROGRESS NOTE ADULT - ASSESSMENT
Patient is a 79y old  Male who presents with a chief complaint of Cardiac arrest, Upon EMS arrival, patient was found to be in PEA, s/p resuscitation for 10-15 mins and ROSC achieved.    Hospital course: Found to have on MR Cervical Spine: Hyperextension injury at C5/C6 with widening of theanterior intervertebral disc space and fracture of the C5 posterior spinous process.  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 Patient requires a hard collar at all times. Over last few weeks CTS has been trying to optimize patient for single vessel minimally invasive bypass. Clinical status has stabilized but physical status continues to deteriorated.  Disccussion focused around risk benefit ratio of surgery, PCI or medical tx with life vest and rehab for next few weeks. After conversation between Bernardo Manriquez and Simón consideration for PCI is being made. CTS will f/u as needed. discussed with Dr. herrera consideration for starting BB in patient with CAD. Dr. Irizarry stated he would f/u with patient today    # Cardiac arrest 2/2 PEA likely from arrythmia  - Cardiologist Dr. Laws - CAD, ostial LAD distal LM disorder  - bernardo Moore had a lengthy discussion with other interventional cardiologists and ct surgery, for surgery his lack of activity and mental status ids the problem for PCI the location, high chance of jailing RI and stent in LM and using DAPT plus DOAC is the main issue  needs life vest  needs rehab consult  must be transferred to rehab floor in the hospital with life vest to get few weeks of PT to get his ambulation back then will reassess him for either bypass in [facility] or other hospitals or PCI by accepting the risk and limitation  he is not suitable to be d/c home      # C5/C6 Intervertebral disk spacings and C5 fracture  - use collar  - sending pt to Rehab 4A    # A.fib- controlled  - was on Eliquis 100 twice daily  - holding because of hemorrhagic risk  - c/w amLODIPine Tablet 2.5 milliGRAM(s) Oral daily    # Anxiety  - On Clonazepam 1 q8 at home  - holding Clonazepam home dose as of risk of fall    # HTN  - c/w amLODIPine Tablet 2.5 milliGRAM(s) Oral daily  - Increased to losartan 100 daily  - Was on Carvedilol 12.5 twice daily + Telmisartan 80mg at bedtime at home    # HLD  atorvastatin 80 milliGRAM(s) Oral at bedtime      # DVTppx: heparin sc  # Diet: Dysphagia 3  # CODE: FULL  # Dispo: 4A rehab

## 2020-02-08 NOTE — PROGRESS NOTE ADULT - SUBJECTIVE AND OBJECTIVE BOX
SUBJECTIVE:    Patient is a 79y old Male who presents with a chief complaint of Cardiac arrest (2020 08:55)    Currently admitted to medicine with the primary diagnosis of Cardiac arrest     Today is hospital day 27d. .   c/o panic attack     PAST MEDICAL & SURGICAL HISTORY  Atrial fibrillation  Neuropathy  HLD (hyperlipidemia)  HTN (hypertension)  History of cholecystectomy  History of cholecystectomy    SOCIAL HISTORY:  Negative for smoking/alcohol/drug use.     ALLERGIES:  No Known Allergies    MEDICATIONS:  STANDING MEDICATIONS  amLODIPine   Tablet 2.5 milliGRAM(s) Oral daily  aspirin  chewable 81 milliGRAM(s) Oral daily  atorvastatin 80 milliGRAM(s) Oral at bedtime  famotidine    Tablet 20 milliGRAM(s) Oral two times a day  heparin  Injectable 5000 Unit(s) SubCutaneous every 12 hours  losartan 100 milliGRAM(s) Oral daily  multivitamin 1 Tablet(s) Oral daily  senna 1 Tablet(s) Oral two times a day  thiamine 100 milliGRAM(s) Oral daily    PRN MEDICATIONS  bisacodyl 5 milliGRAM(s) Oral every 12 hours PRN    VITALS:   T(F): 97.6  HR: 86  BP: 137/68  RR: 18  SpO2: 96%    LABS:            Urinalysis Basic - ( 2020 15:00 )    Color: Yellow / Appearance: Clear / S.019 / pH: x  Gluc: x / Ketone: Negative  / Bili: Negative / Urobili: 3 mg/dL   Blood: x / Protein: Trace / Nitrite: Negative   Leuk Esterase: Negative / RBC: x / WBC x   Sq Epi: x / Non Sq Epi: x / Bacteria: x            Culture - Urine (collected 2020 15:00)  Source: .Urine Clean Catch (Midstream)  Preliminary Report (2020 20:11):    >100,000 CFU/ml Gram Negative Rods          RADIOLOGY:    PHYSICAL EXAM:  GEN: No acute distress  LUNGS: Clear to auscultation bilaterally   HEART: Regular  ABD: Soft, non-tender, non-distended.  EXT: NC/NC/NE/2+PP/FOSTER/Skin Intact.   NEURO: AAOX3    Intravenous access:   NG tube:   Mora Catheter:

## 2020-02-09 LAB
-  AMPICILLIN: SIGNIFICANT CHANGE UP
-  CIPROFLOXACIN: SIGNIFICANT CHANGE UP
-  LEVOFLOXACIN: SIGNIFICANT CHANGE UP
-  NITROFURANTOIN: SIGNIFICANT CHANGE UP
-  TETRACYCLINE: SIGNIFICANT CHANGE UP
-  VANCOMYCIN: SIGNIFICANT CHANGE UP
CULTURE RESULTS: SIGNIFICANT CHANGE UP
METHOD TYPE: SIGNIFICANT CHANGE UP
ORGANISM # SPEC MICROSCOPIC CNT: SIGNIFICANT CHANGE UP
SPECIMEN SOURCE: SIGNIFICANT CHANGE UP

## 2020-02-09 PROCEDURE — 93306 TTE W/DOPPLER COMPLETE: CPT | Mod: 26

## 2020-02-09 RX ORDER — NYSTATIN CREAM 100000 [USP'U]/G
1 CREAM TOPICAL
Refills: 0 | Status: DISCONTINUED | OUTPATIENT
Start: 2020-02-09 | End: 2020-02-24

## 2020-02-09 RX ADMIN — SENNA PLUS 1 TABLET(S): 8.6 TABLET ORAL at 17:24

## 2020-02-09 RX ADMIN — Medication 1 TABLET(S): at 11:35

## 2020-02-09 RX ADMIN — AMLODIPINE BESYLATE 2.5 MILLIGRAM(S): 2.5 TABLET ORAL at 05:56

## 2020-02-09 RX ADMIN — HEPARIN SODIUM 5000 UNIT(S): 5000 INJECTION INTRAVENOUS; SUBCUTANEOUS at 05:56

## 2020-02-09 RX ADMIN — HEPARIN SODIUM 5000 UNIT(S): 5000 INJECTION INTRAVENOUS; SUBCUTANEOUS at 17:24

## 2020-02-09 RX ADMIN — Medication 81 MILLIGRAM(S): at 11:35

## 2020-02-09 RX ADMIN — Medication 100 MILLIGRAM(S): at 11:35

## 2020-02-09 RX ADMIN — SENNA PLUS 1 TABLET(S): 8.6 TABLET ORAL at 05:55

## 2020-02-09 RX ADMIN — LOSARTAN POTASSIUM 100 MILLIGRAM(S): 100 TABLET, FILM COATED ORAL at 05:55

## 2020-02-09 RX ADMIN — FAMOTIDINE 20 MILLIGRAM(S): 10 INJECTION INTRAVENOUS at 17:25

## 2020-02-09 RX ADMIN — FAMOTIDINE 20 MILLIGRAM(S): 10 INJECTION INTRAVENOUS at 05:55

## 2020-02-09 RX ADMIN — NYSTATIN CREAM 1 APPLICATION(S): 100000 CREAM TOPICAL at 17:26

## 2020-02-09 NOTE — SWALLOW BEDSIDE ASSESSMENT ADULT - COMMENTS
+ overt s/s of penetration/aspiration. Suspected pharyngeal dysphagia Mild oral dysphagia with mechanical soft solids 2/2 prolonged mastication, delayed MELISSA, +toleration w/o overt s/s of penetration/aspiration

## 2020-02-09 NOTE — PROGRESS NOTE ADULT - SUBJECTIVE AND OBJECTIVE BOX
Patient was seen and examined. Spoke with RN. Chart reviewed.  Patient seen at bedside. Denies any complaints at this time.  No events overnight.    Vital Signs Last 24 Hrs  T(F): 97.4 (09 Feb 2020 13:24), Max: 97.4 (09 Feb 2020 13:24)  HR: 69 (09 Feb 2020 13:24) (69 - 86)  BP: 143/79 (09 Feb 2020 13:24) (143/79 - 158/81)  SpO2: 98% (09 Feb 2020 13:24) (94% - 98%)    MEDICATIONS  (STANDING):  amLODIPine   Tablet 2.5 milliGRAM(s) Oral daily  aspirin  chewable 81 milliGRAM(s) Oral daily  atorvastatin 80 milliGRAM(s) Oral at bedtime  famotidine    Tablet 20 milliGRAM(s) Oral two times a day  heparin  Injectable 5000 Unit(s) SubCutaneous every 12 hours  losartan 100 milliGRAM(s) Oral daily  multivitamin 1 Tablet(s) Oral daily  nystatin Powder 1 Application(s) Topical two times a day  senna 1 Tablet(s) Oral two times a day  thiamine 100 milliGRAM(s) Oral daily    MEDICATIONS  (PRN):  bisacodyl 5 milliGRAM(s) Oral every 12 hours PRN Constipation    Labs:        Culture - Urine (collected 06 Feb 2020 15:00)  Source: .Urine Clean Catch (Midstream)  Final Report (09 Feb 2020 01:06):    >100,000 CFU/ml Escherichia coli    >100,000 CFU/ml Enterococcus faecalis  Organism: Escherichia coli  Gram Positive Cocci in Pairs and Chains (09 Feb 2020 01:06)  Organism: Gram Positive Cocci in Pairs and Chains (09 Feb 2020 01:06)  Organism: Escherichia coli (09 Feb 2020 01:06)      General: Elderly M lying in bed, comfortable, NAD  Neurology: A&Ox3, nonfocal  Head:  Normocephalic, atraumatic. Neck collar in place  ENT:  Mucosa moist, no ulcerations  Neck:  Supple, no JVD,   Skin: no breakdowns (as per RN)  Resp: CTA B/L, no WRR  CV: RRR, S1S2, No MRG  GI: Soft, NT, bowel sounds  MS: No edema, + peripheral pulses, FROM all 4 extremity      A/P:  Patient is a 79y old  Male who presents with a chief complaint of Cardiac arrest, Upon EMS arrival, patient was found to be in PEA, s/p resuscitation for 10-15 mins and ROSC achieved. Hospital course: Found to have on MR Cervical Spine: Hyperextension injury at C5/C6 with widening of the anterior intervertebral disc space and fracture of the C5 posterior spinous process.  Followed by metabolic encephalopathy pseudoaneurysm of right upper extremity (closed by vascular) and CAD on CC. Patient requires a hard collar at all times. Over last few weeks CTS has been trying to optimize patient for single vessel minimally invasive bypass. Clinical status has stabilized but physical status continues to deteriorated.  Discussion focused around risk benefit ratio of surgery, PCI or medical tx with life vest and rehab for next few weeks. After conversation between Eusebia Manriquez and Simón consideration for PCI is being made. CTS will f/u as needed.  Cardiac arrest 2/2 likely PEA  Afib  C5 fracture  Anxiety  HTN  HLD    FU cardiology Dr. Laws for decision regarding PCI vs CT Sx vs Life Vest  PT/Rehab  Neck immobilization collar  Off AC due to bleeding risks  BP control  Statin  ASA  Amlodipine  famotidine  DVT prophylaxis  Decubitus prevention- all measures as per RN protocol  Please call or text me with any questions or updates Patient was seen and examined. Spoke with RN. Chart reviewed.  Patient seen at bedside. Denies any complaints at this time.  No events overnight.    Vital Signs Last 24 Hrs  T(F): 97.4 (09 Feb 2020 13:24), Max: 97.4 (09 Feb 2020 13:24)  HR: 69 (09 Feb 2020 13:24) (69 - 86)  BP: 143/79 (09 Feb 2020 13:24) (143/79 - 158/81)  SpO2: 98% (09 Feb 2020 13:24) (94% - 98%)    MEDICATIONS  (STANDING):  amLODIPine   Tablet 2.5 milliGRAM(s) Oral daily  aspirin  chewable 81 milliGRAM(s) Oral daily  atorvastatin 80 milliGRAM(s) Oral at bedtime  famotidine    Tablet 20 milliGRAM(s) Oral two times a day  heparin  Injectable 5000 Unit(s) SubCutaneous every 12 hours  losartan 100 milliGRAM(s) Oral daily  multivitamin 1 Tablet(s) Oral daily  nystatin Powder 1 Application(s) Topical two times a day  senna 1 Tablet(s) Oral two times a day  thiamine 100 milliGRAM(s) Oral daily    MEDICATIONS  (PRN):  bisacodyl 5 milliGRAM(s) Oral every 12 hours PRN Constipation    Labs:        Culture - Urine (collected 06 Feb 2020 15:00)  Source: .Urine Clean Catch (Midstream)  Final Report (09 Feb 2020 01:06):    >100,000 CFU/ml Escherichia coli    >100,000 CFU/ml Enterococcus faecalis  Organism: Escherichia coli  Gram Positive Cocci in Pairs and Chains (09 Feb 2020 01:06)  Organism: Gram Positive Cocci in Pairs and Chains (09 Feb 2020 01:06)  Organism: Escherichia coli (09 Feb 2020 01:06)      General: Elderly M lying in bed, comfortable, NAD  Neurology: A&Ox3, nonfocal  Head:  Normocephalic, atraumatic. Neck collar in place  ENT:  Mucosa moist, no ulcerations  Neck:  Supple, no JVD,   Skin: no breakdowns (as per RN)  Resp: CTA B/L, no WRR  CV: RRR, S1S2, No MRG  GI: Soft, NT, bowel sounds  MS: No edema, + peripheral pulses, FROM all 4 extremity      A/P:  Patient is a 79y old  Male who presents with a chief complaint of Cardiac arrest, Upon EMS arrival, patient was found to be in PEA, s/p resuscitation for 10-15 mins and ROSC achieved. Hospital course: Found to have on MR Cervical Spine: Hyperextension injury at C5/C6 with widening of the anterior intervertebral disc space and fracture of the C5 posterior spinous process.  Followed by metabolic encephalopathy pseudoaneurysm of right upper extremity (closed by vascular) and CAD on CC. Patient requires a hard collar at all times. Over last few weeks CTS has been trying to optimize patient for single vessel minimally invasive bypass. Clinical status has stabilized but physical status continues to deteriorated.  Discussion focused around risk benefit ratio of surgery, PCI or medical tx with life vest and rehab for next few weeks. After conversation between Eusebia Manriquez and Simón consideration for PCI is being made. CTS will f/u as needed.  Cardiac arrest 2/2 likely PEA  Afib  C5 fracture  Anxiety  HTN  HLD    FU cardiology Dr. Laws for decision regarding PCI vs CT Sx vs Life Vest  PT/Rehab  Neck immobilization collar  Off AC due to bleeding risks  BP control  Statin  ASA  Amlodipine  famotidine  DVT prophylaxis  Decubitus prevention- all measures as per RN protocol  Discussed with covering resident  Please call or text me with any questions or updates

## 2020-02-10 PROCEDURE — 75557 CARDIAC MRI FOR MORPH: CPT | Mod: 26

## 2020-02-10 RX ADMIN — LOSARTAN POTASSIUM 100 MILLIGRAM(S): 100 TABLET, FILM COATED ORAL at 05:27

## 2020-02-10 RX ADMIN — NYSTATIN CREAM 1 APPLICATION(S): 100000 CREAM TOPICAL at 05:28

## 2020-02-10 RX ADMIN — Medication 1 TABLET(S): at 12:03

## 2020-02-10 RX ADMIN — SENNA PLUS 1 TABLET(S): 8.6 TABLET ORAL at 05:27

## 2020-02-10 RX ADMIN — SENNA PLUS 1 TABLET(S): 8.6 TABLET ORAL at 17:36

## 2020-02-10 RX ADMIN — HEPARIN SODIUM 5000 UNIT(S): 5000 INJECTION INTRAVENOUS; SUBCUTANEOUS at 17:36

## 2020-02-10 RX ADMIN — FAMOTIDINE 20 MILLIGRAM(S): 10 INJECTION INTRAVENOUS at 17:36

## 2020-02-10 RX ADMIN — FAMOTIDINE 20 MILLIGRAM(S): 10 INJECTION INTRAVENOUS at 05:27

## 2020-02-10 RX ADMIN — NYSTATIN CREAM 1 APPLICATION(S): 100000 CREAM TOPICAL at 17:37

## 2020-02-10 RX ADMIN — ATORVASTATIN CALCIUM 80 MILLIGRAM(S): 80 TABLET, FILM COATED ORAL at 21:24

## 2020-02-10 RX ADMIN — Medication 5 MILLIGRAM(S): at 17:37

## 2020-02-10 RX ADMIN — Medication 100 MILLIGRAM(S): at 12:03

## 2020-02-10 RX ADMIN — HEPARIN SODIUM 5000 UNIT(S): 5000 INJECTION INTRAVENOUS; SUBCUTANEOUS at 05:27

## 2020-02-10 RX ADMIN — AMLODIPINE BESYLATE 2.5 MILLIGRAM(S): 2.5 TABLET ORAL at 05:27

## 2020-02-10 RX ADMIN — Medication 81 MILLIGRAM(S): at 12:03

## 2020-02-10 NOTE — PROGRESS NOTE ADULT - SUBJECTIVE AND OBJECTIVE BOX
Patient was seen and examined in CEU. Spoke with RN. Chart reviewed.  No new events overnight.  Vital Signs Last 24 Hrs  T(F): 97.6 (10 Feb 2020 06:54), Max: 97.9 (09 Feb 2020 21:37)  HR: 66 (10 Feb 2020 06:54) (61 - 69)  BP: 141/92 (10 Feb 2020 06:54) (141/92 - 151/96)  SpO2: 97% (10 Feb 2020 06:54) (97% - 98%)  MEDICATIONS  (STANDING):  amLODIPine   Tablet 2.5 milliGRAM(s) Oral daily  aspirin  chewable 81 milliGRAM(s) Oral daily  atorvastatin 80 milliGRAM(s) Oral at bedtime  famotidine    Tablet 20 milliGRAM(s) Oral two times a day  heparin  Injectable 5000 Unit(s) SubCutaneous every 12 hours  losartan 100 milliGRAM(s) Oral daily  multivitamin 1 Tablet(s) Oral daily  nystatin Powder 1 Application(s) Topical two times a day  senna 1 Tablet(s) Oral two times a day  thiamine 100 milliGRAM(s) Oral daily    MEDICATIONS  (PRN):  bisacodyl 5 milliGRAM(s) Oral every 12 hours PRN Constipation        A/P:  79 year old M with hx of HTN, A.fib (Eliquis), CHF, HLD brought in by EMS post cardiac arrest and resuscitated; found to have severe CAD of LAD    conservative management as per CTS and cardio- no CABG or PCI is planned this admission    lifevest as per cardio    PT/rehab    OOB    DC planning to 4A with lifevest as per cardio    neck brace    outpt  f/u    DVT prophylaxis  Decubitus prevention- all measures as per RN protocol  Please call or text me with any questions or updates

## 2020-02-10 NOTE — PROGRESS NOTE ADULT - SUBJECTIVE AND OBJECTIVE BOX
Hospital Day:  29    Subjective:    Patient is a 79y old Male who presents with a chief complaint of Cardiac arrest (10 Feb 2020 07:54)  This morning patient is lying comfortably in bed. He reports no new complaints.    Past Medical Hx:   Atrial fibrillation  Neuropathy  HLD (hyperlipidemia)  HTN (hypertension)    Past Sx:  History of cholecystectomy  History of cholecystectomy    Allergies:  No Known Allergies    Current Meds:   Standng Meds:  amLODIPine   Tablet 2.5 milliGRAM(s) Oral daily  aspirin  chewable 81 milliGRAM(s) Oral daily  atorvastatin 80 milliGRAM(s) Oral at bedtime  famotidine    Tablet 20 milliGRAM(s) Oral two times a day  heparin  Injectable 5000 Unit(s) SubCutaneous every 12 hours  losartan 100 milliGRAM(s) Oral daily  multivitamin 1 Tablet(s) Oral daily  nystatin Powder 1 Application(s) Topical two times a day  senna 1 Tablet(s) Oral two times a day  thiamine 100 milliGRAM(s) Oral daily    PRN Meds:  bisacodyl 5 milliGRAM(s) Oral every 12 hours PRN Constipation    HOME MEDICATIONS:  carvedilol 12.5 mg oral tablet: 1 tab(s) orally 2 times a day  clonazePAM 1 mg oral tablet: 1 tab(s) orally 3 times a day  DULoxetine 30 mg oral delayed release capsule: 1 cap(s) orally once a day  Eliquis 5 mg oral tablet: 1 tab(s) orally 2 times a day  furosemide 40 mg oral tablet: 1 tab(s) orally once a day  gabapentin 300 mg oral capsule: 1 cap(s) orally 2 times a day  omeprazole 20 mg oral delayed release capsule: 1 cap(s) orally once a day  potassium chloride 10 mEq oral capsule, extended release: 1 cap(s) orally once a day  simvastatin 40 mg oral tablet: 1 tab(s) orally once a day (at bedtime)  telmisartan 80 mg oral tablet: 1 tab(s) orally once a day  Vitamin D3: cap(s) orally once a day      Vital Signs:   T(F): 97.6 (02-10-20 @ 06:54), Max: 97.9 (20 @ 21:37)  HR: 66 (02-10-20 @ 06:54) (61 - 69)  BP: 141/92 (02-10-20 @ 06:54) (141/92 - 151/96)  RR: 19 (02-10-20 @ 06:54) (18 - 20)  SpO2: 97% (02-10-20 @ 06:54) (97% - 98%)      20 @ 07:01  -  02-10-20 @ 07:00  --------------------------------------------------------  IN: 100 mL / OUT: 0 mL / NET: 100 mL        Physical Exam:   GENERAL: NAD  HEENT: NCAT  CHEST/LUNG: CTAB  HEART: Regular rate and rhythm; s1 s2 appreciated, No murmurs, rubs, or gallops  ABDOMEN: Soft, Nontender, Nondistended; Bowel sounds present  EXTREMITIES: No LE edema b/l  NERVOUS SYSTEM:  Alert & Oriented X3        Labs:       Hemoglobin A1C, Whole Blood: 5.7 % (20 @ 12:20)            Urinalysis Basic - ( 2020 15:00 )    Color: Yellow / Appearance: Clear / S.019 / pH: x  Gluc: x / Ketone: Negative  / Bili: Negative / Urobili: 3 mg/dL   Blood: x / Protein: Trace / Nitrite: Negative   Leuk Esterase: Negative / RBC: x / WBC x   Sq Epi: x / Non Sq Epi: x / Bacteria: x            Assessment and Plan:     Hospital course: Found to have on MR Cervical Spine: Hyperextension injury at C5/C6 with widening of theanterior intervertebral disc space and fracture of the C5 posterior spinous process.  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 Patient requires a hard collar at all times. Over last few weeks CTS has been trying to optimize patient for single vessel minimally invasive bypass. Clinical status has stabilized but physical status continues to deteriorated.  Disccussion focused around risk benefit ratio of surgery, PCI or medical tx with life vest and rehab for next few weeks. After conversation between Dr. Dawson, Bernardo and Simón consideration for PCI is being made. CTS will f/u as needed. discussed with Dr. herrera consideration for starting BB in patient with CAD. Dr. Irizarry stated he would f/u with patient today    # Cardiac arrest 2/2 PEA likely from arrythmia  - Cardiologist Dr. Laws - CAD, ostial LAD distal LM disorder  - bernardo Moore had a lengthy discussion with other interventional cardiologists and ct surgery: for surgery his lack of activity and mental status is the problem; for PCI the location, high chance of jailing RI and stent in LM and using DAPT plus DOAC is the main issue  - needs life vest  - needs rehab consult  - must be transferred to rehab floor in the hospital with life vest to get few weeks of PT to get his ambulation back then will reassess him for either bypass in [facility] or other hospitals or PCI by accepting the risk and limitation  - he is not suitable to be d/c home      # C5/C6 Intervertebral disk spacings and C5 fracture  - use collar  - sending pt to Rehab 4A    # A.fib- controlled  - was on Eliquis 100 twice daily  - holding because of hemorrhagic risk  - c/w amLODIPine Tablet 2.5 milliGRAM(s) Oral daily    # Anxiety  - On Clonazepam 1 q8 at home  - holding Clonazepam home dose as of risk of fall    # HTN  - c/w amLODIPine Tablet 2.5 milliGRAM(s) Oral daily  - Increased to losartan 100 daily  - Was on Carvedilol 12.5 twice daily + Telmisartan 80mg at bedtime at home    # HLD  - atorvastatin 80 milliGRAM(s) Oral at bedtime      # DVTppx: heparin sc  # Diet: Dysphagia 3  # CODE: FULL  # Dispo: 4A rehab Hospital Day:  29    Subjective:    Patient is a 79y old Male who presents with a chief complaint of Cardiac arrest (10 Feb 2020 07:54)  This morning patient is lying comfortably in bed. He reports no new complaints.    Past Medical Hx:   Atrial fibrillation  Neuropathy  HLD (hyperlipidemia)  HTN (hypertension)    Past Sx:  History of cholecystectomy  History of cholecystectomy    Allergies:  No Known Allergies    Current Meds:   Standng Meds:  amLODIPine   Tablet 2.5 milliGRAM(s) Oral daily  aspirin  chewable 81 milliGRAM(s) Oral daily  atorvastatin 80 milliGRAM(s) Oral at bedtime  famotidine    Tablet 20 milliGRAM(s) Oral two times a day  heparin  Injectable 5000 Unit(s) SubCutaneous every 12 hours  losartan 100 milliGRAM(s) Oral daily  multivitamin 1 Tablet(s) Oral daily  nystatin Powder 1 Application(s) Topical two times a day  senna 1 Tablet(s) Oral two times a day  thiamine 100 milliGRAM(s) Oral daily    PRN Meds:  bisacodyl 5 milliGRAM(s) Oral every 12 hours PRN Constipation    HOME MEDICATIONS:  carvedilol 12.5 mg oral tablet: 1 tab(s) orally 2 times a day  clonazePAM 1 mg oral tablet: 1 tab(s) orally 3 times a day  DULoxetine 30 mg oral delayed release capsule: 1 cap(s) orally once a day  Eliquis 5 mg oral tablet: 1 tab(s) orally 2 times a day  furosemide 40 mg oral tablet: 1 tab(s) orally once a day  gabapentin 300 mg oral capsule: 1 cap(s) orally 2 times a day  omeprazole 20 mg oral delayed release capsule: 1 cap(s) orally once a day  potassium chloride 10 mEq oral capsule, extended release: 1 cap(s) orally once a day  simvastatin 40 mg oral tablet: 1 tab(s) orally once a day (at bedtime)  telmisartan 80 mg oral tablet: 1 tab(s) orally once a day  Vitamin D3: cap(s) orally once a day      Vital Signs:   T(F): 97.6 (02-10-20 @ 06:54), Max: 97.9 (20 @ 21:37)  HR: 66 (02-10-20 @ 06:54) (61 - 69)  BP: 141/92 (02-10-20 @ 06:54) (141/92 - 151/96)  RR: 19 (02-10-20 @ 06:54) (18 - 20)  SpO2: 97% (02-10-20 @ 06:54) (97% - 98%)      20 @ 07:01  -  02-10-20 @ 07:00  --------------------------------------------------------  IN: 100 mL / OUT: 0 mL / NET: 100 mL        Physical Exam:   GENERAL: NAD  HEENT: NCAT  CHEST/LUNG: CTAB  HEART: Regular rate and rhythm; s1 s2 appreciated, No murmurs, rubs, or gallops  ABDOMEN: Soft, Nontender, Nondistended; Bowel sounds present  EXTREMITIES: No LE edema b/l  NERVOUS SYSTEM:  Alert & Oriented X3        Labs:       Hemoglobin A1C, Whole Blood: 5.7 % (20 @ 12:20)            Urinalysis Basic - ( 2020 15:00 )    Color: Yellow / Appearance: Clear / S.019 / pH: x  Gluc: x / Ketone: Negative  / Bili: Negative / Urobili: 3 mg/dL   Blood: x / Protein: Trace / Nitrite: Negative   Leuk Esterase: Negative / RBC: x / WBC x   Sq Epi: x / Non Sq Epi: x / Bacteria: x            Assessment and Plan:     Hospital course: Found to have on MR Cervical Spine: Hyperextension injury at C5/C6 with widening of theanterior intervertebral disc space and fracture of the C5 posterior spinous process.  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 Patient requires a hard collar at all times. Over last few weeks CTS has been trying to optimize patient for single vessel minimally invasive bypass. Clinical status has stabilized but physical status continues to deteriorated.  Disccussion focused around risk benefit ratio of surgery, PCI or medical tx with life vest and rehab for next few weeks. After conversation between Dr. Dawson, Bernardo and Simón consideration for PCI is being made. CTS will f/u as needed. discussed with Dr. herrera consideration for starting BB in patient with CAD. Dr. Irizarry stated he would f/u with patient today    # Cardiac arrest 2/2 PEA likely from arrythmia  - Cardiologist Dr. Laws - CAD, ostial LAD distal LM disorder  - bernardo Moore had a lengthy discussion with other interventional cardiologists and ct surgery: for surgery his lack of activity and mental status is the problem; for PCI the location, high chance of jailing RI and stent in LM and using DAPT plus DOAC is the main issue  - needs life vest  - needs rehab consult  - must be transferred to rehab floor in the hospital with life vest to get few weeks of PT to get his ambulation back then will reassess him for either bypass in [facility] or other hospitals or PCI by accepting the risk and limitation  - he is not suitable to be d/c home      # C5/C6 Intervertebral disk spacings and C5 fracture  - use collar  - Neurosurgery will come back to see patient  - sending pt to Rehab 4A    # A.fib- controlled  - was on Eliquis 100 twice daily  - holding because of hemorrhagic risk  - c/w amLODIPine Tablet 2.5 milliGRAM(s) Oral daily    # Anxiety  - On Clonazepam 1 q8 at home  - holding Clonazepam home dose as of risk of fall    # HTN  - c/w amLODIPine Tablet 2.5 milliGRAM(s) Oral daily  - Increased to losartan 100 daily  - Was on Carvedilol 12.5 twice daily + Telmisartan 80mg at bedtime at home    # HLD  - atorvastatin 80 milliGRAM(s) Oral at bedtime      # DVTppx: heparin sc  # Diet: Dysphagia 3  # CODE: FULL  # Dispo: 4A rehab Hospital Day:  29    Subjective:    Patient is a 79y old Male who presents with a chief complaint of Cardiac arrest (10 Feb 2020 07:54)  This morning patient is lying comfortably in bed. He reports no new complaints.    Past Medical Hx:   Atrial fibrillation  Neuropathy  HLD (hyperlipidemia)  HTN (hypertension)    Past Sx:  History of cholecystectomy  History of cholecystectomy    Allergies:  No Known Allergies    Current Meds:   Standng Meds:  amLODIPine   Tablet 2.5 milliGRAM(s) Oral daily  aspirin  chewable 81 milliGRAM(s) Oral daily  atorvastatin 80 milliGRAM(s) Oral at bedtime  famotidine    Tablet 20 milliGRAM(s) Oral two times a day  heparin  Injectable 5000 Unit(s) SubCutaneous every 12 hours  losartan 100 milliGRAM(s) Oral daily  multivitamin 1 Tablet(s) Oral daily  nystatin Powder 1 Application(s) Topical two times a day  senna 1 Tablet(s) Oral two times a day  thiamine 100 milliGRAM(s) Oral daily    PRN Meds:  bisacodyl 5 milliGRAM(s) Oral every 12 hours PRN Constipation    HOME MEDICATIONS:  carvedilol 12.5 mg oral tablet: 1 tab(s) orally 2 times a day  clonazePAM 1 mg oral tablet: 1 tab(s) orally 3 times a day  DULoxetine 30 mg oral delayed release capsule: 1 cap(s) orally once a day  Eliquis 5 mg oral tablet: 1 tab(s) orally 2 times a day  furosemide 40 mg oral tablet: 1 tab(s) orally once a day  gabapentin 300 mg oral capsule: 1 cap(s) orally 2 times a day  omeprazole 20 mg oral delayed release capsule: 1 cap(s) orally once a day  potassium chloride 10 mEq oral capsule, extended release: 1 cap(s) orally once a day  simvastatin 40 mg oral tablet: 1 tab(s) orally once a day (at bedtime)  telmisartan 80 mg oral tablet: 1 tab(s) orally once a day  Vitamin D3: cap(s) orally once a day      Vital Signs:   T(F): 97.6 (02-10-20 @ 06:54), Max: 97.9 (20 @ 21:37)  HR: 66 (02-10-20 @ 06:54) (61 - 69)  BP: 141/92 (02-10-20 @ 06:54) (141/92 - 151/96)  RR: 19 (02-10-20 @ 06:54) (18 - 20)  SpO2: 97% (02-10-20 @ 06:54) (97% - 98%)      20 @ 07:01  -  02-10-20 @ 07:00  --------------------------------------------------------  IN: 100 mL / OUT: 0 mL / NET: 100 mL        Physical Exam:   GENERAL: NAD  HEENT: NCAT  CHEST/LUNG: CTAB  HEART: Regular rate and rhythm; s1 s2 appreciated, No murmurs, rubs, or gallops  ABDOMEN: Soft, Nontender, Nondistended; Bowel sounds present  EXTREMITIES: No LE edema b/l  NERVOUS SYSTEM:  Alert & Oriented X3        Labs:       Hemoglobin A1C, Whole Blood: 5.7 % (20 @ 12:20)            Urinalysis Basic - ( 2020 15:00 )    Color: Yellow / Appearance: Clear / S.019 / pH: x  Gluc: x / Ketone: Negative  / Bili: Negative / Urobili: 3 mg/dL   Blood: x / Protein: Trace / Nitrite: Negative   Leuk Esterase: Negative / RBC: x / WBC x   Sq Epi: x / Non Sq Epi: x / Bacteria: x            Assessment and Plan:     Hospital course: Found to have on MR Cervical Spine: Hyperextension injury at C5/C6 with widening of theanterior intervertebral disc space and fracture of the C5 posterior spinous process.  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 Patient requires a hard collar at all times. Over last few weeks CTS has been trying to optimize patient for single vessel minimally invasive bypass. Clinical status has stabilized but physical status continues to deteriorated.  Disccussion focused around risk benefit ratio of surgery, PCI or medical tx with life vest and rehab for next few weeks. After conversation between Dr. Daswon, Bernardo and Simón consideration for PCI is being made. CTS will f/u as needed. discussed with Dr. herrera consideration for starting BB in patient with CAD. Dr. Irizarry stated he would f/u with patient today    # Cardiac arrest 2/2 PEA likely from arrythmia  - Dr Vazquez thinks patient requires CABG. He will give CT surgery one more day to decide; if they still do not want he will try to transfer patient to another hospital for CABG  - Cardiologist Dr. Laws - CAD, ostial LAD distal LM disorder  - bernardo Moore had a lengthy discussion with other interventional cardiologists and ct surgery: for surgery his lack of activity and mental status is the problem; for PCI the location, high chance of jailing RI and stent in LM and using DAPT plus DOAC is the main issue  - must be transferred to rehab floor in the hospital with life vest to get few weeks of PT to get his ambulation back then will reassess him for either bypass in [facility] or other hospitals or PCI by accepting the risk and limitation  - he is not suitable to be d/c home      # C5/C6 Intervertebral disk spacings and C5 fracture  - use collar  - Neurosurgery will come back to see patient  - sending pt to Rehab 4A    # A.fib- controlled  - was on Eliquis 100 twice daily  - holding because of hemorrhagic risk  - c/w amLODIPine Tablet 2.5 milliGRAM(s) Oral daily    # Anxiety  - On Clonazepam 1 q8 at home  - holding Clonazepam home dose as of risk of fall    # HTN  - c/w amLODIPine Tablet 2.5 milliGRAM(s) Oral daily  - Increased to losartan 100 daily  - Was on Carvedilol 12.5 twice daily + Telmisartan 80mg at bedtime at home    # HLD  - atorvastatin 80 milliGRAM(s) Oral at bedtime      # DVTppx: heparin sc  # Diet: Dysphagia 3  # CODE: FULL  # Dispo: 4A rehab

## 2020-02-11 LAB
ALBUMIN SERPL ELPH-MCNC: 2.7 G/DL — LOW (ref 3.5–5.2)
ALP SERPL-CCNC: 117 U/L — HIGH (ref 30–115)
ALT FLD-CCNC: 24 U/L — SIGNIFICANT CHANGE UP (ref 0–41)
ANION GAP SERPL CALC-SCNC: 11 MMOL/L — SIGNIFICANT CHANGE UP (ref 7–14)
AST SERPL-CCNC: 23 U/L — SIGNIFICANT CHANGE UP (ref 0–41)
BASOPHILS # BLD AUTO: 0.02 K/UL — SIGNIFICANT CHANGE UP (ref 0–0.2)
BASOPHILS NFR BLD AUTO: 0.3 % — SIGNIFICANT CHANGE UP (ref 0–1)
BILIRUB SERPL-MCNC: 0.9 MG/DL — SIGNIFICANT CHANGE UP (ref 0.2–1.2)
BUN SERPL-MCNC: 12 MG/DL — SIGNIFICANT CHANGE UP (ref 10–20)
CALCIUM SERPL-MCNC: 8.4 MG/DL — LOW (ref 8.5–10.1)
CHLORIDE SERPL-SCNC: 103 MMOL/L — SIGNIFICANT CHANGE UP (ref 98–110)
CO2 SERPL-SCNC: 27 MMOL/L — SIGNIFICANT CHANGE UP (ref 17–32)
CREAT SERPL-MCNC: 0.7 MG/DL — SIGNIFICANT CHANGE UP (ref 0.7–1.5)
EOSINOPHIL # BLD AUTO: 0.17 K/UL — SIGNIFICANT CHANGE UP (ref 0–0.7)
EOSINOPHIL NFR BLD AUTO: 2.5 % — SIGNIFICANT CHANGE UP (ref 0–8)
GLUCOSE SERPL-MCNC: 121 MG/DL — HIGH (ref 70–99)
HCT VFR BLD CALC: 38.2 % — LOW (ref 42–52)
HGB BLD-MCNC: 11.8 G/DL — LOW (ref 14–18)
IMM GRANULOCYTES NFR BLD AUTO: 0.3 % — SIGNIFICANT CHANGE UP (ref 0.1–0.3)
LYMPHOCYTES # BLD AUTO: 0.79 K/UL — LOW (ref 1.2–3.4)
LYMPHOCYTES # BLD AUTO: 11.7 % — LOW (ref 20.5–51.1)
MCHC RBC-ENTMCNC: 30.4 PG — SIGNIFICANT CHANGE UP (ref 27–31)
MCHC RBC-ENTMCNC: 30.9 G/DL — LOW (ref 32–37)
MCV RBC AUTO: 98.5 FL — HIGH (ref 80–94)
MONOCYTES # BLD AUTO: 0.53 K/UL — SIGNIFICANT CHANGE UP (ref 0.1–0.6)
MONOCYTES NFR BLD AUTO: 7.9 % — SIGNIFICANT CHANGE UP (ref 1.7–9.3)
NEUTROPHILS # BLD AUTO: 5.22 K/UL — SIGNIFICANT CHANGE UP (ref 1.4–6.5)
NEUTROPHILS NFR BLD AUTO: 77.3 % — HIGH (ref 42.2–75.2)
NRBC # BLD: 0 /100 WBCS — SIGNIFICANT CHANGE UP (ref 0–0)
PLATELET # BLD AUTO: 170 K/UL — SIGNIFICANT CHANGE UP (ref 130–400)
POTASSIUM SERPL-MCNC: 4.1 MMOL/L — SIGNIFICANT CHANGE UP (ref 3.5–5)
POTASSIUM SERPL-SCNC: 4.1 MMOL/L — SIGNIFICANT CHANGE UP (ref 3.5–5)
PROT SERPL-MCNC: 5.8 G/DL — LOW (ref 6–8)
RBC # BLD: 3.88 M/UL — LOW (ref 4.7–6.1)
RBC # FLD: 14.8 % — HIGH (ref 11.5–14.5)
SODIUM SERPL-SCNC: 141 MMOL/L — SIGNIFICANT CHANGE UP (ref 135–146)
WBC # BLD: 6.75 K/UL — SIGNIFICANT CHANGE UP (ref 4.8–10.8)
WBC # FLD AUTO: 6.75 K/UL — SIGNIFICANT CHANGE UP (ref 4.8–10.8)

## 2020-02-11 RX ORDER — CLONAZEPAM 1 MG
1 TABLET ORAL DAILY
Refills: 0 | Status: DISCONTINUED | OUTPATIENT
Start: 2020-02-11 | End: 2020-02-18

## 2020-02-11 RX ORDER — CLONAZEPAM 1 MG
1 TABLET ORAL
Qty: 0 | Refills: 0 | DISCHARGE

## 2020-02-11 RX ADMIN — FAMOTIDINE 20 MILLIGRAM(S): 10 INJECTION INTRAVENOUS at 05:51

## 2020-02-11 RX ADMIN — SENNA PLUS 1 TABLET(S): 8.6 TABLET ORAL at 17:26

## 2020-02-11 RX ADMIN — HEPARIN SODIUM 5000 UNIT(S): 5000 INJECTION INTRAVENOUS; SUBCUTANEOUS at 05:51

## 2020-02-11 RX ADMIN — LOSARTAN POTASSIUM 100 MILLIGRAM(S): 100 TABLET, FILM COATED ORAL at 05:51

## 2020-02-11 RX ADMIN — FAMOTIDINE 20 MILLIGRAM(S): 10 INJECTION INTRAVENOUS at 17:26

## 2020-02-11 RX ADMIN — NYSTATIN CREAM 1 APPLICATION(S): 100000 CREAM TOPICAL at 05:51

## 2020-02-11 RX ADMIN — AMLODIPINE BESYLATE 2.5 MILLIGRAM(S): 2.5 TABLET ORAL at 05:51

## 2020-02-11 RX ADMIN — Medication 1 MILLIGRAM(S): at 11:46

## 2020-02-11 RX ADMIN — ATORVASTATIN CALCIUM 80 MILLIGRAM(S): 80 TABLET, FILM COATED ORAL at 21:50

## 2020-02-11 RX ADMIN — SENNA PLUS 1 TABLET(S): 8.6 TABLET ORAL at 05:51

## 2020-02-11 RX ADMIN — Medication 81 MILLIGRAM(S): at 11:46

## 2020-02-11 RX ADMIN — Medication 100 MILLIGRAM(S): at 11:46

## 2020-02-11 RX ADMIN — HEPARIN SODIUM 5000 UNIT(S): 5000 INJECTION INTRAVENOUS; SUBCUTANEOUS at 17:26

## 2020-02-11 RX ADMIN — Medication 1 TABLET(S): at 11:46

## 2020-02-11 RX ADMIN — NYSTATIN CREAM 1 APPLICATION(S): 100000 CREAM TOPICAL at 17:24

## 2020-02-11 NOTE — PROGRESS NOTE ADULT - SUBJECTIVE AND OBJECTIVE BOX
Hospital Day:  30d    Subjective:    Patient is a 79y old Male who presents with a chief complaint of Cardiac arrest (11 Feb 2020 07:38)  This morning patient is lying in bed comfortably. He reports no new complaints.    Past Medical Hx:   Atrial fibrillation  Neuropathy  HLD (hyperlipidemia)  HTN (hypertension)    Past Sx:  History of cholecystectomy  History of cholecystectomy    Allergies:  No Known Allergies    Current Meds:   Standng Meds:  amLODIPine   Tablet 2.5 milliGRAM(s) Oral daily  aspirin  chewable 81 milliGRAM(s) Oral daily  atorvastatin 80 milliGRAM(s) Oral at bedtime  famotidine    Tablet 20 milliGRAM(s) Oral two times a day  heparin  Injectable 5000 Unit(s) SubCutaneous every 12 hours  losartan 100 milliGRAM(s) Oral daily  multivitamin 1 Tablet(s) Oral daily  nystatin Powder 1 Application(s) Topical two times a day  senna 1 Tablet(s) Oral two times a day  thiamine 100 milliGRAM(s) Oral daily    PRN Meds:  benzonatate 100 milliGRAM(s) Oral three times a day PRN Cough  bisacodyl 5 milliGRAM(s) Oral every 12 hours PRN Constipation    HOME MEDICATIONS:  carvedilol 12.5 mg oral tablet: 1 tab(s) orally 2 times a day  clonazePAM 1 mg oral tablet: 1 tab(s) orally 3 times a day  DULoxetine 30 mg oral delayed release capsule: 1 cap(s) orally once a day  Eliquis 5 mg oral tablet: 1 tab(s) orally 2 times a day  furosemide 40 mg oral tablet: 1 tab(s) orally once a day  gabapentin 300 mg oral capsule: 1 cap(s) orally 2 times a day  omeprazole 20 mg oral delayed release capsule: 1 cap(s) orally once a day  potassium chloride 10 mEq oral capsule, extended release: 1 cap(s) orally once a day  simvastatin 40 mg oral tablet: 1 tab(s) orally once a day (at bedtime)  telmisartan 80 mg oral tablet: 1 tab(s) orally once a day  Vitamin D3: cap(s) orally once a day      Vital Signs:   T(F): 97.9 (02-11-20 @ 05:38), Max: 99.1 (02-10-20 @ 20:48)  HR: 80 (02-11-20 @ 05:38) (80 - 91)  BP: 145/81 (02-11-20 @ 05:38) (139/67 - 154/86)  RR: 18 (02-11-20 @ 05:38) (18 - 18)  SpO2: 96% (02-11-20 @ 05:38) (90% - 96%)        Physical Exam:   GENERAL: NAD  HEENT: NCAT  CHEST/LUNG: CTAB  HEART: Regular rate and rhythm; s1 s2 appreciated, No murmurs, rubs, or gallops  ABDOMEN: Soft, Nontender, Nondistended; Bowel sounds present  EXTREMITIES: No LE edema b/l  NERVOUS SYSTEM:  Alert & Oriented X3        Labs:                         11.8   6.75  )-----------( 170      ( 11 Feb 2020 07:31 )             38.2     Neutophil% 77.3, Lymphocyte% 11.7, Monocyte% 7.9, Bands% 0.3 02-11-20 @ 07:31    11 Feb 2020 07:31    141    |  103    |  12     ----------------------------<  121    4.1     |  27     |  0.7      Ca    8.4        11 Feb 2020 07:31    TPro  5.8    /  Alb  2.7    /  TBili  0.9    /  DBili  x      /  AST  23     /  ALT  24     /  AlkPhos  117    11 Feb 2020 07:31          Hemoglobin A1C, Whole Blood: 5.7 % (01-21-20 @ 12:20)              Assessment and Plan:     Hospital course: Found to have on MR Cervical Spine: Hyperextension injury at C5/C6 with widening of theanterior intervertebral disc space and fracture of the C5 posterior spinous process.  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 Patient requires a hard collar at all times. Over last few weeks CTS has been trying to optimize patient for single vessel minimally invasive bypass. Clinical status has stabilized but physical status continues to deteriorated.  Disccussion focused around risk benefit ratio of surgery, PCI or medical tx with life vest and rehab for next few weeks. After conversation between Bernardo Manriquez and Simón consideration for PCI is being made. CTS will f/u as needed. discussed with Dr. herrera consideration for starting BB in patient with CAD. Dr. Irizarry stated he would f/u with patient today    # Cardiac arrest 2/2 PEA likely from arrythmia  - Dr Vazquez thinks patient requires CABG. He will give CT surgery one more day to decide; if they still do not want he will try to transfer patient to another hospital for CABG  - Cardiologist Dr. Laws - CAD, ostial LAD distal LM disorder  - bernardo Moore had a lengthy discussion with other interventional cardiologists and ct surgery: for surgery his lack of activity and mental status is the problem; for PCI the location, high chance of jailing RI and stent in LM and using DAPT plus DOAC is the main issue  - he is not suitable to be d/c home      # C5/C6 Intervertebral disk spacings and C5 fracture  - use collar  - Neurosurgery will come back to see patient  - sending pt to Rehab 4A    # A.fib- controlled  - was on Eliquis 100 twice daily  - holding because of hemorrhagic risk  - c/w amLODIPine Tablet 2.5 milliGRAM(s) Oral daily    # Anxiety  - On Clonazepam 1 q8 at home  - holding Clonazepam home dose as of risk of fall    # HTN  - c/w amLODIPine Tablet 2.5 milliGRAM(s) Oral daily  - Increased to losartan 100 daily  - Was on Carvedilol 12.5 twice daily + Telmisartan 80mg at bedtime at home    # HLD  - atorvastatin 80 milliGRAM(s) Oral at bedtime      # DVTppx: heparin sc  # Diet: Dysphagia 3  # CODE: FULL  # Dispo: Acute; possibly 4A Hospital Day:  30d    Subjective:    Patient is a 79y old Male who presents with a chief complaint of Cardiac arrest (11 Feb 2020 07:38)  This morning patient is lying in bed comfortably. He reports no new complaints.    Past Medical Hx:   Atrial fibrillation  Neuropathy  HLD (hyperlipidemia)  HTN (hypertension)    Past Sx:  History of cholecystectomy  History of cholecystectomy    Allergies:  No Known Allergies    Current Meds:   Standng Meds:  amLODIPine   Tablet 2.5 milliGRAM(s) Oral daily  aspirin  chewable 81 milliGRAM(s) Oral daily  atorvastatin 80 milliGRAM(s) Oral at bedtime  famotidine    Tablet 20 milliGRAM(s) Oral two times a day  heparin  Injectable 5000 Unit(s) SubCutaneous every 12 hours  losartan 100 milliGRAM(s) Oral daily  multivitamin 1 Tablet(s) Oral daily  nystatin Powder 1 Application(s) Topical two times a day  senna 1 Tablet(s) Oral two times a day  thiamine 100 milliGRAM(s) Oral daily    PRN Meds:  benzonatate 100 milliGRAM(s) Oral three times a day PRN Cough  bisacodyl 5 milliGRAM(s) Oral every 12 hours PRN Constipation    HOME MEDICATIONS:  carvedilol 12.5 mg oral tablet: 1 tab(s) orally 2 times a day  clonazePAM 1 mg oral tablet: 1 tab(s) orally 3 times a day  DULoxetine 30 mg oral delayed release capsule: 1 cap(s) orally once a day  Eliquis 5 mg oral tablet: 1 tab(s) orally 2 times a day  furosemide 40 mg oral tablet: 1 tab(s) orally once a day  gabapentin 300 mg oral capsule: 1 cap(s) orally 2 times a day  omeprazole 20 mg oral delayed release capsule: 1 cap(s) orally once a day  potassium chloride 10 mEq oral capsule, extended release: 1 cap(s) orally once a day  simvastatin 40 mg oral tablet: 1 tab(s) orally once a day (at bedtime)  telmisartan 80 mg oral tablet: 1 tab(s) orally once a day  Vitamin D3: cap(s) orally once a day      Vital Signs:   T(F): 97.9 (02-11-20 @ 05:38), Max: 99.1 (02-10-20 @ 20:48)  HR: 80 (02-11-20 @ 05:38) (80 - 91)  BP: 145/81 (02-11-20 @ 05:38) (139/67 - 154/86)  RR: 18 (02-11-20 @ 05:38) (18 - 18)  SpO2: 96% (02-11-20 @ 05:38) (90% - 96%)        Physical Exam:   GENERAL: NAD  HEENT: NCAT  CHEST/LUNG: CTAB  HEART: Regular rate and rhythm; s1 s2 appreciated, No murmurs, rubs, or gallops  ABDOMEN: Soft, Nontender, Nondistended; Bowel sounds present  EXTREMITIES: No LE edema b/l  NERVOUS SYSTEM:  Alert & Oriented X3        Labs:                         11.8   6.75  )-----------( 170      ( 11 Feb 2020 07:31 )             38.2     Neutophil% 77.3, Lymphocyte% 11.7, Monocyte% 7.9, Bands% 0.3 02-11-20 @ 07:31    11 Feb 2020 07:31    141    |  103    |  12     ----------------------------<  121    4.1     |  27     |  0.7      Ca    8.4        11 Feb 2020 07:31    TPro  5.8    /  Alb  2.7    /  TBili  0.9    /  DBili  x      /  AST  23     /  ALT  24     /  AlkPhos  117    11 Feb 2020 07:31          Hemoglobin A1C, Whole Blood: 5.7 % (01-21-20 @ 12:20)              Assessment and Plan:     Hospital course: Found to have on MR Cervical Spine: Hyperextension injury at C5/C6 with widening of theanterior intervertebral disc space and fracture of the C5 posterior spinous process.  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 Patient requires a hard collar at all times. Over last few weeks CTS has been trying to optimize patient for single vessel minimally invasive bypass. Clinical status has stabilized but physical status continues to deteriorated.  Disccussion focused around risk benefit ratio of surgery, PCI or medical tx with life vest and rehab for next few weeks. After conversation between Bernardo Manriquez and Simón consideration for PCI is being made. CTS will f/u as needed. discussed with Dr. herrera consideration for starting BB in patient with CAD. Dr. Irizarry stated he would f/u with patient today    # Cardiac arrest 2/2 PEA likely from arrythmia  - Dr Vazquez thinks patient requires CABG. He will give CT surgery one more day to decide; if they still do not want he will try to transfer patient to another hospital for CABG  - Cardiologist Dr. Laws - CAD, ostial LAD distal LM disorder  - bernardo Moore had a lengthy discussion with other interventional cardiologists and ct surgery: for surgery his lack of activity and mental status is the problem; for PCI the location, high chance of jailing RI and stent in LM and using DAPT plus DOAC is the main issue  - he is not suitable to be d/c home      # C5/C6 Intervertebral disk spacings and C5 fracture  - use collar  - Neurosurgery will come back to see patient    # A.fib- controlled  - was on Eliquis 100 twice daily  - holding because of hemorrhagic risk  - c/w amLODIPine Tablet 2.5 milliGRAM(s) Oral daily    # Anxiety  - On Clonazepam 1 q8 at home  - holding Clonazepam home dose as of risk of fall    # HTN  - c/w amLODIPine Tablet 2.5 milliGRAM(s) Oral daily  - Increased to losartan 100 daily  - Was on Carvedilol 12.5 twice daily + Telmisartan 80mg at bedtime at home    # HLD  - atorvastatin 80 milliGRAM(s) Oral at bedtime      # DVTppx: heparin sc  # Diet: Dysphagia 3  # CODE: FULL  # Dispo: Acute; possibly 4A

## 2020-02-11 NOTE — PROGRESS NOTE ADULT - SUBJECTIVE AND OBJECTIVE BOX
Patient was seen and examined. Spoke with RN. Chart reviewed.  No events overnight. In good spirits - very optimistic  Vital Signs Last 24 Hrs  T(F): 97.9 (11 Feb 2020 05:38), Max: 99.1 (10 Feb 2020 20:48)  HR: 80 (11 Feb 2020 05:38) (80 - 91)  BP: 145/81 (11 Feb 2020 05:38) (139/67 - 154/86)  SpO2: 96% (11 Feb 2020 05:38) (90% - 96%)  MEDICATIONS  (STANDING):  amLODIPine   Tablet 2.5 milliGRAM(s) Oral daily  aspirin  chewable 81 milliGRAM(s) Oral daily  atorvastatin 80 milliGRAM(s) Oral at bedtime  famotidine    Tablet 20 milliGRAM(s) Oral two times a day  heparin  Injectable 5000 Unit(s) SubCutaneous every 12 hours  losartan 100 milliGRAM(s) Oral daily  multivitamin 1 Tablet(s) Oral daily  nystatin Powder 1 Application(s) Topical two times a day  senna 1 Tablet(s) Oral two times a day  thiamine 100 milliGRAM(s) Oral daily    MEDICATIONS  (PRN):  bisacodyl 5 milliGRAM(s) Oral every 12 hours PRN Constipation    cardiac MRI noted        A/P:  79 year old M with hx of HTN, A.fib (Eliquis), CHF, HLD brought in by EMS post cardiac arrest and resuscitated; found to have severe CAD of LAD    conservative management as per CTS and cardio- no CABG or PCI is planned this admission    cardio to f/u today on results of cardiac MRI    lifevest as per cardio    PO abx for UTI based on sensitivities from 2/6    PT/rehab    OOB    DC planning to 4A with lifevest as per cardio    neck brace    outpt  f/u  DVT prophylaxis  Decubitus prevention- all measures as per RN protocol  Please call or text me with any questions or updates

## 2020-02-11 NOTE — CHART NOTE - NSCHARTNOTEFT_GEN_A_CORE
Limited reassessment    Meds and labs reviewed    Diet order: Dysphagia 3 soft, thin liquids; Ensure Enlive TID     Wt trending down likely 2/2 fluid/on diuretics    (2/2) +1 edema L/R feet  Skin assessment shows upper back abrasion    Pt and wife at bedside reports that pt is doing pretty well with meals. Consistently consumes at least 50%. Sometimes drinks ensure enlive, there are a lot of unopened bottles at bedside. If pt is eating well, likely does not need supplement. No chewing/swallowing difficulties with current diet order, pt reports tolerance. No nausea/abdominal pain with meals. No diarrhea/constipation- pt states that he has been having regular BMs. Can decrease frequency of oral nutrition supplement at this time. No further nutrition interventions. Pt remains not at risk, RD to f/u within the next 7 days.

## 2020-02-12 LAB — GLUCOSE BLDC GLUCOMTR-MCNC: 109 MG/DL — HIGH (ref 70–99)

## 2020-02-12 PROCEDURE — 99232 SBSQ HOSP IP/OBS MODERATE 35: CPT

## 2020-02-12 RX ADMIN — NYSTATIN CREAM 1 APPLICATION(S): 100000 CREAM TOPICAL at 05:16

## 2020-02-12 RX ADMIN — SENNA PLUS 1 TABLET(S): 8.6 TABLET ORAL at 17:23

## 2020-02-12 RX ADMIN — FAMOTIDINE 20 MILLIGRAM(S): 10 INJECTION INTRAVENOUS at 05:15

## 2020-02-12 RX ADMIN — ATORVASTATIN CALCIUM 80 MILLIGRAM(S): 80 TABLET, FILM COATED ORAL at 21:42

## 2020-02-12 RX ADMIN — Medication 81 MILLIGRAM(S): at 11:20

## 2020-02-12 RX ADMIN — NYSTATIN CREAM 1 APPLICATION(S): 100000 CREAM TOPICAL at 17:22

## 2020-02-12 RX ADMIN — Medication 100 MILLIGRAM(S): at 11:20

## 2020-02-12 RX ADMIN — FAMOTIDINE 20 MILLIGRAM(S): 10 INJECTION INTRAVENOUS at 17:23

## 2020-02-12 RX ADMIN — AMLODIPINE BESYLATE 2.5 MILLIGRAM(S): 2.5 TABLET ORAL at 05:15

## 2020-02-12 RX ADMIN — LOSARTAN POTASSIUM 100 MILLIGRAM(S): 100 TABLET, FILM COATED ORAL at 05:15

## 2020-02-12 RX ADMIN — HEPARIN SODIUM 5000 UNIT(S): 5000 INJECTION INTRAVENOUS; SUBCUTANEOUS at 17:22

## 2020-02-12 RX ADMIN — Medication 1 TABLET(S): at 11:20

## 2020-02-12 RX ADMIN — Medication 1 MILLIGRAM(S): at 11:19

## 2020-02-12 RX ADMIN — SENNA PLUS 1 TABLET(S): 8.6 TABLET ORAL at 05:15

## 2020-02-12 RX ADMIN — HEPARIN SODIUM 5000 UNIT(S): 5000 INJECTION INTRAVENOUS; SUBCUTANEOUS at 05:16

## 2020-02-12 NOTE — PROGRESS NOTE ADULT - SUBJECTIVE AND OBJECTIVE BOX
Hospital Day:  31d    Subjective:    Patient is a 79y old Male who presents with a chief complaint of Cardiac arrest (12 Feb 2020 08:38)  This morning patient is lying in bed comfortably. He reports no new complaints.    Past Medical Hx:   Atrial fibrillation  Neuropathy  HLD (hyperlipidemia)  HTN (hypertension)    Past Sx:  History of cholecystectomy  History of cholecystectomy    Allergies:  No Known Allergies    Current Meds:   Standng Meds:  amLODIPine   Tablet 2.5 milliGRAM(s) Oral daily  aspirin  chewable 81 milliGRAM(s) Oral daily  atorvastatin 80 milliGRAM(s) Oral at bedtime  clonazePAM  Tablet 1 milliGRAM(s) Oral daily  famotidine    Tablet 20 milliGRAM(s) Oral two times a day  heparin  Injectable 5000 Unit(s) SubCutaneous every 12 hours  losartan 100 milliGRAM(s) Oral daily  multivitamin 1 Tablet(s) Oral daily  nystatin Powder 1 Application(s) Topical two times a day  senna 1 Tablet(s) Oral two times a day  thiamine 100 milliGRAM(s) Oral daily    PRN Meds:  benzonatate 100 milliGRAM(s) Oral three times a day PRN Cough  bisacodyl 5 milliGRAM(s) Oral every 12 hours PRN Constipation    HOME MEDICATIONS:  carvedilol 12.5 mg oral tablet: 1 tab(s) orally 2 times a day  clonazePAM 1 mg oral tablet: 1 tab(s) orally once a day  DULoxetine 30 mg oral delayed release capsule: 1 cap(s) orally once a day  Eliquis 5 mg oral tablet: 1 tab(s) orally 2 times a day  furosemide 40 mg oral tablet: 1 tab(s) orally once a day  gabapentin 300 mg oral capsule: 1 cap(s) orally 2 times a day  omeprazole 20 mg oral delayed release capsule: 1 cap(s) orally once a day  potassium chloride 10 mEq oral capsule, extended release: 1 cap(s) orally once a day  simvastatin 40 mg oral tablet: 1 tab(s) orally once a day (at bedtime)  telmisartan 80 mg oral tablet: 1 tab(s) orally once a day  Vitamin D3: cap(s) orally once a day      Vital Signs:   T(F): 97.1 (02-12-20 @ 05:04), Max: 97.8 (02-11-20 @ 14:04)  HR: 83 (02-12-20 @ 05:04) (83 - 85)  BP: 127/59 (02-12-20 @ 05:04) (112/57 - 141/72)  RR: 18 (02-12-20 @ 05:04) (18 - 19)  SpO2: 94% (02-12-20 @ 05:04) (94% - 98%)      02-11-20 @ 07:01  -  02-12-20 @ 07:00  --------------------------------------------------------  IN: 0 mL / OUT: 3 mL / NET: -3 mL        Physical Exam:   GENERAL: NAD  HEENT: NCAT  CHEST/LUNG: CTAB  HEART: Regular rate and rhythm; s1 s2 appreciated, No murmurs, rubs, or gallops  ABDOMEN: Soft, Nontender, Nondistended; Bowel sounds present  EXTREMITIES: No LE edema b/l  NERVOUS SYSTEM:  Alert & Oriented X3        Labs:                         11.8   6.75  )-----------( 170      ( 11 Feb 2020 07:31 )             38.2       11 Feb 2020 07:31    141    |  103    |  12     ----------------------------<  121    4.1     |  27     |  0.7      Ca    8.4        11 Feb 2020 07:31    TPro  5.8    /  Alb  2.7    /  TBili  0.9    /  DBili  x      /  AST  23     /  ALT  24     /  AlkPhos  117    11 Feb 2020 07:31          Hemoglobin A1C, Whole Blood: 5.7 % (01-21-20 @ 12:20)                  Assessment and Plan:     Hospital course: Found to have on MR Cervical Spine: Hyperextension injury at C5/C6 with widening of theanterior intervertebral disc space and fracture of the C5 posterior spinous process.  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 Patient requires a hard collar at all times. Over last few weeks CTS has been trying to optimize patient for single vessel minimally invasive bypass. Clinical status has stabilized but physical status continues to deteriorated.  Disccussion focused around risk benefit ratio of surgery, PCI or medical tx with life vest and rehab for next few weeks. After conversation between Bernardo Manriquez and Simón consideration for PCI is being made. CTS will f/u as needed. discussed with Dr. herrera consideration for starting BB in patient with CAD. Dr. Irizarry stated he would f/u with patient today    # Cardiac arrest 2/2 PEA likely from arrythmia  - Dr Vazquez thinks patient requires CABG. He will give CT surgery one more day to decide; if they still do not want he will try to transfer patient to another hospital for CABG  - Cardiologist Dr. Laws - CAD, ostial LAD distal LM disorder  - bernardo Moore had a lengthy discussion with other interventional cardiologists and ct surgery: for surgery his lack of activity and mental status is the problem; for PCI the location, high chance of jailing RI and stent in LM and using DAPT plus DOAC is the main issue  - he is not suitable to be d/c home      # C5/C6 Intervertebral disk spacings and C5 fracture  - use collar  - Neurosurgery will come back to see patient    # A.fib- controlled  - was on Eliquis 100 twice daily  - holding because of hemorrhagic risk  - c/w amLODIPine Tablet 2.5 milliGRAM(s) Oral daily    # Anxiety  - On Clonazepam 1 q8 at home  - holding Clonazepam home dose as of risk of fall    # HTN  - c/w amLODIPine Tablet 2.5 milliGRAM(s) Oral daily  - Increased to losartan 100 daily  - Was on Carvedilol 12.5 twice daily + Telmisartan 80mg at bedtime at home    # HLD  - atorvastatin 80 milliGRAM(s) Oral at bedtime      # DVTppx: heparin sc  # Diet: Dysphagia 3  # CODE: FULL  # Dispo: Acute; possibly 4A Hospital Day:  31d    Subjective:    Patient is a 79y old Male who presents with a chief complaint of Cardiac arrest (12 Feb 2020 08:38)  This morning patient is lying in bed comfortably. He reports no new complaints.    Past Medical Hx:   Atrial fibrillation  Neuropathy  HLD (hyperlipidemia)  HTN (hypertension)    Past Sx:  History of cholecystectomy  History of cholecystectomy    Allergies:  No Known Allergies    Current Meds:   Standng Meds:  amLODIPine   Tablet 2.5 milliGRAM(s) Oral daily  aspirin  chewable 81 milliGRAM(s) Oral daily  atorvastatin 80 milliGRAM(s) Oral at bedtime  clonazePAM  Tablet 1 milliGRAM(s) Oral daily  famotidine    Tablet 20 milliGRAM(s) Oral two times a day  heparin  Injectable 5000 Unit(s) SubCutaneous every 12 hours  losartan 100 milliGRAM(s) Oral daily  multivitamin 1 Tablet(s) Oral daily  nystatin Powder 1 Application(s) Topical two times a day  senna 1 Tablet(s) Oral two times a day  thiamine 100 milliGRAM(s) Oral daily    PRN Meds:  benzonatate 100 milliGRAM(s) Oral three times a day PRN Cough  bisacodyl 5 milliGRAM(s) Oral every 12 hours PRN Constipation    HOME MEDICATIONS:  carvedilol 12.5 mg oral tablet: 1 tab(s) orally 2 times a day  clonazePAM 1 mg oral tablet: 1 tab(s) orally once a day  DULoxetine 30 mg oral delayed release capsule: 1 cap(s) orally once a day  Eliquis 5 mg oral tablet: 1 tab(s) orally 2 times a day  furosemide 40 mg oral tablet: 1 tab(s) orally once a day  gabapentin 300 mg oral capsule: 1 cap(s) orally 2 times a day  omeprazole 20 mg oral delayed release capsule: 1 cap(s) orally once a day  potassium chloride 10 mEq oral capsule, extended release: 1 cap(s) orally once a day  simvastatin 40 mg oral tablet: 1 tab(s) orally once a day (at bedtime)  telmisartan 80 mg oral tablet: 1 tab(s) orally once a day  Vitamin D3: cap(s) orally once a day      Vital Signs:   T(F): 97.1 (02-12-20 @ 05:04), Max: 97.8 (02-11-20 @ 14:04)  HR: 83 (02-12-20 @ 05:04) (83 - 85)  BP: 127/59 (02-12-20 @ 05:04) (112/57 - 141/72)  RR: 18 (02-12-20 @ 05:04) (18 - 19)  SpO2: 94% (02-12-20 @ 05:04) (94% - 98%)      02-11-20 @ 07:01  -  02-12-20 @ 07:00  --------------------------------------------------------  IN: 0 mL / OUT: 3 mL / NET: -3 mL        Physical Exam:   GENERAL: NAD  HEENT: NCAT  CHEST/LUNG: CTAB  HEART: Regular rate and rhythm; s1 s2 appreciated, No murmurs, rubs, or gallops  ABDOMEN: Soft, Nontender, Nondistended; Bowel sounds present  EXTREMITIES: No LE edema b/l  NERVOUS SYSTEM:  Alert & Oriented X3        Labs:                         11.8   6.75  )-----------( 170      ( 11 Feb 2020 07:31 )             38.2       11 Feb 2020 07:31    141    |  103    |  12     ----------------------------<  121    4.1     |  27     |  0.7      Ca    8.4        11 Feb 2020 07:31    TPro  5.8    /  Alb  2.7    /  TBili  0.9    /  DBili  x      /  AST  23     /  ALT  24     /  AlkPhos  117    11 Feb 2020 07:31          Hemoglobin A1C, Whole Blood: 5.7 % (01-21-20 @ 12:20)                  Assessment and Plan:     Hospital course: Found to have on MR Cervical Spine: Hyperextension injury at C5/C6 with widening of theanterior intervertebral disc space and fracture of the C5 posterior spinous process.  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 Patient requires a hard collar at all times. Over last few weeks CTS has been trying to optimize patient for single vessel minimally invasive bypass. Clinical status has stabilized but physical status continues to deteriorated.  Disccussion focused around risk benefit ratio of surgery, PCI or medical tx with life vest and rehab for next few weeks. After conversation between Bernardo Manriquez and Simón consideration for PCI is being made. CTS will f/u as needed. discussed with Dr. herrera consideration for starting BB in patient with CAD. Dr. Irizarry stated he would f/u with patient today    # Cardiac arrest 2/2 PEA likely from arrythmia  - Dr Vazquez thinks patient requires CABG. He will give CT surgery one more day to decide; if they still do not want he will try to transfer patient to another hospital for CABG  - Cardiologist Dr. Laws - CAD, ostial LAD distal LM disorder  - bernardo Moore had a lengthy discussion with other interventional cardiologists and ct surgery: for surgery his lack of activity and mental status is the problem; for PCI the location, high chance of jailing RI and stent in LM and using DAPT plus DOAC is the main issue  - he is not suitable to be d/c home      # C5/C6 Intervertebral disk spacings and C5 fracture  - use collar  - Neurosurgery recs: patient must wear collar at all times. In 6 weeks obtain cervical spine x-rays and follow up with Dr Joseph    # A.fib- controlled  - was on Eliquis 100 twice daily  - holding because of hemorrhagic risk  - c/w amLODIPine Tablet 2.5 milliGRAM(s) Oral daily    # Anxiety  - On Clonazepam 1 q8 at home  - holding Clonazepam home dose as of risk of fall    # HTN  - c/w amLODIPine Tablet 2.5 milliGRAM(s) Oral daily  - Increased to losartan 100 daily  - Was on Carvedilol 12.5 twice daily + Telmisartan 80mg at bedtime at home    # HLD  - atorvastatin 80 milliGRAM(s) Oral at bedtime      # DVTppx: heparin sc  # Diet: Dysphagia 3  # CODE: FULL  # Dispo: Acute; possibly 4A

## 2020-02-12 NOTE — CHART NOTE - NSCHARTNOTEFT_GEN_A_CORE
Patient's MRI reviewed by Dr Joseph.  Patient must wear hard cervical collar at all times.  Patient to get repeat Cervical spine xrays in 6 weeks.  Follow up in Dr Joseph's office as outpatient after Xrays complete to determine how long C-collar should continue.  If pt is still inpatient, please recall after xrays complete. Above discussed w attg.

## 2020-02-12 NOTE — CHART NOTE - NSCHARTNOTEFT_GEN_A_CORE
Asked to reconsider CABG for patient as PCI is high risk. Since we last saw patient he has becomes more inactive, debilitated and poor apetite. Also very depressed .though VARGAS to LAD would be technically easy , his ability to rehab at this time is very poor. I discussed this w patient and family. will have a family meeting w Dr Laws

## 2020-02-12 NOTE — PROGRESS NOTE ADULT - SUBJECTIVE AND OBJECTIVE BOX
Patient was seen and examined. Spoke with RN. Chart reviewed.  No events overnight. A bit more tired than usual today- RN aware  Vital Signs Last 24 Hrs  T(F): 97.1 (12 Feb 2020 05:04), Max: 97.8 (11 Feb 2020 14:04)  HR: 83 (12 Feb 2020 05:04) (83 - 85)  BP: 127/59 (12 Feb 2020 05:04) (112/57 - 141/72)  SpO2: 94% (12 Feb 2020 05:04) (94% - 98%)  MEDICATIONS  (STANDING):  amLODIPine   Tablet 2.5 milliGRAM(s) Oral daily  aspirin  chewable 81 milliGRAM(s) Oral daily  atorvastatin 80 milliGRAM(s) Oral at bedtime  clonazePAM  Tablet 1 milliGRAM(s) Oral daily  famotidine    Tablet 20 milliGRAM(s) Oral two times a day  heparin  Injectable 5000 Unit(s) SubCutaneous every 12 hours  losartan 100 milliGRAM(s) Oral daily  multivitamin 1 Tablet(s) Oral daily  nystatin Powder 1 Application(s) Topical two times a day  senna 1 Tablet(s) Oral two times a day  thiamine 100 milliGRAM(s) Oral daily    MEDICATIONS  (PRN):  benzonatate 100 milliGRAM(s) Oral three times a day PRN Cough  bisacodyl 5 milliGRAM(s) Oral every 12 hours PRN Constipation    Labs:                        11.8   6.75  )-----------( 170      ( 11 Feb 2020 07:31 )             38.2     11 Feb 2020 07:31    141    |  103    |  12     ----------------------------<  121    4.1     |  27     |  0.7      Ca    8.4        11 Feb 2020 07:31    TPro  5.8    /  Alb  2.7    /  TBili  0.9    /  DBili  x      /  AST  23     /  ALT  24     /  AlkPhos  117    11 Feb 2020 07:31          General: comfortable, NAD  Neurology:  nonfocal  Head:  Normocephalic, atraumatic  ENT:  Mucosa moist, no ulcerations  Neck:  brace in place  Skin: no breakdowns (as per RN)  Resp: CTA B/L  CV: RRR, S1S2,   GI: Soft, NT, bowel sounds        A/P:  79 year old M with hx of HTN, A.fib (Eliquis), CHF, HLD brought in by EMS post cardiac arrest and resuscitated; found to have severe CAD of LAD    conservative management as per CTS and cardio- no CABG or PCI is planned this admission    cardio to f/u today on results of cardiac MRI    lifevest as per cardio    PO abx for UTI based on sensitivities from 2/6    PT/rehab    OOB    DC planning to 4A with lifevest as per cardio    neck brace    outpt  f/u  DVT prophylaxis  Decubitus prevention- all measures as per RN protocol  Please call or text me with any questions or updates

## 2020-02-12 NOTE — PROGRESS NOTE ADULT - ASSESSMENT
cad, post cardiac arrest  post cpr fatigue, weakness, lack of mobility  chronic Afib    due to location of the stenosis and chronic Afib and necessity of using DOAC & DAPT and risk of bleeding with sub optimal benefit, i still strongly believe bypass is the best option  main issue is ambulating him doing an initial PT to help him to walk to take him to CABG properly  CT surgery needs to re assess the patient for the BYPASS, if they refuse i will try to transfer to other facilities if i can, then if all attempts are failed hen will try PCI stent by accepting the risk

## 2020-02-12 NOTE — PROGRESS NOTE ADULT - SUBJECTIVE AND OBJECTIVE BOX
Subjective:  i saw him yesterday and this early morning, no change in general condition, his mental status is stable, acceptable, able to communicate, and remembers his address, day and able to answer all the question, unfortunately has not been able to walk, he has not received any rehab yet,   he had no chest pain sob, palpitation, headache, dizziness, just fatigue and lack of energy  still he uses cervical collar      MEDICATIONS  (STANDING):  amLODIPine   Tablet 2.5 milliGRAM(s) Oral daily  aspirin  chewable 81 milliGRAM(s) Oral daily  atorvastatin 80 milliGRAM(s) Oral at bedtime  clonazePAM  Tablet 1 milliGRAM(s) Oral daily  famotidine    Tablet 20 milliGRAM(s) Oral two times a day  heparin  Injectable 5000 Unit(s) SubCutaneous every 12 hours  losartan 100 milliGRAM(s) Oral daily  multivitamin 1 Tablet(s) Oral daily  nystatin Powder 1 Application(s) Topical two times a day  senna 1 Tablet(s) Oral two times a day  thiamine 100 milliGRAM(s) Oral daily    MEDICATIONS  (PRN):  benzonatate 100 milliGRAM(s) Oral three times a day PRN Cough  bisacodyl 5 milliGRAM(s) Oral every 12 hours PRN Constipation            Vital Signs Last 24 Hrs  T(C): 36.2 (12 Feb 2020 05:04), Max: 36.6 (11 Feb 2020 14:04)  T(F): 97.1 (12 Feb 2020 05:04), Max: 97.8 (11 Feb 2020 14:04)  HR: 83 (12 Feb 2020 05:04) (83 - 85)  BP: 127/59 (12 Feb 2020 05:04) (112/57 - 141/72)  BP(mean): 76 (11 Feb 2020 14:04) (76 - 76)  RR: 18 (12 Feb 2020 05:04) (18 - 19)  SpO2: 94% (12 Feb 2020 05:04) (94% - 98%)               PHYSICAL EXAM:  · CONSTITUTIONAL: Looks stable,   . NECK: on collar  · RESPIRATORY: Normal air entry to lung base, no wheeze, no crackle, no wet rales  · CARDIOVASCULAR: Normal S1, A2, P2, no murmur,   · EXTREMITIES: No cyanosis, no clubbing, no edema  · VASCULAR: Pulses are irregular, equal, bilateral in upper and lower extremities  	  TELEMETRY: afib, rate controlled    ECG: < from: 12 Lead ECG (01.23.20 @ 08:06) >  Atrial fibrillation  Prolonged QT    < end of copied text >      TTE: < from: Transthoracic Echocardiogram (02.09.20 @ 09:09) >  Summary:   1. Left ventricular ejection fraction, by visual estimation, is 65 to 70%.   2. Mildly increased LV wall thickness.   3. Mild to moderate mitral valve regurgitation.   4. Moderate tricuspidregurgitation.   5. Mild aortic regurgitation.   6. Sclerotic aortic valve with decreased opening.   7. Estimated pulmonary artery systolic pressure is 41.1 mmHg assuming a right atrial pressure of 3 mmHg, which is consistent with mild pulmonary hypertension.    < end of copied text >      LABS:                        11.8   6.75  )-----------( 170      ( 11 Feb 2020 07:31 )             38.2     02-11    141  |  103  |  12  ----------------------------<  121<H>  4.1   |  27  |  0.7    Ca    8.4<L>      11 Feb 2020 07:31    TPro  5.8<L>  /  Alb  2.7<L>  /  TBili  0.9  /  DBili  x   /  AST  23  /  ALT  24  /  AlkPhos  117<H>  02-11            I&O's Summary    11 Feb 2020 07:01  -  12 Feb 2020 07:00  --------------------------------------------------------  IN: 0 mL / OUT: 3 mL / NET: -3 mL      BNP  RADIOLOGY & ADDITIONAL STUDIES:    IMPRESSION AND PLAN:

## 2020-02-12 NOTE — SWALLOW FEES ASSESSMENT ADULT - ROSENBEK'S PENETRATION ASPIRATION SCALE
(7) material passes glottis, visible subglottic residue remains despite patient’s response (aspiration) (1) no aspiration, material does not enter airway

## 2020-02-12 NOTE — SWALLOW FEES ASSESSMENT ADULT - ORAL PHASE
Uncontrolled bolus/spillover in hypopharynx/spillage overt the BOT into the laryngeal vestibule prior to the swallow WFL

## 2020-02-12 NOTE — SWALLOW FEES ASSESSMENT ADULT - PHARYNGEAL PHASE COMMENTS
Severe pharyngeal dysphagia for thin liquids Mild pharyngeal dysphagia for nectar, puree and soft solids w/o penetration or aspiration

## 2020-02-13 ENCOUNTER — TRANSCRIPTION ENCOUNTER (OUTPATIENT)
Age: 80
End: 2020-02-13

## 2020-02-13 PROCEDURE — 99232 SBSQ HOSP IP/OBS MODERATE 35: CPT

## 2020-02-13 RX ORDER — ASPIRIN/CALCIUM CARB/MAGNESIUM 324 MG
1 TABLET ORAL
Qty: 0 | Refills: 0 | DISCHARGE
Start: 2020-02-13

## 2020-02-13 RX ADMIN — ATORVASTATIN CALCIUM 80 MILLIGRAM(S): 80 TABLET, FILM COATED ORAL at 21:51

## 2020-02-13 RX ADMIN — LOSARTAN POTASSIUM 100 MILLIGRAM(S): 100 TABLET, FILM COATED ORAL at 05:30

## 2020-02-13 RX ADMIN — SENNA PLUS 1 TABLET(S): 8.6 TABLET ORAL at 17:01

## 2020-02-13 RX ADMIN — Medication 1 MILLIGRAM(S): at 11:30

## 2020-02-13 RX ADMIN — FAMOTIDINE 20 MILLIGRAM(S): 10 INJECTION INTRAVENOUS at 17:01

## 2020-02-13 RX ADMIN — Medication 1 TABLET(S): at 11:30

## 2020-02-13 RX ADMIN — HEPARIN SODIUM 5000 UNIT(S): 5000 INJECTION INTRAVENOUS; SUBCUTANEOUS at 17:01

## 2020-02-13 RX ADMIN — FAMOTIDINE 20 MILLIGRAM(S): 10 INJECTION INTRAVENOUS at 05:30

## 2020-02-13 RX ADMIN — Medication 100 MILLIGRAM(S): at 11:30

## 2020-02-13 RX ADMIN — AMLODIPINE BESYLATE 2.5 MILLIGRAM(S): 2.5 TABLET ORAL at 05:30

## 2020-02-13 RX ADMIN — Medication 100 MILLIGRAM(S): at 23:25

## 2020-02-13 RX ADMIN — NYSTATIN CREAM 1 APPLICATION(S): 100000 CREAM TOPICAL at 05:30

## 2020-02-13 RX ADMIN — NYSTATIN CREAM 1 APPLICATION(S): 100000 CREAM TOPICAL at 17:00

## 2020-02-13 RX ADMIN — HEPARIN SODIUM 5000 UNIT(S): 5000 INJECTION INTRAVENOUS; SUBCUTANEOUS at 05:30

## 2020-02-13 RX ADMIN — Medication 81 MILLIGRAM(S): at 11:30

## 2020-02-13 RX ADMIN — SENNA PLUS 1 TABLET(S): 8.6 TABLET ORAL at 05:30

## 2020-02-13 NOTE — DISCHARGE NOTE PROVIDER - NSDCFUSCHEDAPPT_GEN_ALL_CORE_FT
MED IVORY ; 03/17/2020 ; NPP Ctsurg [address] [facility] ; 04/29/2020 ; NPP Cardio [address] [facility]

## 2020-02-13 NOTE — DISCHARGE NOTE PROVIDER - CARE PROVIDER_API CALL
Radha Laws)  Cardiology; Internal Medicine; Nuclear Cardiology  [address] [place] [address]  Phone: [phone]  Fax: [phone]  Follow Up Time: Radha Laws)  Cardiology; Internal Medicine; Nuclear Cardiology  [address] [facility], [address]  [place] [address]  Phone: [phone]  Fax: [phone]  Follow Up Time:     Obey Gary)  Surgery; Vascular Surgery  [address] [facility]  [place] [address]  Phone: [phone]  Fax: [phone]  Follow Up Time:     Kenn Arreguin)  Surgery; Thoracic and Cardiac Surgery  [address] [place] [address], [address]  [place] [address]  Phone: [phone]  Fax: [phone]  Follow Up Time:     Delmi Joseph)  Neurological Surgery  [address] [facility], [address]  [place] [address]  Phone: [phone]  Fax: [phone]  Follow Up Time: 2 weeks Radha Laws)  Cardiology; Internal Medicine; Nuclear Cardiology  [address] [facility], [address]  [place] [address]  Phone: [phone]  Fax: [phone]  Follow Up Time:     Obey Gary)  Surgery; Vascular Surgery  [address] [facility], [facility] [address]  [place] [address]  Phone: [phone]  Fax: [phone]  Follow Up Time:     Kenn Arreguin)  Surgery; Thoracic and Cardiac Surgery  [address] [place] [address], [facility] [address]  [place] [address]  Phone: [phone]  Fax: [phone]  Scheduled Appointment: 03/17/2020 02:00 PM    Delmi Joseph)  Neurological Surgery  [address] [place] [address], [address]  [place] [address]  Phone: [phone]  Fax: [phone]  Follow Up Time: 2 weeks    Andreas Larry; LIANNA)  Cardiac Electrophysiology  [address] [place] [address] [address]  [place] [address]  Phone: [phone]  Fax: [phone]  Follow Up Time: 1 month

## 2020-02-13 NOTE — PROGRESS NOTE ADULT - SUBJECTIVE AND OBJECTIVE BOX
Coronary artery disease with unstable C-Spine                     79yMale  SURGEON(s): LEVI Levy  SUBJECTIVE ASSESSMENT: doing little better today, wife at bedside, seen by Dr. Shaw  Vital Signs Last 24 Hrs  T(F): 96 (13 Feb 2020 12:55), Max: 98.1 (12 Feb 2020 21:41)  HR: 95 (13 Feb 2020 12:55) (71 - 95)  BP: 99/54 (13 Feb 2020 12:55) (99/54 - 142/86)  RR: 19 (13 Feb 2020 12:55) (18 - 20)  SpO2: 97% (13 Feb 2020 05:38) (97% - 97%)  I&O's Detail    Net:   I&O's Detail    11 Feb 2020 07:01  -  12 Feb 2020 07:00  --------------------------------------------------------  Total NET: -3 mL       LABS:                        11.8<L>  6.75  )-----------( 170      ( 11 Feb 2020 07:31 )             38.2<L>    02-11    141  |  103  |  12  ----------------------------<  121<H>  4.1   |  27  |  0.7    Ca    8.4<L>      11 Feb 2020 07:31    TPro  5.8<L> [6.0 - 8.0]  /  Alb  2.7<L> [3.5 - 5.2]  /  TBili  0.9 [0.2 - 1.2]  /  DBili  x   /  AST  23 [0 - 41]  /  ALT  24 [0 - 41]  /  AlkPhos  117<H> [30 - 115]  02-11    < from: Transthoracic Echocardiogram (02.09.20 @ 09:09) >  Summary:   1. Left ventricular ejection fraction, by visual estimation, is 65 to 70%.   2. Mildly increased LV wall thickness.   3. Mild to moderate mitral valve regurgitation.   4. Moderate tricuspidregurgitation.   5. Mild aortic regurgitation.   6. Sclerotic aortic valve with decreased opening.   7. Estimated pulmonary artery systolic pressure is 41.1 mmHg assuming a right atrial pressure of 3 mmHg, which is consistent with mild pulmonary hypertension.        MEDICATIONS  (STANDING):  amLODIPine   Tablet 2.5 milliGRAM(s) Oral daily  aspirin  chewable 81 milliGRAM(s) Oral daily  atorvastatin 80 milliGRAM(s) Oral at bedtime  clonazePAM  Tablet 1 milliGRAM(s) Oral daily  famotidine    Tablet 20 milliGRAM(s) Oral two times a day  heparin  Injectable 5000 Unit(s) SubCutaneous every 12 hours  losartan 100 milliGRAM(s) Oral daily  multivitamin 1 Tablet(s) Oral daily  nystatin Powder 1 Application(s) Topical two times a day  senna 1 Tablet(s) Oral two times a day  thiamine 100 milliGRAM(s) Oral daily    MEDICATIONS  (PRN):  benzonatate 100 milliGRAM(s) Oral three times a day PRN Cough  bisacodyl 5 milliGRAM(s) Oral every 12 hours PRN Constipation    Allergies  No Known Allergies    Intolerances  Ambulation/Activity Status:    Assessment/Plan:  79y Male debilitated male with c-spine instability and coronary artery disease  Dr. Shaw feels patient post op risk for meaningful recovery is not good at this time, will discuss plans with Dr. Durand and Dr. Laws    Social Service Disposition:  to be determined by medicine

## 2020-02-13 NOTE — PROGRESS NOTE ADULT - ASSESSMENT
Cardiac arrest 2/2 PEA likely from arrythmia  C5/C6 Intervertebral disk spacings and C5 fracture  A.fib-  Anxiety  htn  dyslip    po eliquis  collar  cardio/ct surg fu for planAnxietycontinue current tx  id eval no abx prob colonization ecoli

## 2020-02-13 NOTE — PROGRESS NOTE ADULT - SUBJECTIVE AND OBJECTIVE BOX
Subjective:  general condition unchanged, waxing and waning of the M/S, occasionally confused, but most of the time able to communicate, has not been ambulating, not walking, did not get rehab, he was in CEU, in a small room with no window, had more sun downing, i asked to be moved to telemetry to ambulate him and re assess his condition  no chest pain, sob, palpitation, no arrhythmia other than chronic afib during this lengthy admission.    MEDICATIONS  (STANDING):  amLODIPine   Tablet 2.5 milliGRAM(s) Oral daily  aspirin  chewable 81 milliGRAM(s) Oral daily  atorvastatin 80 milliGRAM(s) Oral at bedtime  clonazePAM  Tablet 1 milliGRAM(s) Oral daily  famotidine    Tablet 20 milliGRAM(s) Oral two times a day  heparin  Injectable 5000 Unit(s) SubCutaneous every 12 hours  losartan 100 milliGRAM(s) Oral daily  multivitamin 1 Tablet(s) Oral daily  nystatin Powder 1 Application(s) Topical two times a day  senna 1 Tablet(s) Oral two times a day  thiamine 100 milliGRAM(s) Oral daily    MEDICATIONS  (PRN):  benzonatate 100 milliGRAM(s) Oral three times a day PRN Cough  bisacodyl 5 milliGRAM(s) Oral every 12 hours PRN Constipation            Vital Signs Last 24 Hrs  T(C): 36.1 (13 Feb 2020 20:55), Max: 36.7 (13 Feb 2020 05:38)  T(F): 96.9 (13 Feb 2020 20:55), Max: 98.1 (13 Feb 2020 05:38)  HR: 83 (13 Feb 2020 20:55) (74 - 95)  BP: 114/55 (13 Feb 2020 20:55) (99/54 - 130/68)  BP(mean): --  RR: 19 (13 Feb 2020 20:55) (18 - 20)  SpO2: 97% (13 Feb 2020 05:38) (97% - 97%)             REVIEW OF SYSTEMS:  CONSTITUTIONAL: no fever, no chills, no diaphoresis  CARDIOLOGY: no chest pain, no SOB, no palpitation, no diaphoresis, no faint   RESPIRATORY: no dyspnea, no wheeze, no orthopnea, no PND   NEUROLOGICAL: no headache, focal deficits to report.  GI: no abdominal pain, no dyspepsia, no nausea, no vomiting, no diarrhea.                 PHYSICAL EXAM:  · CONSTITUTIONAL: Looks stable, in o respiratory distress, in supine position  . NECK: still in cervical collar   · RESPIRATORY: Normal air entry to lung base, no wheeze, no crackle, no wet rales  · CARDIOVASCULAR:  S1, A2, P2, no gallop, no S3  · EXTREMITIES: No cyanosis, no clubbing, no edema  · VASCULAR: Pulses are irregular, equal  	  TELEMETRY: Afib    ECG: no new ECG    TTE: normal ef    LABS:                  I&O's Summary    13 Feb 2020 07:01  -  13 Feb 2020 22:44  --------------------------------------------------------  IN: 240 mL / OUT: 0 mL / NET: 240 mL      BNP  RADIOLOGY & ADDITIONAL STUDIES:     IMPRESSION AND PLAN:

## 2020-02-13 NOTE — DISCHARGE NOTE PROVIDER - CARE PROVIDERS DIRECT ADDRESSES
,DirectAddress_Unknown ,DirectAddress_Unknown,[email or web address] ,[email or web address],[email or web address]

## 2020-02-13 NOTE — DISCHARGE NOTE PROVIDER - HOSPITAL COURSE
80 yo M with hx of HTN, A.fib (Eliquis), CHF, HLD brought in by EMS post cardiac arrest. As per wife at bedside, patient at the basement watching movie and was doing fine. Few mins later, patient came up and told the wife that he wasn't feeling right and that he felt funny. Then he crashed. Wife called the EMS who arrived 5 mins later. Upon EMS arrival, patient was found to be in PEA then asystole, s/p resuscitation for ~ 10 mins and ROSC achieved after 1 round of compression and epi. While on the way to hospital, patient had another cardiac arrest on the ambulant s/p CPR and ROSC achieved few mins later. As per family, patient was doing welll and at baseline prior to the episode. As baseline patient is fully functional. Denied any recent infection, recent hospitalization, recent ED visit, fever, chills.         In ED, patient was found to bradycardic and hypotensive. s/p 1 dose of atropine and patient was started on low dose Levophed with improvement in HR and BP.        CCU Course:    Patient was intubated and shifted to CCU and in the morning of 01/13 patient was found to be alert and oriented x4 and was extubated. Patient was weaned off Levophed and started on fluids. Echo showed EF of 60-65% and patient is planned for a cath prior to being discharged. Patient was found to have a fracture of cervical spine on CT Chest and  MR was recommended but the patient could not tolerate it due to anxiety. Neurosurgery recommend no further workup and want the patient to wear [place] J collar for a couple of weeks. Lovenox is restarted after clearance from Neurosurgery and patient will undergo Cath on Monday.         Cath showed: Ostial LAD: There was a 90% stenosis. This is a likely culprit for the patient's clinical presentation. Proximal LAD: There was a tubular 80% stenosis.        Occlusion is a difficult place to stent, therefore Dr Wood wanted CABG. However CT surgery thought patient is not strong enough to recover from CABG. 80 yo M with hx of HTN, A.fib (Eliquis), CHF, HLD brought in by EMS post cardiac arrest. As per wife at bedside, patient at the basement watching movie and was doing fine. Few mins later, patient came up and told the wife that he wasn't feeling right and that he felt funny. Then he crashed. Wife called the EMS who arrived 5 mins later. Upon EMS arrival, patient was found to be in PEA then asystole, s/p resuscitation for ~ 10 mins and ROSC achieved after 1 round of compression and epi. While on the way to hospital, patient had another cardiac arrest on the ambulant s/p CPR and ROSC achieved few mins later. As per family, patient was doing welll and at baseline prior to the episode. As baseline patient is fully functional. Denied any recent infection, recent hospitalization, recent ED visit, fever, chills.         In ED, patient was found to bradycardic and hypotensive. s/p 1 dose of atropine and patient was started on low dose Levophed with improvement in HR and BP.        CCU Course:    Patient was intubated and shifted to CCU and in the morning of 01/13 patient was found to be alert and oriented x4 and was extubated. Patient was weaned off Levophed and started on fluids. Echo showed EF of 60-65% and patient is planned for a cath prior to being discharged. Patient was found to have a fracture of cervical spine  and swelling consistent with hematoma most likely 2/2 fx on CT Chest which was confirmed by MR. Campbell tear r/o clinically. Neurosurgery recommend no further workup and want the patient to wear [place] J collar for a couple of 3 months. subsequent cardiac catheterization via right radial revealed: Ostial LAD: There was a 90% stenosis. This is a likely culprit for the patient's clinical presentation. Proximal LAD: There was a tubular 80% stenosis.         Occlusion is a difficult place to stent, therefore Dr Wood wanted CABG. 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 He was eventually medically optimized and underwent MICS CABG on 2/24/20 ( hospital day 43). His post op Course was 78 yo M with hx of HTN, A.fib (Eliquis), CHF, HLD brought in by EMS post cardiac arrest. As per wife at bedside, patient at the basement watching movie and was doing fine. Few mins later, patient came up and told the wife that he wasn't feeling right and that he felt funny. Then he crashed. Wife called the EMS who arrived 5 mins later. Upon EMS arrival, patient was found to be in PEA then asystole, s/p resuscitation for ~ 10 mins and ROSC achieved after 1 round of compression and epi. While on the way to hospital, patient had another cardiac arrest on the ambulant s/p CPR and ROSC achieved few mins later. As per family, patient was doing welll and at baseline prior to the episode. As baseline patient is fully functional. Denied any recent infection, recent hospitalization, recent ED visit, fever, chills.     In ED, patient was found to bradycardic and hypotensive. s/p 1 dose of atropine and patient was started on low dose Levophed with improvement in HR and BP.    CCU Course:    Patient was intubated and shifted to CCU and in the morning of 01/13 patient was found to be alert and oriented x4 and was extubated. Patient was weaned off Levophed and started on fluids. Echo showed EF of 60-65% and patient is planned for a cath prior to being discharged. Patient was found to have a fracture of cervical spine  and swelling consistent with hematoma most likely 2/2 fx on CT Chest which was confirmed by MR. Campbell tear r/o clinically. Neurosurgery recommend no further workup and want the patient to wear [place] J collar for a couple of 3 months. subsequent cardiac catheterization via right radial revealed: Ostial LAD: There was a 90% stenosis. This is a likely culprit for the patient's clinical presentation. Proximal LAD: There was a tubular 80% stenosis.     Occlusion is a difficult place to stent, therefore Dr Wood wanted CABG. 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 He was eventually medically optimized and underwent MICS CABG on 2/24/20 ( hospital day 43). Post-operatively, patient was anemic secondary to acute surgical blood loss, and was transfused appropriately.  Patient developed a left chest wall hematoma, in which he returned to the OR for VATS and drainage. Pt also had urinary retention and patrick catheter was placed. He is to follow up with Dr. Cuadra as outpatient. He also had post-op bradycardia, in which a PPM was inserted. Pt has chronic a fib/ a flutter, however pt is high risk for anticoagulation being he had chest wall hematoma and he is high risk for fall as well.  Patient was discharged to [facility] in stable condition on POD#17 with instructions to follow up with Dr. Arreguin on 3/17/2020 @ 2:00pm.

## 2020-02-13 NOTE — PROGRESS NOTE ADULT - SUBJECTIVE AND OBJECTIVE BOX
Hospital Day:  32d    Subjective:    Patient is a 79y old Male who presents with a chief complaint of Cardiac arrest (12 Feb 2020 09:31)  This morning patient is lying in bed comfortably. He reports no new complaints.    Past Medical Hx:   Atrial fibrillation  Neuropathy  HLD (hyperlipidemia)  HTN (hypertension)    Past Sx:  History of cholecystectomy  History of cholecystectomy    Allergies:  No Known Allergies    Current Meds:   Standng Meds:  amLODIPine   Tablet 2.5 milliGRAM(s) Oral daily  aspirin  chewable 81 milliGRAM(s) Oral daily  atorvastatin 80 milliGRAM(s) Oral at bedtime  clonazePAM  Tablet 1 milliGRAM(s) Oral daily  famotidine    Tablet 20 milliGRAM(s) Oral two times a day  heparin  Injectable 5000 Unit(s) SubCutaneous every 12 hours  losartan 100 milliGRAM(s) Oral daily  multivitamin 1 Tablet(s) Oral daily  nystatin Powder 1 Application(s) Topical two times a day  senna 1 Tablet(s) Oral two times a day  thiamine 100 milliGRAM(s) Oral daily    PRN Meds:  benzonatate 100 milliGRAM(s) Oral three times a day PRN Cough  bisacodyl 5 milliGRAM(s) Oral every 12 hours PRN Constipation    HOME MEDICATIONS:  carvedilol 12.5 mg oral tablet: 1 tab(s) orally 2 times a day  clonazePAM 1 mg oral tablet: 1 tab(s) orally once a day  DULoxetine 30 mg oral delayed release capsule: 1 cap(s) orally once a day  Eliquis 5 mg oral tablet: 1 tab(s) orally 2 times a day  furosemide 40 mg oral tablet: 1 tab(s) orally once a day  gabapentin 300 mg oral capsule: 1 cap(s) orally 2 times a day  omeprazole 20 mg oral delayed release capsule: 1 cap(s) orally once a day  potassium chloride 10 mEq oral capsule, extended release: 1 cap(s) orally once a day  simvastatin 40 mg oral tablet: 1 tab(s) orally once a day (at bedtime)  telmisartan 80 mg oral tablet: 1 tab(s) orally once a day  Vitamin D3: cap(s) orally once a day      Vital Signs:   T(F): 98.1 (02-13-20 @ 05:38), Max: 98.1 (02-12-20 @ 21:41)  HR: 74 (02-13-20 @ 05:38) (71 - 96)  BP: 115/60 (02-13-20 @ 05:38) (100/56 - 142/86)  RR: 20 (02-13-20 @ 05:38) (18 - 20)  SpO2: 97% (02-13-20 @ 05:38) (91% - 97%)        Physical Exam:   GENERAL: NAD  HEENT: NCAT  CHEST/LUNG: CTAB  HEART: Regular rate and rhythm; s1 s2 appreciated, No murmurs, rubs, or gallops  ABDOMEN: Soft, Nontender, Nondistended; Bowel sounds present  EXTREMITIES: No LE edema b/l  NERVOUS SYSTEM:  Alert & Oriented X3        Labs:                   Hemoglobin A1C, Whole Blood: 5.7 % (01-21-20 @ 12:20)                Assessment and Plan:     Hospital course: Found to have on MR Cervical Spine: Hyperextension injury at C5/C6 with widening of theanterior intervertebral disc space and fracture of the C5 posterior spinous process.  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 Patient requires a hard collar at all times. Over last few weeks CTS has been trying to optimize patient for single vessel minimally invasive bypass. Clinical status has stabilized but physical status continues to deteriorated.  Disccussion focused around risk benefit ratio of surgery, PCI or medical tx with life vest and rehab for next few weeks. After conversation between Bernardo Manriquez and Simón consideration for PCI is being made. CTS will f/u as needed. discussed with Dr. herrera consideration for starting BB in patient with CAD. Dr. Irizarry stated he would f/u with patient today    # Cardiac arrest 2/2 PEA likely from arrythmia  - Dr Vazquez thinks patient requires CABG. He will give CT surgery one more day to decide; if they still do not want he will try to transfer patient to another hospital for CABG  - Cardiologist Dr. Laws - CAD, ostial LAD distal LM disorder  - bernardo Moore had a lengthy discussion with other interventional cardiologists and ct surgery: for surgery his lack of activity and mental status is the problem; for PCI the location, high chance of jailing RI and stent in LM and using DAPT plus DOAC is the main issue  - he is not suitable to be d/c home    - Dr Laws and CT surgeon will arrange family meeting to discuss plan of care    # C5/C6 Intervertebral disk spacings and C5 fracture  - use collar  - Neurosurgery recs: patient must wear collar at all times. In 6 weeks obtain cervical spine x-rays and follow up with Dr Joseph    # MADDY.fib- controlled  - was on Eliquis 100 twice daily  - holding because of hemorrhagic risk  - c/w amLODIPine Tablet 2.5 milliGRAM(s) Oral daily    # Anxiety  - On Clonazepam 1 q8 at home  - holding Clonazepam home dose as of risk of fall    # HTN  - c/w amLODIPine Tablet 2.5 milliGRAM(s) Oral daily  - Increased to losartan 100 daily  - Was on Carvedilol 12.5 twice daily + Telmisartan 80mg at bedtime at home    # HLD  - atorvastatin 80 milliGRAM(s) Oral at bedtime      # DVTppx: heparin sc  # Diet: Dysphagia 3  # CODE: FULL  # Dispo: Acute; possibly 4A Hospital Day:  32d    Subjective:    Patient is a 79y old Male who presents with a chief complaint of Cardiac arrest (12 Feb 2020 09:31)  This morning patient is lying in bed comfortably. He reports no new complaints.    Past Medical Hx:   Atrial fibrillation  Neuropathy  HLD (hyperlipidemia)  HTN (hypertension)    Past Sx:  History of cholecystectomy  History of cholecystectomy    Allergies:  No Known Allergies    Current Meds:   Standng Meds:  amLODIPine   Tablet 2.5 milliGRAM(s) Oral daily  aspirin  chewable 81 milliGRAM(s) Oral daily  atorvastatin 80 milliGRAM(s) Oral at bedtime  clonazePAM  Tablet 1 milliGRAM(s) Oral daily  famotidine    Tablet 20 milliGRAM(s) Oral two times a day  heparin  Injectable 5000 Unit(s) SubCutaneous every 12 hours  losartan 100 milliGRAM(s) Oral daily  multivitamin 1 Tablet(s) Oral daily  nystatin Powder 1 Application(s) Topical two times a day  senna 1 Tablet(s) Oral two times a day  thiamine 100 milliGRAM(s) Oral daily    PRN Meds:  benzonatate 100 milliGRAM(s) Oral three times a day PRN Cough  bisacodyl 5 milliGRAM(s) Oral every 12 hours PRN Constipation    HOME MEDICATIONS:  carvedilol 12.5 mg oral tablet: 1 tab(s) orally 2 times a day  clonazePAM 1 mg oral tablet: 1 tab(s) orally once a day  DULoxetine 30 mg oral delayed release capsule: 1 cap(s) orally once a day  Eliquis 5 mg oral tablet: 1 tab(s) orally 2 times a day  furosemide 40 mg oral tablet: 1 tab(s) orally once a day  gabapentin 300 mg oral capsule: 1 cap(s) orally 2 times a day  omeprazole 20 mg oral delayed release capsule: 1 cap(s) orally once a day  potassium chloride 10 mEq oral capsule, extended release: 1 cap(s) orally once a day  simvastatin 40 mg oral tablet: 1 tab(s) orally once a day (at bedtime)  telmisartan 80 mg oral tablet: 1 tab(s) orally once a day  Vitamin D3: cap(s) orally once a day      Vital Signs:   T(F): 98.1 (02-13-20 @ 05:38), Max: 98.1 (02-12-20 @ 21:41)  HR: 74 (02-13-20 @ 05:38) (71 - 96)  BP: 115/60 (02-13-20 @ 05:38) (100/56 - 142/86)  RR: 20 (02-13-20 @ 05:38) (18 - 20)  SpO2: 97% (02-13-20 @ 05:38) (91% - 97%)        Physical Exam:   GENERAL: NAD  HEENT: NCAT  CHEST/LUNG: CTAB  HEART: Regular rate and rhythm; s1 s2 appreciated, No murmurs, rubs, or gallops  ABDOMEN: Soft, Nontender, Nondistended; Bowel sounds present  EXTREMITIES: No LE edema b/l  NERVOUS SYSTEM:  Alert & Oriented X3        Labs:                   Hemoglobin A1C, Whole Blood: 5.7 % (01-21-20 @ 12:20)                Assessment and Plan:     Hospital course: Found to have on MR Cervical Spine: Hyperextension injury at C5/C6 with widening of theanterior intervertebral disc space and fracture of the C5 posterior spinous process.  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 Patient requires a hard collar at all times. Over last few weeks CTS has been trying to optimize patient for single vessel minimally invasive bypass. Clinical status has stabilized but physical status continues to deteriorated.  Disccussion focused around risk benefit ratio of surgery, PCI or medical tx with life vest and rehab for next few weeks. After conversation between Bernardo Manriquez and Simón consideration for PCI is being made. CTS will f/u as needed. discussed with Dr. herrera consideration for starting BB in patient with CAD. Dr. Irizarry stated he would f/u with patient today    # Cardiac arrest 2/2 PEA likely from arrythmia  - Dr Vazquez thinks patient requires CABG. He will give CT surgery one more day to decide; if they still do not want he will try to transfer patient to another hospital for CABG  - Cardiologist Dr. Laws - CAD, ostial LAD distal LM disorder  - bernardo Moore had a lengthy discussion with other interventional cardiologists and ct surgery: for surgery his lack of activity and mental status is the problem; for PCI the location, high chance of jailing RI and stent in LM and using DAPT plus DOAC is the main issue  - he is not suitable to be d/c home    - Dr Laws and CT surgeon will arrange family meeting to discuss plan of care    # C5/C6 Intervertebral disk spacings and C5 fracture  - use collar  - Neurosurgery recs: patient must wear collar at all times. In 6 weeks obtain cervical spine x-rays and follow up with Dr Joseph    # MADDY.fib- controlled  - was on Eliquis 100 twice daily  - holding because of hemorrhagic risk  - c/w amLODIPine Tablet 2.5 milliGRAM(s) Oral daily    # Anxiety  - On Clonazepam 1 q8 at home  - holding Clonazepam home dose as of risk of fall    # HTN  - c/w amLODIPine Tablet 2.5 milliGRAM(s) Oral daily  - Increased to losartan 100 daily  - Was on Carvedilol 12.5 twice daily + Telmisartan 80mg at bedtime at home    # HLD  - atorvastatin 80 milliGRAM(s) Oral at bedtime    # Bladder colonization  - Urine positive for gram positive cocci and e Coli  - Patient asymptomatic and will not treat      # DVTppx: heparin sc  # Diet: Dysphagia 3  # CODE: FULL  # Dispo: Acute; possibly 4A

## 2020-02-13 NOTE — DISCHARGE NOTE PROVIDER - NSDCMRMEDTOKEN_GEN_ALL_CORE_FT
aspirin 81 mg oral tablet, chewable: 1 tab(s) orally once a day  carvedilol 12.5 mg oral tablet: 1 tab(s) orally 2 times a day  clonazePAM 1 mg oral tablet: 1 tab(s) orally once a day  DULoxetine 30 mg oral delayed release capsule: 1 cap(s) orally once a day  Eliquis 5 mg oral tablet: 1 tab(s) orally 2 times a day  furosemide 40 mg oral tablet: 1 tab(s) orally once a day  gabapentin 300 mg oral capsule: 1 cap(s) orally 2 times a day  omeprazole 20 mg oral delayed release capsule: 1 cap(s) orally once a day  potassium chloride 10 mEq oral capsule, extended release: 1 cap(s) orally once a day  simvastatin 40 mg oral tablet: 1 tab(s) orally once a day (at bedtime)  telmisartan 80 mg oral tablet: 1 tab(s) orally once a day  Vitamin D3: cap(s) orally once a day aspirin 325 mg oral delayed release tablet: 1 tab(s) orally once a day  atorvastatin 80 mg oral tablet: 1 tab(s) orally once a day (at bedtime)  bacitracin 500 units/g topical ointment: 1 application topically 3 times a day  bacitracin 500 units/g topical ointment: 1 application topically every 8 hours  cyanocobalamin 1000 mcg oral tablet: 1 tab(s) orally once a day  famotidine 20 mg oral tablet: 1 tab(s) orally 2 times a day  heparin: 5000 unit(s) subcutaneous 2 times a day  ipratropium 500 mcg/2.5 mL inhalation solution: 2.5 milliliter(s) inhaled every 6 hours  metoprolol tartrate 25 mg oral tablet: 1 tab(s) orally 2 times a day  oxyCODONE 5 mg oral tablet: 1 tab(s) orally every 4 hours, As needed, Mild Pain (1 - 3)  polyethylene glycol 3350 oral powder for reconstitution: 17 gram(s) orally once a day  sertraline 50 mg oral tablet: 1 tab(s) orally once a day  tamsulosin 0.4 mg oral capsule: 1 cap(s) orally once a day (at bedtime)  thiamine 100 mg oral tablet: 1 tab(s) orally once a day  Vitamin D3: cap(s) orally once a day

## 2020-02-13 NOTE — DISCHARGE NOTE PROVIDER - NSDCCPTREATMENT_GEN_ALL_CORE_FT
PRINCIPAL PROCEDURE  Procedure: CABG, minimally invasive, using LIMA graft, without cardiopulmonary bypass  Findings and Treatment:       SECONDARY PROCEDURE  Procedure: Repair of pseudoaneurysm of radial artery  Findings and Treatment: right side PRINCIPAL PROCEDURE  Procedure: CABG, minimally invasive, using LIMA graft, without cardiopulmonary bypass  Findings and Treatment:       SECONDARY PROCEDURE  Procedure: Drainage. hemothorax, using VATS  Findings and Treatment:     Procedure: Repair of pseudoaneurysm of radial artery  Findings and Treatment: right side

## 2020-02-13 NOTE — DISCHARGE NOTE PROVIDER - NSDCCPCAREPLAN_GEN_ALL_CORE_FT
PRINCIPAL DISCHARGE DIAGNOSIS  Diagnosis: Cardiac arrest  Assessment and Plan of Treatment: Coronary artery disease - cardiac catheterization discharge  Cardiac catheterization involves passing a thin flexible tube (catheter) into the right or left side of the heart. The catheter is most often inserted from the groin or the arm.   When You're in the Hospital  A catheter was inserted into an artery in your groin or arm. Then it was carefully guided up to your heart. Then contrast dye was injected to see any areas in your coronary arteries that were blocked or narrowed.  If you had a blockage, you may have had angioplasty and a stent placed in your heart during the procedure.  What to Expect at Home  You may have bruising and pain in your groin or arm where the catheter was placed.   Walking short distances on a flat surface is OK. Limit going up and downstairs to around twice a day for the first 2 to 3 days.  DO NOT do any heavy pushing, sexual activity for 2 to 5 days. .  You should be able to return to work in 2 to 3 days if you DO NOT do heavy work.  DO NOT take a bath or swim for the first week. You may take showers, protect incision site  Your provider will tell you how often to change your dressing.  If your incision bleeds, lie down and put pressure on it for 30 minutes.  Many people take medicine to thin the blood to prevent a blood clot that can lead to a heart attack. Take the medicines exactly as your provider tells you. DO NOT stop taking them without talking to your provider.  Call your provider if:  There is bleeding at the catheter insertion site that does not stop when you apply pressure.  Your arm or leg below where the catheter was inserted changes color, is cool to the touch, or is numb.  The small incision for your catheter becomes red or painful, or yellow or green discharge is draining from   You have chest pain or shortness of breath that does not go away with rest.  Your pulse feels irregular -- it is very slow  or very fast.  You have dizziness, fainting, fatigue, coughing blood  You have chills or a fever over 101°F (38.3°C). PRINCIPAL DISCHARGE DIAGNOSIS  Diagnosis: Cardiac arrest  Assessment and Plan of Treatment:

## 2020-02-13 NOTE — PROGRESS NOTE ADULT - ASSESSMENT
post cardiac arrest, CPR affected his mental status, however most of the time communicating well  CAD, Ostial LAD distal LM, needs revascularization  Afib, rate controlled, on DOAC    yesterday I had a lengthy talk with Dr Shaw, asked him to see him and re assess his condition for the CABG, in his point patient mental status was unstable, he has not been ambulating, doing PCI stent is not favorable, already has been discussed several times, presented to cath conference, still he benefits from the bypass  he was not qualified for the life vest, regardless of sudden cardiac arrest could not send him to rehab unprotected    decided to transfer to telemetry to give a chance for activity, ambulation in cardiac floor rehab temporarily then decide on the CABG or if possible PCI stent of ostial LAD to LM and watchman?!

## 2020-02-13 NOTE — DISCHARGE NOTE PROVIDER - NSDCFUADDAPPT_GEN_ALL_CORE_FT
follow up with Dr. Arreguin on March     at follow up with Dr. Arreguin on March 17, 2020    at  02:00pm

## 2020-02-13 NOTE — DISCHARGE NOTE PROVIDER - NSDCACTIVITY_GEN_ALL_CORE
Showering allowed/Do not make important decisions/Stairs allowed/Walking - Indoors allowed/Do not drive or operate machinery/No heavy lifting/straining/Walking - Outdoors allowed

## 2020-02-13 NOTE — DISCHARGE NOTE PROVIDER - PROVIDER TOKENS
PROVIDER:[TOKEN:[[id number]]] PROVIDER:[TOKEN:[[id number]]],PROVIDER:[TOKEN:[[id number]]],PROVIDER:[TOKEN:[[id number]]],PROVIDER:[TOKEN:[[id number]],FOLLOWUP:[2 weeks]] PROVIDER:[TOKEN:[[id number]]],PROVIDER:[TOKEN:[[id number]]],PROVIDER:[TOKEN:[[id number]],SCHEDULEDAPPT:[03/17/2020],SCHEDULEDAPPTTIME:[02:00 PM]],PROVIDER:[TOKEN:[[id number]],FOLLOWUP:[2 weeks]],PROVIDER:[TOKEN:[[id number]],FOLLOWUP:[1 month]]

## 2020-02-13 NOTE — PROGRESS NOTE ADULT - SUBJECTIVE AND OBJECTIVE BOX
Patient was seen and examined. Spoke with RN. Chart reviewed.  d/w ho  id eval ecoli prob colonization in ucx    No events overnight.  Vital Signs Last 24 Hrs  T(F): 96 (13 Feb 2020 12:55), Max: 98.1 (12 Feb 2020 21:41)  HR: 95 (13 Feb 2020 12:55) (71 - 95)  BP: 99/54 (13 Feb 2020 12:55) (99/54 - 142/86)  SpO2: 97% (13 Feb 2020 05:38) (91% - 97%)  MEDICATIONS  (STANDING):  amLODIPine   Tablet 2.5 milliGRAM(s) Oral daily  aspirin  chewable 81 milliGRAM(s) Oral daily  atorvastatin 80 milliGRAM(s) Oral at bedtime  clonazePAM  Tablet 1 milliGRAM(s) Oral daily  famotidine    Tablet 20 milliGRAM(s) Oral two times a day  heparin  Injectable 5000 Unit(s) SubCutaneous every 12 hours  losartan 100 milliGRAM(s) Oral daily  multivitamin 1 Tablet(s) Oral daily  nystatin Powder 1 Application(s) Topical two times a day  senna 1 Tablet(s) Oral two times a day  thiamine 100 milliGRAM(s) Oral daily    MEDICATIONS  (PRN):  benzonatate 100 milliGRAM(s) Oral three times a day PRN Cough  bisacodyl 5 milliGRAM(s) Oral every 12 hours PRN Constipation    Labs:                  Radiology:    General: comfortable, NAD  Neurology: A&Ox3, nonfocal  Head:  Normocephalic, atraumatic  ENT:  Mucosa moist, no ulcerations  Neck:  Supple, no JVD,   Skin: no breakdowns (as per RN)  Resp: CTA B/L  CV: RRR, S1S2,   GI: Soft, NT, bowel sounds  MS: No edema, + peripheral pulses, FROM all 4 extremity

## 2020-02-14 RX ORDER — ENOXAPARIN SODIUM 100 MG/ML
100 INJECTION SUBCUTANEOUS EVERY 12 HOURS
Refills: 0 | Status: DISCONTINUED | OUTPATIENT
Start: 2020-02-14 | End: 2020-02-23

## 2020-02-14 RX ADMIN — ENOXAPARIN SODIUM 100 MILLIGRAM(S): 100 INJECTION SUBCUTANEOUS at 17:32

## 2020-02-14 RX ADMIN — FAMOTIDINE 20 MILLIGRAM(S): 10 INJECTION INTRAVENOUS at 17:32

## 2020-02-14 RX ADMIN — SENNA PLUS 1 TABLET(S): 8.6 TABLET ORAL at 05:46

## 2020-02-14 RX ADMIN — ATORVASTATIN CALCIUM 80 MILLIGRAM(S): 80 TABLET, FILM COATED ORAL at 21:43

## 2020-02-14 RX ADMIN — HEPARIN SODIUM 5000 UNIT(S): 5000 INJECTION INTRAVENOUS; SUBCUTANEOUS at 05:46

## 2020-02-14 RX ADMIN — AMLODIPINE BESYLATE 2.5 MILLIGRAM(S): 2.5 TABLET ORAL at 05:46

## 2020-02-14 RX ADMIN — FAMOTIDINE 20 MILLIGRAM(S): 10 INJECTION INTRAVENOUS at 05:46

## 2020-02-14 RX ADMIN — SENNA PLUS 1 TABLET(S): 8.6 TABLET ORAL at 17:32

## 2020-02-14 RX ADMIN — Medication 1 TABLET(S): at 11:35

## 2020-02-14 RX ADMIN — Medication 81 MILLIGRAM(S): at 11:35

## 2020-02-14 RX ADMIN — Medication 100 MILLIGRAM(S): at 05:47

## 2020-02-14 RX ADMIN — LOSARTAN POTASSIUM 100 MILLIGRAM(S): 100 TABLET, FILM COATED ORAL at 05:46

## 2020-02-14 RX ADMIN — Medication 1 MILLIGRAM(S): at 11:35

## 2020-02-14 RX ADMIN — NYSTATIN CREAM 1 APPLICATION(S): 100000 CREAM TOPICAL at 17:32

## 2020-02-14 RX ADMIN — NYSTATIN CREAM 1 APPLICATION(S): 100000 CREAM TOPICAL at 05:46

## 2020-02-14 RX ADMIN — Medication 100 MILLIGRAM(S): at 11:35

## 2020-02-14 NOTE — PROGRESS NOTE ADULT - SUBJECTIVE AND OBJECTIVE BOX
Patient was seen and examined. Spoke with RN. Chart reviewed.  No events overnight.  Vital Signs Last 24 Hrs  T(F): 97 (14 Feb 2020 06:12), Max: 97 (14 Feb 2020 06:12)  HR: 84 (14 Feb 2020 06:12) (83 - 95)  BP: 147/77 (14 Feb 2020 06:12) (99/54 - 147/77)  SpO2: 98% (14 Feb 2020 06:39) (98% - 98%)  MEDICATIONS  (STANDING):  amLODIPine   Tablet 2.5 milliGRAM(s) Oral daily  aspirin  chewable 81 milliGRAM(s) Oral daily  atorvastatin 80 milliGRAM(s) Oral at bedtime  clonazePAM  Tablet 1 milliGRAM(s) Oral daily  famotidine    Tablet 20 milliGRAM(s) Oral two times a day  heparin  Injectable 5000 Unit(s) SubCutaneous every 12 hours  losartan 100 milliGRAM(s) Oral daily  multivitamin 1 Tablet(s) Oral daily  nystatin Powder 1 Application(s) Topical two times a day  senna 1 Tablet(s) Oral two times a day  thiamine 100 milliGRAM(s) Oral daily    MEDICATIONS  (PRN):  benzonatate 100 milliGRAM(s) Oral three times a day PRN Cough  bisacodyl 5 milliGRAM(s) Oral every 12 hours PRN Constipation    Labs:                  General: comfortable, NAD  Neurology:  nonfocal  Head:  Normocephalic, atraumatic  ENT:  Mucosa moist, no ulceration, c collar   Neck:  brace in place  Skin: no breakdowns (as per RN)  Resp: CTA B/L  CV: RRR, S1S2,   GI: Soft, NT, bowel sounds        A/P:  79 year old M with hx of HTN, A.fib (Eliquis), CHF, HLD brought in by EMS post cardiac arrest and resuscitated; found to have severe CAD of LAD    transferred to tele  no qualified for life vest, can not be d/c to rehab as per cardio   PT/Rehab   will need aggressive PT inpatient   plan as per cardio/CT  repeat UA/Ucx   monitor off abx   DVT prophylaxis  Decubitus prevention- all measures as per RN protocol  Please call or text me with any questions or updates

## 2020-02-14 NOTE — PROGRESS NOTE ADULT - ASSESSMENT
Hospital course: Found to have on MR Cervical Spine: Hyperextension injury at C5/C6 with widening of theanterior intervertebral disc space and fracture of the C5 posterior spinous process.  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 Patient requires a hard collar at all times. Over last few weeks CTS has been trying to optimize patient for single vessel minimally invasive bypass. Clinical status has stabilized but physical status continues to deteriorated.  Disccussion focused around risk benefit ratio of surgery, PCI or medical tx with life vest and rehab for next few weeks. After conversation between Bernardo Manriquez and Simón consideration for PCI is being made. CTS will f/u as needed. discussed with Dr. herrera consideration for starting BB in patient with CAD. Dr. Irizarry stated he would f/u with patient today    # Cardiac arrest 2/2 PEA likely from arrythmia secondary to MI/NSTEMI  - Dr Vazquez thinks patient requires CABG. He will give CT surgery one more day to decide; if they still do not want he will try to transfer patient to another hospital for CABG  - Cardiologist Dr. Laws - CAD, ostial LAD distal LM disorder  - bernardo Moore had a lengthy discussion with other interventional cardiologists and ct surgery: for surgery his lack of activity and mental status is the problem; for PCI the location, high chance of jailing RI and stent in LM and using DAPT plus DOAC is the main issue  - he is not suitable to be d/c home    - Dr Laws and CT surgeon will arrange family meeting to discuss plan of care    # C5/C6 Intervertebral disk spacings and C5 fracture  - use collar  - Neurosurgery recs: patient must wear collar at all times. In 6 weeks obtain cervical spine x-rays and follow up with Dr Joseph    # A.fib - low rate with occasional 3 second pauses at night of 2/13  - No rate control medication  - Currently not on therapeutic anticoagulation     # Anxiety  - On Clonazepam 1 q8 at home  - holding Clonazepam home dose as of risk of fall    # HTN  - c/w amLODIPine Tablet 2.5 milliGRAM(s) Oral daily  - Increased to losartan 100 daily  - Was on Carvedilol 12.5 twice daily + Telmisartan 80mg at bedtime at home    # HLD  - atorvastatin 80 milliGRAM(s) Oral at bedtime    # Bladder colonization  - Urine positive for gram positive cocci and e Coli  - Patient asymptomatic and will not treat      # DVTppx: heparin sc  # Diet: Dysphagia 3  # CODE: FULL  # Dispo: Acute; possibly 4A Hospital course: Found to have on MR Cervical Spine: Hyperextension injury at C5/C6 with widening of theanterior intervertebral disc space and fracture of the C5 posterior spinous process.  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 Patient requires a hard collar at all times. Over last few weeks CTS has been trying to optimize patient for single vessel minimally invasive bypass. Clinical status has stabilized but physical status continues to deteriorated.  Disccussion focused around risk benefit ratio of surgery, PCI or medical tx with life vest and rehab for next few weeks. After conversation between Bernardo Manriquez and Simón consideration for PCI is being made. CTS will f/u as needed. discussed with Dr. herrera consideration for starting BB in patient with CAD. Dr. Irizarry stated he would f/u with patient today    # Cardiac arrest 2/2 PEA likely from arrythmia secondary to MI/NSTEMI  - Dr Vazquez thinks patient requires CABG. He will give CT surgery one more day to decide; if they still do not want he will try to transfer patient to another hospital for CABG  - Cardiologist Dr. Laws - CAD, ostial LAD distal LM disorder  - bernardo Moore had a lengthy discussion with other interventional cardiologists and ct surgery: for surgery his lack of activity and mental status is the problem; for PCI the location, high chance of jailing RI and stent in LM and using DAPT plus DOAC is the main issue  - he is not suitable to be d/c home    - Dr Laws and CT surgeon will arrange family meeting to discuss plan of care    # C5/C6 Intervertebral disk spacings and C5 fracture  - use collar  - Neurosurgery recs: patient must wear collar at all times. In 6 weeks obtain cervical spine x-rays and follow up with Dr Joseph    # A.fib - low rate with occasional 3 second pauses at night of 2/13  - No rate control medication - patient bradycardic baseline  - Currently not on therapeutic anticoagulation pending planning for possible CT-surgery    # Anxiety  - On Clonazepam 1 q8 at home  - holding Clonazepam home dose as of risk of fall    # HTN  - c/w amLODIPine Tablet 2.5 milliGRAM(s) Oral daily  - Increased to losartan 100 daily  - Was on Carvedilol 12.5 twice daily + Telmisartan 80mg at bedtime at home    # HLD  - atorvastatin 80 milliGRAM(s) Oral at bedtime    # Bladder colonization  - Urine positive for gram positive cocci and e Coli  - Patient asymptomatic and will not treat      # DVTppx: heparin sc  # Diet: Dysphagia 3  # CODE: FULL  # Dispo: Acute; possibly 4A Hospital course: Found to have on MR Cervical Spine: Hyperextension injury at C5/C6 with widening of theanterior intervertebral disc space and fracture of the C5 posterior spinous process.  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 Patient requires a hard collar at all times. Over last few weeks CTS has been trying to optimize patient for single vessel minimally invasive bypass. Clinical status has stabilized but physical status continues to deteriorated.  Disccussion focused around risk benefit ratio of surgery, PCI or medical tx with life vest and rehab for next few weeks. After conversation between Bernardo Manriquez and Simón consideration for PCI is being made. CTS will f/u as needed. discussed with Dr. herrera consideration for starting BB in patient with CAD. Dr. Irizarry stated he would f/u with patient today    # Cardiac arrest 2/2 PEA likely from arrythmia secondary to MI/NSTEMI  - Dr Vazquez thinks patient requires CABG. He will give CT surgery one more day to decide; if they still do not want he will try to transfer patient to another hospital for CABG  - Cardiologist Dr. Laws - CAD, ostial LAD distal LM disorder  - bernardo Moore had a lengthy discussion with other interventional cardiologists and ct surgery: for surgery his lack of activity and mental status is the problem; for PCI the location, high chance of jailing RI and stent in LM and using DAPT plus DOAC is the main issue  - he is not suitable to be d/c home    - Dr Laws and CT surgeon will arrange family meeting to discuss plan of care    # C5/C6 Intervertebral disk spacings and C5 fracture  - use collar  - Neurosurgery recs: patient must wear collar at all times. In 6 weeks obtain cervical spine x-rays and follow up with Dr Joseph    # A.fib - low rate with occasional 3 second pauses at night of 2/13  - No rate control medication - patient bradycardic baseline  - Anticoagulation > Lovenox 100mg bid    # Anxiety  - On Clonazepam 1 q8 at home  - holding Clonazepam home dose as of risk of fall    # HTN  - c/w amLODIPine Tablet 2.5 milliGRAM(s) Oral daily  - Increased to losartan 100 daily  - Was on Carvedilol 12.5 twice daily + Telmisartan 80mg at bedtime at home    # HLD  - atorvastatin 80 milliGRAM(s) Oral at bedtime    # Bladder colonization  - Urine positive for gram positive cocci and e Coli  - Patient asymptomatic and will not treat      # DVTppx: heparin sc  # Diet: Dysphagia 3  # CODE: FULL  # Dispo: Acute; possibly 4A

## 2020-02-14 NOTE — PROGRESS NOTE ADULT - SUBJECTIVE AND OBJECTIVE BOX
Subjective:  The patient is awake, oriented and responds to verbal commands. He complains that the cervical collar is annoying him but understands the importance of keeping it on.   No pain.    Objective:      amLODIPine   Tablet 2.5 milliGRAM(s) Oral daily  aspirin  chewable 81 milliGRAM(s) Oral daily  atorvastatin 80 milliGRAM(s) Oral at bedtime  benzonatate 100 milliGRAM(s) Oral three times a day PRN  bisacodyl 5 milliGRAM(s) Oral every 12 hours PRN  clonazePAM  Tablet 1 milliGRAM(s) Oral daily  famotidine    Tablet 20 milliGRAM(s) Oral two times a day  heparin  Injectable 5000 Unit(s) SubCutaneous every 12 hours  losartan 100 milliGRAM(s) Oral daily  multivitamin 1 Tablet(s) Oral daily  nystatin Powder 1 Application(s) Topical two times a day  senna 1 Tablet(s) Oral two times a day  thiamine 100 milliGRAM(s) Oral daily      PHYSICAL EXAM:  General: A/ox 3, No acute Distress  Neck: Supple, NO JVD  Cardiac: S1 S2 heard regular, No audible murmurs  Pulmonary: Good bilateral breath sounds, Breathing unlabored, No Rhonchi/Rales/Wheezing  Abdomen: Soft, Non -tender, +BS   Extremities: No Rashes, No edema  Neuro: A/o x 3, No focal deficits  Psch: normal mood , normal affect    T(C): 36.1 (02-14-20 @ 06:12), Max: 36.1 (02-13-20 @ 17:55)  HR: 84 (02-14-20 @ 06:12) (83 - 95)  BP: 147/77 (02-14-20 @ 06:12) (99/54 - 147/77)  RR: 19 (02-14-20 @ 06:12) (18 - 19)  SpO2: 98% (02-14-20 @ 06:39) (98% - 98%)    LABS:

## 2020-02-15 LAB
ANION GAP SERPL CALC-SCNC: 8 MMOL/L — SIGNIFICANT CHANGE UP (ref 7–14)
BUN SERPL-MCNC: 12 MG/DL — SIGNIFICANT CHANGE UP (ref 10–20)
CALCIUM SERPL-MCNC: 8.5 MG/DL — SIGNIFICANT CHANGE UP (ref 8.5–10.1)
CHLORIDE SERPL-SCNC: 101 MMOL/L — SIGNIFICANT CHANGE UP (ref 98–110)
CO2 SERPL-SCNC: 34 MMOL/L — HIGH (ref 17–32)
CREAT SERPL-MCNC: 0.7 MG/DL — SIGNIFICANT CHANGE UP (ref 0.7–1.5)
GLUCOSE SERPL-MCNC: 119 MG/DL — HIGH (ref 70–99)
MAGNESIUM SERPL-MCNC: 1.7 MG/DL — LOW (ref 1.8–2.4)
POTASSIUM SERPL-MCNC: 4.3 MMOL/L — SIGNIFICANT CHANGE UP (ref 3.5–5)
POTASSIUM SERPL-SCNC: 4.3 MMOL/L — SIGNIFICANT CHANGE UP (ref 3.5–5)
SODIUM SERPL-SCNC: 143 MMOL/L — SIGNIFICANT CHANGE UP (ref 135–146)

## 2020-02-15 RX ORDER — MAGNESIUM SULFATE 500 MG/ML
2 VIAL (ML) INJECTION
Refills: 0 | Status: COMPLETED | OUTPATIENT
Start: 2020-02-15 | End: 2020-02-15

## 2020-02-15 RX ADMIN — ENOXAPARIN SODIUM 100 MILLIGRAM(S): 100 INJECTION SUBCUTANEOUS at 17:25

## 2020-02-15 RX ADMIN — Medication 100 MILLIGRAM(S): at 05:27

## 2020-02-15 RX ADMIN — FAMOTIDINE 20 MILLIGRAM(S): 10 INJECTION INTRAVENOUS at 17:24

## 2020-02-15 RX ADMIN — AMLODIPINE BESYLATE 2.5 MILLIGRAM(S): 2.5 TABLET ORAL at 05:24

## 2020-02-15 RX ADMIN — SENNA PLUS 1 TABLET(S): 8.6 TABLET ORAL at 05:24

## 2020-02-15 RX ADMIN — ENOXAPARIN SODIUM 100 MILLIGRAM(S): 100 INJECTION SUBCUTANEOUS at 05:24

## 2020-02-15 RX ADMIN — SENNA PLUS 1 TABLET(S): 8.6 TABLET ORAL at 17:24

## 2020-02-15 RX ADMIN — LOSARTAN POTASSIUM 100 MILLIGRAM(S): 100 TABLET, FILM COATED ORAL at 05:24

## 2020-02-15 RX ADMIN — Medication 100 MILLIGRAM(S): at 11:34

## 2020-02-15 RX ADMIN — Medication 50 GRAM(S): at 11:31

## 2020-02-15 RX ADMIN — Medication 81 MILLIGRAM(S): at 11:34

## 2020-02-15 RX ADMIN — Medication 1 TABLET(S): at 11:34

## 2020-02-15 RX ADMIN — NYSTATIN CREAM 1 APPLICATION(S): 100000 CREAM TOPICAL at 05:25

## 2020-02-15 RX ADMIN — FAMOTIDINE 20 MILLIGRAM(S): 10 INJECTION INTRAVENOUS at 05:24

## 2020-02-15 RX ADMIN — Medication 50 GRAM(S): at 08:54

## 2020-02-15 RX ADMIN — NYSTATIN CREAM 1 APPLICATION(S): 100000 CREAM TOPICAL at 17:25

## 2020-02-15 RX ADMIN — ATORVASTATIN CALCIUM 80 MILLIGRAM(S): 80 TABLET, FILM COATED ORAL at 22:35

## 2020-02-15 NOTE — PROGRESS NOTE ADULT - ASSESSMENT
cad, post cardiac arrest, chronic Afib no ventricular arrhythmia, normal systolic function  some mental status changes, fluctuating mentality, deconditioning  we are stuck in a close circuit of lack of physical improvement, and requiring revascularization    will give him few days, will try to ambulate him with assistance and will re assess to decide on further move and choice of revascularization, he is high risk for CABG, higher risk for PCI stent and not able to d/c him due to prior sudden cardiac arrest  I had a long talk with the patient and his family specially his wife

## 2020-02-15 NOTE — PROGRESS NOTE ADULT - SUBJECTIVE AND OBJECTIVE BOX
Patient was seen and examined. Spoke with RN. Chart reviewed.  No events overnight.  Vital Signs Last 24 Hrs  T(F): 97 (15 Feb 2020 04:46), Max: 97.1 (14 Feb 2020 14:04)  HR: 92 (15 Feb 2020 04:46) (82 - 92)  BP: 134/87 (15 Feb 2020 04:46) (134/87 - 145/74)  SpO2: 96% (15 Feb 2020 08:32) (96% - 96%)  MEDICATIONS  (STANDING):  amLODIPine   Tablet 2.5 milliGRAM(s) Oral daily  aspirin  chewable 81 milliGRAM(s) Oral daily  atorvastatin 80 milliGRAM(s) Oral at bedtime  clonazePAM  Tablet 1 milliGRAM(s) Oral daily  enoxaparin Injectable 100 milliGRAM(s) SubCutaneous every 12 hours  famotidine    Tablet 20 milliGRAM(s) Oral two times a day  losartan 100 milliGRAM(s) Oral daily  magnesium sulfate  IVPB 2 Gram(s) IV Intermittent every 2 hours  multivitamin 1 Tablet(s) Oral daily  nystatin Powder 1 Application(s) Topical two times a day  senna 1 Tablet(s) Oral two times a day  thiamine 100 milliGRAM(s) Oral daily    MEDICATIONS  (PRN):  benzonatate 100 milliGRAM(s) Oral three times a day PRN Cough  bisacodyl 5 milliGRAM(s) Oral every 12 hours PRN Constipation    Labs:    15 Feb 2020 04:30    143    |  101    |  12     ----------------------------<  119    4.3     |  34     |  0.7      Ca    8.5        15 Feb 2020 04:30  Mg     1.7       15 Feb 2020 04:30            General: comfortable, NAD  Neurology:  nonfocal  Head:  Normocephalic, atraumatic  ENT:  Mucosa moist, no ulceration, c collar   Neck:  brace in place  Skin: no breakdowns (as per RN)  Resp: CTA B/L  CV: RRR, S1S2,   GI: Soft, NT, bowel sounds        A/P:  79 year old M with hx of HTN, A.fib (Eliquis), CHF, HLD brought in by EMS post cardiac arrest and resuscitated; found to have severe CAD of LAD    tele  can not be d/c to rehab as per cardio   PT/Rehab   plan as per cardio/CT to decide  possible transfer if no intervention?   cardio/CT to follow up today  DVT prophylaxis  Decubitus prevention- all measures as per RN protocol  Please call or text me with any questions or updates

## 2020-02-15 NOTE — PROGRESS NOTE ADULT - ASSESSMENT
Hospital course: Found to have on MR Cervical Spine: Hyperextension injury at C5/C6 with widening of theanterior intervertebral disc space and fracture of the C5 posterior spinous process.  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 Patient requires a hard collar at all times. Over last few weeks CTS has been trying to optimize patient for single vessel minimally invasive bypass. Clinical status has stabilized but physical status continues to deteriorated.  Disccussion focused around risk benefit ratio of surgery, PCI or medical tx with life vest and rehab for next few weeks. After conversation between Bernardo Manriquez and Simón consideration for PCI is being made. CTS will f/u as needed. discussed with Dr. herrera consideration for starting BB in patient with CAD. Dr. Irizarry stated he would f/u with patient today    # Cardiac arrest 2/2 PEA likely from arrythmia secondary to MI/NSTEMI  - Dr Vazquez thinks patient requires CABG. He will give CT surgery one more day to decide; if they still do not want he will try to transfer patient to another hospital for CABG  - Cardiologist Dr. Laws - CAD, ostial LAD distal LM disorder  - bernardo Moore had a lengthy discussion with other interventional cardiologists and ct surgery: for surgery his lack of activity and mental status is the problem; for PCI the location, high chance of jailing RI and stent in LM and using DAPT plus DOAC is the main issue  - he is not suitable to be d/c home    - Dr Laws and CT surgeon will arrange family meeting to discuss plan of care - still pending as of 2/15    # C5/C6 Intervertebral disk spacings and C5 fracture  - use collar  - Neurosurgery recs: patient must wear collar at all times. In 6 weeks obtain cervical spine x-rays and follow up with Dr Joseph    # A.fib - low rate with occasional 3 second pauses at night of 2/13  - No rate control medication - patient bradycardic baseline  - Anticoagulation > Lovenox 100mg bid    # Anxiety  - On Clonazepam 1 q8 at home  - holding Clonazepam home dose as of risk of fall    # HTN  - c/w amLODIPine Tablet 2.5 milliGRAM(s) Oral daily  - Increased to losartan 100 daily  - Was on Carvedilol 12.5 twice daily + Telmisartan 80mg at bedtime at home    # HLD  - atorvastatin 80 milliGRAM(s) Oral at bedtime    # Bladder colonization  - Urine positive for gram positive cocci and e Coli  - Patient asymptomatic and will not treat      # DVTppx: heparin sc  # Diet: Dysphagia 3  # CODE: FULL  # Dispo: Acute; possibly 4A

## 2020-02-15 NOTE — PROGRESS NOTE ADULT - SUBJECTIVE AND OBJECTIVE BOX
Subjective:  The patient is awake, oriented and responds to verbal commands. No pain.    Objective:      amLODIPine   Tablet 2.5 milliGRAM(s) Oral daily  aspirin  chewable 81 milliGRAM(s) Oral daily  atorvastatin 80 milliGRAM(s) Oral at bedtime  benzonatate 100 milliGRAM(s) Oral three times a day PRN  bisacodyl 5 milliGRAM(s) Oral every 12 hours PRN  clonazePAM  Tablet 1 milliGRAM(s) Oral daily  enoxaparin Injectable 100 milliGRAM(s) SubCutaneous every 12 hours  famotidine    Tablet 20 milliGRAM(s) Oral two times a day  losartan 100 milliGRAM(s) Oral daily  magnesium sulfate  IVPB 2 Gram(s) IV Intermittent every 2 hours  multivitamin 1 Tablet(s) Oral daily  nystatin Powder 1 Application(s) Topical two times a day  senna 1 Tablet(s) Oral two times a day  thiamine 100 milliGRAM(s) Oral daily      PHYSICAL EXAM:  General: A/ox 3, No acute Distress  Neck: Supple, NO JVD  Cardiac: S1 S2 heard regular, No audible murmurs  Pulmonary: Good bilateral breath sounds, Breathing unlabored, No Rhonchi/Rales/Wheezing  Abdomen: Soft, Non -tender, +BS   Extremities: No Rashes, No edema  Neuro: A/o x 3, No focal deficits  Psch: normal mood , normal affect    T(C): 36.1 (02-15-20 @ 04:46), Max: 36.2 (02-14-20 @ 14:04)  HR: 92 (02-15-20 @ 04:46) (82 - 92)  BP: 134/87 (02-15-20 @ 04:46) (134/87 - 145/74)  RR: 18 (02-15-20 @ 04:46) (18 - 18)  SpO2: 96% (02-14-20 @ 23:16) (96% - 96%)    LABS:    02-15    143  |  101  |  12  ----------------------------<  119<H>  4.3   |  34<H>  |  0.7    Ca    8.5      15 Feb 2020 04:30  Mg     1.7     02-15

## 2020-02-16 LAB
ANION GAP SERPL CALC-SCNC: 9 MMOL/L — SIGNIFICANT CHANGE UP (ref 7–14)
BASOPHILS # BLD AUTO: 0.02 K/UL — SIGNIFICANT CHANGE UP (ref 0–0.2)
BASOPHILS NFR BLD AUTO: 0.3 % — SIGNIFICANT CHANGE UP (ref 0–1)
BUN SERPL-MCNC: 11 MG/DL — SIGNIFICANT CHANGE UP (ref 10–20)
CALCIUM SERPL-MCNC: 8.3 MG/DL — LOW (ref 8.5–10.1)
CHLORIDE SERPL-SCNC: 99 MMOL/L — SIGNIFICANT CHANGE UP (ref 98–110)
CO2 SERPL-SCNC: 33 MMOL/L — HIGH (ref 17–32)
CREAT SERPL-MCNC: 0.7 MG/DL — SIGNIFICANT CHANGE UP (ref 0.7–1.5)
EOSINOPHIL # BLD AUTO: 0.18 K/UL — SIGNIFICANT CHANGE UP (ref 0–0.7)
EOSINOPHIL NFR BLD AUTO: 2.9 % — SIGNIFICANT CHANGE UP (ref 0–8)
GLUCOSE SERPL-MCNC: 106 MG/DL — HIGH (ref 70–99)
HCT VFR BLD CALC: 37.7 % — LOW (ref 42–52)
HGB BLD-MCNC: 11.1 G/DL — LOW (ref 14–18)
IMM GRANULOCYTES NFR BLD AUTO: 0.3 % — SIGNIFICANT CHANGE UP (ref 0.1–0.3)
LYMPHOCYTES # BLD AUTO: 1.03 K/UL — LOW (ref 1.2–3.4)
LYMPHOCYTES # BLD AUTO: 16.5 % — LOW (ref 20.5–51.1)
MAGNESIUM SERPL-MCNC: 1.9 MG/DL — SIGNIFICANT CHANGE UP (ref 1.8–2.4)
MCHC RBC-ENTMCNC: 29.4 G/DL — LOW (ref 32–37)
MCHC RBC-ENTMCNC: 29.4 PG — SIGNIFICANT CHANGE UP (ref 27–31)
MCV RBC AUTO: 100 FL — HIGH (ref 80–94)
MONOCYTES # BLD AUTO: 0.47 K/UL — SIGNIFICANT CHANGE UP (ref 0.1–0.6)
MONOCYTES NFR BLD AUTO: 7.5 % — SIGNIFICANT CHANGE UP (ref 1.7–9.3)
NEUTROPHILS # BLD AUTO: 4.51 K/UL — SIGNIFICANT CHANGE UP (ref 1.4–6.5)
NEUTROPHILS NFR BLD AUTO: 72.5 % — SIGNIFICANT CHANGE UP (ref 42.2–75.2)
NRBC # BLD: 0 /100 WBCS — SIGNIFICANT CHANGE UP (ref 0–0)
PLATELET # BLD AUTO: 176 K/UL — SIGNIFICANT CHANGE UP (ref 130–400)
POTASSIUM SERPL-MCNC: 4 MMOL/L — SIGNIFICANT CHANGE UP (ref 3.5–5)
POTASSIUM SERPL-SCNC: 4 MMOL/L — SIGNIFICANT CHANGE UP (ref 3.5–5)
RBC # BLD: 3.77 M/UL — LOW (ref 4.7–6.1)
RBC # FLD: 14.7 % — HIGH (ref 11.5–14.5)
SODIUM SERPL-SCNC: 141 MMOL/L — SIGNIFICANT CHANGE UP (ref 135–146)
WBC # BLD: 6.23 K/UL — SIGNIFICANT CHANGE UP (ref 4.8–10.8)
WBC # FLD AUTO: 6.23 K/UL — SIGNIFICANT CHANGE UP (ref 4.8–10.8)

## 2020-02-16 RX ADMIN — Medication 100 MILLIGRAM(S): at 12:08

## 2020-02-16 RX ADMIN — Medication 100 MILLIGRAM(S): at 09:51

## 2020-02-16 RX ADMIN — SENNA PLUS 1 TABLET(S): 8.6 TABLET ORAL at 07:01

## 2020-02-16 RX ADMIN — NYSTATIN CREAM 1 APPLICATION(S): 100000 CREAM TOPICAL at 17:16

## 2020-02-16 RX ADMIN — ENOXAPARIN SODIUM 100 MILLIGRAM(S): 100 INJECTION SUBCUTANEOUS at 07:00

## 2020-02-16 RX ADMIN — AMLODIPINE BESYLATE 2.5 MILLIGRAM(S): 2.5 TABLET ORAL at 07:01

## 2020-02-16 RX ADMIN — ENOXAPARIN SODIUM 100 MILLIGRAM(S): 100 INJECTION SUBCUTANEOUS at 17:16

## 2020-02-16 RX ADMIN — NYSTATIN CREAM 1 APPLICATION(S): 100000 CREAM TOPICAL at 07:01

## 2020-02-16 RX ADMIN — FAMOTIDINE 20 MILLIGRAM(S): 10 INJECTION INTRAVENOUS at 07:01

## 2020-02-16 RX ADMIN — SENNA PLUS 1 TABLET(S): 8.6 TABLET ORAL at 17:17

## 2020-02-16 RX ADMIN — ATORVASTATIN CALCIUM 80 MILLIGRAM(S): 80 TABLET, FILM COATED ORAL at 22:38

## 2020-02-16 RX ADMIN — Medication 100 MILLIGRAM(S): at 17:17

## 2020-02-16 RX ADMIN — Medication 1 TABLET(S): at 12:08

## 2020-02-16 RX ADMIN — Medication 81 MILLIGRAM(S): at 12:08

## 2020-02-16 RX ADMIN — FAMOTIDINE 20 MILLIGRAM(S): 10 INJECTION INTRAVENOUS at 17:16

## 2020-02-16 NOTE — MEDICAL STUDENT PROGRESS NOTE(EDUCATION) - SUBJECTIVE AND OBJECTIVE BOX
MED IVORY 79y Male  MRN#: [id number]     SUBJECTIVE  Patient is a 79y old Male who presents with a chief complaint of Cardiac arrest (16 Feb 2020 09:32)      Today is hospital day 36d, and this morning he is lying in bed without distress on nasal cannula breathing comfortably. Patient is wearing a [place] collar. Patient complained of coughing when exerting himself, neck pain, and discomfort around the neck collar. Patient has no complaints of chest pain, nausea, vomiting, constipation, or leg pain. Patient endorsed a lack of interest in social gatherings and activities, lack of appetite, and a sense of fatigue throughout the day. No feelings of guilt or hopeless. No acute overnight events.     OBJECTIVE  PAST MEDICAL & SURGICAL HISTORY  Atrial fibrillation  Neuropathy  HLD (hyperlipidemia)  HTN (hypertension)  History of cholecystectomy  History of cholecystectomy    ALLERGIES:  No Known Allergies    MEDICATIONS:  STANDING MEDICATIONS  amLODIPine   Tablet 2.5 milliGRAM(s) Oral daily  aspirin  chewable 81 milliGRAM(s) Oral daily  atorvastatin 80 milliGRAM(s) Oral at bedtime  clonazePAM  Tablet 1 milliGRAM(s) Oral daily  enoxaparin Injectable 100 milliGRAM(s) SubCutaneous every 12 hours  famotidine    Tablet 20 milliGRAM(s) Oral two times a day  losartan 100 milliGRAM(s) Oral daily  multivitamin 1 Tablet(s) Oral daily  nystatin Powder 1 Application(s) Topical two times a day  senna 1 Tablet(s) Oral two times a day  thiamine 100 milliGRAM(s) Oral daily    PRN MEDICATIONS  benzonatate 100 milliGRAM(s) Oral three times a day PRN  bisacodyl 5 milliGRAM(s) Oral every 12 hours PRN    HOME MEDICATIONS  Home Medications:  aspirin 81 mg oral tablet, chewable: 1 tab(s) orally once a day (13 Feb 2020 14:27)  carvedilol 12.5 mg oral tablet: 1 tab(s) orally 2 times a day (16 Jan 2020 12:31)  clonazePAM 1 mg oral tablet: 1 tab(s) orally once a day (11 Feb 2020 10:55)  DULoxetine 30 mg oral delayed release capsule: 1 cap(s) orally once a day (16 Jan 2020 12:31)  Eliquis 5 mg oral tablet: 1 tab(s) orally 2 times a day (16 Jan 2020 12:31)  furosemide 40 mg oral tablet: 1 tab(s) orally once a day (16 Jan 2020 12:31)  gabapentin 300 mg oral capsule: 1 cap(s) orally 2 times a day (16 Jan 2020 12:31)  omeprazole 20 mg oral delayed release capsule: 1 cap(s) orally once a day (16 Jan 2020 12:31)  potassium chloride 10 mEq oral capsule, extended release: 1 cap(s) orally once a day (16 Jan 2020 12:31)  simvastatin 40 mg oral tablet: 1 tab(s) orally once a day (at bedtime) (16 Jan 2020 12:31)  telmisartan 80 mg oral tablet: 1 tab(s) orally once a day (16 Jan 2020 12:31)  Vitamin D3: cap(s) orally once a day (16 Jan 2020 12:31)      LABS:                        11.1   6.23  )-----------( 176      ( 16 Feb 2020 07:02 )             37.7     02-16    141  |  99  |  11  ----------------------------<  106<H>  4.0   |  33<H>  |  0.7    Ca    8.3<L>      16 Feb 2020 07:02  Mg     1.7     02-15                      CAPILLARY BLOOD GLUCOSE          PHYSICAL EXAM:  T(C): 36.7 (02-16-20 @ 05:41), Max: 36.7 (02-16-20 @ 05:41)  HR: 78 (02-16-20 @ 05:41) (66 - 85)  BP: 119/69 (02-16-20 @ 05:41) (119/69 - 135/69)  RR: 18 (02-16-20 @ 05:41) (18 - 18)  SpO2: --    GENERAL: NAD, well-developed, 79y  EENT: EOMI, conjunctiva and sclera clear, No nasal obstruction or discharge  RESPIRATORY: Mild expiratory wheeze in the left lower lung field. Clear to auscultation in the left upper lungs and right lung  CARDIOVASCULAR: Regular rate and rhythm; No murmurs, rubs, or gallops, no pitting edema  GASTROINTESTINAL: Abdomen Soft, Nontender, Nondistended  MUSCULOSKELETAL:  No cyanosis, extremities grossly symmetrical  PSYCH: AAOx3, affect appropriate  NEUROLOGY: non-focal, cognition grossly intact, MAEE    ADMISSION SUMMARY  Patient is a 79y old Male who presents with a chief complaint of Cardiac arrest (16 Feb 2020 09:32) MED IVORY 79y Male  MRN#: [id number]     SUBJECTIVE  Patient is a 79y old Male who presents with a chief complaint of Cardiac arrest (16 Feb 2020 09:32)      Today is hospital day 36d, and this morning he is lying in bed without distress on nasal cannula breathing comfortably. Patient is wearing a [place] collar. Patient complained of coughing when exerting himself, neck pain, and discomfort around the neck collar. Patient has no complaints of chest pain, nausea, vomiting, constipation, or leg pain. Patient endorsed a lack of interest in social gatherings and activities, lack of appetite, and a sense of fatigue throughout the day. No feelings of guilt or hopeless. No acute overnight events. Is having regular bowel movements.     OBJECTIVE  PAST MEDICAL & SURGICAL HISTORY  Atrial fibrillation  Neuropathy  HLD (hyperlipidemia)  HTN (hypertension)  History of cholecystectomy  History of cholecystectomy    ALLERGIES:  No Known Allergies    MEDICATIONS:  STANDING MEDICATIONS  amLODIPine   Tablet 2.5 milliGRAM(s) Oral daily  aspirin  chewable 81 milliGRAM(s) Oral daily  atorvastatin 80 milliGRAM(s) Oral at bedtime  clonazePAM  Tablet 1 milliGRAM(s) Oral daily  enoxaparin Injectable 100 milliGRAM(s) SubCutaneous every 12 hours  famotidine    Tablet 20 milliGRAM(s) Oral two times a day  losartan 100 milliGRAM(s) Oral daily  multivitamin 1 Tablet(s) Oral daily  nystatin Powder 1 Application(s) Topical two times a day  senna 1 Tablet(s) Oral two times a day  thiamine 100 milliGRAM(s) Oral daily    PRN MEDICATIONS  benzonatate 100 milliGRAM(s) Oral three times a day PRN  bisacodyl 5 milliGRAM(s) Oral every 12 hours PRN    HOME MEDICATIONS  Home Medications:  aspirin 81 mg oral tablet, chewable: 1 tab(s) orally once a day (13 Feb 2020 14:27)  carvedilol 12.5 mg oral tablet: 1 tab(s) orally 2 times a day (16 Jan 2020 12:31)  clonazePAM 1 mg oral tablet: 1 tab(s) orally once a day (11 Feb 2020 10:55)  DULoxetine 30 mg oral delayed release capsule: 1 cap(s) orally once a day (16 Jan 2020 12:31)  Eliquis 5 mg oral tablet: 1 tab(s) orally 2 times a day (16 Jan 2020 12:31)  furosemide 40 mg oral tablet: 1 tab(s) orally once a day (16 Jan 2020 12:31)  gabapentin 300 mg oral capsule: 1 cap(s) orally 2 times a day (16 Jan 2020 12:31)  omeprazole 20 mg oral delayed release capsule: 1 cap(s) orally once a day (16 Jan 2020 12:31)  potassium chloride 10 mEq oral capsule, extended release: 1 cap(s) orally once a day (16 Jan 2020 12:31)  simvastatin 40 mg oral tablet: 1 tab(s) orally once a day (at bedtime) (16 Jan 2020 12:31)  telmisartan 80 mg oral tablet: 1 tab(s) orally once a day (16 Jan 2020 12:31)  Vitamin D3: cap(s) orally once a day (16 Jan 2020 12:31)      LABS:                        11.1   6.23  )-----------( 176      ( 16 Feb 2020 07:02 )             37.7     02-16    141  |  99  |  11  ----------------------------<  106<H>  4.0   |  33<H>  |  0.7    Ca    8.3<L>      16 Feb 2020 07:02  Mg     1.7     02-15                      CAPILLARY BLOOD GLUCOSE          PHYSICAL EXAM:  T(C): 36.7 (02-16-20 @ 05:41), Max: 36.7 (02-16-20 @ 05:41)  HR: 78 (02-16-20 @ 05:41) (66 - 85)  BP: 119/69 (02-16-20 @ 05:41) (119/69 - 135/69)  RR: 18 (02-16-20 @ 05:41) (18 - 18)  SpO2: --    GENERAL: NAD, well-developed, 79y lying in bed,a wake  EENT: EOMI, conjunctiva and sclera clear, No nasal obstruction or discharge; wearing cervical collar  RESPIRATORY: Mild expiratory wheeze in the left lower lung field. Clear to auscultation in the left upper lungs and right lung; On NC, comfortable  CARDIOVASCULAR: Difficult to appreciate but appears Regular rate and rhythm; No murmurs, rubs, or gallops, no pitting edema  GASTROINTESTINAL: Abdomen Soft, Nontender, Nondistended  MUSCULOSKELETAL:  No cyanosis, extremities grossly symmetrical; R foot with non-pitting edema, tenderness to palpation (patient states chronic), no open wounds noted on bl feet  PSYCH: AAO, affect appropriate  NEUROLOGY: non-focal, cognition grossly intact, MAEE    ADMISSION SUMMARY  Patient is a 79y old Male who presents with a chief complaint of Cardiac arrest (16 Feb 2020 09:32)

## 2020-02-16 NOTE — PROGRESS NOTE ADULT - SUBJECTIVE AND OBJECTIVE BOX
CTS Attending    Patient interviewed and examined  Wife present during our discussion  Pt being re-evaluated for CABG, LIMA to LAD, MICS  He appears well oriented   Still needs assistance for ADL  Will anticipate and tentative schedule CABG on or about Mon 2/23  Dr Shaw will cover and monitor preparations for surgery

## 2020-02-16 NOTE — PROGRESS NOTE ADULT - SUBJECTIVE AND OBJECTIVE BOX
Patient was seen and examined. Spoke with RN. Chart reviewed.  No new events overnight.  Vital Signs Last 24 Hrs  T(F): 98 (16 Feb 2020 05:41), Max: 98 (16 Feb 2020 05:41)  HR: 78 (16 Feb 2020 05:41) (66 - 85)  BP: 119/69 (16 Feb 2020 05:41) (119/69 - 135/69)  SpO2: --  MEDICATIONS  (STANDING):  amLODIPine   Tablet 2.5 milliGRAM(s) Oral daily  aspirin  chewable 81 milliGRAM(s) Oral daily  atorvastatin 80 milliGRAM(s) Oral at bedtime  clonazePAM  Tablet 1 milliGRAM(s) Oral daily  enoxaparin Injectable 100 milliGRAM(s) SubCutaneous every 12 hours  famotidine    Tablet 20 milliGRAM(s) Oral two times a day  losartan 100 milliGRAM(s) Oral daily  multivitamin 1 Tablet(s) Oral daily  nystatin Powder 1 Application(s) Topical two times a day  senna 1 Tablet(s) Oral two times a day  thiamine 100 milliGRAM(s) Oral daily    MEDICATIONS  (PRN):  benzonatate 100 milliGRAM(s) Oral three times a day PRN Cough  bisacodyl 5 milliGRAM(s) Oral every 12 hours PRN Constipation    Labs:                        11.1   6.23  )-----------( 176      ( 16 Feb 2020 07:02 )             37.7     16 Feb 2020 07:02    141    |  99     |  11     ----------------------------<  106    4.0     |  33     |  0.7    15 Feb 2020 04:30    143    |  101    |  12     ----------------------------<  119    4.3     |  34     |  0.7      Ca    8.3        16 Feb 2020 07:02  Ca    8.5        15 Feb 2020 04:30  Mg     1.7       15 Feb 2020 04:30    in [place] collar    awake and alert      A/P:  79 year old M with hx of HTN, A.fib (Eliquis), CHF, HLD brought in by EMS post cardiac arrest and resuscitated; found to have severe CAD of LAD    tele  can not be d/c to rehab as per cardio   [place] collar  PT/Rehab ; OOB   plan as per cardio/CT to decide on plan- 4A vs intervention  cardio/CT to follow up Monday  lifevest  DVT prophylaxis  Decubitus prevention- all measures as per RN protocol  Please call or text me with any questions or updates

## 2020-02-16 NOTE — MEDICAL STUDENT PROGRESS NOTE(EDUCATION) - NS MD HP STUD ASPLAN PLAN FT
79 year old male with a past medical history of atrial fibrillation (on Eliquis), CHF (EF = 60-65%, HTN, HLD, who was brought in by EMS post cardiac arrest. Patient has CAD that will require cardiac catherization on Monday. Patient also has fracture of the cervical spine and is placed on a [place] J collar.    # Cardiac Arrest 2/2 PEA likely from arrythmia secondary to MI/NSTEMI  - Cardiologist Dr. Laws - CAD, ostial LAD distal LM disorder  - Dr. Laws thinks patient requires CABG  - pending cardiology for approval for cardiac catherization    # C5/C6 intervertebral disk spacings and C5 fracture  - use [place] J collar  - Neurosurgery recomnends patient must wear collar at all times. In 6 weeks obtain cervical spine x-rays and follow up with Dr. Joseph    # A.fib -   - No rate control medication - patient b  - Anticoagulation > Lovenox 100mg bid    # Anxiety   - On Clonazepam 1 q8 at home  - holding clonazepam home dos as of risk of fall    # HTN  - c/w amLODIPine Tablet 2.5 mg oral daily  - increased to losartan 100 daily  - was on cervedilol 12.5 mg twice daily + Telmisartan 80mg at bedtime at home     #DVTppx: heparin sc  #GIppx: not needed  #Diet: dysphagia 3  #CODE: FULL  #Dispo: Acute; possible cardiac cath 79 year old male with a past medical history of atrial fibrillation (on Eliquis), Coronary Artery Disease, HTN, HLD, who was brought in by EMS post cardiac arrest. Patient admitted to medicine/telemetry.  has CAD that will require cardiac catheretization on Monday. Patient also has fracture of the cervical spine and is placed on a [place] J collar.    # Cardiac Arrest 2/2 PEA likely from arrythmia secondary to MI/NSTEMI  #Possible history of of CHF? Mentioned in notes but echo with normal EF   - Cardiologist Dr. Laws - CAD, ostial LAD distal LM disorder  - Dr. Laws thinks patient requires CABG  - pending cardiology for approval for cardiac catherization    # C5/C6 intervertebral disk spacings and C5 fracture  - use [place] J collar  - Neurosurgery recomnends patient must wear collar at all times. In 6 weeks obtain cervical spine x-rays and follow up with Dr. Joseph    # A.fib -   - No rate control medication - patient b  - Anticoagulation > Lovenox 100mg bid    # Anxiety   - On Clonazepam 1 q8 at home  - holding clonazepam home dos as of risk of fall    # HTN  - c/w amLODIPine Tablet 2.5 mg oral daily  - increased to losartan 100 daily  - was on cervedilol 12.5 mg twice daily + Telmisartan 80mg at bedtime at home     #DVTppx: heparin sc  #GIppx: not needed  #Diet: dysphagia 3  #CODE: FULL  #Dispo: Acute; possible cardiac cath 79 year old male with a past medical history of atrial fibrillation (on Eliquis), Coronary Artery Disease, HTN, HLD, who was brought in by EMS post cardiac arrest. Patient admitted to medicine/telemetry. Planning for possible cath and/or CABG.  Patient also has fracture of the cervical spine and is placed on a [place] J collar.    #Cardiac Arrest 2/2 PEA likely from arrythmia secondary to NSTEMI  #CAD  #Possible history of of CHF? Mentioned in notes but echo with normal EF   -Trop 0.3 on admission  - Cardiologist Dr. Laws   -- Dr. Laws thinks patient requires CABG--discussing with Dr Shaw and Dr Carbajal  -- Needs cath but not ambulating, was altered--continue to monitor and re-evaluate    # C5/C6 intervertebral disk spacings and C5 fracture  -Hyperextension injury  - use [place] J collar  - Neurosurgery following  -- patient must wear collar at all times  --In 6 weeks obtain cervical spine x-rays and follow up with Dr. Joseph    # A.fib   - Rate currently ok  - Anticoagulation > Lovenox 100mg bid    # Anxiety   - On Clonazepam 1 q8 at home  - holding clonazepam as of risk of fall    # HTN  -At home is on carvedilol 12.5 mg twice daily + Telmisartan 80mg at bedtime  -- c/w amLODIPine   -- increased to losartan 100 daily    #Mental health  -No indication for acute psychiatric consult  --Continue to render emotional supports  --F/u outpatient     #DVTppx: heparin sc  #GIppx: not needed  #Diet: dysphagia 3  #CODE: FULL  #Dispo: Acute; possible cardiac cath, possible CABG 79 year old male with a past medical history of atrial fibrillation (on Eliquis), Coronary Artery Disease, HTN, HLD, who was brought in by EMS post cardiac arrest. Patient admitted to medicine/telemetry. Planning for possible cath and/or CABG.  Patient also has fracture of the cervical spine and is placed on a [place] J collar.    #Cardiac Arrest 2/2 PEA likely from arrythmia secondary to NSTEMI  #CAD  #Possible history of of CHF? Mentioned in notes but echo with normal EF   -Trop 0.3 on admission  - Cardiologist Dr. Laws   -- Dr. Laws thinks patient requires CABG--discussing with Dr Shaw and Dr Carbajal  -- Needs cath but not ambulating, was altered--continue to monitor and re-evaluate    # C5/C6 intervertebral disk spacings and C5 fracture  -Hyperextension injury  - use [place] J collar  - Neurosurgery following  -- patient must wear collar at all times  --In 6 weeks obtain cervical spine x-rays and follow up with Dr. Joseph    # A.fib   - Rate currently ok  - Anticoagulation > Lovenox 100mg bid    # Anxiety   - On Clonazepam 1 q8 at home  - holding clonazepam as of risk of fall    # HTN  -At home is on carvedilol 12.5 mg twice daily + Telmisartan 80mg at bedtime  -- c/w amLODIPine   -- increased to losartan 100 daily    #Macrocytic anemia  --Follow up B12, Folate    #Mental health  -No indication for acute psychiatric consult  --Continue to render emotional supports  --F/u outpatient     #DVTppx: heparin sc  #GIppx: not needed  #Diet: dysphagia 3  #CODE: FULL  #Dispo: Acute; possible cardiac cath, possible CABG

## 2020-02-17 LAB
ALBUMIN SERPL ELPH-MCNC: 2.9 G/DL — LOW (ref 3.5–5.2)
ALP SERPL-CCNC: 130 U/L — HIGH (ref 30–115)
ALT FLD-CCNC: 22 U/L — SIGNIFICANT CHANGE UP (ref 0–41)
ANION GAP SERPL CALC-SCNC: 8 MMOL/L — SIGNIFICANT CHANGE UP (ref 7–14)
AST SERPL-CCNC: 20 U/L — SIGNIFICANT CHANGE UP (ref 0–41)
BASOPHILS # BLD AUTO: 0.01 K/UL — SIGNIFICANT CHANGE UP (ref 0–0.2)
BASOPHILS NFR BLD AUTO: 0.2 % — SIGNIFICANT CHANGE UP (ref 0–1)
BILIRUB SERPL-MCNC: 0.8 MG/DL — SIGNIFICANT CHANGE UP (ref 0.2–1.2)
BUN SERPL-MCNC: 12 MG/DL — SIGNIFICANT CHANGE UP (ref 10–20)
CALCIUM SERPL-MCNC: 9 MG/DL — SIGNIFICANT CHANGE UP (ref 8.5–10.1)
CHLORIDE SERPL-SCNC: 99 MMOL/L — SIGNIFICANT CHANGE UP (ref 98–110)
CO2 SERPL-SCNC: 34 MMOL/L — HIGH (ref 17–32)
CREAT SERPL-MCNC: 0.7 MG/DL — SIGNIFICANT CHANGE UP (ref 0.7–1.5)
EOSINOPHIL # BLD AUTO: 0.17 K/UL — SIGNIFICANT CHANGE UP (ref 0–0.7)
EOSINOPHIL NFR BLD AUTO: 3 % — SIGNIFICANT CHANGE UP (ref 0–8)
FOLATE SERPL-MCNC: 6.4 NG/ML — SIGNIFICANT CHANGE UP
GLUCOSE SERPL-MCNC: 108 MG/DL — HIGH (ref 70–99)
HCT VFR BLD CALC: 39.1 % — LOW (ref 42–52)
HGB BLD-MCNC: 11.9 G/DL — LOW (ref 14–18)
IMM GRANULOCYTES NFR BLD AUTO: 0.3 % — SIGNIFICANT CHANGE UP (ref 0.1–0.3)
LYMPHOCYTES # BLD AUTO: 0.91 K/UL — LOW (ref 1.2–3.4)
LYMPHOCYTES # BLD AUTO: 15.9 % — LOW (ref 20.5–51.1)
MAGNESIUM SERPL-MCNC: 1.8 MG/DL — SIGNIFICANT CHANGE UP (ref 1.8–2.4)
MCHC RBC-ENTMCNC: 30.1 PG — SIGNIFICANT CHANGE UP (ref 27–31)
MCHC RBC-ENTMCNC: 30.4 G/DL — LOW (ref 32–37)
MCV RBC AUTO: 99 FL — HIGH (ref 80–94)
MONOCYTES # BLD AUTO: 0.42 K/UL — SIGNIFICANT CHANGE UP (ref 0.1–0.6)
MONOCYTES NFR BLD AUTO: 7.3 % — SIGNIFICANT CHANGE UP (ref 1.7–9.3)
NEUTROPHILS # BLD AUTO: 4.21 K/UL — SIGNIFICANT CHANGE UP (ref 1.4–6.5)
NEUTROPHILS NFR BLD AUTO: 73.3 % — SIGNIFICANT CHANGE UP (ref 42.2–75.2)
NRBC # BLD: 0 /100 WBCS — SIGNIFICANT CHANGE UP (ref 0–0)
PLATELET # BLD AUTO: 204 K/UL — SIGNIFICANT CHANGE UP (ref 130–400)
POTASSIUM SERPL-MCNC: 5.2 MMOL/L — HIGH (ref 3.5–5)
POTASSIUM SERPL-SCNC: 5.2 MMOL/L — HIGH (ref 3.5–5)
PROT SERPL-MCNC: 6.5 G/DL — SIGNIFICANT CHANGE UP (ref 6–8)
RBC # BLD: 3.95 M/UL — LOW (ref 4.7–6.1)
RBC # FLD: 14.4 % — SIGNIFICANT CHANGE UP (ref 11.5–14.5)
SODIUM SERPL-SCNC: 141 MMOL/L — SIGNIFICANT CHANGE UP (ref 135–146)
VIT B12 SERPL-MCNC: 855 PG/ML — SIGNIFICANT CHANGE UP (ref 232–1245)
WBC # BLD: 5.74 K/UL — SIGNIFICANT CHANGE UP (ref 4.8–10.8)
WBC # FLD AUTO: 5.74 K/UL — SIGNIFICANT CHANGE UP (ref 4.8–10.8)

## 2020-02-17 RX ADMIN — NYSTATIN CREAM 1 APPLICATION(S): 100000 CREAM TOPICAL at 17:45

## 2020-02-17 RX ADMIN — AMLODIPINE BESYLATE 2.5 MILLIGRAM(S): 2.5 TABLET ORAL at 05:58

## 2020-02-17 RX ADMIN — NYSTATIN CREAM 1 APPLICATION(S): 100000 CREAM TOPICAL at 05:58

## 2020-02-17 RX ADMIN — LOSARTAN POTASSIUM 100 MILLIGRAM(S): 100 TABLET, FILM COATED ORAL at 05:57

## 2020-02-17 RX ADMIN — Medication 1 TABLET(S): at 12:03

## 2020-02-17 RX ADMIN — Medication 100 MILLIGRAM(S): at 12:03

## 2020-02-17 RX ADMIN — SENNA PLUS 1 TABLET(S): 8.6 TABLET ORAL at 17:45

## 2020-02-17 RX ADMIN — ENOXAPARIN SODIUM 100 MILLIGRAM(S): 100 INJECTION SUBCUTANEOUS at 05:58

## 2020-02-17 RX ADMIN — FAMOTIDINE 20 MILLIGRAM(S): 10 INJECTION INTRAVENOUS at 05:58

## 2020-02-17 RX ADMIN — Medication 1 MILLIGRAM(S): at 21:48

## 2020-02-17 RX ADMIN — ATORVASTATIN CALCIUM 80 MILLIGRAM(S): 80 TABLET, FILM COATED ORAL at 21:48

## 2020-02-17 RX ADMIN — ENOXAPARIN SODIUM 100 MILLIGRAM(S): 100 INJECTION SUBCUTANEOUS at 17:46

## 2020-02-17 RX ADMIN — Medication 81 MILLIGRAM(S): at 12:03

## 2020-02-17 RX ADMIN — SENNA PLUS 1 TABLET(S): 8.6 TABLET ORAL at 05:57

## 2020-02-17 RX ADMIN — FAMOTIDINE 20 MILLIGRAM(S): 10 INJECTION INTRAVENOUS at 17:45

## 2020-02-17 NOTE — PROGRESS NOTE ADULT - SUBJECTIVE AND OBJECTIVE BOX
SUBJECTIVE:    Patient is a 79y old Male who presents with a chief complaint of Cardiac arrest (17 Feb 2020 09:04)    Overnight Events: Patient is stable, no acute events overnight.  VS are stable, no major complaints.    PAST MEDICAL & SURGICAL HISTORY  Atrial fibrillation  Neuropathy  HLD (hyperlipidemia)  HTN (hypertension)  History of cholecystectomy  History of cholecystectomy    SOCIAL HISTORY:  Negative for smoking/alcohol/drug use.     ALLERGIES:  No Known Allergies    MEDICATIONS:  STANDING MEDICATIONS  amLODIPine   Tablet 2.5 milliGRAM(s) Oral daily  aspirin  chewable 81 milliGRAM(s) Oral daily  atorvastatin 80 milliGRAM(s) Oral at bedtime  clonazePAM  Tablet 1 milliGRAM(s) Oral daily  enoxaparin Injectable 100 milliGRAM(s) SubCutaneous every 12 hours  famotidine    Tablet 20 milliGRAM(s) Oral two times a day  losartan 100 milliGRAM(s) Oral daily  multivitamin 1 Tablet(s) Oral daily  nystatin Powder 1 Application(s) Topical two times a day  senna 1 Tablet(s) Oral two times a day  thiamine 100 milliGRAM(s) Oral daily    PRN MEDICATIONS  benzonatate 100 milliGRAM(s) Oral three times a day PRN  bisacodyl 5 milliGRAM(s) Oral every 12 hours PRN  guaiFENesin   Syrup  (Sugar-Free) 200 milliGRAM(s) Oral every 6 hours PRN    VITALS:   T(F): 98, Max: 98 (02-17-20 @ 05:44)  HR: 78 (77 - 83)  BP: 192/76 (125/69 - 192/76)  RR: 18 (18 - 18)  SpO2: 98% (98% - 98%)    LABS:                        11.9   5.74  )-----------( 204      ( 17 Feb 2020 05:48 )             39.1     02-17    141  |  99  |  12  ----------------------------<  108<H>  5.2<H>   |  34<H>  |  0.7    Ca    9.0      17 Feb 2020 05:48  Mg     1.8     02-17    TPro  6.5  /  Alb  2.9<L>  /  TBili  0.8  /  DBili  x   /  AST  20  /  ALT  22  /  AlkPhos  130<H>  02-17 02-16-20 @ 07:01  -  02-17-20 @ 07:00  --------------------------------------------------------  IN: 560 mL / OUT: 550 mL / NET: 10 mL      < from: Transthoracic Echocardiogram (02.09.20 @ 09:09) >  Summary:   1. Left ventricular ejection fraction, by visual estimation, is 65 to 70%.   2. Mildly increased LV wall thickness.   3. Mild to moderate mitral valve regurgitation.   4. Moderate tricuspidregurgitation.   5. Mild aortic regurgitation.   6. Sclerotic aortic valve with decreased opening.   7. Estimated pulmonary artery systolic pressure is 41.1 mmHg assuming a right atrial pressure of 3 mmHg, which is consistent with mild pulmonary hypertension.    < end of copied text >      PHYSICAL EXAM:  GEN: NAD, comfortable  LUNGS: CTAB, no w/r/r  HEART: RRR, s1 and s2 appreciated, no m/r/g  ABD: soft, NT/ND, +BS  EXT: no edema, PP b/l  NEURO: AAOX3

## 2020-02-17 NOTE — PROGRESS NOTE ADULT - SUBJECTIVE AND OBJECTIVE BOX
Patient was seen and examined. Spoke with RN. Chart reviewed.  No events overnight.  Vital Signs Last 24 Hrs  T(F): 98 (17 Feb 2020 05:44), Max: 98 (17 Feb 2020 05:44)  HR: 78 (17 Feb 2020 05:44) (77 - 83)  BP: 192/76 (17 Feb 2020 05:44) (125/69 - 192/76)  SpO2: 98% (16 Feb 2020 20:22) (98% - 98%)  MEDICATIONS  (STANDING):  amLODIPine   Tablet 2.5 milliGRAM(s) Oral daily  aspirin  chewable 81 milliGRAM(s) Oral daily  atorvastatin 80 milliGRAM(s) Oral at bedtime  clonazePAM  Tablet 1 milliGRAM(s) Oral daily  enoxaparin Injectable 100 milliGRAM(s) SubCutaneous every 12 hours  famotidine    Tablet 20 milliGRAM(s) Oral two times a day  losartan 100 milliGRAM(s) Oral daily  multivitamin 1 Tablet(s) Oral daily  nystatin Powder 1 Application(s) Topical two times a day  senna 1 Tablet(s) Oral two times a day  thiamine 100 milliGRAM(s) Oral daily    MEDICATIONS  (PRN):  benzonatate 100 milliGRAM(s) Oral three times a day PRN Cough  bisacodyl 5 milliGRAM(s) Oral every 12 hours PRN Constipation  guaiFENesin   Syrup  (Sugar-Free) 200 milliGRAM(s) Oral every 6 hours PRN Cough    Labs:                        11.9   5.74  )-----------( 204      ( 17 Feb 2020 05:48 )             39.1                         11.1   6.23  )-----------( 176      ( 16 Feb 2020 07:02 )             37.7     17 Feb 2020 05:48    141    |  99     |  12     ----------------------------<  108    5.2     |  34     |  0.7    16 Feb 2020 07:02    141    |  99     |  11     ----------------------------<  106    4.0     |  33     |  0.7      Ca    9.0        17 Feb 2020 05:48  Ca    8.3        16 Feb 2020 07:02  Mg     1.8       17 Feb 2020 05:48  Mg     1.9       16 Feb 2020 10:46    TPro  6.5    /  Alb  2.9    /  TBili  0.8    /  DBili  x      /  AST  20     /  ALT  22     /  AlkPhos  130    17 Feb 2020 05:48        awake and alert  neck brace in place      A/P:  79 year old M with hx of HTN, A.fib (Eliquis), CHF, HLD brought in by EMS post cardiac arrest and resuscitated; found to have severe CAD of LAD    tele  CABG planned for 2/23  [facility]  PT/Rehab ; OOB   plan as per cardio/CT  lifevest  DVT prophylaxis  Decubitus prevention- all measures as per RN protocol  Please call or text me with any questions or updates

## 2020-02-17 NOTE — PROGRESS NOTE ADULT - ASSESSMENT
79 year old male with a past medical history of atrial fibrillation (on Eliquis), Coronary Artery Disease, HTN, HLD, who was brought in by EMS post cardiac arrest. Patient admitted to medicine/telemetry. Planning for possible cath and/or CABG.  Patient also has fracture of the cervical spine and is placed on a [place] J collar.    #Cardiac Arrest 2/2 PEA likely from arrythmia secondary to NSTEMI  #CAD  -Trop 0.3 on admission  - Cardiac cath showed 90% stenosis of ostial LAD and 80% stenosis of proximal LAD  - Cardiologist Dr. Laws   -- Dr. Laws thinks patient requires CABG  - CTS following, will anticipate and tentative schedule for CABG on 2/23    # C5/C6 intervertebral disk spacings and C5 fracture  -Hyperextension injury  - use [place] J collar  - Neurosurgery following  -- patient must wear collar at all times  --In 6 weeks obtain cervical spine x-rays and follow up with Dr. Joseph    # A.fib   - Rate currently ok  - Anticoagulation > Lovenox 100mg bid    # Anxiety   - On Clonazepam 1 q8 at home  - holding clonazepam as of risk of fall    # HTN  -At home is on carvedilol 12.5 mg twice daily + Telmisartan 80mg at bedtime  -- c/w amLODIPine   -- increased to losartan 100 daily    #Macrocytic anemia  --Follow up B12, Folate    #DVTppx: lovenox  #GIppx: not needed  #Diet: dysphagia 3  #CODE: FULL  #Dispo: Acute; possible CABG on 2/23

## 2020-02-18 LAB
ANION GAP SERPL CALC-SCNC: 10 MMOL/L — SIGNIFICANT CHANGE UP (ref 7–14)
BASOPHILS # BLD AUTO: 0.01 K/UL — SIGNIFICANT CHANGE UP (ref 0–0.2)
BASOPHILS NFR BLD AUTO: 0.2 % — SIGNIFICANT CHANGE UP (ref 0–1)
BUN SERPL-MCNC: 11 MG/DL — SIGNIFICANT CHANGE UP (ref 10–20)
CALCIUM SERPL-MCNC: 8.5 MG/DL — SIGNIFICANT CHANGE UP (ref 8.5–10.1)
CHLORIDE SERPL-SCNC: 100 MMOL/L — SIGNIFICANT CHANGE UP (ref 98–110)
CO2 SERPL-SCNC: 33 MMOL/L — HIGH (ref 17–32)
CREAT SERPL-MCNC: 0.7 MG/DL — SIGNIFICANT CHANGE UP (ref 0.7–1.5)
EOSINOPHIL # BLD AUTO: 0.15 K/UL — SIGNIFICANT CHANGE UP (ref 0–0.7)
EOSINOPHIL NFR BLD AUTO: 2.8 % — SIGNIFICANT CHANGE UP (ref 0–8)
GLUCOSE BLDC GLUCOMTR-MCNC: 92 MG/DL — SIGNIFICANT CHANGE UP (ref 70–99)
GLUCOSE SERPL-MCNC: 94 MG/DL — SIGNIFICANT CHANGE UP (ref 70–99)
HCT VFR BLD CALC: 37.2 % — LOW (ref 42–52)
HGB BLD-MCNC: 11.2 G/DL — LOW (ref 14–18)
IMM GRANULOCYTES NFR BLD AUTO: 0.4 % — HIGH (ref 0.1–0.3)
LYMPHOCYTES # BLD AUTO: 1.1 K/UL — LOW (ref 1.2–3.4)
LYMPHOCYTES # BLD AUTO: 20.6 % — SIGNIFICANT CHANGE UP (ref 20.5–51.1)
MCHC RBC-ENTMCNC: 29.6 PG — SIGNIFICANT CHANGE UP (ref 27–31)
MCHC RBC-ENTMCNC: 30.1 G/DL — LOW (ref 32–37)
MCV RBC AUTO: 98.4 FL — HIGH (ref 80–94)
MONOCYTES # BLD AUTO: 0.43 K/UL — SIGNIFICANT CHANGE UP (ref 0.1–0.6)
MONOCYTES NFR BLD AUTO: 8.1 % — SIGNIFICANT CHANGE UP (ref 1.7–9.3)
NEUTROPHILS # BLD AUTO: 3.62 K/UL — SIGNIFICANT CHANGE UP (ref 1.4–6.5)
NEUTROPHILS NFR BLD AUTO: 67.9 % — SIGNIFICANT CHANGE UP (ref 42.2–75.2)
NRBC # BLD: 0 /100 WBCS — SIGNIFICANT CHANGE UP (ref 0–0)
PLATELET # BLD AUTO: 184 K/UL — SIGNIFICANT CHANGE UP (ref 130–400)
POTASSIUM SERPL-MCNC: 4.1 MMOL/L — SIGNIFICANT CHANGE UP (ref 3.5–5)
POTASSIUM SERPL-SCNC: 4.1 MMOL/L — SIGNIFICANT CHANGE UP (ref 3.5–5)
RBC # BLD: 3.78 M/UL — LOW (ref 4.7–6.1)
RBC # FLD: 14.5 % — SIGNIFICANT CHANGE UP (ref 11.5–14.5)
SODIUM SERPL-SCNC: 143 MMOL/L — SIGNIFICANT CHANGE UP (ref 135–146)
WBC # BLD: 5.33 K/UL — SIGNIFICANT CHANGE UP (ref 4.8–10.8)
WBC # FLD AUTO: 5.33 K/UL — SIGNIFICANT CHANGE UP (ref 4.8–10.8)

## 2020-02-18 PROCEDURE — 99232 SBSQ HOSP IP/OBS MODERATE 35: CPT

## 2020-02-18 RX ADMIN — Medication 1 MILLIGRAM(S): at 11:33

## 2020-02-18 RX ADMIN — LOSARTAN POTASSIUM 100 MILLIGRAM(S): 100 TABLET, FILM COATED ORAL at 05:35

## 2020-02-18 RX ADMIN — SENNA PLUS 1 TABLET(S): 8.6 TABLET ORAL at 05:35

## 2020-02-18 RX ADMIN — ENOXAPARIN SODIUM 100 MILLIGRAM(S): 100 INJECTION SUBCUTANEOUS at 05:35

## 2020-02-18 RX ADMIN — AMLODIPINE BESYLATE 2.5 MILLIGRAM(S): 2.5 TABLET ORAL at 05:35

## 2020-02-18 RX ADMIN — NYSTATIN CREAM 1 APPLICATION(S): 100000 CREAM TOPICAL at 17:03

## 2020-02-18 RX ADMIN — FAMOTIDINE 20 MILLIGRAM(S): 10 INJECTION INTRAVENOUS at 05:35

## 2020-02-18 RX ADMIN — Medication 100 MILLIGRAM(S): at 11:33

## 2020-02-18 RX ADMIN — NYSTATIN CREAM 1 APPLICATION(S): 100000 CREAM TOPICAL at 05:35

## 2020-02-18 RX ADMIN — FAMOTIDINE 20 MILLIGRAM(S): 10 INJECTION INTRAVENOUS at 17:03

## 2020-02-18 RX ADMIN — Medication 1 TABLET(S): at 11:33

## 2020-02-18 RX ADMIN — ENOXAPARIN SODIUM 100 MILLIGRAM(S): 100 INJECTION SUBCUTANEOUS at 17:03

## 2020-02-18 RX ADMIN — ATORVASTATIN CALCIUM 80 MILLIGRAM(S): 80 TABLET, FILM COATED ORAL at 22:16

## 2020-02-18 RX ADMIN — Medication 81 MILLIGRAM(S): at 11:33

## 2020-02-18 NOTE — PROGRESS NOTE ADULT - SUBJECTIVE AND OBJECTIVE BOX
Patient was seen and examined. Spoke with RN. Chart reviewed.    No events overnight.  Vital Signs Last 24 Hrs  T(F): 98.6 (18 Feb 2020 12:54), Max: 98.6 (18 Feb 2020 12:54)  HR: 87 (18 Feb 2020 12:54) (78 - 87)  BP: 81/50 (18 Feb 2020 12:54) (81/50 - 149/92)  SpO2: 97% (17 Feb 2020 19:40) (97% - 97%)  MEDICATIONS  (STANDING):  amLODIPine   Tablet 2.5 milliGRAM(s) Oral daily  aspirin  chewable 81 milliGRAM(s) Oral daily  atorvastatin 80 milliGRAM(s) Oral at bedtime  enoxaparin Injectable 100 milliGRAM(s) SubCutaneous every 12 hours  famotidine    Tablet 20 milliGRAM(s) Oral two times a day  losartan 100 milliGRAM(s) Oral daily  multivitamin 1 Tablet(s) Oral daily  nystatin Powder 1 Application(s) Topical two times a day  senna 1 Tablet(s) Oral two times a day  thiamine 100 milliGRAM(s) Oral daily    MEDICATIONS  (PRN):  benzonatate 100 milliGRAM(s) Oral three times a day PRN Cough  bisacodyl 5 milliGRAM(s) Oral every 12 hours PRN Constipation  guaiFENesin   Syrup  (Sugar-Free) 200 milliGRAM(s) Oral every 6 hours PRN Cough    Labs:                        11.2   5.33  )-----------( 184      ( 18 Feb 2020 05:32 )             37.2                         11.9   5.74  )-----------( 204      ( 17 Feb 2020 05:48 )             39.1     18 Feb 2020 05:32    143    |  100    |  11     ----------------------------<  94     4.1     |  33     |  0.7    17 Feb 2020 05:48    141    |  99     |  12     ----------------------------<  108    5.2     |  34     |  0.7      Ca    8.5        18 Feb 2020 05:32  Ca    9.0        17 Feb 2020 05:48  Mg     1.8       17 Feb 2020 05:48    TPro  6.5    /  Alb  2.9    /  TBili  0.8    /  DBili  x      /  AST  20     /  ALT  22     /  AlkPhos  130    17 Feb 2020 05:48            Radiology:    General: comfortable, NAD  Neurology: A&Ox3, nonfocal  Head:  Normocephalic, atraumatic c collar  ENT:  Mucosa moist, no ulcerations  Neck:  Supple, no JVD,   Skin: no breakdowns (as per RN)  Resp: CTA B/L  CV: RRR, S1S2,   GI: Soft, NT, bowel sounds  MS: No edema, + peripheral pulses, FROM all 4 extremity

## 2020-02-18 NOTE — CHART NOTE - NSCHARTNOTEFT_GEN_A_CORE
Registered Dietitian Follow-Up     Patient Profile Reviewed                           Yes [x]   No []     Nutrition History Previously Obtained        Yes [x]  No []       Pertinent Medical Interventions: Per progress notes, pt admitted with Cardiac Arrest 2/2 PEA likely from arrythmia secondary to NSTEMI. Planning for possible cath and/or CABG at this time. C5/C6 intervertebral disk spacings and C5 fracture noted.     Diet order: Currently receives Dysphagia 3 Mechanical Soft diet with nectar thick liquids + DASH/TLC diet modifier with Ensure Enlive q8hrs.      Anthropometrics:  - Ht. 182.9 cm.  - Wt. 99.4 kg (2/18)  - wt change: wt has ranged from 99.4 kg (current wt) to 108.7 kg (1/14). Initial wt this admit was 107.8 kg (1/13)  - BMI 29.7 (current wt 99.4 kg)  - IBW 80.9 kg.     Pertinent Lab Data: 2/18: RBC-3.78, H/H-11.2/37.2; 1/21: OiyI9V-7.7%     Pertinent Meds: enoxaparin, amlodipine, atorvastatin, dulcolax, famotidine, MVI, senna, thiamine     Physical Findings:  - Appearance: alert; edema noted earlier this admit; no edema noted at this time. Pt previously received lasix; ? if wt loss is 2/2 diuretic use.  - GI function: last BM 2/16; constipation reported  - Tubes: no feeding tubes reported  - Oral/Mouth cavity: Latest SLP eval (2/13) notes +toleration of nectar thick liquids w/ use of a consecutive swallow; recommends dys 2 w/ nectar.  - Skin: noted intact     Nutrition Requirements  Weight Used: 80.9 kg IBW d/t borderline obese BMI     Estimated Energy Needs    Continue []  Adjust [x]  3761-8540 kcal/day (25-30 kcal/kg IBW)        Estimated Protein Needs    Continue []  Adjust [x]  80-97 g/day (1-1.2 g/kg IBW)        Estimated Fluid Needs        Continue []  Adjust [x]  1 mL/kcal or per LIP     Nutrient Intake: Not meeting estimated nutrient needs at this time; consuming 50-75% of meals per report. Reportedly not receiving Ensure Enlive at this time - kitchen notified.     Nutrition Diagnostic #1  Problem: Inadequate oral intake  Etiology: reduced appetite; additionally, pt not receiving diet texture recommended by SLP  Statement: pt consuming <75% of meals at times    Nutrition Intervention: Meals & Snacks     Goal/Expected Outcome: Pt to demonstrate tolerance to diet order, with at least 75% po intake maintained over next 3 days.     Indicator/Monitoring: Energy intake, diet order, glucose profile, renal profile, nutrition focused physical findings, body composition.    Recommendation: Change diet to Dysphagia 2 Mechanical soft diet with nectar thick liquids as per 2/13 SLP eval recommendations. Can continue providing Ensure Enlive, but would decrease rate to q24hrs & serve with nectar thickening packets + add Ensure Pudding q24hrs. Reviewed with LIP via [id number].

## 2020-02-18 NOTE — PROGRESS NOTE ADULT - ASSESSMENT
Cardiac arrest 2/2 PEA likely from arrythmia  C5/C6 Intervertebral disk spacings and C5 fracture  A.fib-  Anxiety  htn  dyslip    rehab/pt    po eliquis    for cabg next week

## 2020-02-18 NOTE — PROGRESS NOTE ADULT - ASSESSMENT
79 year old male with a past medical history of atrial fibrillation (on Eliquis), Coronary Artery Disease, HTN, HLD, who was brought in by EMS post cardiac arrest. Patient admitted to medicine/telemetry. Planning for possible cath and/or CABG.  Patient also has fracture of the cervical spine and is placed on a [place] J collar.    #Cardiac Arrest 2/2 PEA likely from arrythmia secondary to NSTEMI  #CAD  -Trop 0.3 on admission  - Cardiac cath showed 90% stenosis of ostial LAD and 80% stenosis of proximal LAD  - Cardiologist Dr. Laws   -- Dr. Laws thinks patient requires CABG  - CTS following, will anticipate and tentative schedule for CABG on 2/23 if he continues to improve with intense therapy    # C5/C6 intervertebral disk spacings and C5 fracture  -Hyperextension injury  - use [place] J collar  - Neurosurgery following  -- patient must wear collar at all times  --In 6 weeks obtain cervical spine x-rays and follow up with Dr. Joseph    # A.fib   - Rate currently ok  - Anticoagulation > Lovenox 100mg bid    # Anxiety   - On Clonazepam 1 q8 at home  - holding clonazepam as of risk of fall    # HTN  -At home is on carvedilol 12.5 mg twice daily + Telmisartan 80mg at bedtime  -- c/w amLODIPine   -- increased to losartan 100 daily    #Macrocytic anemia  --Follow up B12, Folate    #DVTppx: lovenox  #GIppx: not needed  #Diet: dysphagia 3  #CODE: FULL  #Dispo: Acute; possible CABG on 2/23

## 2020-02-18 NOTE — PROGRESS NOTE ADULT - SUBJECTIVE AND OBJECTIVE BOX
SUBJECTIVE:    Patient is a 79y old Male who presents with a chief complaint of Cardiac arrest (17 Feb 2020 10:11)    Overnight Events: Patient is stable, no acute events overnight.  VS are stable, no major complaints.  Patient has walked yesterday with the help of rolling walker.    PAST MEDICAL & SURGICAL HISTORY  Atrial fibrillation  Neuropathy  HLD (hyperlipidemia)  HTN (hypertension)  History of cholecystectomy  History of cholecystectomy    SOCIAL HISTORY:  Negative for smoking/alcohol/drug use.     ALLERGIES:  No Known Allergies    MEDICATIONS:  STANDING MEDICATIONS  amLODIPine   Tablet 2.5 milliGRAM(s) Oral daily  aspirin  chewable 81 milliGRAM(s) Oral daily  atorvastatin 80 milliGRAM(s) Oral at bedtime  clonazePAM  Tablet 1 milliGRAM(s) Oral daily  enoxaparin Injectable 100 milliGRAM(s) SubCutaneous every 12 hours  famotidine    Tablet 20 milliGRAM(s) Oral two times a day  losartan 100 milliGRAM(s) Oral daily  multivitamin 1 Tablet(s) Oral daily  nystatin Powder 1 Application(s) Topical two times a day  senna 1 Tablet(s) Oral two times a day  thiamine 100 milliGRAM(s) Oral daily    PRN MEDICATIONS  benzonatate 100 milliGRAM(s) Oral three times a day PRN  bisacodyl 5 milliGRAM(s) Oral every 12 hours PRN  guaiFENesin   Syrup  (Sugar-Free) 200 milliGRAM(s) Oral every 6 hours PRN    VITALS:   T(F): 97.1, Max: 97.2 (02-17-20 @ 13:31)  HR: 78 (78 - 84)  BP: 146/88 (96/55 - 149/92)  RR: 18 (18 - 18)  SpO2: 97% (97% - 97%)    LABS:                        11.2   5.33  )-----------( 184      ( 18 Feb 2020 05:32 )             37.2     02-18    143  |  100  |  11  ----------------------------<  94  4.1   |  33<H>  |  0.7    Ca    8.5      18 Feb 2020 05:32  Mg     1.8     02-17    TPro  6.5  /  Alb  2.9<L>  /  TBili  0.8  /  DBili  x   /  AST  20  /  ALT  22  /  AlkPhos  130<H>  02-17                      02-17-20 @ 07:01  -  02-18-20 @ 07:00  --------------------------------------------------------  IN: 120 mL / OUT: 500 mL / NET: -380 mL        PHYSICAL EXAM:  GEN: NAD, comfortable  LUNGS: CTAB, no w/r/r  HEART: RRR, s1 and s2 appreciated, no m/r/g  ABD: soft, NT/ND, +BS  EXT: no edema, PP b/l  NEURO: AAOX3

## 2020-02-18 NOTE — CHART NOTE - NSCHARTNOTEFT_GEN_A_CORE
Looks so much better than he did last week. even better today than when I examined yesterday. Ambulated with walker.VSS. no more arrhythmias. will continue with intense therapy and possible CABG next week if he continues to improve. DW patient b, family, Dr Laws and Dr gee.

## 2020-02-19 LAB
ANION GAP SERPL CALC-SCNC: 12 MMOL/L — SIGNIFICANT CHANGE UP (ref 7–14)
BASOPHILS # BLD AUTO: 0.01 K/UL — SIGNIFICANT CHANGE UP (ref 0–0.2)
BASOPHILS NFR BLD AUTO: 0.2 % — SIGNIFICANT CHANGE UP (ref 0–1)
BUN SERPL-MCNC: 11 MG/DL — SIGNIFICANT CHANGE UP (ref 10–20)
CALCIUM SERPL-MCNC: 8.1 MG/DL — LOW (ref 8.5–10.1)
CHLORIDE SERPL-SCNC: 100 MMOL/L — SIGNIFICANT CHANGE UP (ref 98–110)
CO2 SERPL-SCNC: 28 MMOL/L — SIGNIFICANT CHANGE UP (ref 17–32)
CREAT SERPL-MCNC: 0.6 MG/DL — LOW (ref 0.7–1.5)
EOSINOPHIL # BLD AUTO: 0.03 K/UL — SIGNIFICANT CHANGE UP (ref 0–0.7)
EOSINOPHIL NFR BLD AUTO: 0.5 % — SIGNIFICANT CHANGE UP (ref 0–8)
GLUCOSE SERPL-MCNC: 101 MG/DL — HIGH (ref 70–99)
HCT VFR BLD CALC: 36.3 % — LOW (ref 42–52)
HGB BLD-MCNC: 10.9 G/DL — LOW (ref 14–18)
IMM GRANULOCYTES NFR BLD AUTO: 0.3 % — SIGNIFICANT CHANGE UP (ref 0.1–0.3)
LYMPHOCYTES # BLD AUTO: 0.5 K/UL — LOW (ref 1.2–3.4)
LYMPHOCYTES # BLD AUTO: 7.6 % — LOW (ref 20.5–51.1)
MCHC RBC-ENTMCNC: 29.2 PG — SIGNIFICANT CHANGE UP (ref 27–31)
MCHC RBC-ENTMCNC: 30 G/DL — LOW (ref 32–37)
MCV RBC AUTO: 97.3 FL — HIGH (ref 80–94)
MONOCYTES # BLD AUTO: 0.3 K/UL — SIGNIFICANT CHANGE UP (ref 0.1–0.6)
MONOCYTES NFR BLD AUTO: 4.6 % — SIGNIFICANT CHANGE UP (ref 1.7–9.3)
NEUTROPHILS # BLD AUTO: 5.69 K/UL — SIGNIFICANT CHANGE UP (ref 1.4–6.5)
NEUTROPHILS NFR BLD AUTO: 86.8 % — HIGH (ref 42.2–75.2)
NRBC # BLD: 0 /100 WBCS — SIGNIFICANT CHANGE UP (ref 0–0)
PLATELET # BLD AUTO: 184 K/UL — SIGNIFICANT CHANGE UP (ref 130–400)
POTASSIUM SERPL-MCNC: 4.6 MMOL/L — SIGNIFICANT CHANGE UP (ref 3.5–5)
POTASSIUM SERPL-SCNC: 4.6 MMOL/L — SIGNIFICANT CHANGE UP (ref 3.5–5)
PREALB SERPL-MCNC: 12 MG/DL — LOW (ref 20–40)
RBC # BLD: 3.73 M/UL — LOW (ref 4.7–6.1)
RBC # FLD: 14.8 % — HIGH (ref 11.5–14.5)
SODIUM SERPL-SCNC: 140 MMOL/L — SIGNIFICANT CHANGE UP (ref 135–146)
WBC # BLD: 6.55 K/UL — SIGNIFICANT CHANGE UP (ref 4.8–10.8)
WBC # FLD AUTO: 6.55 K/UL — SIGNIFICANT CHANGE UP (ref 4.8–10.8)

## 2020-02-19 PROCEDURE — 71045 X-RAY EXAM CHEST 1 VIEW: CPT | Mod: 26

## 2020-02-19 PROCEDURE — 99232 SBSQ HOSP IP/OBS MODERATE 35: CPT

## 2020-02-19 RX ORDER — FUROSEMIDE 40 MG
40 TABLET ORAL ONCE
Refills: 0 | Status: COMPLETED | OUTPATIENT
Start: 2020-02-19 | End: 2020-02-19

## 2020-02-19 RX ADMIN — Medication 40 MILLIGRAM(S): at 13:44

## 2020-02-19 RX ADMIN — ENOXAPARIN SODIUM 100 MILLIGRAM(S): 100 INJECTION SUBCUTANEOUS at 17:11

## 2020-02-19 RX ADMIN — Medication 100 MILLIGRAM(S): at 12:28

## 2020-02-19 RX ADMIN — SENNA PLUS 1 TABLET(S): 8.6 TABLET ORAL at 05:43

## 2020-02-19 RX ADMIN — SENNA PLUS 1 TABLET(S): 8.6 TABLET ORAL at 17:11

## 2020-02-19 RX ADMIN — NYSTATIN CREAM 1 APPLICATION(S): 100000 CREAM TOPICAL at 17:11

## 2020-02-19 RX ADMIN — Medication 1 TABLET(S): at 12:28

## 2020-02-19 RX ADMIN — FAMOTIDINE 20 MILLIGRAM(S): 10 INJECTION INTRAVENOUS at 17:11

## 2020-02-19 RX ADMIN — ENOXAPARIN SODIUM 100 MILLIGRAM(S): 100 INJECTION SUBCUTANEOUS at 05:43

## 2020-02-19 RX ADMIN — Medication 81 MILLIGRAM(S): at 12:28

## 2020-02-19 RX ADMIN — LOSARTAN POTASSIUM 100 MILLIGRAM(S): 100 TABLET, FILM COATED ORAL at 05:44

## 2020-02-19 RX ADMIN — ATORVASTATIN CALCIUM 80 MILLIGRAM(S): 80 TABLET, FILM COATED ORAL at 21:27

## 2020-02-19 RX ADMIN — AMLODIPINE BESYLATE 2.5 MILLIGRAM(S): 2.5 TABLET ORAL at 05:43

## 2020-02-19 RX ADMIN — NYSTATIN CREAM 1 APPLICATION(S): 100000 CREAM TOPICAL at 05:44

## 2020-02-19 RX ADMIN — FAMOTIDINE 20 MILLIGRAM(S): 10 INJECTION INTRAVENOUS at 05:44

## 2020-02-19 NOTE — PROGRESS NOTE ADULT - ASSESSMENT
79 year old male with a past medical history of atrial fibrillation (on Eliquis), Coronary Artery Disease, HTN, HLD, who was brought in by EMS post cardiac arrest. Patient admitted to medicine/telemetry. Planning for possible cath and/or CABG.  Patient also has fracture of the cervical spine and is placed on a [place] J collar.    #Cardiac Arrest 2/2 PEA likely from arrythmia secondary to NSTEMI  #CAD  -Trop 0.3 on admission  - Cardiac cath showed 90% stenosis of ostial LAD and 80% stenosis of proximal LAD  - Cardiologist Dr. Laws following  -- Dr. Laws thinks patient requires CABG  - CTS following, will anticipate and tentative schedule for CABG on 2/23 if he continues to improve with intense therapy    # C5/C6 intervertebral disk spacings and C5 fracture  -Hyperextension injury  - use [place] J collar  - Neurosurgery following  -- patient must wear collar at all times  --In 6 weeks obtain cervical spine x-rays and follow up with Dr. Joseph    # A.fib   - Rate currently ok  - Anticoagulation > Lovenox 100mg bid    # Anxiety   - On Clonazepam 1 q8 at home  - holding clonazepam as of risk of fall    # HTN  -At home is on carvedilol 12.5 mg twice daily + Telmisartan 80mg at bedtime  -- c/w amLODIPine   -- increased to losartan 100 daily    #Macrocytic anemia  --Follow up B12, Folate    #DVTppx: lovenox  #GIppx: not needed  #Diet: dysphagia 3  #CODE: FULL  #Dispo: Acute; possible CABG on 2/23

## 2020-02-19 NOTE — PROGRESS NOTE ADULT - SUBJECTIVE AND OBJECTIVE BOX
Called by Ct surgery to evaluate hard collar prior to operative procedure. Pt has an unstable cervical fracture and must remain in hard collar at all times. If patient is ziggy to OR consider fiberoptic intubation. D/w attending

## 2020-02-19 NOTE — PROGRESS NOTE ADULT - SUBJECTIVE AND OBJECTIVE BOX
Patient was seen and examined. Spoke with RN. Chart reviewed.  awaiting pt    No events overnight.  Vital Signs Last 24 Hrs  T(F): 97.7 (19 Feb 2020 13:41), Max: 99.2 (18 Feb 2020 20:30)  HR: 89 (19 Feb 2020 13:41) (81 - 104)  BP: 101/56 (19 Feb 2020 13:41) (101/56 - 138/72)  SpO2: 93% (18 Feb 2020 19:15) (93% - 93%)  MEDICATIONS  (STANDING):  amLODIPine   Tablet 2.5 milliGRAM(s) Oral daily  aspirin  chewable 81 milliGRAM(s) Oral daily  atorvastatin 80 milliGRAM(s) Oral at bedtime  enoxaparin Injectable 100 milliGRAM(s) SubCutaneous every 12 hours  famotidine    Tablet 20 milliGRAM(s) Oral two times a day  losartan 100 milliGRAM(s) Oral daily  multivitamin 1 Tablet(s) Oral daily  nystatin Powder 1 Application(s) Topical two times a day  senna 1 Tablet(s) Oral two times a day  thiamine 100 milliGRAM(s) Oral daily    MEDICATIONS  (PRN):  benzonatate 100 milliGRAM(s) Oral three times a day PRN Cough  bisacodyl 5 milliGRAM(s) Oral every 12 hours PRN Constipation  guaiFENesin   Syrup  (Sugar-Free) 200 milliGRAM(s) Oral every 6 hours PRN Cough    Labs:                        10.9   6.55  )-----------( 184      ( 19 Feb 2020 05:27 )             36.3                         11.2   5.33  )-----------( 184      ( 18 Feb 2020 05:32 )             37.2     19 Feb 2020 05:27    140    |  100    |  11     ----------------------------<  101    4.6     |  28     |  0.6    18 Feb 2020 05:32    143    |  100    |  11     ----------------------------<  94     4.1     |  33     |  0.7      Ca    8.1        19 Feb 2020 05:27  Ca    8.5        18 Feb 2020 05:32              Radiology:    General: comfortable, NAD  Neurology: A&Ox3, nonfocal  Head:  Normocephalic, atraumatic  ENT:  Mucosa moist, no ulcerations  Neck:  Supple, no JVD,   Skin: no breakdowns (as per RN)  Resp: CTA B/L  CV: RRR, S1S2,   GI: Soft, NT, bowel sounds  MS: No edema, + peripheral pulses, FROM all 4 extremity

## 2020-02-19 NOTE — PROGRESS NOTE ADULT - SUBJECTIVE AND OBJECTIVE BOX
PLANNED OPERATIVE PROCEDURE(s):    CABG                                SURGEON(s): MARCY Arreguin  SUBJECTIVE ASSESSMENT: 79y Male patient seen and examined at bedside. Patient denies any acute complaints at this time. Patient ambulating with c-collar in place.    Vital Signs Last 24 Hrs  T(F): 98.9 (19 Feb 2020 05:47), Max: 99.2 (18 Feb 2020 20:30)  HR: 81 (19 Feb 2020 05:47) (81 - 104)  BP: 138/72 (19 Feb 2020 05:47) (81/50 - 138/72)  RR: 19 (19 Feb 2020 05:47) (18 - 19)  SpO2: 93% (18 Feb 2020 19:15) (93% - 93%)    I&O's Detail    18 Feb 2020 07:01  -  19 Feb 2020 07:00  --------------------------------------------------------  IN:    Oral Fluid: 840 mL  Total IN: 840 mL    OUT:    Voided: 1400 mL  Total OUT: 1400 mL    Net: I&O's Detail    17 Feb 2020 07:01  -  18 Feb 2020 07:00  --------------------------------------------------------  Total NET: -380 mL    18 Feb 2020 07:01  -  19 Feb 2020 07:00  --------------------------------------------------------  Total NET: -560 mL      CAPILLARY BLOOD GLUCOSE  POCT Blood Glucose.: 92 mg/dL (18 Feb 2020 14:18)    Physical Exam:  General: NAD; A&Ox3  Cardiac: S1/S2, RRR, no murmur, no rubs  Lungs: unlabored respirations, CTA b/l, no wheeze, no rales, no crackles  Abdomen: Soft/NT/ND; positive bowel sounds x 4  Extremities: No edema b/l lower extremities; good capillary refill; no cyanosis; palpable 1+ pedal pulses b/l        LABS:                        10.9<L>  6.55  )-----------( 184      ( 19 Feb 2020 05:27 )             36.3<L>                        11.2<L>  5.33  )-----------( 184      ( 18 Feb 2020 05:32 )             37.2<L>    02-19    140  |  100  |  11  ----------------------------<  101<H>  4.6   |  28  |  0.6<L>  02-18    143  |  100  |  11  ----------------------------<  94  4.1   |  33<H>  |  0.7    Ca    8.1<L>      19 Feb 2020 05:27  Mg     1.8     02-17    TPro  6.5 [6.0 - 8.0]  /  Alb  2.9<L> [3.5 - 5.2]  /  TBili  0.8 [0.2 - 1.2]  /  DBili  x   /  AST  20 [0 - 41]  /  ALT  22 [0 - 41]  /  AlkPhos  130<H> [30 - 115]  02-17    MRSA/MSSA PCR (01.13.20 @ 01:06)    MRSA PCR Result.: Negative: By: Real-Time PCR (Polymerase Reaction Method)    Urinalysis in AM (02.06.20 @ 15:00)    pH Urine: 6.0    Glucose Qualitative, Urine: Negative    Blood, Urine: Trace    Color: Yellow    Urine Appearance: Clear    Bilirubin: Negative    Ketone - Urine: Negative    Specific Gravity: 1.019    Protein, Urine: Trace    Urobilinogen: 3 mg/dL    Nitrite: Negative    Leukocyte Esterase Concentration: Negative    Hemoglobin A1C with Mean Plasma Glucose (01.21.20 @ 12:20)    Hemoglobin A1C, Whole Blood: 5.7      RADIOLOGY & ADDITIONAL TESTS:  CXR: < from: Xray Chest 1 View- PORTABLE-Routine (02.03.20 @ 05:35) >  Impression:  Unchanged bilateral opacities, right greater than left.  < end of copied text >      Cardiac Cath: Coronary circulation: The coronary circulation is right dominant. Left main: The vessel was large sized. Angiography showed a single discrete lesion. Distal left main: There was a 20 % stenosis. LAD: The vessel was large sized. Angiography showed the vessel to wrap around the cardiac apex and multiple discrete lesions. Ostial LAD: There was a 90 % stenosis. This is a likely culprit for the patient's clinical presentation. Proximal LAD: There was a tubular 80 % stenosis. Circumflex: Angiography showed minor luminal irregularities with no flow limiting lesions. RCA: Angiography showed minor luminal irregularities with no flow limiting lesions.       TTE:< from: Transthoracic Echocardiogram (02.09.20 @ 09:09) >  Summary:   1. Left ventricular ejection fraction, by visual estimation, is 65 to 70%.   2. Mildly increased LV wall thickness.   3. Mild to moderate mitral valve regurgitation.   4. Moderate tricuspidregurgitation.   5. Mild aortic regurgitation.   6. Sclerotic aortic valve with decreased opening.   7. Estimated pulmonary artery systolic pressure is 41.1 mmHg assuming a right atrial pressure of 3 mmHg, which is consistent with mild pulmonary hypertension.  PHYSICIAN INTERPRETATION:  Left Ventricle: The left ventricular internal cavity size is normal. Left ventricular wall thickness is mildly increased. Left ventricular ejection fraction, by visual estimation, is 65 to 70%.  Right Ventricle:Normal right ventricular size and function.  Left Atrium: Moderately enlarged left atrium.  Right Atrium: Moderately enlarged right atrium.  Pericardium: There is no evidence of pericardial effusion.  Mitral Valve: Structurally normal mitral valve, with normal leaflet excursion. The mitral valve is normal in structure. Mild to moderate mitral valve regurgitation is seen.  Tricuspid Valve: Structurally normal tricuspid valve, with normal leaflet excursion. The tricuspid valve is normal in structure. Moderate tricuspid regurgitation is visualized. Estimated pulmonary artery systolic pressure is 41.1 mmHg assuming a right atrial pressure of 3 mmHg, which is consistent with mild pulmonary hypertension.  Aortic Valve: The aortic valve is trileaflet. No evidence of aortic stenosis. Sclerotic aortic valve with decreased opening. Mild aortic valve regurgitation is seen. Moderately thickened and calcified.  Pulmonic Valve: Structurally normal pulmonic valve, with normal leaflet excursion. The pulmonic valve is normal. Trace pulmonic valve regurgitation.  Aorta: The aortic root and ascending aorta are structurally normal, with no evidence of dilitation.  Pulmonary Artery: The main pulmonary artery is normal in size.  < end of copied text >      Carotids: < from: [facility] Duplex Carotid, Bilat (01.20.20 @ 20:33) >  Impression:  Less than 50% stenosis of the right internal carotid artery.  Less than 50% stenosis of the left internal carotid artery.  < end of copied text >      CT head: < from: CT Head No Cont (01.12.20 @ 18:59) >  IMPRESSION:  1.  No evidence of acute intracranial pathology.  2.  Chronic microvascular ischemic changes.  3.  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  < end of copied text >       CT neck: < from: CT Cervical Spine No Cont (01.14.20 @ 16:48) >  IMPRESSION:  1.  Widening of the C5-6 disc space and fragmentation of the anterior bridging osteophyte at this level. Findings are suspicious for distraction injury through the C5-6 disc with C5-6 anterior osteophyte fracture. Recommend further evaluation with MRI.  2.  Diffuse prevertebral edema as well as edema within the left anterolateral neck soft tissues and supraclavicular region.  3.  Stranding and possible hematoma behind the esophagus, as previously described.  4.  Multilevel anterior bridging osteophytes/syndesmophytes.  5.  Multilevel degenerative changes as described.  < end of copied text >      CT CHEST: < from: CT Angio Chest w/ IV Cont (01.13.20 @ 03:06) >  IMPRESSION:   1. No central or lobar pulmonary embolus.  2.  Small bibasilar consolidated opacities, possibly representing atelectasis or sequela of prior aspiration event.  3.  A 4.4 cm retroesophageal soft tissue density within the thorax may represent hematoma.  4.  No evidence of acute/inflammatory process within the abdomen or pelvis.  < end of copied text >      C T ABD: < from: CT Abdomen and Pelvis w/ IV Cont (01.13.20 @ 03:08) >  IMPRESSION:   1. No central or lobar pulmonary embolus.  2.  Small bibasilar consolidated opacities, possibly representing atelectasis or sequela of prior aspiration event.  3.  A 4.4 cm retroesophageal soft tissue density within the thorax may represent hematoma.  4.  No evidence of acute/inflammatory process within the abdomen or pelvis.  < end of copied text >       LE Venous duplex: < from: VA Duplex Lower Ext Vein Scan, Bilat (01.13.20 @ 18:48) >  Impression:  No evidence of deep venous thrombosis in the bilateral lower extremities.  < end of copied text >      < from: VA Physiol Extremity Lower 3+ Level, BI (01.22.20 @ 16:54) >  Ankle brachial index was  1.1 on the right & 1.3  on the left.  Impression: Normal arterial hemodynamics in the lower extremities bilaterally.   < end of copied text >    Allergies  No Known Allergies  Intolerances      MEDICATIONS  (STANDING):  amLODIPine   Tablet 2.5 milliGRAM(s) Oral daily  aspirin  chewable 81 milliGRAM(s) Oral daily  atorvastatin 80 milliGRAM(s) Oral at bedtime  enoxaparin Injectable 100 milliGRAM(s) SubCutaneous every 12 hours  famotidine    Tablet 20 milliGRAM(s) Oral two times a day  losartan 100 milliGRAM(s) Oral daily  multivitamin 1 Tablet(s) Oral daily  nystatin Powder 1 Application(s) Topical two times a day  senna 1 Tablet(s) Oral two times a day  thiamine 100 milliGRAM(s) Oral daily    MEDICATIONS  (PRN):  benzonatate 100 milliGRAM(s) Oral three times a day PRN Cough  bisacodyl 5 milliGRAM(s) Oral every 12 hours PRN Constipation  guaiFENesin   Syrup  (Sugar-Free) 200 milliGRAM(s) Oral every 6 hours PRN Cough      Pre-op ACEi/ARB/CCB held 24 hours prior to planned procedure: [x] Yes, [] NO: indication:  Pre-Op Beta-Blockers: [x]Yes, []No: contraindication:  Pre-Op Aspirin: [x]Yes,  []No: contraindication: [] Held for Pre-op cardiac valve surgery with no CAD  Pre-Op Statin: [x]Yes, []No: contraindication:      Ambulation/Activity Status: OOB/AMB    Assessment/Plan:  79y Male Pre-op for CABG LIMA to LAD via MICs    - Case and plan discussed with CT Surgeon - Dr. Shaw  - Continue supportive care.    - Continue DVT/GI prophylaxis  - Incentive Spirometry 10 times an hour  - Continue to advance physical activity as tolerated and continue PT/OT as directed  1. CAD: Continue ASA, statin, BB  2. HTN: please hold losartan to prevent chey[place]-operative ELMO. PLANNED OPERATIVE PROCEDURE(s):    CABG                                SURGEON(s): MARCY Arreguin  SUBJECTIVE ASSESSMENT: 79y Male patient seen and examined at bedside. Patient complaining of worsening SOB. Patient ambulating with c-collar in place.    Vital Signs Last 24 Hrs  T(F): 98.9 (19 Feb 2020 05:47), Max: 99.2 (18 Feb 2020 20:30)  HR: 81 (19 Feb 2020 05:47) (81 - 104)  BP: 138/72 (19 Feb 2020 05:47) (81/50 - 138/72)  RR: 19 (19 Feb 2020 05:47) (18 - 19)  SpO2: 93% (18 Feb 2020 19:15) (93% - 93%)    I&O's Detail    18 Feb 2020 07:01  -  19 Feb 2020 07:00  --------------------------------------------------------  IN:    Oral Fluid: 840 mL  Total IN: 840 mL    OUT:    Voided: 1400 mL  Total OUT: 1400 mL    Net: I&O's Detail    17 Feb 2020 07:01  -  18 Feb 2020 07:00  --------------------------------------------------------  Total NET: -380 mL    18 Feb 2020 07:01  -  19 Feb 2020 07:00  --------------------------------------------------------  Total NET: -560 mL      CAPILLARY BLOOD GLUCOSE  POCT Blood Glucose.: 92 mg/dL (18 Feb 2020 14:18)    Physical Exam:  General: NAD; A&Ox3  Cardiac: S1/S2, RRR, no murmur, no rubs  Lungs: unlabored respirations, CTA b/l, no wheeze, no rales, no crackles  Abdomen: Soft/NT/ND; positive bowel sounds x 4  Extremities: 1+ edema b/l lower extremities; good capillary refill; no cyanosis; palpable 1+ pedal pulses b/l        LABS:                        10.9<L>  6.55  )-----------( 184      ( 19 Feb 2020 05:27 )             36.3<L>                        11.2<L>  5.33  )-----------( 184      ( 18 Feb 2020 05:32 )             37.2<L>    02-19    140  |  100  |  11  ----------------------------<  101<H>  4.6   |  28  |  0.6<L>  02-18    143  |  100  |  11  ----------------------------<  94  4.1   |  33<H>  |  0.7    Ca    8.1<L>      19 Feb 2020 05:27  Mg     1.8     02-17    TPro  6.5 [6.0 - 8.0]  /  Alb  2.9<L> [3.5 - 5.2]  /  TBili  0.8 [0.2 - 1.2]  /  DBili  x   /  AST  20 [0 - 41]  /  ALT  22 [0 - 41]  /  AlkPhos  130<H> [30 - 115]  02-17    MRSA/MSSA PCR (01.13.20 @ 01:06)    MRSA PCR Result.: Negative: By: Real-Time PCR (Polymerase Reaction Method)    Urinalysis in AM (02.06.20 @ 15:00)    pH Urine: 6.0    Glucose Qualitative, Urine: Negative    Blood, Urine: Trace    Color: Yellow    Urine Appearance: Clear    Bilirubin: Negative    Ketone - Urine: Negative    Specific Gravity: 1.019    Protein, Urine: Trace    Urobilinogen: 3 mg/dL    Nitrite: Negative    Leukocyte Esterase Concentration: Negative    Hemoglobin A1C with Mean Plasma Glucose (01.21.20 @ 12:20)    Hemoglobin A1C, Whole Blood: 5.7      RADIOLOGY & ADDITIONAL TESTS:  CXR: < from: Xray Chest 1 View- PORTABLE-Routine (02.03.20 @ 05:35) >  Impression:  Unchanged bilateral opacities, right greater than left.  < end of copied text >      Cardiac Cath: Coronary circulation: The coronary circulation is right dominant. Left main: The vessel was large sized. Angiography showed a single discrete lesion. Distal left main: There was a 20 % stenosis. LAD: The vessel was large sized. Angiography showed the vessel to wrap around the cardiac apex and multiple discrete lesions. Ostial LAD: There was a 90 % stenosis. This is a likely culprit for the patient's clinical presentation. Proximal LAD: There was a tubular 80 % stenosis. Circumflex: Angiography showed minor luminal irregularities with no flow limiting lesions. RCA: Angiography showed minor luminal irregularities with no flow limiting lesions.       TTE:< from: Transthoracic Echocardiogram (02.09.20 @ 09:09) >  Summary:   1. Left ventricular ejection fraction, by visual estimation, is 65 to 70%.   2. Mildly increased LV wall thickness.   3. Mild to moderate mitral valve regurgitation.   4. Moderate tricuspidregurgitation.   5. Mild aortic regurgitation.   6. Sclerotic aortic valve with decreased opening.   7. Estimated pulmonary artery systolic pressure is 41.1 mmHg assuming a right atrial pressure of 3 mmHg, which is consistent with mild pulmonary hypertension.  PHYSICIAN INTERPRETATION:  Left Ventricle: The left ventricular internal cavity size is normal. Left ventricular wall thickness is mildly increased. Left ventricular ejection fraction, by visual estimation, is 65 to 70%.  Right Ventricle:Normal right ventricular size and function.  Left Atrium: Moderately enlarged left atrium.  Right Atrium: Moderately enlarged right atrium.  Pericardium: There is no evidence of pericardial effusion.  Mitral Valve: Structurally normal mitral valve, with normal leaflet excursion. The mitral valve is normal in structure. Mild to moderate mitral valve regurgitation is seen.  Tricuspid Valve: Structurally normal tricuspid valve, with normal leaflet excursion. The tricuspid valve is normal in structure. Moderate tricuspid regurgitation is visualized. Estimated pulmonary artery systolic pressure is 41.1 mmHg assuming a right atrial pressure of 3 mmHg, which is consistent with mild pulmonary hypertension.  Aortic Valve: The aortic valve is trileaflet. No evidence of aortic stenosis. Sclerotic aortic valve with decreased opening. Mild aortic valve regurgitation is seen. Moderately thickened and calcified.  Pulmonic Valve: Structurally normal pulmonic valve, with normal leaflet excursion. The pulmonic valve is normal. Trace pulmonic valve regurgitation.  Aorta: The aortic root and ascending aorta are structurally normal, with no evidence of dilitation.  Pulmonary Artery: The main pulmonary artery is normal in size.  < end of copied text >      Carotids: < from: VA Duplex Carotid, Bilat (01.20.20 @ 20:33) >  Impression:  Less than 50% stenosis of the right internal carotid artery.  Less than 50% stenosis of the left internal carotid artery.  < end of copied text >      CT head: < from: CT Head No Cont (01.12.20 @ 18:59) >  IMPRESSION:  1.  No evidence of acute intracranial pathology.  2.  Chronic microvascular ischemic changes.  3.  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  < end of copied text >       CT neck: < from: CT Cervical Spine No Cont (01.14.20 @ 16:48) >  IMPRESSION:  1.  Widening of the C5-6 disc space and fragmentation of the anterior bridging osteophyte at this level. Findings are suspicious for distraction injury through the C5-6 disc with C5-6 anterior osteophyte fracture. Recommend further evaluation with MRI.  2.  Diffuse prevertebral edema as well as edema within the left anterolateral neck soft tissues and supraclavicular region.  3.  Stranding and possible hematoma behind the esophagus, as previously described.  4.  Multilevel anterior bridging osteophytes/syndesmophytes.  5.  Multilevel degenerative changes as described.  < end of copied text >      CT CHEST: < from: CT Angio Chest w/ IV Cont (01.13.20 @ 03:06) >  IMPRESSION:   1. No central or lobar pulmonary embolus.  2.  Small bibasilar consolidated opacities, possibly representing atelectasis or sequela of prior aspiration event.  3.  A 4.4 cm retroesophageal soft tissue density within the thorax may represent hematoma.  4.  No evidence of acute/inflammatory process within the abdomen or pelvis.  < end of copied text >      C T ABD: < from: CT Abdomen and Pelvis w/ IV Cont (01.13.20 @ 03:08) >  IMPRESSION:   1. No central or lobar pulmonary embolus.  2.  Small bibasilar consolidated opacities, possibly representing atelectasis or sequela of prior aspiration event.  3.  A 4.4 cm retroesophageal soft tissue density within the thorax may represent hematoma.  4.  No evidence of acute/inflammatory process within the abdomen or pelvis.  < end of copied text >       LE Venous duplex: < from: VA Duplex Lower Ext Vein Scan, Bilat (01.13.20 @ 18:48) >  Impression:  No evidence of deep venous thrombosis in the bilateral lower extremities.  < end of copied text >      < from: VA Physiol Extremity Lower 3+ Level, BI (01.22.20 @ 16:54) >  Ankle brachial index was  1.1 on the right & 1.3  on the left.  Impression: Normal arterial hemodynamics in the lower extremities bilaterally.   < end of copied text >    Allergies  No Known Allergies  Intolerances      MEDICATIONS  (STANDING):  amLODIPine   Tablet 2.5 milliGRAM(s) Oral daily  aspirin  chewable 81 milliGRAM(s) Oral daily  atorvastatin 80 milliGRAM(s) Oral at bedtime  enoxaparin Injectable 100 milliGRAM(s) SubCutaneous every 12 hours  famotidine    Tablet 20 milliGRAM(s) Oral two times a day  losartan 100 milliGRAM(s) Oral daily  multivitamin 1 Tablet(s) Oral daily  nystatin Powder 1 Application(s) Topical two times a day  senna 1 Tablet(s) Oral two times a day  thiamine 100 milliGRAM(s) Oral daily    MEDICATIONS  (PRN):  benzonatate 100 milliGRAM(s) Oral three times a day PRN Cough  bisacodyl 5 milliGRAM(s) Oral every 12 hours PRN Constipation  guaiFENesin   Syrup  (Sugar-Free) 200 milliGRAM(s) Oral every 6 hours PRN Cough      Pre-op ACEi/ARB/CCB held 24 hours prior to planned procedure: [x] Yes, [] NO: indication:  Pre-Op Beta-Blockers: [x]Yes, []No: contraindication:  Pre-Op Aspirin: [x]Yes,  []No: contraindication: [] Held for Pre-op cardiac valve surgery with no CAD  Pre-Op Statin: [x]Yes, []No: contraindication:      Ambulation/Activity Status: OOB/AMB    Assessment/Plan:  79y Male Pre-op for CABG LIMA to LAD via MICs    - Case and plan discussed with CT Surgeon - Dr. Shaw  - Continue supportive care.    - Continue DVT/GI prophylaxis  - Incentive Spirometry 10 times an hour  - Continue to advance physical activity as tolerated and continue PT/OT as directed  1. CAD: Continue ASA, statin, BB  2. HTN: please hold losartan to prevent chey[place]-operative ELMO.  3. SOB: cxr stat with lasix 40 mg IV x1.  4. Please transfer to CTS service under Dr. Shaw. PLANNED OPERATIVE PROCEDURE(s):    CABG                                SURGEON(s): MARCY Arreguin  SUBJECTIVE ASSESSMENT: 79y Male patient seen and examined at bedside. Patient complaining of worsening SOB. Patient ambulating with c-collar in place.    Vital Signs Last 24 Hrs  T(F): 98.9 (19 Feb 2020 05:47), Max: 99.2 (18 Feb 2020 20:30)  HR: 81 (19 Feb 2020 05:47) (81 - 104)  BP: 138/72 (19 Feb 2020 05:47) (81/50 - 138/72)  RR: 19 (19 Feb 2020 05:47) (18 - 19)  SpO2: 93% (18 Feb 2020 19:15) (93% - 93%)    I&O's Detail    18 Feb 2020 07:01  -  19 Feb 2020 07:00  --------------------------------------------------------  IN:    Oral Fluid: 840 mL  Total IN: 840 mL    OUT:    Voided: 1400 mL  Total OUT: 1400 mL    Net: I&O's Detail    17 Feb 2020 07:01  -  18 Feb 2020 07:00  --------------------------------------------------------  Total NET: -380 mL    18 Feb 2020 07:01  -  19 Feb 2020 07:00  --------------------------------------------------------  Total NET: -560 mL      CAPILLARY BLOOD GLUCOSE  POCT Blood Glucose.: 92 mg/dL (18 Feb 2020 14:18)    Physical Exam:  General: NAD; A&Ox3  Cardiac: S1/S2, RRR, no murmur, no rubs  Lungs: unlabored respirations, CTA b/l, no wheeze, no rales, no crackles  Abdomen: Soft/NT/ND; positive bowel sounds x 4  Extremities: 1+ edema b/l lower extremities; good capillary refill; no cyanosis; palpable 1+ pedal pulses b/l        LABS:                        10.9<L>  6.55  )-----------( 184      ( 19 Feb 2020 05:27 )             36.3<L>                        11.2<L>  5.33  )-----------( 184      ( 18 Feb 2020 05:32 )             37.2<L>    02-19    140  |  100  |  11  ----------------------------<  101<H>  4.6   |  28  |  0.6<L>  02-18    143  |  100  |  11  ----------------------------<  94  4.1   |  33<H>  |  0.7    Ca    8.1<L>      19 Feb 2020 05:27  Mg     1.8     02-17    TPro  6.5 [6.0 - 8.0]  /  Alb  2.9<L> [3.5 - 5.2]  /  TBili  0.8 [0.2 - 1.2]  /  DBili  x   /  AST  20 [0 - 41]  /  ALT  22 [0 - 41]  /  AlkPhos  130<H> [30 - 115]  02-17    MRSA/MSSA PCR (01.13.20 @ 01:06)    MRSA PCR Result.: Negative: By: Real-Time PCR (Polymerase Reaction Method)    Urinalysis in AM (02.06.20 @ 15:00)    pH Urine: 6.0    Glucose Qualitative, Urine: Negative    Blood, Urine: Trace    Color: Yellow    Urine Appearance: Clear    Bilirubin: Negative    Ketone - Urine: Negative    Specific Gravity: 1.019    Protein, Urine: Trace    Urobilinogen: 3 mg/dL    Nitrite: Negative    Leukocyte Esterase Concentration: Negative    Hemoglobin A1C with Mean Plasma Glucose (01.21.20 @ 12:20)    Hemoglobin A1C, Whole Blood: 5.7      RADIOLOGY & ADDITIONAL TESTS:  CXR: < from: Xray Chest 1 View- PORTABLE-Routine (02.03.20 @ 05:35) >  Impression:  Unchanged bilateral opacities, right greater than left.  < end of copied text >      Cardiac Cath: Coronary circulation: The coronary circulation is right dominant. Left main: The vessel was large sized. Angiography showed a single discrete lesion. Distal left main: There was a 20 % stenosis. LAD: The vessel was large sized. Angiography showed the vessel to wrap around the cardiac apex and multiple discrete lesions. Ostial LAD: There was a 90 % stenosis. This is a likely culprit for the patient's clinical presentation. Proximal LAD: There was a tubular 80 % stenosis. Circumflex: Angiography showed minor luminal irregularities with no flow limiting lesions. RCA: Angiography showed minor luminal irregularities with no flow limiting lesions.       TTE:< from: Transthoracic Echocardiogram (02.09.20 @ 09:09) >  Summary:   1. Left ventricular ejection fraction, by visual estimation, is 65 to 70%.   2. Mildly increased LV wall thickness.   3. Mild to moderate mitral valve regurgitation.   4. Moderate tricuspidregurgitation.   5. Mild aortic regurgitation.   6. Sclerotic aortic valve with decreased opening.   7. Estimated pulmonary artery systolic pressure is 41.1 mmHg assuming a right atrial pressure of 3 mmHg, which is consistent with mild pulmonary hypertension.  PHYSICIAN INTERPRETATION:  Left Ventricle: The left ventricular internal cavity size is normal. Left ventricular wall thickness is mildly increased. Left ventricular ejection fraction, by visual estimation, is 65 to 70%.  Right Ventricle:Normal right ventricular size and function.  Left Atrium: Moderately enlarged left atrium.  Right Atrium: Moderately enlarged right atrium.  Pericardium: There is no evidence of pericardial effusion.  Mitral Valve: Structurally normal mitral valve, with normal leaflet excursion. The mitral valve is normal in structure. Mild to moderate mitral valve regurgitation is seen.  Tricuspid Valve: Structurally normal tricuspid valve, with normal leaflet excursion. The tricuspid valve is normal in structure. Moderate tricuspid regurgitation is visualized. Estimated pulmonary artery systolic pressure is 41.1 mmHg assuming a right atrial pressure of 3 mmHg, which is consistent with mild pulmonary hypertension.  Aortic Valve: The aortic valve is trileaflet. No evidence of aortic stenosis. Sclerotic aortic valve with decreased opening. Mild aortic valve regurgitation is seen. Moderately thickened and calcified.  Pulmonic Valve: Structurally normal pulmonic valve, with normal leaflet excursion. The pulmonic valve is normal. Trace pulmonic valve regurgitation.  Aorta: The aortic root and ascending aorta are structurally normal, with no evidence of dilitation.  Pulmonary Artery: The main pulmonary artery is normal in size.  < end of copied text >      Carotids: < from: [facility] Duplex Carotid, Bilat (01.20.20 @ 20:33) >  Impression:  Less than 50% stenosis of the right internal carotid artery.  Less than 50% stenosis of the left internal carotid artery.  < end of copied text >      CT head: < from: CT Head No Cont (01.12.20 @ 18:59) >  IMPRESSION:  1.  No evidence of acute intracranial pathology.  2.  Chronic microvascular ischemic changes.  3.  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  < end of copied text >       CT neck: < from: CT Cervical Spine No Cont (01.14.20 @ 16:48) >  IMPRESSION:  1.  Widening of the C5-6 disc space and fragmentation of the anterior bridging osteophyte at this level. Findings are suspicious for distraction injury through the C5-6 disc with C5-6 anterior osteophyte fracture. Recommend further evaluation with MRI.  2.  Diffuse prevertebral edema as well as edema within the left anterolateral neck soft tissues and supraclavicular region.  3.  Stranding and possible hematoma behind the esophagus, as previously described.  4.  Multilevel anterior bridging osteophytes/syndesmophytes.  5.  Multilevel degenerative changes as described.  < end of copied text >      CT CHEST: < from: CT Angio Chest w/ IV Cont (01.13.20 @ 03:06) >  IMPRESSION:   1. No central or lobar pulmonary embolus.  2.  Small bibasilar consolidated opacities, possibly representing atelectasis or sequela of prior aspiration event.  3.  A 4.4 cm retroesophageal soft tissue density within the thorax may represent hematoma.  4.  No evidence of acute/inflammatory process within the abdomen or pelvis.  < end of copied text >      C T ABD: < from: CT Abdomen and Pelvis w/ IV Cont (01.13.20 @ 03:08) >  IMPRESSION:   1. No central or lobar pulmonary embolus.  2.  Small bibasilar consolidated opacities, possibly representing atelectasis or sequela of prior aspiration event.  3.  A 4.4 cm retroesophageal soft tissue density within the thorax may represent hematoma.  4.  No evidence of acute/inflammatory process within the abdomen or pelvis.  < end of copied text >       LE Venous duplex: < from: [facility] Duplex Lower Ext Vein Scan, Bilat (01.13.20 @ 18:48) >  Impression:  No evidence of deep venous thrombosis in the bilateral lower extremities.  < end of copied text >      < from: [facility] Physiol Extremity Lower 3+ Level, BI (01.22.20 @ 16:54) >  Ankle brachial index was  1.1 on the right & 1.3  on the left.  Impression: Normal arterial hemodynamics in the lower extremities bilaterally.   < end of copied text >    Allergies  No Known Allergies  Intolerances      MEDICATIONS  (STANDING):  amLODIPine   Tablet 2.5 milliGRAM(s) Oral daily  aspirin  chewable 81 milliGRAM(s) Oral daily  atorvastatin 80 milliGRAM(s) Oral at bedtime  enoxaparin Injectable 100 milliGRAM(s) SubCutaneous every 12 hours  famotidine    Tablet 20 milliGRAM(s) Oral two times a day  losartan 100 milliGRAM(s) Oral daily  multivitamin 1 Tablet(s) Oral daily  nystatin Powder 1 Application(s) Topical two times a day  senna 1 Tablet(s) Oral two times a day  thiamine 100 milliGRAM(s) Oral daily    MEDICATIONS  (PRN):  benzonatate 100 milliGRAM(s) Oral three times a day PRN Cough  bisacodyl 5 milliGRAM(s) Oral every 12 hours PRN Constipation  guaiFENesin   Syrup  (Sugar-Free) 200 milliGRAM(s) Oral every 6 hours PRN Cough      Pre-op ACEi/ARB/CCB held 24 hours prior to planned procedure: [x] Yes, [] NO: indication:  Pre-Op Beta-Blockers: [x]Yes, []No: contraindication:  Pre-Op Aspirin: [x]Yes,  []No: contraindication: [] Held for Pre-op cardiac valve surgery with no CAD  Pre-Op Statin: [x]Yes, []No: contraindication:      Ambulation/Activity Status: OOB/AMB    Assessment/Plan:  79y Male Pre-op for CABG LIMA to LAD via MICs    - Case and plan discussed with CT Surgeon - Dr. Shaw  - Continue supportive care.    - Continue DVT/GI prophylaxis  - Incentive Spirometry 10 times an hour  - Continue to advance physical activity as tolerated and continue PT/OT as directed  1. CAD: Continue ASA, statin, BB  2. HTN: please hold losartan to prevent [place]-operative ELMO.  3. SOB: cxr stat with lasix 40 mg IV x1.  4. Neurosurgery ignacio for C-collar: maintain for total 3 months for unstable c-spine fractures. Will require fiberoptic intubation for planned CABG.  5. Please transfer to CTS service under Dr. Shaw.

## 2020-02-19 NOTE — PROGRESS NOTE ADULT - SUBJECTIVE AND OBJECTIVE BOX
SUBJECTIVE:    Patient is a 79y old Male who presents with a chief complaint of Cardiac arrest (18 Feb 2020 13:38)    Overnight Events: Patient is stable, no acute events overnight.  VS are stable, no major complaints.    PAST MEDICAL & SURGICAL HISTORY  Atrial fibrillation  Neuropathy  HLD (hyperlipidemia)  HTN (hypertension)  History of cholecystectomy  History of cholecystectomy    SOCIAL HISTORY:  Negative for smoking/alcohol/drug use.     ALLERGIES:  No Known Allergies    MEDICATIONS:  STANDING MEDICATIONS  amLODIPine   Tablet 2.5 milliGRAM(s) Oral daily  aspirin  chewable 81 milliGRAM(s) Oral daily  atorvastatin 80 milliGRAM(s) Oral at bedtime  enoxaparin Injectable 100 milliGRAM(s) SubCutaneous every 12 hours  famotidine    Tablet 20 milliGRAM(s) Oral two times a day  losartan 100 milliGRAM(s) Oral daily  multivitamin 1 Tablet(s) Oral daily  nystatin Powder 1 Application(s) Topical two times a day  senna 1 Tablet(s) Oral two times a day  thiamine 100 milliGRAM(s) Oral daily    PRN MEDICATIONS  benzonatate 100 milliGRAM(s) Oral three times a day PRN  bisacodyl 5 milliGRAM(s) Oral every 12 hours PRN  guaiFENesin   Syrup  (Sugar-Free) 200 milliGRAM(s) Oral every 6 hours PRN    VITALS:   T(F): 98.9, Max: 99.2 (02-18-20 @ 20:30)  HR: 81 (81 - 104)  BP: 138/72 (81/50 - 138/72)  RR: 19 (18 - 19)  SpO2: 93% (93% - 93%)    LABS:                        10.9   6.55  )-----------( 184      ( 19 Feb 2020 05:27 )             36.3     02-19    140  |  100  |  11  ----------------------------<  101<H>  4.6   |  28  |  0.6<L>    Ca    8.1<L>      19 Feb 2020 05:27                        02-18-20 @ 07:01  -  02-19-20 @ 07:00  --------------------------------------------------------  IN: 840 mL / OUT: 1400 mL / NET: -560 mL          PHYSICAL EXAM:  GEN: NAD, comfortable  LUNGS: CTAB, no w/r/r  HEART: RRR, s1 and s2 appreciated, no m/r/g  ABD: soft, NT/ND, +BS  EXT: no edema, PP b/l  NEURO: AAOX3

## 2020-02-20 LAB
ANION GAP SERPL CALC-SCNC: 10 MMOL/L — SIGNIFICANT CHANGE UP (ref 7–14)
BASOPHILS # BLD AUTO: 0.01 K/UL — SIGNIFICANT CHANGE UP (ref 0–0.2)
BASOPHILS NFR BLD AUTO: 0.2 % — SIGNIFICANT CHANGE UP (ref 0–1)
BUN SERPL-MCNC: 17 MG/DL — SIGNIFICANT CHANGE UP (ref 10–20)
CALCIUM SERPL-MCNC: 8.3 MG/DL — LOW (ref 8.5–10.1)
CHLORIDE SERPL-SCNC: 97 MMOL/L — LOW (ref 98–110)
CO2 SERPL-SCNC: 34 MMOL/L — HIGH (ref 17–32)
CREAT SERPL-MCNC: 0.7 MG/DL — SIGNIFICANT CHANGE UP (ref 0.7–1.5)
EOSINOPHIL # BLD AUTO: 0.08 K/UL — SIGNIFICANT CHANGE UP (ref 0–0.7)
EOSINOPHIL NFR BLD AUTO: 1.4 % — SIGNIFICANT CHANGE UP (ref 0–8)
GLUCOSE BLDC GLUCOMTR-MCNC: 101 MG/DL — HIGH (ref 70–99)
GLUCOSE SERPL-MCNC: 95 MG/DL — SIGNIFICANT CHANGE UP (ref 70–99)
HCT VFR BLD CALC: 36.9 % — LOW (ref 42–52)
HGB BLD-MCNC: 11.3 G/DL — LOW (ref 14–18)
IMM GRANULOCYTES NFR BLD AUTO: 0.5 % — HIGH (ref 0.1–0.3)
LYMPHOCYTES # BLD AUTO: 0.88 K/UL — LOW (ref 1.2–3.4)
LYMPHOCYTES # BLD AUTO: 15.3 % — LOW (ref 20.5–51.1)
MCHC RBC-ENTMCNC: 29.5 PG — SIGNIFICANT CHANGE UP (ref 27–31)
MCHC RBC-ENTMCNC: 30.6 G/DL — LOW (ref 32–37)
MCV RBC AUTO: 96.3 FL — HIGH (ref 80–94)
MONOCYTES # BLD AUTO: 0.5 K/UL — SIGNIFICANT CHANGE UP (ref 0.1–0.6)
MONOCYTES NFR BLD AUTO: 8.7 % — SIGNIFICANT CHANGE UP (ref 1.7–9.3)
NEUTROPHILS # BLD AUTO: 4.26 K/UL — SIGNIFICANT CHANGE UP (ref 1.4–6.5)
NEUTROPHILS NFR BLD AUTO: 73.9 % — SIGNIFICANT CHANGE UP (ref 42.2–75.2)
NRBC # BLD: 0 /100 WBCS — SIGNIFICANT CHANGE UP (ref 0–0)
PLATELET # BLD AUTO: 184 K/UL — SIGNIFICANT CHANGE UP (ref 130–400)
POTASSIUM SERPL-MCNC: 4.1 MMOL/L — SIGNIFICANT CHANGE UP (ref 3.5–5)
POTASSIUM SERPL-SCNC: 4.1 MMOL/L — SIGNIFICANT CHANGE UP (ref 3.5–5)
RBC # BLD: 3.83 M/UL — LOW (ref 4.7–6.1)
RBC # FLD: 14.8 % — HIGH (ref 11.5–14.5)
SODIUM SERPL-SCNC: 141 MMOL/L — SIGNIFICANT CHANGE UP (ref 135–146)
WBC # BLD: 5.76 K/UL — SIGNIFICANT CHANGE UP (ref 4.8–10.8)
WBC # FLD AUTO: 5.76 K/UL — SIGNIFICANT CHANGE UP (ref 4.8–10.8)

## 2020-02-20 PROCEDURE — 99232 SBSQ HOSP IP/OBS MODERATE 35: CPT

## 2020-02-20 PROCEDURE — 71045 X-RAY EXAM CHEST 1 VIEW: CPT | Mod: 26

## 2020-02-20 PROCEDURE — 93010 ELECTROCARDIOGRAM REPORT: CPT

## 2020-02-20 RX ADMIN — SENNA PLUS 1 TABLET(S): 8.6 TABLET ORAL at 05:24

## 2020-02-20 RX ADMIN — SENNA PLUS 1 TABLET(S): 8.6 TABLET ORAL at 17:34

## 2020-02-20 RX ADMIN — FAMOTIDINE 20 MILLIGRAM(S): 10 INJECTION INTRAVENOUS at 17:34

## 2020-02-20 RX ADMIN — ATORVASTATIN CALCIUM 80 MILLIGRAM(S): 80 TABLET, FILM COATED ORAL at 22:05

## 2020-02-20 RX ADMIN — NYSTATIN CREAM 1 APPLICATION(S): 100000 CREAM TOPICAL at 05:25

## 2020-02-20 RX ADMIN — NYSTATIN CREAM 1 APPLICATION(S): 100000 CREAM TOPICAL at 17:35

## 2020-02-20 RX ADMIN — Medication 81 MILLIGRAM(S): at 11:58

## 2020-02-20 RX ADMIN — LOSARTAN POTASSIUM 100 MILLIGRAM(S): 100 TABLET, FILM COATED ORAL at 05:25

## 2020-02-20 RX ADMIN — Medication 100 MILLIGRAM(S): at 11:58

## 2020-02-20 RX ADMIN — AMLODIPINE BESYLATE 2.5 MILLIGRAM(S): 2.5 TABLET ORAL at 05:25

## 2020-02-20 RX ADMIN — Medication 1 TABLET(S): at 11:58

## 2020-02-20 RX ADMIN — FAMOTIDINE 20 MILLIGRAM(S): 10 INJECTION INTRAVENOUS at 05:25

## 2020-02-20 RX ADMIN — ENOXAPARIN SODIUM 100 MILLIGRAM(S): 100 INJECTION SUBCUTANEOUS at 05:25

## 2020-02-20 RX ADMIN — ENOXAPARIN SODIUM 100 MILLIGRAM(S): 100 INJECTION SUBCUTANEOUS at 17:35

## 2020-02-20 NOTE — PROGRESS NOTE ADULT - ASSESSMENT
79 year old male with a past medical history of atrial fibrillation (on Eliquis), Coronary Artery Disease, HTN, HLD, who was brought in by EMS post cardiac arrest. Patient admitted to medicine/telemetry. Planning for possible cath and/or CABG.  Patient also has fracture of the cervical spine and is placed on a [place] J collar.    #Cardiac Arrest 2/2 PEA likely from arrythmia secondary to NSTEMI  #CAD  -Trop 0.3 on admission  - Cardiac cath showed 90% stenosis of ostial LAD and 80% stenosis of proximal LAD  - Cardiologist Dr. Laws following  -- Dr. Laws thinks patient requires CABG  - CTS following, will anticipate and tentative schedule for CABG on 2/23 if he continues to improve with intense therapy    # C5/C6 intervertebral disk spacings and C5 fracture  -Hyperextension injury  - use [place] J collar  - Maintain Collar for total 3 months for unstable c-spine fractures. Will require fiberoptic intubation for planned CABG.  - Neurosurgery following  -- patient must wear collar at all times  --In 6 weeks obtain cervical spine x-rays and follow up with Dr. Joseph    # A.fib   - Rate currently ok  - Anticoagulation > Lovenox 100mg bid    # Anxiety   - On Clonazepam 1 q8 at home  - holding clonazepam as of risk of fall    # HTN  -At home is on carvedilol 12.5 mg twice daily + Telmisartan 80mg at bedtime  -- c/w amLODIPine   -- increased to losartan 100 daily    #Macrocytic anemia  --Follow up B12, Folate    #DVTppx: lovenox  #GIppx: not needed  #Diet: dysphagia 3  #CODE: FULL  #Dispo: Acute; possible CABG on 2/23 if he continues to improve with intense therapy

## 2020-02-20 NOTE — PROGRESS NOTE ADULT - SUBJECTIVE AND OBJECTIVE BOX
Pt seen and examined at bedside with Dr. Joseph. He will need flexion extension xray of cervical spine prior to discharge. Please call neurosurgery when planning discharge home so we can observe flex ex. Neurosurgery spectra [phone]

## 2020-02-20 NOTE — PROGRESS NOTE ADULT - SUBJECTIVE AND OBJECTIVE BOX
SUBJECTIVE:    Patient is a 79y old Male who presents with a chief complaint of Cardiac arrest (20 Feb 2020 08:47)    Overnight Events: Patient is stable, no acute events overnight.  VS are stable, no major complaints.    PAST MEDICAL & SURGICAL HISTORY  Atrial fibrillation  Neuropathy  HLD (hyperlipidemia)  HTN (hypertension)  History of cholecystectomy  History of cholecystectomy    SOCIAL HISTORY:  Negative for smoking/alcohol/drug use.     ALLERGIES:  No Known Allergies    MEDICATIONS:  STANDING MEDICATIONS  amLODIPine   Tablet 2.5 milliGRAM(s) Oral daily  aspirin  chewable 81 milliGRAM(s) Oral daily  atorvastatin 80 milliGRAM(s) Oral at bedtime  enoxaparin Injectable 100 milliGRAM(s) SubCutaneous every 12 hours  famotidine    Tablet 20 milliGRAM(s) Oral two times a day  losartan 100 milliGRAM(s) Oral daily  multivitamin 1 Tablet(s) Oral daily  nystatin Powder 1 Application(s) Topical two times a day  senna 1 Tablet(s) Oral two times a day  thiamine 100 milliGRAM(s) Oral daily    PRN MEDICATIONS  benzonatate 100 milliGRAM(s) Oral three times a day PRN  bisacodyl 5 milliGRAM(s) Oral every 12 hours PRN  guaiFENesin   Syrup  (Sugar-Free) 200 milliGRAM(s) Oral every 6 hours PRN    VITALS:   T(F): 98, Max: 98 (02-20-20 @ 05:19)  HR: 80 (80 - 89)  BP: 129/79 (101/56 - 149/68)  RR: 18 (18 - 18)  SpO2: 96% (96% - 96%)    LABS:                        11.3   5.76  )-----------( 184      ( 20 Feb 2020 05:41 )             36.9     02-20    141  |  97<L>  |  17  ----------------------------<  95  4.1   |  34<H>  |  0.7    Ca    8.3<L>      20 Feb 2020 05:41                        02-19-20 @ 07:01  -  02-20-20 @ 07:00  --------------------------------------------------------  IN: 1050 mL / OUT: 1500 mL / NET: -450 mL            PHYSICAL EXAM:  GEN: NAD, comfortable  LUNGS: CTAB, no w/r/r  HEART: RRR, s1 and s2 appreciated, no m/r/g  ABD: soft, NT/ND, +BS  EXT: no edema, PP b/l  NEURO: AAOX3

## 2020-02-20 NOTE — PROGRESS NOTE ADULT - SUBJECTIVE AND OBJECTIVE BOX
Patient was seen and examined. Spoke with RN. Chart reviewed.    No events overnight.  Vital Signs Last 24 Hrs  T(F): 98 (20 Feb 2020 13:23), Max: 98 (20 Feb 2020 05:19)  HR: 64 (20 Feb 2020 13:23) (64 - 89)  BP: 113/51 (20 Feb 2020 13:23) (101/56 - 149/68)  SpO2: 96% (19 Feb 2020 22:52) (96% - 96%)  MEDICATIONS  (STANDING):  amLODIPine   Tablet 2.5 milliGRAM(s) Oral daily  aspirin  chewable 81 milliGRAM(s) Oral daily  atorvastatin 80 milliGRAM(s) Oral at bedtime  enoxaparin Injectable 100 milliGRAM(s) SubCutaneous every 12 hours  famotidine    Tablet 20 milliGRAM(s) Oral two times a day  losartan 100 milliGRAM(s) Oral daily  multivitamin 1 Tablet(s) Oral daily  nystatin Powder 1 Application(s) Topical two times a day  senna 1 Tablet(s) Oral two times a day  thiamine 100 milliGRAM(s) Oral daily    MEDICATIONS  (PRN):  benzonatate 100 milliGRAM(s) Oral three times a day PRN Cough  bisacodyl 5 milliGRAM(s) Oral every 12 hours PRN Constipation  guaiFENesin   Syrup  (Sugar-Free) 200 milliGRAM(s) Oral every 6 hours PRN Cough    Labs:                        11.3   5.76  )-----------( 184      ( 20 Feb 2020 05:41 )             36.9                         10.9   6.55  )-----------( 184      ( 19 Feb 2020 05:27 )             36.3     20 Feb 2020 05:41    141    |  97     |  17     ----------------------------<  95     4.1     |  34     |  0.7    19 Feb 2020 05:27    140    |  100    |  11     ----------------------------<  101    4.6     |  28     |  0.6      Ca    8.3        20 Feb 2020 05:41  Ca    8.1        19 Feb 2020 05:27              Radiology:    General: comfortable, NAD  Neurology: A&Ox3, nonfocal  Head:  Normocephalic, atraumatic collar  ENT:  Mucosa moist, no ulcerations  Neck:  Supple, no JVD,   Skin: no breakdowns (as per RN)  Resp: CTA B/L  CV: RRR, S1S2,   GI: Soft, NT, bowel sounds  MS: No edema, + peripheral pulses, FROM all 4 extremity

## 2020-02-20 NOTE — PROGRESS NOTE ADULT - SUBJECTIVE AND OBJECTIVE BOX
PLANNED OPERATIVE PROCEDURE(s):   MICs CABG             HD #                       SURGEON(s): LEVI Lvey  SUBJECTIVE ASSESSMENT: 79y Male patient seen and examined at bedside. Patient reports improvement in SOB. Continued to encourage patient to ambulate to maintain physical conditioning prior to planned CABG      Vital Signs Last 24 Hrs  T(F): 98 (20 Feb 2020 05:19), Max: 98 (20 Feb 2020 05:19)  HR: 80 (20 Feb 2020 05:19) (80 - 89)  BP: 129/79 (20 Feb 2020 05:19) (101/56 - 149/68)  RR: 18 (20 Feb 2020 05:19) (18 - 18)  SpO2: 96% (19 Feb 2020 22:52) (96% - 96%)    I&O's Detail    19 Feb 2020 07:01  -  20 Feb 2020 07:00  --------------------------------------------------------  IN:    Oral Fluid: 1050 mL  Total IN: 1050 mL    OUT:    Voided: 1500 mL  Total OUT: 1500 mL    Net: I&O's Detail    18 Feb 2020 07:01  -  19 Feb 2020 07:00  --------------------------------------------------------  Total NET: -560 mL    19 Feb 2020 07:01  -  20 Feb 2020 07:00  --------------------------------------------------------  Total NET: -450 mL      Physical Exam:  General: NAD; A&Ox3  Cardiac: S1/S2, RRR, no murmur, no rubs  Lungs: unlabored respirations, CTA b/l, no wheeze, no rales, no crackles  Abdomen: Soft/NT/ND; positive bowel sounds x 4  Extremities: 1+ edema b/l lower extremities; good capillary refill; no cyanosis; palpable 1+ pedal pulses b/l        LABS:                        11.3<L>  5.76  )-----------( 184      ( 20 Feb 2020 05:41 )             36.9<L>                        10.9<L>  6.55  )-----------( 184      ( 19 Feb 2020 05:27 )             36.3<L>    02-20    141  |  97<L>  |  17  ----------------------------<  95  4.1   |  34<H>  |  0.7    02-19    140  |  100  |  11  ----------------------------<  101<H>  4.6   |  28  |  0.6<L>    Ca    8.3<L>      20 Feb 2020 05:41      RADIOLOGY & ADDITIONAL TESTS:  CXR: < from: Xray Chest 1 View-PORTABLE IMMEDIATE (02.19.20 @ 14:49) >  Findings:  Support devices: None.  Cardiac/mediastinum/hilum: Unremarkable.  Lung parenchyma/Pleura: Within normal limits.  Skeleton/soft tissues: Unchanged.  Impression:    No radiographic evidence of acute cardiopulmonary disease.  < end of copied text >    Cardiac Cath: Coronary circulation: The coronary circulation is right dominant. Left main: The vessel was large sized. Angiography showed a single discrete lesion. Distal left main: There was a 20 % stenosis. LAD: The vessel was large sized. Angiography showed the vessel to wrap around the cardiac apex and multiple discrete lesions. Ostial LAD: There was a 90 % stenosis. This is a likely culprit for the patient's clinical presentation. Proximal LAD: There was a tubular 80 % stenosis. Circumflex: Angiography showed minor luminal irregularities with no flow limiting lesions. RCA: Angiography showed minor luminal irregularities with no flow limiting lesions.       TTE:< from: Transthoracic Echocardiogram (02.09.20 @ 09:09) >  Summary:   1. Left ventricular ejection fraction, by visual estimation, is 65 to 70%.   2. Mildly increased LV wall thickness.   3. Mild to moderate mitral valve regurgitation.   4. Moderate tricuspidregurgitation.   5. Mild aortic regurgitation.   6. Sclerotic aortic valve with decreased opening.   7. Estimated pulmonary artery systolic pressure is 41.1 mmHg assuming a right atrial pressure of 3 mmHg, which is consistent with mild pulmonary hypertension.  PHYSICIAN INTERPRETATION:  Left Ventricle: The left ventricular internal cavity size is normal. Left ventricular wall thickness is mildly increased. Left ventricular ejection fraction, by visual estimation, is 65 to 70%.  Right Ventricle:Normal right ventricular size and function.  Left Atrium: Moderately enlarged left atrium.  Right Atrium: Moderately enlarged right atrium.  Pericardium: There is no evidence of pericardial effusion.  Mitral Valve: Structurally normal mitral valve, with normal leaflet excursion. The mitral valve is normal in structure. Mild to moderate mitral valve regurgitation is seen.  Tricuspid Valve: Structurally normal tricuspid valve, with normal leaflet excursion. The tricuspid valve is normal in structure. Moderate tricuspid regurgitation is visualized. Estimated pulmonary artery systolic pressure is 41.1 mmHg assuming a right atrial pressure of 3 mmHg, which is consistent with mild pulmonary hypertension.  Aortic Valve: The aortic valve is trileaflet. No evidence of aortic stenosis. Sclerotic aortic valve with decreased opening. Mild aortic valve regurgitation is seen. Moderately thickened and calcified.  Pulmonic Valve: Structurally normal pulmonic valve, with normal leaflet excursion. The pulmonic valve is normal. Trace pulmonic valve regurgitation.  Aorta: The aortic root and ascending aorta are structurally normal, with no evidence of dilitation.  Pulmonary Artery: The main pulmonary artery is normal in size.  < end of copied text >      Carotids: < from: VA Duplex Carotid, Bilat (01.20.20 @ 20:33) >  Impression:  Less than 50% stenosis of the right internal carotid artery.  Less than 50% stenosis of the left internal carotid artery.  < end of copied text >      CT head: < from: CT Head No Cont (01.12.20 @ 18:59) >  IMPRESSION:  1.  No evidence of acute intracranial pathology.  2.  Chronic microvascular ischemic changes.  3.  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  < end of copied text >       CT neck: < from: CT Cervical Spine No Cont (01.14.20 @ 16:48) >  IMPRESSION:  1.  Widening of the C5-6 disc space and fragmentation of the anterior bridging osteophyte at this level. Findings are suspicious for distraction injury through the C5-6 disc with C5-6 anterior osteophyte fracture. Recommend further evaluation with MRI.  2.  Diffuse prevertebral edema as well as edema within the left anterolateral neck soft tissues and supraclavicular region.  3.  Stranding and possible hematoma behind the esophagus, as previously described.  4.  Multilevel anterior bridging osteophytes/syndesmophytes.  5.  Multilevel degenerative changes as described.  < end of copied text >      CT CHEST: < from: CT Angio Chest w/ IV Cont (01.13.20 @ 03:06) >  IMPRESSION:   1. No central or lobar pulmonary embolus.  2.  Small bibasilar consolidated opacities, possibly representing atelectasis or sequela of prior aspiration event.  3.  A 4.4 cm retroesophageal soft tissue density within the thorax may represent hematoma.  4.  No evidence of acute/inflammatory process within the abdomen or pelvis.  < end of copied text >      C T ABD: < from: CT Abdomen and Pelvis w/ IV Cont (01.13.20 @ 03:08) >  IMPRESSION:   1. No central or lobar pulmonary embolus.  2.  Small bibasilar consolidated opacities, possibly representing atelectasis or sequela of prior aspiration event.  3.  A 4.4 cm retroesophageal soft tissue density within the thorax may represent hematoma.  4.  No evidence of acute/inflammatory process within the abdomen or pelvis.  < end of copied text >       LE Venous duplex: < from: VA Duplex Lower Ext Vein Scan, Bilat (01.13.20 @ 18:48) >  Impression:  No evidence of deep venous thrombosis in the bilateral lower extremities.  < end of copied text >      < from: VA Physiol Extremity Lower 3+ Level, BI (01.22.20 @ 16:54) >  Ankle brachial index was  1.1 on the right & 1.3  on the left.  Impression: Normal arterial hemodynamics in the lower extremities bilaterally.   < end of copied text >      Allergies  No Known Allergies  Intolerances      MEDICATIONS  (STANDING):  amLODIPine   Tablet 2.5 milliGRAM(s) Oral daily  aspirin  chewable 81 milliGRAM(s) Oral daily  atorvastatin 80 milliGRAM(s) Oral at bedtime  enoxaparin Injectable 100 milliGRAM(s) SubCutaneous every 12 hours  famotidine    Tablet 20 milliGRAM(s) Oral two times a day  losartan 100 milliGRAM(s) Oral daily  multivitamin 1 Tablet(s) Oral daily  nystatin Powder 1 Application(s) Topical two times a day  senna 1 Tablet(s) Oral two times a day  thiamine 100 milliGRAM(s) Oral daily    MEDICATIONS  (PRN):  benzonatate 100 milliGRAM(s) Oral three times a day PRN Cough  bisacodyl 5 milliGRAM(s) Oral every 12 hours PRN Constipation  guaiFENesin   Syrup  (Sugar-Free) 200 milliGRAM(s) Oral every 6 hours PRN Cough      Ambulation/Activity Status: OOB/AMB w/C-Collar in place      Assessment/Plan:  79y Male Pre-op for CABG LIMA to LAD via MICs    - Case and plan discussed with CT Surgeon - Dr. Shaw  - Continue supportive care.    - Continue DVT/GI prophylaxis  - Incentive Spirometry 10 times an hour  - Continue to advance physical activity as tolerated and continue PT/OT as directed  1. CAD: Continue ASA, statin, BB  2. HTN: please hold losartan to prevent [place]-operative ELMO.  3. SOB: cxr stat with lasix 40 mg IV x1.  4. Neurosurgery ignacio for C-collar: maintain for total 3 months for unstable c-spine fractures. Will require fiberoptic intubation for planned CABG.  5. Please transfer to CTS service under Dr. Shaw.

## 2020-02-20 NOTE — PROGRESS NOTE ADULT - ASSESSMENT
Cardiac arrest 2/2 PEA likely from arrythmia  C5/C6 Intervertebral disk spacings and C5 fracture  A.fib-  Anxiety  htn  dyslip    rehab/pt  po eliquis  continue current meds  for cabg next week

## 2020-02-21 LAB
ANION GAP SERPL CALC-SCNC: 10 MMOL/L — SIGNIFICANT CHANGE UP (ref 7–14)
BASOPHILS # BLD AUTO: 0.02 K/UL — SIGNIFICANT CHANGE UP (ref 0–0.2)
BASOPHILS NFR BLD AUTO: 0.3 % — SIGNIFICANT CHANGE UP (ref 0–1)
BUN SERPL-MCNC: 21 MG/DL — HIGH (ref 10–20)
CALCIUM SERPL-MCNC: 8.5 MG/DL — SIGNIFICANT CHANGE UP (ref 8.5–10.1)
CHLORIDE SERPL-SCNC: 99 MMOL/L — SIGNIFICANT CHANGE UP (ref 98–110)
CO2 SERPL-SCNC: 31 MMOL/L — SIGNIFICANT CHANGE UP (ref 17–32)
CREAT SERPL-MCNC: 0.6 MG/DL — LOW (ref 0.7–1.5)
EOSINOPHIL # BLD AUTO: 0.18 K/UL — SIGNIFICANT CHANGE UP (ref 0–0.7)
EOSINOPHIL NFR BLD AUTO: 2.8 % — SIGNIFICANT CHANGE UP (ref 0–8)
GLUCOSE BLDC GLUCOMTR-MCNC: 88 MG/DL — SIGNIFICANT CHANGE UP (ref 70–99)
GLUCOSE BLDC GLUCOMTR-MCNC: 91 MG/DL — SIGNIFICANT CHANGE UP (ref 70–99)
GLUCOSE SERPL-MCNC: 109 MG/DL — HIGH (ref 70–99)
HCT VFR BLD CALC: 37.3 % — LOW (ref 42–52)
HGB BLD-MCNC: 11.4 G/DL — LOW (ref 14–18)
IMM GRANULOCYTES NFR BLD AUTO: 0.5 % — HIGH (ref 0.1–0.3)
LYMPHOCYTES # BLD AUTO: 1.02 K/UL — LOW (ref 1.2–3.4)
LYMPHOCYTES # BLD AUTO: 15.9 % — LOW (ref 20.5–51.1)
MCHC RBC-ENTMCNC: 29.8 PG — SIGNIFICANT CHANGE UP (ref 27–31)
MCHC RBC-ENTMCNC: 30.6 G/DL — LOW (ref 32–37)
MCV RBC AUTO: 97.6 FL — HIGH (ref 80–94)
MONOCYTES # BLD AUTO: 0.54 K/UL — SIGNIFICANT CHANGE UP (ref 0.1–0.6)
MONOCYTES NFR BLD AUTO: 8.4 % — SIGNIFICANT CHANGE UP (ref 1.7–9.3)
NEUTROPHILS # BLD AUTO: 4.62 K/UL — SIGNIFICANT CHANGE UP (ref 1.4–6.5)
NEUTROPHILS NFR BLD AUTO: 72.1 % — SIGNIFICANT CHANGE UP (ref 42.2–75.2)
NRBC # BLD: 0 /100 WBCS — SIGNIFICANT CHANGE UP (ref 0–0)
PLATELET # BLD AUTO: 173 K/UL — SIGNIFICANT CHANGE UP (ref 130–400)
POTASSIUM SERPL-MCNC: 4.1 MMOL/L — SIGNIFICANT CHANGE UP (ref 3.5–5)
POTASSIUM SERPL-SCNC: 4.1 MMOL/L — SIGNIFICANT CHANGE UP (ref 3.5–5)
RBC # BLD: 3.82 M/UL — LOW (ref 4.7–6.1)
RBC # FLD: 14.8 % — HIGH (ref 11.5–14.5)
SODIUM SERPL-SCNC: 140 MMOL/L — SIGNIFICANT CHANGE UP (ref 135–146)
WBC # BLD: 6.41 K/UL — SIGNIFICANT CHANGE UP (ref 4.8–10.8)
WBC # FLD AUTO: 6.41 K/UL — SIGNIFICANT CHANGE UP (ref 4.8–10.8)

## 2020-02-21 PROCEDURE — 99232 SBSQ HOSP IP/OBS MODERATE 35: CPT

## 2020-02-21 RX ADMIN — Medication 1 TABLET(S): at 11:38

## 2020-02-21 RX ADMIN — Medication 100 MILLIGRAM(S): at 11:38

## 2020-02-21 RX ADMIN — ATORVASTATIN CALCIUM 80 MILLIGRAM(S): 80 TABLET, FILM COATED ORAL at 21:43

## 2020-02-21 RX ADMIN — SENNA PLUS 1 TABLET(S): 8.6 TABLET ORAL at 05:24

## 2020-02-21 RX ADMIN — NYSTATIN CREAM 1 APPLICATION(S): 100000 CREAM TOPICAL at 05:25

## 2020-02-21 RX ADMIN — NYSTATIN CREAM 1 APPLICATION(S): 100000 CREAM TOPICAL at 17:18

## 2020-02-21 RX ADMIN — AMLODIPINE BESYLATE 2.5 MILLIGRAM(S): 2.5 TABLET ORAL at 05:24

## 2020-02-21 RX ADMIN — LOSARTAN POTASSIUM 100 MILLIGRAM(S): 100 TABLET, FILM COATED ORAL at 05:24

## 2020-02-21 RX ADMIN — ENOXAPARIN SODIUM 100 MILLIGRAM(S): 100 INJECTION SUBCUTANEOUS at 05:25

## 2020-02-21 RX ADMIN — SENNA PLUS 1 TABLET(S): 8.6 TABLET ORAL at 17:18

## 2020-02-21 RX ADMIN — Medication 81 MILLIGRAM(S): at 11:38

## 2020-02-21 RX ADMIN — FAMOTIDINE 20 MILLIGRAM(S): 10 INJECTION INTRAVENOUS at 17:18

## 2020-02-21 RX ADMIN — ENOXAPARIN SODIUM 100 MILLIGRAM(S): 100 INJECTION SUBCUTANEOUS at 17:18

## 2020-02-21 RX ADMIN — FAMOTIDINE 20 MILLIGRAM(S): 10 INJECTION INTRAVENOUS at 05:24

## 2020-02-21 NOTE — CHART NOTE - NSCHARTNOTEFT_GEN_A_CORE
Registered Dietitian Follow-Up    ***Scroll to the bottom for RD recommendation***    Patient Profile Reviewed                           Yes [x]   No []  Nutrition History Previously Obtained        Yes [x]  No []          PERTINENT SUBJECTIVE INFORMATION (LATEST AS OF TODAY):  - pt admits to consuming 50-75% of meals and tends to drink 1 ensure bottle a day and he is eating enough. Too many ensure bottles ordered for him.  - has neck brace on, on the chair, alert and oriented. Sx possible next week.         PERTINENT MEDICAL INFORMATIONS:  (1) cardiac arrest 2/2 PEA likely arrythmia. C5/C6 intercerebral disk spacing and and C5 fx.  (2) Rehab/ PT. c/w meds.   (3) possible CABG next week.          DIET ORDER:   Dysphagia 3 Nectar, DASH/TLC, Ensure Enlive x3        ANTHROPOMETRICS:  - Ht.  182.9cm  - Wt.  (1/13): 107.8kg  (1/14): 108.7kg  (2/13): 105.2kg  (2/18): 99.4kg  (2/21): 100.9kg  weight has been fluctuating but trended downward. Possibly from lack of Po v.s. bedscale error. will monitor.  - %wt change  - BMI. 31.5  - IBW. 80.9kg       PERTINENT LAB DATA:  2/21: h/h 11.4/37.3, BUN 21, Cr 0.6, glucose 109  PERTINENT MEDS: enoxaparin, aspirin, amlodipine, atorvastatin, biscodyl, famotidine, losartan, MVI< senna, b1      PHYSICAL FINDINGS  - APPEARANCE:        alert and oriented, 1+ leg edema  - GI FUNCTION:        LBM 2/19 per pt  - TUBES:                       - ORAL/MOUTH:      on dysphagia 3 nectar diet  - SKIN:                        intact        NUTRITION REQUIREMENTS  WEIGHT USED:                          80.9 kg IBW d/t borderline obese BMI  ESTIMATED ENERGY NEEDS:       CONTINUE [ x ]      ADJUST [  ]  - needs continue from 2/18 note    ESTIMATED ENERGY NEEDS:         9584-2415 kcal/day (25-30 kcal/kg IBW)  ESTIMATED PROTEIN NEEDS:        80-97 g/day (1-1.2 g/kg IBW)  ESTIMATED FLUID NEEDS:             1ml/kcal or per LIP    CURRENT NUTRIENT NEEDS:     likely meeting, aim on the low end of kcal needs          [ x ] PREVIOUS NUTRITION DIAGNOSIS:   (1) Inadequate oral intake            [  ] ONGOING        [  x] RESOLVED           PATIENT INTERVENTION:    [ x ] ORAL        [ ] EN/TF     GOAL/EXPECTED OUTCOME:     pt continues to consume and tolerate >75% of all meals and snacks through LOS.  INDICATOR/MONITORING:       RD to monitor diet order, energy intake, body composition, nutrition focused physical findings  NUTRITION INTERVENTION:        Meals and snacks.     RECS: (1) Continue current Dysphagia 3 nectar per SLP recs, DASH/TLC and Ensure enlive q8hr.

## 2020-02-21 NOTE — PROGRESS NOTE ADULT - SUBJECTIVE AND OBJECTIVE BOX
Patient was seen and examined. Spoke with RN. Chart reviewed.    No events overnight.  Vital Signs Last 24 Hrs  T(F): 95.8 (21 Feb 2020 13:38), Max: 97.8 (20 Feb 2020 20:55)  HR: 71 (21 Feb 2020 13:38) (71 - 80)  BP: 96/52 (21 Feb 2020 13:38) (96/52 - 148/89)  SpO2: 97% (20 Feb 2020 21:29) (97% - 97%)  MEDICATIONS  (STANDING):  amLODIPine   Tablet 2.5 milliGRAM(s) Oral daily  aspirin  chewable 81 milliGRAM(s) Oral daily  atorvastatin 80 milliGRAM(s) Oral at bedtime  enoxaparin Injectable 100 milliGRAM(s) SubCutaneous every 12 hours  famotidine    Tablet 20 milliGRAM(s) Oral two times a day  losartan 100 milliGRAM(s) Oral daily  multivitamin 1 Tablet(s) Oral daily  nystatin Powder 1 Application(s) Topical two times a day  senna 1 Tablet(s) Oral two times a day  thiamine 100 milliGRAM(s) Oral daily    MEDICATIONS  (PRN):  benzonatate 100 milliGRAM(s) Oral three times a day PRN Cough  bisacodyl 5 milliGRAM(s) Oral every 12 hours PRN Constipation  guaiFENesin   Syrup  (Sugar-Free) 200 milliGRAM(s) Oral every 6 hours PRN Cough    Labs:                        11.4   6.41  )-----------( 173      ( 21 Feb 2020 06:30 )             37.3                         11.3   5.76  )-----------( 184      ( 20 Feb 2020 05:41 )             36.9     21 Feb 2020 06:30    140    |  99     |  21     ----------------------------<  109    4.1     |  31     |  0.6    20 Feb 2020 05:41    141    |  97     |  17     ----------------------------<  95     4.1     |  34     |  0.7      Ca    8.5        21 Feb 2020 06:30  Ca    8.3        20 Feb 2020 05:41              Radiology:    General: comfortable, NAD  Neurology: A&Ox3, nonfocal  Head:  Normocephalic, atraumatic  ENT:  Mucosa moist, no ulcerations  Neck:  Supple, no JVD, collar  Skin: no breakdowns (as per RN)  Resp: CTA B/L  CV: RRR, S1S2,   GI: Soft, NT, bowel sounds  MS: No edema, + peripheral pulses, FROM all 4 extremity

## 2020-02-21 NOTE — CHART NOTE - NSCHARTNOTEFT_GEN_A_CORE
Patient denies any complaints. VSS. Lungs clear. Heart RRR. Reviewed case in morning report. Patient may benefit from off pump CABG. All the risks were explained to patient and family(son). He understands and agrees. Needs to be mobilized more. Patient denies any complaints. VSS. Lungs clear. Heart RRR. Reviewed case in morning report. Patient may benefit from off pump CABG(MIDCAB). All the risks were explained to patient. He understands and agrees. Needs to be mobilized more.

## 2020-02-22 PROCEDURE — 99232 SBSQ HOSP IP/OBS MODERATE 35: CPT

## 2020-02-22 RX ADMIN — FAMOTIDINE 20 MILLIGRAM(S): 10 INJECTION INTRAVENOUS at 17:32

## 2020-02-22 RX ADMIN — Medication 100 MILLIGRAM(S): at 11:37

## 2020-02-22 RX ADMIN — AMLODIPINE BESYLATE 2.5 MILLIGRAM(S): 2.5 TABLET ORAL at 06:13

## 2020-02-22 RX ADMIN — ENOXAPARIN SODIUM 100 MILLIGRAM(S): 100 INJECTION SUBCUTANEOUS at 17:32

## 2020-02-22 RX ADMIN — ATORVASTATIN CALCIUM 80 MILLIGRAM(S): 80 TABLET, FILM COATED ORAL at 21:31

## 2020-02-22 RX ADMIN — NYSTATIN CREAM 1 APPLICATION(S): 100000 CREAM TOPICAL at 06:13

## 2020-02-22 RX ADMIN — Medication 1 TABLET(S): at 11:37

## 2020-02-22 RX ADMIN — SENNA PLUS 1 TABLET(S): 8.6 TABLET ORAL at 17:32

## 2020-02-22 RX ADMIN — SENNA PLUS 1 TABLET(S): 8.6 TABLET ORAL at 06:14

## 2020-02-22 RX ADMIN — LOSARTAN POTASSIUM 100 MILLIGRAM(S): 100 TABLET, FILM COATED ORAL at 06:14

## 2020-02-22 RX ADMIN — FAMOTIDINE 20 MILLIGRAM(S): 10 INJECTION INTRAVENOUS at 06:14

## 2020-02-22 RX ADMIN — Medication 81 MILLIGRAM(S): at 11:37

## 2020-02-22 RX ADMIN — NYSTATIN CREAM 1 APPLICATION(S): 100000 CREAM TOPICAL at 17:32

## 2020-02-22 RX ADMIN — ENOXAPARIN SODIUM 100 MILLIGRAM(S): 100 INJECTION SUBCUTANEOUS at 06:13

## 2020-02-22 NOTE — PROGRESS NOTE ADULT - SUBJECTIVE AND OBJECTIVE BOX
Cardiac Surgery Pre-op Note:  CC: Patient is a 79y old  Male who presents with a chief complaint of Cardiac arrest (21 Feb 2020 14:02)      Referring Physician:   Dr. Laws                                                                                          Surgeon:  Dr Arreguin  Procedure: (Date) (Procedure) 2/24/2020   Coronary Artery Bypass Grafting    Allergies    No Known Allergies    Intolerances      HPI:  79 year old M with hx of HTN, A.fib (Eliquis), CHF, HLD brought in by EMS post cardiac arrest. As per wife at bedside, patient at the basement watching movie and was doing fine. Few mins later, patient came up and told the wife that he wasn't feeling right and that he felt funny. Then he crashed. Wife called the EMS who arrived 5 mins later. Upon EMS arrival, patient was found to be in PEA then asystole, s/p resuscitation for ~ 10 mins and ROSC achieved after 1 round of compression and epi. While on the way to hospital, patient had another cardiac arrest on the ambulant s/p CPR and ROSC achieved few mins later. As per family, patient was doing welll and at baseline prior to the episode. As baseline patient is fully functional. Denied any recent infection, recent hospitalization, recent ED visit, fever, chills.   In ED, patient was found to bradycardic and hypotensive. S/p 1 dose of atropine and patient was started on low dose Levophed with improvement in HR and BP. Hospital course significant for being quickly weaned off pressors and extubated successfully. Patient required c-collar for suspected fracture of c5-c6. Patient with bradycardia on telemetry and subsequent cardiac cath revealed single vessel CAD of LAD. CT surgery called for possible surgical intervention.      PAST MEDICAL & SURGICAL HISTORY:  Atrial fibrillation  Neuropathy  HLD (hyperlipidemia)  HTN (hypertension)  History of cholecystectomy  History of cholecystectomy      MEDICATIONS  (STANDING):  amLODIPine   Tablet 2.5 milliGRAM(s) Oral daily  aspirin  chewable 81 milliGRAM(s) Oral daily  atorvastatin 80 milliGRAM(s) Oral at bedtime  enoxaparin Injectable 100 milliGRAM(s) SubCutaneous every 12 hours  famotidine    Tablet 20 milliGRAM(s) Oral two times a day  losartan 100 milliGRAM(s) Oral daily  multivitamin 1 Tablet(s) Oral daily  nystatin Powder 1 Application(s) Topical two times a day  senna 1 Tablet(s) Oral two times a day  thiamine 100 milliGRAM(s) Oral daily    MEDICATIONS  (PRN):  benzonatate 100 milliGRAM(s) Oral three times a day PRN Cough  bisacodyl 5 milliGRAM(s) Oral every 12 hours PRN Constipation  guaiFENesin   Syrup  (Sugar-Free) 200 milliGRAM(s) Oral every 6 hours PRN Cough      Labs:                        11.4   6.41  )-----------( 173      ( 21 Feb 2020 06:30 )             37.3     02-21    140  |  99  |  21<H>  ----------------------------<  109<H>  4.1   |  31  |  0.6<L>    Ca    8.5      21 Feb 2020 06:30          Blood Type:   HGB A1C:     Hemoglobin A1C, Whole Blood: 5.7: Method: Immunoassay    Mean Plasma Glucose: 117 mg/dL      Prealbumin: Prealbumin, Serum: 12 mg/dL (02-19 @ 05:27)    Pro-BNP: Serum Pro-Brain Natriuretic Peptide (01.12.20 @ 17:20)    Serum Pro-Brain Natriuretic Peptide: 1767 pg/mL      Thyroid Panel: Thyroid Stimulating Hormone, Serum (01.29.20 @ 21:49)    Thyroid Stimulating Hormone, Serum: 2.03 uIU/mL      MRSA:  / MSSA: MRSA PCR Result.: Negative: By: Real-Time PCR (Polymerase Reaction Method) (01.13.20 @ 01:06)          CXR: < from: Xray Chest 1 View- PORTABLE-Routine (02.20.20 @ 08:01) >  Impression:      No radiographic evidence of acute cardiopulmonary disease.    < end of copied text >      EKG: < from: 12 Lead ECG (02.20.20 @ 07:54) >    Ventricular Rate 66 BPM    Atrial Rate 220 BPM    QRS Duration 110 ms    Q-T Interval 388 ms    QTC Calculation(Bezet) 406 ms    R Axis -13 degrees    T Axis 12 degrees    Diagnosis Line Atrial fibrillation  Inferior infarct , age undetermined  Abnormal ECG    < end of copied text >      Carotid Duplex:  < from: VA Duplex Carotid, Bilat (01.20.20 @ 20:33) >  Impression:    Less than 50% stenosis of the right internal carotid artery.  Less than 50% stenosis of the left internal carotid artery.    < end of copied text >      PFT's:    Echocardiogram: < from: Transthoracic Echocardiogram (02.09.20 @ 09:09) >  Summary:   1. Left ventricular ejection fraction, by visual estimation, is 65 to 70%.   2. Mildly increased LV wall thickness.   3. Mild to moderate mitral valve regurgitation.   4. Moderate tricuspidregurgitation.   5. Mild aortic regurgitation.   6. Sclerotic aortic valve with decreased opening.   7. Estimated pulmonary artery systolic pressure is 41.1 mmHg assuming a right atrial pressure of 3 mmHg, which is consistent with mild pulmonary hypertension.    PHYSICIAN INTERPRETATION:  Left Ventricle: The left ventricular internal cavity size is normal. Left ventricular wall thickness is mildly increased. Left ventricular ejection fraction, by visual estimation, is 65 to 70%.  Right Ventricle:Normal right ventricular size and function.  Left Atrium: Moderately enlarged left atrium.  Right Atrium: Moderately enlarged right atrium.  Pericardium: There is no evidence of pericardial effusion.  Mitral Valve: Structurally normal mitral valve, with normal leaflet excursion. The mitral valve is normal in structure. Mild to moderate mitral valve regurgitation is seen.  Tricuspid Valve: Structurally normal tricuspid valve, with normal leaflet excursion. The tricuspid valve is normal in structure. Moderate tricuspid regurgitation is visualized. Estimated pulmonary artery systolic pressure is 41.1 mmHg assuming a right atrial pressure of 3 mmHg, which is consistent with mild pulmonary hypertension.  Aortic Valve: The aortic valve is trileaflet. No evidence of aortic stenosis. Sclerotic aortic valve with decreased opening. Mild aortic valve regurgitation is seen. Moderately thickened and calcified.  Pulmonic Valve: Structurally normal pulmonic valve, with normal leaflet excursion. The pulmonic valve is normal. Trace pulmonic valve regurgitation.  Aorta: The aortic root and ascending aorta are structurally normal, with no evidence of dilitation.  Pulmonary Artery: The main pulmonary artery is normal in size.    < end of copied text >    < from: CT Angio Chest w/ IV Cont (01.13.20 @ 03:06) >  IMPRESSION:     1. No central or lobar pulmonary embolus.    2.  Small bibasilar consolidated opacities, possibly representing atelectasis or sequela of prior aspiration event.    3.  A 4.4 cm retroesophageal soft tissue density within the thorax may represent hematoma.    4.  No evidence of acute/inflammatory process within the abdomen or pelvis.    < end of copied text >    Cardiac catheterization: Cardiac Cath: Coronary circulation: The coronary circulation is right dominant. Left main: The vessel was large sized. Angiography showed a single discrete lesion. Distal left main: There was a 20 % stenosis. LAD: The vessel was large sized. Angiography showed the vessel to wrap around the cardiac apex and multiple discrete lesions. Ostial LAD: There was a 90 % stenosis. This is a likely culprit for the patient's clinical presentation. Proximal LAD: There was a tubular 80 % stenosis. Circumflex: Angiography showed minor luminal irregularities with no flow limiting lesions. RCA: Angiography showed minor luminal irregularities with no flow limiting lesions.       Vital Signs Last 24 Hrs  T(C): 36.1 (22 Feb 2020 04:58), Max: 36.7 (21 Feb 2020 20:30)  T(F): 96.9 (22 Feb 2020 04:58), Max: 98 (21 Feb 2020 20:30)  HR: 84 (22 Feb 2020 04:58) (71 - 84)  BP: 144/81 (22 Feb 2020 04:58) (96/52 - 144/81)  BP(mean): --  RR: 18 (22 Feb 2020 04:58) (18 - 18)  SpO2: 96% (21 Feb 2020 20:14) (96% - 96%)  right arm bp  left arm bp    5 meter:    Gen: WN/WD NAD  Neuro: A&Ox3, nonfocal  Pulm: CTA B/L  CV: RRR, S1S2   Abd: Soft, NT, ND +BS  Ext: No edema, + peripheral pulses    Cardic Surgery Risk Factors  CVA and/or carotid/cerebrovascular disease. Yes  No  Explain if Yes  Aortoiliac disease Yes  No  Explain if Yes  Previous MI Yes  No  Explain if Yes  Previos Cardiac Surgery Yes  No  Explain if Yes  Hemodynamics-Unstable or Shock Yes  No  Explain if Yes  Diabetis Yes  No  Explain if Yes  Hepatic Failure Yes  No  Explain if Yes  Renal failure and/or dialysis Yes  No  Explain if Yes  Heart failure-type-present or past Yes  No  Explain if Yes  COPD Yes  No  Explain if Yes  Immune System Deficiency Yes  No  Explain if Yes  Malignant Ventricular Arrhythmia Yes  No  Explain if Yes    STS Score:     Pt has AICD/PPM [ ] Yes  [x ] No             Brand Name:  Pre-op Beta Blocker ordered within 24 hrs of surgery (CABG ONLY)?  [x ] Yes  [ ] No  If not, Why?  Type & Cross  [ ] Yes  [ ] No  NPO after Midnight [x ] Yes  [ ] No  Pre-op ABX ordered, to be taped on chart:  [ x] Yes  [ ] No     Hibiclens/Peridex ordered [x ] Yes  [ ] No  Intraop on Hold: PRBCs, CXR, JOHN [x ]   Consent obtained  [x ] Yes  [ ] No

## 2020-02-23 RX ORDER — METOPROLOL TARTRATE 50 MG
12.5 TABLET ORAL ONCE
Refills: 0 | Status: COMPLETED | OUTPATIENT
Start: 2020-02-23 | End: 2020-02-23

## 2020-02-23 RX ORDER — CHLORHEXIDINE GLUCONATE 213 G/1000ML
15 SOLUTION TOPICAL ONCE
Refills: 0 | Status: COMPLETED | OUTPATIENT
Start: 2020-02-23 | End: 2020-02-24

## 2020-02-23 RX ORDER — CHLORHEXIDINE GLUCONATE 213 G/1000ML
1 SOLUTION TOPICAL ONCE
Refills: 0 | Status: COMPLETED | OUTPATIENT
Start: 2020-02-23 | End: 2020-02-23

## 2020-02-23 RX ORDER — DIBUCAINE 1 %
1 OINTMENT (GRAM) RECTAL EVERY 12 HOURS
Refills: 0 | Status: DISCONTINUED | OUTPATIENT
Start: 2020-02-23 | End: 2020-02-24

## 2020-02-23 RX ADMIN — NYSTATIN CREAM 1 APPLICATION(S): 100000 CREAM TOPICAL at 19:15

## 2020-02-23 RX ADMIN — CHLORHEXIDINE GLUCONATE 1 APPLICATION(S): 213 SOLUTION TOPICAL at 20:00

## 2020-02-23 RX ADMIN — Medication 1 APPLICATION(S): at 20:00

## 2020-02-23 RX ADMIN — Medication 1 TABLET(S): at 11:32

## 2020-02-23 RX ADMIN — Medication 12.5 MILLIGRAM(S): at 21:27

## 2020-02-23 RX ADMIN — AMLODIPINE BESYLATE 2.5 MILLIGRAM(S): 2.5 TABLET ORAL at 06:12

## 2020-02-23 RX ADMIN — FAMOTIDINE 20 MILLIGRAM(S): 10 INJECTION INTRAVENOUS at 06:13

## 2020-02-23 RX ADMIN — LOSARTAN POTASSIUM 100 MILLIGRAM(S): 100 TABLET, FILM COATED ORAL at 06:12

## 2020-02-23 RX ADMIN — FAMOTIDINE 20 MILLIGRAM(S): 10 INJECTION INTRAVENOUS at 19:14

## 2020-02-23 RX ADMIN — ATORVASTATIN CALCIUM 80 MILLIGRAM(S): 80 TABLET, FILM COATED ORAL at 21:27

## 2020-02-23 RX ADMIN — Medication 81 MILLIGRAM(S): at 11:32

## 2020-02-23 RX ADMIN — Medication 10 MILLIGRAM(S): at 15:46

## 2020-02-23 RX ADMIN — NYSTATIN CREAM 1 APPLICATION(S): 100000 CREAM TOPICAL at 06:13

## 2020-02-23 RX ADMIN — ENOXAPARIN SODIUM 100 MILLIGRAM(S): 100 INJECTION SUBCUTANEOUS at 06:13

## 2020-02-23 RX ADMIN — Medication 100 MILLIGRAM(S): at 11:32

## 2020-02-23 RX ADMIN — SENNA PLUS 1 TABLET(S): 8.6 TABLET ORAL at 06:12

## 2020-02-23 NOTE — PROGRESS NOTE ADULT - SUBJECTIVE AND OBJECTIVE BOX
OPERATIVE PROCEDURE(s):                POD #                       79yMale  SURGEON(s): FRANSISCO Shwa  SUBJECTIVE ASSESSMENT:   Vital Signs Last 24 Hrs  T(F): 98.1 (22 Feb 2020 22:44), Max: 98.1 (22 Feb 2020 22:44)  HR: 75 (23 Feb 2020 08:00) (65 - 82)  BP: 86/51 (23 Feb 2020 08:00) (86/51 - 157/88)  BP(mean): 64 (23 Feb 2020 08:00) (64 - 115)  RR: 30 (23 Feb 2020 08:00) (16 - 30)  SpO2: 100% (23 Feb 2020 08:00) (95% - 100%)      I&O's Detail    22 Feb 2020 07:01  -  23 Feb 2020 07:00  --------------------------------------------------------  IN:    Oral Fluid: 480 mL  Total IN: 480 mL    OUT:    Voided: 1050 mL  Total OUT: 1050 mL        Net:   I&O's Detail    21 Feb 2020 07:01  -  22 Feb 2020 07:00  --------------------------------------------------------  Total NET: 0 mL      22 Feb 2020 07:01  -  23 Feb 2020 07:00  --------------------------------------------------------  Total NET: -570 mL        CAPILLARY BLOOD GLUCOSE        Physical Exam:  General: NAD; A&Ox3/Patient is intubated and sedated  Cardiac: S1/S2, RRR, no murmur, no rubs  Lungs: unlabored respirations, CTA b/l, no wheeze, no rales, no crackles  Abdomen: Soft/NT/ND; positive bowel sounds x 4  Sternum: Intact, no click, incision healing well with no drainage  Incisions: Incisions clean/dry/intact  Extremities: No edema b/l lower extremities; good capillary refill; no cyanosis; palpable 1+ pedal pulses b/l    Central Venous Catheter: Yes[]  No[] , If Yes indication:           Day #  Mora Catheter: Yes  [] , No  [] , If yes indication:                      Day #  NGT: Yes [] No [] ,    If Yes Placement:                                     Day #  EPICARDIAL WIRES:  [] YES [] NO                                              Day #  BOWEL MOVEMENT:  [] YES [] NO, If No, Timing since last BM:      Day #  CHEST TUBE(Left/Right):  [] YES [] NO, If yes -  AIR LEAKS:  [] YES [] NO        LABS:                        11.4<L>  6.41  )-----------( 173      ( 21 Feb 2020 06:30 )             37.3<L>    02-21    140  |  99  |  21<H>  ----------------------------<  109<H>  4.1   |  31  |  0.6<L>    Ca    8.5      21 Feb 2020 06:30            RADIOLOGY & ADDITIONAL TESTS:  CXR:  EKG:  MEDICATIONS  (STANDING):  amLODIPine   Tablet 2.5 milliGRAM(s) Oral daily  aspirin  chewable 81 milliGRAM(s) Oral daily  atorvastatin 80 milliGRAM(s) Oral at bedtime  famotidine    Tablet 20 milliGRAM(s) Oral two times a day  multivitamin 1 Tablet(s) Oral daily  nystatin Powder 1 Application(s) Topical two times a day  senna 1 Tablet(s) Oral two times a day  thiamine 100 milliGRAM(s) Oral daily    MEDICATIONS  (PRN):  benzonatate 100 milliGRAM(s) Oral three times a day PRN Cough  bisacodyl 5 milliGRAM(s) Oral every 12 hours PRN Constipation  guaiFENesin   Syrup  (Sugar-Free) 200 milliGRAM(s) Oral every 6 hours PRN Cough    HEPARIN:  [] YES [] NO  Dose: XX UNITS/HR UNITS Q8H  LOVENOX:[] YES [] NO  Dose: XX mg Q24H  COUMADIN: []  YES [] NO  Dose: XX mg  Q24H  SCD's: YES b/l  GI Prophylaxis: Protonix [], Pepcid [], None [], (Contra-indication:.....)    Post-Op Beta-Blockers: Yes [], No[], If No, then contraindication:  Post-Op Aspirin: Yes [],  No [], If No, then contraindication:  Post-Op Statin: Yes [], No[], If No, then contraindication:  Allergies    No Known Allergies    Intolerances      Ambulation/Activity Status:    Assessment/Plan:  79y Male status-post .....  - Case and plan discussed with CTU Intensivist and CT Surgeon - Dr. Shaw/Rodríguez/Samir   - Continue CTU supportive care    - Continue DVT/GI prophylaxis  - Incentive Spirometry 10 times an hour  - Continue to advance physical activity as tolerated and continue PT/OT as directed  1. CAD: Continue ASA, statin, BB  2. HTN:   3. A. Fib:   4. COPD/Hypoxia:   5. DM/Glucose Control:     Social Service Disposition: Cardiac Surgery Pre-op Note:  CC: Patient is a 79y old  Male who presents with a chief complaint of Cardiac arrest (23 Feb 2020 08:52)      Referring Physician:   Dr. Laws                                                                                          Surgeon:  Dr Arreguin  Procedure: (Date) (Procedure) 2/24/2020   Coronary Artery Bypass Grafting    Allergies    No Known Allergies    Intolerances      HPI:  79 year old M with hx of HTN, A.fib (Eliquis), CHF, HLD brought in by EMS post cardiac arrest. As per wife at bedside, patient at the basement watching movie and was doing fine. Few mins later, patient came up and told the wife that he wasn't feeling right and that he felt funny. Then he crashed. Wife called the EMS who arrived 5 mins later. Upon EMS arrival, patient was found to be in PEA then asystole, s/p resuscitation for ~ 10 mins and ROSC achieved after 1 round of compression and epi. While on the way to hospital, patient had another cardiac arrest on the ambulant s/p CPR and ROSC achieved few mins later. As per family, patient was doing welll and at baseline prior to the episode. As baseline patient is fully functional. Denied any recent infection, recent hospitalization, recent ED visit, fever, chills.   In ED, patient was found to bradycardic and hypotensive. S/p 1 dose of atropine and patient was started on low dose Levophed with improvement in HR and BP. Hospital course significant for being quickly weaned off pressors and extubated successfully. Patient required c-collar for suspected fracture of c5-c6. Patient with bradycardia on telemetry and subsequent cardiac cath revealed single vessel CAD of LAD. CT surgery called for possible surgical intervention.      PAST MEDICAL & SURGICAL HISTORY:  Atrial fibrillation  Neuropathy  HLD (hyperlipidemia)  HTN (hypertension)  History of cholecystectomy  History of cholecystectomy      MEDICATIONS  (STANDING):  amLODIPine   Tablet 2.5 milliGRAM(s) Oral daily  aspirin  chewable 81 milliGRAM(s) Oral daily  atorvastatin 80 milliGRAM(s) Oral at bedtime  famotidine    Tablet 20 milliGRAM(s) Oral two times a day  multivitamin 1 Tablet(s) Oral daily  nystatin Powder 1 Application(s) Topical two times a day  senna 1 Tablet(s) Oral two times a day  thiamine 100 milliGRAM(s) Oral daily    MEDICATIONS  (PRN):  benzonatate 100 milliGRAM(s) Oral three times a day PRN Cough  bisacodyl 5 milliGRAM(s) Oral every 12 hours PRN Constipation  guaiFENesin   Syrup  (Sugar-Free) 200 milliGRAM(s) Oral every 6 hours PRN Cough      Labs:                          11.4   6.41  )-----------( 173      ( 21 Feb 2020 06:30 )             37.3     02-21    140  |  99  |  21<H>  ----------------------------<  109<H>  4.1   |  31  |  0.6<L>    Ca    8.5      21 Feb 2020 06:30      Blood Type:   HGB A1C:     Hemoglobin A1C, Whole Blood: 5.7: Method: Immunoassay    Mean Plasma Glucose: 117 mg/dL      Prealbumin: Prealbumin, Serum: 12 mg/dL (02-19 @ 05:27)    Pro-BNP: Serum Pro-Brain Natriuretic Peptide (01.12.20 @ 17:20)    Serum Pro-Brain Natriuretic Peptide: 1767 pg/mL      Thyroid Panel: Thyroid Stimulating Hormone, Serum (01.29.20 @ 21:49)    Thyroid Stimulating Hormone, Serum: 2.03 uIU/mL      MRSA:  / MSSA: MRSA PCR Result.: Negative: By: Real-Time PCR (Polymerase Reaction Method) (01.13.20 @ 01:06)          CXR: < from: Xray Chest 1 View- PORTABLE-Routine (02.20.20 @ 08:01) >  Impression:      No radiographic evidence of acute cardiopulmonary disease.    < end of copied text >      EKG: < from: 12 Lead ECG (02.20.20 @ 07:54) >    Ventricular Rate 66 BPM    Atrial Rate 220 BPM    QRS Duration 110 ms    Q-T Interval 388 ms    QTC Calculation(Bezet) 406 ms    R Axis -13 degrees    T Axis 12 degrees    Diagnosis Line Atrial fibrillation  Inferior infarct , age undetermined  Abnormal ECG    < end of copied text >      Carotid Duplex:  < from: VA Duplex Carotid, Bilat (01.20.20 @ 20:33) >  Impression:    Less than 50% stenosis of the right internal carotid artery.  Less than 50% stenosis of the left internal carotid artery.    < end of copied text >      PFT's: FEv1 40%    Echocardiogram: < from: Transthoracic Echocardiogram (02.09.20 @ 09:09) >  Summary:   1. Left ventricular ejection fraction, by visual estimation, is 65 to 70%.   2. Mildly increased LV wall thickness.   3. Mild to moderate mitral valve regurgitation.   4. Moderate tricuspidregurgitation.   5. Mild aortic regurgitation.   6. Sclerotic aortic valve with decreased opening.   7. Estimated pulmonary artery systolic pressure is 41.1 mmHg assuming a right atrial pressure of 3 mmHg, which is consistent with mild pulmonary hypertension.    PHYSICIAN INTERPRETATION:  Left Ventricle: The left ventricular internal cavity size is normal. Left ventricular wall thickness is mildly increased. Left ventricular ejection fraction, by visual estimation, is 65 to 70%.  Right Ventricle:Normal right ventricular size and function.  Left Atrium: Moderately enlarged left atrium.  Right Atrium: Moderately enlarged right atrium.  Pericardium: There is no evidence of pericardial effusion.  Mitral Valve: Structurally normal mitral valve, with normal leaflet excursion. The mitral valve is normal in structure. Mild to moderate mitral valve regurgitation is seen.  Tricuspid Valve: Structurally normal tricuspid valve, with normal leaflet excursion. The tricuspid valve is normal in structure. Moderate tricuspid regurgitation is visualized. Estimated pulmonary artery systolic pressure is 41.1 mmHg assuming a right atrial pressure of 3 mmHg, which is consistent with mild pulmonary hypertension.  Aortic Valve: The aortic valve is trileaflet. No evidence of aortic stenosis. Sclerotic aortic valve with decreased opening. Mild aortic valve regurgitation is seen. Moderately thickened and calcified.  Pulmonic Valve: Structurally normal pulmonic valve, with normal leaflet excursion. The pulmonic valve is normal. Trace pulmonic valve regurgitation.  Aorta: The aortic root and ascending aorta are structurally normal, with no evidence of dilitation.  Pulmonary Artery: The main pulmonary artery is normal in size.    < end of copied text >    < from: CT Angio Chest w/ IV Cont (01.13.20 @ 03:06) >  IMPRESSION:     1. No central or lobar pulmonary embolus.    2.  Small bibasilar consolidated opacities, possibly representing atelectasis or sequela of prior aspiration event.    3.  A 4.4 cm retroesophageal soft tissue density within the thorax may represent hematoma.    4.  No evidence of acute/inflammatory process within the abdomen or pelvis.    < end of copied text >    Cardiac catheterization: Cardiac Cath: Coronary circulation: The coronary circulation is right dominant. Left main: The vessel was large sized. Angiography showed a single discrete lesion. Distal left main: There was a 20 % stenosis. LAD: The vessel was large sized. Angiography showed the vessel to wrap around the cardiac apex and multiple discrete lesions. Ostial LAD: There was a 90 % stenosis. This is a likely culprit for the patient's clinical presentation. Proximal LAD: There was a tubular 80 % stenosis. Circumflex: Angiography showed minor luminal irregularities with no flow limiting lesions. RCA: Angiography showed minor luminal irregularities with no flow limiting lesions.       Vital Signs Last 24 Hrs  T(C): 35.9 (23 Feb 2020 10:59), Max: 36.7 (22 Feb 2020 22:44)  T(F): 96.6 (23 Feb 2020 10:59), Max: 98.1 (22 Feb 2020 22:44)  HR: 79 (23 Feb 2020 10:59) (65 - 82)  BP: 106/56 (23 Feb 2020 10:59) (80/46 - 157/88)  BP(mean): 73 (23 Feb 2020 10:59) (57 - 115)  RR: 28 (23 Feb 2020 10:59) (16 - 31)  SpO2: 92% (23 Feb 2020 10:59) (92% - 100%)  right arm bp: 91/53  left arm bp: 96/47      5 meter: 14.7/16.6/14.8    Gen: WN/WD NAD  Neuro: A&Ox3, nonfocal  Pulm: CTA B/L  CV: RRR, S1S2   Abd: Soft, NT, ND +BS  Ext: No edema, + peripheral pulses    Cardic Surgery Risk Factors  CVA and/or carotid/cerebrovascular disease. Yes  No  Explain if Yes  Aortoiliac disease Yes  No  Explain if Yes  Previous MI Yes  No  Explain if Yes this admission, + cardiac arrest  Previous Cardiac Surgery Yes  No  Explain if Yes  Hemodynamics-Unstable or Shock Yes  No  Explain if Yes  Diabetes Yes  No  Explain if Yes  Hepatic Failure Yes  No  Explain if Yes  Renal failure and/or dialysis Yes  No  Explain if Yes  Heart failure-type-present or past Yes  No  Explain if Yes acute diastolic heart failure  COPD Yes  No  Explain if Yes FEv1 40%  Immune System Deficiency Yes  No  Explain if Yes  Malignant Ventricular Arrhythmia Yes  No  Explain if Yes    STS Score:   Risk of Mortality:  2.993%  Renal Failure:  2.373%  Permanent Stroke:  0.626%  Prolonged Ventilation:  10.831%  DSW Infection:  0.192%  Reoperation:  3.217%  Morbidity or Mortality:  16.108%  Short Length of Stay:  22.301%  Long Length of Stay:  7.857%      Pt has AICD/PPM [ ] Yes  [x ] No             Brand Name:  Pre-op Beta Blocker ordered within 24 hrs of surgery (CABG ONLY)?  [x ] Yes  [ ] No  If not, Why?  Type & Cross  [ ] Yes  [ ] No  NPO after Midnight [x ] Yes  [ ] No  Pre-op ABX ordered, to be taped on chart:  [ x] Yes  [ ] No     Hibiclens/Peridex ordered [x ] Yes  [ ] No  Intraop on Hold: PRBCs, CXR, JOHN [x ]   Consent obtained  [x ] Yes  [ ] No

## 2020-02-24 LAB
ABO RH CONFIRMATION: SIGNIFICANT CHANGE UP
ALBUMIN SERPL ELPH-MCNC: 3.4 G/DL — LOW (ref 3.5–5.2)
ALP SERPL-CCNC: 68 U/L — SIGNIFICANT CHANGE UP (ref 30–115)
ALT FLD-CCNC: 22 U/L — SIGNIFICANT CHANGE UP (ref 0–41)
ANION GAP SERPL CALC-SCNC: 11 MMOL/L — SIGNIFICANT CHANGE UP (ref 7–14)
ANION GAP SERPL CALC-SCNC: 9 MMOL/L — SIGNIFICANT CHANGE UP (ref 7–14)
APTT BLD: 28.5 SEC — SIGNIFICANT CHANGE UP (ref 27–39.2)
APTT BLD: 31.4 SEC — SIGNIFICANT CHANGE UP (ref 27–39.2)
AST SERPL-CCNC: 25 U/L — SIGNIFICANT CHANGE UP (ref 0–41)
BASOPHILS # BLD AUTO: 0.02 K/UL — SIGNIFICANT CHANGE UP (ref 0–0.2)
BASOPHILS # BLD AUTO: 0.03 K/UL — SIGNIFICANT CHANGE UP (ref 0–0.2)
BASOPHILS NFR BLD AUTO: 0.2 % — SIGNIFICANT CHANGE UP (ref 0–1)
BASOPHILS NFR BLD AUTO: 0.2 % — SIGNIFICANT CHANGE UP (ref 0–1)
BILIRUB SERPL-MCNC: 1 MG/DL — SIGNIFICANT CHANGE UP (ref 0.2–1.2)
BLD GP AB SCN SERPL QL: SIGNIFICANT CHANGE UP
BUN SERPL-MCNC: 15 MG/DL — SIGNIFICANT CHANGE UP (ref 10–20)
BUN SERPL-MCNC: 19 MG/DL — SIGNIFICANT CHANGE UP (ref 10–20)
CALCIUM SERPL-MCNC: 7.7 MG/DL — LOW (ref 8.5–10.1)
CALCIUM SERPL-MCNC: 8.6 MG/DL — SIGNIFICANT CHANGE UP (ref 8.5–10.1)
CHLORIDE SERPL-SCNC: 104 MMOL/L — SIGNIFICANT CHANGE UP (ref 98–110)
CHLORIDE SERPL-SCNC: 97 MMOL/L — LOW (ref 98–110)
CO2 SERPL-SCNC: 24 MMOL/L — SIGNIFICANT CHANGE UP (ref 17–32)
CO2 SERPL-SCNC: 31 MMOL/L — SIGNIFICANT CHANGE UP (ref 17–32)
CREAT SERPL-MCNC: 0.6 MG/DL — LOW (ref 0.7–1.5)
CREAT SERPL-MCNC: 0.7 MG/DL — SIGNIFICANT CHANGE UP (ref 0.7–1.5)
EOSINOPHIL # BLD AUTO: 0.07 K/UL — SIGNIFICANT CHANGE UP (ref 0–0.7)
EOSINOPHIL # BLD AUTO: 0.12 K/UL — SIGNIFICANT CHANGE UP (ref 0–0.7)
EOSINOPHIL NFR BLD AUTO: 0.4 % — SIGNIFICANT CHANGE UP (ref 0–8)
EOSINOPHIL NFR BLD AUTO: 1.4 % — SIGNIFICANT CHANGE UP (ref 0–8)
GAS PNL BLDA: SIGNIFICANT CHANGE UP
GLUCOSE BLDC GLUCOMTR-MCNC: 109 MG/DL — HIGH (ref 70–99)
GLUCOSE BLDC GLUCOMTR-MCNC: 123 MG/DL — HIGH (ref 70–99)
GLUCOSE BLDC GLUCOMTR-MCNC: 145 MG/DL — HIGH (ref 70–99)
GLUCOSE BLDC GLUCOMTR-MCNC: 154 MG/DL — HIGH (ref 70–99)
GLUCOSE SERPL-MCNC: 106 MG/DL — HIGH (ref 70–99)
GLUCOSE SERPL-MCNC: 108 MG/DL — HIGH (ref 70–99)
HCT VFR BLD CALC: 24.8 % — LOW (ref 42–52)
HCT VFR BLD CALC: 32.4 % — LOW (ref 42–52)
HCT VFR BLD CALC: 35.4 % — LOW (ref 42–52)
HGB BLD-MCNC: 10.1 G/DL — LOW (ref 14–18)
HGB BLD-MCNC: 10.9 G/DL — LOW (ref 14–18)
HGB BLD-MCNC: 7.6 G/DL — LOW (ref 14–18)
IMM GRANULOCYTES NFR BLD AUTO: 0.3 % — SIGNIFICANT CHANGE UP (ref 0.1–0.3)
IMM GRANULOCYTES NFR BLD AUTO: 1.4 % — HIGH (ref 0.1–0.3)
INR BLD: 1.19 RATIO — SIGNIFICANT CHANGE UP (ref 0.65–1.3)
INR BLD: 1.35 RATIO — HIGH (ref 0.65–1.3)
LYMPHOCYTES # BLD AUTO: 0.81 K/UL — LOW (ref 1.2–3.4)
LYMPHOCYTES # BLD AUTO: 1.38 K/UL — SIGNIFICANT CHANGE UP (ref 1.2–3.4)
LYMPHOCYTES # BLD AUTO: 15.7 % — LOW (ref 20.5–51.1)
LYMPHOCYTES # BLD AUTO: 4.6 % — LOW (ref 20.5–51.1)
MAGNESIUM SERPL-MCNC: 2 MG/DL — SIGNIFICANT CHANGE UP (ref 1.8–2.4)
MCHC RBC-ENTMCNC: 29.3 PG — SIGNIFICANT CHANGE UP (ref 27–31)
MCHC RBC-ENTMCNC: 29.6 PG — SIGNIFICANT CHANGE UP (ref 27–31)
MCHC RBC-ENTMCNC: 30.1 PG — SIGNIFICANT CHANGE UP (ref 27–31)
MCHC RBC-ENTMCNC: 30.6 G/DL — LOW (ref 32–37)
MCHC RBC-ENTMCNC: 30.8 G/DL — LOW (ref 32–37)
MCHC RBC-ENTMCNC: 31.2 G/DL — LOW (ref 32–37)
MCV RBC AUTO: 95.2 FL — HIGH (ref 80–94)
MCV RBC AUTO: 96.4 FL — HIGH (ref 80–94)
MCV RBC AUTO: 96.5 FL — HIGH (ref 80–94)
MONOCYTES # BLD AUTO: 0.68 K/UL — HIGH (ref 0.1–0.6)
MONOCYTES # BLD AUTO: 0.83 K/UL — HIGH (ref 0.1–0.6)
MONOCYTES NFR BLD AUTO: 4.7 % — SIGNIFICANT CHANGE UP (ref 1.7–9.3)
MONOCYTES NFR BLD AUTO: 7.7 % — SIGNIFICANT CHANGE UP (ref 1.7–9.3)
NEUTROPHILS # BLD AUTO: 15.66 K/UL — HIGH (ref 1.4–6.5)
NEUTROPHILS # BLD AUTO: 6.57 K/UL — HIGH (ref 1.4–6.5)
NEUTROPHILS NFR BLD AUTO: 74.7 % — SIGNIFICANT CHANGE UP (ref 42.2–75.2)
NEUTROPHILS NFR BLD AUTO: 88.7 % — HIGH (ref 42.2–75.2)
NRBC # BLD: 0 /100 WBCS — SIGNIFICANT CHANGE UP (ref 0–0)
PLATELET # BLD AUTO: 120 K/UL — LOW (ref 130–400)
PLATELET # BLD AUTO: 165 K/UL — SIGNIFICANT CHANGE UP (ref 130–400)
PLATELET # BLD AUTO: 195 K/UL — SIGNIFICANT CHANGE UP (ref 130–400)
POTASSIUM SERPL-MCNC: 4.2 MMOL/L — SIGNIFICANT CHANGE UP (ref 3.5–5)
POTASSIUM SERPL-MCNC: 4.9 MMOL/L — SIGNIFICANT CHANGE UP (ref 3.5–5)
POTASSIUM SERPL-SCNC: 4.2 MMOL/L — SIGNIFICANT CHANGE UP (ref 3.5–5)
POTASSIUM SERPL-SCNC: 4.9 MMOL/L — SIGNIFICANT CHANGE UP (ref 3.5–5)
PROT SERPL-MCNC: 5.3 G/DL — LOW (ref 6–8)
PROTHROM AB SERPL-ACNC: 13.7 SEC — HIGH (ref 9.95–12.87)
PROTHROM AB SERPL-ACNC: 15.5 SEC — HIGH (ref 9.95–12.87)
RBC # BLD: 2.57 M/UL — LOW (ref 4.7–6.1)
RBC # BLD: 3.36 M/UL — LOW (ref 4.7–6.1)
RBC # BLD: 3.72 M/UL — LOW (ref 4.7–6.1)
RBC # FLD: 14.6 % — HIGH (ref 11.5–14.5)
RBC # FLD: 14.8 % — HIGH (ref 11.5–14.5)
RBC # FLD: 14.9 % — HIGH (ref 11.5–14.5)
SODIUM SERPL-SCNC: 137 MMOL/L — SIGNIFICANT CHANGE UP (ref 135–146)
SODIUM SERPL-SCNC: 139 MMOL/L — SIGNIFICANT CHANGE UP (ref 135–146)
WBC # BLD: 12.37 K/UL — HIGH (ref 4.8–10.8)
WBC # BLD: 17.64 K/UL — HIGH (ref 4.8–10.8)
WBC # BLD: 8.8 K/UL — SIGNIFICANT CHANGE UP (ref 4.8–10.8)
WBC # FLD AUTO: 12.37 K/UL — HIGH (ref 4.8–10.8)
WBC # FLD AUTO: 17.64 K/UL — HIGH (ref 4.8–10.8)
WBC # FLD AUTO: 8.8 K/UL — SIGNIFICANT CHANGE UP (ref 4.8–10.8)

## 2020-02-24 PROCEDURE — 71045 X-RAY EXAM CHEST 1 VIEW: CPT | Mod: 26,77

## 2020-02-24 PROCEDURE — 71045 X-RAY EXAM CHEST 1 VIEW: CPT | Mod: 26

## 2020-02-24 PROCEDURE — 33533 CABG ARTERIAL SINGLE: CPT | Mod: 22

## 2020-02-24 PROCEDURE — 33533 CABG ARTERIAL SINGLE: CPT | Mod: AS,22

## 2020-02-24 RX ORDER — INSULIN HUMAN 100 [IU]/ML
1 INJECTION, SOLUTION SUBCUTANEOUS
Qty: 100 | Refills: 0 | Status: DISCONTINUED | OUTPATIENT
Start: 2020-02-24 | End: 2020-02-25

## 2020-02-24 RX ORDER — NITROGLYCERIN 6.5 MG
5 CAPSULE, EXTENDED RELEASE ORAL
Qty: 50 | Refills: 0 | Status: DISCONTINUED | OUTPATIENT
Start: 2020-02-24 | End: 2020-02-25

## 2020-02-24 RX ORDER — PHENYLEPHRINE HYDROCHLORIDE 10 MG/ML
0.1 INJECTION INTRAVENOUS
Qty: 40 | Refills: 0 | Status: DISCONTINUED | OUTPATIENT
Start: 2020-02-24 | End: 2020-02-25

## 2020-02-24 RX ORDER — NICARDIPINE HYDROCHLORIDE 30 MG/1
5 CAPSULE, EXTENDED RELEASE ORAL
Qty: 40 | Refills: 0 | Status: DISCONTINUED | OUTPATIENT
Start: 2020-02-24 | End: 2020-02-25

## 2020-02-24 RX ORDER — DEXTROSE 50 % IN WATER 50 %
25 SYRINGE (ML) INTRAVENOUS
Refills: 0 | Status: DISCONTINUED | OUTPATIENT
Start: 2020-02-24 | End: 2020-02-25

## 2020-02-24 RX ORDER — FAMOTIDINE 10 MG/ML
20 INJECTION INTRAVENOUS EVERY 12 HOURS
Refills: 0 | Status: DISCONTINUED | OUTPATIENT
Start: 2020-02-24 | End: 2020-02-25

## 2020-02-24 RX ORDER — ASPIRIN/CALCIUM CARB/MAGNESIUM 324 MG
300 TABLET ORAL ONCE
Refills: 0 | Status: COMPLETED | OUTPATIENT
Start: 2020-02-24 | End: 2020-02-24

## 2020-02-24 RX ORDER — IPRATROPIUM BROMIDE 0.2 MG/ML
2 SOLUTION, NON-ORAL INHALATION EVERY 6 HOURS
Refills: 0 | Status: DISCONTINUED | OUTPATIENT
Start: 2020-02-24 | End: 2020-02-25

## 2020-02-24 RX ORDER — ACETAMINOPHEN 500 MG
1000 TABLET ORAL ONCE
Refills: 0 | Status: COMPLETED | OUTPATIENT
Start: 2020-02-24 | End: 2020-02-24

## 2020-02-24 RX ORDER — OXYCODONE HYDROCHLORIDE 5 MG/1
5 TABLET ORAL EVERY 4 HOURS
Refills: 0 | Status: DISCONTINUED | OUTPATIENT
Start: 2020-02-24 | End: 2020-02-27

## 2020-02-24 RX ORDER — FENTANYL CITRATE 50 UG/ML
25 INJECTION INTRAVENOUS ONCE
Refills: 0 | Status: DISCONTINUED | OUTPATIENT
Start: 2020-02-24 | End: 2020-02-24

## 2020-02-24 RX ORDER — DEXTROSE 50 % IN WATER 50 %
50 SYRINGE (ML) INTRAVENOUS
Refills: 0 | Status: DISCONTINUED | OUTPATIENT
Start: 2020-02-24 | End: 2020-02-25

## 2020-02-24 RX ORDER — PROPOFOL 10 MG/ML
10 INJECTION, EMULSION INTRAVENOUS
Qty: 1000 | Refills: 0 | Status: DISCONTINUED | OUTPATIENT
Start: 2020-02-24 | End: 2020-02-25

## 2020-02-24 RX ORDER — POLYETHYLENE GLYCOL 3350 17 G/17G
17 POWDER, FOR SOLUTION ORAL DAILY
Refills: 0 | Status: DISCONTINUED | OUTPATIENT
Start: 2020-02-24 | End: 2020-03-12

## 2020-02-24 RX ORDER — CHLORHEXIDINE GLUCONATE 213 G/1000ML
5 SOLUTION TOPICAL EVERY 4 HOURS
Refills: 0 | Status: DISCONTINUED | OUTPATIENT
Start: 2020-02-24 | End: 2020-02-25

## 2020-02-24 RX ORDER — PROTAMINE SULFATE 10 MG/ML
25 AMPUL (ML) INTRAVENOUS ONCE
Refills: 0 | Status: COMPLETED | OUTPATIENT
Start: 2020-02-24 | End: 2020-02-24

## 2020-02-24 RX ORDER — DEXMEDETOMIDINE HYDROCHLORIDE IN 0.9% SODIUM CHLORIDE 4 UG/ML
0.25 INJECTION INTRAVENOUS
Qty: 200 | Refills: 0 | Status: DISCONTINUED | OUTPATIENT
Start: 2020-02-24 | End: 2020-02-25

## 2020-02-24 RX ORDER — CHLORHEXIDINE GLUCONATE 213 G/1000ML
15 SOLUTION TOPICAL EVERY 12 HOURS
Refills: 0 | Status: DISCONTINUED | OUTPATIENT
Start: 2020-02-24 | End: 2020-02-25

## 2020-02-24 RX ORDER — VASOPRESSIN 20 [USP'U]/ML
0.04 INJECTION INTRAVENOUS
Qty: 50 | Refills: 0 | Status: DISCONTINUED | OUTPATIENT
Start: 2020-02-24 | End: 2020-02-25

## 2020-02-24 RX ORDER — NOREPINEPHRINE BITARTRATE/D5W 8 MG/250ML
0.05 PLASTIC BAG, INJECTION (ML) INTRAVENOUS
Qty: 8 | Refills: 0 | Status: DISCONTINUED | OUTPATIENT
Start: 2020-02-24 | End: 2020-02-25

## 2020-02-24 RX ORDER — MAGNESIUM SULFATE 500 MG/ML
1 VIAL (ML) INJECTION EVERY 12 HOURS
Refills: 0 | Status: DISCONTINUED | OUTPATIENT
Start: 2020-02-24 | End: 2020-03-12

## 2020-02-24 RX ORDER — MEPERIDINE HYDROCHLORIDE 50 MG/ML
25 INJECTION INTRAMUSCULAR; INTRAVENOUS; SUBCUTANEOUS ONCE
Refills: 0 | Status: DISCONTINUED | OUTPATIENT
Start: 2020-02-24 | End: 2020-02-25

## 2020-02-24 RX ORDER — ALBUTEROL 90 UG/1
2 AEROSOL, METERED ORAL EVERY 6 HOURS
Refills: 0 | Status: DISCONTINUED | OUTPATIENT
Start: 2020-02-24 | End: 2020-02-25

## 2020-02-24 RX ORDER — CHLORHEXIDINE GLUCONATE 213 G/1000ML
1 SOLUTION TOPICAL
Refills: 0 | Status: DISCONTINUED | OUTPATIENT
Start: 2020-02-24 | End: 2020-02-25

## 2020-02-24 RX ORDER — SODIUM CHLORIDE 9 MG/ML
1000 INJECTION INTRAMUSCULAR; INTRAVENOUS; SUBCUTANEOUS
Refills: 0 | Status: DISCONTINUED | OUTPATIENT
Start: 2020-02-24 | End: 2020-02-27

## 2020-02-24 RX ORDER — OXYCODONE HYDROCHLORIDE 5 MG/1
10 TABLET ORAL EVERY 4 HOURS
Refills: 0 | Status: DISCONTINUED | OUTPATIENT
Start: 2020-02-24 | End: 2020-02-26

## 2020-02-24 RX ORDER — ONDANSETRON 8 MG/1
4 TABLET, FILM COATED ORAL ONCE
Refills: 0 | Status: DISCONTINUED | OUTPATIENT
Start: 2020-02-24 | End: 2020-03-12

## 2020-02-24 RX ORDER — ASPIRIN/CALCIUM CARB/MAGNESIUM 324 MG
325 TABLET ORAL DAILY
Refills: 0 | Status: DISCONTINUED | OUTPATIENT
Start: 2020-02-25 | End: 2020-03-12

## 2020-02-24 RX ORDER — CEFAZOLIN SODIUM 1 G
1000 VIAL (EA) INJECTION EVERY 8 HOURS
Refills: 0 | Status: COMPLETED | OUTPATIENT
Start: 2020-02-24 | End: 2020-02-25

## 2020-02-24 RX ORDER — ALBUMIN HUMAN 25 %
500 VIAL (ML) INTRAVENOUS ONCE
Refills: 0 | Status: COMPLETED | OUTPATIENT
Start: 2020-02-24 | End: 2020-02-24

## 2020-02-24 RX ORDER — ALBUMIN HUMAN 25 %
1000 VIAL (ML) INTRAVENOUS ONCE
Refills: 0 | Status: COMPLETED | OUTPATIENT
Start: 2020-02-24 | End: 2020-02-24

## 2020-02-24 RX ADMIN — Medication 1000 MILLIGRAM(S): at 20:15

## 2020-02-24 RX ADMIN — Medication 250 MILLILITER(S): at 17:00

## 2020-02-24 RX ADMIN — Medication 500 MILLILITER(S): at 17:15

## 2020-02-24 RX ADMIN — PROPOFOL 6.31 MICROGRAM(S)/KG/MIN: 10 INJECTION, EMULSION INTRAVENOUS at 21:00

## 2020-02-24 RX ADMIN — Medication 100 GRAM(S): at 17:34

## 2020-02-24 RX ADMIN — CHLORHEXIDINE GLUCONATE 5 MILLILITER(S): 213 SOLUTION TOPICAL at 21:43

## 2020-02-24 RX ADMIN — FAMOTIDINE 20 MILLIGRAM(S): 10 INJECTION INTRAVENOUS at 17:15

## 2020-02-24 RX ADMIN — CHLORHEXIDINE GLUCONATE 15 MILLILITER(S): 213 SOLUTION TOPICAL at 06:30

## 2020-02-24 RX ADMIN — Medication 250 MILLILITER(S): at 19:00

## 2020-02-24 RX ADMIN — Medication 100 MILLIGRAM(S): at 17:14

## 2020-02-24 RX ADMIN — Medication 1 APPLICATION(S): at 06:30

## 2020-02-24 RX ADMIN — FENTANYL CITRATE 25 MICROGRAM(S): 50 INJECTION INTRAVENOUS at 18:30

## 2020-02-24 RX ADMIN — CHLORHEXIDINE GLUCONATE 5 MILLILITER(S): 213 SOLUTION TOPICAL at 17:15

## 2020-02-24 RX ADMIN — NYSTATIN CREAM 1 APPLICATION(S): 100000 CREAM TOPICAL at 06:31

## 2020-02-24 RX ADMIN — Medication 210 MILLIGRAM(S): at 17:30

## 2020-02-24 RX ADMIN — Medication 400 MILLIGRAM(S): at 20:00

## 2020-02-24 NOTE — PROCEDURE NOTE - NSINDICATIONS_GEN_A_CORE
arterial puncture to obtain ABG's/monitoring purposes/critical patient
venous access
hemodynamic monitoring

## 2020-02-24 NOTE — PROCEDURE NOTE - ADDITIONAL PROCEDURE DETAILS
Neck kept in neutral position with collar off, patient paralyzed under GA, no neck movement whatsoever during procedure, antibiotic dressing applied after insertion, hard collar put back on after procedure

## 2020-02-24 NOTE — AIRWAY PLACEMENT NOTE ADULT - AIRWAY COMMENTS:
NB Hard cervical collar left in place for intubation. Easy glidescope intubation one attempt second provider providing additional stability at all times. BS BL, ETCO2 noted

## 2020-02-24 NOTE — PROCEDURE NOTE - NSPROCDETAILS_GEN_ALL_CORE
ultrasound guidance/supine position/ultrasound assessment/sterile technique, catheter placed/location identified, draped/prepped, sterile technique used
connected to a pressurized flush line/Seldinger technique/all materials/supplies accounted for at end of procedure/location identified, draped/prepped, sterile technique used, needle inserted/introduced/positive blood return obtained via catheter/sutured in place/hemostasis with direct pressure, dressing applied
guidewire recovered/sterile technique, catheter placed/ultrasound guidance/sterile dressing applied/lumen(s) aspirated and flushed

## 2020-02-24 NOTE — PACU DISCHARGE NOTE - COMMENTS
No anesthesia complications  Unable to float swan-bladimir, CTU team informed  Neck precautions maintained throughout  /76, HR 58, RR 14, SaO2 100%, T 96.8F

## 2020-02-25 LAB
ALBUMIN SERPL ELPH-MCNC: 3.8 G/DL — SIGNIFICANT CHANGE UP (ref 3.5–5.2)
ALP SERPL-CCNC: 55 U/L — SIGNIFICANT CHANGE UP (ref 30–115)
ALT FLD-CCNC: 14 U/L — SIGNIFICANT CHANGE UP (ref 0–41)
ANION GAP SERPL CALC-SCNC: 11 MMOL/L — SIGNIFICANT CHANGE UP (ref 7–14)
APTT BLD: 32.3 SEC — SIGNIFICANT CHANGE UP (ref 27–39.2)
AST SERPL-CCNC: 19 U/L — SIGNIFICANT CHANGE UP (ref 0–41)
BASOPHILS # BLD AUTO: 0.01 K/UL — SIGNIFICANT CHANGE UP (ref 0–0.2)
BASOPHILS NFR BLD AUTO: 0.1 % — SIGNIFICANT CHANGE UP (ref 0–1)
BILIRUB SERPL-MCNC: 1.2 MG/DL — SIGNIFICANT CHANGE UP (ref 0.2–1.2)
BUN SERPL-MCNC: 15 MG/DL — SIGNIFICANT CHANGE UP (ref 10–20)
CALCIUM SERPL-MCNC: 7.7 MG/DL — LOW (ref 8.5–10.1)
CHLORIDE SERPL-SCNC: 105 MMOL/L — SIGNIFICANT CHANGE UP (ref 98–110)
CO2 SERPL-SCNC: 22 MMOL/L — SIGNIFICANT CHANGE UP (ref 17–32)
CREAT SERPL-MCNC: 0.7 MG/DL — SIGNIFICANT CHANGE UP (ref 0.7–1.5)
EOSINOPHIL # BLD AUTO: 0 K/UL — SIGNIFICANT CHANGE UP (ref 0–0.7)
EOSINOPHIL NFR BLD AUTO: 0 % — SIGNIFICANT CHANGE UP (ref 0–8)
GLUCOSE BLDC GLUCOMTR-MCNC: 111 MG/DL — HIGH (ref 70–99)
GLUCOSE BLDC GLUCOMTR-MCNC: 116 MG/DL — HIGH (ref 70–99)
GLUCOSE BLDC GLUCOMTR-MCNC: 120 MG/DL — HIGH (ref 70–99)
GLUCOSE BLDC GLUCOMTR-MCNC: 130 MG/DL — HIGH (ref 70–99)
GLUCOSE SERPL-MCNC: 118 MG/DL — HIGH (ref 70–99)
HCT VFR BLD CALC: 29.3 % — LOW (ref 42–52)
HGB BLD-MCNC: 9.1 G/DL — LOW (ref 14–18)
IMM GRANULOCYTES NFR BLD AUTO: 0.5 % — HIGH (ref 0.1–0.3)
INR BLD: 1.5 RATIO — HIGH (ref 0.65–1.3)
LYMPHOCYTES # BLD AUTO: 0.71 K/UL — LOW (ref 1.2–3.4)
LYMPHOCYTES # BLD AUTO: 5.5 % — LOW (ref 20.5–51.1)
MAGNESIUM SERPL-MCNC: 2 MG/DL — SIGNIFICANT CHANGE UP (ref 1.8–2.4)
MCHC RBC-ENTMCNC: 29.6 PG — SIGNIFICANT CHANGE UP (ref 27–31)
MCHC RBC-ENTMCNC: 31.1 G/DL — LOW (ref 32–37)
MCV RBC AUTO: 95.4 FL — HIGH (ref 80–94)
MONOCYTES # BLD AUTO: 0.75 K/UL — HIGH (ref 0.1–0.6)
MONOCYTES NFR BLD AUTO: 5.9 % — SIGNIFICANT CHANGE UP (ref 1.7–9.3)
NEUTROPHILS # BLD AUTO: 11.28 K/UL — HIGH (ref 1.4–6.5)
NEUTROPHILS NFR BLD AUTO: 88 % — HIGH (ref 42.2–75.2)
NRBC # BLD: 0 /100 WBCS — SIGNIFICANT CHANGE UP (ref 0–0)
PLATELET # BLD AUTO: 130 K/UL — SIGNIFICANT CHANGE UP (ref 130–400)
POTASSIUM SERPL-MCNC: 4.4 MMOL/L — SIGNIFICANT CHANGE UP (ref 3.5–5)
POTASSIUM SERPL-SCNC: 4.4 MMOL/L — SIGNIFICANT CHANGE UP (ref 3.5–5)
PROT SERPL-MCNC: 5.3 G/DL — LOW (ref 6–8)
PROTHROM AB SERPL-ACNC: 17.3 SEC — HIGH (ref 9.95–12.87)
RBC # BLD: 3.07 M/UL — LOW (ref 4.7–6.1)
RBC # FLD: 15.3 % — HIGH (ref 11.5–14.5)
SODIUM SERPL-SCNC: 138 MMOL/L — SIGNIFICANT CHANGE UP (ref 135–146)
WBC # BLD: 12.82 K/UL — HIGH (ref 4.8–10.8)
WBC # FLD AUTO: 12.82 K/UL — HIGH (ref 4.8–10.8)

## 2020-02-25 PROCEDURE — 99291 CRITICAL CARE FIRST HOUR: CPT

## 2020-02-25 PROCEDURE — 93010 ELECTROCARDIOGRAM REPORT: CPT

## 2020-02-25 PROCEDURE — 71045 X-RAY EXAM CHEST 1 VIEW: CPT | Mod: 26

## 2020-02-25 RX ORDER — DEXTROSE 50 % IN WATER 50 %
12.5 SYRINGE (ML) INTRAVENOUS ONCE
Refills: 0 | Status: DISCONTINUED | OUTPATIENT
Start: 2020-02-25 | End: 2020-02-28

## 2020-02-25 RX ORDER — IPRATROPIUM/ALBUTEROL SULFATE 18-103MCG
3 AEROSOL WITH ADAPTER (GRAM) INHALATION EVERY 6 HOURS
Refills: 0 | Status: DISCONTINUED | OUTPATIENT
Start: 2020-02-25 | End: 2020-02-29

## 2020-02-25 RX ORDER — CHLORHEXIDINE GLUCONATE 213 G/1000ML
1 SOLUTION TOPICAL
Refills: 0 | Status: DISCONTINUED | OUTPATIENT
Start: 2020-02-25 | End: 2020-03-12

## 2020-02-25 RX ORDER — SENNA PLUS 8.6 MG/1
1 TABLET ORAL
Refills: 0 | Status: DISCONTINUED | OUTPATIENT
Start: 2020-02-25 | End: 2020-02-29

## 2020-02-25 RX ORDER — DEXTROSE 50 % IN WATER 50 %
25 SYRINGE (ML) INTRAVENOUS ONCE
Refills: 0 | Status: DISCONTINUED | OUTPATIENT
Start: 2020-02-25 | End: 2020-02-28

## 2020-02-25 RX ORDER — FENTANYL CITRATE 50 UG/ML
25 INJECTION INTRAVENOUS ONCE
Refills: 0 | Status: DISCONTINUED | OUTPATIENT
Start: 2020-02-25 | End: 2020-02-25

## 2020-02-25 RX ORDER — DEXTROSE 50 % IN WATER 50 %
15 SYRINGE (ML) INTRAVENOUS ONCE
Refills: 0 | Status: DISCONTINUED | OUTPATIENT
Start: 2020-02-25 | End: 2020-02-28

## 2020-02-25 RX ORDER — PREGABALIN 225 MG/1
1000 CAPSULE ORAL DAILY
Refills: 0 | Status: DISCONTINUED | OUTPATIENT
Start: 2020-02-25 | End: 2020-03-12

## 2020-02-25 RX ORDER — FAMOTIDINE 10 MG/ML
20 INJECTION INTRAVENOUS
Refills: 0 | Status: DISCONTINUED | OUTPATIENT
Start: 2020-02-25 | End: 2020-03-12

## 2020-02-25 RX ORDER — HEPARIN SODIUM 5000 [USP'U]/ML
5000 INJECTION INTRAVENOUS; SUBCUTANEOUS EVERY 8 HOURS
Refills: 0 | Status: DISCONTINUED | OUTPATIENT
Start: 2020-02-25 | End: 2020-02-26

## 2020-02-25 RX ORDER — ACETAMINOPHEN 500 MG
1000 TABLET ORAL ONCE
Refills: 0 | Status: COMPLETED | OUTPATIENT
Start: 2020-02-25 | End: 2020-02-25

## 2020-02-25 RX ORDER — GLUCAGON INJECTION, SOLUTION 0.5 MG/.1ML
1 INJECTION, SOLUTION SUBCUTANEOUS ONCE
Refills: 0 | Status: DISCONTINUED | OUTPATIENT
Start: 2020-02-25 | End: 2020-03-12

## 2020-02-25 RX ORDER — SODIUM CHLORIDE 9 MG/ML
1000 INJECTION, SOLUTION INTRAVENOUS
Refills: 0 | Status: DISCONTINUED | OUTPATIENT
Start: 2020-02-25 | End: 2020-02-28

## 2020-02-25 RX ORDER — GUAIFENESIN/DEXTROMETHORPHAN 600MG-30MG
20 TABLET, EXTENDED RELEASE 12 HR ORAL EVERY 8 HOURS
Refills: 0 | Status: DISCONTINUED | OUTPATIENT
Start: 2020-02-25 | End: 2020-02-25

## 2020-02-25 RX ORDER — ATORVASTATIN CALCIUM 80 MG/1
80 TABLET, FILM COATED ORAL AT BEDTIME
Refills: 0 | Status: DISCONTINUED | OUTPATIENT
Start: 2020-02-25 | End: 2020-03-12

## 2020-02-25 RX ORDER — INSULIN LISPRO 100/ML
VIAL (ML) SUBCUTANEOUS
Refills: 0 | Status: DISCONTINUED | OUTPATIENT
Start: 2020-02-25 | End: 2020-02-28

## 2020-02-25 RX ORDER — THIAMINE MONONITRATE (VIT B1) 100 MG
100 TABLET ORAL DAILY
Refills: 0 | Status: DISCONTINUED | OUTPATIENT
Start: 2020-02-25 | End: 2020-03-12

## 2020-02-25 RX ORDER — GUAIFENESIN/DEXTROMETHORPHAN 600MG-30MG
10 TABLET, EXTENDED RELEASE 12 HR ORAL EVERY 6 HOURS
Refills: 0 | Status: DISCONTINUED | OUTPATIENT
Start: 2020-02-25 | End: 2020-03-12

## 2020-02-25 RX ADMIN — CHLORHEXIDINE GLUCONATE 1 APPLICATION(S): 213 SOLUTION TOPICAL at 05:25

## 2020-02-25 RX ADMIN — CHLORHEXIDINE GLUCONATE 5 MILLILITER(S): 213 SOLUTION TOPICAL at 01:36

## 2020-02-25 RX ADMIN — SENNA PLUS 1 TABLET(S): 8.6 TABLET ORAL at 18:52

## 2020-02-25 RX ADMIN — FENTANYL CITRATE 25 MICROGRAM(S): 50 INJECTION INTRAVENOUS at 09:15

## 2020-02-25 RX ADMIN — NICARDIPINE HYDROCHLORIDE 25 MG/HR: 30 CAPSULE, EXTENDED RELEASE ORAL at 20:00

## 2020-02-25 RX ADMIN — ATORVASTATIN CALCIUM 80 MILLIGRAM(S): 80 TABLET, FILM COATED ORAL at 21:45

## 2020-02-25 RX ADMIN — OXYCODONE HYDROCHLORIDE 10 MILLIGRAM(S): 5 TABLET ORAL at 11:00

## 2020-02-25 RX ADMIN — VASOPRESSIN 2.4 UNIT(S)/MIN: 20 INJECTION INTRAVENOUS at 06:26

## 2020-02-25 RX ADMIN — Medication 3 MILLILITER(S): at 15:00

## 2020-02-25 RX ADMIN — POLYETHYLENE GLYCOL 3350 17 GRAM(S): 17 POWDER, FOR SOLUTION ORAL at 12:04

## 2020-02-25 RX ADMIN — Medication 100 GRAM(S): at 05:24

## 2020-02-25 RX ADMIN — OXYCODONE HYDROCHLORIDE 10 MILLIGRAM(S): 5 TABLET ORAL at 18:50

## 2020-02-25 RX ADMIN — OXYCODONE HYDROCHLORIDE 10 MILLIGRAM(S): 5 TABLET ORAL at 10:31

## 2020-02-25 RX ADMIN — FENTANYL CITRATE 25 MICROGRAM(S): 50 INJECTION INTRAVENOUS at 19:00

## 2020-02-25 RX ADMIN — PHENYLEPHRINE HYDROCHLORIDE 3.94 MICROGRAM(S)/KG/MIN: 10 INJECTION INTRAVENOUS at 06:25

## 2020-02-25 RX ADMIN — ALBUTEROL 2 PUFF(S): 90 AEROSOL, METERED ORAL at 05:07

## 2020-02-25 RX ADMIN — Medication 2 PUFF(S): at 05:07

## 2020-02-25 RX ADMIN — Medication 100 MILLIGRAM(S): at 01:36

## 2020-02-25 RX ADMIN — FENTANYL CITRATE 25 MICROGRAM(S): 50 INJECTION INTRAVENOUS at 09:21

## 2020-02-25 RX ADMIN — CHLORHEXIDINE GLUCONATE 15 MILLILITER(S): 213 SOLUTION TOPICAL at 05:25

## 2020-02-25 RX ADMIN — Medication 100 MILLIGRAM(S): at 09:25

## 2020-02-25 RX ADMIN — OXYCODONE HYDROCHLORIDE 10 MILLIGRAM(S): 5 TABLET ORAL at 19:00

## 2020-02-25 RX ADMIN — Medication 325 MILLIGRAM(S): at 12:04

## 2020-02-25 RX ADMIN — PREGABALIN 1000 MICROGRAM(S): 225 CAPSULE ORAL at 12:04

## 2020-02-25 RX ADMIN — Medication 10 MILLILITER(S): at 12:55

## 2020-02-25 RX ADMIN — OXYCODONE HYDROCHLORIDE 10 MILLIGRAM(S): 5 TABLET ORAL at 14:33

## 2020-02-25 RX ADMIN — Medication 100 GRAM(S): at 18:53

## 2020-02-25 RX ADMIN — FENTANYL CITRATE 25 MICROGRAM(S): 50 INJECTION INTRAVENOUS at 18:51

## 2020-02-25 RX ADMIN — Medication 400 MILLIGRAM(S): at 05:24

## 2020-02-25 RX ADMIN — Medication 1000 MILLIGRAM(S): at 06:00

## 2020-02-25 RX ADMIN — CHLORHEXIDINE GLUCONATE 5 MILLILITER(S): 213 SOLUTION TOPICAL at 05:25

## 2020-02-25 RX ADMIN — OXYCODONE HYDROCHLORIDE 10 MILLIGRAM(S): 5 TABLET ORAL at 22:50

## 2020-02-25 RX ADMIN — FAMOTIDINE 20 MILLIGRAM(S): 10 INJECTION INTRAVENOUS at 18:51

## 2020-02-25 RX ADMIN — Medication 100 MILLIGRAM(S): at 12:04

## 2020-02-25 RX ADMIN — Medication 10 MILLILITER(S): at 18:51

## 2020-02-25 RX ADMIN — OXYCODONE HYDROCHLORIDE 10 MILLIGRAM(S): 5 TABLET ORAL at 23:30

## 2020-02-25 RX ADMIN — FAMOTIDINE 20 MILLIGRAM(S): 10 INJECTION INTRAVENOUS at 05:24

## 2020-02-25 RX ADMIN — ALBUTEROL 2 PUFF(S): 90 AEROSOL, METERED ORAL at 07:51

## 2020-02-25 RX ADMIN — SODIUM CHLORIDE 10 MILLILITER(S): 9 INJECTION INTRAMUSCULAR; INTRAVENOUS; SUBCUTANEOUS at 15:00

## 2020-02-25 RX ADMIN — Medication 2 PUFF(S): at 07:51

## 2020-02-25 RX ADMIN — HEPARIN SODIUM 5000 UNIT(S): 5000 INJECTION INTRAVENOUS; SUBCUTANEOUS at 13:59

## 2020-02-25 NOTE — PROGRESS NOTE ADULT - SUBJECTIVE AND OBJECTIVE BOX
CTU Attending Progress Daily Note     25 Feb 2020 08:28    Procedure:       CABG                                           POD#        1           Patient seen as post-op critical care follow-up    HPI:  78 yo M with hx of HTN, A.fib (Eliquis), CHF, HLD brought in by EMS post cardiac arrest. As per wife at bedside, patient at the basement watching movie and was doing fine. Few mins later, patient came up and told the wife that he wasn't feeling right and that he felt funny. Then he crashed. Wife called the EMS who arrived 5 mins later. Upon EMS arrival, patient was found to be in PEA then asystole, s/p resuscitation for ~ 10 mins and ROSC achieved after 1 round of compression and epi. While on the way to hospital, patient had another cardiac arrest on the ambulant s/p CPR and ROSC achieved few mins later. As per family, patient was doing welll and at baseline prior to the episode. As baseline patient is fully functional. Denied any recent infection, recent hospitalization, recent ED visit, fever, chills.     In ED, patient was found to bradycardic and hypotensive. s/p 1 dose of atropine and patient was started on low dose Levophed with improvement in HR and BP. (12 Jan 2020 22:13)    See preop testing chart H&P    Interval event for past 24 hr:  CHRISTINELUKEMED CASTAÑEDA  79y had low cardiac index and BP, cardiogenic shock responded to fluids and pressors, did not require inotropes    Current Complains:  MED IVORY has no new complaints    REVIEW OF SYSTEMS:  [ x ] Unable to assess ROS because : intubated    OBJECTIVE:  ICU Vital Signs Last 24 Hrs  T(C): 37.3 (25 Feb 2020 08:05), Max: 38 (25 Feb 2020 04:00)  T(F): 99.1 (25 Feb 2020 08:05), Max: 100.4 (25 Feb 2020 04:00)  HR: 77 (25 Feb 2020 08:05) (51 - 83)  BP: 111/63 (25 Feb 2020 05:00) (71/44 - 160/90)  BP(mean): 82 (25 Feb 2020 05:00) (53 - 119)  ABP: 123/63 (25 Feb 2020 08:05) (53/39 - 182/86)  ABP(mean): 83 (25 Feb 2020 08:05) (35 - 120)  RR: 33 (25 Feb 2020 08:05) (10 - 33)  SpO2: 100% (25 Feb 2020 08:05) (85% - 100%)      I&O's Summary    24 Feb 2020 07:01  -  25 Feb 2020 07:00  --------------------------------------------------------  IN: 3646.7 mL / OUT: 1442 mL / NET: 2204.7 mL    25 Feb 2020 07:01  -  25 Feb 2020 08:28  --------------------------------------------------------  IN: 59.4 mL / OUT: 31 mL / NET: 28.4 mL      Adult Advanced Hemodynamics Last 24 Hrs  CVP(mm Hg): 10 (25 Feb 2020 08:05) (3 - 23)  CVP(cm H2O): --  CO: 4.3 (25 Feb 2020 08:05) (3.3 - 5.5)  CI: 2.1 (25 Feb 2020 08:05) (1.5 - 2.4)  PA: 33/17 (25 Feb 2020 08:05) (11/7 - 201/200)  PA(mean): 24 (25 Feb 2020 08:05) (9 - 200)  PCWP: --  SVR: 1356 (25 Feb 2020 08:05) (768 - 1573)  SVRI: 2777 (25 Feb 2020 08:05) (5454 - 3462)  PVR: --  PVRI: --  Mode: AC/ CMV (Assist Control/ Continuous Mandatory Ventilation)  RR (machine): 18  TV (machine): 450  FiO2: 40  PEEP: 5  ITime: 1  MAP: 11  PIP: 20      PHYSICAL EXAM:  General: WN/WD NAD    HEENT:     [+] NCAT  [+] EOMI  [-] Conjuctival edema   [-] Icterus   [-] Thrush   [-] ETT  [-] NGT/OGT    Neck:         [+] FROM   [-] JVD     [-] Nodes     [-] Masses    [+] Mid-line trachea    [-] Tracheostomy    Chest:         [-] Sternal click   [-] Sternal drainage   [-] Pacing wires   [+] Chest tubes   [-] SubQ emphysema    Lungs:          [+] CTA   [-] Rhonchi   [-] Rales    [-] Wheezing    [-] Decreased BS    [-] Dullness R L    Cardiac:       [+] S1 [+] S2    [+] RRR   [-] Irregular   [-] S3   [-] S4    [-] Murmurs    [-] Rub    Abdomen:    [+] BS    [+] Soft    [+] Non-tender     [-] Distended    [-] Organomegaly  [-] PEG    Extremities:   [-] Cyanosis U/L   [-] Clubbing  U/L  [-] LE/UE Edema   [+] Capillary refill    [+] Pulses     Neuro:       [+] Sedated      Skin:        [-] Rashes    [-] Erythema   [+] Normal incisions   [+] IV sites intact   [-] Sacral decubitus    Tubes:  LINES:    CAPILLARY BLOOD GLUCOSE      POCT Blood Glucose.: 116 mg/dL (25 Feb 2020 06:41)    CAPILLARY BLOOD GLUCOSE      POCT Blood Glucose.: 116 mg/dL (25 Feb 2020 06:41)  POCT Blood Glucose.: 111 mg/dL (25 Feb 2020 02:07)  POCT Blood Glucose.: 109 mg/dL (24 Feb 2020 23:56)  POCT Blood Glucose.: 154 mg/dL (24 Feb 2020 19:35)  POCT Blood Glucose.: 145 mg/dL (24 Feb 2020 17:57)  POCT Blood Glucose.: 123 mg/dL (24 Feb 2020 16:11)      HOSPITAL MEDICATIONS:  MEDICATIONS  (STANDING):  ALBUTerol    90 MICROgram(s) HFA Inhaler 2 Puff(s) Inhalation every 6 hours  aspirin enteric coated 325 milliGRAM(s) Oral daily  ceFAZolin   IVPB 1000 milliGRAM(s) IV Intermittent every 8 hours  chlorhexidine 0.12% Liquid 5 milliLiter(s) Oral Mucosa every 4 hours  chlorhexidine 0.12% Liquid 15 milliLiter(s) Oral Mucosa every 12 hours  chlorhexidine 4% Liquid 1 Application(s) Topical <User Schedule>  dexMEDEtomidine Infusion 0.25 MICROgram(s)/kG/Hr (6.575 mL/Hr) IV Continuous <Continuous>  dextrose 50% Injectable 50 milliLiter(s) IV Push every 15 minutes  dextrose 50% Injectable 25 milliLiter(s) IV Push every 15 minutes  famotidine Injectable 20 milliGRAM(s) IV Push every 12 hours  insulin regular Infusion 1 Unit(s)/Hr (1 mL/Hr) IV Continuous <Continuous>  ipratropium 17 MICROgram(s) HFA Inhaler 2 Puff(s) Inhalation every 6 hours  magnesium sulfate  IVPB 1 Gram(s) IV Intermittent every 12 hours  meperidine     Injectable 25 milliGRAM(s) IV Push once  niCARdipine Infusion 5 mG/Hr (25 mL/Hr) IV Continuous <Continuous>  nitroglycerin  Infusion 5 MICROgram(s)/Min (1.5 mL/Hr) IV Continuous <Continuous>  norepinephrine Infusion 0.05 MICROgram(s)/kG/Min (9.863 mL/Hr) IV Continuous <Continuous>  phenylephrine    Infusion 0.1 MICROgram(s)/kG/Min (3.945 mL/Hr) IV Continuous <Continuous>  polyethylene glycol 3350 17 Gram(s) Oral daily  propofol Infusion 10 MICROgram(s)/kG/Min (6.312 mL/Hr) IV Continuous <Continuous>  sodium chloride 0.9%. 1000 milliLiter(s) (10 mL/Hr) IV Continuous <Continuous>  vasopressin Infusion 0.04 Unit(s)/Min (2.4 mL/Hr) IV Continuous <Continuous>    MEDICATIONS  (PRN):  ondansetron  IVPB 4 milliGRAM(s) IV Intermittent once PRN Nausea and/or Vomiting  oxyCODONE    IR 5 milliGRAM(s) Oral every 4 hours PRN Moderate Pain (4 - 6)  oxyCODONE    IR 10 milliGRAM(s) Oral every 4 hours PRN Severe Pain (7 - 10)      LABS:  ABG - ( 25 Feb 2020 02:33 )  pH, Arterial: 7.39  pH, Blood: x     /  pCO2: 38    /  pO2: 113   / HCO3: 23    / Base Excess: -1.7  /  SaO2: 99                                      9.1    12.82 )-----------( 130      ( 25 Feb 2020 02:00 )             29.3     02-25    138  |  105  |  15  ----------------------------<  118<H>  4.4   |  22  |  0.7    Ca    7.7<L>      25 Feb 2020 02:00  Mg     2.0     02-25    TPro  5.3<L>  /  Alb  3.8  /  TBili  1.2  /  DBili  x   /  AST  19  /  ALT  14  /  AlkPhos  55  02-25    PT/INR - ( 25 Feb 2020 02:00 )   PT: 17.30 sec;   INR: 1.50 ratio         PTT - ( 25 Feb 2020 02:00 )  PTT:32.3 sec        RADIOLOGY:  Reviewed and interpreted by me  CXR from 02-25-20 shows [+] mild congestion, [-] pneumothorax, [-] R/L effusion, [-] cardiomegaly,   NGT in place, S-G Catheter in place, R/L TLC in place, R/L Chest Tubes in place    ECG:  Reviewed and interpreted by me: SR 74  QTC: 444    Assessment:  CAD SP CABG  Acute blood loss anemia SP 2 PRBCs  hypotension with low cardiac index CW cardiogenic shock     PAST MEDICAL & SURGICAL HISTORY:  Atrial fibrillation  Neuropathy  HLD (hyperlipidemia)  HTN (hypertension)  History of cholecystectomy  History of cholecystectomy      PLAN:  Neuro: Pain control, SAT  Pulm: SBT with extubation then Encourage coughing, deep breathing and use of incentive spirometry. Wean off supplemental oxygen as able. Daily CXR.   Cardio: Monitor telemetry/alarms. Continue cardiac meds  GI: Tolerating diet. Continue stool softeners. Continue GI prophylaxis  Renal: monitor urine output, supplement electrolytes as needed  Vasc: Heparin SC/SCDs for DVT prophylaxis  Heme: Monitor H/H.   ID: Off antibiotics. Stable.  Endocrine: Monitor finger stick blood sugar and control hyperglycemia with insulin  Physical Therapy: OOB/ambulate  Tubes: Monitor Chest tube output      Discussed with Cardiothoracic Team at AM rounds.    45 minutes of critical care time spent providing medical care for patient's acute illness/conditions that impairs at least one vital organ system and/or poses a high risk of imminent or life threatening deterioration in the patient's condition. It includes time spent evaluating and treating the patient's acute illness as well as time spent reviewing labs, radiology, discussing goals of care with patient and/or patient's family, and discussing the case with a multidisciplinary team in an effort to prevent further life threatening deterioration or end organ damage. This time is independent of any procedures performed.

## 2020-02-25 NOTE — PROGRESS NOTE ADULT - SUBJECTIVE AND OBJECTIVE BOX
OPERATIVE PROCEDURE(s): MICabg               POD #   1                    SURGEON(s): MARCY Arreguin    SUBJECTIVE ASSESSMENT: 79y Male patient seen and examined at bedside. Patient still vented.     Vital Signs Last 24 Hrs  T(F): 99.1 (25 Feb 2020 08:05), Max: 100.4 (25 Feb 2020 04:00)  HR: 77 (25 Feb 2020 08:05) (51 - 83)  BP: 111/63 (25 Feb 2020 05:00) (71/44 - 160/90)  BP(mean): 82 (25 Feb 2020 05:00) (53 - 119)  ABP: 123/63 (25 Feb 2020 08:05) (53/39 - 182/86)  ABP(mean): 83 (25 Feb 2020 08:05)  RR: 33 (25 Feb 2020 08:05) (10 - 33)  SpO2: 100% (25 Feb 2020 08:05) (85% - 100%)    CVP(mm Hg): 10 (25 Feb 2020 08:05)  CO: 4.3 (25 Feb 2020 08:05)  CI: 2.1 (25 Feb 2020 08:05)  PA: 33/17 (25 Feb 2020 08:05)  SVR: 1356 (25 Feb 2020 08:05)    Mode: AC/ CMV (Assist Control/ Continuous Mandatory Ventilation)  RR (machine): 18  TV (machine): 450  FiO2: 40  PEEP: 5  MAP: 11    I&O's Detail    24 Feb 2020 07:01  -  25 Feb 2020 07:00  --------------------------------------------------------  IN:    Albumin 5%  - 500 mL: 2000 mL    dexmedetomidine Infusion: 35 mL    insulin regular Infusion: 22 mL    IV PiggyBack: 300 mL    niCARdipine Infusion: 30 mL    nitroglycerin  Infusion: 19 mL    norepinephrine Infusion: 28 mL    Packed Red Blood Cells: 625 mL    phenylephrine   Infusion: 62 mL    propofol Infusion: 144.9 mL    sodium chloride 0.9%.: 340 mL    vasopressin Infusion: 40.8 mL  Total IN: 3646.7 mL    OUT:    Left pleural Tube: 940 mL    Indwelling Catheter - Urethral: 502 mL  Total OUT: 1442 mL    Net: I&O's Detail    23 Feb 2020 07:01  -  24 Feb 2020 07:00  --------------------------------------------------------  Total NET: -470 mL    24 Feb 2020 07:01  -  25 Feb 2020 07:00  --------------------------------------------------------  Total NET: 2204.7 mL      CAPILLARY BLOOD GLUCOSE  POCT Blood Glucose.: 116 mg/dL (25 Feb 2020 06:41)  POCT Blood Glucose.: 111 mg/dL (25 Feb 2020 02:07)  POCT Blood Glucose.: 109 mg/dL (24 Feb 2020 23:56)  POCT Blood Glucose.: 154 mg/dL (24 Feb 2020 19:35)  POCT Blood Glucose.: 145 mg/dL (24 Feb 2020 17:57)  POCT Blood Glucose.: 123 mg/dL (24 Feb 2020 16:11)      Physical Exam:  General: NAD; A&Ox3  Cardiac: S1/S2, RRR, no murmur, no rubs  Lungs: unlabored respirations, CTA b/l, no wheeze, no rales, no crackles  Abdomen: Soft/NT/ND; positive bowel sounds x 4  Left thoracotomy Incisions: Incisions clean/dry/intact  Extremities: No edema b/l lower extremities; good capillary refill; no cyanosis; palpable 1+ pedal pulses b/l    Central Venous Catheter: Yes[x]  No[] , If Yes indication:                    Day # 1  Mora Catheter: Yes  [x] , No  [] , If yes indication:                                 Day # 1  BOWEL MOVEMENT:  [] YES [x] NO, If No, Timing since last BM Day #  CHEST TUBE(Left/Right):  [x] YES [] NO, If yes -  AIR LEAKS:  [] YES [] NO        LABS:                             9.1<L>  12.82<H> )-----------( 130      ( 25 Feb 2020 02:00 )                  29.3<L>                        7.6<L>  12.37<H> )-----------( 120<L>    ( 24 Feb 2020 20:03 )             24.8<L>    02-25    138  |  105  |  15  ----------------------------<  118<H>  4.4   |  22  |  0.7  02-24    139  |  104  |  15  ----------------------------<  108<H>  4.9   |  24  |  0.7    Ca    7.7<L>      25 Feb 2020 02:00  Mg     2.0     02-25    TPro  5.3<L> [6.0 - 8.0]  /  Alb  3.8 [3.5 - 5.2]  /  TBili  1.2 [0.2 - 1.2]  /  DBili  x   /  AST  19 [0 - 41]  /  ALT  14 [0 - 41]  /  AlkPhos  55 [30 - 115]  02-25    PT/INR - ( 25 Feb 2020 02:00 )   PT: ;   INR: 1.50 ratio       PT/INR - ( 24 Feb 2020 14:58 )   PT: ;   INR: 1.35 ratio       PTT - ( 25 Feb 2020 02:00 )  PTT:32.3 sec, PTT - ( 24 Feb 2020 14:58 )  PTT:28.5 sec    ABG - ( 25 Feb 2020 08:29 )  pH: 7.42  /  pCO2: 35    /  pO2: 98    / HCO3: 23    / Base Excess: -1.4  /  SaO2: 98    /  LA: 0.9        RADIOLOGY & ADDITIONAL TESTS:  CXR: < from: Xray Chest 1 View-PORTABLE IMMEDIATE (02.24.20 @ 15:52) >  Impression:    The right jugular line is overlying theregion of the superior vena cava. There is no evidence of pneumothorax.  < end of copied text >     EKG: < from: 12 Lead ECG (02.20.20 @ 07:54) >  Ventricular Rate 66 BPM  Atrial Rate 220 BPM  QRS Duration 110 ms  Q-T Interval 388 ms  QTC Calculation(Bezet) 406 ms  R Axis -13 degrees  T Axis 12 degrees  Diagnosis Line Atrial fibrillation  Inferior infarct , age undetermined  Abnormal ECG  Confirmed by Martin Gamboa ([id number]) on 2/20/2020 12:27:20 PM  < end of copied text >      Allergies  No Known Allergies  Intolerances      MEDICATIONS  (STANDING):  ALBUTerol    90 MICROgram(s) HFA Inhaler 2 Puff(s) Inhalation every 6 hours  aspirin enteric coated 325 milliGRAM(s) Oral daily  ceFAZolin   IVPB 1000 milliGRAM(s) IV Intermittent every 8 hours  chlorhexidine 0.12% Liquid 5 milliLiter(s) Oral Mucosa every 4 hours  chlorhexidine 0.12% Liquid 15 milliLiter(s) Oral Mucosa every 12 hours  chlorhexidine 4% Liquid 1 Application(s) Topical <User Schedule>  dexMEDEtomidine Infusion 0.25 MICROgram(s)/kG/Hr (6.575 mL/Hr) IV Continuous <Continuous>  dextrose 50% Injectable 50 milliLiter(s) IV Push every 15 minutes  dextrose 50% Injectable 25 milliLiter(s) IV Push every 15 minutes  famotidine Injectable 20 milliGRAM(s) IV Push every 12 hours  insulin regular Infusion 1 Unit(s)/Hr (1 mL/Hr) IV Continuous <Continuous>  ipratropium 17 MICROgram(s) HFA Inhaler 2 Puff(s) Inhalation every 6 hours  magnesium sulfate  IVPB 1 Gram(s) IV Intermittent every 12 hours  meperidine     Injectable 25 milliGRAM(s) IV Push once  niCARdipine Infusion 5 mG/Hr (25 mL/Hr) IV Continuous <Continuous>  nitroglycerin  Infusion 5 MICROgram(s)/Min (1.5 mL/Hr) IV Continuous <Continuous>  norepinephrine Infusion 0.05 MICROgram(s)/kG/Min (9.863 mL/Hr) IV Continuous <Continuous>  phenylephrine    Infusion 0.1 MICROgram(s)/kG/Min (3.945 mL/Hr) IV Continuous <Continuous>  polyethylene glycol 3350 17 Gram(s) Oral daily  propofol Infusion 10 MICROgram(s)/kG/Min (6.312 mL/Hr) IV Continuous <Continuous>  sodium chloride 0.9%. 1000 milliLiter(s) (10 mL/Hr) IV Continuous <Continuous>  vasopressin Infusion 0.04 Unit(s)/Min (2.4 mL/Hr) IV Continuous <Continuous>    MEDICATIONS  (PRN):  ondansetron  IVPB 4 milliGRAM(s) IV Intermittent once PRN Nausea and/or Vomiting  oxyCODONE    IR 5 milliGRAM(s) Oral every 4 hours PRN Moderate Pain (4 - 6)  oxyCODONE    IR 10 milliGRAM(s) Oral every 4 hours PRN Severe Pain (7 - 10)    Home Medications:  aspirin 81 mg oral tablet, chewable: 1 tab(s) orally once a day (13 Feb 2020 14:27)  carvedilol 12.5 mg oral tablet: 1 tab(s) orally 2 times a day (16 Jan 2020 12:31)  clonazePAM 1 mg oral tablet: 1 tab(s) orally once a day (11 Feb 2020 10:55)  DULoxetine 30 mg oral delayed release capsule: 1 cap(s) orally once a day (16 Jan 2020 12:31)  Eliquis 5 mg oral tablet: 1 tab(s) orally 2 times a day (16 Jan 2020 12:31)  furosemide 40 mg oral tablet: 1 tab(s) orally once a day (16 Jan 2020 12:31)  gabapentin 300 mg oral capsule: 1 cap(s) orally 2 times a day (16 Jan 2020 12:31)  omeprazole 20 mg oral delayed release capsule: 1 cap(s) orally once a day (16 Jan 2020 12:31)  potassium chloride 10 mEq oral capsule, extended release: 1 cap(s) orally once a day (16 Jan 2020 12:31)  simvastatin 40 mg oral tablet: 1 tab(s) orally once a day (at bedtime) (16 Jan 2020 12:31)  telmisartan 80 mg oral tablet: 1 tab(s) orally once a day (16 Jan 2020 12:31)  Vitamin D3: cap(s) orally once a day (16 Jan 2020 12:31)      Post-Op Beta-Blockers: []Yes, [x]No: contraindication: pt hypotensive requiring vasopressors    Post-Op Aspirin: [x]Yes,  []No: contraindication:  Post-Op Statin: [x]Yes, []No: contraindication:    Ambulation/Activity Status: OOB/AMB    Assessment/Plan:  79y Male status-post MICabg               POD #   1  - Case and plan discussed with CTU Intensivist and CT Surgeon - Dr. Arreguin   - Continue CTU supportive care and ongoing plan of care as per continuing CTU rounds.    - Continue DVT/GI prophylaxis  - Incentive Spirometry 10 times an hour  - Continue to advance physical activity as tolerated and continue PT/OT as directed  1. CAD: Continue ASA, statin, BB  2. HTN:   3. A. Fib:   4. COPD/Hypoxia:   5. DM/Glucose Control:     Social Service Disposition: OPERATIVE PROCEDURE(s): MICabg               POD #   1                    SURGEON(s): MARCY Arreguin    SUBJECTIVE ASSESSMENT: 79y Male patient seen and examined at bedside.     Vital Signs Last 24 Hrs  T(F): 99.1 (25 Feb 2020 08:05), Max: 100.4 (25 Feb 2020 04:00)  HR: 77 (25 Feb 2020 08:05) (51 - 83)  BP: 111/63 (25 Feb 2020 05:00) (71/44 - 160/90)  BP(mean): 82 (25 Feb 2020 05:00) (53 - 119)  ABP: 123/63 (25 Feb 2020 08:05) (53/39 - 182/86)  ABP(mean): 83 (25 Feb 2020 08:05)  RR: 33 (25 Feb 2020 08:05) (10 - 33)  SpO2: 100% (25 Feb 2020 08:05) (85% - 100%)    CVP(mm Hg): 10 (25 Feb 2020 08:05)  CO: 4.3 (25 Feb 2020 08:05)  CI: 2.1 (25 Feb 2020 08:05)  PA: 33/17 (25 Feb 2020 08:05)  SVR: 1356 (25 Feb 2020 08:05)    Mode: AC/ CMV (Assist Control/ Continuous Mandatory Ventilation)  RR (machine): 18  TV (machine): 450  FiO2: 40  PEEP: 5  MAP: 11    I&O's Detail    24 Feb 2020 07:01  -  25 Feb 2020 07:00  --------------------------------------------------------  IN:    Albumin 5%  - 500 mL: 2000 mL    dexmedetomidine Infusion: 35 mL    insulin regular Infusion: 22 mL    IV PiggyBack: 300 mL    niCARdipine Infusion: 30 mL    nitroglycerin  Infusion: 19 mL    norepinephrine Infusion: 28 mL    Packed Red Blood Cells: 625 mL    phenylephrine   Infusion: 62 mL    propofol Infusion: 144.9 mL    sodium chloride 0.9%.: 340 mL    vasopressin Infusion: 40.8 mL  Total IN: 3646.7 mL    OUT:    Left pleural Tube: 940 mL    Indwelling Catheter - Urethral: 502 mL  Total OUT: 1442 mL    Net: I&O's Detail    23 Feb 2020 07:01  -  24 Feb 2020 07:00  --------------------------------------------------------  Total NET: -470 mL    24 Feb 2020 07:01  -  25 Feb 2020 07:00  --------------------------------------------------------  Total NET: 2204.7 mL      CAPILLARY BLOOD GLUCOSE  POCT Blood Glucose.: 116 mg/dL (25 Feb 2020 06:41)  POCT Blood Glucose.: 111 mg/dL (25 Feb 2020 02:07)  POCT Blood Glucose.: 109 mg/dL (24 Feb 2020 23:56)  POCT Blood Glucose.: 154 mg/dL (24 Feb 2020 19:35)  POCT Blood Glucose.: 145 mg/dL (24 Feb 2020 17:57)  POCT Blood Glucose.: 123 mg/dL (24 Feb 2020 16:11)      Physical Exam:  General: NAD; A&Ox3  Cardiac: S1/S2, RRR, no murmur, no rubs  Lungs: unlabored respirations, CTA b/l, no wheeze, no rales, no crackles  Abdomen: Soft/NT/ND; positive bowel sounds x 4  Left thoracotomy Incisions: Incisions clean/dry/intact  Extremities: No edema b/l lower extremities; good capillary refill; no cyanosis; palpable 1+ pedal pulses b/l    Central Venous Catheter: Yes[x]  No[] , If Yes indication:                    Day # 1  Mora Catheter: Yes  [x] , No  [] , If yes indication:                                 Day # 1  BOWEL MOVEMENT:  [] YES [x] NO, If No, Timing since last BM Day #  CHEST TUBE(Left/Right):  [x] YES [] NO, If yes -  AIR LEAKS:  [] YES [] NO        LABS:                             9.1<L>  12.82<H> )-----------( 130      ( 25 Feb 2020 02:00 )                  29.3<L>                        7.6<L>  12.37<H> )-----------( 120<L>    ( 24 Feb 2020 20:03 )             24.8<L>    02-25    138  |  105  |  15  ----------------------------<  118<H>  4.4   |  22  |  0.7  02-24    139  |  104  |  15  ----------------------------<  108<H>  4.9   |  24  |  0.7    Ca    7.7<L>      25 Feb 2020 02:00  Mg     2.0     02-25    TPro  5.3<L> [6.0 - 8.0]  /  Alb  3.8 [3.5 - 5.2]  /  TBili  1.2 [0.2 - 1.2]  /  DBili  x   /  AST  19 [0 - 41]  /  ALT  14 [0 - 41]  /  AlkPhos  55 [30 - 115]  02-25    PT/INR - ( 25 Feb 2020 02:00 )   PT: ;   INR: 1.50 ratio       PT/INR - ( 24 Feb 2020 14:58 )   PT: ;   INR: 1.35 ratio       PTT - ( 25 Feb 2020 02:00 )  PTT:32.3 sec, PTT - ( 24 Feb 2020 14:58 )  PTT:28.5 sec    ABG - ( 25 Feb 2020 08:29 )  pH: 7.42  /  pCO2: 35    /  pO2: 98    / HCO3: 23    / Base Excess: -1.4  /  SaO2: 98    /  LA: 0.9        RADIOLOGY & ADDITIONAL TESTS:  CXR: < from: Xray Chest 1 View-PORTABLE IMMEDIATE (02.24.20 @ 15:52) >  Impression:    The right jugular line is overlying theregion of the superior vena cava. There is no evidence of pneumothorax.  < end of copied text >     EKG: < from: 12 Lead ECG (02.20.20 @ 07:54) >  Ventricular Rate 66 BPM  Atrial Rate 220 BPM  QRS Duration 110 ms  Q-T Interval 388 ms  QTC Calculation(Bezet) 406 ms  R Axis -13 degrees  T Axis 12 degrees  Diagnosis Line Atrial fibrillation  Inferior infarct , age undetermined  Abnormal ECG  Confirmed by Martin Gamboa [id number]) on 2/20/2020 12:27:20 PM  < end of copied text >      Allergies  No Known Allergies  Intolerances      MEDICATIONS  (STANDING):  ALBUTerol    90 MICROgram(s) HFA Inhaler 2 Puff(s) Inhalation every 6 hours  aspirin enteric coated 325 milliGRAM(s) Oral daily  ceFAZolin   IVPB 1000 milliGRAM(s) IV Intermittent every 8 hours  chlorhexidine 0.12% Liquid 5 milliLiter(s) Oral Mucosa every 4 hours  chlorhexidine 0.12% Liquid 15 milliLiter(s) Oral Mucosa every 12 hours  chlorhexidine 4% Liquid 1 Application(s) Topical <User Schedule>  dexMEDEtomidine Infusion 0.25 MICROgram(s)/kG/Hr (6.575 mL/Hr) IV Continuous <Continuous>  dextrose 50% Injectable 50 milliLiter(s) IV Push every 15 minutes  dextrose 50% Injectable 25 milliLiter(s) IV Push every 15 minutes  famotidine Injectable 20 milliGRAM(s) IV Push every 12 hours  insulin regular Infusion 1 Unit(s)/Hr (1 mL/Hr) IV Continuous <Continuous>  ipratropium 17 MICROgram(s) HFA Inhaler 2 Puff(s) Inhalation every 6 hours  magnesium sulfate  IVPB 1 Gram(s) IV Intermittent every 12 hours  meperidine     Injectable 25 milliGRAM(s) IV Push once  niCARdipine Infusion 5 mG/Hr (25 mL/Hr) IV Continuous <Continuous>  nitroglycerin  Infusion 5 MICROgram(s)/Min (1.5 mL/Hr) IV Continuous <Continuous>  norepinephrine Infusion 0.05 MICROgram(s)/kG/Min (9.863 mL/Hr) IV Continuous <Continuous>  phenylephrine    Infusion 0.1 MICROgram(s)/kG/Min (3.945 mL/Hr) IV Continuous <Continuous>  polyethylene glycol 3350 17 Gram(s) Oral daily  propofol Infusion 10 MICROgram(s)/kG/Min (6.312 mL/Hr) IV Continuous <Continuous>  sodium chloride 0.9%. 1000 milliLiter(s) (10 mL/Hr) IV Continuous <Continuous>  vasopressin Infusion 0.04 Unit(s)/Min (2.4 mL/Hr) IV Continuous <Continuous>    MEDICATIONS  (PRN):  ondansetron  IVPB 4 milliGRAM(s) IV Intermittent once PRN Nausea and/or Vomiting  oxyCODONE    IR 5 milliGRAM(s) Oral every 4 hours PRN Moderate Pain (4 - 6)  oxyCODONE    IR 10 milliGRAM(s) Oral every 4 hours PRN Severe Pain (7 - 10)    Home Medications:  aspirin 81 mg oral tablet, chewable: 1 tab(s) orally once a day (13 Feb 2020 14:27)  carvedilol 12.5 mg oral tablet: 1 tab(s) orally 2 times a day (16 Jan 2020 12:31)  clonazePAM 1 mg oral tablet: 1 tab(s) orally once a day (11 Feb 2020 10:55)  DULoxetine 30 mg oral delayed release capsule: 1 cap(s) orally once a day (16 Jan 2020 12:31)  Eliquis 5 mg oral tablet: 1 tab(s) orally 2 times a day (16 Jan 2020 12:31)  furosemide 40 mg oral tablet: 1 tab(s) orally once a day (16 Jan 2020 12:31)  gabapentin 300 mg oral capsule: 1 cap(s) orally 2 times a day (16 Jan 2020 12:31)  omeprazole 20 mg oral delayed release capsule: 1 cap(s) orally once a day (16 Jan 2020 12:31)  potassium chloride 10 mEq oral capsule, extended release: 1 cap(s) orally once a day (16 Jan 2020 12:31)  simvastatin 40 mg oral tablet: 1 tab(s) orally once a day (at bedtime) (16 Jan 2020 12:31)  telmisartan 80 mg oral tablet: 1 tab(s) orally once a day (16 Jan 2020 12:31)  Vitamin D3: cap(s) orally once a day (16 Jan 2020 12:31)      Post-Op Beta-Blockers: []Yes, [x]No: contraindication: pt hypotensive requiring vasopressors    Post-Op Aspirin: [x]Yes,  []No: contraindication:  Post-Op Statin: [x]Yes, []No: contraindication:    Ambulation/Activity Status: OOB/AMB    Assessment/Plan:  79y Male status-post MICabg               POD #   1  - Case and plan discussed with CTU Intensivist and CT Surgeon - Dr. Arreguin   - Continue CTU supportive care and ongoing plan of care as per continuing CTU rounds.    - Continue DVT/GI prophylaxis  - Incentive Spirometry 10 times an hour  - Continue to advance physical activity as tolerated and continue PT/OT as directed  1. CAD: Continue ASA and statin. Hold BB for symptomatic hypotensive requiring vasopressors.  2. A. Fib: magnesium sulfate  IVPB 1 Gram(s) IV Intermittent every 12 hours   3. COPD/Hypoxia: cont duonebs

## 2020-02-26 LAB
ALBUMIN SERPL ELPH-MCNC: 3.4 G/DL — LOW (ref 3.5–5.2)
ALP SERPL-CCNC: 68 U/L — SIGNIFICANT CHANGE UP (ref 30–115)
ALT FLD-CCNC: 11 U/L — SIGNIFICANT CHANGE UP (ref 0–41)
ANION GAP SERPL CALC-SCNC: 8 MMOL/L — SIGNIFICANT CHANGE UP (ref 7–14)
APTT BLD: 33.6 SEC — SIGNIFICANT CHANGE UP (ref 27–39.2)
AST SERPL-CCNC: 14 U/L — SIGNIFICANT CHANGE UP (ref 0–41)
BASOPHILS # BLD AUTO: 0.01 K/UL — SIGNIFICANT CHANGE UP (ref 0–0.2)
BASOPHILS NFR BLD AUTO: 0.1 % — SIGNIFICANT CHANGE UP (ref 0–1)
BILIRUB SERPL-MCNC: 0.9 MG/DL — SIGNIFICANT CHANGE UP (ref 0.2–1.2)
BUN SERPL-MCNC: 16 MG/DL — SIGNIFICANT CHANGE UP (ref 10–20)
CALCIUM SERPL-MCNC: 8.1 MG/DL — LOW (ref 8.5–10.1)
CHLORIDE SERPL-SCNC: 105 MMOL/L — SIGNIFICANT CHANGE UP (ref 98–110)
CO2 SERPL-SCNC: 26 MMOL/L — SIGNIFICANT CHANGE UP (ref 17–32)
CREAT SERPL-MCNC: 0.6 MG/DL — LOW (ref 0.7–1.5)
EOSINOPHIL # BLD AUTO: 0.02 K/UL — SIGNIFICANT CHANGE UP (ref 0–0.7)
EOSINOPHIL NFR BLD AUTO: 0.1 % — SIGNIFICANT CHANGE UP (ref 0–8)
GAS PNL BLDA: SIGNIFICANT CHANGE UP
GLUCOSE BLDC GLUCOMTR-MCNC: 114 MG/DL — HIGH (ref 70–99)
GLUCOSE BLDC GLUCOMTR-MCNC: 117 MG/DL — HIGH (ref 70–99)
GLUCOSE BLDC GLUCOMTR-MCNC: 123 MG/DL — HIGH (ref 70–99)
GLUCOSE BLDC GLUCOMTR-MCNC: 135 MG/DL — HIGH (ref 70–99)
GLUCOSE SERPL-MCNC: 117 MG/DL — HIGH (ref 70–99)
HCT VFR BLD CALC: 29.7 % — LOW (ref 42–52)
HGB BLD-MCNC: 9 G/DL — LOW (ref 14–18)
IMM GRANULOCYTES NFR BLD AUTO: 0.6 % — HIGH (ref 0.1–0.3)
INR BLD: 1.65 RATIO — HIGH (ref 0.65–1.3)
LYMPHOCYTES # BLD AUTO: 0.78 K/UL — LOW (ref 1.2–3.4)
LYMPHOCYTES # BLD AUTO: 5.6 % — LOW (ref 20.5–51.1)
MAGNESIUM SERPL-MCNC: 2.1 MG/DL — SIGNIFICANT CHANGE UP (ref 1.8–2.4)
MCHC RBC-ENTMCNC: 29.3 PG — SIGNIFICANT CHANGE UP (ref 27–31)
MCHC RBC-ENTMCNC: 30.3 G/DL — LOW (ref 32–37)
MCV RBC AUTO: 96.7 FL — HIGH (ref 80–94)
MONOCYTES # BLD AUTO: 0.98 K/UL — HIGH (ref 0.1–0.6)
MONOCYTES NFR BLD AUTO: 7 % — SIGNIFICANT CHANGE UP (ref 1.7–9.3)
NEUTROPHILS # BLD AUTO: 12.03 K/UL — HIGH (ref 1.4–6.5)
NEUTROPHILS NFR BLD AUTO: 86.6 % — HIGH (ref 42.2–75.2)
NRBC # BLD: 0 /100 WBCS — SIGNIFICANT CHANGE UP (ref 0–0)
PLATELET # BLD AUTO: 141 K/UL — SIGNIFICANT CHANGE UP (ref 130–400)
POTASSIUM SERPL-MCNC: 4.5 MMOL/L — SIGNIFICANT CHANGE UP (ref 3.5–5)
POTASSIUM SERPL-SCNC: 4.5 MMOL/L — SIGNIFICANT CHANGE UP (ref 3.5–5)
PROT SERPL-MCNC: 5.2 G/DL — LOW (ref 6–8)
PROTHROM AB SERPL-ACNC: 19 SEC — HIGH (ref 9.95–12.87)
RBC # BLD: 3.07 M/UL — LOW (ref 4.7–6.1)
RBC # FLD: 15.6 % — HIGH (ref 11.5–14.5)
SODIUM SERPL-SCNC: 139 MMOL/L — SIGNIFICANT CHANGE UP (ref 135–146)
WBC # BLD: 13.91 K/UL — HIGH (ref 4.8–10.8)
WBC # FLD AUTO: 13.91 K/UL — HIGH (ref 4.8–10.8)

## 2020-02-26 PROCEDURE — 99233 SBSQ HOSP IP/OBS HIGH 50: CPT

## 2020-02-26 PROCEDURE — 71045 X-RAY EXAM CHEST 1 VIEW: CPT | Mod: 26

## 2020-02-26 PROCEDURE — 93010 ELECTROCARDIOGRAM REPORT: CPT

## 2020-02-26 RX ORDER — FENTANYL CITRATE 50 UG/ML
25 INJECTION INTRAVENOUS ONCE
Refills: 0 | Status: DISCONTINUED | OUTPATIENT
Start: 2020-02-26 | End: 2020-02-26

## 2020-02-26 RX ORDER — HEPARIN SODIUM 5000 [USP'U]/ML
5000 INJECTION INTRAVENOUS; SUBCUTANEOUS EVERY 8 HOURS
Refills: 0 | Status: DISCONTINUED | OUTPATIENT
Start: 2020-02-26 | End: 2020-03-04

## 2020-02-26 RX ORDER — FUROSEMIDE 40 MG
40 TABLET ORAL ONCE
Refills: 0 | Status: COMPLETED | OUTPATIENT
Start: 2020-02-26 | End: 2020-02-26

## 2020-02-26 RX ORDER — METOPROLOL TARTRATE 50 MG
12.5 TABLET ORAL EVERY 12 HOURS
Refills: 0 | Status: DISCONTINUED | OUTPATIENT
Start: 2020-02-26 | End: 2020-03-11

## 2020-02-26 RX ADMIN — OXYCODONE HYDROCHLORIDE 10 MILLIGRAM(S): 5 TABLET ORAL at 11:51

## 2020-02-26 RX ADMIN — FAMOTIDINE 20 MILLIGRAM(S): 10 INJECTION INTRAVENOUS at 05:02

## 2020-02-26 RX ADMIN — Medication 100 GRAM(S): at 17:13

## 2020-02-26 RX ADMIN — FENTANYL CITRATE 25 MICROGRAM(S): 50 INJECTION INTRAVENOUS at 05:26

## 2020-02-26 RX ADMIN — OXYCODONE HYDROCHLORIDE 10 MILLIGRAM(S): 5 TABLET ORAL at 06:00

## 2020-02-26 RX ADMIN — Medication 10 MILLILITER(S): at 17:13

## 2020-02-26 RX ADMIN — Medication 10 MILLILITER(S): at 11:22

## 2020-02-26 RX ADMIN — Medication 12.5 MILLIGRAM(S): at 17:08

## 2020-02-26 RX ADMIN — Medication 3 MILLILITER(S): at 08:26

## 2020-02-26 RX ADMIN — OXYCODONE HYDROCHLORIDE 10 MILLIGRAM(S): 5 TABLET ORAL at 05:11

## 2020-02-26 RX ADMIN — Medication 10 MILLILITER(S): at 05:25

## 2020-02-26 RX ADMIN — Medication 12.5 MILLIGRAM(S): at 10:08

## 2020-02-26 RX ADMIN — ATORVASTATIN CALCIUM 80 MILLIGRAM(S): 80 TABLET, FILM COATED ORAL at 21:50

## 2020-02-26 RX ADMIN — HEPARIN SODIUM 5000 UNIT(S): 5000 INJECTION INTRAVENOUS; SUBCUTANEOUS at 16:00

## 2020-02-26 RX ADMIN — FAMOTIDINE 20 MILLIGRAM(S): 10 INJECTION INTRAVENOUS at 17:08

## 2020-02-26 RX ADMIN — OXYCODONE HYDROCHLORIDE 10 MILLIGRAM(S): 5 TABLET ORAL at 11:21

## 2020-02-26 RX ADMIN — Medication 40 MILLIGRAM(S): at 09:30

## 2020-02-26 RX ADMIN — HEPARIN SODIUM 5000 UNIT(S): 5000 INJECTION INTRAVENOUS; SUBCUTANEOUS at 09:30

## 2020-02-26 RX ADMIN — OXYCODONE HYDROCHLORIDE 10 MILLIGRAM(S): 5 TABLET ORAL at 20:32

## 2020-02-26 RX ADMIN — Medication 3 MILLILITER(S): at 14:24

## 2020-02-26 RX ADMIN — SENNA PLUS 1 TABLET(S): 8.6 TABLET ORAL at 05:02

## 2020-02-26 RX ADMIN — PREGABALIN 1000 MICROGRAM(S): 225 CAPSULE ORAL at 11:21

## 2020-02-26 RX ADMIN — SENNA PLUS 1 TABLET(S): 8.6 TABLET ORAL at 17:08

## 2020-02-26 RX ADMIN — Medication 325 MILLIGRAM(S): at 11:21

## 2020-02-26 RX ADMIN — Medication 100 GRAM(S): at 05:05

## 2020-02-26 RX ADMIN — POLYETHYLENE GLYCOL 3350 17 GRAM(S): 17 POWDER, FOR SOLUTION ORAL at 11:22

## 2020-02-26 RX ADMIN — FENTANYL CITRATE 25 MICROGRAM(S): 50 INJECTION INTRAVENOUS at 06:00

## 2020-02-26 RX ADMIN — CHLORHEXIDINE GLUCONATE 1 APPLICATION(S): 213 SOLUTION TOPICAL at 05:02

## 2020-02-26 RX ADMIN — Medication 10 MILLILITER(S): at 00:23

## 2020-02-26 RX ADMIN — OXYCODONE HYDROCHLORIDE 10 MILLIGRAM(S): 5 TABLET ORAL at 21:00

## 2020-02-26 RX ADMIN — Medication 100 MILLIGRAM(S): at 11:21

## 2020-02-26 RX ADMIN — Medication 3 MILLILITER(S): at 20:44

## 2020-02-26 NOTE — PROGRESS NOTE ADULT - SUBJECTIVE AND OBJECTIVE BOX
OPERATIVE PROCEDURE(s):                POD #                       79yMale  SURGEON(s): MARCY Arreguin  SUBJECTIVE ASSESSMENT:   Vital Signs Last 24 Hrs  T(F): 99.4 (26 Feb 2020 04:00), Max: 99.9 (25 Feb 2020 12:00)  HR: 88 (26 Feb 2020 06:00) (71 - 96)  BP: --  BP(mean): --  ABP: 116/52 (26 Feb 2020 06:00) (88/50 - 143/66)  ABP(mean): 70 (26 Feb 2020 06:00)  RR: 31 (26 Feb 2020 06:00) (17 - 41)  SpO2: 100% (26 Feb 2020 06:00) (93% - 100%)  CVP(mm Hg): 8 (25 Feb 2020 11:30)  CVP(cm H2O): --  CO: 5.7 (26 Feb 2020 00:00)  CI: 2.5 (26 Feb 2020 00:00)  PA: 31/13 (25 Feb 2020 11:30)  SVR: 1033 (25 Feb 2020 10:30)  Mode: CPAP with PS  FiO2: 40  PEEP: 5  PS: 8  MAP: 8    I&O's Detail    25 Feb 2020 07:01  -  26 Feb 2020 07:00  --------------------------------------------------------  IN:    insulin regular Infusion: 2 mL    IV PiggyBack: 150 mL    Oral Fluid: 550 mL    propofol Infusion: 15 mL    sodium chloride 0.9%.: 200 mL    vasopressin Infusion: 2.4 mL  Total IN: 919.4 mL    OUT:    Chest Tube: 610 mL    Indwelling Catheter - Urethral: 839 mL  Total OUT: 1449 mL        Net:   I&O's Detail    24 Feb 2020 07:01  -  25 Feb 2020 07:00  --------------------------------------------------------  Total NET: 2204.7 mL      25 Feb 2020 07:01  -  26 Feb 2020 07:00  --------------------------------------------------------  Total NET: -529.6 mL        CAPILLARY BLOOD GLUCOSE      POCT Blood Glucose.: 123 mg/dL (26 Feb 2020 06:50)  POCT Blood Glucose.: 130 mg/dL (25 Feb 2020 16:36)  POCT Blood Glucose.: 120 mg/dL (25 Feb 2020 11:28)    Physical Exam:  General: NAD; A&Ox3/Patient is intubated and sedated  Cardiac: S1/S2, RRR, no murmur, no rubs  Lungs: unlabored respirations, CTA b/l, no wheeze, no rales, no crackles  Abdomen: Soft/NT/ND; positive bowel sounds x 4  Sternum: Intact, no click, incision healing well with no drainage  Incisions: Incisions clean/dry/intact  Extremities: No edema b/l lower extremities; good capillary refill; no cyanosis; palpable 1+ pedal pulses b/l    Central Venous Catheter: Yes[]  No[] , If Yes indication:           Day #  Mora Catheter: Yes  [] , No  [] , If yes indication:                      Day #  NGT: Yes [] No [] ,    If Yes Placement:                                     Day #  EPICARDIAL WIRES:  [] YES [] NO                                              Day #  BOWEL MOVEMENT:  [] YES [] NO, If No, Timing since last BM:      Day #  CHEST TUBE(Left/Right):  [] YES [] NO, If yes -  AIR LEAKS:  [] YES [] NO        LABS:                        9.0<L>  13.91<H> )-----------( 141      ( 26 Feb 2020 01:30 )             29.7<L>                        9.1<L>  12.82<H> )-----------( 130      ( 25 Feb 2020 02:00 )             29.3<L>    02-26    139  |  105  |  16  ----------------------------<  117<H>  4.5   |  26  |  0.6<L>  02-25    138  |  105  |  15  ----------------------------<  118<H>  4.4   |  22  |  0.7    Ca    8.1<L>      26 Feb 2020 01:30  Mg     2.1     02-26    TPro  5.2<L> [6.0 - 8.0]  /  Alb  3.4<L> [3.5 - 5.2]  /  TBili  0.9 [0.2 - 1.2]  /  DBili  x   /  AST  14 [0 - 41]  /  ALT  11 [0 - 41]  /  AlkPhos  68 [30 - 115]  02-26    PT/INR - ( 26 Feb 2020 01:30 )   PT: ;   INR: 1.65 ratio         PT/INR - ( 25 Feb 2020 02:00 )   PT: ;   INR: 1.50 ratio         PTT - ( 26 Feb 2020 01:30 )  PTT:33.6 sec, PTT - ( 25 Feb 2020 02:00 )  PTT:32.3 sec    ABG - ( 26 Feb 2020 04:01 )  pH: 7.40  /  pCO2: 44    /  pO2: 87    / HCO3: 27    / Base Excess: 1.9   /  SaO2: 98    /  LA: 0.6              RADIOLOGY & ADDITIONAL TESTS:  CXR:  EKG:  MEDICATIONS  (STANDING):  albuterol/ipratropium for Nebulization 3 milliLiter(s) Nebulizer every 6 hours  aspirin enteric coated 325 milliGRAM(s) Oral daily  atorvastatin 80 milliGRAM(s) Oral at bedtime  chlorhexidine 4% Liquid 1 Application(s) Topical <User Schedule>  cyanocobalamin 1000 MICROGram(s) Oral daily  dextrose 5%. 1000 milliLiter(s) (50 mL/Hr) IV Continuous <Continuous>  dextrose 50% Injectable 12.5 Gram(s) IV Push once  dextrose 50% Injectable 25 Gram(s) IV Push once  dextrose 50% Injectable 25 Gram(s) IV Push once  famotidine    Tablet 20 milliGRAM(s) Oral two times a day  guaifenesin/dextromethorphan  Syrup 10 milliLiter(s) Oral every 6 hours  insulin lispro (HumaLOG) corrective regimen sliding scale   SubCutaneous three times a day before meals  magnesium sulfate  IVPB 1 Gram(s) IV Intermittent every 12 hours  polyethylene glycol 3350 17 Gram(s) Oral daily  senna 1 Tablet(s) Oral two times a day  sodium chloride 0.9%. 1000 milliLiter(s) (10 mL/Hr) IV Continuous <Continuous>  thiamine 100 milliGRAM(s) Oral daily    MEDICATIONS  (PRN):  dextrose 40% Gel 15 Gram(s) Oral once PRN Blood Glucose LESS THAN 70 milliGRAM(s)/deciliter  glucagon  Injectable 1 milliGRAM(s) IntraMuscular once PRN Glucose LESS THAN 70 milligrams/deciliter  ondansetron  IVPB 4 milliGRAM(s) IV Intermittent once PRN Nausea and/or Vomiting  oxyCODONE    IR 5 milliGRAM(s) Oral every 4 hours PRN Moderate Pain (4 - 6)  oxyCODONE    IR 10 milliGRAM(s) Oral every 4 hours PRN Severe Pain (7 - 10)    HEPARIN:  [] YES [] NO  Dose: XX UNITS/HR UNITS Q8H  LOVENOX:[] YES [] NO  Dose: XX mg Q24H  COUMADIN: []  YES [] NO  Dose: XX mg  Q24H  SCD's: YES b/l  GI Prophylaxis: Protonix [], Pepcid [], None [], (Contra-indication:.....)    Post-Op Beta-Blockers: Yes [], No[], If No, then contraindication:  Post-Op Aspirin: Yes [],  No [], If No, then contraindication:  Post-Op Statin: Yes [], No[], If No, then contraindication:  Allergies    No Known Allergies    Intolerances      Ambulation/Activity Status:    Assessment/Plan:  79y Male status-post .....  - Case and plan discussed with CTU Intensivist and CT Surgeon - Dr. Shaw/Rodríguez/Samir   - Continue CTU supportive care    - Continue DVT/GI prophylaxis  - Incentive Spirometry 10 times an hour  - Continue to advance physical activity as tolerated and continue PT/OT as directed  1. CAD: Continue ASA, statin, BB  2. HTN:   3. A. Fib:   4. COPD/Hypoxia:   5. DM/Glucose Control:     Social Service Disposition: OPERATIVE PROCEDURE(s):      MICSx1          POD #       2                79yMale  SURGEON(s): MARCY Arreguin  SUBJECTIVE ASSESSMENT: pt seen and examined. no acute complaints at this time  Vital Signs Last 24 Hrs  T(F): 99.4 (26 Feb 2020 04:00), Max: 99.9 (25 Feb 2020 12:00)  HR: 88 (26 Feb 2020 06:00) (71 - 96)  ABP: 116/52 (26 Feb 2020 06:00) (88/50 - 143/66)  ABP(mean): 70 (26 Feb 2020 06:00)  RR: 31 (26 Feb 2020 06:00) (17 - 41)  SpO2: 100% (26 Feb 2020 06:00) (93% - 100%)  CVP(mm Hg): 8 (25 Feb 2020 11:30)  CO: 5.7 (26 Feb 2020 00:00)  CI: 2.5 (26 Feb 2020 00:00)  PA: 31/13 (25 Feb 2020 11:30)  SVR: 1033 (25 Feb 2020 10:30)  Mode: CPAP with PS  FiO2: 40  PEEP: 5  PS: 8  MAP: 8    I&O's Detail    25 Feb 2020 07:01  -  26 Feb 2020 07:00  --------------------------------------------------------  IN:    insulin regular Infusion: 2 mL    IV PiggyBack: 150 mL    Oral Fluid: 550 mL    propofol Infusion: 15 mL    sodium chloride 0.9%.: 200 mL    vasopressin Infusion: 2.4 mL  Total IN: 919.4 mL    OUT:    Chest Tube: 610 mL    Indwelling Catheter - Urethral: 839 mL  Total OUT: 1449 mL        Net:   I&O's Detail    24 Feb 2020 07:01  -  25 Feb 2020 07:00  --------------------------------------------------------  Total NET: 2204.7 mL      25 Feb 2020 07:01  -  26 Feb 2020 07:00  --------------------------------------------------------  Total NET: -529.6 mL        CAPILLARY BLOOD GLUCOSE      POCT Blood Glucose.: 123 mg/dL (26 Feb 2020 06:50)  POCT Blood Glucose.: 130 mg/dL (25 Feb 2020 16:36)  POCT Blood Glucose.: 120 mg/dL (25 Feb 2020 11:28)    Physical Exam:  General: NAD; A&Ox3  Cardiac: S1/S2, RRR, no murmur, no rubs  Lungs: decreased bs at bases bilaterally  abdomen: Soft/NT/ND; positive bowel sounds x 4  Incisions: Incisions clean/dry/intact  Extremities: No edema b/l lower extremities; good capillary refill; no cyanosis; palpable 1+ pedal pulses b/l    Central Venous Catheter: Yes[x]  No[] , If Yes indication:      hd unstable     Day #2  Mora Catheter: Yes  [x] , No  [] , If yes indication:         strict i.o             Day #2  NGT: Yes [] No [x] ,    If Yes Placement:                                     Day #  EPICARDIAL WIRES:  [] YES [x] NO                                              Day #  BOWEL MOVEMENT:  [] YES [] NO, If No, Timing since last BM:      Day #  CHEST TUBE(Left):  [x YES [] NO, If yes -  AIR LEAKS:  [] YES [x] NO        LABS:                        9.0<L>  13.91<H> )-----------( 141      ( 26 Feb 2020 01:30 )             29.7<L>                        9.1<L>  12.82<H> )-----------( 130      ( 25 Feb 2020 02:00 )             29.3<L>    02-26    139  |  105  |  16  ----------------------------<  117<H>  4.5   |  26  |  0.6<L>  02-25    138  |  105  |  15  ----------------------------<  118<H>  4.4   |  22  |  0.7    Ca    8.1<L>      26 Feb 2020 01:30  Mg     2.1     02-26    TPro  5.2<L> [6.0 - 8.0]  /  Alb  3.4<L> [3.5 - 5.2]  /  TBili  0.9 [0.2 - 1.2]  /  DBili  x   /  AST  14 [0 - 41]  /  ALT  11 [0 - 41]  /  AlkPhos  68 [30 - 115]  02-26    PT/INR - ( 26 Feb 2020 01:30 )   PT: ;   INR: 1.65 ratio         PT/INR - ( 25 Feb 2020 02:00 )   PT: ;   INR: 1.50 ratio         PTT - ( 26 Feb 2020 01:30 )  PTT:33.6 sec, PTT - ( 25 Feb 2020 02:00 )  PTT:32.3 sec    ABG - ( 26 Feb 2020 04:01 )  pH: 7.40  /  pCO2: 44    /  pO2: 87    / HCO3: 27    / Base Excess: 1.9   /  SaO2: 98    /  LA: 0.6              RADIOLOGY & ADDITIONAL TESTS:  CXR: < from: Xray Chest 1 View- PORTABLE-Routine (02.26.20 @ 04:18) >  Impression:      Left basilar opacity.    Interval extubation, removal of enteric tube and Sullivan-Evelia Evelia catheter. Additional support devices stable.    < end of copied text >    EKG: < from: 12 Lead ECG (02.26.20 @ 08:01) >  Ventricular Rate 86 BPM    Atrial Rate 468 BPM    QRS Duration 102 ms    Q-T Interval 358 ms    QTC Calculation(Bezet) 428 ms    R Axis 8 degrees    T Axis 19 degrees    Diagnosis Line Atrial fibrillation  Abnormal ECG    < end of copied text >    MEDICATIONS  (STANDING):  albuterol/ipratropium for Nebulization 3 milliLiter(s) Nebulizer every 6 hours  aspirin enteric coated 325 milliGRAM(s) Oral daily  atorvastatin 80 milliGRAM(s) Oral at bedtime  chlorhexidine 4% Liquid 1 Application(s) Topical <User Schedule>  cyanocobalamin 1000 MICROGram(s) Oral daily  dextrose 5%. 1000 milliLiter(s) (50 mL/Hr) IV Continuous <Continuous>  dextrose 50% Injectable 12.5 Gram(s) IV Push once  dextrose 50% Injectable 25 Gram(s) IV Push once  dextrose 50% Injectable 25 Gram(s) IV Push once  famotidine    Tablet 20 milliGRAM(s) Oral two times a day  guaifenesin/dextromethorphan  Syrup 10 milliLiter(s) Oral every 6 hours  insulin lispro (HumaLOG) corrective regimen sliding scale   SubCutaneous three times a day before meals  magnesium sulfate  IVPB 1 Gram(s) IV Intermittent every 12 hours  polyethylene glycol 3350 17 Gram(s) Oral daily  senna 1 Tablet(s) Oral two times a day  sodium chloride 0.9%. 1000 milliLiter(s) (10 mL/Hr) IV Continuous <Continuous>  thiamine 100 milliGRAM(s) Oral daily    MEDICATIONS  (PRN):  dextrose 40% Gel 15 Gram(s) Oral once PRN Blood Glucose LESS THAN 70 milliGRAM(s)/deciliter  glucagon  Injectable 1 milliGRAM(s) IntraMuscular once PRN Glucose LESS THAN 70 milligrams/deciliter  ondansetron  IVPB 4 milliGRAM(s) IV Intermittent once PRN Nausea and/or Vomiting  oxyCODONE    IR 5 milliGRAM(s) Oral every 4 hours PRN Moderate Pain (4 - 6)  oxyCODONE    IR 10 milliGRAM(s) Oral every 4 hours PRN Severe Pain (7 - 10)    HEPARIN:  [x] YES [] NO  Dose: 5000 UNITS Q8H  LOVENOX:[] YES [x] NO  Dose: XX mg Q24H  COUMADIN: []  YES [x] NO  Dose: XX mg  Q24H  SCD's: YES b/l  GI Prophylaxis: Protonix [], Pepcid [x], None [], (Contra-indication:.....)    Post-Op Beta-Blockers: Yes [x], No[], If No, then contraindication:  Post-Op Aspirin: Yes [x],  No [], If No, then contraindication:  Post-Op Statin: Yes [x], No[], If No, then contraindication:  Allergies    No Known Allergies    Intolerances       Ambulation/Activity Status: ambulate    Assessment/Plan:  79y Male status-post MICabg               POD #   2  - Case and plan discussed with CTU Intensivist and CT Surgeon - Dr. Arreguin   - Continue CTU supportive care and ongoing plan of care as per continuing CTU rounds.    - Continue DVT/GI prophylaxis  - Incentive Spirometry 10 times an hour  - Continue to advance physical activity as tolerated and continue PT/OT as directed  1. CAD: Continue ASA and statin. start bb now pt is off pressors   2. A. Fib: magnesium sulfate  IVPB 1 Gram(s) IV Intermittent every 12 hours, lopressor  3. COPD/Hypoxia: cont duonebs, lasix for fluid overload

## 2020-02-26 NOTE — PROGRESS NOTE ADULT - SUBJECTIVE AND OBJECTIVE BOX
CTU Attending Progress Daily Note     26 Feb 2020 08:01    Procedure:                                                  POD#                   Patient seen as post-op critical care follow-up    HPI:  78 yo M with hx of HTN, A.fib (Eliquis), CHF, HLD brought in by EMS post cardiac arrest. As per wife at bedside, patient at the basement watching movie and was doing fine. Few mins later, patient came up and told the wife that he wasn't feeling right and that he felt funny. Then he crashed. Wife called the EMS who arrived 5 mins later. Upon EMS arrival, patient was found to be in PEA then asystole, s/p resuscitation for ~ 10 mins and ROSC achieved after 1 round of compression and epi. While on the way to hospital, patient had another cardiac arrest on the ambulant s/p CPR and ROSC achieved few mins later. As per family, patient was doing welll and at baseline prior to the episode. As baseline patient is fully functional. Denied any recent infection, recent hospitalization, recent ED visit, fever, chills.     In ED, patient was found to bradycardic and hypotensive. s/p 1 dose of atropine and patient was started on low dose Levophed with improvement in HR and BP. (12 Jan 2020 22:13)    See preop testing chart H&P    Interval event for past 24 hr:  MED IVORY  79y had no event.     Current Complains:  MED IVORY has no new complaints    REVIEW OF SYSTEMS:  CONSTITUTIONAL:  [-] weakness, [-] fevers, [-] chills  EYES/ENT: [-] visual changes, [-] vertigo, [-] throat pain   NECK: [-] pain, [-] stiffness  RESPIRATORY: [-] cough, [-] wheezing, [-] hemoptysis, [-] shortness of breath  CARDIOVASCULAR: [-] chest pain, [-] palpitations, [-] orthopnea  GASTROINTESTINAL:    [-]abdominal pain, [-] nausea, [-] vomiting, [-] hematemesis, [-] diarrhea, [-] constipation, [-] melena, [-] hematochezia.  GENITOURINARY: [-] dysuria, [-] frequency, [-] hematuria  NEUROLOGICAL: [-] numbness, [-] weakness  SKIN: [-] itching, [-] burning, [-] rashes, [-] lesions   All other review of systems is negative unless indicated above.    [  ] Unable to assess ROS because :    OBJECTIVE:  ICU Vital Signs Last 24 Hrs  T(C): 37.5 (26 Feb 2020 07:00), Max: 37.7 (25 Feb 2020 12:00)  T(F): 99.5 (26 Feb 2020 07:00), Max: 99.9 (25 Feb 2020 12:00)  HR: 83 (26 Feb 2020 07:00) (77 - 96)  BP: --  BP(mean): --  ABP: 121/55 (26 Feb 2020 07:00) (88/50 - 143/66)  ABP(mean): 74 (26 Feb 2020 07:00) (64 - 88)  RR: 21 (26 Feb 2020 07:00) (17 - 41)  SpO2: 100% (26 Feb 2020 07:00) (93% - 100%)      I&O's Summary    25 Feb 2020 07:01  -  26 Feb 2020 07:00  --------------------------------------------------------  IN: 919.4 mL / OUT: 1449 mL / NET: -529.6 mL      Adult Advanced Hemodynamics Last 24 Hrs  CVP(mm Hg): 8 (25 Feb 2020 11:30) (-9 - 10)  CVP(cm H2O): --  CO: 6 (26 Feb 2020 07:00) (4.3 - 7.2)  CI: 2.6 (26 Feb 2020 07:00) (2.1 - 3.1)  PA: 31/13 (25 Feb 2020 11:30) (14/-4 - 33/17)  PA(mean): 20 (25 Feb 2020 11:30) (4 - 24)  PCWP: --  SVR: 1033 (25 Feb 2020 10:30) (1033 - 1356)  SVRI: 2496 (25 Feb 2020 10:30) (7003 - 2777)  PVR: --  PVRI: --      PHYSICAL EXAM:  General: WN/WD NAD    HEENT:     [+] NCAT  [+] EOMI  [-] Conjuctival edema   [-] Icterus   [-] Thrush   [-] ETT  [-] NGT/OGT    Neck:         [+] FROM   [-] JVD     [-] Nodes     [-] Masses    [+] Mid-line trachea    [-] Tracheostomy    Chest:         [-] Sternal click   [-] Sternal drainage   [+] Pacing wires   [+] Chest tubes   [-] SubQ emphysema    Lungs:          [+] CTA   [-] Rhonchi   [-] Rales    [-] Wheezing    [-] Decreased BS    [-] Dullness R L    Cardiac:       [+] S1 [+] S2    [+] RRR   [-] Irregular   [-] S3   [-] S4    [-] Murmurs    [-] Rub    Abdomen:    [+] BS    [+] Soft    [+] Non-tender     [-] Distended    [-] Organomegaly  [-] PEG    Extremities:   [-] Cyanosis U/L   [-] Clubbing  U/L  [-] LE/UE Edema   [+] Capillary refill    [+] Pulses     Neuro:        [+] Awake   [+]  Alert   [-] Confused   [-] Lethargic   [-] Sedated   [-] Generalized Weakness    Skin:        [-] Rashes    [-] Erythema   [+] Normal incisions   [+] IV sites intact   [-] Sacral decubitus    Tubes:  LINES:    CAPILLARY BLOOD GLUCOSE      POCT Blood Glucose.: 123 mg/dL (26 Feb 2020 06:50)    CAPILLARY BLOOD GLUCOSE      POCT Blood Glucose.: 123 mg/dL (26 Feb 2020 06:50)  POCT Blood Glucose.: 130 mg/dL (25 Feb 2020 16:36)  POCT Blood Glucose.: 120 mg/dL (25 Feb 2020 11:28)      HOSPITAL MEDICATIONS:  MEDICATIONS  (STANDING):  albuterol/ipratropium for Nebulization 3 milliLiter(s) Nebulizer every 6 hours  aspirin enteric coated 325 milliGRAM(s) Oral daily  atorvastatin 80 milliGRAM(s) Oral at bedtime  chlorhexidine 4% Liquid 1 Application(s) Topical <User Schedule>  cyanocobalamin 1000 MICROGram(s) Oral daily  dextrose 5%. 1000 milliLiter(s) (50 mL/Hr) IV Continuous <Continuous>  dextrose 50% Injectable 12.5 Gram(s) IV Push once  dextrose 50% Injectable 25 Gram(s) IV Push once  dextrose 50% Injectable 25 Gram(s) IV Push once  famotidine    Tablet 20 milliGRAM(s) Oral two times a day  guaifenesin/dextromethorphan  Syrup 10 milliLiter(s) Oral every 6 hours  insulin lispro (HumaLOG) corrective regimen sliding scale   SubCutaneous three times a day before meals  magnesium sulfate  IVPB 1 Gram(s) IV Intermittent every 12 hours  polyethylene glycol 3350 17 Gram(s) Oral daily  senna 1 Tablet(s) Oral two times a day  sodium chloride 0.9%. 1000 milliLiter(s) (10 mL/Hr) IV Continuous <Continuous>  thiamine 100 milliGRAM(s) Oral daily    MEDICATIONS  (PRN):  dextrose 40% Gel 15 Gram(s) Oral once PRN Blood Glucose LESS THAN 70 milliGRAM(s)/deciliter  glucagon  Injectable 1 milliGRAM(s) IntraMuscular once PRN Glucose LESS THAN 70 milligrams/deciliter  ondansetron  IVPB 4 milliGRAM(s) IV Intermittent once PRN Nausea and/or Vomiting  oxyCODONE    IR 5 milliGRAM(s) Oral every 4 hours PRN Moderate Pain (4 - 6)  oxyCODONE    IR 10 milliGRAM(s) Oral every 4 hours PRN Severe Pain (7 - 10)      LABS:  ABG - ( 26 Feb 2020 04:01 )  pH, Arterial: 7.40  pH, Blood: x     /  pCO2: 44    /  pO2: 87    / HCO3: 27    / Base Excess: 1.9   /  SaO2: 98                                      9.0    13.91 )-----------( 141      ( 26 Feb 2020 01:30 )             29.7     02-26    139  |  105  |  16  ----------------------------<  117<H>  4.5   |  26  |  0.6<L>    Ca    8.1<L>      26 Feb 2020 01:30  Mg     2.1     02-26    TPro  5.2<L>  /  Alb  3.4<L>  /  TBili  0.9  /  DBili  x   /  AST  14  /  ALT  11  /  AlkPhos  68  02-26    PT/INR - ( 26 Feb 2020 01:30 )   PT: 19.00 sec;   INR: 1.65 ratio         PTT - ( 26 Feb 2020 01:30 )  PTT:33.6 sec        RADIOLOGY:  Reviewed and interpreted by me  CXR from 02-26-20 shows [+] mild congestion, [-] pneumothorax, [-] R/L effusion, [-] cardiomegaly,   NGT in place, S-G Catheter in place, R/L TLC in place, R/L Chest Tubes in place    ECG:  Reviewed and interpreted by me:   QTC:    Assessment:      PAST MEDICAL & SURGICAL HISTORY:  Atrial fibrillation  Neuropathy  HLD (hyperlipidemia)  HTN (hypertension)  History of cholecystectomy  History of cholecystectomy      PLAN:  Neuro: Pain control  Pulm: Encourage coughing, deep breathing and use of incentive spirometry. Wean off supplemental oxygen as able. Daily CXR.   Cardio: Monitor telemetry/alarms. Continue cardiac meds  GI: Tolerating diet. Continue stool softeners. Continue GI prophylaxis  Renal: monitor urine output, supplement electrolytes as needed  Vasc: Heparin SC/SCDs for DVT prophylaxis  Heme: Monitor H/H.   ID: Off antibiotics. Stable.  Endocrine: Monitor finger stick blood sugar and control hyperglycemia with insulin  Physical Therapy: OOB/ambulate  Tubes: Monitor Chest tube output      Discussed with Cardiothoracic Team at AM rounds.    45 minutes of critical care time spent providing medical care for patient's acute illness/conditions that impairs at least one vital organ system and/or poses a high risk of imminent or life threatening deterioration in the patient's condition. It includes time spent evaluating and treating the patient's acute illness as well as time spent reviewing labs, radiology, discussing goals of care with patient and/or patient's family, and discussing the case with a multidisciplinary team in an effort to prevent further life threatening deterioration or end organ damage. This time is independent of any procedures performed. CTU Attending Progress Daily Note     26 Feb 2020 08:01    Procedure:          CABG                                        POD#     2              Patient seen as post-op critical care follow-up    HPI:  80 yo M with hx of HTN, A.fib (Eliquis), CHF, HLD brought in by EMS post cardiac arrest. As per wife at bedside, patient at the basement watching movie and was doing fine. Few mins later, patient came up and told the wife that he wasn't feeling right and that he felt funny. Then he crashed. Wife called the EMS who arrived 5 mins later. Upon EMS arrival, patient was found to be in PEA then asystole, s/p resuscitation for ~ 10 mins and ROSC achieved after 1 round of compression and epi. While on the way to hospital, patient had another cardiac arrest on the ambulant s/p CPR and ROSC achieved few mins later. As per family, patient was doing welll and at baseline prior to the episode. As baseline patient is fully functional. Denied any recent infection, recent hospitalization, recent ED visit, fever, chills.     In ED, patient was found to bradycardic and hypotensive. s/p 1 dose of atropine and patient was started on low dose Levophed with improvement in HR and BP. (12 Jan 2020 22:13)    See preop testing chart H&P    Interval event for past 24 hr:  MED IVORY  79y had no event.     Current Complains:  MED IVORY has no new complaints    REVIEW OF SYSTEMS:  CONSTITUTIONAL:  [-] weakness, [-] fevers, [-] chills  EYES/ENT: [-] visual changes, [-] vertigo, [-] throat pain   NECK: [-] pain, [-] stiffness  RESPIRATORY: [-] cough, [-] wheezing, [-] hemoptysis, [-] shortness of breath  CARDIOVASCULAR: [-] chest pain, [-] palpitations, [-] orthopnea  GASTROINTESTINAL:    [-]abdominal pain, [-] nausea, [-] vomiting, [-] hematemesis, [-] diarrhea, [-] constipation, [-] melena, [-] hematochezia.  GENITOURINARY: [-] dysuria, [-] frequency, [-] hematuria  NEUROLOGICAL: [-] numbness, [-] weakness  SKIN: [-] itching, [-] burning, [-] rashes, [-] lesions   All other review of systems is negative unless indicated above.    [  ] Unable to assess ROS because :    OBJECTIVE:  ICU Vital Signs Last 24 Hrs  T(C): 37.5 (26 Feb 2020 07:00), Max: 37.7 (25 Feb 2020 12:00)  T(F): 99.5 (26 Feb 2020 07:00), Max: 99.9 (25 Feb 2020 12:00)  HR: 83 (26 Feb 2020 07:00) (77 - 96)  BP: --  BP(mean): --  ABP: 121/55 (26 Feb 2020 07:00) (88/50 - 143/66)  ABP(mean): 74 (26 Feb 2020 07:00) (64 - 88)  RR: 21 (26 Feb 2020 07:00) (17 - 41)  SpO2: 100% (26 Feb 2020 07:00) (93% - 100%)      I&O's Summary    25 Feb 2020 07:01  -  26 Feb 2020 07:00  --------------------------------------------------------  IN: 919.4 mL / OUT: 1449 mL / NET: -529.6 mL      Adult Advanced Hemodynamics Last 24 Hrs  CVP(mm Hg): 8 (25 Feb 2020 11:30) (-9 - 10)  CVP(cm H2O): --  CO: 6 (26 Feb 2020 07:00) (4.3 - 7.2)  CI: 2.6 (26 Feb 2020 07:00) (2.1 - 3.1)  PA: 31/13 (25 Feb 2020 11:30) (14/-4 - 33/17)  PA(mean): 20 (25 Feb 2020 11:30) (4 - 24)  PCWP: --  SVR: 1033 (25 Feb 2020 10:30) (1033 - 1356)  SVRI: 2496 (25 Feb 2020 10:30) (6533 - 2777)  PVR: --  PVRI: --      PHYSICAL EXAM:  General: WN/WD NAD    HEENT:     [+] NCAT  [+] EOMI  [-] Conjuctival edema   [-] Icterus   [-] Thrush   [-] ETT  [-] NGT/OGT    Neck:         [+] FROM   [-] JVD     [-] Nodes     [-] Masses    [+] Mid-line trachea    [-] Tracheostomy    Chest:         [-] Sternal click   [-] Sternal drainage   [+] Pacing wires   [+] Chest tubes   [-] SubQ emphysema    Lungs:          [+] CTA   [-] Rhonchi   [-] Rales    [-] Wheezing    [-] Decreased BS    [-] Dullness R L    Cardiac:       [+] S1 [+] S2    [+] RRR   [-] Irregular   [-] S3   [-] S4    [-] Murmurs    [-] Rub    Abdomen:    [+] BS    [+] Soft    [+] Non-tender     [-] Distended    [-] Organomegaly  [-] PEG    Extremities:   [-] Cyanosis U/L   [-] Clubbing  U/L  [-] LE/UE Edema   [+] Capillary refill    [+] Pulses     Neuro:        [+] Awake   [+]  Alert   [-] Confused   [-] Lethargic   [-] Sedated   [-] Generalized Weakness    Skin:        [-] Rashes    [-] Erythema   [+] Normal incisions   [+] IV sites intact   [-] Sacral decubitus    Tubes:  LINES:    CAPILLARY BLOOD GLUCOSE      POCT Blood Glucose.: 123 mg/dL (26 Feb 2020 06:50)    CAPILLARY BLOOD GLUCOSE      POCT Blood Glucose.: 123 mg/dL (26 Feb 2020 06:50)  POCT Blood Glucose.: 130 mg/dL (25 Feb 2020 16:36)  POCT Blood Glucose.: 120 mg/dL (25 Feb 2020 11:28)      HOSPITAL MEDICATIONS:  MEDICATIONS  (STANDING):  albuterol/ipratropium for Nebulization 3 milliLiter(s) Nebulizer every 6 hours  aspirin enteric coated 325 milliGRAM(s) Oral daily  atorvastatin 80 milliGRAM(s) Oral at bedtime  chlorhexidine 4% Liquid 1 Application(s) Topical <User Schedule>  cyanocobalamin 1000 MICROGram(s) Oral daily  dextrose 5%. 1000 milliLiter(s) (50 mL/Hr) IV Continuous <Continuous>  dextrose 50% Injectable 12.5 Gram(s) IV Push once  dextrose 50% Injectable 25 Gram(s) IV Push once  dextrose 50% Injectable 25 Gram(s) IV Push once  famotidine    Tablet 20 milliGRAM(s) Oral two times a day  guaifenesin/dextromethorphan  Syrup 10 milliLiter(s) Oral every 6 hours  insulin lispro (HumaLOG) corrective regimen sliding scale   SubCutaneous three times a day before meals  magnesium sulfate  IVPB 1 Gram(s) IV Intermittent every 12 hours  polyethylene glycol 3350 17 Gram(s) Oral daily  senna 1 Tablet(s) Oral two times a day  sodium chloride 0.9%. 1000 milliLiter(s) (10 mL/Hr) IV Continuous <Continuous>  thiamine 100 milliGRAM(s) Oral daily    MEDICATIONS  (PRN):  dextrose 40% Gel 15 Gram(s) Oral once PRN Blood Glucose LESS THAN 70 milliGRAM(s)/deciliter  glucagon  Injectable 1 milliGRAM(s) IntraMuscular once PRN Glucose LESS THAN 70 milligrams/deciliter  ondansetron  IVPB 4 milliGRAM(s) IV Intermittent once PRN Nausea and/or Vomiting  oxyCODONE    IR 5 milliGRAM(s) Oral every 4 hours PRN Moderate Pain (4 - 6)  oxyCODONE    IR 10 milliGRAM(s) Oral every 4 hours PRN Severe Pain (7 - 10)      LABS:  ABG - ( 26 Feb 2020 04:01 )  pH, Arterial: 7.40  pH, Blood: x     /  pCO2: 44    /  pO2: 87    / HCO3: 27    / Base Excess: 1.9   /  SaO2: 98                                      9.0    13.91 )-----------( 141      ( 26 Feb 2020 01:30 )             29.7     02-26    139  |  105  |  16  ----------------------------<  117<H>  4.5   |  26  |  0.6<L>    Ca    8.1<L>      26 Feb 2020 01:30  Mg     2.1     02-26    TPro  5.2<L>  /  Alb  3.4<L>  /  TBili  0.9  /  DBili  x   /  AST  14  /  ALT  11  /  AlkPhos  68  02-26    PT/INR - ( 26 Feb 2020 01:30 )   PT: 19.00 sec;   INR: 1.65 ratio         PTT - ( 26 Feb 2020 01:30 )  PTT:33.6 sec        RADIOLOGY:  Reviewed and interpreted by me  CXR from 02-26-20 shows [+] mild congestion, [-] pneumothorax, [-] R/L effusion, [-] cardiomegaly,     ECG:  Reviewed and interpreted by me:   QTC:    Assessment:  CAD SP CABG  Acute blood loss anemia    PAST MEDICAL & SURGICAL HISTORY:  Atrial fibrillation  Neuropathy  HLD (hyperlipidemia)  HTN (hypertension)  History of cholecystectomy  History of cholecystectomy      PLAN:  Neuro: Pain control  Pulm: Encourage coughing, deep breathing and use of incentive spirometry. Wean off supplemental oxygen as able. Daily CXR.   Cardio: Monitor telemetry/alarms. Continue cardiac meds  GI: Tolerating diet. Continue stool softeners. Continue GI prophylaxis  Renal: monitor urine output, supplement electrolytes as needed  Vasc: Heparin SC/SCDs for DVT prophylaxis  Heme: Monitor H/H.   ID: Off antibiotics. Stable.  Endocrine: Monitor finger stick blood sugar and control hyperglycemia with insulin  Physical Therapy: OOB/ambulate  Tubes: Monitor Chest tube output      Discussed with Cardiothoracic Team at AM rounds.

## 2020-02-27 LAB
ALBUMIN SERPL ELPH-MCNC: 3.1 G/DL — LOW (ref 3.5–5.2)
ALP SERPL-CCNC: 77 U/L — SIGNIFICANT CHANGE UP (ref 30–115)
ALT FLD-CCNC: 12 U/L — SIGNIFICANT CHANGE UP (ref 0–41)
ANION GAP SERPL CALC-SCNC: 8 MMOL/L — SIGNIFICANT CHANGE UP (ref 7–14)
AST SERPL-CCNC: 14 U/L — SIGNIFICANT CHANGE UP (ref 0–41)
BASOPHILS # BLD AUTO: 0.02 K/UL — SIGNIFICANT CHANGE UP (ref 0–0.2)
BASOPHILS NFR BLD AUTO: 0.2 % — SIGNIFICANT CHANGE UP (ref 0–1)
BILIRUB SERPL-MCNC: 0.8 MG/DL — SIGNIFICANT CHANGE UP (ref 0.2–1.2)
BUN SERPL-MCNC: 17 MG/DL — SIGNIFICANT CHANGE UP (ref 10–20)
CALCIUM SERPL-MCNC: 8.4 MG/DL — LOW (ref 8.5–10.1)
CHLORIDE SERPL-SCNC: 103 MMOL/L — SIGNIFICANT CHANGE UP (ref 98–110)
CO2 SERPL-SCNC: 27 MMOL/L — SIGNIFICANT CHANGE UP (ref 17–32)
CREAT SERPL-MCNC: 0.5 MG/DL — LOW (ref 0.7–1.5)
EOSINOPHIL # BLD AUTO: 0.04 K/UL — SIGNIFICANT CHANGE UP (ref 0–0.7)
EOSINOPHIL NFR BLD AUTO: 0.4 % — SIGNIFICANT CHANGE UP (ref 0–8)
GLUCOSE BLDC GLUCOMTR-MCNC: 100 MG/DL — HIGH (ref 70–99)
GLUCOSE BLDC GLUCOMTR-MCNC: 101 MG/DL — HIGH (ref 70–99)
GLUCOSE BLDC GLUCOMTR-MCNC: 114 MG/DL — HIGH (ref 70–99)
GLUCOSE BLDC GLUCOMTR-MCNC: 147 MG/DL — HIGH (ref 70–99)
GLUCOSE SERPL-MCNC: 113 MG/DL — HIGH (ref 70–99)
HCT VFR BLD CALC: 28.3 % — LOW (ref 42–52)
HGB BLD-MCNC: 8.7 G/DL — LOW (ref 14–18)
IMM GRANULOCYTES NFR BLD AUTO: 0.6 % — HIGH (ref 0.1–0.3)
LYMPHOCYTES # BLD AUTO: 0.68 K/UL — LOW (ref 1.2–3.4)
LYMPHOCYTES # BLD AUTO: 6.5 % — LOW (ref 20.5–51.1)
MAGNESIUM SERPL-MCNC: 1.8 MG/DL — SIGNIFICANT CHANGE UP (ref 1.8–2.4)
MCHC RBC-ENTMCNC: 30 PG — SIGNIFICANT CHANGE UP (ref 27–31)
MCHC RBC-ENTMCNC: 30.7 G/DL — LOW (ref 32–37)
MCV RBC AUTO: 97.6 FL — HIGH (ref 80–94)
MONOCYTES # BLD AUTO: 0.86 K/UL — HIGH (ref 0.1–0.6)
MONOCYTES NFR BLD AUTO: 8.2 % — SIGNIFICANT CHANGE UP (ref 1.7–9.3)
NEUTROPHILS # BLD AUTO: 8.84 K/UL — HIGH (ref 1.4–6.5)
NEUTROPHILS NFR BLD AUTO: 84.1 % — HIGH (ref 42.2–75.2)
NRBC # BLD: 0 /100 WBCS — SIGNIFICANT CHANGE UP (ref 0–0)
PLATELET # BLD AUTO: 167 K/UL — SIGNIFICANT CHANGE UP (ref 130–400)
POTASSIUM SERPL-MCNC: 4.1 MMOL/L — SIGNIFICANT CHANGE UP (ref 3.5–5)
POTASSIUM SERPL-SCNC: 4.1 MMOL/L — SIGNIFICANT CHANGE UP (ref 3.5–5)
PROT SERPL-MCNC: 5.3 G/DL — LOW (ref 6–8)
RBC # BLD: 2.9 M/UL — LOW (ref 4.7–6.1)
RBC # FLD: 15.2 % — HIGH (ref 11.5–14.5)
SODIUM SERPL-SCNC: 138 MMOL/L — SIGNIFICANT CHANGE UP (ref 135–146)
WBC # BLD: 10.5 K/UL — SIGNIFICANT CHANGE UP (ref 4.8–10.8)
WBC # FLD AUTO: 10.5 K/UL — SIGNIFICANT CHANGE UP (ref 4.8–10.8)

## 2020-02-27 PROCEDURE — 71045 X-RAY EXAM CHEST 1 VIEW: CPT | Mod: 26

## 2020-02-27 PROCEDURE — 93010 ELECTROCARDIOGRAM REPORT: CPT

## 2020-02-27 PROCEDURE — 99233 SBSQ HOSP IP/OBS HIGH 50: CPT

## 2020-02-27 RX ORDER — FUROSEMIDE 40 MG
40 TABLET ORAL ONCE
Refills: 0 | Status: COMPLETED | OUTPATIENT
Start: 2020-02-27 | End: 2020-02-27

## 2020-02-27 RX ORDER — TAMSULOSIN HYDROCHLORIDE 0.4 MG/1
0.4 CAPSULE ORAL AT BEDTIME
Refills: 0 | Status: DISCONTINUED | OUTPATIENT
Start: 2020-02-28 | End: 2020-03-12

## 2020-02-27 RX ORDER — POTASSIUM CHLORIDE 20 MEQ
20 PACKET (EA) ORAL ONCE
Refills: 0 | Status: COMPLETED | OUTPATIENT
Start: 2020-02-27 | End: 2020-02-27

## 2020-02-27 RX ORDER — TAMSULOSIN HYDROCHLORIDE 0.4 MG/1
0.4 CAPSULE ORAL ONCE
Refills: 0 | Status: COMPLETED | OUTPATIENT
Start: 2020-02-27 | End: 2020-02-27

## 2020-02-27 RX ADMIN — FAMOTIDINE 20 MILLIGRAM(S): 10 INJECTION INTRAVENOUS at 17:13

## 2020-02-27 RX ADMIN — Medication 10 MILLILITER(S): at 01:14

## 2020-02-27 RX ADMIN — OXYCODONE HYDROCHLORIDE 5 MILLIGRAM(S): 5 TABLET ORAL at 11:30

## 2020-02-27 RX ADMIN — HEPARIN SODIUM 5000 UNIT(S): 5000 INJECTION INTRAVENOUS; SUBCUTANEOUS at 13:01

## 2020-02-27 RX ADMIN — Medication 325 MILLIGRAM(S): at 11:26

## 2020-02-27 RX ADMIN — TAMSULOSIN HYDROCHLORIDE 0.4 MILLIGRAM(S): 0.4 CAPSULE ORAL at 10:04

## 2020-02-27 RX ADMIN — SENNA PLUS 1 TABLET(S): 8.6 TABLET ORAL at 17:13

## 2020-02-27 RX ADMIN — HEPARIN SODIUM 5000 UNIT(S): 5000 INJECTION INTRAVENOUS; SUBCUTANEOUS at 21:09

## 2020-02-27 RX ADMIN — Medication 3 MILLILITER(S): at 08:06

## 2020-02-27 RX ADMIN — HEPARIN SODIUM 5000 UNIT(S): 5000 INJECTION INTRAVENOUS; SUBCUTANEOUS at 00:38

## 2020-02-27 RX ADMIN — SENNA PLUS 1 TABLET(S): 8.6 TABLET ORAL at 05:24

## 2020-02-27 RX ADMIN — OXYCODONE HYDROCHLORIDE 5 MILLIGRAM(S): 5 TABLET ORAL at 06:30

## 2020-02-27 RX ADMIN — OXYCODONE HYDROCHLORIDE 5 MILLIGRAM(S): 5 TABLET ORAL at 12:36

## 2020-02-27 RX ADMIN — Medication 10 MILLILITER(S): at 17:13

## 2020-02-27 RX ADMIN — HEPARIN SODIUM 5000 UNIT(S): 5000 INJECTION INTRAVENOUS; SUBCUTANEOUS at 05:25

## 2020-02-27 RX ADMIN — ATORVASTATIN CALCIUM 80 MILLIGRAM(S): 80 TABLET, FILM COATED ORAL at 21:09

## 2020-02-27 RX ADMIN — POLYETHYLENE GLYCOL 3350 17 GRAM(S): 17 POWDER, FOR SOLUTION ORAL at 11:26

## 2020-02-27 RX ADMIN — Medication 100 GRAM(S): at 17:13

## 2020-02-27 RX ADMIN — Medication 10 MILLILITER(S): at 11:26

## 2020-02-27 RX ADMIN — PREGABALIN 1000 MICROGRAM(S): 225 CAPSULE ORAL at 11:26

## 2020-02-27 RX ADMIN — Medication 10 MILLILITER(S): at 05:25

## 2020-02-27 RX ADMIN — Medication 12.5 MILLIGRAM(S): at 17:13

## 2020-02-27 RX ADMIN — Medication 3 MILLILITER(S): at 14:41

## 2020-02-27 RX ADMIN — OXYCODONE HYDROCHLORIDE 5 MILLIGRAM(S): 5 TABLET ORAL at 06:00

## 2020-02-27 RX ADMIN — Medication 40 MILLIGRAM(S): at 09:01

## 2020-02-27 RX ADMIN — Medication 12.5 MILLIGRAM(S): at 05:24

## 2020-02-27 RX ADMIN — Medication 100 MILLIEQUIVALENT(S): at 09:29

## 2020-02-27 RX ADMIN — CHLORHEXIDINE GLUCONATE 1 APPLICATION(S): 213 SOLUTION TOPICAL at 05:27

## 2020-02-27 RX ADMIN — Medication 10 MILLILITER(S): at 23:59

## 2020-02-27 RX ADMIN — Medication 100 GRAM(S): at 05:24

## 2020-02-27 RX ADMIN — Medication 3 MILLILITER(S): at 19:38

## 2020-02-27 RX ADMIN — Medication 100 MILLIGRAM(S): at 11:26

## 2020-02-27 RX ADMIN — FAMOTIDINE 20 MILLIGRAM(S): 10 INJECTION INTRAVENOUS at 05:24

## 2020-02-27 NOTE — PROGRESS NOTE ADULT - SUBJECTIVE AND OBJECTIVE BOX
CTU Attending Progress Daily Note     27 Feb 2020 08:03    Procedure:                                                  POD#                   Patient seen as post-op critical care follow-up    HPI:  78 yo M with hx of HTN, A.fib (Eliquis), CHF, HLD brought in by EMS post cardiac arrest. As per wife at bedside, patient at the basement watching movie and was doing fine. Few mins later, patient came up and told the wife that he wasn't feeling right and that he felt funny. Then he crashed. Wife called the EMS who arrived 5 mins later. Upon EMS arrival, patient was found to be in PEA then asystole, s/p resuscitation for ~ 10 mins and ROSC achieved after 1 round of compression and epi. While on the way to hospital, patient had another cardiac arrest on the ambulant s/p CPR and ROSC achieved few mins later. As per family, patient was doing welll and at baseline prior to the episode. As baseline patient is fully functional. Denied any recent infection, recent hospitalization, recent ED visit, fever, chills.     In ED, patient was found to bradycardic and hypotensive. s/p 1 dose of atropine and patient was started on low dose Levophed with improvement in HR and BP. (12 Jan 2020 22:13)    See preop testing chart H&P    Interval event for past 24 hr:  EMD IVORY  79y had no event.     Current Complains:  MED IVORY has no new complaints    REVIEW OF SYSTEMS:  CONSTITUTIONAL:  [-] weakness, [-] fevers, [-] chills  EYES/ENT: [-] visual changes, [-] vertigo, [-] throat pain   NECK: [-] pain, [-] stiffness  RESPIRATORY: [-] cough, [-] wheezing, [-] hemoptysis, [-] shortness of breath  CARDIOVASCULAR: [-] chest pain, [-] palpitations, [-] orthopnea  GASTROINTESTINAL:    [-]abdominal pain, [-] nausea, [-] vomiting, [-] hematemesis, [-] diarrhea, [-] constipation, [-] melena, [-] hematochezia.  GENITOURINARY: [-] dysuria, [-] frequency, [-] hematuria  NEUROLOGICAL: [-] numbness, [-] weakness  SKIN: [-] itching, [-] burning, [-] rashes, [-] lesions   All other review of systems is negative unless indicated above.    [  ] Unable to assess ROS because :    OBJECTIVE:  ICU Vital Signs Last 24 Hrs  T(C): 36.8 (27 Feb 2020 07:00), Max: 37.4 (27 Feb 2020 00:00)  T(F): 98.2 (27 Feb 2020 07:00), Max: 99.3 (27 Feb 2020 00:00)  HR: 84 (27 Feb 2020 07:00) (84 - 98)  BP: 113/69 (27 Feb 2020 01:00) (113/69 - 113/69)  BP(mean): 80 (27 Feb 2020 01:00) (80 - 80)  ABP: 107/63 (27 Feb 2020 07:00) (100/55 - 154/64)  ABP(mean): 79 (27 Feb 2020 07:00) (64 - 92)  RR: 25 (27 Feb 2020 07:00) (14 - 50)  SpO2: 100% (27 Feb 2020 07:00) (97% - 100%)      I&O's Summary    26 Feb 2020 07:01  -  27 Feb 2020 07:00  --------------------------------------------------------  IN: 760 mL / OUT: 1816 mL / NET: -1056 mL      Adult Advanced Hemodynamics Last 24 Hrs  CVP(mm Hg): --  CVP(cm H2O): --  CO: 6 (26 Feb 2020 16:00) (6 - 6.5)  CI: 2.6 (26 Feb 2020 16:00) (2.6 - 3)  PA: --  PA(mean): --  PCWP: --  SVR: --  SVRI: --  PVR: --  PVRI: --      PHYSICAL EXAM:  General: WN/WD NAD    HEENT:     [+] NCAT  [+] EOMI  [-] Conjuctival edema   [-] Icterus   [-] Thrush   [-] ETT  [-] NGT/OGT    Neck:         [+] FROM   [-] JVD     [-] Nodes     [-] Masses    [+] Mid-line trachea    [-] Tracheostomy    Chest:         [-] Sternal click   [-] Sternal drainage   [+] Pacing wires   [+] Chest tubes   [-] SubQ emphysema    Lungs:          [+] CTA   [-] Rhonchi   [-] Rales    [-] Wheezing    [-] Decreased BS    [-] Dullness R L    Cardiac:       [+] S1 [+] S2    [+] RRR   [-] Irregular   [-] S3   [-] S4    [-] Murmurs    [-] Rub    Abdomen:    [+] BS    [+] Soft    [+] Non-tender     [-] Distended    [-] Organomegaly  [-] PEG    Extremities:   [-] Cyanosis U/L   [-] Clubbing  U/L  [-] LE/UE Edema   [+] Capillary refill    [+] Pulses     Neuro:        [+] Awake   [+]  Alert   [-] Confused   [-] Lethargic   [-] Sedated   [-] Generalized Weakness    Skin:        [-] Rashes    [-] Erythema   [+] Normal incisions   [+] IV sites intact   [-] Sacral decubitus    Tubes:  LINES:    CAPILLARY BLOOD GLUCOSE      POCT Blood Glucose.: 147 mg/dL (27 Feb 2020 06:46)    CAPILLARY BLOOD GLUCOSE      POCT Blood Glucose.: 147 mg/dL (27 Feb 2020 06:46)  POCT Blood Glucose.: 114 mg/dL (26 Feb 2020 20:37)  POCT Blood Glucose.: 135 mg/dL (26 Feb 2020 16:08)  POCT Blood Glucose.: 117 mg/dL (26 Feb 2020 11:40)      HOSPITAL MEDICATIONS:  MEDICATIONS  (STANDING):  albuterol/ipratropium for Nebulization 3 milliLiter(s) Nebulizer every 6 hours  aspirin enteric coated 325 milliGRAM(s) Oral daily  atorvastatin 80 milliGRAM(s) Oral at bedtime  chlorhexidine 4% Liquid 1 Application(s) Topical <User Schedule>  cyanocobalamin 1000 MICROGram(s) Oral daily  dextrose 5%. 1000 milliLiter(s) (50 mL/Hr) IV Continuous <Continuous>  dextrose 50% Injectable 12.5 Gram(s) IV Push once  dextrose 50% Injectable 25 Gram(s) IV Push once  dextrose 50% Injectable 25 Gram(s) IV Push once  famotidine    Tablet 20 milliGRAM(s) Oral two times a day  guaifenesin/dextromethorphan  Syrup 10 milliLiter(s) Oral every 6 hours  heparin  Injectable 5000 Unit(s) SubCutaneous every 8 hours  insulin lispro (HumaLOG) corrective regimen sliding scale   SubCutaneous three times a day before meals  magnesium sulfate  IVPB 1 Gram(s) IV Intermittent every 12 hours  metoprolol tartrate 12.5 milliGRAM(s) Oral every 12 hours  polyethylene glycol 3350 17 Gram(s) Oral daily  senna 1 Tablet(s) Oral two times a day  sodium chloride 0.9%. 1000 milliLiter(s) (10 mL/Hr) IV Continuous <Continuous>  thiamine 100 milliGRAM(s) Oral daily    MEDICATIONS  (PRN):  dextrose 40% Gel 15 Gram(s) Oral once PRN Blood Glucose LESS THAN 70 milliGRAM(s)/deciliter  glucagon  Injectable 1 milliGRAM(s) IntraMuscular once PRN Glucose LESS THAN 70 milligrams/deciliter  ondansetron  IVPB 4 milliGRAM(s) IV Intermittent once PRN Nausea and/or Vomiting  oxyCODONE    IR 5 milliGRAM(s) Oral every 4 hours PRN Moderate Pain (4 - 6)  oxyCODONE    IR 10 milliGRAM(s) Oral every 4 hours PRN Severe Pain (7 - 10)      LABS:  ABG - ( 26 Feb 2020 04:01 )  pH, Arterial: 7.40  pH, Blood: x     /  pCO2: 44    /  pO2: 87    / HCO3: 27    / Base Excess: 1.9   /  SaO2: 98                                      8.7    10.50 )-----------( 167      ( 27 Feb 2020 01:30 )             28.3     02-27    138  |  103  |  17  ----------------------------<  113<H>  4.1   |  27  |  0.5<L>    Ca    8.4<L>      27 Feb 2020 01:30  Mg     1.8     02-27    TPro  5.3<L>  /  Alb  3.1<L>  /  TBili  0.8  /  DBili  x   /  AST  14  /  ALT  12  /  AlkPhos  77  02-27    PT/INR - ( 26 Feb 2020 01:30 )   PT: 19.00 sec;   INR: 1.65 ratio         PTT - ( 26 Feb 2020 01:30 )  PTT:33.6 sec        RADIOLOGY:  Reviewed and interpreted by me  CXR from 02-27-20 shows [+] mild congestion, [-] pneumothorax, [-] R/L effusion, [-] cardiomegaly,   NGT in place, S-G Catheter in place, R/L TLC in place, R/L Chest Tubes in place    ECG:  Reviewed and interpreted by me:   QTC:    Assessment:      PAST MEDICAL & SURGICAL HISTORY:  Atrial fibrillation  Neuropathy  HLD (hyperlipidemia)  HTN (hypertension)  History of cholecystectomy  History of cholecystectomy      PLAN:  Neuro: Pain control  Pulm: Encourage coughing, deep breathing and use of incentive spirometry. Wean off supplemental oxygen as able. Daily CXR.   Cardio: Monitor telemetry/alarms. Continue cardiac meds  GI: Tolerating diet. Continue stool softeners. Continue GI prophylaxis  Renal: monitor urine output, supplement electrolytes as needed  Vasc: Heparin SC/SCDs for DVT prophylaxis  Heme: Monitor H/H.   ID: Off antibiotics. Stable.  Endocrine: Monitor finger stick blood sugar and control hyperglycemia with insulin  Physical Therapy: OOB/ambulate  Tubes: Monitor Chest tube output      Discussed with Cardiothoracic Team at AM rounds.    45 minutes of critical care time spent providing medical care for patient's acute illness/conditions that impairs at least one vital organ system and/or poses a high risk of imminent or life threatening deterioration in the patient's condition. It includes time spent evaluating and treating the patient's acute illness as well as time spent reviewing labs, radiology, discussing goals of care with patient and/or patient's family, and discussing the case with a multidisciplinary team in an effort to prevent further life threatening deterioration or end organ damage. This time is independent of any procedures performed. CTU Attending Progress Daily Note     27 Feb 2020 08:03    Procedure:     CABG                                             POD#   3                Patient seen as post-op critical care follow-up    HPI:  80 yo M with hx of HTN, A.fib (Eliquis), CHF, HLD brought in by EMS post cardiac arrest. As per wife at bedside, patient at the basement watching movie and was doing fine. Few mins later, patient came up and told the wife that he wasn't feeling right and that he felt funny. Then he crashed. Wife called the EMS who arrived 5 mins later. Upon EMS arrival, patient was found to be in PEA then asystole, s/p resuscitation for ~ 10 mins and ROSC achieved after 1 round of compression and epi. While on the way to hospital, patient had another cardiac arrest on the ambulant s/p CPR and ROSC achieved few mins later. As per family, patient was doing welll and at baseline prior to the episode. As baseline patient is fully functional. Denied any recent infection, recent hospitalization, recent ED visit, fever, chills.     In ED, patient was found to bradycardic and hypotensive. s/p 1 dose of atropine and patient was started on low dose Levophed with improvement in HR and BP. (12 Jan 2020 22:13)    See preop testing chart H&P    Interval event for past 24 hr:  MED IVORY  79y had no event.     Current Complains:  MED IVORY has no new complaints    REVIEW OF SYSTEMS:  CONSTITUTIONAL:  [-] weakness, [-] fevers, [-] chills  EYES/ENT: [-] visual changes, [-] vertigo, [-] throat pain   NECK: [-] pain, [-] stiffness  RESPIRATORY: [-] cough, [-] wheezing, [-] hemoptysis, [-] shortness of breath  CARDIOVASCULAR: [-] chest pain, [-] palpitations, [-] orthopnea  GASTROINTESTINAL:    [-]abdominal pain, [-] nausea, [-] vomiting, [-] hematemesis, [-] diarrhea, [-] constipation, [-] melena, [-] hematochezia.  GENITOURINARY: [-] dysuria, [-] frequency, [-] hematuria  NEUROLOGICAL: [-] numbness, [-] weakness  SKIN: [-] itching, [-] burning, [-] rashes, [-] lesions   All other review of systems is negative unless indicated above.    [  ] Unable to assess ROS because :    OBJECTIVE:  ICU Vital Signs Last 24 Hrs  T(C): 36.8 (27 Feb 2020 07:00), Max: 37.4 (27 Feb 2020 00:00)  T(F): 98.2 (27 Feb 2020 07:00), Max: 99.3 (27 Feb 2020 00:00)  HR: 84 (27 Feb 2020 07:00) (84 - 98)  BP: 113/69 (27 Feb 2020 01:00) (113/69 - 113/69)  BP(mean): 80 (27 Feb 2020 01:00) (80 - 80)  ABP: 107/63 (27 Feb 2020 07:00) (100/55 - 154/64)  ABP(mean): 79 (27 Feb 2020 07:00) (64 - 92)  RR: 25 (27 Feb 2020 07:00) (14 - 50)  SpO2: 100% (27 Feb 2020 07:00) (97% - 100%)      I&O's Summary    26 Feb 2020 07:01  -  27 Feb 2020 07:00  --------------------------------------------------------  IN: 760 mL / OUT: 1816 mL / NET: -1056 mL      Adult Advanced Hemodynamics Last 24 Hrs  CVP(mm Hg): --  CVP(cm H2O): --  CO: 6 (26 Feb 2020 16:00) (6 - 6.5)  CI: 2.6 (26 Feb 2020 16:00) (2.6 - 3)  PA: --  PA(mean): --  PCWP: --  SVR: --  SVRI: --  PVR: --  PVRI: --      PHYSICAL EXAM:  General: WN/WD NAD    HEENT:     [+] NCAT  [+] EOMI  [-] Conjuctival edema   [-] Icterus   [-] Thrush   [-] ETT  [-] NGT/OGT    Neck:         [+] FROM   [-] JVD     [-] Nodes     [-] Masses    [+] Mid-line trachea    [-] Tracheostomy    Chest:         [-] Sternal click   [-] Sternal drainage   [+] Pacing wires   [+] Chest tubes   [-] SubQ emphysema    Lungs:          [+] CTA   [-] Rhonchi   [-] Rales    [-] Wheezing    [-] Decreased BS    [-] Dullness R L    Cardiac:       [+] S1 [+] S2    [+] RRR   [-] Irregular   [-] S3   [-] S4    [-] Murmurs    [-] Rub    Abdomen:    [+] BS    [+] Soft    [+] Non-tender     [-] Distended    [-] Organomegaly  [-] PEG    Extremities:   [-] Cyanosis U/L   [-] Clubbing  U/L  [-] LE/UE Edema   [+] Capillary refill    [+] Pulses     Neuro:        [+] Awake   [+]  Alert   [-] Confused   [-] Lethargic   [-] Sedated   [-] Generalized Weakness    Skin:        [-] Rashes    [-] Erythema   [+] Normal incisions   [+] IV sites intact   [-] Sacral decubitus    Tubes:  LINES:    CAPILLARY BLOOD GLUCOSE      POCT Blood Glucose.: 147 mg/dL (27 Feb 2020 06:46)    CAPILLARY BLOOD GLUCOSE      POCT Blood Glucose.: 147 mg/dL (27 Feb 2020 06:46)  POCT Blood Glucose.: 114 mg/dL (26 Feb 2020 20:37)  POCT Blood Glucose.: 135 mg/dL (26 Feb 2020 16:08)  POCT Blood Glucose.: 117 mg/dL (26 Feb 2020 11:40)      HOSPITAL MEDICATIONS:  MEDICATIONS  (STANDING):  albuterol/ipratropium for Nebulization 3 milliLiter(s) Nebulizer every 6 hours  aspirin enteric coated 325 milliGRAM(s) Oral daily  atorvastatin 80 milliGRAM(s) Oral at bedtime  chlorhexidine 4% Liquid 1 Application(s) Topical <User Schedule>  cyanocobalamin 1000 MICROGram(s) Oral daily  dextrose 5%. 1000 milliLiter(s) (50 mL/Hr) IV Continuous <Continuous>  dextrose 50% Injectable 12.5 Gram(s) IV Push once  dextrose 50% Injectable 25 Gram(s) IV Push once  dextrose 50% Injectable 25 Gram(s) IV Push once  famotidine    Tablet 20 milliGRAM(s) Oral two times a day  guaifenesin/dextromethorphan  Syrup 10 milliLiter(s) Oral every 6 hours  heparin  Injectable 5000 Unit(s) SubCutaneous every 8 hours  insulin lispro (HumaLOG) corrective regimen sliding scale   SubCutaneous three times a day before meals  magnesium sulfate  IVPB 1 Gram(s) IV Intermittent every 12 hours  metoprolol tartrate 12.5 milliGRAM(s) Oral every 12 hours  polyethylene glycol 3350 17 Gram(s) Oral daily  senna 1 Tablet(s) Oral two times a day  sodium chloride 0.9%. 1000 milliLiter(s) (10 mL/Hr) IV Continuous <Continuous>  thiamine 100 milliGRAM(s) Oral daily    MEDICATIONS  (PRN):  dextrose 40% Gel 15 Gram(s) Oral once PRN Blood Glucose LESS THAN 70 milliGRAM(s)/deciliter  glucagon  Injectable 1 milliGRAM(s) IntraMuscular once PRN Glucose LESS THAN 70 milligrams/deciliter  ondansetron  IVPB 4 milliGRAM(s) IV Intermittent once PRN Nausea and/or Vomiting  oxyCODONE    IR 5 milliGRAM(s) Oral every 4 hours PRN Moderate Pain (4 - 6)  oxyCODONE    IR 10 milliGRAM(s) Oral every 4 hours PRN Severe Pain (7 - 10)      LABS:  ABG - ( 26 Feb 2020 04:01 )  pH, Arterial: 7.40  pH, Blood: x     /  pCO2: 44    /  pO2: 87    / HCO3: 27    / Base Excess: 1.9   /  SaO2: 98                                      8.7    10.50 )-----------( 167      ( 27 Feb 2020 01:30 )             28.3     02-27    138  |  103  |  17  ----------------------------<  113<H>  4.1   |  27  |  0.5<L>    Ca    8.4<L>      27 Feb 2020 01:30  Mg     1.8     02-27    TPro  5.3<L>  /  Alb  3.1<L>  /  TBili  0.8  /  DBili  x   /  AST  14  /  ALT  12  /  AlkPhos  77  02-27    PT/INR - ( 26 Feb 2020 01:30 )   PT: 19.00 sec;   INR: 1.65 ratio         PTT - ( 26 Feb 2020 01:30 )  PTT:33.6 sec        RADIOLOGY:  Reviewed and interpreted by me  CXR from 02-27-20 shows [+] mild congestion, [-] pneumothorax, [-] R/L effusion, [-] cardiomegaly,       Assessment:  CAD SP CABG  Acute blood loss anemia      PAST MEDICAL & SURGICAL HISTORY:  Atrial fibrillation  Neuropathy  HLD (hyperlipidemia)  HTN (hypertension)  History of cholecystectomy  History of cholecystectomy      PLAN:  Neuro: Pain control  Pulm: Encourage coughing, deep breathing and use of incentive spirometry. Wean off supplemental oxygen as able. Daily CXR.   Cardio: Monitor telemetry/alarms. Continue cardiac meds  GI: Tolerating diet. Continue stool softeners. Continue GI prophylaxis  Renal: monitor urine output, supplement electrolytes as needed  Vasc: Heparin SC/SCDs for DVT prophylaxis  Heme: Monitor H/H.   ID: Off antibiotics. Stable.  Endocrine: Monitor finger stick blood sugar and control hyperglycemia with insulin  Physical Therapy: OOB/ambulate      Discussed with Cardiothoracic Team at AM rounds.

## 2020-02-28 LAB
ANION GAP SERPL CALC-SCNC: 9 MMOL/L — SIGNIFICANT CHANGE UP (ref 7–14)
BASOPHILS # BLD AUTO: 0.02 K/UL — SIGNIFICANT CHANGE UP (ref 0–0.2)
BASOPHILS NFR BLD AUTO: 0.2 % — SIGNIFICANT CHANGE UP (ref 0–1)
BUN SERPL-MCNC: 21 MG/DL — HIGH (ref 10–20)
CALCIUM SERPL-MCNC: 8 MG/DL — LOW (ref 8.5–10.1)
CHLORIDE SERPL-SCNC: 97 MMOL/L — LOW (ref 98–110)
CO2 SERPL-SCNC: 30 MMOL/L — SIGNIFICANT CHANGE UP (ref 17–32)
CREAT SERPL-MCNC: 0.6 MG/DL — LOW (ref 0.7–1.5)
EOSINOPHIL # BLD AUTO: 0.08 K/UL — SIGNIFICANT CHANGE UP (ref 0–0.7)
EOSINOPHIL NFR BLD AUTO: 0.8 % — SIGNIFICANT CHANGE UP (ref 0–8)
GLUCOSE BLDC GLUCOMTR-MCNC: 104 MG/DL — HIGH (ref 70–99)
GLUCOSE BLDC GLUCOMTR-MCNC: 107 MG/DL — HIGH (ref 70–99)
GLUCOSE BLDC GLUCOMTR-MCNC: 118 MG/DL — HIGH (ref 70–99)
GLUCOSE BLDC GLUCOMTR-MCNC: 124 MG/DL — HIGH (ref 70–99)
GLUCOSE SERPL-MCNC: 117 MG/DL — HIGH (ref 70–99)
HCT VFR BLD CALC: 28.1 % — LOW (ref 42–52)
HGB BLD-MCNC: 8.4 G/DL — LOW (ref 14–18)
IMM GRANULOCYTES NFR BLD AUTO: 0.5 % — HIGH (ref 0.1–0.3)
LYMPHOCYTES # BLD AUTO: 0.92 K/UL — LOW (ref 1.2–3.4)
LYMPHOCYTES # BLD AUTO: 9.5 % — LOW (ref 20.5–51.1)
MAGNESIUM SERPL-MCNC: 1.8 MG/DL — SIGNIFICANT CHANGE UP (ref 1.8–2.4)
MCHC RBC-ENTMCNC: 28.9 PG — SIGNIFICANT CHANGE UP (ref 27–31)
MCHC RBC-ENTMCNC: 29.9 G/DL — LOW (ref 32–37)
MCV RBC AUTO: 96.6 FL — HIGH (ref 80–94)
MONOCYTES # BLD AUTO: 0.75 K/UL — HIGH (ref 0.1–0.6)
MONOCYTES NFR BLD AUTO: 7.7 % — SIGNIFICANT CHANGE UP (ref 1.7–9.3)
NEUTROPHILS # BLD AUTO: 7.91 K/UL — HIGH (ref 1.4–6.5)
NEUTROPHILS NFR BLD AUTO: 81.3 % — HIGH (ref 42.2–75.2)
NRBC # BLD: 0 /100 WBCS — SIGNIFICANT CHANGE UP (ref 0–0)
PLATELET # BLD AUTO: 206 K/UL — SIGNIFICANT CHANGE UP (ref 130–400)
POTASSIUM SERPL-MCNC: 4.2 MMOL/L — SIGNIFICANT CHANGE UP (ref 3.5–5)
POTASSIUM SERPL-SCNC: 4.2 MMOL/L — SIGNIFICANT CHANGE UP (ref 3.5–5)
RBC # BLD: 2.91 M/UL — LOW (ref 4.7–6.1)
RBC # FLD: 15.2 % — HIGH (ref 11.5–14.5)
SODIUM SERPL-SCNC: 136 MMOL/L — SIGNIFICANT CHANGE UP (ref 135–146)
WBC # BLD: 9.73 K/UL — SIGNIFICANT CHANGE UP (ref 4.8–10.8)
WBC # FLD AUTO: 9.73 K/UL — SIGNIFICANT CHANGE UP (ref 4.8–10.8)

## 2020-02-28 PROCEDURE — 71045 X-RAY EXAM CHEST 1 VIEW: CPT | Mod: 26

## 2020-02-28 PROCEDURE — 99233 SBSQ HOSP IP/OBS HIGH 50: CPT

## 2020-02-28 PROCEDURE — 71045 X-RAY EXAM CHEST 1 VIEW: CPT | Mod: 26,77

## 2020-02-28 RX ORDER — MAGNESIUM SULFATE 500 MG/ML
1 VIAL (ML) INJECTION ONCE
Refills: 0 | Status: COMPLETED | OUTPATIENT
Start: 2020-02-28 | End: 2020-02-28

## 2020-02-28 RX ORDER — FUROSEMIDE 40 MG
40 TABLET ORAL ONCE
Refills: 0 | Status: COMPLETED | OUTPATIENT
Start: 2020-02-28 | End: 2020-02-28

## 2020-02-28 RX ORDER — SIMETHICONE 80 MG/1
80 TABLET, CHEWABLE ORAL EVERY 12 HOURS
Refills: 0 | Status: DISCONTINUED | OUTPATIENT
Start: 2020-02-28 | End: 2020-03-12

## 2020-02-28 RX ADMIN — CHLORHEXIDINE GLUCONATE 1 APPLICATION(S): 213 SOLUTION TOPICAL at 05:28

## 2020-02-28 RX ADMIN — Medication 10 MILLILITER(S): at 05:27

## 2020-02-28 RX ADMIN — SIMETHICONE 80 MILLIGRAM(S): 80 TABLET, CHEWABLE ORAL at 17:22

## 2020-02-28 RX ADMIN — Medication 100 GRAM(S): at 17:18

## 2020-02-28 RX ADMIN — Medication 40 MILLIGRAM(S): at 08:48

## 2020-02-28 RX ADMIN — Medication 100 GRAM(S): at 05:27

## 2020-02-28 RX ADMIN — SENNA PLUS 1 TABLET(S): 8.6 TABLET ORAL at 05:27

## 2020-02-28 RX ADMIN — FAMOTIDINE 20 MILLIGRAM(S): 10 INJECTION INTRAVENOUS at 17:19

## 2020-02-28 RX ADMIN — HEPARIN SODIUM 5000 UNIT(S): 5000 INJECTION INTRAVENOUS; SUBCUTANEOUS at 05:28

## 2020-02-28 RX ADMIN — PREGABALIN 1000 MICROGRAM(S): 225 CAPSULE ORAL at 12:11

## 2020-02-28 RX ADMIN — POLYETHYLENE GLYCOL 3350 17 GRAM(S): 17 POWDER, FOR SOLUTION ORAL at 12:12

## 2020-02-28 RX ADMIN — HEPARIN SODIUM 5000 UNIT(S): 5000 INJECTION INTRAVENOUS; SUBCUTANEOUS at 14:08

## 2020-02-28 RX ADMIN — Medication 10 MILLIGRAM(S): at 08:48

## 2020-02-28 RX ADMIN — Medication 12.5 MILLIGRAM(S): at 05:27

## 2020-02-28 RX ADMIN — Medication 12.5 MILLIGRAM(S): at 17:19

## 2020-02-28 RX ADMIN — FAMOTIDINE 20 MILLIGRAM(S): 10 INJECTION INTRAVENOUS at 05:27

## 2020-02-28 RX ADMIN — ATORVASTATIN CALCIUM 80 MILLIGRAM(S): 80 TABLET, FILM COATED ORAL at 22:21

## 2020-02-28 RX ADMIN — TAMSULOSIN HYDROCHLORIDE 0.4 MILLIGRAM(S): 0.4 CAPSULE ORAL at 22:21

## 2020-02-28 RX ADMIN — Medication 100 MILLIGRAM(S): at 12:12

## 2020-02-28 RX ADMIN — Medication 10 MILLILITER(S): at 17:19

## 2020-02-28 RX ADMIN — HEPARIN SODIUM 5000 UNIT(S): 5000 INJECTION INTRAVENOUS; SUBCUTANEOUS at 22:20

## 2020-02-28 RX ADMIN — SENNA PLUS 1 TABLET(S): 8.6 TABLET ORAL at 17:19

## 2020-02-28 RX ADMIN — Medication 100 GRAM(S): at 08:48

## 2020-02-28 RX ADMIN — Medication 10 MILLILITER(S): at 12:11

## 2020-02-28 RX ADMIN — Medication 325 MILLIGRAM(S): at 12:11

## 2020-02-28 RX ADMIN — SIMETHICONE 80 MILLIGRAM(S): 80 TABLET, CHEWABLE ORAL at 10:12

## 2020-02-28 NOTE — PROGRESS NOTE ADULT - SUBJECTIVE AND OBJECTIVE BOX
80 yo M w HTN, A.fib (Eliquis), CHF, HLD brought in by EMS post cardiac arrest, resuscitated - ruled in for severe CAD, referred for CABG eval. Patient on 1/21/2020 had loss of mental status event, ruled out for stroke, extubated/OOB.    01/31 - Right radial artery pseudoaneurysm repair  POD # 4 - Min Invasive CABG x1, EF 45-50%    Vital Signs Last 24 Hrs  T(C): 37.1 (28 Feb 2020 08:10), Max: 37.1 (28 Feb 2020 08:10)  T(F): 98.8 (28 Feb 2020 08:10), Max: 98.8 (28 Feb 2020 08:10)  HR: 90 (28 Feb 2020 10:00) (86 - 113)  BP: 95/52 (28 Feb 2020 10:00) (79/50 - 144/71)  BP(mean): 66 (28 Feb 2020 10:00) (58 - 96)  RR: 28 (28 Feb 2020 10:00) (18 - 56)  SpO2: 100% (28 Feb 2020 10:00) (96% - 100%)    alert, awake, verbal  follows commands  clean surgical incision  Neck collar in place with right IJ cordis  few rhonchi b/l  S1S2 reg rate  soft abdomen, non tender, non distended  Right groin hematoma - no tender  no pedal edema, good rom, warm periphery  Power is 5/5 in upper and lower ext    CXR - left pleural effusion (small-mod), b/l congestion left > right  1/22/20 - MR C spine - Hyperextension injury at C5/C6 with widening of the anterior intervertebral disc space and fracture of the C5 posterior spinous process. 3 column involvement with disruption of the anterior and posterior longitudinal ligaments as well as the ligamentum flavum. Reactive prevertebral soft tissue edema    WBC 9.7, Hg 8.4, plt 206  BUN 21, Cr 0.6, Mag 1.8    a/p:    CAD s/p CABG   Cervical spine Injury (? unstable)  HTN  Rate controlled A fib  Acute post thoracotomy pain    Lasix 40 mg IV today  PO ASA, Statin and metop  Keep neck collar in place as per NeuroSx request  d/c cordis and wires today  PO flomax  OOB to chair  PO diet with glycemic control  DVT proph

## 2020-02-28 NOTE — PROGRESS NOTE ADULT - SUBJECTIVE AND OBJECTIVE BOX
OPERATIVE PROCEDURE(s):                POD #                       SURGEON(s): MARCY Arreguin  SUBJECTIVE ASSESSMENT:79yMale patient seen and examined at bedside.    Vital Signs Last 24 Hrs  T(F): 97.5 (28 Feb 2020 04:00), Max: 98.4 (28 Feb 2020 00:00)  HR: 97 (28 Feb 2020 06:13) (87 - 113)  BP: 131/70 (28 Feb 2020 06:13) (79/50 - 144/71)  BP(mean): 95 (28 Feb 2020 06:13) (58 - 96)  ABP: 112/57 (27 Feb 2020 09:00) (112/57 - 112/57)  ABP(mean): 76 (27 Feb 2020 09:00)  RR: 20 (28 Feb 2020 06:13) (18 - 56)  SpO2: 99% (28 Feb 2020 06:13) (96% - 100%)    I&O's Detail    27 Feb 2020 07:01  -  28 Feb 2020 07:00  --------------------------------------------------------  IN:    IV PiggyBack: 200 mL    Oral Fluid: 400 mL  Total IN: 600 mL    OUT:    Chest Tube: 135 mL    Indwelling Catheter - Urethral: 1167 mL    Voided: 400 mL  Total OUT: 1702 mL    Net: I&O's Detail    26 Feb 2020 07:01  -  27 Feb 2020 07:00  --------------------------------------------------------  Total NET: -1056 mL    27 Feb 2020 07:01  -  28 Feb 2020 07:00  --------------------------------------------------------  Total NET: -1102 mL      CAPILLARY BLOOD GLUCOSE  POCT Blood Glucose.: 118 mg/dL (28 Feb 2020 07:27)  POCT Blood Glucose.: 100 mg/dL (27 Feb 2020 21:04)  POCT Blood Glucose.: 114 mg/dL (27 Feb 2020 15:49)  POCT Blood Glucose.: 101 mg/dL (27 Feb 2020 11:20)      Physical Exam:  General: NAD; A&Ox3  Cardiac: S1/S2, RRR, no murmur, no rubs  Lungs: unlabored respirations, CTA b/l, no wheeze, no rales, no crackles  Abdomen: Soft/NT/ND; positive bowel sounds x 4  Sternum: Intact, no click, incision healing well with no drainage  Incisions: Incisions clean/dry/intact  Extremities: No edema b/l lower extremities; good capillary refill; no cyanosis; palpable 1+ pedal pulses b/l    Central Venous Catheter: Yes[]  No[] , If Yes indication:                    Day #  Mora Catheter: Yes  [] , No  [] , If yes indication:                                 Day #  NGT: Yes [] No [] ,    If Yes Placement:                                                   Day #  EPICARDIAL WIRES:  [] YES [] NO                                                            Day #  BOWEL MOVEMENT:  [] YES [] NO, If No, Timing since last BM Day #  CHEST TUBE(Left/Right):  [] YES [] NO, If yes -  AIR LEAKS:  [] YES [] NO        LABS:                        8.4<L>  9.73  )-----------( 206      ( 28 Feb 2020 02:00 )             28.1<L>                        8.7<L>  10.50 )-----------( 167      ( 27 Feb 2020 01:30 )             28.3<L>    02-28    136  |  97<L>  |  21<H>  ----------------------------<  117<H>  4.2   |  30  |  0.6<L>  02-27    138  |  103  |  17  ----------------------------<  113<H>  4.1   |  27  |  0.5<L>    Ca    8.0<L>      28 Feb 2020 02:00  Mg     1.8     02-28    TPro  5.3<L> [6.0 - 8.0]  /  Alb  3.1<L> [3.5 - 5.2]  /  TBili  0.8 [0.2 - 1.2]  /  DBili  x   /  AST  14 [0 - 41]  /  ALT  12 [0 - 41]  /  AlkPhos  77 [30 - 115]  02-27      RADIOLOGY & ADDITIONAL TESTS:  CXR:   EKG:      Allergies  No Known Allergies  Intolerances      MEDICATIONS  (STANDING):  albuterol/ipratropium for Nebulization 3 milliLiter(s) Nebulizer every 6 hours  aspirin enteric coated 325 milliGRAM(s) Oral daily  atorvastatin 80 milliGRAM(s) Oral at bedtime  chlorhexidine 4% Liquid 1 Application(s) Topical <User Schedule>  cyanocobalamin 1000 MICROGram(s) Oral daily  dextrose 5%. 1000 milliLiter(s) (50 mL/Hr) IV Continuous <Continuous>  dextrose 50% Injectable 12.5 Gram(s) IV Push once  dextrose 50% Injectable 25 Gram(s) IV Push once  dextrose 50% Injectable 25 Gram(s) IV Push once  famotidine    Tablet 20 milliGRAM(s) Oral two times a day  guaifenesin/dextromethorphan  Syrup 10 milliLiter(s) Oral every 6 hours  heparin  Injectable 5000 Unit(s) SubCutaneous every 8 hours  insulin lispro (HumaLOG) corrective regimen sliding scale   SubCutaneous three times a day before meals  magnesium sulfate  IVPB 1 Gram(s) IV Intermittent every 12 hours  magnesium sulfate  IVPB 1 Gram(s) IV Intermittent once  metoprolol tartrate 12.5 milliGRAM(s) Oral every 12 hours  polyethylene glycol 3350 17 Gram(s) Oral daily  senna 1 Tablet(s) Oral two times a day  tamsulosin 0.4 milliGRAM(s) Oral at bedtime  thiamine 100 milliGRAM(s) Oral daily    MEDICATIONS  (PRN):  dextrose 40% Gel 15 Gram(s) Oral once PRN Blood Glucose LESS THAN 70 milliGRAM(s)/deciliter  glucagon  Injectable 1 milliGRAM(s) IntraMuscular once PRN Glucose LESS THAN 70 milligrams/deciliter  ondansetron  IVPB 4 milliGRAM(s) IV Intermittent once PRN Nausea and/or Vomiting  oxyCODONE    IR 5 milliGRAM(s) Oral every 4 hours PRN Moderate Pain (4 - 6)  oxyCODONE    IR 10 milliGRAM(s) Oral every 4 hours PRN Severe Pain (7 - 10)      Pharmacologic DVT Prophylaxis: [] YES, []NO: Contraindication:   [] HEPARIN: Dose: XX mg  Q24H    [] LOVENOX: Dose: XX mg  Q24H                 SCD's: YES b/l    GI Prophylaxis: Protonix [], Pepcid []    Post-Op Beta-Blockers: []Yes, []No: contraindication:  Post-Op Nitrate: []Yes, []No: contraindication:  Post-Op Aspirin: []Yes,  []No: contraindication:  Post-Op Statin: []Yes, []No: contraindication:      Ambulation/Activity Status:    Assessment/Plan:  79y Male status-post  - Case and plan discussed with CTU Intensivist and CT Surgeon - Dr. Shaw/Rodríguez/Samir   - Continue CTU supportive care and ongoing plan of care as per continuing CTU rounds.    - Continue DVT/GI prophylaxis  - Incentive Spirometry 10 times an hour  - Continue to advance physical activity as tolerated and continue PT/OT as directed  1. CAD: Continue ASA, statin, BB  2. HTN:   3. A. Fib:   4. COPD/Hypoxia:   5. DM/Glucose Control:     Social Service Disposition: OPERATIVE PROCEDURE(s): MICSx1               POD # 4                      SURGEON(s): MARCY Arreguin  SUBJECTIVE ASSESSMENT: 79y Male patient seen and examined at bedside. Patient denies any acute complaints at this time.     Vital Signs Last 24 Hrs  T(F): 97.5 (28 Feb 2020 04:00), Max: 98.4 (28 Feb 2020 00:00)  HR: 97 (28 Feb 2020 06:13) (87 - 113)  BP: 131/70 (28 Feb 2020 06:13) (79/50 - 144/71)  BP(mean): 95 (28 Feb 2020 06:13) (58 - 96)  ABP: 112/57 (27 Feb 2020 09:00) (112/57 - 112/57)  ABP(mean): 76 (27 Feb 2020 09:00)  RR: 20 (28 Feb 2020 06:13) (18 - 56)  SpO2: 99% (28 Feb 2020 06:13) (96% - 100%)    I&O's Detail    27 Feb 2020 07:01  -  28 Feb 2020 07:00  --------------------------------------------------------  IN:    IV PiggyBack: 200 mL    Oral Fluid: 400 mL  Total IN: 600 mL    OUT:    Chest Tube: 135 mL    Indwelling Catheter - Urethral: 1167 mL    Voided: 400 mL  Total OUT: 1702 mL    Net: I&O's Detail    26 Feb 2020 07:01  -  27 Feb 2020 07:00  --------------------------------------------------------  Total NET: -1056 mL    27 Feb 2020 07:01  -  28 Feb 2020 07:00  --------------------------------------------------------  Total NET: -1102 mL      CAPILLARY BLOOD GLUCOSE  POCT Blood Glucose.: 118 mg/dL (28 Feb 2020 07:27)  POCT Blood Glucose.: 100 mg/dL (27 Feb 2020 21:04)  POCT Blood Glucose.: 114 mg/dL (27 Feb 2020 15:49)  POCT Blood Glucose.: 101 mg/dL (27 Feb 2020 11:20)      Physical Exam:  General: NAD; A&Ox3  Cardiac: S1/S2, RRR, no murmur, no rubs  Lungs: unlabored respirations, CTA b/l, no wheeze, no rales, no crackles  Abdomen: Soft/NT/ND; positive bowel sounds x 4  Sternum: Intact, no click, incision healing well with no drainage  Incisions: Incisions clean/dry/intact  Extremities: No edema b/l lower extremities; good capillary refill; no cyanosis; palpable 1+ pedal pulses b/l    Central Venous Catheter: Yes[x]  No[] , If Yes indication:   IV Access                  Day # 4  BOWEL MOVEMENT:  [x] YES [] NO, If No, Timing since last BM Day #  CHEST TUBE(Left/Right):  [x] YES [] NO, If yes -  AIR LEAKS:  [] YES [x] NO        LABS:                        8.4<L>  9.73  )-----------( 206      ( 28 Feb 2020 02:00 )             28.1<L>                        8.7<L>  10.50 )-----------( 167      ( 27 Feb 2020 01:30 )             28.3<L>    02-28    136  |  97<L>  |  21<H>  ----------------------------<  117<H>  4.2   |  30  |  0.6<L>  02-27    138  |  103  |  17  ----------------------------<  113<H>  4.1   |  27  |  0.5<L>    Ca    8.0<L>      28 Feb 2020 02:00  Mg     1.8     02-28    TPro  5.3<L> [6.0 - 8.0]  /  Alb  3.1<L> [3.5 - 5.2]  /  TBili  0.8 [0.2 - 1.2]  /  DBili  x   /  AST  14 [0 - 41]  /  ALT  12 [0 - 41]  /  AlkPhos  77 [30 - 115]  02-27      RADIOLOGY & ADDITIONAL TESTS:  CXR: < from: Xray Chest 1 View- PORTABLE-Routine (02.28.20 @ 05:00) >  IMPRESSION:    No significant change since one day earlier.  < end of copied text >    EKG: < from: 12 Lead ECG (02.27.20 @ 07:33) >  Ventricular Rate 82 BPM  Atrial Rate 84 BPM  QRS Duration 100 ms  Q-T Interval 358 ms  QTC Calculation(Bezet) 418 ms  R Axis 38 degrees  T Axis 16 degrees  Diagnosis Line Atrial fibrillation  Anterior infarct , age undetermined  Abnormal ECG  Confirmed by Andrea Gr ([id number]) on 2/27/2020 8:39:46 AM  < end of copied text >      Allergies  No Known Allergies  Intolerances      MEDICATIONS  (STANDING):  albuterol/ipratropium for Nebulization 3 milliLiter(s) Nebulizer every 6 hours  aspirin enteric coated 325 milliGRAM(s) Oral daily  atorvastatin 80 milliGRAM(s) Oral at bedtime  chlorhexidine 4% Liquid 1 Application(s) Topical <User Schedule>  cyanocobalamin 1000 MICROGram(s) Oral daily  dextrose 5%. 1000 milliLiter(s) (50 mL/Hr) IV Continuous <Continuous>  dextrose 50% Injectable 12.5 Gram(s) IV Push once  dextrose 50% Injectable 25 Gram(s) IV Push once  dextrose 50% Injectable 25 Gram(s) IV Push once  famotidine    Tablet 20 milliGRAM(s) Oral two times a day  guaifenesin/dextromethorphan  Syrup 10 milliLiter(s) Oral every 6 hours  heparin  Injectable 5000 Unit(s) SubCutaneous every 8 hours  insulin lispro (HumaLOG) corrective regimen sliding scale   SubCutaneous three times a day before meals  magnesium sulfate  IVPB 1 Gram(s) IV Intermittent every 12 hours  magnesium sulfate  IVPB 1 Gram(s) IV Intermittent once  metoprolol tartrate 12.5 milliGRAM(s) Oral every 12 hours  polyethylene glycol 3350 17 Gram(s) Oral daily  senna 1 Tablet(s) Oral two times a day  tamsulosin 0.4 milliGRAM(s) Oral at bedtime  thiamine 100 milliGRAM(s) Oral daily    MEDICATIONS  (PRN):  dextrose 40% Gel 15 Gram(s) Oral once PRN Blood Glucose LESS THAN 70 milliGRAM(s)/deciliter  glucagon  Injectable 1 milliGRAM(s) IntraMuscular once PRN Glucose LESS THAN 70 milligrams/deciliter  ondansetron  IVPB 4 milliGRAM(s) IV Intermittent once PRN Nausea and/or Vomiting  oxyCODONE    IR 5 milliGRAM(s) Oral every 4 hours PRN Moderate Pain (4 - 6)  oxyCODONE    IR 10 milliGRAM(s) Oral every 4 hours PRN Severe Pain (7 - 10)      Post-Op Beta-Blockers: [x]Yes, []No: contraindication:  Post-Op Aspirin: [x]Yes,  []No: contraindication:  Post-Op Statin: [x]Yes, []No: contraindication:      Ambulation/Activity Status: OOB/AMB    Assessment/Plan:  79y Male status-post MICSx1               POD # 4   - Case and plan discussed with CTU Intensivist and CT Surgeon - Dr. Arreguin   - Continue CTU supportive care and ongoing plan of care as per continuing CTU rounds.    - Continue DVT/GI prophylaxis  - Incentive Spirometry 10 times an hour  - Continue to advance physical activity as tolerated and continue PT/OT as directed  1. CAD: Continue ASA, statin, BB  2. A. Fib: continue magnesium sulfate  IVPB 1 Gram(s) IV Intermittent every 12 hours w/lopressor.

## 2020-02-28 NOTE — CHART NOTE - NSCHARTNOTEFT_GEN_A_CORE
Registered Dietitian Follow-Up     Patient Profile Reviewed                           Yes [x]   No []     Nutrition History Previously Obtained        Yes [x]  No []       Pertinent Subjective Information: Pt. resting in chair, c/o constipation- received stool softener this morning. Decreased appetite s/p surgery. pt. Pt. in low spirits today, tired of prolonged hospital stay and c/o of uncomfortable neck collar. Drinks Ensure only vanilla or strawberry, chocolate is too thick per pt. Likes apple sauce and used to receive Ensure pudding earlier during LOS.      Pertinent Medical Interventions:  1/22/20 - MR C spine - Hyperextension injury at C5/C6 with widening of the anterior intervertebral disc space and fracture of the C5 posterior spinous process. Neurosurgery following. CAD s/p CABG. Afib: rate controlled. SLP rec: dysphagia 3 mech soft nectar consistency fluids.      Diet order: dysphagia 3 soft nectar consistency fluid, DASH/TLC, Ensure enlive TID      Anthropometrics:  - Ht. 182.8cm  - Wt. 93.1kg on 2/28 vs.   1/13): 107.8kg  (1/14): 108.7kg  (2/13): 105.2kg  (2/18): 99.4kg  (2/21): 100.9kg  - %wt change appears trending down, pt. with edema, on/off diuretics, will continue to monitor weight trends   - BMI 31.5  - IBW 80.9kg     Pertinent Lab Data: (2/28) RBC 2.91, Hg 8.2, Hct 28.1, BUN 21, creat 0.6, glu 117       Pertinent Meds: Metoprolol, Atorvastatin, Cyanocobalamin, Pepcid, Senna, vit B1, Miralax      Physical Findings:  - Appearance: AAOx4, 2+ L, R foot edema   - GI function: c/o constipation, last doc BM 2/28   - Tubes: none noted  - Oral/Mouth cavity: denies symptoms   - Skin: ecchymosis (BS 18)      Nutrition Requirements (from RD note on 2/18)  Weight Used:   80.9 kg IBW d/t borderline obese BMI    Estimated Energy Needs    Continue [x]  Adjust []  6899-5151 kcal/day (25-30 kcal/kg IBW)  Adjusted Energy Recommendations:   kcal/day        Estimated Protein Needs    Continue [x]  Adjust [] 80-97 g/day (1-1.2 g/kg IBW)  Adjusted Protein Recommendations:   gm/day        Estimated Fluid Needs        Continue []  Adjust [x]  per CTU team   Adjusted Fluid Recommendations:   mL/day      Nutrient Intake: ~25% PO at meals, not meeting estimated energy needs        [] Previous Nutrition Diagnosis:  Inadequate oral intake- previously resolved, now pertaining again.             [] Ongoing          [] Resolved       Nutrition Intervention: meals and snacks, medical food supplement    Rec: Continue current Dysphagia 3 nectar per SLP recs, DASH/TLC and Ensure enlive TID, add Ensure pudding BID    Goal/Expected Outcome: In 4 days pt. to consistently consume at least 50-75% PO and supplement     Indicator/Monitoring: diet order, energy intake, body composition, nutrition focused physical findings

## 2020-02-29 LAB
ANION GAP SERPL CALC-SCNC: 10 MMOL/L — SIGNIFICANT CHANGE UP (ref 7–14)
BASOPHILS # BLD AUTO: 0.01 K/UL — SIGNIFICANT CHANGE UP (ref 0–0.2)
BASOPHILS NFR BLD AUTO: 0.1 % — SIGNIFICANT CHANGE UP (ref 0–1)
BUN SERPL-MCNC: 22 MG/DL — HIGH (ref 10–20)
CALCIUM SERPL-MCNC: 8.6 MG/DL — SIGNIFICANT CHANGE UP (ref 8.5–10.1)
CHLORIDE SERPL-SCNC: 97 MMOL/L — LOW (ref 98–110)
CO2 SERPL-SCNC: 32 MMOL/L — SIGNIFICANT CHANGE UP (ref 17–32)
CREAT SERPL-MCNC: 0.6 MG/DL — LOW (ref 0.7–1.5)
EOSINOPHIL # BLD AUTO: 0.05 K/UL — SIGNIFICANT CHANGE UP (ref 0–0.7)
EOSINOPHIL NFR BLD AUTO: 0.5 % — SIGNIFICANT CHANGE UP (ref 0–8)
GLUCOSE BLDC GLUCOMTR-MCNC: 103 MG/DL — HIGH (ref 70–99)
GLUCOSE BLDC GLUCOMTR-MCNC: 113 MG/DL — HIGH (ref 70–99)
GLUCOSE SERPL-MCNC: 124 MG/DL — HIGH (ref 70–99)
HCT VFR BLD CALC: 31.2 % — LOW (ref 42–52)
HGB BLD-MCNC: 9.5 G/DL — LOW (ref 14–18)
IMM GRANULOCYTES NFR BLD AUTO: 0.4 % — HIGH (ref 0.1–0.3)
LYMPHOCYTES # BLD AUTO: 0.83 K/UL — LOW (ref 1.2–3.4)
LYMPHOCYTES # BLD AUTO: 8.5 % — LOW (ref 20.5–51.1)
MAGNESIUM SERPL-MCNC: 1.9 MG/DL — SIGNIFICANT CHANGE UP (ref 1.8–2.4)
MCHC RBC-ENTMCNC: 29.1 PG — SIGNIFICANT CHANGE UP (ref 27–31)
MCHC RBC-ENTMCNC: 30.4 G/DL — LOW (ref 32–37)
MCV RBC AUTO: 95.4 FL — HIGH (ref 80–94)
MONOCYTES # BLD AUTO: 0.78 K/UL — HIGH (ref 0.1–0.6)
MONOCYTES NFR BLD AUTO: 8 % — SIGNIFICANT CHANGE UP (ref 1.7–9.3)
NEUTROPHILS # BLD AUTO: 8.08 K/UL — HIGH (ref 1.4–6.5)
NEUTROPHILS NFR BLD AUTO: 82.5 % — HIGH (ref 42.2–75.2)
NRBC # BLD: 0 /100 WBCS — SIGNIFICANT CHANGE UP (ref 0–0)
PLATELET # BLD AUTO: 240 K/UL — SIGNIFICANT CHANGE UP (ref 130–400)
POTASSIUM SERPL-MCNC: 3.9 MMOL/L — SIGNIFICANT CHANGE UP (ref 3.5–5)
POTASSIUM SERPL-SCNC: 3.9 MMOL/L — SIGNIFICANT CHANGE UP (ref 3.5–5)
RBC # BLD: 3.27 M/UL — LOW (ref 4.7–6.1)
RBC # FLD: 15.1 % — HIGH (ref 11.5–14.5)
SODIUM SERPL-SCNC: 139 MMOL/L — SIGNIFICANT CHANGE UP (ref 135–146)
WBC # BLD: 9.79 K/UL — SIGNIFICANT CHANGE UP (ref 4.8–10.8)
WBC # FLD AUTO: 9.79 K/UL — SIGNIFICANT CHANGE UP (ref 4.8–10.8)

## 2020-02-29 PROCEDURE — 71045 X-RAY EXAM CHEST 1 VIEW: CPT | Mod: 26

## 2020-02-29 PROCEDURE — 93010 ELECTROCARDIOGRAM REPORT: CPT

## 2020-02-29 RX ORDER — IPRATROPIUM BROMIDE 0.2 MG/ML
500 SOLUTION, NON-ORAL INHALATION EVERY 6 HOURS
Refills: 0 | Status: DISCONTINUED | OUTPATIENT
Start: 2020-02-29 | End: 2020-03-12

## 2020-02-29 RX ORDER — FUROSEMIDE 40 MG
40 TABLET ORAL ONCE
Refills: 0 | Status: COMPLETED | OUTPATIENT
Start: 2020-02-29 | End: 2020-02-29

## 2020-02-29 RX ADMIN — Medication 12.5 MILLIGRAM(S): at 06:50

## 2020-02-29 RX ADMIN — HEPARIN SODIUM 5000 UNIT(S): 5000 INJECTION INTRAVENOUS; SUBCUTANEOUS at 14:00

## 2020-02-29 RX ADMIN — Medication 10 MILLILITER(S): at 00:34

## 2020-02-29 RX ADMIN — CHLORHEXIDINE GLUCONATE 1 APPLICATION(S): 213 SOLUTION TOPICAL at 06:18

## 2020-02-29 RX ADMIN — Medication 100 MILLIGRAM(S): at 11:08

## 2020-02-29 RX ADMIN — FAMOTIDINE 20 MILLIGRAM(S): 10 INJECTION INTRAVENOUS at 17:40

## 2020-02-29 RX ADMIN — Medication 500 MICROGRAM(S): at 08:00

## 2020-02-29 RX ADMIN — Medication 40 MILLIGRAM(S): at 09:24

## 2020-02-29 RX ADMIN — SIMETHICONE 80 MILLIGRAM(S): 80 TABLET, CHEWABLE ORAL at 06:18

## 2020-02-29 RX ADMIN — PREGABALIN 1000 MICROGRAM(S): 225 CAPSULE ORAL at 11:07

## 2020-02-29 RX ADMIN — Medication 100 GRAM(S): at 06:49

## 2020-02-29 RX ADMIN — TAMSULOSIN HYDROCHLORIDE 0.4 MILLIGRAM(S): 0.4 CAPSULE ORAL at 21:57

## 2020-02-29 RX ADMIN — Medication 500 MICROGRAM(S): at 14:22

## 2020-02-29 RX ADMIN — HEPARIN SODIUM 5000 UNIT(S): 5000 INJECTION INTRAVENOUS; SUBCUTANEOUS at 06:18

## 2020-02-29 RX ADMIN — Medication 10 MILLILITER(S): at 11:07

## 2020-02-29 RX ADMIN — Medication 325 MILLIGRAM(S): at 11:07

## 2020-02-29 RX ADMIN — ATORVASTATIN CALCIUM 80 MILLIGRAM(S): 80 TABLET, FILM COATED ORAL at 21:57

## 2020-02-29 RX ADMIN — Medication 100 GRAM(S): at 17:42

## 2020-02-29 RX ADMIN — Medication 10 MILLILITER(S): at 06:18

## 2020-02-29 RX ADMIN — HEPARIN SODIUM 5000 UNIT(S): 5000 INJECTION INTRAVENOUS; SUBCUTANEOUS at 21:57

## 2020-02-29 RX ADMIN — SIMETHICONE 80 MILLIGRAM(S): 80 TABLET, CHEWABLE ORAL at 17:40

## 2020-02-29 RX ADMIN — Medication 12.5 MILLIGRAM(S): at 17:40

## 2020-02-29 RX ADMIN — Medication 10 MILLILITER(S): at 17:40

## 2020-02-29 RX ADMIN — FAMOTIDINE 20 MILLIGRAM(S): 10 INJECTION INTRAVENOUS at 06:18

## 2020-02-29 NOTE — PROGRESS NOTE ADULT - SUBJECTIVE AND OBJECTIVE BOX
OPERATIVE PROCEDURE(s):                POD #                       SURGEON(s): MARCY Arreguin  SUBJECTIVE ASSESSMENT:79yMale patient seen and examined at bedside.    Vital Signs Last 24 Hrs  T(F): 98.1 (29 Feb 2020 04:00), Max: 98.8 (28 Feb 2020 08:10)  HR: 99 (29 Feb 2020 04:00) (86 - 102)  BP: 125/76 (29 Feb 2020 04:00) (92/51 - 129/65)  BP(mean): 92 (29 Feb 2020 04:00) (66 - 93)  ABP: --  ABP(mean): --  RR: 21 (29 Feb 2020 04:00) (21 - 47)  SpO2: 98% (29 Feb 2020 04:00) (96% - 100%)  CVP(mm Hg): --  CVP(cm H2O): --  CO: --  CI: --  PA: --  SVR: --    I&O's Detail    28 Feb 2020 07:01  -  29 Feb 2020 07:00  --------------------------------------------------------  IN:    IV PiggyBack: 200 mL    Oral Fluid: 1017 mL  Total IN: 1217 mL    OUT:    Chest Tube: 25 mL    Voided: 1750 mL  Total OUT: 1775 mL        Net: I&O's Detail    27 Feb 2020 07:01  -  28 Feb 2020 07:00  --------------------------------------------------------  Total NET: -1102 mL      28 Feb 2020 07:01  -  29 Feb 2020 07:00  --------------------------------------------------------  Total NET: -558 mL        CAPILLARY BLOOD GLUCOSE      POCT Blood Glucose.: 103 mg/dL (29 Feb 2020 06:34)  POCT Blood Glucose.: 124 mg/dL (28 Feb 2020 21:54)  POCT Blood Glucose.: 107 mg/dL (28 Feb 2020 16:43)  POCT Blood Glucose.: 104 mg/dL (28 Feb 2020 11:36)  POCT Blood Glucose.: 118 mg/dL (28 Feb 2020 07:27)      Physical Exam:  General: NAD; A&Ox3  Cardiac: S1/S2, RRR, no murmur, no rubs  Lungs: unlabored respirations, CTA b/l, no wheeze, no rales, no crackles  Abdomen: Soft/NT/ND; positive bowel sounds x 4  Sternum: Intact, no click, incision healing well with no drainage  Incisions: Incisions clean/dry/intact  Extremities: No edema b/l lower extremities; good capillary refill; no cyanosis; palpable 1+ pedal pulses b/l    Central Venous Catheter: Yes[]  No[] , If Yes indication:                    Day #  Mora Catheter: Yes  [] , No  [] , If yes indication:                                 Day #  NGT: Yes [] No [] ,    If Yes Placement:                                                   Day #  EPICARDIAL WIRES:  [] YES [] NO                                                            Day #  BOWEL MOVEMENT:  [] YES [] NO, If No, Timing since last BM Day #  CHEST TUBE(Left/Right):  [] YES [] NO, If yes -  AIR LEAKS:  [] YES [] NO        LABS:                        9.5<L>  9.79  )-----------( 240      ( 29 Feb 2020 02:50 )             31.2<L>                        8.4<L>  9.73  )-----------( 206      ( 28 Feb 2020 02:00 )             28.1<L>    02-29    139  |  97<L>  |  22<H>  ----------------------------<  124<H>  3.9   |  32  |  0.6<L>  02-28    136  |  97<L>  |  21<H>  ----------------------------<  117<H>  4.2   |  30  |  0.6<L>    Ca    8.6      29 Feb 2020 02:50  Mg     1.9     02-29    TPro  5.3<L> [6.0 - 8.0]  /  Alb  3.1<L> [3.5 - 5.2]  /  TBili  0.8 [0.2 - 1.2]  /  DBili  x   /  AST  14 [0 - 41]  /  ALT  12 [0 - 41]  /  AlkPhos  77 [30 - 115]  02-27          RADIOLOGY & ADDITIONAL TESTS:  CXR:   EKG:  Allergies    No Known Allergies    Intolerances      MEDICATIONS  (STANDING):  aspirin enteric coated 325 milliGRAM(s) Oral daily  atorvastatin 80 milliGRAM(s) Oral at bedtime  chlorhexidine 4% Liquid 1 Application(s) Topical <User Schedule>  cyanocobalamin 1000 MICROGram(s) Oral daily  famotidine    Tablet 20 milliGRAM(s) Oral two times a day  guaifenesin/dextromethorphan  Syrup 10 milliLiter(s) Oral every 6 hours  heparin  Injectable 5000 Unit(s) SubCutaneous every 8 hours  ipratropium    for Nebulization 500 MICROGram(s) Nebulizer every 6 hours  magnesium sulfate  IVPB 1 Gram(s) IV Intermittent every 12 hours  metoprolol tartrate 12.5 milliGRAM(s) Oral every 12 hours  polyethylene glycol 3350 17 Gram(s) Oral daily  senna 1 Tablet(s) Oral two times a day  simethicone 80 milliGRAM(s) Chew every 12 hours  tamsulosin 0.4 milliGRAM(s) Oral at bedtime  thiamine 100 milliGRAM(s) Oral daily    MEDICATIONS  (PRN):  glucagon  Injectable 1 milliGRAM(s) IntraMuscular once PRN Glucose LESS THAN 70 milligrams/deciliter  ondansetron  IVPB 4 milliGRAM(s) IV Intermittent once PRN Nausea and/or Vomiting  oxyCODONE    IR 5 milliGRAM(s) Oral every 4 hours PRN Moderate Pain (4 - 6)  oxyCODONE    IR 10 milliGRAM(s) Oral every 4 hours PRN Severe Pain (7 - 10)      Pharmacologic DVT Prophylaxis: [] YES, []NO: Contraindication:   [] HEPARIN: Dose: XX mg  Q24H    [] LOVENOX: Dose: XX mg  Q24H                 SCD's: YES b/l    GI Prophylaxis: Protonix [], Pepcid []    Post-Op Beta-Blockers: []Yes, []No: contraindication:  Post-Op Nitrate: []Yes, []No: contraindication:  Post-Op Aspirin: []Yes,  []No: contraindication:  Post-Op Statin: []Yes, []No: contraindication:      Ambulation/Activity Status:    Assessment/Plan:  79y Male status-post  - Case and plan discussed with CTU Intensivist and CT Surgeon - Dr. Shaw/Rodríguez/Samir   - Continue CTU supportive care and ongoing plan of care as per continuing CTU rounds.    - Continue DVT/GI prophylaxis  - Incentive Spirometry 10 times an hour  - Continue to advance physical activity as tolerated and continue PT/OT as directed  1. CAD: Continue ASA, statin, BB  2. HTN:   3. A. Fib:   4. COPD/Hypoxia:   5. DM/Glucose Control:     Social Service Disposition: OPERATIVE PROCEDURE(s):                POD #                       SURGEON(s): MARCY Arreguin  SUBJECTIVE ASSESSMENT: 79y Male patient seen and examined at bedside.    Vital Signs Last 24 Hrs  T(F): 98.1 (29 Feb 2020 04:00), Max: 98.8 (28 Feb 2020 08:10)  HR: 99 (29 Feb 2020 04:00) (86 - 102)  BP: 125/76 (29 Feb 2020 04:00) (92/51 - 129/65)  BP(mean): 92 (29 Feb 2020 04:00) (66 - 93)  ABP: --  ABP(mean): --  RR: 21 (29 Feb 2020 04:00) (21 - 47)  SpO2: 98% (29 Feb 2020 04:00) (96% - 100%)  CVP(mm Hg): --  CVP(cm H2O): --  CO: --  CI: --  PA: --  SVR: --    I&O's Detail    28 Feb 2020 07:01  -  29 Feb 2020 07:00  --------------------------------------------------------  IN:    IV PiggyBack: 200 mL    Oral Fluid: 1017 mL  Total IN: 1217 mL    OUT:    Chest Tube: 25 mL    Voided: 1750 mL  Total OUT: 1775 mL        Net: I&O's Detail    27 Feb 2020 07:01  -  28 Feb 2020 07:00  --------------------------------------------------------  Total NET: -1102 mL      28 Feb 2020 07:01  -  29 Feb 2020 07:00  --------------------------------------------------------  Total NET: -558 mL        CAPILLARY BLOOD GLUCOSE      POCT Blood Glucose.: 103 mg/dL (29 Feb 2020 06:34)  POCT Blood Glucose.: 124 mg/dL (28 Feb 2020 21:54)  POCT Blood Glucose.: 107 mg/dL (28 Feb 2020 16:43)  POCT Blood Glucose.: 104 mg/dL (28 Feb 2020 11:36)  POCT Blood Glucose.: 118 mg/dL (28 Feb 2020 07:27)      Physical Exam:  General: NAD; A&Ox3  Cardiac: S1/S2, RRR, no murmur, no rubs  Lungs: unlabored respirations, CTA b/l, no wheeze, no rales, no crackles  Abdomen: Soft/NT/ND; positive bowel sounds x 4  Sternum: Intact, no click, incision healing well with no drainage  Incisions: Incisions clean/dry/intact  Extremities: No edema b/l lower extremities; good capillary refill; no cyanosis; palpable 1+ pedal pulses b/l    Central Venous Catheter: Yes[]  No[] , If Yes indication:                    Day #  Mora Catheter: Yes  [] , No  [] , If yes indication:                                 Day #  NGT: Yes [] No [] ,    If Yes Placement:                                                   Day #  EPICARDIAL WIRES:  [] YES [] NO                                                            Day #  BOWEL MOVEMENT:  [] YES [] NO, If No, Timing since last BM Day #  CHEST TUBE(Left/Right):  [] YES [] NO, If yes -  AIR LEAKS:  [] YES [] NO        LABS:                        9.5<L>  9.79  )-----------( 240      ( 29 Feb 2020 02:50 )             31.2<L>                        8.4<L>  9.73  )-----------( 206      ( 28 Feb 2020 02:00 )             28.1<L>    02-29    139  |  97<L>  |  22<H>  ----------------------------<  124<H>  3.9   |  32  |  0.6<L>  02-28    136  |  97<L>  |  21<H>  ----------------------------<  117<H>  4.2   |  30  |  0.6<L>    Ca    8.6      29 Feb 2020 02:50  Mg     1.9     02-29    TPro  5.3<L> [6.0 - 8.0]  /  Alb  3.1<L> [3.5 - 5.2]  /  TBili  0.8 [0.2 - 1.2]  /  DBili  x   /  AST  14 [0 - 41]  /  ALT  12 [0 - 41]  /  AlkPhos  77 [30 - 115]  02-27          RADIOLOGY & ADDITIONAL TESTS:  CXR:   EKG:  Allergies    No Known Allergies    Intolerances      MEDICATIONS  (STANDING):  aspirin enteric coated 325 milliGRAM(s) Oral daily  atorvastatin 80 milliGRAM(s) Oral at bedtime  chlorhexidine 4% Liquid 1 Application(s) Topical <User Schedule>  cyanocobalamin 1000 MICROGram(s) Oral daily  famotidine    Tablet 20 milliGRAM(s) Oral two times a day  guaifenesin/dextromethorphan  Syrup 10 milliLiter(s) Oral every 6 hours  heparin  Injectable 5000 Unit(s) SubCutaneous every 8 hours  ipratropium    for Nebulization 500 MICROGram(s) Nebulizer every 6 hours  magnesium sulfate  IVPB 1 Gram(s) IV Intermittent every 12 hours  metoprolol tartrate 12.5 milliGRAM(s) Oral every 12 hours  polyethylene glycol 3350 17 Gram(s) Oral daily  senna 1 Tablet(s) Oral two times a day  simethicone 80 milliGRAM(s) Chew every 12 hours  tamsulosin 0.4 milliGRAM(s) Oral at bedtime  thiamine 100 milliGRAM(s) Oral daily    MEDICATIONS  (PRN):  glucagon  Injectable 1 milliGRAM(s) IntraMuscular once PRN Glucose LESS THAN 70 milligrams/deciliter  ondansetron  IVPB 4 milliGRAM(s) IV Intermittent once PRN Nausea and/or Vomiting  oxyCODONE    IR 5 milliGRAM(s) Oral every 4 hours PRN Moderate Pain (4 - 6)  oxyCODONE    IR 10 milliGRAM(s) Oral every 4 hours PRN Severe Pain (7 - 10)      Pharmacologic DVT Prophylaxis: [] YES, []NO: Contraindication:   [] HEPARIN: Dose: XX mg  Q24H    [] LOVENOX: Dose: XX mg  Q24H                 SCD's: YES b/l    GI Prophylaxis: Protonix [], Pepcid []    Post-Op Beta-Blockers: []Yes, []No: contraindication:  Post-Op Nitrate: []Yes, []No: contraindication:  Post-Op Aspirin: []Yes,  []No: contraindication:  Post-Op Statin: []Yes, []No: contraindication:      Ambulation/Activity Status:    Assessment/Plan:  79y Male status-post  - Case and plan discussed with CTU Intensivist and CT Surgeon - Dr. Shaw/Rodríguez/Samir   - Continue CTU supportive care and ongoing plan of care as per continuing CTU rounds.    - Continue DVT/GI prophylaxis  - Incentive Spirometry 10 times an hour  - Continue to advance physical activity as tolerated and continue PT/OT as directed  1. CAD: Continue ASA, statin, BB  2. HTN:   3. A. Fib:   4. COPD/Hypoxia:   5. DM/Glucose Control:     Social Service Disposition: OPERATIVE PROCEDURE(s):  MICSx1              POD #    5                   SURGEON(s): MARCY Arreguin  SUBJECTIVE ASSESSMENT: 79y Male patient seen and examined at bedside.    Vital Signs Last 24 Hrs  T(F): 98.1 (29 Feb 2020 04:00), Max: 98.8 (28 Feb 2020 08:10)  HR: 99 (29 Feb 2020 04:00) (86 - 102)  BP: 125/76 (29 Feb 2020 04:00) (92/51 - 129/65)  BP(mean): 92 (29 Feb 2020 04:00) (66 - 93)  RR: 21 (29 Feb 2020 04:00) (21 - 47)  SpO2: 98% (29 Feb 2020 04:00) (96% - 100%)    I&O's Detail    28 Feb 2020 07:01  -  29 Feb 2020 07:00  --------------------------------------------------------  IN:    IV PiggyBack: 200 mL    Oral Fluid: 1017 mL  Total IN: 1217 mL    OUT:    Chest Tube: 25 mL    Voided: 1750 mL  Total OUT: 1775 mL    Net: I&O's Detail    27 Feb 2020 07:01  -  28 Feb 2020 07:00  --------------------------------------------------------  Total NET: -1102 mL    28 Feb 2020 07:01  -  29 Feb 2020 07:00  --------------------------------------------------------  Total NET: -558 mL      CAPILLARY BLOOD GLUCOSE  POCT Blood Glucose.: 103 mg/dL (29 Feb 2020 06:34)  POCT Blood Glucose.: 124 mg/dL (28 Feb 2020 21:54)  POCT Blood Glucose.: 107 mg/dL (28 Feb 2020 16:43)  POCT Blood Glucose.: 104 mg/dL (28 Feb 2020 11:36)  POCT Blood Glucose.: 118 mg/dL (28 Feb 2020 07:27)      Physical Exam:  General: NAD; A&Ox3  Cardiac: S1/S2, RRR, no murmur, no rubs  Lungs: unlabored respirations, CTA b/l, no wheeze, no rales, no crackles  Abdomen: Soft/NT/ND; positive bowel sounds x 4  Sternum: Intact, no click, incision healing well with no drainage  Incisions: Incisions clean/dry/intact  Extremities: No edema b/l lower extremities; good capillary refill; no cyanosis; palpable 1+ pedal pulses b/l    BOWEL MOVEMENT:  [x] YES [] NO, If No, Timing since last BM Day #          LABS:                        9.5<L>  9.79  )-----------( 240      ( 29 Feb 2020 02:50 )             31.2<L>                        8.4<L>  9.73  )-----------( 206      ( 28 Feb 2020 02:00 )             28.1<L>    02-29    139  |  97<L>  |  22<H>  ----------------------------<  124<H>  3.9   |  32  |  0.6<L>  02-28    136  |  97<L>  |  21<H>  ----------------------------<  117<H>  4.2   |  30  |  0.6<L>    Ca    8.6      29 Feb 2020 02:50  Mg     1.9     02-29    TPro  5.3<L> [6.0 - 8.0]  /  Alb  3.1<L> [3.5 - 5.2]  /  TBili  0.8 [0.2 - 1.2]  /  DBili  x   /  AST  14 [0 - 41]  /  ALT  12 [0 - 41]  /  AlkPhos  77 [30 - 115]  02-27          RADIOLOGY & ADDITIONAL TESTS:  CXR: < from: Xray Chest 1 View-PORTABLE IMMEDIATE (02.28.20 @ 14:08) >  Impression:  Left-sided pneumothorax better seen at this time.  Left-sided pleural effusion with retrocardiac opacification without change.  < end of copied text >    EKG: < from: 12 Lead ECG (02.29.20 @ 08:31) >  Ventricular Rate 81 BPM  Atrial Rate 326 BPM  QRS Duration 108 ms  Q-T Interval 388 ms  QTC Calculation(Bezet) 450 ms  R Axis -13 degrees  T Axis -16 degrees  Diagnosis Line Atrial fibrillation  Septal infarct , age undetermined  Inferior infarct , age undetermined  Abnormal ECG  Confirmed by LEON YANCEY MD ([id number]) on 2/29/2020 9:51:21 AM  < end of copied text >      Allergies  No Known Allergies  Intolerances      MEDICATIONS  (STANDING):  aspirin enteric coated 325 milliGRAM(s) Oral daily  atorvastatin 80 milliGRAM(s) Oral at bedtime  chlorhexidine 4% Liquid 1 Application(s) Topical <User Schedule>  cyanocobalamin 1000 MICROGram(s) Oral daily  famotidine    Tablet 20 milliGRAM(s) Oral two times a day  guaifenesin/dextromethorphan  Syrup 10 milliLiter(s) Oral every 6 hours  heparin  Injectable 5000 Unit(s) SubCutaneous every 8 hours  ipratropium    for Nebulization 500 MICROGram(s) Nebulizer every 6 hours  magnesium sulfate  IVPB 1 Gram(s) IV Intermittent every 12 hours  metoprolol tartrate 12.5 milliGRAM(s) Oral every 12 hours  polyethylene glycol 3350 17 Gram(s) Oral daily  senna 1 Tablet(s) Oral two times a day  simethicone 80 milliGRAM(s) Chew every 12 hours  tamsulosin 0.4 milliGRAM(s) Oral at bedtime  thiamine 100 milliGRAM(s) Oral daily    MEDICATIONS  (PRN):  glucagon  Injectable 1 milliGRAM(s) IntraMuscular once PRN Glucose LESS THAN 70 milligrams/deciliter  ondansetron  IVPB 4 milliGRAM(s) IV Intermittent once PRN Nausea and/or Vomiting  oxyCODONE    IR 5 milliGRAM(s) Oral every 4 hours PRN Moderate Pain (4 - 6)  oxyCODONE    IR 10 milliGRAM(s) Oral every 4 hours PRN Severe Pain (7 - 10)      Assessment/Plan:  79y Male status-post MICSx1              POD #    5   - Case and plan discussed with CTU Intensivist and CT Surgeon - Dr. Dawson   - Continue CTU supportive care and ongoing plan of care as per continuing CTU rounds.    - Continue DVT/GI prophylaxis  - Incentive Spirometry 10 times an hour  - Continue to advance physical activity as tolerated and continue PT/OT as directed  1. CAD: Continue ASA, statin, BB  2. A. Fib: continue magnesium sulfate  IVPB 1 Gram(s) IV Intermittent every 12 hours w/lopressor.

## 2020-03-01 LAB
ANION GAP SERPL CALC-SCNC: 10 MMOL/L — SIGNIFICANT CHANGE UP (ref 7–14)
BASOPHILS # BLD AUTO: 0.01 K/UL — SIGNIFICANT CHANGE UP (ref 0–0.2)
BASOPHILS NFR BLD AUTO: 0.1 % — SIGNIFICANT CHANGE UP (ref 0–1)
BUN SERPL-MCNC: 21 MG/DL — HIGH (ref 10–20)
CALCIUM SERPL-MCNC: 8.4 MG/DL — LOW (ref 8.5–10.1)
CHLORIDE SERPL-SCNC: 96 MMOL/L — LOW (ref 98–110)
CO2 SERPL-SCNC: 31 MMOL/L — SIGNIFICANT CHANGE UP (ref 17–32)
CREAT SERPL-MCNC: 0.7 MG/DL — SIGNIFICANT CHANGE UP (ref 0.7–1.5)
EOSINOPHIL # BLD AUTO: 0.08 K/UL — SIGNIFICANT CHANGE UP (ref 0–0.7)
EOSINOPHIL NFR BLD AUTO: 0.9 % — SIGNIFICANT CHANGE UP (ref 0–8)
GLUCOSE BLDC GLUCOMTR-MCNC: 123 MG/DL — HIGH (ref 70–99)
GLUCOSE BLDC GLUCOMTR-MCNC: 125 MG/DL — HIGH (ref 70–99)
GLUCOSE SERPL-MCNC: 134 MG/DL — HIGH (ref 70–99)
HCT VFR BLD CALC: 29.6 % — LOW (ref 42–52)
HGB BLD-MCNC: 9.3 G/DL — LOW (ref 14–18)
IMM GRANULOCYTES NFR BLD AUTO: 0.4 % — HIGH (ref 0.1–0.3)
LYMPHOCYTES # BLD AUTO: 0.74 K/UL — LOW (ref 1.2–3.4)
LYMPHOCYTES # BLD AUTO: 7.9 % — LOW (ref 20.5–51.1)
MAGNESIUM SERPL-MCNC: 1.7 MG/DL — LOW (ref 1.8–2.4)
MCHC RBC-ENTMCNC: 30.2 PG — SIGNIFICANT CHANGE UP (ref 27–31)
MCHC RBC-ENTMCNC: 31.4 G/DL — LOW (ref 32–37)
MCV RBC AUTO: 96.1 FL — HIGH (ref 80–94)
MONOCYTES # BLD AUTO: 0.72 K/UL — HIGH (ref 0.1–0.6)
MONOCYTES NFR BLD AUTO: 7.7 % — SIGNIFICANT CHANGE UP (ref 1.7–9.3)
NEUTROPHILS # BLD AUTO: 7.74 K/UL — HIGH (ref 1.4–6.5)
NEUTROPHILS NFR BLD AUTO: 83 % — HIGH (ref 42.2–75.2)
NRBC # BLD: 0 /100 WBCS — SIGNIFICANT CHANGE UP (ref 0–0)
PLATELET # BLD AUTO: 231 K/UL — SIGNIFICANT CHANGE UP (ref 130–400)
POTASSIUM SERPL-MCNC: 3.6 MMOL/L — SIGNIFICANT CHANGE UP (ref 3.5–5)
POTASSIUM SERPL-SCNC: 3.6 MMOL/L — SIGNIFICANT CHANGE UP (ref 3.5–5)
RBC # BLD: 3.08 M/UL — LOW (ref 4.7–6.1)
RBC # FLD: 15.1 % — HIGH (ref 11.5–14.5)
SODIUM SERPL-SCNC: 137 MMOL/L — SIGNIFICANT CHANGE UP (ref 135–146)
WBC # BLD: 9.33 K/UL — SIGNIFICANT CHANGE UP (ref 4.8–10.8)
WBC # FLD AUTO: 9.33 K/UL — SIGNIFICANT CHANGE UP (ref 4.8–10.8)

## 2020-03-01 PROCEDURE — 71045 X-RAY EXAM CHEST 1 VIEW: CPT | Mod: 26

## 2020-03-01 PROCEDURE — 99233 SBSQ HOSP IP/OBS HIGH 50: CPT

## 2020-03-01 RX ORDER — POTASSIUM CHLORIDE 20 MEQ
40 PACKET (EA) ORAL ONCE
Refills: 0 | Status: COMPLETED | OUTPATIENT
Start: 2020-03-01 | End: 2020-03-01

## 2020-03-01 RX ORDER — BACITRACIN ZINC 500 UNIT/G
1 OINTMENT IN PACKET (EA) TOPICAL THREE TIMES A DAY
Refills: 0 | Status: DISCONTINUED | OUTPATIENT
Start: 2020-03-01 | End: 2020-03-12

## 2020-03-01 RX ORDER — POTASSIUM CHLORIDE 20 MEQ
20 PACKET (EA) ORAL ONCE
Refills: 0 | Status: COMPLETED | OUTPATIENT
Start: 2020-03-01 | End: 2020-03-01

## 2020-03-01 RX ORDER — FUROSEMIDE 40 MG
20 TABLET ORAL ONCE
Refills: 0 | Status: COMPLETED | OUTPATIENT
Start: 2020-03-01 | End: 2020-03-01

## 2020-03-01 RX ADMIN — ATORVASTATIN CALCIUM 80 MILLIGRAM(S): 80 TABLET, FILM COATED ORAL at 21:42

## 2020-03-01 RX ADMIN — SIMETHICONE 80 MILLIGRAM(S): 80 TABLET, CHEWABLE ORAL at 05:54

## 2020-03-01 RX ADMIN — CHLORHEXIDINE GLUCONATE 1 APPLICATION(S): 213 SOLUTION TOPICAL at 05:54

## 2020-03-01 RX ADMIN — TAMSULOSIN HYDROCHLORIDE 0.4 MILLIGRAM(S): 0.4 CAPSULE ORAL at 21:42

## 2020-03-01 RX ADMIN — Medication 10 MILLILITER(S): at 12:00

## 2020-03-01 RX ADMIN — Medication 500 MICROGRAM(S): at 08:13

## 2020-03-01 RX ADMIN — FAMOTIDINE 20 MILLIGRAM(S): 10 INJECTION INTRAVENOUS at 05:53

## 2020-03-01 RX ADMIN — Medication 1 APPLICATION(S): at 21:47

## 2020-03-01 RX ADMIN — HEPARIN SODIUM 5000 UNIT(S): 5000 INJECTION INTRAVENOUS; SUBCUTANEOUS at 05:54

## 2020-03-01 RX ADMIN — Medication 100 MILLIGRAM(S): at 16:31

## 2020-03-01 RX ADMIN — PREGABALIN 1000 MICROGRAM(S): 225 CAPSULE ORAL at 16:33

## 2020-03-01 RX ADMIN — Medication 12.5 MILLIGRAM(S): at 05:53

## 2020-03-01 RX ADMIN — Medication 100 GRAM(S): at 05:11

## 2020-03-01 RX ADMIN — Medication 100 GRAM(S): at 17:37

## 2020-03-01 RX ADMIN — SIMETHICONE 80 MILLIGRAM(S): 80 TABLET, CHEWABLE ORAL at 17:36

## 2020-03-01 RX ADMIN — HEPARIN SODIUM 5000 UNIT(S): 5000 INJECTION INTRAVENOUS; SUBCUTANEOUS at 21:42

## 2020-03-01 RX ADMIN — FAMOTIDINE 20 MILLIGRAM(S): 10 INJECTION INTRAVENOUS at 17:36

## 2020-03-01 RX ADMIN — Medication 325 MILLIGRAM(S): at 16:32

## 2020-03-01 RX ADMIN — Medication 500 MICROGRAM(S): at 14:36

## 2020-03-01 RX ADMIN — Medication 20 MILLIGRAM(S): at 08:00

## 2020-03-01 RX ADMIN — HEPARIN SODIUM 5000 UNIT(S): 5000 INJECTION INTRAVENOUS; SUBCUTANEOUS at 16:35

## 2020-03-01 RX ADMIN — Medication 10 MILLILITER(S): at 00:14

## 2020-03-01 RX ADMIN — POLYETHYLENE GLYCOL 3350 17 GRAM(S): 17 POWDER, FOR SOLUTION ORAL at 16:34

## 2020-03-01 RX ADMIN — Medication 12.5 MILLIGRAM(S): at 17:37

## 2020-03-01 RX ADMIN — Medication 20 MILLIEQUIVALENT(S): at 16:35

## 2020-03-01 RX ADMIN — Medication 40 MILLIEQUIVALENT(S): at 06:01

## 2020-03-01 RX ADMIN — Medication 10 MILLILITER(S): at 17:37

## 2020-03-01 RX ADMIN — Medication 10 MILLILITER(S): at 05:53

## 2020-03-01 RX ADMIN — Medication 10 MILLILITER(S): at 23:37

## 2020-03-01 NOTE — PROGRESS NOTE ADULT - SUBJECTIVE AND OBJECTIVE BOX
OPERATIVE PROCEDURE(s):                POD #                       SURGEON(s): MARCY Arreguin  SUBJECTIVE ASSESSMENT:79yMale patient seen and examined at bedside.    Vital Signs Last 24 Hrs  T(F): 98 (01 Mar 2020 03:00), Max: 98.9 (29 Feb 2020 12:00)  HR: 96 (01 Mar 2020 06:00) (85 - 112)  BP: 140/76 (01 Mar 2020 06:00) (90/55 - 168/79)  BP(mean): 101 (01 Mar 2020 06:00) (66 - 113)  ABP: --  ABP(mean): --  RR: 22 (01 Mar 2020 06:00) (18 - 52)  SpO2: 99% (01 Mar 2020 06:00) (94% - 99%)  CVP(mm Hg): --  CVP(cm H2O): --  CO: --  CI: --  PA: --  SVR: --    I&O's Detail    29 Feb 2020 07:01  -  01 Mar 2020 07:00  --------------------------------------------------------  IN:    IV PiggyBack: 200 mL    Oral Fluid: 560 mL  Total IN: 760 mL    OUT:    Voided: 1100 mL  Total OUT: 1100 mL        Net: I&O's Detail    28 Feb 2020 07:01  -  29 Feb 2020 07:00  --------------------------------------------------------  Total NET: -488 mL      29 Feb 2020 07:01  -  01 Mar 2020 07:00  --------------------------------------------------------  Total NET: -340 mL        CAPILLARY BLOOD GLUCOSE      POCT Blood Glucose.: 125 mg/dL (01 Mar 2020 06:37)  POCT Blood Glucose.: 113 mg/dL (29 Feb 2020 22:02)      Physical Exam:  General: NAD; A&Ox3  Cardiac: S1/S2, RRR, no murmur, no rubs  Lungs: unlabored respirations, CTA b/l, no wheeze, no rales, no crackles  Abdomen: Soft/NT/ND; positive bowel sounds x 4  Sternum: Intact, no click, incision healing well with no drainage  Incisions: Incisions clean/dry/intact  Extremities: No edema b/l lower extremities; good capillary refill; no cyanosis; palpable 1+ pedal pulses b/l    Central Venous Catheter: Yes[]  No[] , If Yes indication:                    Day #  Mora Catheter: Yes  [] , No  [] , If yes indication:                                 Day #  NGT: Yes [] No [] ,    If Yes Placement:                                                   Day #  EPICARDIAL WIRES:  [] YES [] NO                                                            Day #  BOWEL MOVEMENT:  [] YES [] NO, If No, Timing since last BM Day #  CHEST TUBE(Left/Right):  [] YES [] NO, If yes -  AIR LEAKS:  [] YES [] NO        LABS:                        9.3<L>  9.33  )-----------( 231      ( 01 Mar 2020 03:00 )             29.6<L>                        9.5<L>  9.79  )-----------( 240      ( 29 Feb 2020 02:50 )             31.2<L>    03-01    137  |  96<L>  |  21<H>  ----------------------------<  134<H>  3.6   |  31  |  0.7  02-29    139  |  97<L>  |  22<H>  ----------------------------<  124<H>  3.9   |  32  |  0.6<L>    Ca    8.4<L>      01 Mar 2020 03:00  Mg     1.7     03-01            RADIOLOGY & ADDITIONAL TESTS:  CXR:   EKG:  Allergies    No Known Allergies    Intolerances      MEDICATIONS  (STANDING):  aspirin enteric coated 325 milliGRAM(s) Oral daily  atorvastatin 80 milliGRAM(s) Oral at bedtime  chlorhexidine 4% Liquid 1 Application(s) Topical <User Schedule>  cyanocobalamin 1000 MICROGram(s) Oral daily  famotidine    Tablet 20 milliGRAM(s) Oral two times a day  guaifenesin/dextromethorphan  Syrup 10 milliLiter(s) Oral every 6 hours  heparin  Injectable 5000 Unit(s) SubCutaneous every 8 hours  ipratropium    for Nebulization 500 MICROGram(s) Nebulizer every 6 hours  magnesium sulfate  IVPB 1 Gram(s) IV Intermittent every 12 hours  metoprolol tartrate 12.5 milliGRAM(s) Oral every 12 hours  polyethylene glycol 3350 17 Gram(s) Oral daily  simethicone 80 milliGRAM(s) Chew every 12 hours  tamsulosin 0.4 milliGRAM(s) Oral at bedtime  thiamine 100 milliGRAM(s) Oral daily    MEDICATIONS  (PRN):  glucagon  Injectable 1 milliGRAM(s) IntraMuscular once PRN Glucose LESS THAN 70 milligrams/deciliter  ondansetron  IVPB 4 milliGRAM(s) IV Intermittent once PRN Nausea and/or Vomiting  oxyCODONE    IR 5 milliGRAM(s) Oral every 4 hours PRN Moderate Pain (4 - 6)  oxyCODONE    IR 10 milliGRAM(s) Oral every 4 hours PRN Severe Pain (7 - 10)      Pharmacologic DVT Prophylaxis: [] YES, []NO: Contraindication:   [] HEPARIN: Dose: XX mg  Q24H    [] LOVENOX: Dose: XX mg  Q24H                 SCD's: YES b/l    GI Prophylaxis: Protonix [], Pepcid []    Post-Op Beta-Blockers: []Yes, []No: contraindication:  Post-Op Nitrate: []Yes, []No: contraindication:  Post-Op Aspirin: []Yes,  []No: contraindication:  Post-Op Statin: []Yes, []No: contraindication:      Ambulation/Activity Status:    Assessment/Plan:  79y Male status-post  - Case and plan discussed with CTU Intensivist and CT Surgeon - Dr. Shaw/Rodríguez/Samir   - Continue CTU supportive care and ongoing plan of care as per continuing CTU rounds.    - Continue DVT/GI prophylaxis  - Incentive Spirometry 10 times an hour  - Continue to advance physical activity as tolerated and continue PT/OT as directed  1. CAD: Continue ASA, statin, BB  2. HTN:   3. A. Fib:   4. COPD/Hypoxia:   5. DM/Glucose Control:     Social Service Disposition: OPERATIVE PROCEDURE(s):  MICSx1              POD # 6                       SURGEON(s): MARCY Arreguin  SUBJECTIVE ASSESSMENT:79yMale patient seen and examined at bedside.    Vital Signs Last 24 Hrs  T(F): 98 (01 Mar 2020 03:00), Max: 98.9 (29 Feb 2020 12:00)  HR: 96 (01 Mar 2020 06:00) (85 - 112)  BP: 140/76 (01 Mar 2020 06:00) (90/55 - 168/79)  BP(mean): 101 (01 Mar 2020 06:00) (66 - 113)  RR: 22 (01 Mar 2020 06:00) (18 - 52)  SpO2: 99% (01 Mar 2020 06:00) (94% - 99%)    I&O's Detail    29 Feb 2020 07:01  -  01 Mar 2020 07:00  --------------------------------------------------------  IN:    IV PiggyBack: 200 mL    Oral Fluid: 560 mL  Total IN: 760 mL    OUT:    Voided: 1100 mL  Total OUT: 1100 mL    Net: I&O's Detail    28 Feb 2020 07:01  -  29 Feb 2020 07:00  --------------------------------------------------------  Total NET: -488 mL    29 Feb 2020 07:01  -  01 Mar 2020 07:00  --------------------------------------------------------  Total NET: -340 mL      CAPILLARY BLOOD GLUCOSE  POCT Blood Glucose.: 125 mg/dL (01 Mar 2020 06:37)  POCT Blood Glucose.: 113 mg/dL (29 Feb 2020 22:02)      Physical Exam:  General: NAD; A&Ox3  Cardiac: S1/S2, RRR, no murmur, no rubs  Lungs: unlabored respirations, CTA b/l, no wheeze, no rales, no crackles  Abdomen: Soft/NT/ND; positive bowel sounds x 4  Sternum: Intact, no click, incision healing well with no drainage  Incisions: Incisions clean/dry/intact  Extremities: No edema b/l lower extremities; good capillary refill; no cyanosis; palpable 1+ pedal pulses b/l    BOWEL MOVEMENT:  [x] YES [] NO, If No, Timing since last BM Day #      LABS:                        9.3<L>  9.33  )-----------( 231      ( 01 Mar 2020 03:00 )             29.6<L>                        9.5<L>  9.79  )-----------( 240      ( 29 Feb 2020 02:50 )             31.2<L>    03-01    137  |  96<L>  |  21<H>  ----------------------------<  134<H>  3.6   |  31  |  0.7  02-29    139  |  97<L>  |  22<H>  ----------------------------<  124<H>  3.9   |  32  |  0.6<L>    Ca    8.4<L>      01 Mar 2020 03:00  Mg     1.7     03-01      RADIOLOGY & ADDITIONAL TESTS:  CXR: < from: Xray Chest 1 View-PORTABLE IMMEDIATE (02.28.20 @ 14:08) >  Impression:    Left-sided pneumothorax better seen at this time.  Left-sided pleural effusion with retrocardiac opacification without change.  < end of copied text >    EKG: < from: 12 Lead ECG (02.29.20 @ 08:31) >  Ventricular Rate 81 BPM  Atrial Rate 326 BPM  QRS Duration 108 ms  Q-T Interval 388 ms  QTC Calculation(Bezet) 450 ms  R Axis -13 degrees  T Axis -16 degrees  Diagnosis Line Atrial fibrillation  Septal infarct , age undetermined  Inferior infarct , age undetermined  Abnormal ECG  Confirmed by LEON YANCEY MD ([id number]) on 2/29/2020 9:51:21 AM  < end of copied text >      Allergies  No Known Allergies  Intolerances      MEDICATIONS  (STANDING):  aspirin enteric coated 325 milliGRAM(s) Oral daily  atorvastatin 80 milliGRAM(s) Oral at bedtime  chlorhexidine 4% Liquid 1 Application(s) Topical <User Schedule>  cyanocobalamin 1000 MICROGram(s) Oral daily  famotidine    Tablet 20 milliGRAM(s) Oral two times a day  guaifenesin/dextromethorphan  Syrup 10 milliLiter(s) Oral every 6 hours  heparin  Injectable 5000 Unit(s) SubCutaneous every 8 hours  ipratropium    for Nebulization 500 MICROGram(s) Nebulizer every 6 hours  magnesium sulfate  IVPB 1 Gram(s) IV Intermittent every 12 hours  metoprolol tartrate 12.5 milliGRAM(s) Oral every 12 hours  polyethylene glycol 3350 17 Gram(s) Oral daily  simethicone 80 milliGRAM(s) Chew every 12 hours  tamsulosin 0.4 milliGRAM(s) Oral at bedtime  thiamine 100 milliGRAM(s) Oral daily    MEDICATIONS  (PRN):  glucagon  Injectable 1 milliGRAM(s) IntraMuscular once PRN Glucose LESS THAN 70 milligrams/deciliter  ondansetron  IVPB 4 milliGRAM(s) IV Intermittent once PRN Nausea and/or Vomiting  oxyCODONE    IR 5 milliGRAM(s) Oral every 4 hours PRN Moderate Pain (4 - 6)  oxyCODONE    IR 10 milliGRAM(s) Oral every 4 hours PRN Severe Pain (7 - 10)      Assessment/Plan:  79y Male status-post MICSx1              POD #   6   - Case and plan discussed with CTU Intensivist and CT Surgeon - Dr. Dawson   - Continue CTU supportive care and ongoing plan of care as per continuing CTU rounds.    - Continue DVT/GI prophylaxis  - Incentive Spirometry 10 times an hour  - Continue to advance physical activity as tolerated and continue PT/OT as directed  1. CAD: Continue ASA, statin, BB  2. A. Fib: continue magnesium sulfate  IVPB 1 Gram(s) IV Intermittent every 12 hours w/lopressor. OPERATIVE PROCEDURE(s):  MICSx1              POD # 6                       SURGEON(s): MARCY Arreguin  SUBJECTIVE ASSESSMENT:79yMale patient seen and examined at bedside.    Vital Signs Last 24 Hrs  T(F): 98 (01 Mar 2020 03:00), Max: 98.9 (29 Feb 2020 12:00)  HR: 96 (01 Mar 2020 06:00) (85 - 112)  BP: 140/76 (01 Mar 2020 06:00) (90/55 - 168/79)  BP(mean): 101 (01 Mar 2020 06:00) (66 - 113)  RR: 22 (01 Mar 2020 06:00) (18 - 52)  SpO2: 99% (01 Mar 2020 06:00) (94% - 99%)    I&O's Detail    29 Feb 2020 07:01  -  01 Mar 2020 07:00  --------------------------------------------------------  IN:    IV PiggyBack: 200 mL    Oral Fluid: 560 mL  Total IN: 760 mL    OUT:    Voided: 1100 mL  Total OUT: 1100 mL    Net: I&O's Detail    28 Feb 2020 07:01  -  29 Feb 2020 07:00  --------------------------------------------------------  Total NET: -488 mL    29 Feb 2020 07:01  -  01 Mar 2020 07:00  --------------------------------------------------------  Total NET: -340 mL    CAPILLARY BLOOD GLUCOSE  POCT Blood Glucose.: 125 mg/dL (01 Mar 2020 06:37)  POCT Blood Glucose.: 113 mg/dL (29 Feb 2020 22:02)    Physical Exam:  General: NAD; A&Ox3  Cardiac: S1/S2, RRR, no murmur, no rubs  Lungs: unlabored respirations, CTA b/l, no wheeze, no rales, no crackles  Abdomen: Soft/NT/ND; positive bowel sounds x 4  Sternum: Intact, no click, incision healing well with no drainage  Incisions: Incisions clean/dry/intact  Extremities: No edema b/l lower extremities; good capillary refill; no cyanosis; palpable 1+ pedal pulses b/l    BOWEL MOVEMENT:  [x] YES [] NO, If No, Timing since last BM Day #      LABS:                        9.3<L>  9.33  )-----------( 231      ( 01 Mar 2020 03:00 )             29.6<L>                        9.5<L>  9.79  )-----------( 240      ( 29 Feb 2020 02:50 )             31.2<L>    03-01    137  |  96<L>  |  21<H>  ----------------------------<  134<H>  3.6   |  31  |  0.7  02-29    139  |  97<L>  |  22<H>  ----------------------------<  124<H>  3.9   |  32  |  0.6<L>    Ca    8.4<L>      01 Mar 2020 03:00  Mg     1.7     03-01    RADIOLOGY & ADDITIONAL TESTS:  CXR: < from: Xray Chest 1 View-PORTABLE IMMEDIATE (02.28.20 @ 14:08) >  Impression:    Left-sided pneumothorax better seen at this time.  Left-sided pleural effusion with retrocardiac opacification without change.  < end of copied text >    EKG: < from: 12 Lead ECG (02.29.20 @ 08:31) >  Ventricular Rate 81 BPM  Atrial Rate 326 BPM  QRS Duration 108 ms  Q-T Interval 388 ms  QTC Calculation(Bezet) 450 ms  R Axis -13 degrees  T Axis -16 degrees  Diagnosis Line Atrial fibrillation  Septal infarct , age undetermined  Inferior infarct , age undetermined  Abnormal ECG  Confirmed by LEON YANCEY MD ([id number]) on 2/29/2020 9:51:21 AM  < end of copied text >    Allergies  No Known Allergies  Intolerances    MEDICATIONS  (STANDING):  aspirin enteric coated 325 milliGRAM(s) Oral daily  atorvastatin 80 milliGRAM(s) Oral at bedtime  chlorhexidine 4% Liquid 1 Application(s) Topical <User Schedule>  cyanocobalamin 1000 MICROGram(s) Oral daily  famotidine    Tablet 20 milliGRAM(s) Oral two times a day  guaifenesin/dextromethorphan  Syrup 10 milliLiter(s) Oral every 6 hours  heparin  Injectable 5000 Unit(s) SubCutaneous every 8 hours  ipratropium    for Nebulization 500 MICROGram(s) Nebulizer every 6 hours  magnesium sulfate  IVPB 1 Gram(s) IV Intermittent every 12 hours  metoprolol tartrate 12.5 milliGRAM(s) Oral every 12 hours  polyethylene glycol 3350 17 Gram(s) Oral daily  simethicone 80 milliGRAM(s) Chew every 12 hours  tamsulosin 0.4 milliGRAM(s) Oral at bedtime  thiamine 100 milliGRAM(s) Oral daily    MEDICATIONS  (PRN):  glucagon  Injectable 1 milliGRAM(s) IntraMuscular once PRN Glucose LESS THAN 70 milligrams/deciliter  ondansetron  IVPB 4 milliGRAM(s) IV Intermittent once PRN Nausea and/or Vomiting  oxyCODONE    IR 5 milliGRAM(s) Oral every 4 hours PRN Moderate Pain (4 - 6)  oxyCODONE    IR 10 milliGRAM(s) Oral every 4 hours PRN Severe Pain (7 - 10)    Assessment/Plan:  79y Male status-post MICSx1              POD #   6   - Case and plan discussed with CTU Intensivist and CT Surgeon - Dr. Dawson   - Continue CTU supportive care and ongoing plan of care as per continuing CTU rounds.    - Continue DVT/GI prophylaxis  - Incentive Spirometry 10 times an hour  - Continue to advance physical activity as tolerated and continue PT/OT as directed  1. CAD: Continue ASA, statin, BB  2. A. Fib: continue magnesium sulfate  IVPB 1 Gram(s) IV Intermittent every 12 hours w/lopressor.

## 2020-03-01 NOTE — PROGRESS NOTE ADULT - ATTENDING COMMENTS
79 year old M with hx of HTN, A.fib (Eliquis), CHF, HLD brought in by EMS post cardiac arrest (probably vtach), rescuscitated, ruled in for severe CAD of LAD, referred for CABG eval. Patient on 1/21/2020 had loss of mental status event, ruled out for stroke, currently extubated/OOB    Diet:  pt according to nursing staff demonstrates signs of aspiration during fluid intake, will reassess pt's swallow.    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
79 year old M with hx of HTN, A.fib (Eliquis), CHF, HLD brought in by EMS post cardiac arrest (probably vtach), rescuscitated, ruled in for severe CAD of LAD, referred for CABG eval. 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
79 year old M with hx of HTN, A.fib (Eliquis), CHF, HLD brought in by EMS post cardiac arrest (probably vtach), rescuscitated, ruled in for severe CAD of LAD, referred for CABG eval. 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
79 year old M with hx of HTN, A.fib (Eliquis), CHF, HLD brought in by EMS post cardiac arrest (probably vtach), rescuscitated, ruled in for severe CAD of LAD, referred for CABG eval.   Patient was medically optimized in CTU for a while, unfortunately, patient has been regressing in terms of his ambulatory status last couple of days.  The feeling is that risk benefit ratio of PCI is probably better than the one for surgical revascularization. Postop rehab would be vary difficult in somebody how is not ambulatory.   Case d/w Dr. Laws and Dr. Gamboa  PCI would be planned by cardiology  tx pt to medical service for further case  discuss plan of care in detail with pt and his wife
79 year old M with hx of HTN, A.fib (Eliquis), CHF, HLD brought in by EMS post cardiac arrest (probably vtach), rescuscitated, ruled in for severe CAD of LAD, referred for CABG eval. 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
79 year old M with hx of HTN, A.fib (Eliquis), CHF, HLD brought in by EMS post cardiac arrest (probably vtach), rescuscitated, ruled in for severe CAD of LAD, referred for CABG eval. 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
79 year old M with hx of HTN, A.fib (Eliquis), CHF, HLD brought in by EMS post cardiac arrest (probably vtach), rescuscitated, ruled in for severe CAD of LAD, referred for CABG eval. 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
79 year old M with hx of HTN, A.fib (Eliquis), CHF, HLD brought in by EMS post cardiac arrest, presumed PEA arrest with ROSC in 10 mins, followed by another cardiac arrest while in route, with quick return of spontaneous circulation. Trauma workup suggested  C5-C6 distraction injury/prevertebral edema and suspected vertebral injury. LHC yesterday ruled in severe ostial proxim LAD lesion not easily amenable to PCI.  CT surgery was asked to evaluate patient for CABG  pt is slightly more awake today  Cspine needs to be cleared (will reach out to neurosurg, pt noncompliant with rigid neck collar)  +some midline tenderness, and some pain on turning head to the right  discussed with the family the reason for consultation.  better assess patients mobility prior to decision on surgery  will present pt tomorrow in cath conference  preop w/u in progress
General Thoracic Surgery Attestation    I have seen and examined the patient.  Where appropriate I have updated, edited, or corrected the resident's or PA's note with regard to findings, values, and plan.    esophagram still pending.  this apparently is due to concern for an issue with c spine, which is not documented anywhere in the actual radiology reports.      however this would potentially explain the hematoma seen.
General Thoracic Surgery Attestation    I have seen and examined the patient.  Where appropriate I have updated, edited, or corrected the resident's or PA's note with regard to findings, values, and plan.    from my standpoint, patient has essentially rule out for esophageal perforation based on timeframe, and remains not tachycardia, no neck crepitus or swelling, no pleural effusion, no fevers, no wbc.  from my standpoint can have clears.  if tolerates clears for 24 hours, may have diet per primary team.    please recall if needed.
More SOB today. cervical collar bothering him. Hemodynamically stable. Will d/w neurosurg about dc collar. cont diuresis. improve nutrition and physiotherapy. if stable and stronger then CABG Monday.
79 year old M with hx of HTN, A.fib (Eliquis), CHF, HLD brought in by EMS post cardiac arrest (probably vtach), rescuscitated, ruled in for severe CAD of LAD, referred for CABG eval. Patient on 1/21/2020 had loss of mental status event, ruled out for stroke, currently extubated/OOB/passed speech and swallow.  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
POD 6 after MICS C1IMA  doing well  cont ASA/SQH/BB  neck collar   cont mild Diuresis  Cont pulmonary toilet, Chest PT, pain control, Incentive spirometry  OOB ambulate  dispo planning (possible d/c tomorrow)  case d/w CTU team
patient seen and examined, agree with above, s/p CP arrest, extubated, cardio F/UP
General Thoracic Surgery Attestation    I have seen and examined the patient.  Where appropriate I have updated, edited, or corrected the resident's or PA's note with regard to findings, values, and plan.    no esophagram yet, as concern for c spine fracture.  of note, this would explain the hematoma seen.  the hematoma on the c spine film does not look any worse.  in particular I see no mediastinal fluid collections, and no air outside of esophagus.  given more likely alternative explanation for hematoma, and the fact that this patient is now 4 days s/p initial presentation and remains completely stable with no elevated white count or imaging suggesting esophageal perforation, I would comment that the likelihood of esophageal issue is very low.  from my standpoint the patient may have clears.  we are outside of the window where esophagram would be clinically useful to evaluate for perforation, given stability over 4 days and current imaging as noted above.
patient seen and examined, agree with above, cardiac pause, EPS, T Collar no surgical intervention
SCD  - Patient episode of sudden cardiac death followed by successful defibrillation is likely due to significant coronary artery  disease.  - Recommend revascularization  - patient is not a candidate for defibrillator at this time.

## 2020-03-01 NOTE — PROGRESS NOTE ADULT - SUBJECTIVE AND OBJECTIVE BOX
78 yo M w HTN, A.fib (Eliquis), CHF, HLD brought in by EMS post cardiac arrest, resuscitated - ruled in for severe CAD, referred for CABG eval. Patient on 1/21/2020 had loss of mental status event, ruled out for stroke, extubated/OOB.    01/31 - Right radial artery pseudoaneurysm repair  POD # 6 - Min Invasive CABG x1, EF 45-50%    Vital Signs Last 24 Hrs  T(C): 36.7 (01 Mar 2020 03:00), Max: 37.2 (29 Feb 2020 12:00)  T(F): 98 (01 Mar 2020 03:00), Max: 98.9 (29 Feb 2020 12:00)  HR: 96 (01 Mar 2020 06:00) (87 - 112)  BP: 140/76 (01 Mar 2020 06:00) (90/55 - 168/79)  BP(mean): 101 (01 Mar 2020 06:00) (66 - 113)  RR: 22 (01 Mar 2020 06:00) (18 - 52)  SpO2: 99% (01 Mar 2020 06:00) (94% - 99%)    alert, awake, verbal  follows commands  clean surgical incision  Neck collar in place  few rhonchi b/l  S1S2 reg rate  soft abdomen, non tender, non distended  Right groin hematoma - no tenderness  no pedal edema, good rom, warm periphery  Power is 5/5 in upper and lower ext    CXR - left sided congested, left effusion  K 3.6, Cr 0.7, Mag 1.7  WBC 9.3, Hg 9.3, plt 231    a/p:    CAD s/p CABG   Cervical spine Injury (? unstable)  HTN  Rate controlled A fib  Acute post thoracotomy pain    Lasix 20 mg IV today  Replace lytes IV  PO ASA, Statin and metop  Keep neck collar in place as per NeuroSx request  PO flomax  OOB to chair  PO diet with glycemic control  DVT proph

## 2020-03-02 LAB
ANION GAP SERPL CALC-SCNC: 9 MMOL/L — SIGNIFICANT CHANGE UP (ref 7–14)
BUN SERPL-MCNC: 20 MG/DL — SIGNIFICANT CHANGE UP (ref 10–20)
CALCIUM SERPL-MCNC: 8.4 MG/DL — LOW (ref 8.5–10.1)
CHLORIDE SERPL-SCNC: 97 MMOL/L — LOW (ref 98–110)
CO2 SERPL-SCNC: 33 MMOL/L — HIGH (ref 17–32)
CREAT SERPL-MCNC: 0.7 MG/DL — SIGNIFICANT CHANGE UP (ref 0.7–1.5)
GLUCOSE SERPL-MCNC: 112 MG/DL — HIGH (ref 70–99)
MAGNESIUM SERPL-MCNC: 1.9 MG/DL — SIGNIFICANT CHANGE UP (ref 1.8–2.4)
POTASSIUM SERPL-MCNC: 4.1 MMOL/L — SIGNIFICANT CHANGE UP (ref 3.5–5)
POTASSIUM SERPL-SCNC: 4.1 MMOL/L — SIGNIFICANT CHANGE UP (ref 3.5–5)
SODIUM SERPL-SCNC: 139 MMOL/L — SIGNIFICANT CHANGE UP (ref 135–146)

## 2020-03-02 PROCEDURE — 93010 ELECTROCARDIOGRAM REPORT: CPT

## 2020-03-02 PROCEDURE — 71045 X-RAY EXAM CHEST 1 VIEW: CPT | Mod: 26

## 2020-03-02 RX ORDER — MAGNESIUM SULFATE 500 MG/ML
1 VIAL (ML) INJECTION ONCE
Refills: 0 | Status: COMPLETED | OUTPATIENT
Start: 2020-03-02 | End: 2020-03-02

## 2020-03-02 RX ORDER — FUROSEMIDE 40 MG
40 TABLET ORAL ONCE
Refills: 0 | Status: COMPLETED | OUTPATIENT
Start: 2020-03-02 | End: 2020-03-02

## 2020-03-02 RX ORDER — POTASSIUM CHLORIDE 20 MEQ
20 PACKET (EA) ORAL ONCE
Refills: 0 | Status: COMPLETED | OUTPATIENT
Start: 2020-03-02 | End: 2020-03-02

## 2020-03-02 RX ADMIN — HEPARIN SODIUM 5000 UNIT(S): 5000 INJECTION INTRAVENOUS; SUBCUTANEOUS at 05:07

## 2020-03-02 RX ADMIN — ATORVASTATIN CALCIUM 80 MILLIGRAM(S): 80 TABLET, FILM COATED ORAL at 22:01

## 2020-03-02 RX ADMIN — SIMETHICONE 80 MILLIGRAM(S): 80 TABLET, CHEWABLE ORAL at 05:07

## 2020-03-02 RX ADMIN — Medication 12.5 MILLIGRAM(S): at 05:06

## 2020-03-02 RX ADMIN — Medication 100 MILLIGRAM(S): at 12:31

## 2020-03-02 RX ADMIN — Medication 1 APPLICATION(S): at 22:01

## 2020-03-02 RX ADMIN — HEPARIN SODIUM 5000 UNIT(S): 5000 INJECTION INTRAVENOUS; SUBCUTANEOUS at 14:54

## 2020-03-02 RX ADMIN — Medication 100 GRAM(S): at 17:11

## 2020-03-02 RX ADMIN — Medication 325 MILLIGRAM(S): at 12:31

## 2020-03-02 RX ADMIN — CHLORHEXIDINE GLUCONATE 1 APPLICATION(S): 213 SOLUTION TOPICAL at 05:07

## 2020-03-02 RX ADMIN — Medication 40 MILLIGRAM(S): at 11:00

## 2020-03-02 RX ADMIN — Medication 1 APPLICATION(S): at 05:06

## 2020-03-02 RX ADMIN — Medication 10 MILLILITER(S): at 05:06

## 2020-03-02 RX ADMIN — HEPARIN SODIUM 5000 UNIT(S): 5000 INJECTION INTRAVENOUS; SUBCUTANEOUS at 22:01

## 2020-03-02 RX ADMIN — FAMOTIDINE 20 MILLIGRAM(S): 10 INJECTION INTRAVENOUS at 05:06

## 2020-03-02 RX ADMIN — Medication 100 GRAM(S): at 05:06

## 2020-03-02 RX ADMIN — FAMOTIDINE 20 MILLIGRAM(S): 10 INJECTION INTRAVENOUS at 17:11

## 2020-03-02 RX ADMIN — PREGABALIN 1000 MICROGRAM(S): 225 CAPSULE ORAL at 12:32

## 2020-03-02 RX ADMIN — Medication 20 MILLIEQUIVALENT(S): at 12:31

## 2020-03-02 RX ADMIN — TAMSULOSIN HYDROCHLORIDE 0.4 MILLIGRAM(S): 0.4 CAPSULE ORAL at 22:01

## 2020-03-02 RX ADMIN — SIMETHICONE 80 MILLIGRAM(S): 80 TABLET, CHEWABLE ORAL at 17:11

## 2020-03-02 RX ADMIN — Medication 500 MICROGRAM(S): at 20:05

## 2020-03-02 RX ADMIN — Medication 10 MILLILITER(S): at 12:32

## 2020-03-02 RX ADMIN — Medication 12.5 MILLIGRAM(S): at 17:11

## 2020-03-02 RX ADMIN — Medication 1 APPLICATION(S): at 15:00

## 2020-03-02 NOTE — PROGRESS NOTE ADULT - SUBJECTIVE AND OBJECTIVE BOX
OPERATIVE PROCEDURE(s):   MICSx1             POD #  7                     SURGEON(s): MARCY Arreguin  SUBJECTIVE ASSESSMENT:79yMale patient seen and examined at bedside.    Vital Signs Last 24 Hrs  T(F): 98 (02 Mar 2020 04:00), Max: 98.7 (01 Mar 2020 09:00)  HR: 83 (02 Mar 2020 07:00) (83 - 96)  BP: 116/69 (02 Mar 2020 07:00) (91/54 - 134/78)  BP(mean): 88 (02 Mar 2020 07:00) (67 - 102)  RR: 18 (02 Mar 2020 07:00) (18 - 26)  SpO2: 98% (02 Mar 2020 07:00) (95% - 100%)    I&O's Detail    01 Mar 2020 07:01  -  02 Mar 2020 07:00  --------------------------------------------------------  IN:    IV PiggyBack: 200 mL    Oral Fluid: 1140 mL  Total IN: 1340 mL    OUT:    Voided: 1500 mL  Total OUT: 1500 mL    Net: I&O's Detail    29 Feb 2020 07:01  -  01 Mar 2020 07:00  --------------------------------------------------------  Total NET: -340 mL    01 Mar 2020 07:01  -  02 Mar 2020 07:00  --------------------------------------------------------  Total NET: -160 mL      Physical Exam:  General: NAD; A&Ox3  Cardiac: S1/S2, RRR, no murmur, no rubs  Lungs: unlabored respirations, CTA b/l, no wheeze, no rales, no crackles  Abdomen: Soft/NT/ND; positive bowel sounds x 4  Sternum: Intact, no click, incision healing well with no drainage  Incisions: Incisions clean/dry/intact  Extremities: No edema b/l lower extremities; good capillary refill; no cyanosis; palpable 1+ pedal pulses b/l    BOWEL MOVEMENT:  [x] YES [] NO, If No, Timing since last BM Day #        LABS:                        9.3<L>  9.33  )-----------( 231      ( 01 Mar 2020 03:00 )             29.6<L>                        9.5<L>  9.79  )-----------( 240      ( 29 Feb 2020 02:50 )             31.2<L>    03-02    139  |  97<L>  |  20  ----------------------------<  112<H>  4.1   |  33<H>  |  0.7  03-01    137  |  96<L>  |  21<H>  ----------------------------<  134<H>  3.6   |  31  |  0.7    Ca    8.4<L>      02 Mar 2020 01:30  Mg     1.9     03-02      RADIOLOGY & ADDITIONAL TESTS:  CXR: < from: Xray Chest 1 View- PORTABLE-Routine (03.01.20 @ 05:09) >  Impression:    Stable loculated left pleural effusion and left-sided opacifications.  < end of copied text >    EKG: < from: 12 Lead ECG (02.29.20 @ 08:31) >  Ventricular Rate 81 BPM  Atrial Rate 326 BPM  QRS Duration 108 ms  Q-T Interval 388 ms  QTC Calculation(Bezet) 450 ms  R Axis -13 degrees  T Axis -16 degrees  Diagnosis Line Atrial fibrillation  Septal infarct , age undetermined  Inferior infarct , age undetermined  Abnormal ECG  Confirmed by LEON YANCEY MD ([id number]) on 2/29/2020 9:51:21 AM  < end of copied text >      Allergies  No Known Allergies  Intolerances      MEDICATIONS  (STANDING):  aspirin enteric coated 325 milliGRAM(s) Oral daily  atorvastatin 80 milliGRAM(s) Oral at bedtime  BACItracin   Ointment 1 Application(s) Topical three times a day  chlorhexidine 4% Liquid 1 Application(s) Topical <User Schedule>  cyanocobalamin 1000 MICROGram(s) Oral daily  famotidine    Tablet 20 milliGRAM(s) Oral two times a day  guaifenesin/dextromethorphan  Syrup 10 milliLiter(s) Oral every 6 hours  heparin  Injectable 5000 Unit(s) SubCutaneous every 8 hours  ipratropium    for Nebulization 500 MICROGram(s) Nebulizer every 6 hours  magnesium sulfate  IVPB 1 Gram(s) IV Intermittent every 12 hours  metoprolol tartrate 12.5 milliGRAM(s) Oral every 12 hours  polyethylene glycol 3350 17 Gram(s) Oral daily  simethicone 80 milliGRAM(s) Chew every 12 hours  tamsulosin 0.4 milliGRAM(s) Oral at bedtime  thiamine 100 milliGRAM(s) Oral daily    MEDICATIONS  (PRN):  glucagon  Injectable 1 milliGRAM(s) IntraMuscular once PRN Glucose LESS THAN 70 milligrams/deciliter  ondansetron  IVPB 4 milliGRAM(s) IV Intermittent once PRN Nausea and/or Vomiting  oxyCODONE    IR 5 milliGRAM(s) Oral every 4 hours PRN Moderate Pain (4 - 6)  oxyCODONE    IR 10 milliGRAM(s) Oral every 4 hours PRN Severe Pain (7 - 10)      Assessment/Plan:  79y Male status-post  MICSx1             POD #  7  - Case and plan discussed with CTU Intensivist and CT Surgeon - Dr. Arreguin  - Continue CTU supportive care and ongoing plan of care as per continuing CTU rounds.    - Continue DVT/GI prophylaxis  - Incentive Spirometry 10 times an hour  - Continue to advance physical activity as tolerated and continue PT/OT as directed  1. CAD: Continue ASA, statin, BB  2. HTN:   3. A. Fib:   4. COPD/Hypoxia:   5. DM/Glucose Control:     Social Service Disposition: OPERATIVE PROCEDURE(s):   MICSx1             POD #  7                     SURGEON(s): MARCY Arreguin  SUBJECTIVE ASSESSMENT: 79y Male patient seen and examined at bedside. Patient ambulating with walker and 1-2 person assist. Patient denies any acute complaints at this time.     Vital Signs Last 24 Hrs  T(F): 98 (02 Mar 2020 04:00), Max: 98.7 (01 Mar 2020 09:00)  HR: 83 (02 Mar 2020 07:00) (83 - 96)  BP: 116/69 (02 Mar 2020 07:00) (91/54 - 134/78)  BP(mean): 88 (02 Mar 2020 07:00) (67 - 102)  RR: 18 (02 Mar 2020 07:00) (18 - 26)  SpO2: 98% (02 Mar 2020 07:00) (95% - 100%)    I&O's Detail    01 Mar 2020 07:01  -  02 Mar 2020 07:00  --------------------------------------------------------  IN:    IV PiggyBack: 200 mL    Oral Fluid: 1140 mL  Total IN: 1340 mL    OUT:    Voided: 1500 mL  Total OUT: 1500 mL    Net: I&O's Detail    29 Feb 2020 07:01  -  01 Mar 2020 07:00  --------------------------------------------------------  Total NET: -340 mL    01 Mar 2020 07:01  -  02 Mar 2020 07:00  --------------------------------------------------------  Total NET: -160 mL      Physical Exam:  General: NAD; A&Ox3  Cardiac: S1/S2, irregular no murmur, no rubs  Lungs: unlabored respirations, decreased at left base, no wheeze, no rales, no crackles  Abdomen: Soft/NT/ND; positive bowel sounds x 4  Left thoracotomy Incisions: clean/dry/intact  Extremities: 1+ edema b/l lower extremities; good capillary refill; no cyanosis; palpable 1+ pedal pulses b/l    BOWEL MOVEMENT:  [x] YES [] NO, If No, Timing since last BM Day #        LABS:                        9.3<L>  9.33  )-----------( 231      ( 01 Mar 2020 03:00 )             29.6<L>                        9.5<L>  9.79  )-----------( 240      ( 29 Feb 2020 02:50 )             31.2<L>    03-02    139  |  97<L>  |  20  ----------------------------<  112<H>  4.1   |  33<H>  |  0.7  03-01    137  |  96<L>  |  21<H>  ----------------------------<  134<H>  3.6   |  31  |  0.7    Ca    8.4<L>      02 Mar 2020 01:30  Mg     1.9     03-02      RADIOLOGY & ADDITIONAL TESTS:  CXR: < from: Xray Chest 1 View- PORTABLE-Routine (03.01.20 @ 05:09) >  Impression:    Stable loculated left pleural effusion and left-sided opacifications.  < end of copied text >    EKG: < from: 12 Lead ECG (02.29.20 @ 08:31) >  Ventricular Rate 81 BPM  Atrial Rate 326 BPM  QRS Duration 108 ms  Q-T Interval 388 ms  QTC Calculation(Bezet) 450 ms  R Axis -13 degrees  T Axis -16 degrees  Diagnosis Line Atrial fibrillation  Septal infarct , age undetermined  Inferior infarct , age undetermined  Abnormal ECG  Confirmed by LEON YANCEY MD ([id number]) on 2/29/2020 9:51:21 AM  < end of copied text >      Allergies  No Known Allergies  Intolerances      MEDICATIONS  (STANDING):  aspirin enteric coated 325 milliGRAM(s) Oral daily  atorvastatin 80 milliGRAM(s) Oral at bedtime  BACItracin   Ointment 1 Application(s) Topical three times a day  chlorhexidine 4% Liquid 1 Application(s) Topical <User Schedule>  cyanocobalamin 1000 MICROGram(s) Oral daily  famotidine    Tablet 20 milliGRAM(s) Oral two times a day  guaifenesin/dextromethorphan  Syrup 10 milliLiter(s) Oral every 6 hours  heparin  Injectable 5000 Unit(s) SubCutaneous every 8 hours  ipratropium    for Nebulization 500 MICROGram(s) Nebulizer every 6 hours  magnesium sulfate  IVPB 1 Gram(s) IV Intermittent every 12 hours  metoprolol tartrate 12.5 milliGRAM(s) Oral every 12 hours  polyethylene glycol 3350 17 Gram(s) Oral daily  simethicone 80 milliGRAM(s) Chew every 12 hours  tamsulosin 0.4 milliGRAM(s) Oral at bedtime  thiamine 100 milliGRAM(s) Oral daily    MEDICATIONS  (PRN):  glucagon  Injectable 1 milliGRAM(s) IntraMuscular once PRN Glucose LESS THAN 70 milligrams/deciliter  ondansetron  IVPB 4 milliGRAM(s) IV Intermittent once PRN Nausea and/or Vomiting  oxyCODONE    IR 5 milliGRAM(s) Oral every 4 hours PRN Moderate Pain (4 - 6)  oxyCODONE    IR 10 milliGRAM(s) Oral every 4 hours PRN Severe Pain (7 - 10)      Assessment/Plan:  79y Male status-post MICSx1              POD #   7  - Case and plan discussed with CTU Intensivist and CT Surgeon - Dr. Arreguin  - Continue CTU supportive care and ongoing plan of care as per continuing CTU rounds.    - Continue DVT/GI prophylaxis  - Incentive Spirometry 10 times an hour  - Continue to advance physical activity as tolerated and continue PT/OT as directed  1. CAD: Continue ASA, statin, BB  2. A. Fib: continue magnesium sulfate  IVPB 1 Gram(s) IV Intermittent every 12 hours w/lopressor. No anticoagulation as per CT surgeon.     Social Service Disposition:  4A vs SNF. OPERATIVE PROCEDURE(s):   MICSx1             POD #  7                     SURGEON(s): MARCY Arreguin  SUBJECTIVE ASSESSMENT: 79y Male patient seen and examined at bedside. Patient ambulating with walker and 1-2 person assist. Patient denies any acute complaints at this time.     Vital Signs Last 24 Hrs  T(F): 98 (02 Mar 2020 04:00), Max: 98.7 (01 Mar 2020 09:00)  HR: 83 (02 Mar 2020 07:00) (83 - 96)  BP: 116/69 (02 Mar 2020 07:00) (91/54 - 134/78)  BP(mean): 88 (02 Mar 2020 07:00) (67 - 102)  RR: 18 (02 Mar 2020 07:00) (18 - 26)  SpO2: 98% (02 Mar 2020 07:00) (95% - 100%)    I&O's Detail    01 Mar 2020 07:01  -  02 Mar 2020 07:00  --------------------------------------------------------  IN:    IV PiggyBack: 200 mL    Oral Fluid: 1140 mL  Total IN: 1340 mL    OUT:    Voided: 1500 mL  Total OUT: 1500 mL    Net: I&O's Detail    29 Feb 2020 07:01  -  01 Mar 2020 07:00  --------------------------------------------------------  Total NET: -340 mL    01 Mar 2020 07:01  -  02 Mar 2020 07:00  --------------------------------------------------------  Total NET: -160 mL      Physical Exam:  General: NAD; A&Ox3  Cardiac: S1/S2, irregular no murmur, no rubs  Lungs: unlabored respirations, decreased at left base, no wheeze, no rales, no crackles  Abdomen: Soft/NT/ND; positive bowel sounds x 4  Left thoracotomy Incisions: clean/dry/intact  Extremities: 1+ edema b/l lower extremities; good capillary refill; no cyanosis; palpable 1+ pedal pulses b/l    BOWEL MOVEMENT:  [x] YES [] NO, If No, Timing since last BM Day #        LABS:                        9.3<L>  9.33  )-----------( 231      ( 01 Mar 2020 03:00 )             29.6<L>                        9.5<L>  9.79  )-----------( 240      ( 29 Feb 2020 02:50 )             31.2<L>    03-02    139  |  97<L>  |  20  ----------------------------<  112<H>  4.1   |  33<H>  |  0.7  03-01    137  |  96<L>  |  21<H>  ----------------------------<  134<H>  3.6   |  31  |  0.7    Ca    8.4<L>      02 Mar 2020 01:30  Mg     1.9     03-02      RADIOLOGY & ADDITIONAL TESTS:  CXR: < from: Xray Chest 1 View- PORTABLE-Routine (03.01.20 @ 05:09) >  Impression:    Stable loculated left pleural effusion and left-sided opacifications.  < end of copied text >    EKG: < from: 12 Lead ECG (02.29.20 @ 08:31) >  Ventricular Rate 81 BPM  Atrial Rate 326 BPM  QRS Duration 108 ms  Q-T Interval 388 ms  QTC Calculation(Bezet) 450 ms  R Axis -13 degrees  T Axis -16 degrees  Diagnosis Line Atrial fibrillation  Septal infarct , age undetermined  Inferior infarct , age undetermined  Abnormal ECG  Confirmed by LEON YANCEY MD ([id number]) on 2/29/2020 9:51:21 AM  < end of copied text >      Allergies  No Known Allergies  Intolerances      MEDICATIONS  (STANDING):  aspirin enteric coated 325 milliGRAM(s) Oral daily  atorvastatin 80 milliGRAM(s) Oral at bedtime  BACItracin   Ointment 1 Application(s) Topical three times a day  chlorhexidine 4% Liquid 1 Application(s) Topical <User Schedule>  cyanocobalamin 1000 MICROGram(s) Oral daily  famotidine    Tablet 20 milliGRAM(s) Oral two times a day  guaifenesin/dextromethorphan  Syrup 10 milliLiter(s) Oral every 6 hours  heparin  Injectable 5000 Unit(s) SubCutaneous every 8 hours  ipratropium    for Nebulization 500 MICROGram(s) Nebulizer every 6 hours  magnesium sulfate  IVPB 1 Gram(s) IV Intermittent every 12 hours  metoprolol tartrate 12.5 milliGRAM(s) Oral every 12 hours  polyethylene glycol 3350 17 Gram(s) Oral daily  simethicone 80 milliGRAM(s) Chew every 12 hours  tamsulosin 0.4 milliGRAM(s) Oral at bedtime  thiamine 100 milliGRAM(s) Oral daily    MEDICATIONS  (PRN):  glucagon  Injectable 1 milliGRAM(s) IntraMuscular once PRN Glucose LESS THAN 70 milligrams/deciliter  ondansetron  IVPB 4 milliGRAM(s) IV Intermittent once PRN Nausea and/or Vomiting  oxyCODONE    IR 5 milliGRAM(s) Oral every 4 hours PRN Moderate Pain (4 - 6)  oxyCODONE    IR 10 milliGRAM(s) Oral every 4 hours PRN Severe Pain (7 - 10)      Assessment/Plan:  79y Male status-post MICSx1              POD #   7  - Case and plan discussed with CTU Intensivist and CT Surgeon - Dr. Arreguin  - Continue CTU supportive care and ongoing plan of care as per continuing CTU rounds.    - Continue DVT/GI prophylaxis  - Incentive Spirometry 10 times an hour  - Continue to advance physical activity as tolerated and continue PT/OT as directed  1. CAD: Continue ASA only, statin, BB. Holding Plavix for retained left chest wall hematoma on cxr.  2. A. Fib: continue magnesium sulfate  IVPB 1 Gram(s) IV Intermittent every 12 hours w/lopressor. No anticoagulation as per CT surgeon.     Social Service Disposition:  4A vs SNF.

## 2020-03-03 LAB
ANION GAP SERPL CALC-SCNC: 11 MMOL/L — SIGNIFICANT CHANGE UP (ref 7–14)
BUN SERPL-MCNC: 20 MG/DL — SIGNIFICANT CHANGE UP (ref 10–20)
CALCIUM SERPL-MCNC: 8.2 MG/DL — LOW (ref 8.5–10.1)
CHLORIDE SERPL-SCNC: 97 MMOL/L — LOW (ref 98–110)
CO2 SERPL-SCNC: 32 MMOL/L — SIGNIFICANT CHANGE UP (ref 17–32)
CREAT SERPL-MCNC: 0.7 MG/DL — SIGNIFICANT CHANGE UP (ref 0.7–1.5)
GLUCOSE SERPL-MCNC: 111 MG/DL — HIGH (ref 70–99)
MAGNESIUM SERPL-MCNC: 1.8 MG/DL — SIGNIFICANT CHANGE UP (ref 1.8–2.4)
POTASSIUM SERPL-MCNC: 3.7 MMOL/L — SIGNIFICANT CHANGE UP (ref 3.5–5)
POTASSIUM SERPL-SCNC: 3.7 MMOL/L — SIGNIFICANT CHANGE UP (ref 3.5–5)
SODIUM SERPL-SCNC: 140 MMOL/L — SIGNIFICANT CHANGE UP (ref 135–146)

## 2020-03-03 PROCEDURE — 71046 X-RAY EXAM CHEST 2 VIEWS: CPT | Mod: 26

## 2020-03-03 PROCEDURE — 93010 ELECTROCARDIOGRAM REPORT: CPT

## 2020-03-03 RX ORDER — FUROSEMIDE 40 MG
40 TABLET ORAL ONCE
Refills: 0 | Status: COMPLETED | OUTPATIENT
Start: 2020-03-03 | End: 2020-03-03

## 2020-03-03 RX ORDER — POTASSIUM CHLORIDE 20 MEQ
40 PACKET (EA) ORAL ONCE
Refills: 0 | Status: COMPLETED | OUTPATIENT
Start: 2020-03-03 | End: 2020-03-03

## 2020-03-03 RX ADMIN — Medication 1 APPLICATION(S): at 21:38

## 2020-03-03 RX ADMIN — FAMOTIDINE 20 MILLIGRAM(S): 10 INJECTION INTRAVENOUS at 17:24

## 2020-03-03 RX ADMIN — HEPARIN SODIUM 5000 UNIT(S): 5000 INJECTION INTRAVENOUS; SUBCUTANEOUS at 21:38

## 2020-03-03 RX ADMIN — HEPARIN SODIUM 5000 UNIT(S): 5000 INJECTION INTRAVENOUS; SUBCUTANEOUS at 14:10

## 2020-03-03 RX ADMIN — FAMOTIDINE 20 MILLIGRAM(S): 10 INJECTION INTRAVENOUS at 05:09

## 2020-03-03 RX ADMIN — Medication 12.5 MILLIGRAM(S): at 17:24

## 2020-03-03 RX ADMIN — ATORVASTATIN CALCIUM 80 MILLIGRAM(S): 80 TABLET, FILM COATED ORAL at 21:38

## 2020-03-03 RX ADMIN — Medication 12.5 MILLIGRAM(S): at 05:09

## 2020-03-03 RX ADMIN — Medication 100 MILLIGRAM(S): at 11:11

## 2020-03-03 RX ADMIN — Medication 1 APPLICATION(S): at 14:10

## 2020-03-03 RX ADMIN — POLYETHYLENE GLYCOL 3350 17 GRAM(S): 17 POWDER, FOR SOLUTION ORAL at 11:11

## 2020-03-03 RX ADMIN — PREGABALIN 1000 MICROGRAM(S): 225 CAPSULE ORAL at 11:11

## 2020-03-03 RX ADMIN — SIMETHICONE 80 MILLIGRAM(S): 80 TABLET, CHEWABLE ORAL at 17:24

## 2020-03-03 RX ADMIN — Medication 1 APPLICATION(S): at 05:10

## 2020-03-03 RX ADMIN — Medication 100 GRAM(S): at 17:24

## 2020-03-03 RX ADMIN — Medication 100 GRAM(S): at 05:09

## 2020-03-03 RX ADMIN — Medication 40 MILLIGRAM(S): at 06:54

## 2020-03-03 RX ADMIN — HEPARIN SODIUM 5000 UNIT(S): 5000 INJECTION INTRAVENOUS; SUBCUTANEOUS at 05:10

## 2020-03-03 RX ADMIN — TAMSULOSIN HYDROCHLORIDE 0.4 MILLIGRAM(S): 0.4 CAPSULE ORAL at 21:38

## 2020-03-03 RX ADMIN — Medication 325 MILLIGRAM(S): at 11:11

## 2020-03-03 RX ADMIN — SIMETHICONE 80 MILLIGRAM(S): 80 TABLET, CHEWABLE ORAL at 05:09

## 2020-03-03 RX ADMIN — Medication 40 MILLIEQUIVALENT(S): at 06:55

## 2020-03-03 RX ADMIN — CHLORHEXIDINE GLUCONATE 1 APPLICATION(S): 213 SOLUTION TOPICAL at 05:11

## 2020-03-03 RX ADMIN — Medication 10 MILLILITER(S): at 00:12

## 2020-03-03 RX ADMIN — Medication 10 MILLILITER(S): at 05:10

## 2020-03-03 NOTE — CHART NOTE - NSCHARTNOTEFT_GEN_A_CORE
Registered Dietitian Follow-Up     Patient Profile Reviewed                           Yes [x]   No []     Nutrition History Previously Obtained        Yes [x]  No []       Pertinent Subjective Information: Pt. off unit for procedure today. Pt. with prolonged LOS, admitted early January with variable PO intake and frequent NPO status for tests/procedures. Currently recovering from another surgery noted better PO intake over the weekend. Pt. likes Ensure enlive (not chocolate- too thick). Drinks 1-2 bottles daily. Enjoys Ensure pudding as well. Pt. continues on nectar thick liquid. Spoke to RN, PCA and CA. All confirm pt. sneaks in thin liquids and tolerates them well. Often requests jello for his meal trays. Pt. noted on thin liquids earlier during LOS. Will rec SLP reevaluate pt.        Pertinent Medical Interventions: S/p MICSx1. POD #7. - Continue to advance physical activity as tolerated and continue PT/OT as directed, SNF vs. 4A for dispo.      Diet order: dysphagia 3 mech soft nectar consistency fluids, DASH/TLC, Ensure enlive TID.      Anthropometrics:  - Ht. 182.8cm   - Wt. 93.8kg on 3/3 vs. 93.1kg on 2/28 vs.   1/13): 107.8kg  (1/14): 108.7kg  (2/13): 105.2kg  (2/18): 99.4kg  (2/21): 100.9kg  - %wt change noted with mild edema, ?fluid shifts vs weight loss as pt. with prolonged LOS and variable PO intake, frequent NPO status, will continue to monitor wt trends   - BMI 28.1- using today's weight   - IBW     Pertinent Lab Data: (3/3) glu 111     Pertinent Meds: Pepcid, Metoprolol, Atorvastatin, Cyanocobalamin      Physical Findings:  - Appearance: AAOx4 per staff, 1+ L, R foot edema noted  - GI function: no symptoms noted, last BM 2/28   - Tubes:   - Oral/Mouth cavity: no symptoms noted per staff   - Skin: incision (BS 19)       Nutrition Requirements (from RD note on 2/28)  Weight Used: 93.1kg lowest doc weight        Estimated Energy Needs    Continue []  Adjust [x] 2010-2178kcal (MSJ x 1.2-1.3 AF)  Adjusted Energy Recommendations:   kcal/day        Estimated Protein Needs    Continue []  Adjust [x] 93-111g (1-1.2g/kg CBW)  Adjusted Protein Recommendations:   gm/day        Estimated Fluid Needs        Continue [x]  Adjust [] per CTU team   Adjusted Fluid Recommendations:   mL/day     Nutrient Intake: ~50% PO + supplement         [] Previous Nutrition Diagnosis: Inadequate oral intake- improving            [] Ongoing          [] Resolved       Nutrition Intervention: meals and snacks, medical food supplement, coordination of care    Rec: Reconsult SLP for the most appropriate diet texture/consistency, continue DASH/TLC modifier. Continue Ensure enlive TID, add Ensure pudding BID.      Goal/Expected Outcome: In 3 days pt. to consistently consume at least 75% PO and supplement      Indicator/Monitoring: diet order, energy intake, body composition, NFPF

## 2020-03-03 NOTE — PROGRESS NOTE ADULT - SUBJECTIVE AND OBJECTIVE BOX
OPERATIVE PROCEDURE(s):                POD #                       79yMale  SURGEON(s): MARCY Arreguin  SUBJECTIVE ASSESSMENT:   Vital Signs Last 24 Hrs  T(F): 97.8 (03 Mar 2020 04:00), Max: 98.4 (02 Mar 2020 20:00)  HR: 90 (03 Mar 2020 06:00) (85 - 100)  BP: 123/71 (03 Mar 2020 06:00) (95/60 - 138/72)  BP(mean): 91 (03 Mar 2020 06:00) (69 - 98)  RR: 24 (03 Mar 2020 06:00) (17 - 40)  SpO2: 96% (03 Mar 2020 06:00) (93% - 100%)      I&O's Detail    02 Mar 2020 07:01  -  03 Mar 2020 07:00  --------------------------------------------------------  IN:    IV PiggyBack: 200 mL    Oral Fluid: 1160 mL  Total IN: 1360 mL    OUT:    Voided: 2000 mL  Total OUT: 2000 mL        Net:   I&O's Detail    01 Mar 2020 07:01  -  02 Mar 2020 07:00  --------------------------------------------------------  Total NET: -160 mL      02 Mar 2020 07:01  -  03 Mar 2020 07:00  --------------------------------------------------------  Total NET: -640 mL        CAPILLARY BLOOD GLUCOSE        Physical Exam:  General: NAD; A&Ox3/Patient is intubated and sedated  Cardiac: S1/S2, RRR, no murmur, no rubs  Lungs: unlabored respirations, CTA b/l, no wheeze, no rales, no crackles  Abdomen: Soft/NT/ND; positive bowel sounds x 4  Sternum: Intact, no click, incision healing well with no drainage  Incisions: Incisions clean/dry/intact  Extremities: No edema b/l lower extremities; good capillary refill; no cyanosis; palpable 1+ pedal pulses b/l    Central Venous Catheter: Yes[]  No[] , If Yes indication:           Day #  Mora Catheter: Yes  [] , No  [] , If yes indication:                      Day #  NGT: Yes [] No [] ,    If Yes Placement:                                     Day #  EPICARDIAL WIRES:  [] YES [] NO                                              Day #  BOWEL MOVEMENT:  [] YES [] NO, If No, Timing since last BM:      Day #  CHEST TUBE(Left/Right):  [] YES [] NO, If yes -  AIR LEAKS:  [] YES [] NO        LABS:                        9.3<L>  9.33  )-----------( 231      ( 01 Mar 2020 03:00 )             29.6<L>    03-03    140  |  97<L>  |  20  ----------------------------<  111<H>  3.7   |  32  |  0.7  03-02    139  |  97<L>  |  20  ----------------------------<  112<H>  4.1   |  33<H>  |  0.7    Ca    8.2<L>      03 Mar 2020 01:40  Mg     1.8     03-03            RADIOLOGY & ADDITIONAL TESTS:  CXR:  EKG:  MEDICATIONS  (STANDING):  aspirin enteric coated 325 milliGRAM(s) Oral daily  atorvastatin 80 milliGRAM(s) Oral at bedtime  BACItracin   Ointment 1 Application(s) Topical three times a day  chlorhexidine 4% Liquid 1 Application(s) Topical <User Schedule>  cyanocobalamin 1000 MICROGram(s) Oral daily  famotidine    Tablet 20 milliGRAM(s) Oral two times a day  guaifenesin/dextromethorphan  Syrup 10 milliLiter(s) Oral every 6 hours  heparin  Injectable 5000 Unit(s) SubCutaneous every 8 hours  ipratropium    for Nebulization 500 MICROGram(s) Nebulizer every 6 hours  magnesium sulfate  IVPB 1 Gram(s) IV Intermittent every 12 hours  metoprolol tartrate 12.5 milliGRAM(s) Oral every 12 hours  polyethylene glycol 3350 17 Gram(s) Oral daily  simethicone 80 milliGRAM(s) Chew every 12 hours  tamsulosin 0.4 milliGRAM(s) Oral at bedtime  thiamine 100 milliGRAM(s) Oral daily    MEDICATIONS  (PRN):  glucagon  Injectable 1 milliGRAM(s) IntraMuscular once PRN Glucose LESS THAN 70 milligrams/deciliter  ondansetron  IVPB 4 milliGRAM(s) IV Intermittent once PRN Nausea and/or Vomiting    HEPARIN:  [] YES [] NO  Dose: XX UNITS/HR UNITS Q8H  LOVENOX:[] YES [] NO  Dose: XX mg Q24H  COUMADIN: []  YES [] NO  Dose: XX mg  Q24H  SCD's: YES b/l  GI Prophylaxis: Protonix [], Pepcid [], None [], (Contra-indication:.....)    Post-Op Beta-Blockers: Yes [], No[], If No, then contraindication:  Post-Op Aspirin: Yes [],  No [], If No, then contraindication:  Post-Op Statin: Yes [], No[], If No, then contraindication:  Allergies    No Known Allergies    Intolerances      Ambulation/Activity Status:    Assessment/Plan:  79y Male status-post .....  - Case and plan discussed with CTU Intensivist and CT Surgeon - Dr. Shaw/Rodríguez/Samir   - Continue CTU supportive care    - Continue DVT/GI prophylaxis  - Incentive Spirometry 10 times an hour  - Continue to advance physical activity as tolerated and continue PT/OT as directed  1. CAD: Continue ASA, statin, BB  2. HTN:   3. A. Fib:   4. COPD/Hypoxia:   5. DM/Glucose Control:     Social Service Disposition: OPERATIVE PROCEDURE(s): MICS x1                POD #     8                  79yMale  SURGEON(s): MARCY Arreguin  SUBJECTIVE ASSESSMENT: pt seen and examined. no acute complaints at this time  Vital Signs Last 24 Hrs  T(F): 97.8 (03 Mar 2020 04:00), Max: 98.4 (02 Mar 2020 20:00)  HR: 90 (03 Mar 2020 06:00) (85 - 100)  BP: 123/71 (03 Mar 2020 06:00) (95/60 - 138/72)  BP(mean): 91 (03 Mar 2020 06:00) (69 - 98)  RR: 24 (03 Mar 2020 06:00) (17 - 40)  SpO2: 96% (03 Mar 2020 06:00) (93% - 100%)      I&O's Detail    02 Mar 2020 07:01  -  03 Mar 2020 07:00  --------------------------------------------------------  IN:    IV PiggyBack: 200 mL    Oral Fluid: 1160 mL  Total IN: 1360 mL    OUT:    Voided: 2000 mL  Total OUT: 2000 mL        Net:   I&O's Detail    01 Mar 2020 07:01  -  02 Mar 2020 07:00  --------------------------------------------------------  Total NET: -160 mL      02 Mar 2020 07:01  -  03 Mar 2020 07:00  --------------------------------------------------------  Total NET: -640 mL        CAPILLARY BLOOD GLUCOSE        Physical Exam:  General: NAD; A&Ox3  Cardiac: S1/S2, irregular no murmur, no rubs  Lungs: unlabored respirations, decreased at left base, no wheeze, no rales, no crackles  Abdomen: Soft/NT/ND; positive bowel sounds x 4  Left thoracotomy Incisions: clean/dry/intact  Extremities: 1+ edema b/l lower extremities; good capillary refill; no cyanosis; palpable 1+ pedal pulses b/l    BOWEL MOVEMENT:  [x] YES [] NO, If No, Timing since last BM Day         LABS:                        9.3<L>  9.33  )-----------( 231      ( 01 Mar 2020 03:00 )             29.6<L>    03-03    140  |  97<L>  |  20  ----------------------------<  111<H>  3.7   |  32  |  0.7  03-02    139  |  97<L>  |  20  ----------------------------<  112<H>  4.1   |  33<H>  |  0.7    Ca    8.2<L>      03 Mar 2020 01:40  Mg     1.8     03-03            RADIOLOGY & ADDITIONAL TESTS:  CXR: < from: Xray Chest 1 View- PORTABLE-Routine (03.02.20 @ 05:22) >    Impression:      Unchanged loculated left pleural effusion and left-sided opacities.     < end of copied text >      MEDICATIONS  (STANDING):  aspirin enteric coated 325 milliGRAM(s) Oral daily  atorvastatin 80 milliGRAM(s) Oral at bedtime  BACItracin   Ointment 1 Application(s) Topical three times a day  chlorhexidine 4% Liquid 1 Application(s) Topical <User Schedule>  cyanocobalamin 1000 MICROGram(s) Oral daily  famotidine    Tablet 20 milliGRAM(s) Oral two times a day  guaifenesin/dextromethorphan  Syrup 10 milliLiter(s) Oral every 6 hours  heparin  Injectable 5000 Unit(s) SubCutaneous every 8 hours  ipratropium    for Nebulization 500 MICROGram(s) Nebulizer every 6 hours  magnesium sulfate  IVPB 1 Gram(s) IV Intermittent every 12 hours  metoprolol tartrate 12.5 milliGRAM(s) Oral every 12 hours  polyethylene glycol 3350 17 Gram(s) Oral daily  simethicone 80 milliGRAM(s) Chew every 12 hours  tamsulosin 0.4 milliGRAM(s) Oral at bedtime  thiamine 100 milliGRAM(s) Oral daily    MEDICATIONS  (PRN):  glucagon  Injectable 1 milliGRAM(s) IntraMuscular once PRN Glucose LESS THAN 70 milligrams/deciliter  ondansetron  IVPB 4 milliGRAM(s) IV Intermittent once PRN Nausea and/or Vomiting    HEPARIN:  [x] YES [] NO  Dose: 5000 UNITS Q8H  LOVENOX:[] YES [x] NO  Dose: XX mg Q24H  COUMADIN: []  YES [x] NO  Dose: XX mg  Q24H  SCD's: YES b/l  GI Prophylaxis: Protonix [], Pepcid [x], None [], (Contra-indication:.....)    Post-Op Beta-Blockers: Yes [x], No[], If No, then contraindication:  Post-Op Aspirin: Yes [x],  No [], If No, then contraindication:  Post-Op Statin: Yes [x], No[], If No, then contraindication:  Allergies    No Known Allergies    Intolerances      Ambulation/Activity Status: ambulate    Assessment/Plan:  79y Male status-post MICSx1              POD #   8  - Case and plan discussed with CTU Intensivist and CT Surgeon - Dr. Arreguin  - Continue CTU supportive care and ongoing plan of care as per continuing CTU rounds.    - Continue DVT/GI prophylaxis  - Incentive Spirometry 10 times an hour  - Continue to advance physical activity as tolerated and continue PT/OT as directed  1. CAD: Continue ASA only, statin, BB. Holding Plavix for retained left chest wall hematoma on cxr.  2. A. Fib: continue magnesium sulfate  IVPB 1 Gram(s) IV Intermittent every 12 hours w/lopressor. No anticoagulation as per CT surgeon.     Social Service Disposition:  4A vs SNF.

## 2020-03-04 LAB
ANION GAP SERPL CALC-SCNC: 8 MMOL/L — SIGNIFICANT CHANGE UP (ref 7–14)
BUN SERPL-MCNC: 19 MG/DL — SIGNIFICANT CHANGE UP (ref 10–20)
CALCIUM SERPL-MCNC: 8.3 MG/DL — LOW (ref 8.5–10.1)
CHLORIDE SERPL-SCNC: 95 MMOL/L — LOW (ref 98–110)
CO2 SERPL-SCNC: 36 MMOL/L — HIGH (ref 17–32)
CREAT SERPL-MCNC: 0.6 MG/DL — LOW (ref 0.7–1.5)
GLUCOSE SERPL-MCNC: 114 MG/DL — HIGH (ref 70–99)
HCT VFR BLD CALC: 32.7 % — LOW (ref 42–52)
HGB BLD-MCNC: 9.9 G/DL — LOW (ref 14–18)
MAGNESIUM SERPL-MCNC: 2 MG/DL — SIGNIFICANT CHANGE UP (ref 1.8–2.4)
MCHC RBC-ENTMCNC: 29 PG — SIGNIFICANT CHANGE UP (ref 27–31)
MCHC RBC-ENTMCNC: 30.3 G/DL — LOW (ref 32–37)
MCV RBC AUTO: 95.9 FL — HIGH (ref 80–94)
NRBC # BLD: 0 /100 WBCS — SIGNIFICANT CHANGE UP (ref 0–0)
PLATELET # BLD AUTO: 340 K/UL — SIGNIFICANT CHANGE UP (ref 130–400)
POTASSIUM SERPL-MCNC: 4 MMOL/L — SIGNIFICANT CHANGE UP (ref 3.5–5)
POTASSIUM SERPL-SCNC: 4 MMOL/L — SIGNIFICANT CHANGE UP (ref 3.5–5)
RBC # BLD: 3.41 M/UL — LOW (ref 4.7–6.1)
RBC # FLD: 15.7 % — HIGH (ref 11.5–14.5)
SODIUM SERPL-SCNC: 139 MMOL/L — SIGNIFICANT CHANGE UP (ref 135–146)
WBC # BLD: 13.12 K/UL — HIGH (ref 4.8–10.8)
WBC # FLD AUTO: 13.12 K/UL — HIGH (ref 4.8–10.8)

## 2020-03-04 PROCEDURE — 71250 CT THORAX DX C-: CPT | Mod: 26

## 2020-03-04 PROCEDURE — 71045 X-RAY EXAM CHEST 1 VIEW: CPT | Mod: 26

## 2020-03-04 RX ORDER — BACITRACIN ZINC 500 UNIT/G
1 OINTMENT IN PACKET (EA) TOPICAL EVERY 8 HOURS
Refills: 0 | Status: DISCONTINUED | OUTPATIENT
Start: 2020-03-04 | End: 2020-03-12

## 2020-03-04 RX ADMIN — Medication 10 MILLILITER(S): at 13:33

## 2020-03-04 RX ADMIN — SIMETHICONE 80 MILLIGRAM(S): 80 TABLET, CHEWABLE ORAL at 05:18

## 2020-03-04 RX ADMIN — Medication 1 APPLICATION(S): at 05:18

## 2020-03-04 RX ADMIN — ATORVASTATIN CALCIUM 80 MILLIGRAM(S): 80 TABLET, FILM COATED ORAL at 21:48

## 2020-03-04 RX ADMIN — HEPARIN SODIUM 5000 UNIT(S): 5000 INJECTION INTRAVENOUS; SUBCUTANEOUS at 05:18

## 2020-03-04 RX ADMIN — Medication 10 MILLILITER(S): at 18:20

## 2020-03-04 RX ADMIN — CHLORHEXIDINE GLUCONATE 1 APPLICATION(S): 213 SOLUTION TOPICAL at 05:19

## 2020-03-04 RX ADMIN — FAMOTIDINE 20 MILLIGRAM(S): 10 INJECTION INTRAVENOUS at 18:22

## 2020-03-04 RX ADMIN — Medication 1 APPLICATION(S): at 21:49

## 2020-03-04 RX ADMIN — Medication 100 GRAM(S): at 05:18

## 2020-03-04 RX ADMIN — Medication 1 APPLICATION(S): at 13:34

## 2020-03-04 RX ADMIN — Medication 12.5 MILLIGRAM(S): at 18:22

## 2020-03-04 RX ADMIN — FAMOTIDINE 20 MILLIGRAM(S): 10 INJECTION INTRAVENOUS at 05:18

## 2020-03-04 RX ADMIN — Medication 325 MILLIGRAM(S): at 13:30

## 2020-03-04 RX ADMIN — TAMSULOSIN HYDROCHLORIDE 0.4 MILLIGRAM(S): 0.4 CAPSULE ORAL at 22:45

## 2020-03-04 RX ADMIN — Medication 12.5 MILLIGRAM(S): at 05:18

## 2020-03-04 RX ADMIN — Medication 100 MILLIGRAM(S): at 13:36

## 2020-03-04 RX ADMIN — Medication 100 GRAM(S): at 18:23

## 2020-03-04 RX ADMIN — SIMETHICONE 80 MILLIGRAM(S): 80 TABLET, CHEWABLE ORAL at 18:22

## 2020-03-04 RX ADMIN — PREGABALIN 1000 MICROGRAM(S): 225 CAPSULE ORAL at 13:30

## 2020-03-04 NOTE — SWALLOW BEDSIDE ASSESSMENT ADULT - COMMENTS
Per MARIELLE Streeter, pt to remain NPO today 2' chest wall hematoma. SLP to f/u with swallow evaluation 3/5

## 2020-03-04 NOTE — PROGRESS NOTE ADULT - SUBJECTIVE AND OBJECTIVE BOX
Patient seen / chart reviewed           afebrile           vss       lung : clear       function : transfer with assist                       ambulate short distance with walker / assist

## 2020-03-04 NOTE — PROGRESS NOTE ADULT - ASSESSMENT
Imp : cardiac debilitation / awaiting for CT tube placement and evac of hematoma    Plan : continue bedside PT           consider subacute rehab

## 2020-03-04 NOTE — PROGRESS NOTE ADULT - SUBJECTIVE AND OBJECTIVE BOX
OPERATIVE PROCEDURE(s):                POD #                       79yMale  SURGEON(s): MARCY Arreguin  SUBJECTIVE ASSESSMENT:   Vital Signs Last 24 Hrs  T(F): 98 (04 Mar 2020 04:00), Max: 98.7 (03 Mar 2020 20:00)  HR: 90 (04 Mar 2020 06:00) (82 - 95)  BP: 125/84 (04 Mar 2020 06:00) (88/57 - 139/81)  BP(mean): 100 (04 Mar 2020 06:00) (67 - 106)  RR: 47 (04 Mar 2020 06:00) (15 - 57)  SpO2: 96% (04 Mar 2020 06:00) (80% - 100%)      I&O's Detail    03 Mar 2020 07:01  -  04 Mar 2020 07:00  --------------------------------------------------------  IN:    IV PiggyBack: 200 mL    Oral Fluid: 600 mL  Total IN: 800 mL    OUT:    Voided: 1910 mL  Total OUT: 1910 mL        Net:   I&O's Detail    02 Mar 2020 07:01  -  03 Mar 2020 07:00  --------------------------------------------------------  Total NET: -640 mL      03 Mar 2020 07:01  -  04 Mar 2020 07:00  --------------------------------------------------------  Total NET: -1110 mL        CAPILLARY BLOOD GLUCOSE        Physical Exam:  General: NAD; A&Ox3/Patient is intubated and sedated  Cardiac: S1/S2, RRR, no murmur, no rubs  Lungs: unlabored respirations, CTA b/l, no wheeze, no rales, no crackles  Abdomen: Soft/NT/ND; positive bowel sounds x 4  Sternum: Intact, no click, incision healing well with no drainage  Incisions: Incisions clean/dry/intact  Extremities: No edema b/l lower extremities; good capillary refill; no cyanosis; palpable 1+ pedal pulses b/l    Central Venous Catheter: Yes[]  No[] , If Yes indication:           Day #  Mora Catheter: Yes  [] , No  [] , If yes indication:                      Day #  NGT: Yes [] No [] ,    If Yes Placement:                                     Day #  EPICARDIAL WIRES:  [] YES [] NO                                              Day #  BOWEL MOVEMENT:  [] YES [] NO, If No, Timing since last BM:      Day #  CHEST TUBE(Left/Right):  [] YES [] NO, If yes -  AIR LEAKS:  [] YES [] NO        LABS:    03-04    139  |  95<L>  |  19  ----------------------------<  114<H>  4.0   |  36<H>  |  0.6<L>  03-03    140  |  97<L>  |  20  ----------------------------<  111<H>  3.7   |  32  |  0.7    Ca    8.3<L>      04 Mar 2020 03:20  Mg     2.0     03-04            RADIOLOGY & ADDITIONAL TESTS:  CXR:  EKG:  MEDICATIONS  (STANDING):  aspirin enteric coated 325 milliGRAM(s) Oral daily  atorvastatin 80 milliGRAM(s) Oral at bedtime  BACItracin   Ointment 1 Application(s) Topical three times a day  chlorhexidine 4% Liquid 1 Application(s) Topical <User Schedule>  cyanocobalamin 1000 MICROGram(s) Oral daily  famotidine    Tablet 20 milliGRAM(s) Oral two times a day  guaifenesin/dextromethorphan  Syrup 10 milliLiter(s) Oral every 6 hours  heparin  Injectable 5000 Unit(s) SubCutaneous every 8 hours  ipratropium    for Nebulization 500 MICROGram(s) Nebulizer every 6 hours  magnesium sulfate  IVPB 1 Gram(s) IV Intermittent every 12 hours  metoprolol tartrate 12.5 milliGRAM(s) Oral every 12 hours  polyethylene glycol 3350 17 Gram(s) Oral daily  simethicone 80 milliGRAM(s) Chew every 12 hours  tamsulosin 0.4 milliGRAM(s) Oral at bedtime  thiamine 100 milliGRAM(s) Oral daily    MEDICATIONS  (PRN):  glucagon  Injectable 1 milliGRAM(s) IntraMuscular once PRN Glucose LESS THAN 70 milligrams/deciliter  ondansetron  IVPB 4 milliGRAM(s) IV Intermittent once PRN Nausea and/or Vomiting    HEPARIN:  [] YES [] NO  Dose: XX UNITS/HR UNITS Q8H  LOVENOX:[] YES [] NO  Dose: XX mg Q24H  COUMADIN: []  YES [] NO  Dose: XX mg  Q24H  SCD's: YES b/l  GI Prophylaxis: Protonix [], Pepcid [], None [], (Contra-indication:.....)    Post-Op Beta-Blockers: Yes [], No[], If No, then contraindication:  Post-Op Aspirin: Yes [],  No [], If No, then contraindication:  Post-Op Statin: Yes [], No[], If No, then contraindication:  Allergies    No Known Allergies    Intolerances      Ambulation/Activity Status:    Assessment/Plan:  79y Male status-post .....  - Case and plan discussed with CTU Intensivist and CT Surgeon - Dr. Shaw/Rodríguez/Samir   - Continue CTU supportive care    - Continue DVT/GI prophylaxis  - Incentive Spirometry 10 times an hour  - Continue to advance physical activity as tolerated and continue PT/OT as directed  1. CAD: Continue ASA, statin, BB  2. HTN:   3. A. Fib:   4. COPD/Hypoxia:   5. DM/Glucose Control:     Social Service Disposition: OPERATIVE PROCEDURE(s):   MICS x 1             POD #       9                79yMale  SURGEON(s): MARCY Arreguin  SUBJECTIVE ASSESSMENT: pt seen and examined. no acute complaints at this time.  Vital Signs Last 24 Hrs  T(F): 98 (04 Mar 2020 04:00), Max: 98.7 (03 Mar 2020 20:00)  HR: 90 (04 Mar 2020 06:00) (82 - 95)  BP: 125/84 (04 Mar 2020 06:00) (88/57 - 139/81)  BP(mean): 100 (04 Mar 2020 06:00) (67 - 106)  RR: 47 (04 Mar 2020 06:00) (15 - 57)  SpO2: 96% (04 Mar 2020 06:00) (80% - 100%)      I&O's Detail    03 Mar 2020 07:01  -  04 Mar 2020 07:00  --------------------------------------------------------  IN:    IV PiggyBack: 200 mL    Oral Fluid: 600 mL  Total IN: 800 mL    OUT:    Voided: 1910 mL  Total OUT: 1910 mL        Net:   I&O's Detail    02 Mar 2020 07:01  -  03 Mar 2020 07:00  --------------------------------------------------------  Total NET: -640 mL      03 Mar 2020 07:01  -  04 Mar 2020 07:00  --------------------------------------------------------  Total NET: -1110 mL        CAPILLARY BLOOD GLUCOSE            Physical Exam:  General: NAD; A&Ox3  Cardiac: S1/S2, irregular no murmur, no rubs  Lungs: decreased bs at bases, left> right  Abdomen: Soft/NT/ND; positive bowel sounds x 4  Left thoracotomy Incisions: clean/dry/intact  Extremities: 1+ edema b/l lower extremities; good capillary refill; no cyanosis; palpable 1+ pedal pulses b/l    BOWEL MOVEMENT:  [x] YES [] NO, If No, Timing since last BM Day         LABS:    03-04    139  |  95<L>  |  19  ----------------------------<  114<H>  4.0   |  36<H>  |  0.6<L>  03-03    140  |  97<L>  |  20  ----------------------------<  111<H>  3.7   |  32  |  0.7    Ca    8.3<L>      04 Mar 2020 03:20  Mg     2.0     03-04            RADIOLOGY & ADDITIONAL TESTS:  CXR:   EKG:  MEDICATIONS  (STANDING):  aspirin enteric coated 325 milliGRAM(s) Oral daily  atorvastatin 80 milliGRAM(s) Oral at bedtime  BACItracin   Ointment 1 Application(s) Topical three times a day  chlorhexidine 4% Liquid 1 Application(s) Topical <User Schedule>  cyanocobalamin 1000 MICROGram(s) Oral daily  famotidine    Tablet 20 milliGRAM(s) Oral two times a day  guaifenesin/dextromethorphan  Syrup 10 milliLiter(s) Oral every 6 hours  heparin  Injectable 5000 Unit(s) SubCutaneous every 8 hours  ipratropium    for Nebulization 500 MICROGram(s) Nebulizer every 6 hours  magnesium sulfate  IVPB 1 Gram(s) IV Intermittent every 12 hours  metoprolol tartrate 12.5 milliGRAM(s) Oral every 12 hours  polyethylene glycol 3350 17 Gram(s) Oral daily  simethicone 80 milliGRAM(s) Chew every 12 hours  tamsulosin 0.4 milliGRAM(s) Oral at bedtime  thiamine 100 milliGRAM(s) Oral daily    MEDICATIONS  (PRN):  glucagon  Injectable 1 milliGRAM(s) IntraMuscular once PRN Glucose LESS THAN 70 milligrams/deciliter  ondansetron  IVPB 4 milliGRAM(s) IV Intermittent once PRN Nausea and/or Vomiting    HEPARIN:  [x] YES [] NO  Dose: 5000 UNITS Q8H  LOVENOX:[] YES [x] NO  Dose: XX mg Q24H  COUMADIN: []  YES [x] NO  Dose: XX mg  Q24H  SCD's: YES b/l  GI Prophylaxis: Protonix [], Pepcid [x], None [], (Contra-indication:.....)    Post-Op Beta-Blockers: Yes [x], No[], If No, then contraindication:  Post-Op Aspirin: Yes [x],  No [], If No, then contraindication:  Post-Op Statin: Yes [x], No[], If No, then contraindication:  Allergies    No Known Allergies    Intolerances      Ambulation/Activity Status: ambulate    Assessment/Plan:  79y Male status-post MICSx1              POD #   8  - Case and plan discussed with CTU Intensivist and CT Surgeon - Dr. Arreguin  - Continue CTU supportive care and ongoing plan of care as per continuing CTU rounds.    - Continue DVT/GI prophylaxis  - Incentive Spirometry 10 times an hour  - Continue to advance physical activity as tolerated and continue PT/OT as directed  1. CAD: Continue ASA only, statin, BB. Holding Plavix for retained left chest wall hematoma on cxr.  2. A. Fib: continue magnesium sulfate  IVPB 1 Gram(s) IV Intermittent every 12 hours w/lopressor. No anticoagulation as per CT surgeon.   3. left chest wall hematoma- looks bigger on cxr this am, ct chest non contrast, poss ct placement, will f/u with Dr Arreguin

## 2020-03-05 LAB
ANION GAP SERPL CALC-SCNC: 12 MMOL/L — SIGNIFICANT CHANGE UP (ref 7–14)
BUN SERPL-MCNC: 21 MG/DL — HIGH (ref 10–20)
CALCIUM SERPL-MCNC: 8.5 MG/DL — SIGNIFICANT CHANGE UP (ref 8.5–10.1)
CHLORIDE SERPL-SCNC: 99 MMOL/L — SIGNIFICANT CHANGE UP (ref 98–110)
CO2 SERPL-SCNC: 32 MMOL/L — SIGNIFICANT CHANGE UP (ref 17–32)
CREAT SERPL-MCNC: 0.6 MG/DL — LOW (ref 0.7–1.5)
GLUCOSE SERPL-MCNC: 123 MG/DL — HIGH (ref 70–99)
HCT VFR BLD CALC: 31.7 % — LOW (ref 42–52)
HGB BLD-MCNC: 9.5 G/DL — LOW (ref 14–18)
MAGNESIUM SERPL-MCNC: 2 MG/DL — SIGNIFICANT CHANGE UP (ref 1.8–2.4)
MCHC RBC-ENTMCNC: 29 PG — SIGNIFICANT CHANGE UP (ref 27–31)
MCHC RBC-ENTMCNC: 30 G/DL — LOW (ref 32–37)
MCV RBC AUTO: 96.6 FL — HIGH (ref 80–94)
NRBC # BLD: 0 /100 WBCS — SIGNIFICANT CHANGE UP (ref 0–0)
PLATELET # BLD AUTO: 324 K/UL — SIGNIFICANT CHANGE UP (ref 130–400)
POTASSIUM SERPL-MCNC: 4.6 MMOL/L — SIGNIFICANT CHANGE UP (ref 3.5–5)
POTASSIUM SERPL-SCNC: 4.6 MMOL/L — SIGNIFICANT CHANGE UP (ref 3.5–5)
RBC # BLD: 3.28 M/UL — LOW (ref 4.7–6.1)
RBC # FLD: 15.6 % — HIGH (ref 11.5–14.5)
SODIUM SERPL-SCNC: 143 MMOL/L — SIGNIFICANT CHANGE UP (ref 135–146)
WBC # BLD: 12.24 K/UL — HIGH (ref 4.8–10.8)
WBC # FLD AUTO: 12.24 K/UL — HIGH (ref 4.8–10.8)

## 2020-03-05 PROCEDURE — 32653 THORACOSCOPY REMOV FB/FIBRIN: CPT | Mod: 78

## 2020-03-05 PROCEDURE — 71045 X-RAY EXAM CHEST 1 VIEW: CPT | Mod: 26,76

## 2020-03-05 PROCEDURE — ZZZZZ: CPT

## 2020-03-05 RX ORDER — OXYCODONE HYDROCHLORIDE 5 MG/1
5 TABLET ORAL EVERY 4 HOURS
Refills: 0 | Status: DISCONTINUED | OUTPATIENT
Start: 2020-03-05 | End: 2020-03-07

## 2020-03-05 RX ORDER — BUPIVACAINE 13.3 MG/ML
20 INJECTION, SUSPENSION, LIPOSOMAL INFILTRATION ONCE
Refills: 0 | Status: DISCONTINUED | OUTPATIENT
Start: 2020-03-05 | End: 2020-03-12

## 2020-03-05 RX ORDER — CHLORHEXIDINE GLUCONATE 213 G/1000ML
15 SOLUTION TOPICAL EVERY 12 HOURS
Refills: 0 | Status: DISCONTINUED | OUTPATIENT
Start: 2020-03-05 | End: 2020-03-07

## 2020-03-05 RX ORDER — ACETAMINOPHEN 500 MG
1000 TABLET ORAL ONCE
Refills: 0 | Status: COMPLETED | OUTPATIENT
Start: 2020-03-05 | End: 2020-03-06

## 2020-03-05 RX ORDER — CEFAZOLIN SODIUM 1 G
1000 VIAL (EA) INJECTION EVERY 8 HOURS
Refills: 0 | Status: DISCONTINUED | OUTPATIENT
Start: 2020-03-05 | End: 2020-03-06

## 2020-03-05 RX ORDER — DEXMEDETOMIDINE HYDROCHLORIDE IN 0.9% SODIUM CHLORIDE 4 UG/ML
0.2 INJECTION INTRAVENOUS
Qty: 200 | Refills: 0 | Status: DISCONTINUED | OUTPATIENT
Start: 2020-03-05 | End: 2020-03-06

## 2020-03-05 RX ORDER — PROPOFOL 10 MG/ML
7.92 INJECTION, EMULSION INTRAVENOUS
Qty: 1000 | Refills: 0 | Status: DISCONTINUED | OUTPATIENT
Start: 2020-03-05 | End: 2020-03-06

## 2020-03-05 RX ADMIN — Medication 1 APPLICATION(S): at 14:05

## 2020-03-05 RX ADMIN — Medication 1 APPLICATION(S): at 05:24

## 2020-03-05 RX ADMIN — CHLORHEXIDINE GLUCONATE 1 APPLICATION(S): 213 SOLUTION TOPICAL at 05:26

## 2020-03-05 RX ADMIN — Medication 10 MILLILITER(S): at 00:12

## 2020-03-05 RX ADMIN — PROPOFOL 5 MICROGRAM(S)/KG/MIN: 10 INJECTION, EMULSION INTRAVENOUS at 21:52

## 2020-03-05 RX ADMIN — Medication 1 APPLICATION(S): at 21:51

## 2020-03-05 RX ADMIN — OXYCODONE HYDROCHLORIDE 5 MILLIGRAM(S): 5 TABLET ORAL at 16:30

## 2020-03-05 RX ADMIN — FAMOTIDINE 20 MILLIGRAM(S): 10 INJECTION INTRAVENOUS at 05:25

## 2020-03-05 RX ADMIN — Medication 100 MILLIGRAM(S): at 22:10

## 2020-03-05 RX ADMIN — SIMETHICONE 80 MILLIGRAM(S): 80 TABLET, CHEWABLE ORAL at 05:25

## 2020-03-05 RX ADMIN — Medication 10 MILLILITER(S): at 05:26

## 2020-03-05 RX ADMIN — Medication 100 MILLIGRAM(S): at 21:47

## 2020-03-05 RX ADMIN — DEXMEDETOMIDINE HYDROCHLORIDE IN 0.9% SODIUM CHLORIDE 5.26 MICROGRAM(S)/KG/HR: 4 INJECTION INTRAVENOUS at 21:46

## 2020-03-05 RX ADMIN — OXYCODONE HYDROCHLORIDE 5 MILLIGRAM(S): 5 TABLET ORAL at 16:00

## 2020-03-05 RX ADMIN — Medication 100 GRAM(S): at 05:28

## 2020-03-05 RX ADMIN — Medication 12.5 MILLIGRAM(S): at 05:25

## 2020-03-05 NOTE — BRIEF OPERATIVE NOTE - OPERATION/FINDINGS
right radial artery PSA  procedure- rapair od right radial PSA
SUCCESSFUL EVACUATION OF LEFT HEMOTHORAX USING SINGLE PORT THORACOSCOPY
MULTIPLE LEFT HEALING FRACTURES

## 2020-03-05 NOTE — BRIEF OPERATIVE NOTE - NSICDXBRIEFPREOP_GEN_ALL_CORE_FT
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
PRE-OP DIAGNOSIS:  CAD in native artery 24-Feb-2020 14:19:50  Kenn Arreguin  Sudden cardiac arrest 24-Feb-2020 14:19:33  Kenn Arreguin
PRE-OP DIAGNOSIS:  Postoperative hemothorax 05-Mar-2020 18:44:32  Kenn Arreguin  CAD in native artery 24-Feb-2020 14:19:50  Kenn Arreguin  Sudden cardiac arrest 24-Feb-2020 14:19:33  Kenn Arreguin

## 2020-03-05 NOTE — PRE-ANESTHESIA EVALUATION ADULT - NSRADCARDRESULTSFT_GEN_ALL_CORE
CXR: 02.19.20   Findings:  Support devices: None.  Cardiac/mediastinum/hilum: Unremarkable.  Lung parenchyma/Pleura: Within normal limits.  Skeleton/soft tissues: Unchanged.  Impression:    No radiographic evidence of acute cardiopulmonary disease.    Cardiac Cath: Coronary circulation: The coronary circulation is right dominant. Left main: The vessel was large sized. Angiography showed a single discrete lesion. Distal left main: There was a 20 % stenosis. LAD: The vessel was large sized. Angiography showed the vessel to wrap around the cardiac apex and multiple discrete lesions. Ostial LAD: There was a 90 % stenosis. This is a likely culprit for the patient's clinical presentation. Proximal LAD: There was a tubular 80 % stenosis. Circumflex: Angiography showed minor luminal irregularities with no flow limiting lesions. RCA: Angiography showed minor luminal irregularities with no flow limiting lesions.     TTE 2/9/20  Summary:   1. Left ventricular ejection fraction, by visual estimation, is 65 to 70%.   2. Mildly increased LV wall thickness.   3. Mild to moderate mitral valve regurgitation.   4. Moderate tricuspid regurgitation.   5. Mild aortic regurgitation.   6. Sclerotic aortic valve with decreased opening.   7. Estimated pulmonary artery systolic pressure is 41.1 mmHg assuming a right atrial pressure of 3 mmHg, which is consistent with mild pulmonary hypertension.  PHYSICIAN INTERPRETATION:  Left Ventricle: The left ventricular internal cavity size is normal. Left ventricular wall thickness is mildly increased. Left ventricular ejection fraction, by visual estimation, is 65 to 70%.  Right Ventricle: Normal right ventricular size and function.  Left Atrium: Moderately enlarged left atrium.  Right Atrium: Moderately enlarged right atrium.  Pericardium: There is no evidence of pericardial effusion.  Mitral Valve: Structurally normal mitral valve, with normal leaflet excursion. The mitral valve is normal in structure. Mild to moderate mitral valve regurgitation is seen.  Tricuspid Valve: Structurally normal tricuspid valve, with normal leaflet excursion. The tricuspid valve is normal in structure. Moderate tricuspid regurgitation is visualized. Estimated pulmonary artery systolic pressure is 41.1 mmHg assuming a right atrial pressure of 3 mmHg, which is consistent with mild pulmonary hypertension.  Aortic Valve: The aortic valve is trileaflet. No evidence of aortic stenosis. Sclerotic aortic valve with decreased opening. Mild aortic valve regurgitation is seen. Moderately thickened and calcified.  Pulmonic Valve: Structurally normal pulmonic valve, with normal leaflet excursion. The pulmonic valve is normal. Trace pulmonic valve regurgitation.  Aorta: The aortic root and ascending aorta are structurally normal, with no evidence of dilitation.  Pulmonary Artery: The main pulmonary artery is normal in size.    Carotids: 1/20/20  Impression:  Less than 50% stenosis of the right internal carotid artery.  Less than 50% stenosis of the left internal carotid artery.    CT head: 1/12/20  IMPRESSION:  1.  No evidence of acute intracranial pathology.  2.  Chronic microvascular ischemic changes.  3.  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  < end of copied text >     CT neck: 1/14/20  IMPRESSION:  1.  Widening of the C5-6 disc space and fragmentation of the anterior bridging osteophyte at this level. Findings are suspicious for distraction injury through the C5-6 disc with C5-6 anterior osteophyte fracture. Recommend further evaluation with MRI.  2.  Diffuse prevertebral edema as well as edema within the left anterolateral neck soft tissues and supraclavicular region.  3.  Stranding and possible hematoma behind the esophagus, as previously described.  4.  Multilevel anterior bridging osteophytes/syndesmophytes.  5.  Multilevel degenerative changes as described.    CT CHEST: 1/13/20  IMPRESSION:   1. No central or lobar pulmonary embolus.  2.  Small bibasilar consolidated opacities, possibly representing atelectasis or sequela of prior aspiration event.  3.  A 4.4 cm retroesophageal soft tissue density within the thorax may represent hematoma.  4.  No evidence of acute/inflammatory process within the abdomen or pelvis.
Cardiac Cath: Coronary circulation: The coronary circulation is right dominant. Left main: The vessel was large sized. Angiography showed a single discrete lesion. Distal left main: There was a 20 % stenosis. LAD: The vessel was large sized. Angiography showed the vessel to wrap around the cardiac apex and multiple discrete lesions. Ostial LAD: There was a 90 % stenosis. This is a likely culprit for the patient's clinical presentation. Proximal LAD: There was a tubular 80 % stenosis. Circumflex: Angiography showed minor luminal irregularities with no flow limiting lesions. RCA: Angiography showed minor luminal irregularities with no flow limiting lesions.     TTE 2/9/20  Summary:   1. Left ventricular ejection fraction, by visual estimation, is 65 to 70%.   2. Mildly increased LV wall thickness.   3. Mild to moderate mitral valve regurgitation.   4. Moderate tricuspid regurgitation.   5. Mild aortic regurgitation.   6. Sclerotic aortic valve with decreased opening.   7. Estimated pulmonary artery systolic pressure is 41.1 mmHg assuming a right atrial pressure of 3 mmHg, which is consistent with mild pulmonary hypertension.  PHYSICIAN INTERPRETATION:  Left Ventricle: The left ventricular internal cavity size is normal. Left ventricular wall thickness is mildly increased. Left ventricular ejection fraction, by visual estimation, is 65 to 70%.  Right Ventricle: Normal right ventricular size and function.  Left Atrium: Moderately enlarged left atrium.  Right Atrium: Moderately enlarged right atrium.  Pericardium: There is no evidence of pericardial effusion.  Mitral Valve: Structurally normal mitral valve, with normal leaflet excursion. The mitral valve is normal in structure. Mild to moderate mitral valve regurgitation is seen.  Tricuspid Valve: Structurally normal tricuspid valve, with normal leaflet excursion. The tricuspid valve is normal in structure. Moderate tricuspid regurgitation is visualized. Estimated pulmonary artery systolic pressure is 41.1 mmHg assuming a right atrial pressure of 3 mmHg, which is consistent with mild pulmonary hypertension.  Aortic Valve: The aortic valve is trileaflet. No evidence of aortic stenosis. Sclerotic aortic valve with decreased opening. Mild aortic valve regurgitation is seen. Moderately thickened and calcified.  Pulmonic Valve: Structurally normal pulmonic valve, with normal leaflet excursion. The pulmonic valve is normal. Trace pulmonic valve regurgitation.  Aorta: The aortic root and ascending aorta are structurally normal, with no evidence of dilitation.  Pulmonary Artery: The main pulmonary artery is normal in size.    Carotids: 1/20/20  Impression:  Less than 50% stenosis of the right internal carotid artery.  Less than 50% stenosis of the left internal carotid artery.    CT chest 3/4/20  Loculated left-sided pleural effusion with compressive atelectasis.  Left-sided subcutaneous emphysema.  Multiple anterior nondisplaced left rib fractures with surrounding callus formation. Two nondisplaced sternal body fractures body fractures. Left lower chest wall edema and emphysema adjacent to the left rib fractures, most notable around the anterior left fifth rib.

## 2020-03-05 NOTE — BRIEF OPERATIVE NOTE - NSICDXBRIEFPROCEDURE_GEN_ALL_CORE_FT
PROCEDURES:  Excision, pseudoaneurysm, artery, radial 31-Jan-2020 17:31:23  Obey Gary
PROCEDURES:  CABG, using internal thoracic artery obtained by endoscopic surgical procurement 24-Feb-2020 14:22:07  Kenn Arreguin  Minimally invasive cardiac surgery (MICS) without cardiopulmonary bypass pump 24-Feb-2020 14:20:36  Kenn Arreguin
PROCEDURES:  Drainage. hemothorax, using VATS 05-Mar-2020 18:46:32  Kenn Arreguin  CABG, using internal thoracic artery obtained by endoscopic surgical procurement 24-Feb-2020 14:22:07  Kenn Arreguin  Minimally invasive cardiac surgery (MICS) without cardiopulmonary bypass pump 24-Feb-2020 14:20:36  Kenn Arreguin

## 2020-03-05 NOTE — BRIEF OPERATIVE NOTE - COMMENTS
1400 ML OF FLUID EVACUATED
I participated is all aspects of the case as a first assist. From opening to distal to closing. Mekhi PALOMARES

## 2020-03-05 NOTE — PRE-ANESTHESIA EVALUATION ADULT - NSANTHAIRWAYFT_ENT_ALL_CORE
stiff neck C-collar in place, patient unable to extend his neck, good mouth opening
stiff neck C-collar in place, patient unable to extend his neck, good mouth opening

## 2020-03-05 NOTE — BRIEF OPERATIVE NOTE - NSICDXBRIEFPOSTOP_GEN_ALL_CORE_FT
POST-OP DIAGNOSIS:  Aneurysm of radial artery 31-Jan-2020 17:33:35  Obey Gary  Aneurysm of radial artery 31-Jan-2020 17:33:24  Obey Gary
POST-OP DIAGNOSIS:  CAD in native artery 24-Feb-2020 14:20:11  Kenn Arreguin  Sudden cardiac arrest 24-Feb-2020 14:20:03  Kenn Arreguin
POST-OP DIAGNOSIS:  Postoperative hemothorax 05-Mar-2020 18:44:46  Kenn Arreguin  CAD in native artery 24-Feb-2020 14:20:11  Kenn Arreguin  Sudden cardiac arrest 24-Feb-2020 14:20:03  Kenn Arreguin

## 2020-03-05 NOTE — SWALLOW BEDSIDE ASSESSMENT ADULT - COMMENTS
Pt currently NPO for OR, per MARIELLE Hernandez, will resconsult SLP if pt becomes appropriate for SLP services

## 2020-03-05 NOTE — PROGRESS NOTE ADULT - ASSESSMENT
CTS Attending    Case reviewed on rounds today   CT done yesterday showed loculated left hemothorax  Patient and family aware that this will need drainage or evacuation in the operating room  Procedure, risks, benefits and alternatives explained and patient agreed to proceed.

## 2020-03-05 NOTE — PACU DISCHARGE NOTE - COMMENTS
S/P L THORACOSCOPY   EVACUATION OF HEMOTHORAX  BP   146/79  HR   85  RR   16  SAT   100%  TEMP  36.2

## 2020-03-05 NOTE — PROGRESS NOTE ADULT - SUBJECTIVE AND OBJECTIVE BOX
OPERATIVE PROCEDURE(s):                POD #                       79yMale  SURGEON(s): MARCY Arreguin  SUBJECTIVE ASSESSMENT:   Vital Signs Last 24 Hrs  T(F): 96.9 (05 Mar 2020 04:00), Max: 97.2 (05 Mar 2020 00:00)  HR: 80 (05 Mar 2020 04:00) (77 - 102)  BP: 134/75 (05 Mar 2020 04:00) (97/53 - 157/77)  BP(mean): 100 (05 Mar 2020 04:00) (69 - 109)  RR: 26 (05 Mar 2020 04:00) (19 - 36)  SpO2: 97% (05 Mar 2020 04:00) (95% - 99%)      I&O's Detail    04 Mar 2020 07:01  -  05 Mar 2020 07:00  --------------------------------------------------------  IN:    IV PiggyBack: 100 mL    Oral Fluid: 480 mL  Total IN: 580 mL    OUT:    Voided: 500 mL  Total OUT: 500 mL        Net:   I&O's Detail    03 Mar 2020 07:01  -  04 Mar 2020 07:00  --------------------------------------------------------  Total NET: -1110 mL      04 Mar 2020 07:01  -  05 Mar 2020 07:00  --------------------------------------------------------  Total NET: 80 mL        CAPILLARY BLOOD GLUCOSE        Physical Exam:  General: NAD; A&Ox3/Patient is intubated and sedated  Cardiac: S1/S2, RRR, no murmur, no rubs  Lungs: unlabored respirations, CTA b/l, no wheeze, no rales, no crackles  Abdomen: Soft/NT/ND; positive bowel sounds x 4  Sternum: Intact, no click, incision healing well with no drainage  Incisions: Incisions clean/dry/intact  Extremities: No edema b/l lower extremities; good capillary refill; no cyanosis; palpable 1+ pedal pulses b/l    Central Venous Catheter: Yes[]  No[] , If Yes indication:           Day #  Mora Catheter: Yes  [] , No  [] , If yes indication:                      Day #  NGT: Yes [] No [] ,    If Yes Placement:                                     Day #  EPICARDIAL WIRES:  [] YES [] NO                                              Day #  BOWEL MOVEMENT:  [] YES [] NO, If No, Timing since last BM:      Day #  CHEST TUBE(Left/Right):  [] YES [] NO, If yes -  AIR LEAKS:  [] YES [] NO        LABS:                        9.5<L>  12.24<H> )-----------( 324      ( 05 Mar 2020 02:50 )             31.7<L>                        9.9<L>  13.12<H> )-----------( 340      ( 04 Mar 2020 10:45 )             32.7<L>    03-05    143  |  99  |  21<H>  ----------------------------<  123<H>  4.6   |  32  |  0.6<L>  03-04    139  |  95<L>  |  19  ----------------------------<  114<H>  4.0   |  36<H>  |  0.6<L>    Ca    8.5      05 Mar 2020 02:50  Mg     2.0     03-05            RADIOLOGY & ADDITIONAL TESTS:  CXR:  EKG:  MEDICATIONS  (STANDING):  aspirin enteric coated 325 milliGRAM(s) Oral daily  atorvastatin 80 milliGRAM(s) Oral at bedtime  BACItracin   Ointment 1 Application(s) Topical three times a day  BACItracin   Ointment 1 Application(s) Topical every 8 hours  chlorhexidine 4% Liquid 1 Application(s) Topical <User Schedule>  cyanocobalamin 1000 MICROGram(s) Oral daily  famotidine    Tablet 20 milliGRAM(s) Oral two times a day  guaifenesin/dextromethorphan  Syrup 10 milliLiter(s) Oral every 6 hours  ipratropium    for Nebulization 500 MICROGram(s) Nebulizer every 6 hours  magnesium sulfate  IVPB 1 Gram(s) IV Intermittent every 12 hours  metoprolol tartrate 12.5 milliGRAM(s) Oral every 12 hours  polyethylene glycol 3350 17 Gram(s) Oral daily  simethicone 80 milliGRAM(s) Chew every 12 hours  tamsulosin 0.4 milliGRAM(s) Oral at bedtime  thiamine 100 milliGRAM(s) Oral daily    MEDICATIONS  (PRN):  glucagon  Injectable 1 milliGRAM(s) IntraMuscular once PRN Glucose LESS THAN 70 milligrams/deciliter  ondansetron  IVPB 4 milliGRAM(s) IV Intermittent once PRN Nausea and/or Vomiting    HEPARIN:  [] YES [] NO  Dose: XX UNITS/HR UNITS Q8H  LOVENOX:[] YES [] NO  Dose: XX mg Q24H  COUMADIN: []  YES [] NO  Dose: XX mg  Q24H  SCD's: YES b/l  GI Prophylaxis: Protonix [], Pepcid [], None [], (Contra-indication:.....)    Post-Op Beta-Blockers: Yes [], No[], If No, then contraindication:  Post-Op Aspirin: Yes [],  No [], If No, then contraindication:  Post-Op Statin: Yes [], No[], If No, then contraindication:  Allergies    No Known Allergies    Intolerances      Ambulation/Activity Status:    Assessment/Plan:  79y Male status-post .....  - Case and plan discussed with CTU Intensivist and CT Surgeon - Dr. Shaw/Rodríguez/Samir   - Continue CTU supportive care    - Continue DVT/GI prophylaxis  - Incentive Spirometry 10 times an hour  - Continue to advance physical activity as tolerated and continue PT/OT as directed  1. CAD: Continue ASA, statin, BB  2. HTN:   3. A. Fib:   4. COPD/Hypoxia:   5. DM/Glucose Control:     Social Service Disposition: OPERATIVE PROCEDURE(s):  MICS x 1             POD #       10                    79yMale  SURGEON(s): MARCY Arreguin  SUBJECTIVE ASSESSMENT: pt seen and examined. no acute complaints at this time. pt is npo for left thoracoscopy for chest wall hematoma  Vital Signs Last 24 Hrs  T(F): 96.9 (05 Mar 2020 04:00), Max: 97.2 (05 Mar 2020 00:00)  HR: 80 (05 Mar 2020 04:00) (77 - 102)  BP: 134/75 (05 Mar 2020 04:00) (97/53 - 157/77)  BP(mean): 100 (05 Mar 2020 04:00) (69 - 109)  RR: 26 (05 Mar 2020 04:00) (19 - 36)  SpO2: 97% (05 Mar 2020 04:00) (95% - 99%)      I&O's Detail    04 Mar 2020 07:01  -  05 Mar 2020 07:00  --------------------------------------------------------  IN:    IV PiggyBack: 100 mL    Oral Fluid: 480 mL  Total IN: 580 mL    OUT:    Voided: 500 mL  Total OUT: 500 mL        Net:   I&O's Detail    03 Mar 2020 07:01  -  04 Mar 2020 07:00  --------------------------------------------------------  Total NET: -1110 mL      04 Mar 2020 07:01  -  05 Mar 2020 07:00  --------------------------------------------------------  Total NET: 80 mL        CAPILLARY BLOOD GLUCOSE      Physical Exam:  General: NAD; A&Ox3  Cardiac: S1/S2, irregular no murmur, no rubs  Lungs: decreased bs at bases, left> right  Abdomen: Soft/NT/ND; positive bowel sounds x 4  Left thoracotomy Incisions: clean/dry/intact  Extremities: 1+ edema b/l lower extremities; good capillary refill; no cyanosis; palpable 1+ pedal pulses b/l    BOWEL MOVEMENT:  [x] YES [] NO, If No, Timing since last BM Day         LABS:                        9.5<L>  12.24<H> )-----------( 324      ( 05 Mar 2020 02:50 )             31.7<L>                        9.9<L>  13.12<H> )-----------( 340      ( 04 Mar 2020 10:45 )             32.7<L>    03-05    143  |  99  |  21<H>  ----------------------------<  123<H>  4.6   |  32  |  0.6<L>  03-04    139  |  95<L>  |  19  ----------------------------<  114<H>  4.0   |  36<H>  |  0.6<L>    Ca    8.5      05 Mar 2020 02:50  Mg     2.0     03-05            RADIOLOGY & ADDITIONAL TESTS:  CXR: < from: Xray Chest 1 View- PORTABLE-Routine (03.05.20 @ 05:01) >  Impression:      Left lung opacification without change.    < end of copied text >    EKG: < from: 12 Lead ECG (03.03.20 @ 07:53) >    Ventricular Rate 86 BPM    Atrial Rate 86 BPM    P-R Interval 270 ms    QRS Duration 98 ms    Q-T Interval 340 ms    QTC Calculation(Bezet) 406 ms    R Axis -1 degrees    T Axis 24 degrees    Diagnosis Line Sinus rhythm with 1st degree A-V block with Premature atrial complexes  Possible Inferior infarct , age undetermined  ST & T wave abnormality, consider anterior ischemia  Abnormal ECG    < end of copied text >    MEDICATIONS  (STANDING):  aspirin enteric coated 325 milliGRAM(s) Oral daily  atorvastatin 80 milliGRAM(s) Oral at bedtime  BACItracin   Ointment 1 Application(s) Topical three times a day  BACItracin   Ointment 1 Application(s) Topical every 8 hours  chlorhexidine 4% Liquid 1 Application(s) Topical <User Schedule>  cyanocobalamin 1000 MICROGram(s) Oral daily  famotidine    Tablet 20 milliGRAM(s) Oral two times a day  guaifenesin/dextromethorphan  Syrup 10 milliLiter(s) Oral every 6 hours  ipratropium    for Nebulization 500 MICROGram(s) Nebulizer every 6 hours  magnesium sulfate  IVPB 1 Gram(s) IV Intermittent every 12 hours  metoprolol tartrate 12.5 milliGRAM(s) Oral every 12 hours  polyethylene glycol 3350 17 Gram(s) Oral daily  simethicone 80 milliGRAM(s) Chew every 12 hours  tamsulosin 0.4 milliGRAM(s) Oral at bedtime  thiamine 100 milliGRAM(s) Oral daily    MEDICATIONS  (PRN):  glucagon  Injectable 1 milliGRAM(s) IntraMuscular once PRN Glucose LESS THAN 70 milligrams/deciliter  ondansetron  IVPB 4 milliGRAM(s) IV Intermittent once PRN Nausea and/or Vomiting    HEPARIN:  [] YES [x] NO  Dose: XX UNITS/HR UNITS Q8H - held for chest wall hematoma  LOVENOX:[] YES [x] NO  Dose: XX mg Q24H  COUMADIN: []  YES [x] NO  Dose: XX mg  Q24H  SCD's: YES b/l  GI Prophylaxis: Protonix [], Pepcid [x], None [], (Contra-indication:.....)    Post-Op Beta-Blockers: Yes [x], No[], If No, then contraindication:  Post-Op Aspirin: Yes [x],  No [], If No, then contraindication:  Post-Op Statin: Yes [x], No[], If No, then contraindication:  Allergies    No Known Allergies    Intolerances      Ambulation/Activity Status: ambulate     Assessment/Plan:  79y Male status-post MICSx1              POD #   10  - Case and plan discussed with CTU Intensivist and CT Surgeon - Dr. Arreguin  - Continue CTU supportive care and ongoing plan of care as per continuing CTU rounds.    - Continue DVT/GI prophylaxis  - Incentive Spirometry 10 times an hour  - Continue to advance physical activity as tolerated and continue PT/OT as directed  1. CAD: Continue ASA only, statin, BB. Holding Plavix for retained left chest wall hematoma on cxr.  2. A. Fib: continue magnesium sulfate  IVPB 1 Gram(s) IV Intermittent every 12 hours w/lopressor. No anticoagulation as per CT surgeon.   3. left chest wall hematoma- left thoracoscopy today with Dr Arreguin

## 2020-03-05 NOTE — PRE-ANESTHESIA EVALUATION ADULT - NSANTHPROCED_GEN_ALL_CORE
Central Venous Catheter/Arterial Catheter/Pulmonary Artery Catheter/Transesophageal Echocardiogram
Arterial Catheter

## 2020-03-06 LAB
ANION GAP SERPL CALC-SCNC: 12 MMOL/L — SIGNIFICANT CHANGE UP (ref 7–14)
BUN SERPL-MCNC: 21 MG/DL — HIGH (ref 10–20)
CALCIUM SERPL-MCNC: 8.3 MG/DL — LOW (ref 8.5–10.1)
CHLORIDE SERPL-SCNC: 101 MMOL/L — SIGNIFICANT CHANGE UP (ref 98–110)
CO2 SERPL-SCNC: 30 MMOL/L — SIGNIFICANT CHANGE UP (ref 17–32)
CREAT SERPL-MCNC: 0.7 MG/DL — SIGNIFICANT CHANGE UP (ref 0.7–1.5)
GLUCOSE SERPL-MCNC: 160 MG/DL — HIGH (ref 70–99)
HCT VFR BLD CALC: 29.8 % — LOW (ref 42–52)
HGB BLD-MCNC: 9.1 G/DL — LOW (ref 14–18)
MAGNESIUM SERPL-MCNC: 2 MG/DL — SIGNIFICANT CHANGE UP (ref 1.8–2.4)
MCHC RBC-ENTMCNC: 29.1 PG — SIGNIFICANT CHANGE UP (ref 27–31)
MCHC RBC-ENTMCNC: 30.5 G/DL — LOW (ref 32–37)
MCV RBC AUTO: 95.2 FL — HIGH (ref 80–94)
NRBC # BLD: 0 /100 WBCS — SIGNIFICANT CHANGE UP (ref 0–0)
PLATELET # BLD AUTO: 290 K/UL — SIGNIFICANT CHANGE UP (ref 130–400)
POTASSIUM SERPL-MCNC: 4.5 MMOL/L — SIGNIFICANT CHANGE UP (ref 3.5–5)
POTASSIUM SERPL-SCNC: 4.5 MMOL/L — SIGNIFICANT CHANGE UP (ref 3.5–5)
RBC # BLD: 3.13 M/UL — LOW (ref 4.7–6.1)
RBC # FLD: 15.9 % — HIGH (ref 11.5–14.5)
SODIUM SERPL-SCNC: 143 MMOL/L — SIGNIFICANT CHANGE UP (ref 135–146)
WBC # BLD: 9.16 K/UL — SIGNIFICANT CHANGE UP (ref 4.8–10.8)
WBC # FLD AUTO: 9.16 K/UL — SIGNIFICANT CHANGE UP (ref 4.8–10.8)

## 2020-03-06 PROCEDURE — 71045 X-RAY EXAM CHEST 1 VIEW: CPT | Mod: 26

## 2020-03-06 RX ORDER — HEPARIN SODIUM 5000 [USP'U]/ML
5000 INJECTION INTRAVENOUS; SUBCUTANEOUS EVERY 12 HOURS
Refills: 0 | Status: DISCONTINUED | OUTPATIENT
Start: 2020-03-06 | End: 2020-03-12

## 2020-03-06 RX ORDER — FENTANYL CITRATE 50 UG/ML
25 INJECTION INTRAVENOUS ONCE
Refills: 0 | Status: DISCONTINUED | OUTPATIENT
Start: 2020-03-06 | End: 2020-03-06

## 2020-03-06 RX ORDER — CEFAZOLIN SODIUM 1 G
VIAL (EA) INJECTION
Refills: 0 | Status: DISCONTINUED | OUTPATIENT
Start: 2020-03-06 | End: 2020-03-12

## 2020-03-06 RX ORDER — CEFAZOLIN SODIUM 1 G
1000 VIAL (EA) INJECTION EVERY 8 HOURS
Refills: 0 | Status: DISCONTINUED | OUTPATIENT
Start: 2020-03-06 | End: 2020-03-12

## 2020-03-06 RX ORDER — CEFAZOLIN SODIUM 1 G
1000 VIAL (EA) INJECTION ONCE
Refills: 0 | Status: COMPLETED | OUTPATIENT
Start: 2020-03-06 | End: 2020-03-06

## 2020-03-06 RX ADMIN — Medication 100 MILLIGRAM(S): at 15:07

## 2020-03-06 RX ADMIN — Medication 10 MILLILITER(S): at 17:07

## 2020-03-06 RX ADMIN — CHLORHEXIDINE GLUCONATE 15 MILLILITER(S): 213 SOLUTION TOPICAL at 17:07

## 2020-03-06 RX ADMIN — Medication 100 MILLIGRAM(S): at 21:47

## 2020-03-06 RX ADMIN — Medication 400 MILLIGRAM(S): at 20:32

## 2020-03-06 RX ADMIN — Medication 1 APPLICATION(S): at 05:08

## 2020-03-06 RX ADMIN — Medication 1 APPLICATION(S): at 23:41

## 2020-03-06 RX ADMIN — OXYCODONE HYDROCHLORIDE 5 MILLIGRAM(S): 5 TABLET ORAL at 22:56

## 2020-03-06 RX ADMIN — OXYCODONE HYDROCHLORIDE 5 MILLIGRAM(S): 5 TABLET ORAL at 23:43

## 2020-03-06 RX ADMIN — Medication 100 GRAM(S): at 17:08

## 2020-03-06 RX ADMIN — CHLORHEXIDINE GLUCONATE 1 APPLICATION(S): 213 SOLUTION TOPICAL at 00:08

## 2020-03-06 RX ADMIN — TAMSULOSIN HYDROCHLORIDE 0.4 MILLIGRAM(S): 0.4 CAPSULE ORAL at 21:48

## 2020-03-06 RX ADMIN — ATORVASTATIN CALCIUM 80 MILLIGRAM(S): 80 TABLET, FILM COATED ORAL at 21:48

## 2020-03-06 RX ADMIN — FENTANYL CITRATE 25 MICROGRAM(S): 50 INJECTION INTRAVENOUS at 15:15

## 2020-03-06 RX ADMIN — Medication 325 MILLIGRAM(S): at 15:07

## 2020-03-06 RX ADMIN — CHLORHEXIDINE GLUCONATE 15 MILLILITER(S): 213 SOLUTION TOPICAL at 05:08

## 2020-03-06 RX ADMIN — Medication 100 GRAM(S): at 05:09

## 2020-03-06 RX ADMIN — Medication 1 APPLICATION(S): at 15:30

## 2020-03-06 RX ADMIN — Medication 100 MILLIGRAM(S): at 05:09

## 2020-03-06 RX ADMIN — FENTANYL CITRATE 25 MICROGRAM(S): 50 INJECTION INTRAVENOUS at 15:00

## 2020-03-06 RX ADMIN — Medication 500 MICROGRAM(S): at 21:16

## 2020-03-06 RX ADMIN — Medication 12.5 MILLIGRAM(S): at 17:07

## 2020-03-06 RX ADMIN — HEPARIN SODIUM 5000 UNIT(S): 5000 INJECTION INTRAVENOUS; SUBCUTANEOUS at 17:08

## 2020-03-06 RX ADMIN — PREGABALIN 1000 MICROGRAM(S): 225 CAPSULE ORAL at 15:07

## 2020-03-06 RX ADMIN — Medication 10 MILLILITER(S): at 23:39

## 2020-03-06 RX ADMIN — FAMOTIDINE 20 MILLIGRAM(S): 10 INJECTION INTRAVENOUS at 17:07

## 2020-03-06 RX ADMIN — Medication 1000 MILLIGRAM(S): at 21:30

## 2020-03-06 NOTE — PROGRESS NOTE ADULT - SUBJECTIVE AND OBJECTIVE BOX
OPERATIVE PROCEDURE(s):      mics x 1 /VATS evacuation of hemothorax         POD # 11 /1    SURGEON(s): libby    SUBJECTIVE ASSESSMENT: pt was just extubated and is without complaints. Pt was brought to the OR yesterday for evacuation of hemothorax and tolerated procedure well.    Vital Signs Last 24 Hrs  T(C): 36.3 (06 Mar 2020 08:00), Max: 37.1 (05 Mar 2020 16:00)  T(F): 97.3 (06 Mar 2020 08:00), Max: 98.7 (05 Mar 2020 16:00)  HR: 72 (06 Mar 2020 10:30) (53 - 125)  BP: 106/62 (06 Mar 2020 10:00) (81/52 - 125/77)  BP(mean): 80 (06 Mar 2020 10:00) (62 - 99)  RR: 31 (06 Mar 2020 10:30) (8 - 44)  SpO2: 100% (06 Mar 2020 10:30) (94% - 100%)  03-05-20 @ 07:01  -  03-06-20 @ 07:00  --------------------------------------------------------  IN: 617.7 mL / OUT: 1310 mL / NET: -692.3 mL    03-06-20 @ 07:01  -  03-06-20 @ 12:31  --------------------------------------------------------  IN: 0 mL / OUT: 44 mL / NET: -44 mL        Physical Exam  General: alert and awake with slight periods of confusion  Chest: good air entry bl  CVS: s1s2  Abd: pos bs, soft nt  GI/ :  Ext: right groin at prior femoral a-line site there is a loculated hematoma that has not extended the marked off area  left thoracotomy intact      Central Venous Catheter: Yes[ ]  No[x ] , If Yes indication:           Day #    Mora Catheter: Yes  [ ] , No [ x] : If yes indication:                         Day #    NGT: Yes [ ] No [ x ]     If Yes Placement:                                          Day #    Post-Op Beta-Blockers: Yes [x ], No[ ], If No, then contraindication:    CHEST TUBE:  [ x] YES [ ] NO  OUTPUT: left pleural tubes put 110cc and 150cc respectively    per 24 hours    AIR LEAKS:  [ ] YES [x ] NO      EPICARDIAL WIRES:  [ ] YES [ x] NO      LABS:                        9.1    9.16  )-----------( 290      ( 06 Mar 2020 01:00 )             29.8     COUMADIN:   [ ] YES [x ] NO      03-06    143  |  101  |  21<H>  ----------------------------<  160<H>  4.5   |  30  |  0.7    Ca    8.3<L>      06 Mar 2020 01:00  Mg     2.0     03-06  MEDICATIONS  (STANDING):  acetaminophen  IVPB .. 1000 milliGRAM(s) IV Intermittent once  aspirin enteric coated 325 milliGRAM(s) Oral daily  atorvastatin 80 milliGRAM(s) Oral at bedtime  BACItracin   Ointment 1 Application(s) Topical every 8 hours  BACItracin   Ointment 1 Application(s) Topical three times a day  BUpivacaine liposome 1.3% Injectable (no eMAR) 20 milliLiter(s) Local Injection once  chlorhexidine 0.12% Liquid 15 milliLiter(s) Oral Mucosa every 12 hours  chlorhexidine 4% Liquid 1 Application(s) Topical <User Schedule>  cyanocobalamin 1000 MICROGram(s) Oral daily  famotidine    Tablet 20 milliGRAM(s) Oral two times a day  guaifenesin/dextromethorphan  Syrup 10 milliLiter(s) Oral every 6 hours  heparin  Injectable 5000 Unit(s) SubCutaneous every 12 hours  ipratropium    for Nebulization 500 MICROGram(s) Nebulizer every 6 hours  magnesium sulfate  IVPB 1 Gram(s) IV Intermittent every 12 hours  metoprolol tartrate 12.5 milliGRAM(s) Oral every 12 hours  polyethylene glycol 3350 17 Gram(s) Oral daily  simethicone 80 milliGRAM(s) Chew every 12 hours  tamsulosin 0.4 milliGRAM(s) Oral at bedtime  thiamine 100 milliGRAM(s) Oral daily    MEDICATIONS  (PRN):  glucagon  Injectable 1 milliGRAM(s) IntraMuscular once PRN Glucose LESS THAN 70 milligrams/deciliter  ondansetron  IVPB 4 milliGRAM(s) IV Intermittent once PRN Nausea and/or Vomiting  oxyCODONE    IR 5 milliGRAM(s) Oral every 4 hours PRN Mild Pain (1 - 3)      Allergies    No Known Allergies    Intolerances    Ambulation/Activity Status:    pt currently in bed because he was recently extubated    RADIOLOGY & ADDITIONAL TESTS:  Xray Chest 1 View- PORTABLE-Routine: AM   Indication: Shortness of Breath  Transport: Portable,  w/ Monitor  Provider's Contact #: [phone] (03-06-20 @ 08:28)  EXAM:  XR CHEST PORTABLE ROUTINE 1V            PROCEDURE DATE:  03/06/2020      INTERPRETATION:  Clinical History / Reason for exam: Status post CABG    Comparison : Chest radiograph 3/5/2020.    Technique/Positioning: Frontal radiograph of the chest.    Findings:    Support devices: Stable endotracheal tube. Two left chest tubes, unchanged. Interval removal of left-sided IJ central venous catheter.    Cardiac/mediastinum/hilum: Unchanged.    Lung parenchyma/Pleura: Unchanged diffuse leftopacities and pleural effusion. Right upper lung calcified granuloma. No pneumothorax.    Skeleton/soft tissues: Stable osseous structures.    Impression:      Unchanged diffuse left opacities and pleural effusion.    Support devices as per above.    Assessment/Plan: Patient is a 78 yo male s/p MICS x 1 now s/p VATS for evacuation of left hemothorax POD # 11/1  cont present tx  as per ct surgeon  s/p cabg- cont asa , lopressor, and statin  dvt/gi ppx  s/p evacuation of hemothorax- will keep chest tubes in and on suction until drainage dec    Social Service Disposition:

## 2020-03-06 NOTE — SWALLOW BEDSIDE ASSESSMENT ADULT - ASR SWALLOW DENTITION
missing dentition
dentures at home, incomplete dentition/uses dentures to eat
incomplete
uses dentures to eat/dentures at home, incomplete dentition

## 2020-03-06 NOTE — CHART NOTE - NSCHARTNOTEFT_GEN_A_CORE
Registered Dietitian Follow-Up     Patient Profile Reviewed                           Yes [x]   No [] Pt being follow by RD since January, new RD c/s placed on 3/4 for "pt with C-collar on, L chin skin breakdown noted, please assist"     Nutrition History Previously Obtained        Yes [x]  No []       Pertinent Subjective Information: Pt hasn't had meal d/t procedures. Previous, po intake was improving and recorded at % per EMR. Appetite was improving as well. Po intake varied throughout LOS, pt has been admitted since January 2020. Will continue to monitor po intake trends upon diet advancement.     Pertinent Medical Interventions: Pt NPO to have L thoracoscopy for chest wall hematoma sp VATS for evacuation of L hemothorax. SLP eval ordered sp procedure. Chest tube placement- in and n suction until drainage decreases.     Diet order: Dysphagia 3 soft, nectar thickened liquids; NPO MN     Anthropometrics:  - Ht. 72"  - Wt. dosing 105.2 kg/231 lbs vs (3/6) 91.2 kg/201 lbs, (3/5) 90.1 kg/199 lbs, (3/4) 92.9 kg/205 lbs   - %wt change ranging from 199-240 lbs throughout LOS; edema noted + fluid shifts + possible wt loss d/t prolonged LOS w/ frequent NPO days  - BMI 27.3 using  lbs  - IBW 80.9 kg/178 lbs     Pertinent Lab Data: (3/6) RBC 3.13, H/H 9.1/29.8, BUN 21, glucose 160; (1/21) HbA1c 5.7     Pertinent Meds: heparin, ancef, fentanyl, oxycodone, mylicon, flomax, lopressor, lipitor, cyanocobalamin, pepcid, thiamine, magnesium sulfate, zofran, miralax,      Physical Findings:  - Appearance: obese; (3/6) +1 edema L/R feet  - GI function: Last BM 3/3 no reported GI s/s  - Tubes: NA  - Oral/Mouth cavity: SLP eval pending (attempted f/u on 3/4 and 3/5 however pt NPO for procedure/test)   - Skin: L chest skin tear, chin wound      Nutrition Requirements (from RD note on 2/28)  Weight Used: 93.1kg lowest doc weight        Estimated Energy Needs    Continue [x] 2010-2178kcal (MSJ x 1.2-1.3 AF)          Estimated Protein Needs    Continue [x] 93-111g (1-1.2g/kg CBW)        Estimated Fluid Needs        Continue [x]   per CTU team        Nutrient Intake:  NPO for procedure/test; SLP eval ordered today 3/6        [x] Previous Nutrition Diagnosis: Inadequate oral intake            [x] Ongoing          [] Resolved       Nutrition Intervention: meals and snacks, medical food supplement, coordination of care     Goal/Expected Outcome: pt to maintain po intake >75% over the next 4 days     Indicator/Monitoring: diet order, energy intake, body composition, NFPF.    Recommendations: diet order per SLP recommendations, encourage po intake, provide assistance with meals PRN. WIll assess the need for oral supplements upon f/u.

## 2020-03-07 LAB
ANION GAP SERPL CALC-SCNC: 11 MMOL/L — SIGNIFICANT CHANGE UP (ref 7–14)
BUN SERPL-MCNC: 22 MG/DL — HIGH (ref 10–20)
CALCIUM SERPL-MCNC: 8.1 MG/DL — LOW (ref 8.5–10.1)
CHLORIDE SERPL-SCNC: 102 MMOL/L — SIGNIFICANT CHANGE UP (ref 98–110)
CO2 SERPL-SCNC: 31 MMOL/L — SIGNIFICANT CHANGE UP (ref 17–32)
CREAT SERPL-MCNC: 0.7 MG/DL — SIGNIFICANT CHANGE UP (ref 0.7–1.5)
GLUCOSE SERPL-MCNC: 118 MG/DL — HIGH (ref 70–99)
HCT VFR BLD CALC: 30.1 % — LOW (ref 42–52)
HGB BLD-MCNC: 9.4 G/DL — LOW (ref 14–18)
MAGNESIUM SERPL-MCNC: 2.2 MG/DL — SIGNIFICANT CHANGE UP (ref 1.8–2.4)
MCHC RBC-ENTMCNC: 30.1 PG — SIGNIFICANT CHANGE UP (ref 27–31)
MCHC RBC-ENTMCNC: 31.2 G/DL — LOW (ref 32–37)
MCV RBC AUTO: 96.5 FL — HIGH (ref 80–94)
NRBC # BLD: 0 /100 WBCS — SIGNIFICANT CHANGE UP (ref 0–0)
PLATELET # BLD AUTO: 259 K/UL — SIGNIFICANT CHANGE UP (ref 130–400)
POTASSIUM SERPL-MCNC: 3.9 MMOL/L — SIGNIFICANT CHANGE UP (ref 3.5–5)
POTASSIUM SERPL-SCNC: 3.9 MMOL/L — SIGNIFICANT CHANGE UP (ref 3.5–5)
RBC # BLD: 3.12 M/UL — LOW (ref 4.7–6.1)
RBC # FLD: 16 % — HIGH (ref 11.5–14.5)
SODIUM SERPL-SCNC: 144 MMOL/L — SIGNIFICANT CHANGE UP (ref 135–146)
WBC # BLD: 11.42 K/UL — HIGH (ref 4.8–10.8)
WBC # FLD AUTO: 11.42 K/UL — HIGH (ref 4.8–10.8)

## 2020-03-07 PROCEDURE — 71045 X-RAY EXAM CHEST 1 VIEW: CPT | Mod: 26

## 2020-03-07 RX ORDER — POTASSIUM CHLORIDE 20 MEQ
40 PACKET (EA) ORAL ONCE
Refills: 0 | Status: COMPLETED | OUTPATIENT
Start: 2020-03-07 | End: 2020-03-07

## 2020-03-07 RX ADMIN — Medication 100 GRAM(S): at 07:06

## 2020-03-07 RX ADMIN — Medication 100 MILLIGRAM(S): at 11:51

## 2020-03-07 RX ADMIN — Medication 325 MILLIGRAM(S): at 11:51

## 2020-03-07 RX ADMIN — Medication 100 GRAM(S): at 18:31

## 2020-03-07 RX ADMIN — Medication 1 APPLICATION(S): at 22:18

## 2020-03-07 RX ADMIN — Medication 1 APPLICATION(S): at 14:07

## 2020-03-07 RX ADMIN — TAMSULOSIN HYDROCHLORIDE 0.4 MILLIGRAM(S): 0.4 CAPSULE ORAL at 22:18

## 2020-03-07 RX ADMIN — Medication 40 MILLIEQUIVALENT(S): at 11:36

## 2020-03-07 RX ADMIN — Medication 10 MILLILITER(S): at 05:48

## 2020-03-07 RX ADMIN — Medication 10 MILLILITER(S): at 18:00

## 2020-03-07 RX ADMIN — SIMETHICONE 80 MILLIGRAM(S): 80 TABLET, CHEWABLE ORAL at 18:32

## 2020-03-07 RX ADMIN — Medication 12.5 MILLIGRAM(S): at 18:32

## 2020-03-07 RX ADMIN — Medication 10 MILLILITER(S): at 18:31

## 2020-03-07 RX ADMIN — SIMETHICONE 80 MILLIGRAM(S): 80 TABLET, CHEWABLE ORAL at 05:48

## 2020-03-07 RX ADMIN — FAMOTIDINE 20 MILLIGRAM(S): 10 INJECTION INTRAVENOUS at 05:47

## 2020-03-07 RX ADMIN — Medication 1 APPLICATION(S): at 05:47

## 2020-03-07 RX ADMIN — OXYCODONE HYDROCHLORIDE 5 MILLIGRAM(S): 5 TABLET ORAL at 14:02

## 2020-03-07 RX ADMIN — Medication 100 MILLIGRAM(S): at 05:55

## 2020-03-07 RX ADMIN — Medication 10 MILLILITER(S): at 23:44

## 2020-03-07 RX ADMIN — CHLORHEXIDINE GLUCONATE 1 APPLICATION(S): 213 SOLUTION TOPICAL at 05:48

## 2020-03-07 RX ADMIN — Medication 500 MICROGRAM(S): at 09:16

## 2020-03-07 RX ADMIN — ATORVASTATIN CALCIUM 80 MILLIGRAM(S): 80 TABLET, FILM COATED ORAL at 22:18

## 2020-03-07 RX ADMIN — HEPARIN SODIUM 5000 UNIT(S): 5000 INJECTION INTRAVENOUS; SUBCUTANEOUS at 18:31

## 2020-03-07 RX ADMIN — Medication 100 MILLIGRAM(S): at 22:20

## 2020-03-07 RX ADMIN — CHLORHEXIDINE GLUCONATE 15 MILLILITER(S): 213 SOLUTION TOPICAL at 05:55

## 2020-03-07 RX ADMIN — Medication 1 APPLICATION(S): at 22:34

## 2020-03-07 RX ADMIN — Medication 10 MILLILITER(S): at 11:35

## 2020-03-07 RX ADMIN — Medication 12.5 MILLIGRAM(S): at 05:55

## 2020-03-07 RX ADMIN — Medication 100 MILLIGRAM(S): at 14:07

## 2020-03-07 RX ADMIN — FAMOTIDINE 20 MILLIGRAM(S): 10 INJECTION INTRAVENOUS at 18:31

## 2020-03-07 RX ADMIN — PREGABALIN 1000 MICROGRAM(S): 225 CAPSULE ORAL at 11:35

## 2020-03-07 RX ADMIN — HEPARIN SODIUM 5000 UNIT(S): 5000 INJECTION INTRAVENOUS; SUBCUTANEOUS at 05:47

## 2020-03-07 RX ADMIN — POLYETHYLENE GLYCOL 3350 17 GRAM(S): 17 POWDER, FOR SOLUTION ORAL at 11:36

## 2020-03-07 NOTE — PROGRESS NOTE ADULT - SUBJECTIVE AND OBJECTIVE BOX
OPERATIVE PROCEDURE(s):  mics x  1             POD # 12    SURGEON(s): libby    SUBJECTIVE ASSESSMENT: pt is without complaints    Vital Signs Last 24 Hrs  T(C): 36.7 (07 Mar 2020 06:00), Max: 36.9 (06 Mar 2020 21:00)  T(F): 98.1 (07 Mar 2020 06:00), Max: 98.5 (06 Mar 2020 21:00)  HR: 82 (07 Mar 2020 08:16) (73 - 87)  BP: 82/50 (07 Mar 2020 08:00) (82/50 - 132/79)  BP(mean): 61 (07 Mar 2020 08:00) (61 - 101)  RR: 31 (07 Mar 2020 08:16) (17 - 37)  SpO2: 100% (07 Mar 2020 08:16) (100% - 100%)  03-06-20 @ 07:01  -  03-07-20 @ 07:00  --------------------------------------------------------  IN: 350 mL / OUT: 1185 mL / NET: -835 mL    03-07-20 @ 06:01  -  03-07-20 @ 13:13  --------------------------------------------------------  IN: 120 mL / OUT: 40 mL / NET: 80 mL    Physical Exam  General: alert and awake with slight periods of confusion  cervical collar in place   Chest: good air entry bl  CVS: s1s2  Abd: pos bs, soft nt  GI/ :  Ext: right groin at prior femoral a-line site there is a loculated hematoma that has not extended the marked off area  left thoracotomy intact        Central Venous Catheter: Yes[ ]  No[x ] , If Yes indication:           Day #    Mora Catheter: Yes  [x ] , No [ ] : If yes indication:       retention with phimosis                 Day #    NGT: Yes [ ] No [ x ]     If Yes Placement:                                          Day #    Post-Op Beta-Blockers: Yes [x ], No[ ], If No, then contraindication:    CHEST TUBE:  [ x] YES [ ] NO  OUTPUT:     per 24 hours    AIR LEAKS:  [ ] YES [ ] NO      EPICARDIAL WIRES:  [ ] YES [x ] NO      BOWEL MOVEMENT:  [ ] YES [x ] NO          LABS:                        9.4    11.42 )-----------( 259      ( 07 Mar 2020 01:54 )             30.1     COUMADIN:   [ ] YES [x ] NO      03-07    144  |  102  |  22<H>  ----------------------------<  118<H>  3.9   |  31  |  0.7    Ca    8.1<L>      07 Mar 2020 01:54  Mg     2.2     03-07    MEDICATIONS  (STANDING):  aspirin enteric coated 325 milliGRAM(s) Oral daily  atorvastatin 80 milliGRAM(s) Oral at bedtime  BACItracin   Ointment 1 Application(s) Topical every 8 hours  BACItracin   Ointment 1 Application(s) Topical three times a day  BUpivacaine liposome 1.3% Injectable (no eMAR) 20 milliLiter(s) Local Injection once  ceFAZolin   IVPB 1000 milliGRAM(s) IV Intermittent every 8 hours  ceFAZolin   IVPB      chlorhexidine 4% Liquid 1 Application(s) Topical <User Schedule>  cyanocobalamin 1000 MICROGram(s) Oral daily  famotidine    Tablet 20 milliGRAM(s) Oral two times a day  guaifenesin/dextromethorphan  Syrup 10 milliLiter(s) Oral every 6 hours  heparin  Injectable 5000 Unit(s) SubCutaneous every 12 hours  ipratropium    for Nebulization 500 MICROGram(s) Nebulizer every 6 hours  magnesium sulfate  IVPB 1 Gram(s) IV Intermittent every 12 hours  metoprolol tartrate 12.5 milliGRAM(s) Oral every 12 hours  polyethylene glycol 3350 17 Gram(s) Oral daily  simethicone 80 milliGRAM(s) Chew every 12 hours  tamsulosin 0.4 milliGRAM(s) Oral at bedtime  thiamine 100 milliGRAM(s) Oral daily    MEDICATIONS  (PRN):  glucagon  Injectable 1 milliGRAM(s) IntraMuscular once PRN Glucose LESS THAN 70 milligrams/deciliter  ondansetron  IVPB 4 milliGRAM(s) IV Intermittent once PRN Nausea and/or Vomiting  oxyCODONE    IR 5 milliGRAM(s) Oral every 4 hours PRN Mild Pain (1 - 3)      Allergies    No Known Allergies    Intolerances        Ambulation/Activity Status:  ambulated oob to chair      RADIOLOGY & ADDITIONAL TESTS:  Diet, DASH/TLC:   Sodium & Cholesterol Restricted  Consistent Carbohydrate Evening Snack  Dysphagia 2, Mechancial Soft, Nectar Consistency Fluid (DYS2NC)  Nectar Consistency (NECCON) (03-07-20 @ 08:47)  Xray Chest 1 View- PORTABLE-Routine: AM   Indication: Shortness of Breath  Transport: Portable,  w/ Monitor  Provider's Contact #: [phone] (03-07-20 @ 08:47)    EXAM:  XR CHEST PORTABLE ROUTINE 1V            PROCEDURE DATE:  03/07/2020      INTERPRETATION:  Clinical History / Reason for exam: Shortness of breath    Comparison : Chest radiograph March 6, 2020.    Technique/Positioning: AP    Findings:    Support devices: left apical chest tube and left basilar chest tube, unchanged. Interval removal of endotracheal tube.    Cardiac/mediastinum/hilum: Stable.    Lung parenchyma/Pleura: Left opacities and pleural effusion, unchanged.    Skeleton/soft tissues: Stable.    Impression:      Left opacities and pleural effusion, unchanged. 2 left-sided chest tubes, unchanged position.    Assessment/Plan: Patient is a 78 yo male s/p MICS x 1 now s/p VATS for evacuation of left hemothorax POD # 12/2  cont present tx  as per ct surgeon  s/p cabg- cont asa , lopressor, and statin  dvt/gi ppx  s/p evacuation of hemothorax- will keep chest tubes in and on suction until drainage dec  advance diet as per speech  ambulate with pt  Social Service Disposition:

## 2020-03-08 LAB
ANION GAP SERPL CALC-SCNC: 8 MMOL/L — SIGNIFICANT CHANGE UP (ref 7–14)
BUN SERPL-MCNC: 18 MG/DL — SIGNIFICANT CHANGE UP (ref 10–20)
CALCIUM SERPL-MCNC: 8.2 MG/DL — LOW (ref 8.5–10.1)
CHLORIDE SERPL-SCNC: 105 MMOL/L — SIGNIFICANT CHANGE UP (ref 98–110)
CO2 SERPL-SCNC: 35 MMOL/L — HIGH (ref 17–32)
CREAT SERPL-MCNC: 0.7 MG/DL — SIGNIFICANT CHANGE UP (ref 0.7–1.5)
GLUCOSE SERPL-MCNC: 102 MG/DL — HIGH (ref 70–99)
HCT VFR BLD CALC: 32.8 % — LOW (ref 42–52)
HGB BLD-MCNC: 9.7 G/DL — LOW (ref 14–18)
MAGNESIUM SERPL-MCNC: 2.1 MG/DL — SIGNIFICANT CHANGE UP (ref 1.8–2.4)
MCHC RBC-ENTMCNC: 29.1 PG — SIGNIFICANT CHANGE UP (ref 27–31)
MCHC RBC-ENTMCNC: 29.6 G/DL — LOW (ref 32–37)
MCV RBC AUTO: 98.5 FL — HIGH (ref 80–94)
NRBC # BLD: 0 /100 WBCS — SIGNIFICANT CHANGE UP (ref 0–0)
PLATELET # BLD AUTO: 260 K/UL — SIGNIFICANT CHANGE UP (ref 130–400)
POTASSIUM SERPL-MCNC: 4.5 MMOL/L — SIGNIFICANT CHANGE UP (ref 3.5–5)
POTASSIUM SERPL-SCNC: 4.5 MMOL/L — SIGNIFICANT CHANGE UP (ref 3.5–5)
RBC # BLD: 3.33 M/UL — LOW (ref 4.7–6.1)
RBC # FLD: 15.9 % — HIGH (ref 11.5–14.5)
SODIUM SERPL-SCNC: 148 MMOL/L — HIGH (ref 135–146)
WBC # BLD: 10.63 K/UL — SIGNIFICANT CHANGE UP (ref 4.8–10.8)
WBC # FLD AUTO: 10.63 K/UL — SIGNIFICANT CHANGE UP (ref 4.8–10.8)

## 2020-03-08 PROCEDURE — 99233 SBSQ HOSP IP/OBS HIGH 50: CPT

## 2020-03-08 PROCEDURE — 71045 X-RAY EXAM CHEST 1 VIEW: CPT | Mod: 26

## 2020-03-08 RX ORDER — SERTRALINE 25 MG/1
25 TABLET, FILM COATED ORAL DAILY
Refills: 0 | Status: DISCONTINUED | OUTPATIENT
Start: 2020-03-08 | End: 2020-03-09

## 2020-03-08 RX ADMIN — SIMETHICONE 80 MILLIGRAM(S): 80 TABLET, CHEWABLE ORAL at 05:23

## 2020-03-08 RX ADMIN — Medication 100 MILLIGRAM(S): at 15:09

## 2020-03-08 RX ADMIN — FAMOTIDINE 20 MILLIGRAM(S): 10 INJECTION INTRAVENOUS at 05:22

## 2020-03-08 RX ADMIN — Medication 1 APPLICATION(S): at 05:21

## 2020-03-08 RX ADMIN — Medication 500 MICROGRAM(S): at 15:50

## 2020-03-08 RX ADMIN — Medication 100 MILLIGRAM(S): at 21:46

## 2020-03-08 RX ADMIN — HEPARIN SODIUM 5000 UNIT(S): 5000 INJECTION INTRAVENOUS; SUBCUTANEOUS at 17:33

## 2020-03-08 RX ADMIN — Medication 100 GRAM(S): at 17:33

## 2020-03-08 RX ADMIN — Medication 12.5 MILLIGRAM(S): at 05:24

## 2020-03-08 RX ADMIN — CHLORHEXIDINE GLUCONATE 1 APPLICATION(S): 213 SOLUTION TOPICAL at 05:22

## 2020-03-08 RX ADMIN — Medication 12.5 MILLIGRAM(S): at 17:17

## 2020-03-08 RX ADMIN — Medication 325 MILLIGRAM(S): at 13:18

## 2020-03-08 RX ADMIN — TAMSULOSIN HYDROCHLORIDE 0.4 MILLIGRAM(S): 0.4 CAPSULE ORAL at 21:28

## 2020-03-08 RX ADMIN — FAMOTIDINE 20 MILLIGRAM(S): 10 INJECTION INTRAVENOUS at 17:33

## 2020-03-08 RX ADMIN — Medication 1 APPLICATION(S): at 15:09

## 2020-03-08 RX ADMIN — SERTRALINE 25 MILLIGRAM(S): 25 TABLET, FILM COATED ORAL at 17:17

## 2020-03-08 RX ADMIN — Medication 1 APPLICATION(S): at 21:27

## 2020-03-08 RX ADMIN — Medication 100 MILLIGRAM(S): at 13:19

## 2020-03-08 RX ADMIN — ATORVASTATIN CALCIUM 80 MILLIGRAM(S): 80 TABLET, FILM COATED ORAL at 21:28

## 2020-03-08 RX ADMIN — Medication 10 MILLILITER(S): at 17:33

## 2020-03-08 RX ADMIN — SIMETHICONE 80 MILLIGRAM(S): 80 TABLET, CHEWABLE ORAL at 17:17

## 2020-03-08 RX ADMIN — HEPARIN SODIUM 5000 UNIT(S): 5000 INJECTION INTRAVENOUS; SUBCUTANEOUS at 05:24

## 2020-03-08 RX ADMIN — POLYETHYLENE GLYCOL 3350 17 GRAM(S): 17 POWDER, FOR SOLUTION ORAL at 13:19

## 2020-03-08 RX ADMIN — Medication 100 GRAM(S): at 05:24

## 2020-03-08 RX ADMIN — Medication 10 MILLILITER(S): at 13:18

## 2020-03-08 RX ADMIN — Medication 5 MILLIGRAM(S): at 13:30

## 2020-03-08 RX ADMIN — Medication 500 MICROGRAM(S): at 03:28

## 2020-03-08 RX ADMIN — Medication 10 MILLILITER(S): at 05:24

## 2020-03-08 RX ADMIN — PREGABALIN 1000 MICROGRAM(S): 225 CAPSULE ORAL at 13:18

## 2020-03-08 RX ADMIN — Medication 500 MICROGRAM(S): at 09:11

## 2020-03-08 RX ADMIN — Medication 100 MILLIGRAM(S): at 05:21

## 2020-03-08 NOTE — SWALLOW BEDSIDE ASSESSMENT ADULT - ORAL PREPARATORY PHASE
Within functional limits

## 2020-03-08 NOTE — PROGRESS NOTE ADULT - ASSESSMENT
a/p:    CAD s/p CABG POD# 13  VAT L POD#2   Cervical spine Injury (? unstable)  HTN  Rate controlled A Fib/Flutter   Acute post thoracotomy pain      PO ASA, Statin and metop   Keep neck collar in place as per NeuroSx request  Keep L chest tube till am  PO flomax  OOB to chair  PO diet with glycemic control  DVT proph

## 2020-03-08 NOTE — SWALLOW BEDSIDE ASSESSMENT ADULT - ASR SWALLOW RECOMMEND DIAG
Pt would benefit from an instrumental swallow study to assess annmarie-pharyngeal integrity/FEES
to further assess annmarie-pharyngeal swallow integrity/FEES

## 2020-03-08 NOTE — SWALLOW BEDSIDE ASSESSMENT ADULT - ASR SWALLOW ASPIRATION MONITOR
cough/change of breathing pattern/position upright (90Y)/gurgly voice/fever/throat clearing/oral hygiene
oral hygiene/position upright (90Y)
oral hygiene/position upright (90Y)
position upright (90Y)/oral hygiene
cough/fever/throat clearing/oral hygiene/position upright (90Y)/gurgly voice/pneumonia/upper respiratory infection
pneumonia

## 2020-03-08 NOTE — PROGRESS NOTE ADULT - SUBJECTIVE AND OBJECTIVE BOX
CTU Attending Progress Daily Note     08 Mar 2020 09:17  POD# -   He has history of Atrial fibrillation  Neuropathy  HLD (hyperlipidemia)  HTN (hypertension)    Interval event for past 24 hr:  MED IVORY  79y had no event.   Current Complains:  MED IVORY has no new complains  HPI:  78 yo M with hx of HTN, A.fib (Eliquis), CHF, HLD brought in by EMS post cardiac arrest. As per wife at bedside, patient at the basement watching movie and was doing fine. Few mins later, patient came up and told the wife that he wasn't feeling right and that he felt funny. Then he crashed. Wife called the EMS who arrived 5 mins later. Upon EMS arrival, patient was found to be in PEA then asystole, s/p resuscitation for ~ 10 mins and ROSC achieved after 1 round of compression and epi. While on the way to hospital, patient had another cardiac arrest on the ambulant s/p CPR and ROSC achieved few mins later. As per family, patient was doing welll and at baseline prior to the episode. As baseline patient is fully functional. Denied any recent infection, recent hospitalization, recent ED visit, fever, chills.     In ED, patient was found to bradycardic and hypotensive. s/p 1 dose of atropine and patient was started on low dose Levophed with improvement in HR and BP. (12 Jan 2020 22:13)    OBJECTIVE:  ICU Vital Signs Last 24 Hrs  T(C): 37.1 (07 Mar 2020 20:00), Max: 37.1 (07 Mar 2020 20:00)  T(F): 98.8 (07 Mar 2020 20:00), Max: 98.8 (07 Mar 2020 20:00)  HR: 100 (08 Mar 2020 06:00) (84 - 100)  BP: 124/70 (08 Mar 2020 06:00) (112/73 - 131/80)  BP(mean): 91 (08 Mar 2020 06:00) (88 - 101)  ABP: --  ABP(mean): --  RR: 36 (08 Mar 2020 06:00) (21 - 43)  SpO2: 99% (08 Mar 2020 06:00) (88% - 100%)    I&O's Summary    07 Mar 2020 06:01  -  08 Mar 2020 07:00  --------------------------------------------------------  IN: 1090 mL / OUT: 795 mL / NET: 295 mL      I&O's Detail    07 Mar 2020 06:01  -  08 Mar 2020 07:00  --------------------------------------------------------  IN:    IV PiggyBack: 250 mL    Oral Fluid: 840 mL  Total IN: 1090 mL    OUT:    Chest Tube: 20 mL    Chest Tube: 20 mL    Indwelling Catheter - Urethral: 755 mL  Total OUT: 795 mL    Total NET: 295 mL        Adult Advanced Hemodynamics Last 24 Hrs  CVP(mm Hg): --  CVP(cm H2O): --  CO: --  CI: --  PA: --  PA(mean): --  PCWP: --  SVR: --  SVRI: --  PVR: --  PVRI: --    CAPILLARY BLOOD GLUCOSE        LABS:  ABG - ( 07 Mar 2020 03:24 )  pH, Arterial: 7.48  pH, Blood: x     /  pCO2: 47    /  pO2: 107   / HCO3: 35    / Base Excess: 10.7  /  SaO2: 99                                      9.7    10.63 )-----------( 260      ( 08 Mar 2020 03:00 )             32.8     03-08    148<H>  |  105  |  18  ----------------------------<  102<H>  4.5   |  35<H>  |  0.7    Ca    8.2<L>      08 Mar 2020 03:00  Mg     2.1     03-08            Home Medications:  aspirin 81 mg oral tablet, chewable: 1 tab(s) orally once a day (13 Feb 2020 14:27)  carvedilol 12.5 mg oral tablet: 1 tab(s) orally 2 times a day (16 Jan 2020 12:31)  clonazePAM 1 mg oral tablet: 1 tab(s) orally once a day (11 Feb 2020 10:55)  DULoxetine 30 mg oral delayed release capsule: 1 cap(s) orally once a day (16 Jan 2020 12:31)  Eliquis 5 mg oral tablet: 1 tab(s) orally 2 times a day (16 Jan 2020 12:31)  furosemide 40 mg oral tablet: 1 tab(s) orally once a day (16 Jan 2020 12:31)  gabapentin 300 mg oral capsule: 1 cap(s) orally 2 times a day (16 Jan 2020 12:31)  omeprazole 20 mg oral delayed release capsule: 1 cap(s) orally once a day (16 Jan 2020 12:31)  potassium chloride 10 mEq oral capsule, extended release: 1 cap(s) orally once a day (16 Jan 2020 12:31)  simvastatin 40 mg oral tablet: 1 tab(s) orally once a day (at bedtime) (16 Jan 2020 12:31)  telmisartan 80 mg oral tablet: 1 tab(s) orally once a day (16 Jan 2020 12:31)  Vitamin D3: cap(s) orally once a day (16 Jan 2020 12:31)    HOSPITAL MEDICATIONS:  MEDICATIONS  (STANDING):  aspirin enteric coated 325 milliGRAM(s) Oral daily  atorvastatin 80 milliGRAM(s) Oral at bedtime  BACItracin   Ointment 1 Application(s) Topical every 8 hours  BACItracin   Ointment 1 Application(s) Topical three times a day  BUpivacaine liposome 1.3% Injectable (no eMAR) 20 milliLiter(s) Local Injection once  ceFAZolin   IVPB 1000 milliGRAM(s) IV Intermittent every 8 hours  ceFAZolin   IVPB      chlorhexidine 4% Liquid 1 Application(s) Topical <User Schedule>  cyanocobalamin 1000 MICROGram(s) Oral daily  famotidine    Tablet 20 milliGRAM(s) Oral two times a day  guaifenesin/dextromethorphan  Syrup 10 milliLiter(s) Oral every 6 hours  heparin  Injectable 5000 Unit(s) SubCutaneous every 12 hours  ipratropium    for Nebulization 500 MICROGram(s) Nebulizer every 6 hours  magnesium sulfate  IVPB 1 Gram(s) IV Intermittent every 12 hours  metoprolol tartrate 12.5 milliGRAM(s) Oral every 12 hours  polyethylene glycol 3350 17 Gram(s) Oral daily  simethicone 80 milliGRAM(s) Chew every 12 hours  tamsulosin 0.4 milliGRAM(s) Oral at bedtime  thiamine 100 milliGRAM(s) Oral daily    MEDICATIONS  (PRN):  glucagon  Injectable 1 milliGRAM(s) IntraMuscular once PRN Glucose LESS THAN 70 milligrams/deciliter  ondansetron  IVPB 4 milliGRAM(s) IV Intermittent once PRN Nausea and/or Vomiting  oxyCODONE    IR 5 milliGRAM(s) Oral every 4 hours PRN Mild Pain (1 - 3)      REVIEW OF SYSTEMS:  CONSTITUTIONAL: [X] all negative; [ ] weakness, [ ] fevers, [ ] chills  EYES/ENT: [X] all negative; [ ] visual changes, [ ] vertigo, [ ] throat pain   NECK: [X] all negative; [ ] pain, [ ] stiffness  RESPIRATORY: [] all negative, [ ] cough, [ ] wheezing, [ ] hemoptysis, [ ] shortness of breath  CARDIOVASCULAR: [] all negative; [ ] chest pain, [ ] palpitations, [ ] orthopnea  GASTROINTESTINAL: [X] all negative; [ ]abdominal pain, [ ] nausea, [ ] vomiting, [ ] hematemesis, [ ] diarrhea, [ ] constipation, [ ] melena, [ ] hematochezia.  GENITOURINARY: [X] all negative; [ ] dysuria, [ ] frequency, [ ] hematuria  NEUROLOGICAL: [X] all negative; [ ] numbness, [ ] weakness  SKIN: [X] all negative; [ ] itching, [ ] burning, [ ] rashes, [ ] lesions   All other review of systems is negative unless indicated above.    [  ] Unable to assess ROS because     PHYSICAL EXAM:          CONSTITUTIONAL: Well-developed; well-nourished; in no acute distress.   	SKIN: warm, dry  	HEAD: Normocephalic; atraumatic.  	EYES: PERRL, EOM, no conj injection, sclera clear  	ENT: No nasal discharge; airway clear.  	NECK: Supple; non tender.  No midline ttp ctls  	CARD: S1, S2 normal; no murmurs, gallops, or rubs. Regular rate and rhythm. 2+ RPs and DPs bilat, no carotid bruits, no pedal   edema, no calf pain b/l  	RESP: CTA  bilat good air movement No wheezes, rales or rhonchi.  	ABD: Soft, not tender, not distended, no CVA ttp no rebound or guarding, bowel sounds present  	EXT: Normal ROM.  No clubbing, cyanosis or edema.   	  	NEURO: Alert, awake, motor 5/5 R, 5/5 L        RADIOLOGY:  xray    I spent 45 minutes of critical care time examining patient, reviewing vitals, labs, medications, imaging and discussing with the team goals of care to prevent life-threatening in this patient who is at high risk for deterioration or death due to:

## 2020-03-09 LAB
ANION GAP SERPL CALC-SCNC: 10 MMOL/L — SIGNIFICANT CHANGE UP (ref 7–14)
BUN SERPL-MCNC: 14 MG/DL — SIGNIFICANT CHANGE UP (ref 10–20)
CALCIUM SERPL-MCNC: 8.2 MG/DL — LOW (ref 8.5–10.1)
CHLORIDE SERPL-SCNC: 102 MMOL/L — SIGNIFICANT CHANGE UP (ref 98–110)
CO2 SERPL-SCNC: 30 MMOL/L — SIGNIFICANT CHANGE UP (ref 17–32)
CREAT SERPL-MCNC: 0.6 MG/DL — LOW (ref 0.7–1.5)
GLUCOSE BLDC GLUCOMTR-MCNC: 141 MG/DL — HIGH (ref 70–99)
GLUCOSE SERPL-MCNC: 111 MG/DL — HIGH (ref 70–99)
HCT VFR BLD CALC: 35.3 % — LOW (ref 42–52)
HGB BLD-MCNC: 10.4 G/DL — LOW (ref 14–18)
MAGNESIUM SERPL-MCNC: 2.1 MG/DL — SIGNIFICANT CHANGE UP (ref 1.8–2.4)
MCHC RBC-ENTMCNC: 28.9 PG — SIGNIFICANT CHANGE UP (ref 27–31)
MCHC RBC-ENTMCNC: 29.5 G/DL — LOW (ref 32–37)
MCV RBC AUTO: 98.1 FL — HIGH (ref 80–94)
NRBC # BLD: 0 /100 WBCS — SIGNIFICANT CHANGE UP (ref 0–0)
PLATELET # BLD AUTO: 269 K/UL — SIGNIFICANT CHANGE UP (ref 130–400)
POTASSIUM SERPL-MCNC: 4.6 MMOL/L — SIGNIFICANT CHANGE UP (ref 3.5–5)
POTASSIUM SERPL-SCNC: 4.6 MMOL/L — SIGNIFICANT CHANGE UP (ref 3.5–5)
RBC # BLD: 3.6 M/UL — LOW (ref 4.7–6.1)
RBC # FLD: 16 % — HIGH (ref 11.5–14.5)
SODIUM SERPL-SCNC: 142 MMOL/L — SIGNIFICANT CHANGE UP (ref 135–146)
WBC # BLD: 10.15 K/UL — SIGNIFICANT CHANGE UP (ref 4.8–10.8)
WBC # FLD AUTO: 10.15 K/UL — SIGNIFICANT CHANGE UP (ref 4.8–10.8)

## 2020-03-09 PROCEDURE — 99233 SBSQ HOSP IP/OBS HIGH 50: CPT

## 2020-03-09 PROCEDURE — 71045 X-RAY EXAM CHEST 1 VIEW: CPT | Mod: 26,77

## 2020-03-09 PROCEDURE — 71045 X-RAY EXAM CHEST 1 VIEW: CPT | Mod: 26

## 2020-03-09 PROCEDURE — 99221 1ST HOSP IP/OBS SF/LOW 40: CPT

## 2020-03-09 RX ORDER — SERTRALINE 25 MG/1
50 TABLET, FILM COATED ORAL DAILY
Refills: 0 | Status: DISCONTINUED | OUTPATIENT
Start: 2020-03-09 | End: 2020-03-09

## 2020-03-09 RX ORDER — SERTRALINE 25 MG/1
25 TABLET, FILM COATED ORAL DAILY
Refills: 0 | Status: DISCONTINUED | OUTPATIENT
Start: 2020-03-09 | End: 2020-03-12

## 2020-03-09 RX ADMIN — Medication 12.5 MILLIGRAM(S): at 05:35

## 2020-03-09 RX ADMIN — Medication 1 APPLICATION(S): at 16:05

## 2020-03-09 RX ADMIN — PREGABALIN 1000 MICROGRAM(S): 225 CAPSULE ORAL at 12:50

## 2020-03-09 RX ADMIN — FAMOTIDINE 20 MILLIGRAM(S): 10 INJECTION INTRAVENOUS at 17:40

## 2020-03-09 RX ADMIN — Medication 100 GRAM(S): at 06:08

## 2020-03-09 RX ADMIN — CHLORHEXIDINE GLUCONATE 1 APPLICATION(S): 213 SOLUTION TOPICAL at 05:35

## 2020-03-09 RX ADMIN — Medication 1 APPLICATION(S): at 05:36

## 2020-03-09 RX ADMIN — Medication 1 APPLICATION(S): at 15:00

## 2020-03-09 RX ADMIN — SIMETHICONE 80 MILLIGRAM(S): 80 TABLET, CHEWABLE ORAL at 05:35

## 2020-03-09 RX ADMIN — Medication 1 APPLICATION(S): at 22:11

## 2020-03-09 RX ADMIN — SIMETHICONE 80 MILLIGRAM(S): 80 TABLET, CHEWABLE ORAL at 17:40

## 2020-03-09 RX ADMIN — Medication 10 MILLILITER(S): at 17:40

## 2020-03-09 RX ADMIN — FAMOTIDINE 20 MILLIGRAM(S): 10 INJECTION INTRAVENOUS at 05:35

## 2020-03-09 RX ADMIN — HEPARIN SODIUM 5000 UNIT(S): 5000 INJECTION INTRAVENOUS; SUBCUTANEOUS at 05:33

## 2020-03-09 RX ADMIN — POLYETHYLENE GLYCOL 3350 17 GRAM(S): 17 POWDER, FOR SOLUTION ORAL at 12:52

## 2020-03-09 RX ADMIN — Medication 100 MILLIGRAM(S): at 16:04

## 2020-03-09 RX ADMIN — Medication 10 MILLILITER(S): at 12:50

## 2020-03-09 RX ADMIN — SERTRALINE 25 MILLIGRAM(S): 25 TABLET, FILM COATED ORAL at 12:50

## 2020-03-09 RX ADMIN — Medication 12.5 MILLIGRAM(S): at 17:40

## 2020-03-09 RX ADMIN — Medication 100 GRAM(S): at 17:42

## 2020-03-09 RX ADMIN — ATORVASTATIN CALCIUM 80 MILLIGRAM(S): 80 TABLET, FILM COATED ORAL at 22:11

## 2020-03-09 RX ADMIN — TAMSULOSIN HYDROCHLORIDE 0.4 MILLIGRAM(S): 0.4 CAPSULE ORAL at 22:11

## 2020-03-09 RX ADMIN — Medication 100 MILLIGRAM(S): at 12:50

## 2020-03-09 RX ADMIN — Medication 325 MILLIGRAM(S): at 12:50

## 2020-03-09 RX ADMIN — Medication 100 MILLIGRAM(S): at 06:08

## 2020-03-09 RX ADMIN — Medication 5 MILLILITER(S): at 05:34

## 2020-03-09 RX ADMIN — HEPARIN SODIUM 5000 UNIT(S): 5000 INJECTION INTRAVENOUS; SUBCUTANEOUS at 17:40

## 2020-03-09 NOTE — SWALLOW FEES ASSESSMENT ADULT - DIAGNOSTIC IMPRESSIONS
Moderate pharyngeal dysphagia for thin liquids. Further trials unable to be assessed 2' pt with difficulty tolerating scope. Test limited
+severe pharyngeal dysphagia for thin, mild for nectar, puree and soft solids.

## 2020-03-09 NOTE — PROGRESS NOTE ADULT - SUBJECTIVE AND OBJECTIVE BOX
OPERATIVE PROCEDURE(s):     mics x 1   / left vats for evacuation of hemothorax       POD # 14 / 3    SURGEON(s): libby    SUBJECTIVE ASSESSMENT: pt is without complaints     Vital Signs Last 24 Hrs  T(C): 36.2 (09 Mar 2020 12:00), Max: 36.8 (09 Mar 2020 08:00)  T(F): 97.2 (09 Mar 2020 12:00), Max: 98.2 (09 Mar 2020 08:00)  HR: 88 (09 Mar 2020 13:00) (81 - 104)  BP: 115/72 (09 Mar 2020 13:00) (103/60 - 144/73)  BP(mean): 86 (09 Mar 2020 13:00) (75 - 102)  RR: 23 (09 Mar 2020 13:00) (21 - 50)  SpO2: 95% (09 Mar 2020 13:00) (93% - 98%)  03-08-20 @ 07:01  -  03-09-20 @ 07:00  --------------------------------------------------------  IN: 700 mL / OUT: 775 mL / NET: -75 mL    03-09-20 @ 07:01  -  03-09-20 @ 15:11  --------------------------------------------------------  IN: 600 mL / OUT: 145 mL / NET: 455 mL        Physical Exam  General: alert and oriented x 3  Chest: cta bl  CVS: s1s2  Abd: pos bs soft nt  GI/ :  Ext: no sig edema  incisions- c/d/i    Central Venous Catheter: Yes[ ]  No[ x] , If Yes indication:           Day #    Mora Catheter: Yes  [ x] , No [ ] : If yes indication:      retention                   Day #    NGT: Yes [ ] No [  x]     If Yes Placement:                                          Day #    Post-Op Beta-Blockers: Yes [x ], No[ ], If No, then contraindication:    CHEST TUBE:  [x ] YES [ ] NO   AIR LEAKS:  [ ] YES [x ] NO      EPICARDIAL WIRES:  [ ] YES [x ] NO      BOWEL MOVEMENT:  [x] YES [ ] NO          LABS:                        10.4   10.15 )-----------( 269      ( 09 Mar 2020 03:00 )             35.3     COUMADIN:   [ ] YES [ x] NO      03-09    142  |  102  |  14  ----------------------------<  111<H>  4.6   |  30  |  0.6<L>    Ca    8.2<L>      09 Mar 2020 03:00  Mg     2.1     03-09    MEDICATIONS  (STANDING):  aspirin enteric coated 325 milliGRAM(s) Oral daily  atorvastatin 80 milliGRAM(s) Oral at bedtime  BACItracin   Ointment 1 Application(s) Topical every 8 hours  BACItracin   Ointment 1 Application(s) Topical three times a day  BUpivacaine liposome 1.3% Injectable (no eMAR) 20 milliLiter(s) Local Injection once  ceFAZolin   IVPB 1000 milliGRAM(s) IV Intermittent every 8 hours  ceFAZolin   IVPB      chlorhexidine 4% Liquid 1 Application(s) Topical <User Schedule>  cyanocobalamin 1000 MICROGram(s) Oral daily  famotidine    Tablet 20 milliGRAM(s) Oral two times a day  guaifenesin/dextromethorphan  Syrup 10 milliLiter(s) Oral every 6 hours  heparin  Injectable 5000 Unit(s) SubCutaneous every 12 hours  ipratropium    for Nebulization 500 MICROGram(s) Nebulizer every 6 hours  magnesium sulfate  IVPB 1 Gram(s) IV Intermittent every 12 hours  metoprolol tartrate 12.5 milliGRAM(s) Oral every 12 hours  polyethylene glycol 3350 17 Gram(s) Oral daily  sertraline 25 milliGRAM(s) Oral daily  simethicone 80 milliGRAM(s) Chew every 12 hours  tamsulosin 0.4 milliGRAM(s) Oral at bedtime  thiamine 100 milliGRAM(s) Oral daily    MEDICATIONS  (PRN):  bisacodyl 5 milliGRAM(s) Oral every 12 hours PRN Constipation  glucagon  Injectable 1 milliGRAM(s) IntraMuscular once PRN Glucose LESS THAN 70 milligrams/deciliter  ondansetron  IVPB 4 milliGRAM(s) IV Intermittent once PRN Nausea and/or Vomiting  oxyCODONE    IR 5 milliGRAM(s) Oral every 4 hours PRN Mild Pain (1 - 3)      Allergies    No Known Allergies    Intolerances    Ambulation/Activity Status: ambulated with assistance    RADIOLOGY & ADDITIONAL TESTS:  Xray Chest 1 View- PORTABLE-Routine: AM   Indication: Shortness of Breath  Transport: Portable,  w/ Monitor  Provider's Contact #: [phone] (03-09-20 @ 08:45)  EXAM:  XR CHEST PORTABLE IMMED 1V            PROCEDURE DATE:  03/09/2020      INTERPRETATION:  Clinical History / Reason for exam: Shortness of breath    Comparison : Chest radiograph 3/9/2020 4:23 AM.    Technique/Positioning: Frontal radiograph of the chest.    Findings:    Support devices: Interval removal of left upper chest tube, repositioning of left lower chest tube.    Cardiac/mediastinum/hilum: Unchanged.    Lung parenchyma/Pleura: Unchanged left pleural effusion and left midlungopacity. Right lung is clear. No pneumothorax.    Skeleton/soft tissues: Stable osseous structures.    Impression:      Unchanged left pleural effusion and left midlung opacity.    Interval removal of left upper chest tube, repositioning of left lower chest tube.      Assessment/Plan: Patient is a 78 yo male s/p mics x 1 pod # 14/ 3  cont present tx  as per ct surgeon  s/p cabg- cont asa , lopressor, and statin  d/c chest tubes  dvt/gi ppx  s/p evacuation of hemothorax- will keep chest tubes in and on suction until drainage dec  advance diet as per speech  ambulate with pt  Social Service Disposition skilled nursing facility

## 2020-03-09 NOTE — CONSULT NOTE ADULT - REASON FOR ADMISSION
Cardiac arrest

## 2020-03-09 NOTE — SWALLOW FEES ASSESSMENT ADULT - SLP PERTINENT HISTORY OF CURRENT PROBLEM
Pt awake, mi[place] J collar in place. Pt with +confusion
pt s/p right radial pseudoaneurysm repair; Pt presented to ED s/p cardiac arrest x2, s/p resuscitation for ~10 mins, ROSC achieved. cardiac arrest again in ambulance s/p CPR ~5mins and ROSC achieved. initially extubated 1/13. CTH (-). CXR (-). CT Chest revealed 4.4 cm retroesophageal soft tissue density within the thorax may represent hematoma. CT spine revealed Widening of the C5-6 disc space and fragmentation of the anterior bridging osteophyte at this level.

## 2020-03-09 NOTE — PROGRESS NOTE ADULT - ASSESSMENT
PROBLEMS:  I spent 45 minutes of time examining patient, reviewing vitals, labs, medications, imaging and discussing with the team goals of care to prevent life-threatening in this patient who is at high risk for deterioration or death due to:    1.	Postop Hemothorax - resolving - d/c CT today  2.	CAD - Lopressor 12.5 q 12, , Lipitor 80  3.	Depression - on zoloft 25  4.	urinary retention - folley reinserted - need urology follow up  5.	poor ambulation - need PT and rehab  6.	Skin tears - local care    PLAN  Neuro: move all 4 extremities. no sensory or motor deficits  Pain management.   oxyCODONE    IR 5 milliGRAM(s) Oral every 4 hours PRN  sertraline 25 milliGRAM(s) Oral daily    Pulm: Wean off supplemental oxygen as able. Daily CXR. Encourage coughing, deep breathing and use of incentive spirometry.   guaifenesin/dextromethorphan  Syrup 10 milliLiter(s) Oral every 6 hours  ipratropium    for Nebulization 500 MICROGram(s) Nebulizer every 6 hours    Cardio: Monitor telemetry/alarms. Continue supportive care   metoprolol tartrate 12.5 milliGRAM(s) Oral every 12 hours  tamsulosin 0.4 milliGRAM(s) Oral at bedtime    GI: Continue stool softeners.    bisacodyl 5 milliGRAM(s) Oral every 12 hours PRN  famotidine    Tablet 20 milliGRAM(s) Oral two times a day  simethicone 80 milliGRAM(s) Chew every 12 hours    Nutrition: Continue present diet  Endocrine and glucose control:   atorvastatin 80 milliGRAM(s) Oral at bedtime  glucagon  Injectable 1 milliGRAM(s) IntraMuscular once PRN    Renal: monitor urine output, supplement electrolytes as needed,     Vasc: Heparin SC and/or SCDs for DVT prophylaxis  heparin  Injectable 5000 Unit(s) SubCutaneous every 12 hours    ID: Stable, no fever , no chills. Off antibiotics.  ceFAZolin   IVPB 1000 milliGRAM(s) IV Intermittent every 8 hours  ceFAZolin   IVPB        Tubes: Monitor Chest tube output  Therapy: OOB/ambulate  Disposition: start planing discharge home or placement    Pertinent clinical, laboratory, radiographic, hemodynamic, echocardiographic, respiratory data, microbiologic data and chart were reviewed and analyzed frequently throughout the course of the day and night. GI and DVT prophylaxis, glycemic control, head of bed elevation and skin care issues were addressed.  Patient seen, examined and plan discussed with CT Surgery / CTICU team during rounds.    [ ] The patient remains in critical and unstable condition, and requires ICU care and monitoring  [x ] The patient is improving but requires continued monitoring and adjustment of therapy

## 2020-03-09 NOTE — SWALLOW FEES ASSESSMENT ADULT - SLP GENERAL OBSERVATIONS
pt s/p right radial pseudoaneurysm repair; Pt presented to ED s/p cardiac arrest x2, s/p resuscitation for ~10 mins, ROSC achieved. cardiac arrest again in ambulance s/p CPR ~5mins and ROSC achieved. CTH (-) CT Chest revealed 4.4 cm retroesophageal soft tissue density within the thorax may represent hematoma. CT spine revealed Widening of the C5-6 disc space and fragmentation of the anterior bridging osteophyte at this level. Hospital course also significant for MICS X1 (minimally invasive cardiac surgery CABG)
Pt received OOB to chair asleep, woke to verbal stim, +lacey casiano

## 2020-03-09 NOTE — CONSULT NOTE ADULT - SUBJECTIVE AND OBJECTIVE BOX
Patient is a 79y old M w/ hx of HTN, A.fib, CHF, a/w MI s/p CABG - Urology consulted for urinary retention. Pt seen and examined at bedside. Pt seen sitting comfortably in bed. Pt reports waking up 1-2x a night to use the bathroom. Pt admits to mild urgency without any incontinence. Denies any pain, fever, chills, urinary frequency, dribbling.       HPI:  78 yo M with hx of HTN, A.fib (Eliquis), CHF, HLD brought in by EMS post cardiac arrest. As per wife at bedside, patient at the basement watching movie and was doing fine. Few mins later, patient came up and told the wife that he wasn't feeling right and that he felt funny. Then he crashed. Wife called the EMS who arrived 5 mins later. Upon EMS arrival, patient was found to be in PEA then asystole, s/p resuscitation for ~ 10 mins and ROSC achieved after 1 round of compression and epi. While on the way to hospital, patient had another cardiac arrest on the ambulant s/p CPR and ROSC achieved few mins later. As per family, patient was doing welll and at baseline prior to the episode. As baseline patient is fully functional. Denied any recent infection, recent hospitalization, recent ED visit, fever, chills.     In ED, patient was found to bradycardic and hypotensive. s/p 1 dose of atropine and patient was started on low dose Levophed with improvement in HR and BP. (2020 22:13)      PAST MEDICAL & SURGICAL HISTORY:  Atrial fibrillation  Neuropathy  HLD (hyperlipidemia)  HTN (hypertension)  History of cholecystectomy  History of cholecystectomy      REVIEW OF SYSTEMS:    [ x}  a 10 point review of systems was negative except where noted    [  ]  Due to altered mental status/intubation, subjective information was not able t be obtained from the patient.  History was obtained, to the extent possible, from review of the chart and collateral sources of information.            MEDICATIONS  (STANDING):  aspirin enteric coated 325 milliGRAM(s) Oral daily  atorvastatin 80 milliGRAM(s) Oral at bedtime  BACItracin   Ointment 1 Application(s) Topical every 8 hours  BACItracin   Ointment 1 Application(s) Topical three times a day  BUpivacaine liposome 1.3% Injectable (no eMAR) 20 milliLiter(s) Local Injection once  ceFAZolin   IVPB 1000 milliGRAM(s) IV Intermittent every 8 hours  ceFAZolin   IVPB      chlorhexidine 4% Liquid 1 Application(s) Topical <User Schedule>  cyanocobalamin 1000 MICROGram(s) Oral daily  famotidine    Tablet 20 milliGRAM(s) Oral two times a day  guaifenesin/dextromethorphan  Syrup 10 milliLiter(s) Oral every 6 hours  heparin  Injectable 5000 Unit(s) SubCutaneous every 12 hours  ipratropium    for Nebulization 500 MICROGram(s) Nebulizer every 6 hours  magnesium sulfate  IVPB 1 Gram(s) IV Intermittent every 12 hours  metoprolol tartrate 12.5 milliGRAM(s) Oral every 12 hours  polyethylene glycol 3350 17 Gram(s) Oral daily  sertraline 25 milliGRAM(s) Oral daily  simethicone 80 milliGRAM(s) Chew every 12 hours  tamsulosin 0.4 milliGRAM(s) Oral at bedtime  thiamine 100 milliGRAM(s) Oral daily    MEDICATIONS  (PRN):  bisacodyl 5 milliGRAM(s) Oral every 12 hours PRN Constipation  glucagon  Injectable 1 milliGRAM(s) IntraMuscular once PRN Glucose LESS THAN 70 milligrams/deciliter  ondansetron  IVPB 4 milliGRAM(s) IV Intermittent once PRN Nausea and/or Vomiting  oxyCODONE    IR 5 milliGRAM(s) Oral every 4 hours PRN Mild Pain (1 - 3)      Allergies    No Known Allergies    Intolerances    SOCIAL HISTORY: No illicit drug use    Vital Signs Last 24 Hrs  T(C): 36.7 (09 Mar 2020 04:00), Max: 36.7 (09 Mar 2020 04:00)  T(F): 98 (09 Mar 2020 04:00), Max: 98 (09 Mar 2020 04:00)  HR: 88 (09 Mar 2020 10:00) (81 - 104)  BP: 118/69 (09 Mar 2020 10:00) (103/60 - 144/73)  BP(mean): 88 (09 Mar 2020 10:00) (75 - 102)  RR: 23 (09 Mar 2020 10:00) (21 - 50)  SpO2: 95% (09 Mar 2020 10:00) (93% - 100%)    Daily     Daily Weight in k.8 (09 Mar 2020 03:00)    PHYSICAL EXAM:    Constitutional: NAD, alert and awake, Pt seen sitting comfortably in his chair, well-developed  Skin: Nondiaphoretic   HEENT: EOMI, no scleral injection   Neck: FROM, supple   Back: No CVA tenderness,   Respiratory: No accessory respiratory muscle use  Abd: Soft, NT/ND, ecchymosis to abdomen, pubis and tracking down to scrotum  : Phimosis with meatus visible, testicle is symmetrical nontender, no masses w/ Mora in place: draining clear to blood tinged urine   MILE deferred due to Cardiac status   Extremities: no edema  Neurological: A/O x 3  Psychiatric: Normal mood, normal affect      I&O's Summary    08 Mar 2020 07:01  -  09 Mar 2020 07:00  --------------------------------------------------------  IN: 700 mL / OUT: 775 mL / NET: -75 mL    09 Mar 2020 07:01  -  09 Mar 2020 10:55  --------------------------------------------------------  IN: 240 mL / OUT: 80 mL / NET: 160 mL        LABS:                        10.4   10.15 )-----------( 269      ( 09 Mar 2020 03:00 )             35.3     03-09    142  |  102  |  14  ----------------------------<  111<H>  4.6   |  30  |  0.6<L>    Ca    8.2<L>      09 Mar 2020 03:00  Mg     2.1     03-09          Urine Culture:         RADIOLOGY & ADDITIONAL STUDIES:

## 2020-03-09 NOTE — SWALLOW FEES ASSESSMENT ADULT - RECOMMENDED FEEDING/EATING TECHNIQUES
consecutive swallows/small sips/bites
small sips/bites/position upright (90 degrees)/oral hygiene/encourage consecutive swallows

## 2020-03-09 NOTE — PROGRESS NOTE ADULT - SUBJECTIVE AND OBJECTIVE BOX
CTU Attending Progress Daily Note     09 Mar 2020 10:21  Admited 01-12-20, Hospital Day 57d  POD# - 14    HPI:  78 yo M with hx of HTN, A.fib (Eliquis), CHF, HLD brought in by EMS post cardiac arrest. As per wife at bedside, patient at the basement watching movie and was doing fine. Few mins later, patient came up and told the wife that he wasn't feeling right and that he felt funny. Then he crashed. Wife called the EMS who arrived 5 mins later. Upon EMS arrival, patient was found to be in PEA then asystole, s/p resuscitation for ~ 10 mins and ROSC achieved after 1 round of compression and epi. While on the way to hospital, patient had another cardiac arrest on the ambulant s/p CPR and ROSC achieved few mins later. As per family, patient was doing welll and at baseline prior to the episode. As baseline patient is fully functional. Denied any recent infection, recent hospitalization, recent ED visit, fever, chills.     In ED, patient was found to bradycardic and hypotensive. s/p 1 dose of atropine and patient was started on low dose Levophed with improvement in HR and BP. (12 Jan 2020 22:13)    Home Medications:  aspirin 81 mg oral tablet, chewable: 1 tab(s) orally once a day (13 Feb 2020 14:27)  carvedilol 12.5 mg oral tablet: 1 tab(s) orally 2 times a day (16 Jan 2020 12:31)  clonazePAM 1 mg oral tablet: 1 tab(s) orally once a day (11 Feb 2020 10:55)  DULoxetine 30 mg oral delayed release capsule: 1 cap(s) orally once a day (16 Jan 2020 12:31)  Eliquis 5 mg oral tablet: 1 tab(s) orally 2 times a day (16 Jan 2020 12:31)  furosemide 40 mg oral tablet: 1 tab(s) orally once a day (16 Jan 2020 12:31)  gabapentin 300 mg oral capsule: 1 cap(s) orally 2 times a day (16 Jan 2020 12:31)  omeprazole 20 mg oral delayed release capsule: 1 cap(s) orally once a day (16 Jan 2020 12:31)  potassium chloride 10 mEq oral capsule, extended release: 1 cap(s) orally once a day (16 Jan 2020 12:31)  simvastatin 40 mg oral tablet: 1 tab(s) orally once a day (at bedtime) (16 Jan 2020 12:31)  telmisartan 80 mg oral tablet: 1 tab(s) orally once a day (16 Jan 2020 12:31)  Vitamin D3: cap(s) orally once a day (16 Jan 2020 12:31)    FAMILY HISTORY:    PAST MEDICAL & SURGICAL HISTORY:  Atrial fibrillation  Neuropathy  HLD (hyperlipidemia)  HTN (hypertension)  History of cholecystectomy  History of cholecystectomy    Interval event for past 24 hr:  MED IVORY  79y had no event.   Current Complains:  MED IVORY has no new complains  Allergies    No Known Allergies    Intolerances      OBJECTIVE:  Vitals last 24 hrs  T(C): 36.7 (03-09-20 @ 04:00), Max: 36.7 (03-09-20 @ 04:00)  T(F): 98 (03-09-20 @ 04:00), Max: 98 (03-09-20 @ 04:00)  HR: 87 (03-09-20 @ 08:00) (81 - 104)  BP: 103/60 (03-09-20 @ 08:00) (103/60 - 144/73)  ABP: --  ABP(mean): --  RR: 34 (03-09-20 @ 08:00) (21 - 50)  SpO2: 95% (03-09-20 @ 08:00) (93% - 100%)      03-08-20 @ 07:01  -  03-09-20 @ 07:00  --------------------------------------------------------  IN:    IV PiggyBack: 100 mL    Oral Fluid: 600 mL  Total IN: 700 mL    OUT:    Chest Tube: 65 mL    Chest Tube: 40 mL    Indwelling Catheter - Urethral: 670 mL  Total OUT: 775 mL    Total NET: -75 mL          CAPILLARY BLOOD GLUCOSE        LABS:                          10.4   10.15 )-----------( 269      ( 09 Mar 2020 03:00 )             35.3     Hemoglobin: 10.4 g/dL (03-09 @ 03:00)  Hemoglobin: 9.7 g/dL (03-08 @ 03:00)  Hemoglobin: 9.4 g/dL (03-07 @ 01:54)    03-09    142  |  102  |  14  ----------------------------<  111<H>  4.6   |  30  |  0.6<L>    Ca    8.2<L>      09 Mar 2020 03:00  Mg     2.1     03-09      Creatinine, Serum: 0.6 mg/dL (03-09 @ 03:00)  Creatinine, Serum: 0.7 mg/dL (03-08 @ 03:00)  Creatinine, Serum: 0.7 mg/dL (03-07 @ 01:54)  Creatinine, Serum: 0.7 mg/dL (03-06 @ 01:00)          HOSPITAL MEDICATIONS:  MEDICATIONS  (STANDING):  aspirin enteric coated 325 milliGRAM(s) Oral daily  atorvastatin 80 milliGRAM(s) Oral at bedtime  BACItracin   Ointment 1 Application(s) Topical every 8 hours  BACItracin   Ointment 1 Application(s) Topical three times a day  BUpivacaine liposome 1.3% Injectable (no eMAR) 20 milliLiter(s) Local Injection once  ceFAZolin   IVPB 1000 milliGRAM(s) IV Intermittent every 8 hours  ceFAZolin   IVPB      chlorhexidine 4% Liquid 1 Application(s) Topical <User Schedule>  cyanocobalamin 1000 MICROGram(s) Oral daily  famotidine    Tablet 20 milliGRAM(s) Oral two times a day  guaifenesin/dextromethorphan  Syrup 10 milliLiter(s) Oral every 6 hours  heparin  Injectable 5000 Unit(s) SubCutaneous every 12 hours  ipratropium    for Nebulization 500 MICROGram(s) Nebulizer every 6 hours  magnesium sulfate  IVPB 1 Gram(s) IV Intermittent every 12 hours  metoprolol tartrate 12.5 milliGRAM(s) Oral every 12 hours  polyethylene glycol 3350 17 Gram(s) Oral daily  sertraline 25 milliGRAM(s) Oral daily  simethicone 80 milliGRAM(s) Chew every 12 hours  tamsulosin 0.4 milliGRAM(s) Oral at bedtime  thiamine 100 milliGRAM(s) Oral daily    MEDICATIONS  (PRN):  bisacodyl 5 milliGRAM(s) Oral every 12 hours PRN Constipation  glucagon  Injectable 1 milliGRAM(s) IntraMuscular once PRN Glucose LESS THAN 70 milligrams/deciliter  ondansetron  IVPB 4 milliGRAM(s) IV Intermittent once PRN Nausea and/or Vomiting  oxyCODONE    IR 5 milliGRAM(s) Oral every 4 hours PRN Mild Pain (1 - 3)      REVIEW OF SYSTEMS:  CONSTITUTIONAL: [X] all negative; [ ] weakness, [ ] fevers, [ ] chills  EYES/ENT: [X] all negative; [ ] visual changes, [ ] vertigo, [ ] throat pain, [ ] eye pain  NECK: [X] all negative; [ ] pain, [ ] stiffness  RESPIRATORY: [x ] all negative, [ ] cough, [ ] wheezing, [ ] hemoptysis, [ ] shortness of breath, [  ] chest pain  CARDIOVASCULAR: [x ] all negative; [ ] anginal chest pain, [ ] palpitations, [ ] orthopnea  GASTROINTESTINAL: [X] all negative; [ ]abdominal pain, [ ] nausea, [ ] vomiting, [ ] hematemesis, [ ] diarrhea, [ ] constipation, [ ] melena, [ ] hematochezia.  GENITOURINARY: [X] all negative; [ ] dysuria, [ ] frequency, [ ] hematuria  NEUROLOGICAL: [X] all negative; [ ] numbness, [ ] weakness, [ ] paresthesias  MUSCULOSKELETAL: [X] all negative, [ ] joint pain, [ ] joint swelling, [ ] joint redness, [ ] bone pain  SKIN: [X] all negative; [ ] itching, [ ] burning, [ ] rashes, [ ] lesions   All other review of systems is negative unless indicated above.    [  ] Unable to assess ROS because     PHYSICAL EXAM:          CONSTITUTIONAL: Well-developed; well-nourished; in no acute distress.   	SKIN: warm, dry, no rashes or lesions  	HEENT: Atraumatic. Normocephalic. PERRL. Moist membranes, no conjunctival injection, sclera clear  	NECK: in hard collar  	CARD: Normal S1, S2. Rate and Rhythm are regular. No murmurs.  	RESP: Good air entry bilaterally, no wheezes, no rales no rhonchi.  	ABD: Soft, not tender, not distended, no CVA tendernass, no rebound no guarding, bowel sounds present  	EXT: Normal ROM.  No clubbing, no cyanosis, no pedal edema, no calf pain b/l, Peripheral pulses intact.  	LYMPH: No acute cervical adenopathy.  	NEURO: Alert, awake, motor 5/5 R, 5/5 L, sensation intact bilat, CN 2-12 intact,          PSYCH: Cooperative, appropriate. Alert & oriented x 3    RADIOLOGY:  X Reviewed and interpreted by me  CxR from 03-09-20 shows mild congestion, no pneumothorax, no effusion, no cardiomegaly,   CT in place  < from: Xray Chest 1 View- PORTABLE-Routine (03.09.20 @ 04:58) >  INTERPRETATION:  Clinical History / Reason for exam: Shortness of breath.    Comparison : Chest radiograph dated March 8, 2020.    Technique/Positioning: Portablefrontal.    Findings:    Support devices: Left-sided chest tubes in stable position.    Cardiac/mediastinum/hilum: Stable.    Lung parenchyma/Pleura: Unchanged left midlung and left basilar opacity/pleural effusion. No definite pneumothorax.    Skeleton/soft tissues: Stable.    Impression:      Unchanged left midlung and left basilar opacity/pleural effusion.    < end of copied text >        ECG:

## 2020-03-09 NOTE — CONSULT NOTE ADULT - ATTENDING COMMENTS
79 year old M with hx of HTN, A.fib (Eliquis), CHF, HLD brought in by EMS post cardiac arrest, presumed PEA arrest with ROSC in 10 mins, followed by another cardiac arrest while in route, with quick return of spontaneous circulation.   As per family, patient was doing welll and at baseline prior to the episode. As baseline patient is fully functional. Denied any recent infection, recent hospitalization, recent ED visit, fever, chills.   Patient was weaned off pressors and extubated. Patient was seen by neurosurgery possible C5-C6 distraction injury/prevertebral edema and suspected vertebral injury. C spine MRI was recommended to clear patient's neck. Unfortunately, patient is not compliant with C-collar. Hospital course significant for being quickly weaned off pressors and extubated successfully.   Patient underwent LHC today that revealed severe single vessel disease (LAD). GIven location of the blockage, it was felt that patient is not a could candidate for PCI, and CT surgery called for possible surgical intervention.    Patient's imaging reviewed  patient is sedated, and not fully awake  discussed with the family the reason for consultation.  Need better neuro status prior to determination on surgery.   minimize sedation  will try to clear pt's C-spine  preop w/u in progress
General Thoracic Surgery Attestation    I have seen and examined the patient.  Where appropriate I have updated, edited, or corrected the resident's or PA's note with regard to findings, values, and plan.    patient non toxic, recently extubated.  imaging reviewed.  I am unclear the exact cause of this collection/mass behind the esophagus.  this may be related to thoracic trauma (compressions) and anticoagulation.  would plan to rule out esophageal injury with esophagram.  NPO until then please.  patient not tachycardic, no wbc.  no subQ air or visible mediastinal emphysema on imaging.
it seems patient initially had a VT, the reason he was not feeling well, and then VT converted to V.fib and cardiac arrest.   I saw his echo at the bedside, normal EF 65%, normal wall motion, no segmental wall motion abnormality, normal valves  he is completely awake and alert, extubated and using BIPAP now  he has not been on any A.C, I was told b/o bleeding.   suggest to start Heparin 5000 u s/q every 8 hours  head CT scan to r/o edema, bleeding,   when BIPAP is off start Metoprolol 12.5 tid, Lipitor 80 daily, CCU monitoring  After proper recovery and if there is no C/I will do cardiac Cath prior to d/c from cardiology
pt seen and examined.   tov once ambulating vs cic  continue flomax
Cardiologist: Dr. Laws    80 yo M with history of AFib on Eliquis (diagnosed 2017), HTN, HL, admitted for cardiac arrest (not shocked). Patient felt "funny" prior to passing out. Pt found to have PEA arrest and had ROSC after CPR and Epi. Had another cardiac arrest en route to hospital (again no shocks delivered). On tele here, pt has been found to have persistent AFib with pauses of 3-4 sec. Of note, BB dose was increased yest per cardiology.    Recommend  - Cont to monitor on tele  - Agree with holding BB at this time. Suspect pt may have component of SSS.   - Cardiac cath planned for Mon  - Monitor lytes and keep K>4 and Mg>2  - Check TSH and free T4  - Pt may require ICD prior to discharge for secondary prevention
I was physically present for the key portions of the evaluation and management [ E/M] service provided and agree with the plan which i have reviewed and edited where appropriate     -case  discussed pt and family   -may be f/u as outpt   -no further intervention

## 2020-03-09 NOTE — CONSULT NOTE ADULT - PROVIDER SPECIALTY LIST ADULT
CT Surgery
Electrophysiology
Neurosurgery
Cardiology
Neurology
Physiatry
Thoracic Surgery
Urology
Vascular Surgery
Critical Care
Urology

## 2020-03-09 NOTE — CONSULT NOTE ADULT - ASSESSMENT
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    A) Urinary Retention     p)   ·	Continue Mora Care   ·	C/W Flomax  ·	voiding trial when ambulating 200ft, if unable to void consider CIC   ·	d/w attending

## 2020-03-10 LAB
ANION GAP SERPL CALC-SCNC: 9 MMOL/L — SIGNIFICANT CHANGE UP (ref 7–14)
BUN SERPL-MCNC: 14 MG/DL — SIGNIFICANT CHANGE UP (ref 10–20)
CALCIUM SERPL-MCNC: 8.2 MG/DL — LOW (ref 8.5–10.1)
CHLORIDE SERPL-SCNC: 101 MMOL/L — SIGNIFICANT CHANGE UP (ref 98–110)
CO2 SERPL-SCNC: 29 MMOL/L — SIGNIFICANT CHANGE UP (ref 17–32)
CREAT SERPL-MCNC: 0.6 MG/DL — LOW (ref 0.7–1.5)
GLUCOSE BLDC GLUCOMTR-MCNC: 164 MG/DL — HIGH (ref 70–99)
GLUCOSE SERPL-MCNC: 127 MG/DL — HIGH (ref 70–99)
MAGNESIUM SERPL-MCNC: 2 MG/DL — SIGNIFICANT CHANGE UP (ref 1.8–2.4)
POTASSIUM SERPL-MCNC: 4.7 MMOL/L — SIGNIFICANT CHANGE UP (ref 3.5–5)
POTASSIUM SERPL-SCNC: 4.7 MMOL/L — SIGNIFICANT CHANGE UP (ref 3.5–5)
SODIUM SERPL-SCNC: 139 MMOL/L — SIGNIFICANT CHANGE UP (ref 135–146)

## 2020-03-10 PROCEDURE — 93010 ELECTROCARDIOGRAM REPORT: CPT

## 2020-03-10 PROCEDURE — 99232 SBSQ HOSP IP/OBS MODERATE 35: CPT

## 2020-03-10 PROCEDURE — 71045 X-RAY EXAM CHEST 1 VIEW: CPT | Mod: 26

## 2020-03-10 PROCEDURE — 71045 X-RAY EXAM CHEST 1 VIEW: CPT | Mod: 26,77

## 2020-03-10 RX ADMIN — Medication 100 MILLIGRAM(S): at 22:33

## 2020-03-10 RX ADMIN — SIMETHICONE 80 MILLIGRAM(S): 80 TABLET, CHEWABLE ORAL at 17:26

## 2020-03-10 RX ADMIN — Medication 1 APPLICATION(S): at 06:38

## 2020-03-10 RX ADMIN — Medication 10 MILLILITER(S): at 00:28

## 2020-03-10 RX ADMIN — FAMOTIDINE 20 MILLIGRAM(S): 10 INJECTION INTRAVENOUS at 17:25

## 2020-03-10 RX ADMIN — Medication 10 MILLILITER(S): at 11:37

## 2020-03-10 RX ADMIN — HEPARIN SODIUM 5000 UNIT(S): 5000 INJECTION INTRAVENOUS; SUBCUTANEOUS at 06:39

## 2020-03-10 RX ADMIN — Medication 100 GRAM(S): at 17:28

## 2020-03-10 RX ADMIN — Medication 1 APPLICATION(S): at 16:03

## 2020-03-10 RX ADMIN — POLYETHYLENE GLYCOL 3350 17 GRAM(S): 17 POWDER, FOR SOLUTION ORAL at 11:37

## 2020-03-10 RX ADMIN — Medication 12.5 MILLIGRAM(S): at 06:39

## 2020-03-10 RX ADMIN — SERTRALINE 25 MILLIGRAM(S): 25 TABLET, FILM COATED ORAL at 11:37

## 2020-03-10 RX ADMIN — Medication 100 MILLIGRAM(S): at 11:36

## 2020-03-10 RX ADMIN — Medication 1 APPLICATION(S): at 22:33

## 2020-03-10 RX ADMIN — FAMOTIDINE 20 MILLIGRAM(S): 10 INJECTION INTRAVENOUS at 06:39

## 2020-03-10 RX ADMIN — ATORVASTATIN CALCIUM 80 MILLIGRAM(S): 80 TABLET, FILM COATED ORAL at 22:33

## 2020-03-10 RX ADMIN — Medication 10 MILLILITER(S): at 06:39

## 2020-03-10 RX ADMIN — Medication 325 MILLIGRAM(S): at 11:36

## 2020-03-10 RX ADMIN — Medication 12.5 MILLIGRAM(S): at 17:26

## 2020-03-10 RX ADMIN — CHLORHEXIDINE GLUCONATE 1 APPLICATION(S): 213 SOLUTION TOPICAL at 06:39

## 2020-03-10 RX ADMIN — Medication 10 MILLILITER(S): at 17:26

## 2020-03-10 RX ADMIN — TAMSULOSIN HYDROCHLORIDE 0.4 MILLIGRAM(S): 0.4 CAPSULE ORAL at 22:34

## 2020-03-10 RX ADMIN — HEPARIN SODIUM 5000 UNIT(S): 5000 INJECTION INTRAVENOUS; SUBCUTANEOUS at 17:26

## 2020-03-10 RX ADMIN — Medication 100 MILLIGRAM(S): at 16:00

## 2020-03-10 RX ADMIN — SIMETHICONE 80 MILLIGRAM(S): 80 TABLET, CHEWABLE ORAL at 06:39

## 2020-03-10 RX ADMIN — PREGABALIN 1000 MICROGRAM(S): 225 CAPSULE ORAL at 11:36

## 2020-03-10 RX ADMIN — Medication 100 GRAM(S): at 06:38

## 2020-03-10 RX ADMIN — Medication 100 MILLIGRAM(S): at 06:38

## 2020-03-10 NOTE — CHART NOTE - NSCHARTNOTEFT_GEN_A_CORE
Registered Dietitian Follow-Up     Patient Profile Reviewed                           Yes [x]   No []     Nutrition History Previously Obtained        Yes [x]  No []       Pertinent Subjective Information: Pt. with prolonged hospital stay, since January. Initially with good PO intake, later declined. Pt. frequently NPO for tests/procedures than poor PO intake following procedures. Ensure enlive provided during most of LOS and pt. likes it. Also fond fo Ensure pudding. Currently s/p SLP reevaluation and back on thin liquids. Supplement ha snot been ordered s/p diet advancement.      Pertinent Medical Interventions: s/p cabg- CTU monitoring, s/p mics x 1 pod # 14. S/p evacuation of hemothorax. D/c chest tube. Pending dispo to SNF.        Diet order: dysphagia 3 mech soft thin liquid     Anthropometrics:  - Ht. 182.8cm  - Wt. 94.9kg on 3/10 vs. 92.9kg on 3/4 vs. 91.2kg on 3/5 vs fluctuating, likely fluid shifts with edema  - %wt change  - BMI 31.5   - IBW 80.9kg     Pertinent Lab Data: (3/9) RBC 3.60, Hg 10.4, Hct 35.3, creat 0.6, glu 111     Pertinent Meds: Dulcolax, Metoprolol, Atorvastatin, Cyanocobalamin, Pepcid, vit B1, Miralax      Physical Findings:  - Appearance: AAOx4, L, R foot non pitting edema  - GI function: no symptoms noted, last BM 3/9  - Tubes: none noted  - Oral/Mouth cavity: no symptoms noted  - Skin: incision (BS 18)      Nutrition Requirements (from RD note on 3/6)   Weight Used: 93.1kg lowest doc weight      Estimated Energy Needs    Continue [x]  Adjust [] 2010-2178kcal (MSJ x 1.2-1.3 AF)  Adjusted Energy Recommendations:   kcal/day        Estimated Protein Needs    Continue [x]  Adjust []  93-111g (1-1.2g/kg CBW)  Adjusted Protein Recommendations:   gm/day        Estimated Fluid Needs        Continue [x]  Adjust [] per CTU team  Adjusted Fluid Recommendations:   mL/day     Nutrient Intake:        [] Previous Nutrition Diagnosis:  Inadequate oral intake            [x] Ongoing          [] Resolved      Nutrition Intervention: meals and snacks, medical food supplement    Rec: Continue dysphagia 3 mech soft thin liquid diet and add Ensure enlive BID, Ensure pudding BID     Goal/Expected Outcome: In 3 days pt. to consistently consume at least 75% PO and supplement      Indicator/Monitoring: diet order, energy intake, body composition, NFPF Registered Dietitian Follow-Up     Patient Profile Reviewed                           Yes [x]   No []     Nutrition History Previously Obtained        Yes [x]  No []       Pertinent Subjective Information: Pt. with prolonged hospital stay, since January. Initially with good PO intake, later declined. Pt. frequently NPO for tests/procedures than poor PO intake following procedures. Ensure enlive provided during most of LOS and pt. likes it. Also fond fo Ensure pudding. Currently s/p SLP reevaluation and back on thin liquids. Supplement has not been ordered s/p diet advancement. Pt. in good spirits today excited for possibility of discharge soon. Eating with better appetite, ~75% PO at breakfast. Provided Ensure to pt.      Pertinent Medical Interventions: s/p cabg- CTU monitoring, s/p mics x 1 pod # 14. S/p evacuation of hemothorax. D/c chest tube. Pending dispo to SNF.        Diet order: dysphagia 3 mech soft thin liquid     Anthropometrics:  - Ht. 182.8cm  - Wt. 94.9kg on 3/10 vs. 92.9kg on 3/4 vs. 91.2kg on 3/5 vs fluctuating, likely fluid shifts with edema  - %wt change  - BMI 31.5   - IBW 80.9kg     Pertinent Lab Data: (3/9) RBC 3.60, Hg 10.4, Hct 35.3, creat 0.6, glu 111     Pertinent Meds: Dulcolax, Metoprolol, Atorvastatin, Cyanocobalamin, Pepcid, vit B1, Miralax      Physical Findings:  - Appearance: AAOx4, L, R foot non pitting edema  - GI function: no symptoms noted, last BM 3/9  - Tubes: none noted  - Oral/Mouth cavity: no symptoms noted  - Skin: incision (BS 18)      Nutrition Requirements (from RD note on 3/6)   Weight Used: 93.1kg lowest doc weight      Estimated Energy Needs    Continue [x]  Adjust [] 2010-2178kcal (MSJ x 1.2-1.3 AF)  Adjusted Energy Recommendations:   kcal/day        Estimated Protein Needs    Continue [x]  Adjust []  93-111g (1-1.2g/kg CBW)  Adjusted Protein Recommendations:   gm/day        Estimated Fluid Needs        Continue [x]  Adjust [] per CTU team  Adjusted Fluid Recommendations:   mL/day     Nutrient Intake: 50-75% PO at meals        [] Previous Nutrition Diagnosis:  Inadequate oral intake- improving            [x] Ongoing          [] Resolved      Nutrition Intervention: meals and snacks, medical food supplement    Rec: Continue dysphagia 3 mech soft thin liquid diet and add Ensure enlive BID      Goal/Expected Outcome: In 3 days pt. to consistently consume at least 75% PO and supplement      Indicator/Monitoring: diet order, energy intake, body composition, NFPF

## 2020-03-10 NOTE — PROGRESS NOTE ADULT - SUBJECTIVE AND OBJECTIVE BOX
OPERATIVE PROCEDURE(s):                POD #                       79yMale  SURGEON(s): MARCY Arreguin  SUBJECTIVE ASSESSMENT:   Vital Signs Last 24 Hrs  T(F): 98.3 (09 Mar 2020 20:00), Max: 98.7 (09 Mar 2020 16:00)  HR: 86 (10 Mar 2020 07:00) (74 - 91)  BP: 155/90 (10 Mar 2020 07:00) (108/64 - 166/96)  BP(mean): 117 (10 Mar 2020 07:00) (81 - 125)  RR: 22 (10 Mar 2020 07:00) (20 - 26)  SpO2: 99% (10 Mar 2020 07:00) (91% - 99%)      I&O's Detail    09 Mar 2020 07:01  -  10 Mar 2020 07:00  --------------------------------------------------------  IN:    IV PiggyBack: 350 mL    Oral Fluid: 1080 mL  Total IN: 1430 mL    OUT:    Chest Tube: 5 mL    Indwelling Catheter - Urethral: 588 mL  Total OUT: 593 mL        Net:   I&O's Detail    08 Mar 2020 07:01  -  09 Mar 2020 07:00  --------------------------------------------------------  Total NET: -75 mL      09 Mar 2020 07:01  -  10 Mar 2020 07:00  --------------------------------------------------------  Total NET: 837 mL        CAPILLARY BLOOD GLUCOSE      POCT Blood Glucose.: 164 mg/dL (10 Mar 2020 07:01)  POCT Blood Glucose.: 141 mg/dL (09 Mar 2020 22:31)    Physical Exam:  General: NAD; A&Ox3/Patient is intubated and sedated  Cardiac: S1/S2, RRR, no murmur, no rubs  Lungs: unlabored respirations, CTA b/l, no wheeze, no rales, no crackles  Abdomen: Soft/NT/ND; positive bowel sounds x 4  Sternum: Intact, no click, incision healing well with no drainage  Incisions: Incisions clean/dry/intact  Extremities: No edema b/l lower extremities; good capillary refill; no cyanosis; palpable 1+ pedal pulses b/l    Central Venous Catheter: Yes[]  No[] , If Yes indication:           Day #  Mora Catheter: Yes  [] , No  [] , If yes indication:                      Day #  NGT: Yes [] No [] ,    If Yes Placement:                                     Day #  EPICARDIAL WIRES:  [] YES [] NO                                              Day #  BOWEL MOVEMENT:  [] YES [] NO, If No, Timing since last BM:      Day #  CHEST TUBE(Left/Right):  [] YES [] NO, If yes -  AIR LEAKS:  [] YES [] NO        LABS:                        10.4<L>  10.15 )-----------( 269      ( 09 Mar 2020 03:00 )             35.3<L>                        9.7<L>  10.63 )-----------( 260      ( 08 Mar 2020 03:00 )             32.8<L>    03-10    139  |  101  |  14  ----------------------------<  127<H>  4.7   |  29  |  0.6<L>  03-09    142  |  102  |  14  ----------------------------<  111<H>  4.6   |  30  |  0.6<L>    Ca    8.2<L>      10 Mar 2020 03:11  Mg     2.0     03-10            RADIOLOGY & ADDITIONAL TESTS:  CXR:  EKG:  MEDICATIONS  (STANDING):  aspirin enteric coated 325 milliGRAM(s) Oral daily  atorvastatin 80 milliGRAM(s) Oral at bedtime  BACItracin   Ointment 1 Application(s) Topical every 8 hours  BACItracin   Ointment 1 Application(s) Topical three times a day  BUpivacaine liposome 1.3% Injectable (no eMAR) 20 milliLiter(s) Local Injection once  ceFAZolin   IVPB 1000 milliGRAM(s) IV Intermittent every 8 hours  ceFAZolin   IVPB      chlorhexidine 4% Liquid 1 Application(s) Topical <User Schedule>  cyanocobalamin 1000 MICROGram(s) Oral daily  famotidine    Tablet 20 milliGRAM(s) Oral two times a day  guaifenesin/dextromethorphan  Syrup 10 milliLiter(s) Oral every 6 hours  heparin  Injectable 5000 Unit(s) SubCutaneous every 12 hours  ipratropium    for Nebulization 500 MICROGram(s) Nebulizer every 6 hours  magnesium sulfate  IVPB 1 Gram(s) IV Intermittent every 12 hours  metoprolol tartrate 12.5 milliGRAM(s) Oral every 12 hours  polyethylene glycol 3350 17 Gram(s) Oral daily  sertraline 25 milliGRAM(s) Oral daily  simethicone 80 milliGRAM(s) Chew every 12 hours  tamsulosin 0.4 milliGRAM(s) Oral at bedtime  thiamine 100 milliGRAM(s) Oral daily    MEDICATIONS  (PRN):  bisacodyl 5 milliGRAM(s) Oral every 12 hours PRN Constipation  glucagon  Injectable 1 milliGRAM(s) IntraMuscular once PRN Glucose LESS THAN 70 milligrams/deciliter  ondansetron  IVPB 4 milliGRAM(s) IV Intermittent once PRN Nausea and/or Vomiting  oxyCODONE    IR 5 milliGRAM(s) Oral every 4 hours PRN Mild Pain (1 - 3)    HEPARIN:  [] YES [] NO  Dose: XX UNITS/HR UNITS Q8H  LOVENOX:[] YES [] NO  Dose: XX mg Q24H  COUMADIN: []  YES [] NO  Dose: XX mg  Q24H  SCD's: YES b/l  GI Prophylaxis: Protonix [], Pepcid [], None [], (Contra-indication:.....)    Post-Op Beta-Blockers: Yes [], No[], If No, then contraindication:  Post-Op Aspirin: Yes [],  No [], If No, then contraindication:  Post-Op Statin: Yes [], No[], If No, then contraindication:  Allergies    No Known Allergies    Intolerances      Ambulation/Activity Status:    Assessment/Plan:  79y Male status-post .....  - Case and plan discussed with CTU Intensivist and CT Surgeon - Dr. Shaw/Rodríguez/Samir   - Continue CTU supportive care    - Continue DVT/GI prophylaxis  - Incentive Spirometry 10 times an hour  - Continue to advance physical activity as tolerated and continue PT/OT as directed  1. CAD: Continue ASA, statin, BB  2. HTN:   3. A. Fib:   4. COPD/Hypoxia:   5. DM/Glucose Control:     Social Service Disposition: OPERATIVE PROCEDURE(s): MICS x1                POD #     15                  79yMale  SURGEON(s): MARCY Arreguin  SUBJECTIVE ASSESSMENT: pt seen and examined. no acute complaints at this time.   Vital Signs Last 24 Hrs  T(F): 98.3 (09 Mar 2020 20:00), Max: 98.7 (09 Mar 2020 16:00)  HR: 86 (10 Mar 2020 07:00) (74 - 91)  BP: 155/90 (10 Mar 2020 07:00) (108/64 - 166/96)  BP(mean): 117 (10 Mar 2020 07:00) (81 - 125)  RR: 22 (10 Mar 2020 07:00) (20 - 26)  SpO2: 99% (10 Mar 2020 07:00) (91% - 99%)      I&O's Detail    09 Mar 2020 07:01  -  10 Mar 2020 07:00  --------------------------------------------------------  IN:    IV PiggyBack: 350 mL    Oral Fluid: 1080 mL  Total IN: 1430 mL    OUT:    Chest Tube: 5 mL    Indwelling Catheter - Urethral: 588 mL  Total OUT: 593 mL        Net:   I&O's Detail    08 Mar 2020 07:01  -  09 Mar 2020 07:00  --------------------------------------------------------  Total NET: -75 mL      09 Mar 2020 07:01  -  10 Mar 2020 07:00  --------------------------------------------------------  Total NET: 837 mL        CAPILLARY BLOOD GLUCOSE      POCT Blood Glucose.: 164 mg/dL (10 Mar 2020 07:01)  POCT Blood Glucose.: 141 mg/dL (09 Mar 2020 22:31)    Physical Exam:  General: NAD; A&Ox3  Cardiac: S1/S2, RRR, no murmur, no rubs  Lungs: unlabored respirations, CTA b/l, no wheeze, no rales, no crackles  Abdomen: Soft/NT/ND; positive bowel sounds x 4  Incisions: Incisions clean/dry/intact  Extremities: No edema b/l lower extremities; good capillary refill; no cyanosis; palpable 1+ pedal pulses b/l    Central Venous Catheter: Yes[]  No[x] , If Yes indication:           Day #  Patrick Catheter: Yes  [x] , No  [] , If yes indication:   urinary retention                 Day # 5  NGT: Yes [] No [x] ,    If Yes Placement:                                     Day #  EPICARDIAL WIRES:  [] YES [x] NO                                              Day #  BOWEL MOVEMENT:  [] YES [x] NO, If No, Timing since last BM:      Day #  CHEST TUBE(Left/Right):  [] YES [x] NO, If yes -  AIR LEAKS:  [] YES [] NO        LABS:                        10.4<L>  10.15 )-----------( 269      ( 09 Mar 2020 03:00 )             35.3<L>                        9.7<L>  10.63 )-----------( 260      ( 08 Mar 2020 03:00 )             32.8<L>    03-10    139  |  101  |  14  ----------------------------<  127<H>  4.7   |  29  |  0.6<L>  03-09    142  |  102  |  14  ----------------------------<  111<H>  4.6   |  30  |  0.6<L>    Ca    8.2<L>      10 Mar 2020 03:11  Mg     2.0     03-10            RADIOLOGY & ADDITIONAL TESTS:  CXR: < from: Xray Chest 1 View- PORTABLE-Routine (03.10.20 @ 04:43) >  Impression:      Stable left pleuraleffusion/opacity.    < end of copied text >    EKG: < from: 12 Lead ECG (03.03.20 @ 07:53) >  Ventricular Rate 86 BPM    Atrial Rate 86 BPM    P-R Interval 270 ms    QRS Duration 98 ms    Q-T Interval 340 ms    QTC Calculation(Bezet) 406 ms    R Axis -1 degrees    T Axis 24 degrees    Diagnosis Line Sinus rhythm with 1st degree A-V block with Premature atrial complexes  Possible Inferior infarct , age undetermined  ST & T wave abnormality, consider anterior ischemia  Abnormal ECG    < end of copied text >    MEDICATIONS  (STANDING):  aspirin enteric coated 325 milliGRAM(s) Oral daily  atorvastatin 80 milliGRAM(s) Oral at bedtime  BACItracin   Ointment 1 Application(s) Topical every 8 hours  BACItracin   Ointment 1 Application(s) Topical three times a day  BUpivacaine liposome 1.3% Injectable (no eMAR) 20 milliLiter(s) Local Injection once  ceFAZolin   IVPB 1000 milliGRAM(s) IV Intermittent every 8 hours  ceFAZolin   IVPB      chlorhexidine 4% Liquid 1 Application(s) Topical <User Schedule>  cyanocobalamin 1000 MICROGram(s) Oral daily  famotidine    Tablet 20 milliGRAM(s) Oral two times a day  guaifenesin/dextromethorphan  Syrup 10 milliLiter(s) Oral every 6 hours  heparin  Injectable 5000 Unit(s) SubCutaneous every 12 hours  ipratropium    for Nebulization 500 MICROGram(s) Nebulizer every 6 hours  magnesium sulfate  IVPB 1 Gram(s) IV Intermittent every 12 hours  metoprolol tartrate 12.5 milliGRAM(s) Oral every 12 hours  polyethylene glycol 3350 17 Gram(s) Oral daily  sertraline 25 milliGRAM(s) Oral daily  simethicone 80 milliGRAM(s) Chew every 12 hours  tamsulosin 0.4 milliGRAM(s) Oral at bedtime  thiamine 100 milliGRAM(s) Oral daily    MEDICATIONS  (PRN):  bisacodyl 5 milliGRAM(s) Oral every 12 hours PRN Constipation  glucagon  Injectable 1 milliGRAM(s) IntraMuscular once PRN Glucose LESS THAN 70 milligrams/deciliter  ondansetron  IVPB 4 milliGRAM(s) IV Intermittent once PRN Nausea and/or Vomiting  oxyCODONE    IR 5 milliGRAM(s) Oral every 4 hours PRN Mild Pain (1 - 3)    HEPARIN:  [x] YES [] NO  Dose: 5000 UNITS Q8H  LOVENOX:[] YES [x] NO  Dose: XX mg Q24H  COUMADIN: []  YES [x] NO  Dose: XX mg  Q24H  SCD's: YES b/l  GI Prophylaxis: Protonix [], Pepcid [x], None [], (Contra-indication:.....)    Post-Op Beta-Blockers: Yes [x], No[], If No, then contraindication:  Post-Op Aspirin: Yes [x],  No [], If No, then contraindication:  Post-Op Statin: Yes [x], No[], If No, then contraindication:  Allergies    No Known Allergies    Intolerances      Ambulation/Activity Status: ambulate    Assessment/Plan:  79y Male status-post MICSx1   POD#15  - Case and plan discussed with CTU Intensivist and CT Surgeon - Dr. Shaw/Rodríguez/Samir   - Continue CTU supportive care    - Continue DVT/GI prophylaxis  - Incentive Spirometry 10 times an hour  - Continue to advance physical activity as tolerated and continue PT/OT as directed  1. CAD: Continue ASA, statin, BB  2. HTN: cont lopressor  3. A. Fib: rate control, will discuss with Dr Arreguin need for anticoagulation  4. COPD/Hypoxia: cont nebs, mucinex, encourage incentive spirometry  5. urinary retention: cont flomax, keep patrick in for now until walking better then will do a trial of void    Social Service Disposition:  [place]

## 2020-03-10 NOTE — PROGRESS NOTE ADULT - SUBJECTIVE AND OBJECTIVE BOX
CTU Attending Progress Daily Note     10 Mar 2020 08:27    Procedure:                                                  POD#                   Patient seen as post-op critical care follow-up    HPI:  78 yo M with hx of HTN, A.fib (Eliquis), CHF, HLD brought in by EMS post cardiac arrest. As per wife at bedside, patient at the basement watching movie and was doing fine. Few mins later, patient came up and told the wife that he wasn't feeling right and that he felt funny. Then he crashed. Wife called the EMS who arrived 5 mins later. Upon EMS arrival, patient was found to be in PEA then asystole, s/p resuscitation for ~ 10 mins and ROSC achieved after 1 round of compression and epi. While on the way to hospital, patient had another cardiac arrest on the ambulant s/p CPR and ROSC achieved few mins later. As per family, patient was doing welll and at baseline prior to the episode. As baseline patient is fully functional. Denied any recent infection, recent hospitalization, recent ED visit, fever, chills.     In ED, patient was found to bradycardic and hypotensive. s/p 1 dose of atropine and patient was started on low dose Levophed with improvement in HR and BP. (12 Jan 2020 22:13)    See preop testing chart H&P    Interval event for past 24 hr:  MED IVORY  79y had no event.     Current Complains:  MED IVORY has no new complaints    REVIEW OF SYSTEMS:  CONSTITUTIONAL:  [-] weakness, [-] fevers, [-] chills  EYES/ENT: [-] visual changes, [-] vertigo, [-] throat pain   NECK: [-] pain, [-] stiffness  RESPIRATORY: [-] cough, [-] wheezing, [-] hemoptysis, [-] shortness of breath  CARDIOVASCULAR: [-] chest pain, [-] palpitations, [-] orthopnea  GASTROINTESTINAL:    [-]abdominal pain, [-] nausea, [-] vomiting, [-] hematemesis, [-] diarrhea, [-] constipation, [-] melena, [-] hematochezia.  GENITOURINARY: [-] dysuria, [-] frequency, [-] hematuria  NEUROLOGICAL: [-] numbness, [-] weakness  SKIN: [-] itching, [-] burning, [-] rashes, [-] lesions   All other review of systems is negative unless indicated above.    [  ] Unable to assess ROS because :    OBJECTIVE:  ICU Vital Signs Last 24 Hrs  T(C): 36.8 (09 Mar 2020 20:00), Max: 37.1 (09 Mar 2020 16:00)  T(F): 98.3 (09 Mar 2020 20:00), Max: 98.7 (09 Mar 2020 16:00)  HR: 86 (10 Mar 2020 07:00) (74 - 91)  BP: 155/90 (10 Mar 2020 07:00) (108/64 - 166/96)  BP(mean): 117 (10 Mar 2020 07:00) (81 - 125)  ABP: --  ABP(mean): --  RR: 22 (10 Mar 2020 07:00) (20 - 26)  SpO2: 99% (10 Mar 2020 07:00) (91% - 99%)      I&O's Summary    09 Mar 2020 07:01  -  10 Mar 2020 07:00  --------------------------------------------------------  IN: 1430 mL / OUT: 593 mL / NET: 837 mL      Adult Advanced Hemodynamics Last 24 Hrs  CVP(mm Hg): --  CVP(cm H2O): --  CO: --  CI: --  PA: --  PA(mean): --  PCWP: --  SVR: --  SVRI: --  PVR: --  PVRI: --      PHYSICAL EXAM:  General: WN/WD NAD    HEENT:     [+] NCAT  [+] EOMI  [-] Conjuctival edema   [-] Icterus   [-] Thrush   [-] ETT  [-] NGT/OGT    Neck:         [+] FROM   [-] JVD     [-] Nodes     [-] Masses    [+] Mid-line trachea    [-] Tracheostomy    Chest:         [-] Sternal click   [-] Sternal drainage   [+] Pacing wires   [+] Chest tubes   [-] SubQ emphysema    Lungs:          [+] CTA   [-] Rhonchi   [-] Rales    [-] Wheezing    [-] Decreased BS    [-] Dullness R L    Cardiac:       [+] S1 [+] S2    [+] RRR   [-] Irregular   [-] S3   [-] S4    [-] Murmurs    [-] Rub    Abdomen:    [+] BS    [+] Soft    [+] Non-tender     [-] Distended    [-] Organomegaly  [-] PEG    Extremities:   [-] Cyanosis U/L   [-] Clubbing  U/L  [-] LE/UE Edema   [+] Capillary refill    [+] Pulses     Neuro:        [+] Awake   [+]  Alert   [-] Confused   [-] Lethargic   [-] Sedated   [-] Generalized Weakness    Skin:        [-] Rashes    [-] Erythema   [+] Normal incisions   [+] IV sites intact   [-] Sacral decubitus    Tubes:  LINES:    CAPILLARY BLOOD GLUCOSE      POCT Blood Glucose.: 164 mg/dL (10 Mar 2020 07:01)    CAPILLARY BLOOD GLUCOSE      POCT Blood Glucose.: 164 mg/dL (10 Mar 2020 07:01)  POCT Blood Glucose.: 141 mg/dL (09 Mar 2020 22:31)      HOSPITAL MEDICATIONS:  MEDICATIONS  (STANDING):  aspirin enteric coated 325 milliGRAM(s) Oral daily  atorvastatin 80 milliGRAM(s) Oral at bedtime  BACItracin   Ointment 1 Application(s) Topical every 8 hours  BACItracin   Ointment 1 Application(s) Topical three times a day  BUpivacaine liposome 1.3% Injectable (no eMAR) 20 milliLiter(s) Local Injection once  ceFAZolin   IVPB 1000 milliGRAM(s) IV Intermittent every 8 hours  ceFAZolin   IVPB      chlorhexidine 4% Liquid 1 Application(s) Topical <User Schedule>  cyanocobalamin 1000 MICROGram(s) Oral daily  famotidine    Tablet 20 milliGRAM(s) Oral two times a day  guaifenesin/dextromethorphan  Syrup 10 milliLiter(s) Oral every 6 hours  heparin  Injectable 5000 Unit(s) SubCutaneous every 12 hours  ipratropium    for Nebulization 500 MICROGram(s) Nebulizer every 6 hours  magnesium sulfate  IVPB 1 Gram(s) IV Intermittent every 12 hours  metoprolol tartrate 12.5 milliGRAM(s) Oral every 12 hours  polyethylene glycol 3350 17 Gram(s) Oral daily  sertraline 25 milliGRAM(s) Oral daily  simethicone 80 milliGRAM(s) Chew every 12 hours  tamsulosin 0.4 milliGRAM(s) Oral at bedtime  thiamine 100 milliGRAM(s) Oral daily    MEDICATIONS  (PRN):  bisacodyl 5 milliGRAM(s) Oral every 12 hours PRN Constipation  glucagon  Injectable 1 milliGRAM(s) IntraMuscular once PRN Glucose LESS THAN 70 milligrams/deciliter  ondansetron  IVPB 4 milliGRAM(s) IV Intermittent once PRN Nausea and/or Vomiting  oxyCODONE    IR 5 milliGRAM(s) Oral every 4 hours PRN Mild Pain (1 - 3)      LABS:                          10.4   10.15 )-----------( 269      ( 09 Mar 2020 03:00 )             35.3     03-10    139  |  101  |  14  ----------------------------<  127<H>  4.7   |  29  |  0.6<L>    Ca    8.2<L>      10 Mar 2020 03:11  Mg     2.0     03-10              RADIOLOGY:  Reviewed and interpreted by me  CXR from 03-10-20 shows [+] mild congestion, [-] pneumothorax, [-] R/L effusion, [-] cardiomegaly,   NGT in place, S-G Catheter in place, R/L TLC in place, R/L Chest Tubes in place    ECG:  Reviewed and interpreted by me:   QTC:    Assessment:      PAST MEDICAL & SURGICAL HISTORY:  Atrial fibrillation  Neuropathy  HLD (hyperlipidemia)  HTN (hypertension)  History of cholecystectomy  History of cholecystectomy      PLAN:  Neuro: Pain control  Pulm: Encourage coughing, deep breathing and use of incentive spirometry. Wean off supplemental oxygen as able. Daily CXR.   Cardio: Monitor telemetry/alarms. Continue cardiac meds  GI: Tolerating diet. Continue stool softeners. Continue GI prophylaxis  Renal: monitor urine output, supplement electrolytes as needed  Vasc: Heparin SC/SCDs for DVT prophylaxis  Heme: Monitor H/H.   ID: Off antibiotics. Stable.  Endocrine: Monitor finger stick blood sugar and control hyperglycemia with insulin  Physical Therapy: OOB/ambulate  Tubes: Monitor Chest tube output      Discussed with Cardiothoracic Team at AM rounds.    45 minutes of critical care time spent providing medical care for patient's acute illness/conditions that impairs at least one vital organ system and/or poses a high risk of imminent or life threatening deterioration in the patient's condition. It includes time spent evaluating and treating the patient's acute illness as well as time spent reviewing labs, radiology, discussing goals of care with patient and/or patient's family, and discussing the case with a multidisciplinary team in an effort to prevent further life threatening deterioration or end organ damage. This time is independent of any procedures performed. CTU Attending Progress Daily Note     10 Mar 2020 08:27    Procedure:          CABG                                        POD#    15               Patient seen as post-op critical care follow-up    HPI:  80 yo M with hx of HTN, A.fib (Eliquis), CHF, HLD brought in by EMS post cardiac arrest. As per wife at bedside, patient at the basement watching movie and was doing fine. Few mins later, patient came up and told the wife that he wasn't feeling right and that he felt funny. Then he crashed. Wife called the EMS who arrived 5 mins later. Upon EMS arrival, patient was found to be in PEA then asystole, s/p resuscitation for ~ 10 mins and ROSC achieved after 1 round of compression and epi. While on the way to hospital, patient had another cardiac arrest on the ambulant s/p CPR and ROSC achieved few mins later. As per family, patient was doing welll and at baseline prior to the episode. As baseline patient is fully functional. Denied any recent infection, recent hospitalization, recent ED visit, fever, chills.     In ED, patient was found to bradycardic and hypotensive. s/p 1 dose of atropine and patient was started on low dose Levophed with improvement in HR and BP. (12 Jan 2020 22:13)    See preop testing chart H&P    Interval event for past 24 hr:  MED IVORY  79y had no event.     Current Complains:  MED IVORY has no new complaints    REVIEW OF SYSTEMS:  CONSTITUTIONAL:  [-] weakness, [-] fevers, [-] chills  EYES/ENT: [-] visual changes, [-] vertigo, [-] throat pain   NECK: [-] pain, [-] stiffness  RESPIRATORY: [-] cough, [-] wheezing, [-] hemoptysis, [-] shortness of breath  CARDIOVASCULAR: [-] chest pain, [-] palpitations, [-] orthopnea  GASTROINTESTINAL:    [-]abdominal pain, [-] nausea, [-] vomiting, [-] hematemesis, [-] diarrhea, [-] constipation, [-] melena, [-] hematochezia.  GENITOURINARY: [-] dysuria, [-] frequency, [-] hematuria  NEUROLOGICAL: [-] numbness, [-] weakness  SKIN: [-] itching, [-] burning, [-] rashes, [-] lesions   All other review of systems is negative unless indicated above.    [  ] Unable to assess ROS because :    OBJECTIVE:  ICU Vital Signs Last 24 Hrs  T(C): 36.8 (09 Mar 2020 20:00), Max: 37.1 (09 Mar 2020 16:00)  T(F): 98.3 (09 Mar 2020 20:00), Max: 98.7 (09 Mar 2020 16:00)  HR: 86 (10 Mar 2020 07:00) (74 - 91)  BP: 155/90 (10 Mar 2020 07:00) (108/64 - 166/96)  BP(mean): 117 (10 Mar 2020 07:00) (81 - 125)  ABP: --  ABP(mean): --  RR: 22 (10 Mar 2020 07:00) (20 - 26)  SpO2: 99% (10 Mar 2020 07:00) (91% - 99%)      I&O's Summary    09 Mar 2020 07:01  -  10 Mar 2020 07:00  --------------------------------------------------------  IN: 1430 mL / OUT: 593 mL / NET: 837 mL      PHYSICAL EXAM:  General: WN/WD NAD    HEENT:     [+] NCAT  [+] EOMI  [-] Conjuctival edema   [-] Icterus   [-] Thrush   [-] ETT  [-] NGT/OGT    Neck:         [+] FROM   [-] JVD     [-] Nodes     [-] Masses    [+] Mid-line trachea    [-] Tracheostomy    Chest:           [-] SubQ emphysema    Lungs:          [+] CTA   [-] Rhonchi   [-] Rales    [-] Wheezing    [-] Decreased BS    [-] Dullness R L    Cardiac:       [+] S1 [+] S2    [+] RRR   [-] Irregular   [-] S3   [-] S4    [-] Murmurs    [-] Rub    Abdomen:    [+] BS    [+] Soft    [+] Non-tender     [-] Distended    [-] Organomegaly  [-] PEG    Extremities:   [-] Cyanosis U/L   [-] Clubbing  U/L  [-] LE/UE Edema   [+] Capillary refill    [+] Pulses     Neuro:        [+] Awake   [+]  Alert   [-] Confused   [-] Lethargic   [-] Sedated   [-] Generalized Weakness    Skin:        [-] Rashes    [-] Erythema   [+] Normal incisions   [+] IV sites intact   [-] Sacral decubitus    Tubes:  LINES:    CAPILLARY BLOOD GLUCOSE      POCT Blood Glucose.: 164 mg/dL (10 Mar 2020 07:01)    CAPILLARY BLOOD GLUCOSE      POCT Blood Glucose.: 164 mg/dL (10 Mar 2020 07:01)  POCT Blood Glucose.: 141 mg/dL (09 Mar 2020 22:31)      HOSPITAL MEDICATIONS:  MEDICATIONS  (STANDING):  aspirin enteric coated 325 milliGRAM(s) Oral daily  atorvastatin 80 milliGRAM(s) Oral at bedtime  BACItracin   Ointment 1 Application(s) Topical every 8 hours  BACItracin   Ointment 1 Application(s) Topical three times a day  BUpivacaine liposome 1.3% Injectable (no eMAR) 20 milliLiter(s) Local Injection once  ceFAZolin   IVPB 1000 milliGRAM(s) IV Intermittent every 8 hours  ceFAZolin   IVPB      chlorhexidine 4% Liquid 1 Application(s) Topical <User Schedule>  cyanocobalamin 1000 MICROGram(s) Oral daily  famotidine    Tablet 20 milliGRAM(s) Oral two times a day  guaifenesin/dextromethorphan  Syrup 10 milliLiter(s) Oral every 6 hours  heparin  Injectable 5000 Unit(s) SubCutaneous every 12 hours  ipratropium    for Nebulization 500 MICROGram(s) Nebulizer every 6 hours  magnesium sulfate  IVPB 1 Gram(s) IV Intermittent every 12 hours  metoprolol tartrate 12.5 milliGRAM(s) Oral every 12 hours  polyethylene glycol 3350 17 Gram(s) Oral daily  sertraline 25 milliGRAM(s) Oral daily  simethicone 80 milliGRAM(s) Chew every 12 hours  tamsulosin 0.4 milliGRAM(s) Oral at bedtime  thiamine 100 milliGRAM(s) Oral daily    MEDICATIONS  (PRN):  bisacodyl 5 milliGRAM(s) Oral every 12 hours PRN Constipation  glucagon  Injectable 1 milliGRAM(s) IntraMuscular once PRN Glucose LESS THAN 70 milligrams/deciliter  ondansetron  IVPB 4 milliGRAM(s) IV Intermittent once PRN Nausea and/or Vomiting  oxyCODONE    IR 5 milliGRAM(s) Oral every 4 hours PRN Mild Pain (1 - 3)      LABS:                          10.4   10.15 )-----------( 269      ( 09 Mar 2020 03:00 )             35.3     03-10    139  |  101  |  14  ----------------------------<  127<H>  4.7   |  29  |  0.6<L>    Ca    8.2<L>      10 Mar 2020 03:11  Mg     2.0     03-10              RADIOLOGY:  Reviewed and interpreted by me  CXR from 03-10-20 shows [+] mild congestion, [-] pneumothorax, [-] R/L effusion, [-] cardiomegaly,         Assessment:  CAD SP CABG    PAST MEDICAL & SURGICAL HISTORY:  Atrial fibrillation  Neuropathy  HLD (hyperlipidemia)  HTN (hypertension)  History of cholecystectomy  History of cholecystectomy      PLAN:  Neuro: Pain control  Pulm: Encourage coughing, deep breathing and use of incentive spirometry. Wean off supplemental oxygen as able. Daily CXR.   Cardio: Monitor telemetry/alarms. Continue cardiac meds  GI: Tolerating diet. Continue stool softeners. Continue GI prophylaxis  Renal: monitor urine output, supplement electrolytes as needed  Vasc: Heparin SC/SCDs for DVT prophylaxis  Heme: Monitor H/H.   ID: On ancef. 7 days total (return to OR).  Endocrine: Monitor finger stick blood sugar and control hyperglycemia with insulin  Physical Therapy: OOB/ambulate        Discussed with Cardiothoracic Team at AM rounds.

## 2020-03-11 LAB
ANION GAP SERPL CALC-SCNC: 9 MMOL/L — SIGNIFICANT CHANGE UP (ref 7–14)
BUN SERPL-MCNC: 13 MG/DL — SIGNIFICANT CHANGE UP (ref 10–20)
CALCIUM SERPL-MCNC: 8.1 MG/DL — LOW (ref 8.5–10.1)
CHLORIDE SERPL-SCNC: 101 MMOL/L — SIGNIFICANT CHANGE UP (ref 98–110)
CO2 SERPL-SCNC: 30 MMOL/L — SIGNIFICANT CHANGE UP (ref 17–32)
CREAT SERPL-MCNC: 0.6 MG/DL — LOW (ref 0.7–1.5)
GLUCOSE SERPL-MCNC: 111 MG/DL — HIGH (ref 70–99)
HCT VFR BLD CALC: 31.8 % — LOW (ref 42–52)
HGB BLD-MCNC: 9.3 G/DL — LOW (ref 14–18)
MCHC RBC-ENTMCNC: 28.5 PG — SIGNIFICANT CHANGE UP (ref 27–31)
MCHC RBC-ENTMCNC: 29.2 G/DL — LOW (ref 32–37)
MCV RBC AUTO: 97.5 FL — HIGH (ref 80–94)
NRBC # BLD: 0 /100 WBCS — SIGNIFICANT CHANGE UP (ref 0–0)
PLATELET # BLD AUTO: 290 K/UL — SIGNIFICANT CHANGE UP (ref 130–400)
POTASSIUM SERPL-MCNC: 4.6 MMOL/L — SIGNIFICANT CHANGE UP (ref 3.5–5)
POTASSIUM SERPL-SCNC: 4.6 MMOL/L — SIGNIFICANT CHANGE UP (ref 3.5–5)
RBC # BLD: 3.26 M/UL — LOW (ref 4.7–6.1)
RBC # FLD: 15.8 % — HIGH (ref 11.5–14.5)
SODIUM SERPL-SCNC: 140 MMOL/L — SIGNIFICANT CHANGE UP (ref 135–146)
WBC # BLD: 9.92 K/UL — SIGNIFICANT CHANGE UP (ref 4.8–10.8)
WBC # FLD AUTO: 9.92 K/UL — SIGNIFICANT CHANGE UP (ref 4.8–10.8)

## 2020-03-11 PROCEDURE — 99232 SBSQ HOSP IP/OBS MODERATE 35: CPT

## 2020-03-11 PROCEDURE — 33274 TCAT INSJ/RPL PERM LDLS PM: CPT | Mod: Q0

## 2020-03-11 PROCEDURE — 71045 X-RAY EXAM CHEST 1 VIEW: CPT | Mod: 26

## 2020-03-11 PROCEDURE — 93010 ELECTROCARDIOGRAM REPORT: CPT

## 2020-03-11 RX ORDER — HYDRALAZINE HCL 50 MG
10 TABLET ORAL ONCE
Refills: 0 | Status: COMPLETED | OUTPATIENT
Start: 2020-03-11 | End: 2020-03-11

## 2020-03-11 RX ORDER — COLCHICINE 0.6 MG
0.6 TABLET ORAL EVERY 12 HOURS
Refills: 0 | Status: DISCONTINUED | OUTPATIENT
Start: 2020-03-11 | End: 2020-03-12

## 2020-03-11 RX ADMIN — Medication 100 MILLIGRAM(S): at 11:44

## 2020-03-11 RX ADMIN — Medication 100 GRAM(S): at 17:47

## 2020-03-11 RX ADMIN — Medication 100 MILLIGRAM(S): at 21:32

## 2020-03-11 RX ADMIN — Medication 325 MILLIGRAM(S): at 11:44

## 2020-03-11 RX ADMIN — Medication 0.6 MILLIGRAM(S): at 21:15

## 2020-03-11 RX ADMIN — Medication 1 APPLICATION(S): at 06:14

## 2020-03-11 RX ADMIN — Medication 1 APPLICATION(S): at 14:11

## 2020-03-11 RX ADMIN — FAMOTIDINE 20 MILLIGRAM(S): 10 INJECTION INTRAVENOUS at 21:15

## 2020-03-11 RX ADMIN — Medication 1 APPLICATION(S): at 21:14

## 2020-03-11 RX ADMIN — HEPARIN SODIUM 5000 UNIT(S): 5000 INJECTION INTRAVENOUS; SUBCUTANEOUS at 17:49

## 2020-03-11 RX ADMIN — SIMETHICONE 80 MILLIGRAM(S): 80 TABLET, CHEWABLE ORAL at 21:15

## 2020-03-11 RX ADMIN — Medication 10 MILLILITER(S): at 11:45

## 2020-03-11 RX ADMIN — HEPARIN SODIUM 5000 UNIT(S): 5000 INJECTION INTRAVENOUS; SUBCUTANEOUS at 06:14

## 2020-03-11 RX ADMIN — Medication 12.5 MILLIGRAM(S): at 06:15

## 2020-03-11 RX ADMIN — Medication 100 MILLIGRAM(S): at 06:13

## 2020-03-11 RX ADMIN — Medication 100 GRAM(S): at 06:45

## 2020-03-11 RX ADMIN — Medication 100 MILLIGRAM(S): at 14:11

## 2020-03-11 RX ADMIN — PREGABALIN 1000 MICROGRAM(S): 225 CAPSULE ORAL at 11:44

## 2020-03-11 RX ADMIN — CHLORHEXIDINE GLUCONATE 1 APPLICATION(S): 213 SOLUTION TOPICAL at 06:14

## 2020-03-11 RX ADMIN — POLYETHYLENE GLYCOL 3350 17 GRAM(S): 17 POWDER, FOR SOLUTION ORAL at 11:45

## 2020-03-11 RX ADMIN — ATORVASTATIN CALCIUM 80 MILLIGRAM(S): 80 TABLET, FILM COATED ORAL at 21:14

## 2020-03-11 RX ADMIN — TAMSULOSIN HYDROCHLORIDE 0.4 MILLIGRAM(S): 0.4 CAPSULE ORAL at 21:14

## 2020-03-11 RX ADMIN — SERTRALINE 25 MILLIGRAM(S): 25 TABLET, FILM COATED ORAL at 11:44

## 2020-03-11 RX ADMIN — Medication 10 MILLIGRAM(S): at 22:50

## 2020-03-11 NOTE — PRE-ANESTHESIA EVALUATION ADULT - NSDENTALSD_ENT_ALL_CORE
appears normal and intact
missing teeth
removable dentures top and bottom/missing teeth
missing teeth/removable dentures top and bottom

## 2020-03-11 NOTE — PROGRESS NOTE ADULT - SUBJECTIVE AND OBJECTIVE BOX
CTU Attending Progress Daily Note     11 Mar 2020 13:56  Pt had brief bradycardia yestrday  seen by EPS  will go for pacemaker today     He has history of Atrial fibrillation  Neuropathy  HLD (hyperlipidemia)  HTN (hypertension)    Interval event for past 24 hr:  MED IVORY  79y had no event.   Current Complains:  MED IVORY has no new complains  HPI:  78 yo M with hx of HTN, A.fib (Eliquis), CHF, HLD brought in by EMS post cardiac arrest. As per wife at bedside, patient at the basement watching movie and was doing fine. Few mins later, patient came up and told the wife that he wasn't feeling right and that he felt funny. Then he crashed. Wife called the EMS who arrived 5 mins later. Upon EMS arrival, patient was found to be in PEA then asystole, s/p resuscitation for ~ 10 mins and ROSC achieved after 1 round of compression and epi. While on the way to hospital, patient had another cardiac arrest on the ambulant s/p CPR and ROSC achieved few mins later. As per family, patient was doing welll and at baseline prior to the episode. As baseline patient is fully functional. Denied any recent infection, recent hospitalization, recent ED visit, fever, chills.     In ED, patient was found to bradycardic and hypotensive. s/p 1 dose of atropine and patient was started on low dose Levophed with improvement in HR and BP. (12 Jan 2020 22:13)    OBJECTIVE:  ICU Vital Signs Last 24 Hrs  T(C): 36.7 (11 Mar 2020 07:55), Max: 37.1 (10 Mar 2020 20:00)  T(F): 98.1 (11 Mar 2020 07:55), Max: 98.8 (10 Mar 2020 20:00)  HR: 82 (11 Mar 2020 11:45) (67 - 87)  BP: 143/87 (11 Mar 2020 11:45) (115/56 - 145/92)  BP(mean): 110 (11 Mar 2020 11:45) (78 - 114)  ABP: 142/65 (11 Mar 2020 11:45) (98/59 - 153/79)  ABP(mean): 95 (11 Mar 2020 11:45) (74 - 109)  RR: 24 (11 Mar 2020 11:45) (20 - 32)  SpO2: 100% (11 Mar 2020 11:45) (85% - 100%)    I&O's Summary    10 Mar 2020 07:01  -  11 Mar 2020 07:00  --------------------------------------------------------  IN: 500 mL / OUT: 490 mL / NET: 10 mL    11 Mar 2020 07:01  -  11 Mar 2020 13:56  --------------------------------------------------------  IN: 0 mL / OUT: 120 mL / NET: -120 mL      I&O's Detail    10 Mar 2020 07:01  -  11 Mar 2020 07:00  --------------------------------------------------------  IN:    IV PiggyBack: 200 mL    Oral Fluid: 300 mL  Total IN: 500 mL    OUT:    Indwelling Catheter - Urethral: 490 mL  Total OUT: 490 mL    Total NET: 10 mL      11 Mar 2020 07:01  -  11 Mar 2020 13:56  --------------------------------------------------------  IN:  Total IN: 0 mL    OUT:    Indwelling Catheter - Urethral: 120 mL  Total OUT: 120 mL    Total NET: -120 mL        Adult Advanced Hemodynamics Last 24 Hrs  CVP(mm Hg): --  CVP(cm H2O): --  CO: --  CI: --  PA: --  PA(mean): --  PCWP: --  SVR: --  SVRI: --  PVR: --  PVRI: --    CAPILLARY BLOOD GLUCOSE        LABS:  ABG - ( 11 Mar 2020 04:08 )  pH, Arterial: 7.42  pH, Blood: x     /  pCO2: 50    /  pO2: 90    / HCO3: 33    / Base Excess: 7.2   /  SaO2: 98                                      9.3    9.92  )-----------( 290      ( 11 Mar 2020 05:20 )             31.8     03-11    140  |  101  |  13  ----------------------------<  111<H>  4.6   |  30  |  0.6<L>    Ca    8.1<L>      11 Mar 2020 05:20  Mg     2.0     03-10            Home Medications:  aspirin 81 mg oral tablet, chewable: 1 tab(s) orally once a day (13 Feb 2020 14:27)  carvedilol 12.5 mg oral tablet: 1 tab(s) orally 2 times a day (16 Jan 2020 12:31)  clonazePAM 1 mg oral tablet: 1 tab(s) orally once a day (11 Feb 2020 10:55)  DULoxetine 30 mg oral delayed release capsule: 1 cap(s) orally once a day (16 Jan 2020 12:31)  Eliquis 5 mg oral tablet: 1 tab(s) orally 2 times a day (16 Jan 2020 12:31)  furosemide 40 mg oral tablet: 1 tab(s) orally once a day (16 Jan 2020 12:31)  gabapentin 300 mg oral capsule: 1 cap(s) orally 2 times a day (16 Jan 2020 12:31)  omeprazole 20 mg oral delayed release capsule: 1 cap(s) orally once a day (16 Jan 2020 12:31)  potassium chloride 10 mEq oral capsule, extended release: 1 cap(s) orally once a day (16 Jan 2020 12:31)  simvastatin 40 mg oral tablet: 1 tab(s) orally once a day (at bedtime) (16 Jan 2020 12:31)  telmisartan 80 mg oral tablet: 1 tab(s) orally once a day (16 Jan 2020 12:31)  Vitamin D3: cap(s) orally once a day (16 Jan 2020 12:31)    HOSPITAL MEDICATIONS:  MEDICATIONS  (STANDING):  aspirin enteric coated 325 milliGRAM(s) Oral daily  atorvastatin 80 milliGRAM(s) Oral at bedtime  BACItracin   Ointment 1 Application(s) Topical every 8 hours  BACItracin   Ointment 1 Application(s) Topical three times a day  BUpivacaine liposome 1.3% Injectable (no eMAR) 20 milliLiter(s) Local Injection once  ceFAZolin   IVPB 1000 milliGRAM(s) IV Intermittent every 8 hours  ceFAZolin   IVPB      chlorhexidine 4% Liquid 1 Application(s) Topical <User Schedule>  cyanocobalamin 1000 MICROGram(s) Oral daily  famotidine    Tablet 20 milliGRAM(s) Oral two times a day  guaifenesin/dextromethorphan  Syrup 10 milliLiter(s) Oral every 6 hours  heparin  Injectable 5000 Unit(s) SubCutaneous every 12 hours  ipratropium    for Nebulization 500 MICROGram(s) Nebulizer every 6 hours  magnesium sulfate  IVPB 1 Gram(s) IV Intermittent every 12 hours  polyethylene glycol 3350 17 Gram(s) Oral daily  sertraline 25 milliGRAM(s) Oral daily  simethicone 80 milliGRAM(s) Chew every 12 hours  tamsulosin 0.4 milliGRAM(s) Oral at bedtime  thiamine 100 milliGRAM(s) Oral daily    MEDICATIONS  (PRN):  bisacodyl 5 milliGRAM(s) Oral every 12 hours PRN Constipation  glucagon  Injectable 1 milliGRAM(s) IntraMuscular once PRN Glucose LESS THAN 70 milligrams/deciliter  ondansetron  IVPB 4 milliGRAM(s) IV Intermittent once PRN Nausea and/or Vomiting  oxyCODONE    IR 5 milliGRAM(s) Oral every 4 hours PRN Mild Pain (1 - 3)      REVIEW OF SYSTEMS:  CONSTITUTIONAL: [X] all negative; [ ] weakness, [ ] fevers, [ ] chills  EYES/ENT: [X] all negative; [ ] visual changes, [ ] vertigo, [ ] throat pain   NECK: [X] all negative; [ ] pain, [ ] stiffness  RESPIRATORY: [] all negative, [ ] cough, [ ] wheezing, [ ] hemoptysis, [ ] shortness of breath  CARDIOVASCULAR: [] all negative; [ ] chest pain, [ ] palpitations, [ ] orthopnea  GASTROINTESTINAL: [X] all negative; [ ]abdominal pain, [ ] nausea, [ ] vomiting, [ ] hematemesis, [ ] diarrhea, [ ] constipation, [ ] melena, [ ] hematochezia.  GENITOURINARY: [X] all negative; [ ] dysuria, [ ] frequency, [ ] hematuria  NEUROLOGICAL: [X] all negative; [ ] numbness, [ ] weakness  SKIN: [X] all negative; [ ] itching, [ ] burning, [ ] rashes, [ ] lesions   All other review of systems is negative unless indicated above.    [  ] Unable to assess ROS because     PHYSICAL EXAM:          CONSTITUTIONAL: Well-developed; well-nourished; in no acute distress.   	SKIN: warm, dry  	HEAD: Normocephalic; atraumatic.  	EYES: PERRL, EOM, no conj injection, sclera clear  	ENT: No nasal discharge; airway clear.  	NECK: Supple; non tender.  No midline ttp ctls  	CARD: S1, S2 normal; no murmurs, gallops, or rubs. Regular rate and rhythm. 2+ RPs and DPs bilat, no carotid bruits, no pedal   edema, no calf pain b/l  	RESP: CTA  bilat good air movement No wheezes, rales or rhonchi.  	ABD: Soft, not tender, not distended, no CVA ttp no rebound or guarding, bowel sounds present  	EXT: Normal ROM.  No clubbing, cyanosis or edema.   	  	NEURO: Alert, awake, motor 5/5 R, 5/5 L        RADIOLOGY:  xray  < from: Xray Chest 1 View- PORTABLE-Urgent (03.10.20 @ 18:58) >  Impression:      Stable left airspace opacification and pleural effusion.    < end of copied text >    I spent 45 minutes of critical care time examining patient, reviewing vitals, labs, medications, imaging and discussing with the team goals of care to prevent life-threatening in this patient who is at high risk for deterioration or death due to:

## 2020-03-11 NOTE — PROGRESS NOTE ADULT - SUBJECTIVE AND OBJECTIVE BOX
OPERATIVE PROCEDURE(s):                POD #                       79yMale  SURGEON(s): MARCY Arreguin  SUBJECTIVE ASSESSMENT:   Vital Signs Last 24 Hrs  T(F): 98.1 (11 Mar 2020 07:55), Max: 98.8 (10 Mar 2020 20:00)  HR: 79 (11 Mar 2020 07:55) (67 - 183)  BP: 115/56 (11 Mar 2020 07:00) (115/56 - 145/92)  BP(mean): 78 (11 Mar 2020 07:00) (78 - 114)  ABP: 122/54 (11 Mar 2020 07:55) (98/59 - 153/79)  ABP(mean): 79 (11 Mar 2020 07:55)  RR: 28 (11 Mar 2020 07:55) (20 - 37)  SpO2: 100% (11 Mar 2020 07:55) (85% - 100%)  CVP(mm Hg): --  CVP(cm H2O): --  CO: --  CI: --  PA: --  SVR: --    I&O's Detail    10 Mar 2020 07:01  -  11 Mar 2020 07:00  --------------------------------------------------------  IN:    IV PiggyBack: 200 mL    Oral Fluid: 300 mL  Total IN: 500 mL    OUT:    Indwelling Catheter - Urethral: 490 mL  Total OUT: 490 mL        Net:   I&O's Detail    09 Mar 2020 07:01  -  10 Mar 2020 07:00  --------------------------------------------------------  Total NET: 837 mL      10 Mar 2020 07:01  -  11 Mar 2020 07:00  --------------------------------------------------------  Total NET: 10 mL        CAPILLARY BLOOD GLUCOSE        Physical Exam:  General: NAD; A&Ox3/Patient is intubated and sedated  Cardiac: S1/S2, RRR, no murmur, no rubs  Lungs: unlabored respirations, CTA b/l, no wheeze, no rales, no crackles  Abdomen: Soft/NT/ND; positive bowel sounds x 4  Sternum: Intact, no click, incision healing well with no drainage  Incisions: Incisions clean/dry/intact  Extremities: No edema b/l lower extremities; good capillary refill; no cyanosis; palpable 1+ pedal pulses b/l    Central Venous Catheter: Yes[]  No[] , If Yes indication:           Day #  Mora Catheter: Yes  [] , No  [] , If yes indication:                      Day #  NGT: Yes [] No [] ,    If Yes Placement:                                     Day #  EPICARDIAL WIRES:  [] YES [] NO                                              Day #  BOWEL MOVEMENT:  [] YES [] NO, If No, Timing since last BM:      Day #  CHEST TUBE(Left/Right):  [] YES [] NO, If yes -  AIR LEAKS:  [] YES [] NO        LABS:                        9.3<L>  9.92  )-----------( 290      ( 11 Mar 2020 05:20 )             31.8<L>                        10.4<L>  10.15 )-----------( 269      ( 09 Mar 2020 03:00 )             35.3<L>    03-11    140  |  101  |  13  ----------------------------<  111<H>  4.6   |  30  |  0.6<L>  03-10    139  |  101  |  14  ----------------------------<  127<H>  4.7   |  29  |  0.6<L>    Ca    8.1<L>      11 Mar 2020 05:20  Mg     2.0     03-10          ABG - ( 11 Mar 2020 04:08 )  pH: 7.42  /  pCO2: 50    /  pO2: 90    / HCO3: 33    / Base Excess: 7.2   /  SaO2: 98    /  LA: 0.6              RADIOLOGY & ADDITIONAL TESTS:  CXR:  EKG:  MEDICATIONS  (STANDING):  aspirin enteric coated 325 milliGRAM(s) Oral daily  atorvastatin 80 milliGRAM(s) Oral at bedtime  BACItracin   Ointment 1 Application(s) Topical every 8 hours  BACItracin   Ointment 1 Application(s) Topical three times a day  BUpivacaine liposome 1.3% Injectable (no eMAR) 20 milliLiter(s) Local Injection once  ceFAZolin   IVPB 1000 milliGRAM(s) IV Intermittent every 8 hours  ceFAZolin   IVPB      chlorhexidine 4% Liquid 1 Application(s) Topical <User Schedule>  cyanocobalamin 1000 MICROGram(s) Oral daily  famotidine    Tablet 20 milliGRAM(s) Oral two times a day  guaifenesin/dextromethorphan  Syrup 10 milliLiter(s) Oral every 6 hours  heparin  Injectable 5000 Unit(s) SubCutaneous every 12 hours  ipratropium    for Nebulization 500 MICROGram(s) Nebulizer every 6 hours  magnesium sulfate  IVPB 1 Gram(s) IV Intermittent every 12 hours  metoprolol tartrate 12.5 milliGRAM(s) Oral every 12 hours  polyethylene glycol 3350 17 Gram(s) Oral daily  sertraline 25 milliGRAM(s) Oral daily  simethicone 80 milliGRAM(s) Chew every 12 hours  tamsulosin 0.4 milliGRAM(s) Oral at bedtime  thiamine 100 milliGRAM(s) Oral daily    MEDICATIONS  (PRN):  bisacodyl 5 milliGRAM(s) Oral every 12 hours PRN Constipation  glucagon  Injectable 1 milliGRAM(s) IntraMuscular once PRN Glucose LESS THAN 70 milligrams/deciliter  ondansetron  IVPB 4 milliGRAM(s) IV Intermittent once PRN Nausea and/or Vomiting  oxyCODONE    IR 5 milliGRAM(s) Oral every 4 hours PRN Mild Pain (1 - 3)    HEPARIN:  [] YES [] NO  Dose: XX UNITS/HR UNITS Q8H  LOVENOX:[] YES [] NO  Dose: XX mg Q24H  COUMADIN: []  YES [] NO  Dose: XX mg  Q24H  SCD's: YES b/l  GI Prophylaxis: Protonix [], Pepcid [], None [], (Contra-indication:.....)    Post-Op Beta-Blockers: Yes [], No[], If No, then contraindication:  Post-Op Aspirin: Yes [],  No [], If No, then contraindication:  Post-Op Statin: Yes [], No[], If No, then contraindication:  Allergies    No Known Allergies    Intolerances      Ambulation/Activity Status:    Assessment/Plan:  79y Male status-post .....  - Case and plan discussed with CTU Intensivist and CT Surgeon - Dr. Shaw/Rodríguez/Samir   - Continue CTU supportive care    - Continue DVT/GI prophylaxis  - Incentive Spirometry 10 times an hour  - Continue to advance physical activity as tolerated and continue PT/OT as directed  1. CAD: Continue ASA, statin, BB  2. HTN:   3. A. Fib:   4. COPD/Hypoxia:   5. DM/Glucose Control:     Social Service Disposition: OPERATIVE PROCEDURE(s):      [facility]         POD #       16                79yMale  SURGEON(s): MARCY Arreguin  SUBJECTIVE ASSESSMENT: pt seen and examined. awaiting ep eval for bradycardia  Vital Signs Last 24 Hrs  T(F): 98.1 (11 Mar 2020 07:55), Max: 98.8 (10 Mar 2020 20:00)  HR: 79 (11 Mar 2020 07:55) (67 - 183)  BP: 115/56 (11 Mar 2020 07:00) (115/56 - 145/92)  BP(mean): 78 (11 Mar 2020 07:00) (78 - 114)  ABP: 122/54 (11 Mar 2020 07:55) (98/59 - 153/79)  ABP(mean): 79 (11 Mar 2020 07:55)  RR: 28 (11 Mar 2020 07:55) (20 - 37)  SpO2: 100% (11 Mar 2020 07:55) (85% - 100%)      I&O's Detail    10 Mar 2020 07:01  -  11 Mar 2020 07:00  --------------------------------------------------------  IN:    IV PiggyBack: 200 mL    Oral Fluid: 300 mL  Total IN: 500 mL    OUT:    Indwelling Catheter - Urethral: 490 mL  Total OUT: 490 mL        Net:   I&O's Detail    09 Mar 2020 07:01  -  10 Mar 2020 07:00  --------------------------------------------------------  Total NET: 837 mL      10 Mar 2020 07:01  -  11 Mar 2020 07:00  --------------------------------------------------------  Total NET: 10 mL              Physical Exam:  General: NAD; A&Ox3  Cardiac: S1/S2, RRR, no murmur, no rubs  Lungs: unlabored respirations, CTA b/l, no wheeze, no rales, no crackles  Abdomen: Soft/NT/ND; positive bowel sounds x 4  Incisions: Incisions clean/dry/intact  Extremities: No edema b/l lower extremities; good capillary refill; no cyanosis; palpable 1+ pedal pulses b/l    Central Venous Catheter: Yes[]  No[x] , If Yes indication:           Day #  Patrick Catheter: Yes  [x] , No  [] , If yes indication:   urinary retention                 Day # 6  NGT: Yes [] No [x] ,    If Yes Placement:                                     Day #  EPICARDIAL WIRES:  [] YES [x] NO                                              Day #  BOWEL MOVEMENT:  [] YES [x] NO, If No, Timing since last BM:      Day #  CHEST TUBE(Left/Right):  [] YES [x] NO, If yes -  AIR LEAKS:  [] YES [] NO        LABS:                        9.3<L>  9.92  )-----------( 290      ( 11 Mar 2020 05:20 )             31.8<L>                        10.4<L>  10.15 )-----------( 269      ( 09 Mar 2020 03:00 )             35.3<L>    03-11    140  |  101  |  13  ----------------------------<  111<H>  4.6   |  30  |  0.6<L>  03-10    139  |  101  |  14  ----------------------------<  127<H>  4.7   |  29  |  0.6<L>    Ca    8.1<L>      11 Mar 2020 05:20  Mg     2.0     03-10          ABG - ( 11 Mar 2020 04:08 )  pH: 7.42  /  pCO2: 50    /  pO2: 90    / HCO3: 33    / Base Excess: 7.2   /  SaO2: 98    /  LA: 0.6              RADIOLOGY & ADDITIONAL TESTS:  CXR: < from: Xray Chest 1 View- PORTABLE-Urgent (03.10.20 @ 18:58) >  Impression:      Stable left airspace opacification and pleural effusion.    < end of copied text >    EKG:   MEDICATIONS  (STANDING):  aspirin enteric coated 325 milliGRAM(s) Oral daily  atorvastatin 80 milliGRAM(s) Oral at bedtime  BACItracin   Ointment 1 Application(s) Topical every 8 hours  BACItracin   Ointment 1 Application(s) Topical three times a day  BUpivacaine liposome 1.3% Injectable (no eMAR) 20 milliLiter(s) Local Injection once  ceFAZolin   IVPB 1000 milliGRAM(s) IV Intermittent every 8 hours  ceFAZolin   IVPB      chlorhexidine 4% Liquid 1 Application(s) Topical <User Schedule>  cyanocobalamin 1000 MICROGram(s) Oral daily  famotidine    Tablet 20 milliGRAM(s) Oral two times a day  guaifenesin/dextromethorphan  Syrup 10 milliLiter(s) Oral every 6 hours  heparin  Injectable 5000 Unit(s) SubCutaneous every 12 hours  ipratropium    for Nebulization 500 MICROGram(s) Nebulizer every 6 hours  magnesium sulfate  IVPB 1 Gram(s) IV Intermittent every 12 hours  metoprolol tartrate 12.5 milliGRAM(s) Oral every 12 hours  polyethylene glycol 3350 17 Gram(s) Oral daily  sertraline 25 milliGRAM(s) Oral daily  simethicone 80 milliGRAM(s) Chew every 12 hours  tamsulosin 0.4 milliGRAM(s) Oral at bedtime  thiamine 100 milliGRAM(s) Oral daily    MEDICATIONS  (PRN):  bisacodyl 5 milliGRAM(s) Oral every 12 hours PRN Constipation  glucagon  Injectable 1 milliGRAM(s) IntraMuscular once PRN Glucose LESS THAN 70 milligrams/deciliter  ondansetron  IVPB 4 milliGRAM(s) IV Intermittent once PRN Nausea and/or Vomiting  oxyCODONE    IR 5 milliGRAM(s) Oral every 4 hours PRN Mild Pain (1 - 3)    HEPARIN:  [x] YES [] NO  Dose: 5000 UNITS Q8H  LOVENOX:[] YES [x] NO  Dose: XX mg Q24H  COUMADIN: []  YES [x] NO  Dose: XX mg  Q24H  SCD's: YES b/l  GI Prophylaxis: Protonix [], Pepcid [x], None [], (Contra-indication:.....)    Post-Op Beta-Blockers: Yes [x], No[], If No, then contraindication:  Post-Op Aspirin: Yes [x],  No [], If No, then contraindication:  Post-Op Statin: Yes [x], No[], If No, then contraindication:  Allergies    No Known Allergies    Intolerances      Ambulation/Activity Status: ambulate    Assessment/Plan:  79y Male status-post MICSx1   POD#16  - Case and plan discussed with CTU Intensivist and CT Surgeon - Dr. Shaw/Rodríguez/Samir   - Continue CTU supportive care    - Continue DVT/GI prophylaxis  - Incentive Spirometry 10 times an hour  - Continue to advance physical activity as tolerated and continue PT/OT as directed  1. CAD: Continue ASA, statin, BB  2. HTN: cont lopressor  3. A. Fib: rate control, will discuss with Dr Arreguin need for anticoagulation  4. COPD/Hypoxia: cont nebs, mucinex, encourage incentive spirometry  5. urinary retention: cont flomax, keep patrick in for now until walking better then will do a trial of void  6. bradycardia: ep consult appreciated, poss need for ppm    Social Service Disposition:  [place]

## 2020-03-11 NOTE — PROGRESS NOTE ADULT - ASSESSMENT
Assessment:  CAD SP CABG    Vat for L chest   Bradycardia /syncope yestrday   pacemaker planned today by EPS     PAST MEDICAL & SURGICAL HISTORY:  Atrial fibrillation  Neuropathy  HLD (hyperlipidemia)  HTN (hypertension)  History of cholecystectomy  History of cholecystectomy      PLAN:  Neuro: Pain control  Pulm: Encourage coughing, deep breathing and use of incentive spirometry. Wean off supplemental oxygen as able. Daily CXR.   Cardio: Monitor telemetry/alarms. Continue cardiac meds  GI: Tolerating diet. Continue stool softeners. Continue GI prophylaxis  Renal: monitor urine output, supplement electrolytes as needed  Vasc: Heparin SC/SCDs for DVT prophylaxis  Heme: Monitor H/H.   ID: On ancef. 7 days total (return to OR).  Endocrine: Monitor finger stick blood sugar and control hyperglycemia with insulin  Physical Therapy: OOB

## 2020-03-11 NOTE — PROGRESS NOTE ADULT - NSHPATTENDINGPLANDISCUSS_GEN_ALL_CORE
team
team
CTU team
ICU team
ctu team
ctu team
spoke to his resident and spoke in detail with his wife and his daughter
TEAM
patient and team
team
Dr. Laws

## 2020-03-11 NOTE — PRE-ANESTHESIA EVALUATION ADULT - NSANTHRISKSRD_GEN_ALL_CORE
difficult airway/ASA of 4, 4E, 5 or 5E
ASA of 4, 4E, 5 or 5E/difficult airway
ASA of 4, 4E, 5 or 5E
difficult airway/ASA of 4, 4E, 5 or 5E

## 2020-03-11 NOTE — CHART NOTE - NSCHARTNOTEFT_GEN_A_CORE
Cardiac Electrophysiology Procedure Note      Procedure:   Implantable Cardioverter-Defibrillator (ICD) generator change     Indication:  55 yo with history of heart failure and VT referred for generator change as it reached [facility] ([place]).    :  Attending: Andreas Larry MD    Equipment Implanted   Pulse Generator: Medtronic  Model Number:  [id number]  Serial Number: [id number]    Equipment Explanted  Pulse Generator: Medtronic  Model Number:[id number]  Serial Number:  [id number]         Intraprocedure Lead Parameters    Right Ventricular Lead:  Model Number: [id number]  Serial Number: [id number]  Implanted:     11  Threshold:  1.0 V @  0.5ms  Sensin.5   mV  Impedance:     458   Ohms      DESCRIPTION OF PROCEDURE  The patient was brought to the Procedure Room in a nonsedated and fasting state, having received preoperative antibiotics. Informed, written consent was obtained prior to the procedure. Intermittent boluses of Versed  was used to maintain comfort and analgesia throughout the procedure. Blood pressure, oxygenation and level of comfort were stable throughout. The left shoulder region was cleaned and prepped with serial applications of Chlorhexidine Scrub. Patient was then covered from head to toe with sterile drapes in the usual manner. The fluoroscopic anatomy of the left shoulder region was inspected, along with generator and lead position and orientation.    Twenty cc of lidocaine solution were infiltrated into the skin overlying the generator just over the previous incision scar. A 2.5 cm. incision was then made overlying and parallel to the previous incision scar. This incision was carried down to the pre-pectoral fascia using electrocautery and blunt dissection. The pacemaker capsule was identified and opened. The old device was freed from fibrous tissue and retrieved from the pocket. The leads were unscrewed and pulled from the device. The leads were connected to a  and tested. Optimal lead parameters were obtained for the ventricular lead and the atrial lead as shown above Next the pocket was inspected for bleeding, and electrocautery applied where appropriate. The wound was irrigated with copious amounts of antibiotics solution.    The leads were wiped clean and then connected to the new pulse generator. The wound margins approximated well, without any tension or overlap. The wound was closed using an interrupted layer of 2-0 absorbable Dacron suture for the deep fascial layer. This was followed by a continuous layer of 3-0 absorbable sutures. The skin was then closed using 4-0 absorbable Dacron sutures. Steri-strips were placed over the wound.  A dry, sterile dressing was placed over this.  Needle count were performed and found to be consistent at the end of the procedure        COMPLICATIONS:  The patient tolerated the procedure well. There were no immediate complications.    CONCLUSIONS:  Successful ICD generator change               _______________________________  Andreas aLrry MD  Cardiac Electrophysiology

## 2020-03-11 NOTE — PROGRESS NOTE ADULT - SUBJECTIVE AND OBJECTIVE BOX
INTERVAL HPI/EVENTS:  *see original consult 1/17/2020 and note 2/2/20*  78yo Male, originally presented as PEA arrest, and fall at the time of arrest  LHC revealed oLAD 90% stenosis.   Neck CT revealed significant C5-6 anterior and posterior ligamentous injury, in C-Color at all times  Due to mental and physical deconditioning during hospitalization patient was deemed poor surgical candidate. He was not stable to be transferred to rehab prior to revascularization.  Patient was transferred to telemetry and was working with physical therapy.  On 2/24 patient underwent minimally invasive CABG LIMA-LAD.  Tolerated procedure well, extubated on POD2.  Subsequently, POD 8-9, patient developed Lt sided chest wall hematoma (at prior chest tube site). CT scan revealed loculated Left hemothorax.  On 3/5 patient underwent Lt VATS with hemothorax evaculation        MEDICATIONS  (STANDING):  aspirin enteric coated 325 milliGRAM(s) Oral daily  atorvastatin 80 milliGRAM(s) Oral at bedtime  BACItracin   Ointment 1 Application(s) Topical every 8 hours  BACItracin   Ointment 1 Application(s) Topical three times a day  BUpivacaine liposome 1.3% Injectable (no eMAR) 20 milliLiter(s) Local Injection once  ceFAZolin   IVPB 1000 milliGRAM(s) IV Intermittent every 8 hours  ceFAZolin   IVPB      chlorhexidine 4% Liquid 1 Application(s) Topical <User Schedule>  cyanocobalamin 1000 MICROGram(s) Oral daily  famotidine    Tablet 20 milliGRAM(s) Oral two times a day  guaifenesin/dextromethorphan  Syrup 10 milliLiter(s) Oral every 6 hours  heparin  Injectable 5000 Unit(s) SubCutaneous every 12 hours  ipratropium    for Nebulization 500 MICROGram(s) Nebulizer every 6 hours  magnesium sulfate  IVPB 1 Gram(s) IV Intermittent every 12 hours  polyethylene glycol 3350 17 Gram(s) Oral daily  sertraline 25 milliGRAM(s) Oral daily  simethicone 80 milliGRAM(s) Chew every 12 hours  tamsulosin 0.4 milliGRAM(s) Oral at bedtime  thiamine 100 milliGRAM(s) Oral daily    MEDICATIONS  (PRN):  bisacodyl 5 milliGRAM(s) Oral every 12 hours PRN Constipation  glucagon  Injectable 1 milliGRAM(s) IntraMuscular once PRN Glucose LESS THAN 70 milligrams/deciliter  ondansetron  IVPB 4 milliGRAM(s) IV Intermittent once PRN Nausea and/or Vomiting  oxyCODONE    IR 5 milliGRAM(s) Oral every 4 hours PRN Mild Pain (1 - 3)      Allergies    No Known Allergies    Intolerances        REVIEW OF SYSTEMS    [ ] A ten-point review of systems was otherwise negative except as noted.  [ ] Due to altered mental status/intubation, subjective information were not able to be obtained from the patient. History was obtained, to the extent possible, from review of the chart and collateral sources of information.      Vital Signs Last 24 Hrs  T(C): 36.7 (11 Mar 2020 07:55), Max: 37.1 (10 Mar 2020 20:00)  T(F): 98.1 (11 Mar 2020 07:55), Max: 98.8 (10 Mar 2020 20:00)  HR: 79 (11 Mar 2020 07:55) (67 - 183)  BP: 115/56 (11 Mar 2020 07:00) (115/56 - 145/92)  BP(mean): 78 (11 Mar 2020 07:00) (78 - 114)  RR: 28 (11 Mar 2020 07:55) (20 - 37)  SpO2: 100% (11 Mar 2020 07:55) (85% - 100%)    03-10-20 @ 07:01  -  03-11-20 @ 07:00  --------------------------------------------------------  IN: 500 mL / OUT: 490 mL / NET: 10 mL    03-11-20 @ 07:01  -  03-11-20 @ 10:41  --------------------------------------------------------  IN: 0 mL / OUT: 20 mL / NET: -20 mL        PHYSICAL EXAM:    GENERAL: In no apparent distress, well nourished, and hydrated.  HEART: Regular rate and rhythm; No murmur; NO rubs, or gallops.  PULMONARY: Clear to auscultation and percussion.  Normal expansion/effort. No rales, wheezing, or rhonchi bilaterally.  ABDOMEN: Soft, Nontender, Nondistended; Bowel sounds present  EXTREMITIES:  Extremities warm, pink, well-perfused, 2+ Peripheral Pulses, No clubbing, cyanosis, or edema  NEUROLOGICAL: alert & oriented x 3, no focal deficits, PERRLA, EOMI    LABS:                        9.3    9.92  )-----------( 290      ( 11 Mar 2020 05:20 )             31.8     03-11    140  |  101  |  13  ----------------------------<  111<H>  4.6   |  30  |  0.6<L>    Ca    8.1<L>      11 Mar 2020 05:20  Mg     2.0     03-10            EKG:    RADIOLOGY & ADDITIONAL TESTS: INTERVAL HPI/EVENTS:  *see original consult 1/17/2020 and note 2/2/20*  80yo Male, originally presented as PEA arrest, and fall at the time of arrest  LHC revealed oLAD 90% stenosis.   Neck CT revealed significant C5-6 anterior and posterior ligamentous injury, in C-Color at all times  Due to mental and physical deconditioning during hospitalization patient was deemed poor surgical candidate. He was not stable to be transferred to rehab prior to revascularization.  Patient was transferred to telemetry and was working with physical therapy.  On 2/24 patient underwent minimally invasive CABG LIMA-LAD.  Tolerated procedure well, extubated on POD2.  Subsequently, POD 8-9, patient developed Lt sided chest wall hematoma (at prior chest tube site). CT scan revealed loculated Left hemothorax.  On 3/5 patient underwent Lt VATS with hemothorax evacuation chest tube left in placed        MEDICATIONS  (STANDING):  aspirin enteric coated 325 milliGRAM(s) Oral daily  atorvastatin 80 milliGRAM(s) Oral at bedtime  BACItracin   Ointment 1 Application(s) Topical every 8 hours  BACItracin   Ointment 1 Application(s) Topical three times a day  BUpivacaine liposome 1.3% Injectable (no eMAR) 20 milliLiter(s) Local Injection once  ceFAZolin   IVPB 1000 milliGRAM(s) IV Intermittent every 8 hours  ceFAZolin   IVPB      chlorhexidine 4% Liquid 1 Application(s) Topical <User Schedule>  cyanocobalamin 1000 MICROGram(s) Oral daily  famotidine    Tablet 20 milliGRAM(s) Oral two times a day  guaifenesin/dextromethorphan  Syrup 10 milliLiter(s) Oral every 6 hours  heparin  Injectable 5000 Unit(s) SubCutaneous every 12 hours  ipratropium    for Nebulization 500 MICROGram(s) Nebulizer every 6 hours  magnesium sulfate  IVPB 1 Gram(s) IV Intermittent every 12 hours  polyethylene glycol 3350 17 Gram(s) Oral daily  sertraline 25 milliGRAM(s) Oral daily  simethicone 80 milliGRAM(s) Chew every 12 hours  tamsulosin 0.4 milliGRAM(s) Oral at bedtime  thiamine 100 milliGRAM(s) Oral daily    MEDICATIONS  (PRN):  bisacodyl 5 milliGRAM(s) Oral every 12 hours PRN Constipation  glucagon  Injectable 1 milliGRAM(s) IntraMuscular once PRN Glucose LESS THAN 70 milligrams/deciliter  ondansetron  IVPB 4 milliGRAM(s) IV Intermittent once PRN Nausea and/or Vomiting  oxyCODONE    IR 5 milliGRAM(s) Oral every 4 hours PRN Mild Pain (1 - 3)      Allergies    No Known Allergies    Intolerances        REVIEW OF SYSTEMS    [ ] A ten-point review of systems was otherwise negative except as noted.  [ ] Due to altered mental status/intubation, subjective information were not able to be obtained from the patient. History was obtained, to the extent possible, from review of the chart and collateral sources of information.      Vital Signs Last 24 Hrs  T(C): 36.7 (11 Mar 2020 07:55), Max: 37.1 (10 Mar 2020 20:00)  T(F): 98.1 (11 Mar 2020 07:55), Max: 98.8 (10 Mar 2020 20:00)  HR: 79 (11 Mar 2020 07:55) (67 - 183)  BP: 115/56 (11 Mar 2020 07:00) (115/56 - 145/92)  BP(mean): 78 (11 Mar 2020 07:00) (78 - 114)  RR: 28 (11 Mar 2020 07:55) (20 - 37)  SpO2: 100% (11 Mar 2020 07:55) (85% - 100%)    03-10-20 @ 07:01  -  03-11-20 @ 07:00  --------------------------------------------------------  IN: 500 mL / OUT: 490 mL / NET: 10 mL    03-11-20 @ 07:01  -  03-11-20 @ 10:41  --------------------------------------------------------  IN: 0 mL / OUT: 20 mL / NET: -20 mL        PHYSICAL EXAM:    GENERAL: In no apparent distress, well nourished, and hydrated.  HEART: Regular rate and rhythm; No murmur; NO rubs, or gallops.  PULMONARY: Clear to auscultation and percussion.  Normal expansion/effort. No rales, wheezing, or rhonchi bilaterally.  ABDOMEN: Soft, Nontender, Nondistended; Bowel sounds present  EXTREMITIES:  Extremities warm, pink, well-perfused, 2+ Peripheral Pulses, No clubbing, cyanosis, or edema  NEUROLOGICAL: alert & oriented x 3, no focal deficits, PERRLA, EOMI    LABS:                        9.3    9.92  )-----------( 290      ( 11 Mar 2020 05:20 )             31.8     03-11    140  |  101  |  13  ----------------------------<  111<H>  4.6   |  30  |  0.6<L>    Ca    8.1<L>      11 Mar 2020 05:20  Mg     2.0     03-10            EKG:    RADIOLOGY & ADDITIONAL TESTS: INTERVAL HPI/EVENTS:  *see original consult 1/17/2020 and note 2/2/20*  80yo Male, originally presented as PEA arrest, and fall at the time of arrest  LHC revealed oLAD 90% stenosis.   Neck CT revealed significant C5-6 anterior and posterior ligamentous injury, in C-Color at all times  Due to mental and physical deconditioning during hospitalization patient was deemed poor surgical candidate. He was not stable to be transferred to rehab prior to revascularization.  Patient was transferred to telemetry and was working with physical therapy.  On 2/24 patient underwent minimally invasive CABG LIMA-LAD.  Tolerated procedure well, extubated on POD2.  Subsequently, POD 8-9, patient developed Lt sided chest wall hematoma (at prior chest tube site). CT scan revealed loculated Left hemothorax.  On 3/5 patient underwent Lt VATS with hemothorax evacuation chest tube left in placed, removed 3/9  on prophylactic Ancef for 7days s/p return to OR (3/5-3/11)    Yesterday 3/10, patient had episode of bradycardia to 20s bpm, hypotension, LOC, team started compressions with ROSC within 1-2mins      MEDICATIONS  (STANDING):  aspirin enteric coated 325 milliGRAM(s) Oral daily  atorvastatin 80 milliGRAM(s) Oral at bedtime  BACItracin   Ointment 1 Application(s) Topical every 8 hours  BACItracin   Ointment 1 Application(s) Topical three times a day  BUpivacaine liposome 1.3% Injectable (no eMAR) 20 milliLiter(s) Local Injection once  ceFAZolin   IVPB 1000 milliGRAM(s) IV Intermittent every 8 hours  ceFAZolin   IVPB      chlorhexidine 4% Liquid 1 Application(s) Topical <User Schedule>  cyanocobalamin 1000 MICROGram(s) Oral daily  famotidine    Tablet 20 milliGRAM(s) Oral two times a day  guaifenesin/dextromethorphan  Syrup 10 milliLiter(s) Oral every 6 hours  heparin  Injectable 5000 Unit(s) SubCutaneous every 12 hours  ipratropium    for Nebulization 500 MICROGram(s) Nebulizer every 6 hours  magnesium sulfate  IVPB 1 Gram(s) IV Intermittent every 12 hours  polyethylene glycol 3350 17 Gram(s) Oral daily  sertraline 25 milliGRAM(s) Oral daily  simethicone 80 milliGRAM(s) Chew every 12 hours  tamsulosin 0.4 milliGRAM(s) Oral at bedtime  thiamine 100 milliGRAM(s) Oral daily    MEDICATIONS  (PRN):  bisacodyl 5 milliGRAM(s) Oral every 12 hours PRN Constipation  glucagon  Injectable 1 milliGRAM(s) IntraMuscular once PRN Glucose LESS THAN 70 milligrams/deciliter  ondansetron  IVPB 4 milliGRAM(s) IV Intermittent once PRN Nausea and/or Vomiting  oxyCODONE    IR 5 milliGRAM(s) Oral every 4 hours PRN Mild Pain (1 - 3)      Allergies    No Known Allergies    Intolerances        REVIEW OF SYSTEMS    [x ] A ten-point review of systems was otherwise negative except as noted.      Vital Signs Last 24 Hrs  T(C): 36.7 (11 Mar 2020 07:55), Max: 37.1 (10 Mar 2020 20:00)  T(F): 98.1 (11 Mar 2020 07:55), Max: 98.8 (10 Mar 2020 20:00)  HR: 79 (11 Mar 2020 07:55) (67 - 183)  BP: 115/56 (11 Mar 2020 07:00) (115/56 - 145/92)  BP(mean): 78 (11 Mar 2020 07:00) (78 - 114)  RR: 28 (11 Mar 2020 07:55) (20 - 37)  SpO2: 100% (11 Mar 2020 07:55) (85% - 100%)    03-10-20 @ 07:01  -  03-11-20 @ 07:00  --------------------------------------------------------  IN: 500 mL / OUT: 490 mL / NET: 10 mL    03-11-20 @ 07:01  -  03-11-20 @ 10:41  --------------------------------------------------------  IN: 0 mL / OUT: 20 mL / NET: -20 mL        PHYSICAL EXAM:    GENERAL: In no apparent distress, well nourished, and hydrated.  HEART: IRRegular rate and rhythm; 2/6 SM LSB; NO rubs, or gallops.  PULMONARY: Clear to auscultation and percussion.  Normal expansion/effort. No rales, wheezing, or rhonchi bilaterally.  ABDOMEN: Soft, Nontender, Nondistended; Bowel sounds present  EXTREMITIES:  Extremities warm, pink, well-perfused, 1+ Peripheral Pulses, No clubbing, cyanosis, or edema  NEUROLOGICAL: alert & oriented x 3, no focal deficits, PERRLA, EOMI. [facility] [place]    LABS:                        9.3    9.92  )-----------( 290      ( 11 Mar 2020 05:20 )             31.8     03-11    140  |  101  |  13  ----------------------------<  111<H>  4.6   |  30  |  0.6<L>    Ca    8.1<L>      11 Mar 2020 05:20  Mg     2.0     03-10            EKG:  < from: 12 Lead ECG (03.03.20 @ 07:53) >  Ventricular Rate 86 BPM    Atrial Rate 86 BPM    P-R Interval 270 ms    QRS Duration 98 ms    Q-T Interval 340 ms    QTC Calculation(Bezet) 406 ms    R Axis -1 degrees    T Axis 24 degrees    Diagnosis Line Sinus rhythm with 1st degree A-V block with Premature atrial complexes  Possible Inferior infarct , age undetermined  ST & T wave abnormality, consider anterior ischemia  Abnormal ECG    < end of copied text >      RADIOLOGY & ADDITIONAL TESTS:  < from: Transthoracic Echocardiogram (02.09.20 @ 09:09) >  Summary:   1. Left ventricular ejection fraction, by visual estimation, is 65 to 70%.   2. Mildly increased LV wall thickness.   3. Mild to moderate mitral valve regurgitation.   4. Moderate tricuspidregurgitation.   5. Mild aortic regurgitation.   6. Sclerotic aortic valve with decreased opening.   7. Estimated pulmonary artery systolic pressure is 41.1 mmHg assuming a right atrial pressure of 3 mmHg, which is consistent with mild pulmonary hypertension.    < end of copied text >

## 2020-03-11 NOTE — CHART NOTE - NSCHARTNOTEFT_GEN_A_CORE
Cardiac Electrophysiology Procedure Note      Procedure:   Implant if Single chamber Pacemaker (MICRA)  Fluoroscopy    Indication: Patient with AF and tachy-marco antonio syndrome    Attending:	Andreas Larry MD    MATERIALS  Sheath	Insertion Site	Location  20 Fr	RFV	HRA and RV    EQUIPMENT IMPLANTED AND BASELINE PARAMETERS  Pulse Generator:  : Medtronic  Model Number [id number]	  Serial Number	[id number]  Location:	RV apical septum  Threshold:	 0.5 V at 0.24 msec  Sensin.0 mV	  Impedance:	760 ohms	      DESCRIPTION OF PROCEDURE    The patient was brought to the Procedure Room in a nonsedated and fasting state, having received preoperative antibiotics. Informed, written consent was obtained prior to the procedure. Intermittent boluses of Versed and Fentanyl were used to maintain comfort and analgesia throughout the procedure. Blood pressure, oxygenation and level of comfort were stable throughout. The right groin region was cleaned and prepped with the applications of Betadyne Solution. Patient was then covered from head to toe with sterile drapes in the usual manner.     The right inguinal area and arterial pulse were defined; 20 cc of lidocaine solution were infiltrated into the skin overlying the area.     Next, using needle and guidewire, the right femoral vein was accessed once using modified Seldinger technique. The guidewires were advanced to the  IVC, left of the spine, to confirm venous access. The access was dilated up to 20F sheeth. A Medtronic sheath was advanced to the RA. The introducer tool with the PPM was advance through the sheath into the RV. The pacemaker was inserted at a apical septal position. The position was confirmed in the [place] and PISANO projections. Dye was injected to confirm placement. The PPM was released and  adequate pacing and sensing threshold parameters were obtained.. A tug test was performed and it showed appropriate attachment of three anchors. The string was cut and removed.       The catheters were removed and the sheaths pulled and figure 8 stitch applied.  A dry, sterile dressing was placed over this. The patient was returned to a hospital room in stable condition. Gauze and needle count were performed and found to be consistent at the end of the procedure    COMPLICATIONS:  The patient tolerated the procedure well. There were no immediate complications.    CONCLUSIONS:      Signed        _______________________________  Andreas Larry MD  Cardiac Electrophysiology

## 2020-03-11 NOTE — PROGRESS NOTE ADULT - ASSESSMENT
8 yo M with history of AFib on Eliquis (diagnosed 2017), HTN, HL, admitted for cardiac arrest. Pt found to have PEA arrest and had ROSC after CPR and Epi. On tele here, pt has been found to have persistent AFib with pauses of 3-4 sec  PEA arrest, EF 60-65%  Severe single vessel disease    POD 16 LIMA-LAD MICS  POD 6 Lt VATS hemothorax evacuation  Bradycardia and pause    con't tele  Will benefit from PPM / Micra  keep NPO  con't Ancef for another 24hrs post PPM placement  Maintain electrolytes K>4.0 Mg >2.0  maintain [place] J Collar at all times (please change)  f/u Neuro Sx recs: will need Flex-Ex XR prior to discharge to determine length of therapy with th collar

## 2020-03-11 NOTE — CHART NOTE - NSCHARTNOTEFT_GEN_A_CORE
PACU ANESTHESIA ADMISSION NOTE      Procedure: Drainage. 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  Sudden cardiac arrest  Aneurysm of radial artery  Aneurysm of radial artery      ____  Intubated  TV:______       Rate: ______      FiO2: ______    _x___  Patent Airway    _x___  Full return of protective reflexes    __x__  Full recovery from anesthesia / back to baseline     Vitals:   T:  36.2         R: 12                 BP: 139/79                 Sat: 100                  P: 91      Mental Status:  __x__ Awake   _x____ Alert   _____ Drowsy   _____ Sedated    Nausea/Vomiting:  ___x_ NO  ______Yes,   See Post - Op Orders          Pain Scale (0-10):  x_____    Treatment: ____ None    ____ See Post - Op/PCA Orders    Post - Operative Fluids:   __x__ Oral   ____ See Post - Op Orders    Plan: Discharge:   ____Home       _____Floor     __x___Critical Care    _____  Other:_________________    Comments: tolerated procedure well

## 2020-03-11 NOTE — CHART NOTE - NSCHARTNOTESELECT_GEN_ALL_CORE
Event Note/Pre Op Checklist
EP Procedure Note
EP Procedure Note
Electrophysiology PA Note
Event Note
Event Note/RD Limited Follow-Up
Event Note/RD follow up
Event Note/f/u
Neurosurgery/Off Service Note
RD follow up/Event Note
STEMI code/Event Note
Transfer Note
Transfer Note/pacu
cardiac cath prelim note
f/u/Event Note

## 2020-03-11 NOTE — PRE-ANESTHESIA EVALUATION ADULT - NSANTHOSAYNRD_GEN_A_CORE
No. LOW screening performed.  STOP BANG Legend: 0-2 = LOW Risk; 3-4 = INTERMEDIATE Risk; 5-8 = HIGH Risk

## 2020-03-11 NOTE — PRE-ANESTHESIA EVALUATION ADULT - MALLAMPATI CLASS
Class III - visualization of the soft palate and the base of the uvula
cervical collar/Class II - visualization of the soft palate, fauces, and uvula
Class III - visualization of the soft palate and the base of the uvula
Class III - visualization of the soft palate and the base of the uvula

## 2020-03-12 ENCOUNTER — TRANSCRIPTION ENCOUNTER (OUTPATIENT)
Age: 80
End: 2020-03-12

## 2020-03-12 VITALS — HEART RATE: 89 BPM | RESPIRATION RATE: 15 BRPM | SYSTOLIC BLOOD PRESSURE: 127 MMHG | DIASTOLIC BLOOD PRESSURE: 70 MMHG

## 2020-03-12 PROBLEM — E78.5 HYPERLIPIDEMIA, UNSPECIFIED: Chronic | Status: ACTIVE | Noted: 2020-01-12

## 2020-03-12 PROBLEM — I48.91 UNSPECIFIED ATRIAL FIBRILLATION: Chronic | Status: ACTIVE | Noted: 2020-01-12

## 2020-03-12 PROBLEM — G62.9 POLYNEUROPATHY, UNSPECIFIED: Chronic | Status: ACTIVE | Noted: 2020-01-12

## 2020-03-12 PROBLEM — I10 ESSENTIAL (PRIMARY) HYPERTENSION: Chronic | Status: ACTIVE | Noted: 2020-01-12

## 2020-03-12 LAB
ANION GAP SERPL CALC-SCNC: 10 MMOL/L — SIGNIFICANT CHANGE UP (ref 7–14)
BUN SERPL-MCNC: 13 MG/DL — SIGNIFICANT CHANGE UP (ref 10–20)
CALCIUM SERPL-MCNC: 8 MG/DL — LOW (ref 8.5–10.1)
CHLORIDE SERPL-SCNC: 101 MMOL/L — SIGNIFICANT CHANGE UP (ref 98–110)
CO2 SERPL-SCNC: 26 MMOL/L — SIGNIFICANT CHANGE UP (ref 17–32)
CREAT SERPL-MCNC: 0.5 MG/DL — LOW (ref 0.7–1.5)
GLUCOSE BLDC GLUCOMTR-MCNC: 108 MG/DL — HIGH (ref 70–99)
GLUCOSE BLDC GLUCOMTR-MCNC: 169 MG/DL — HIGH (ref 70–99)
GLUCOSE SERPL-MCNC: 101 MG/DL — HIGH (ref 70–99)
HCT VFR BLD CALC: 32.2 % — LOW (ref 42–52)
HGB BLD-MCNC: 9.6 G/DL — LOW (ref 14–18)
MAGNESIUM SERPL-MCNC: 1.9 MG/DL — SIGNIFICANT CHANGE UP (ref 1.8–2.4)
MCHC RBC-ENTMCNC: 29.1 PG — SIGNIFICANT CHANGE UP (ref 27–31)
MCHC RBC-ENTMCNC: 29.8 G/DL — LOW (ref 32–37)
MCV RBC AUTO: 97.6 FL — HIGH (ref 80–94)
NRBC # BLD: 0 /100 WBCS — SIGNIFICANT CHANGE UP (ref 0–0)
PLATELET # BLD AUTO: 273 K/UL — SIGNIFICANT CHANGE UP (ref 130–400)
POTASSIUM SERPL-MCNC: 4.5 MMOL/L — SIGNIFICANT CHANGE UP (ref 3.5–5)
POTASSIUM SERPL-SCNC: 4.5 MMOL/L — SIGNIFICANT CHANGE UP (ref 3.5–5)
RBC # BLD: 3.3 M/UL — LOW (ref 4.7–6.1)
RBC # FLD: 15.9 % — HIGH (ref 11.5–14.5)
SODIUM SERPL-SCNC: 137 MMOL/L — SIGNIFICANT CHANGE UP (ref 135–146)
WBC # BLD: 9.39 K/UL — SIGNIFICANT CHANGE UP (ref 4.8–10.8)
WBC # FLD AUTO: 9.39 K/UL — SIGNIFICANT CHANGE UP (ref 4.8–10.8)

## 2020-03-12 PROCEDURE — 93306 TTE W/DOPPLER COMPLETE: CPT | Mod: 26

## 2020-03-12 PROCEDURE — 93010 ELECTROCARDIOGRAM REPORT: CPT

## 2020-03-12 PROCEDURE — 93280 PM DEVICE PROGR EVAL DUAL: CPT | Mod: 26

## 2020-03-12 PROCEDURE — 71045 X-RAY EXAM CHEST 1 VIEW: CPT | Mod: 26,59

## 2020-03-12 PROCEDURE — 99233 SBSQ HOSP IP/OBS HIGH 50: CPT

## 2020-03-12 PROCEDURE — 71046 X-RAY EXAM CHEST 2 VIEWS: CPT | Mod: 26

## 2020-03-12 RX ORDER — SERTRALINE 25 MG/1
50 TABLET, FILM COATED ORAL DAILY
Refills: 0 | Status: DISCONTINUED | OUTPATIENT
Start: 2020-03-12 | End: 2020-03-12

## 2020-03-12 RX ORDER — SERTRALINE 25 MG/1
1 TABLET, FILM COATED ORAL
Qty: 0 | Refills: 0 | DISCHARGE
Start: 2020-03-12

## 2020-03-12 RX ORDER — CLONAZEPAM 1 MG
1 TABLET ORAL
Qty: 0 | Refills: 0 | DISCHARGE

## 2020-03-12 RX ORDER — PREGABALIN 225 MG/1
1 CAPSULE ORAL
Qty: 0 | Refills: 0 | DISCHARGE
Start: 2020-03-12

## 2020-03-12 RX ORDER — DULOXETINE HYDROCHLORIDE 30 MG/1
1 CAPSULE, DELAYED RELEASE ORAL
Qty: 0 | Refills: 0 | DISCHARGE

## 2020-03-12 RX ORDER — HEPARIN SODIUM 5000 [USP'U]/ML
5000 INJECTION INTRAVENOUS; SUBCUTANEOUS
Qty: 0 | Refills: 0 | DISCHARGE
Start: 2020-03-12

## 2020-03-12 RX ORDER — GABAPENTIN 400 MG/1
1 CAPSULE ORAL
Qty: 0 | Refills: 0 | DISCHARGE

## 2020-03-12 RX ORDER — FAMOTIDINE 10 MG/ML
1 INJECTION INTRAVENOUS
Qty: 0 | Refills: 0 | DISCHARGE
Start: 2020-03-12

## 2020-03-12 RX ORDER — OMEPRAZOLE 10 MG/1
1 CAPSULE, DELAYED RELEASE ORAL
Qty: 0 | Refills: 0 | DISCHARGE

## 2020-03-12 RX ORDER — METOPROLOL TARTRATE 50 MG
1 TABLET ORAL
Qty: 0 | Refills: 0 | DISCHARGE
Start: 2020-03-12

## 2020-03-12 RX ORDER — ATORVASTATIN CALCIUM 80 MG/1
1 TABLET, FILM COATED ORAL
Qty: 0 | Refills: 0 | DISCHARGE
Start: 2020-03-12

## 2020-03-12 RX ORDER — METOPROLOL TARTRATE 50 MG
25 TABLET ORAL
Refills: 0 | Status: DISCONTINUED | OUTPATIENT
Start: 2020-03-12 | End: 2020-03-12

## 2020-03-12 RX ORDER — BACITRACIN ZINC 500 UNIT/G
1 OINTMENT IN PACKET (EA) TOPICAL
Qty: 0 | Refills: 0 | DISCHARGE
Start: 2020-03-12

## 2020-03-12 RX ORDER — APIXABAN 2.5 MG/1
1 TABLET, FILM COATED ORAL
Qty: 0 | Refills: 0 | DISCHARGE

## 2020-03-12 RX ORDER — IPRATROPIUM BROMIDE 0.2 MG/ML
2.5 SOLUTION, NON-ORAL INHALATION
Qty: 0 | Refills: 0 | DISCHARGE
Start: 2020-03-12

## 2020-03-12 RX ORDER — TELMISARTAN 20 MG/1
1 TABLET ORAL
Qty: 0 | Refills: 0 | DISCHARGE

## 2020-03-12 RX ORDER — ASPIRIN/CALCIUM CARB/MAGNESIUM 324 MG
1 TABLET ORAL
Qty: 0 | Refills: 0 | DISCHARGE
Start: 2020-03-12

## 2020-03-12 RX ORDER — OXYCODONE HYDROCHLORIDE 5 MG/1
1 TABLET ORAL
Qty: 0 | Refills: 0 | DISCHARGE
Start: 2020-03-12

## 2020-03-12 RX ORDER — POLYETHYLENE GLYCOL 3350 17 G/17G
17 POWDER, FOR SOLUTION ORAL
Qty: 0 | Refills: 0 | DISCHARGE
Start: 2020-03-12

## 2020-03-12 RX ORDER — CARVEDILOL PHOSPHATE 80 MG/1
1 CAPSULE, EXTENDED RELEASE ORAL
Qty: 0 | Refills: 0 | DISCHARGE

## 2020-03-12 RX ORDER — SIMVASTATIN 20 MG/1
1 TABLET, FILM COATED ORAL
Qty: 0 | Refills: 0 | DISCHARGE

## 2020-03-12 RX ORDER — POTASSIUM CHLORIDE 20 MEQ
1 PACKET (EA) ORAL
Qty: 0 | Refills: 0 | DISCHARGE

## 2020-03-12 RX ORDER — TAMSULOSIN HYDROCHLORIDE 0.4 MG/1
1 CAPSULE ORAL
Qty: 0 | Refills: 0 | DISCHARGE
Start: 2020-03-12

## 2020-03-12 RX ORDER — THIAMINE MONONITRATE (VIT B1) 100 MG
1 TABLET ORAL
Qty: 0 | Refills: 0 | DISCHARGE
Start: 2020-03-12

## 2020-03-12 RX ORDER — FUROSEMIDE 40 MG
1 TABLET ORAL
Qty: 0 | Refills: 0 | DISCHARGE

## 2020-03-12 RX ORDER — METOPROLOL TARTRATE 50 MG
12.5 TABLET ORAL ONCE
Refills: 0 | Status: DISCONTINUED | OUTPATIENT
Start: 2020-03-12 | End: 2020-03-12

## 2020-03-12 RX ADMIN — POLYETHYLENE GLYCOL 3350 17 GRAM(S): 17 POWDER, FOR SOLUTION ORAL at 12:20

## 2020-03-12 RX ADMIN — Medication 100 GRAM(S): at 06:01

## 2020-03-12 RX ADMIN — Medication 0.6 MILLIGRAM(S): at 06:02

## 2020-03-12 RX ADMIN — Medication 325 MILLIGRAM(S): at 12:19

## 2020-03-12 RX ADMIN — Medication 1 APPLICATION(S): at 14:45

## 2020-03-12 RX ADMIN — Medication 5 MILLILITER(S): at 00:01

## 2020-03-12 RX ADMIN — Medication 25 MILLIGRAM(S): at 12:22

## 2020-03-12 RX ADMIN — SIMETHICONE 80 MILLIGRAM(S): 80 TABLET, CHEWABLE ORAL at 06:01

## 2020-03-12 RX ADMIN — HEPARIN SODIUM 5000 UNIT(S): 5000 INJECTION INTRAVENOUS; SUBCUTANEOUS at 06:01

## 2020-03-12 RX ADMIN — PREGABALIN 1000 MICROGRAM(S): 225 CAPSULE ORAL at 12:19

## 2020-03-12 RX ADMIN — CHLORHEXIDINE GLUCONATE 1 APPLICATION(S): 213 SOLUTION TOPICAL at 06:01

## 2020-03-12 RX ADMIN — SERTRALINE 50 MILLIGRAM(S): 25 TABLET, FILM COATED ORAL at 12:20

## 2020-03-12 RX ADMIN — FAMOTIDINE 20 MILLIGRAM(S): 10 INJECTION INTRAVENOUS at 06:01

## 2020-03-12 RX ADMIN — Medication 100 MILLIGRAM(S): at 14:45

## 2020-03-12 RX ADMIN — Medication 1 APPLICATION(S): at 06:02

## 2020-03-12 RX ADMIN — Medication 10 MILLILITER(S): at 12:20

## 2020-03-12 RX ADMIN — Medication 100 MILLIGRAM(S): at 06:09

## 2020-03-12 NOTE — PROGRESS NOTE ADULT - ASSESSMENT
78 yo M with history of AFib on Eliquis (diagnosed 2017), HTN, HLD, admitted for cardiac arrest. Pt found to have PEA arrest and had ROSC after CPR and Epi. On tele here, pt has been found to have persistent AFib with pauses of 3-4 sec, now POD 1 from MICRA.    Impression:  PEA arrest, EF 60-65%  Severe single vessel disease  POD 17 LIMA-LAD MICS  POD 7 Lt VATS hemothorax evacuation  Bradycardia and pause, S/P MICRA (medtronic) POD #1    Plan:  - Interrogation complete, functioning well, no events  - Echo  - con't tele  - con't Ancef for another 24hrs post PPM placement  - Maintain electrolytes K>4.0 Mg >2.0  - maintain [place] J Collar at all times (please change)  - f/u Neuro Sx recs: will need Flex-Ex XR prior to discharge to determine length of therapy with th collar

## 2020-03-12 NOTE — SWALLOW BEDSIDE ASSESSMENT ADULT - SLP PERTINENT HISTORY OF CURRENT PROBLEM
pt s/p right radial pseudoaneurysm repair; Pt presented to ED s/p cardiac arrest x2, s/p resuscitation for ~10 mins, ROSC achieved. cardiac arrest again in ambulance s/p CPR ~5mins and ROSC achieved. CTH (-) CT Chest revealed 4.4 cm retroesophageal soft tissue density within the thorax may represent hematoma. CT spine revealed Widening of the C5-6 disc space and fragmentation of the anterior bridging osteophyte at this level. Hospital course also significant for MICS X1 (minimally invasive cardiac surgery CABG)
pt s/p right radial pseudoaneurysm repair; Pt presented to ED s/p cardiac arrest x2, s/p resuscitation for ~10 mins, ROSC achieved. cardiac arrest again in ambulance s/p CPR ~5mins and ROSC achieved. initially extubated 1/13. CTH (-). CXR (-). CT Chest revealed 4.4 cm retroesophageal soft tissue density within the thorax may represent hematoma. CT spine revealed Widening of the C5-6 disc space and fragmentation of the anterior bridging osteophyte at this level.
pt s/p right radial pseudoaneurysm repair; Pt presented to ED s/p cardiac arrest x2, s/p resuscitation for ~10 mins, ROSC achieved. cardiac arrest again in ambulance s/p CPR ~5mins and ROSC achieved. initially extubated 1/13. CTH (-). CXR (-). CT Chest revealed 4.4 cm retroesophageal soft tissue density within the thorax may represent hematoma. CT spine revealed Widening of the C5-6 disc space and fragmentation of the anterior bridging osteophyte at this level.
Pt presented to ED s/p cardiac arrest x2, s/p resuscitation for ~10 mins, ROSC achieved. cardiac arrest again in ambulance s/p CPR ~5mins and ROSC achieved. Pt s/p intubation 1/12, extubated 1/13. CTH (-). CXR (-). CT Chest revealed 4.4 cm retroesophageal soft tissue density within the thorax may represent hematoma. CT spine revealed Widening of the C5-6 disc space and fragmentation of the anterior bridging osteophyte at this level. Esophagram was recommended, however esophageal perforation was unlikely so no esophagram completed. Pt unable to tolerate MRI.
Pt presented to ED s/p cardiac arrest x2, s/p resuscitation for ~10 mins, ROSC achieved. cardiac arrest again in ambulance s/p CPR ~5mins and ROSC achieved. initially extubated 1/13. CTH (-). CXR (-). CT Chest revealed 4.4 cm retroesophageal soft tissue density within the thorax may represent hematoma. CT spine revealed Widening of the C5-6 disc space and fragmentation of the anterior bridging osteophyte at this level. Esophagram was recommended, however esophageal perforation was unlikely so no esophagram completed. Pt unable to tolerate MRI.
pt s/p right radial pseudoaneurysm repair; Pt presented to ED s/p cardiac arrest x2, s/p resuscitation for ~10 mins, ROSC achieved. cardiac arrest again in ambulance s/p CPR ~5mins and ROSC achieved. initially extubated 1/13. CTH (-). CXR (-). CT Chest revealed 4.4 cm retroesophageal soft tissue density within the thorax may represent hematoma. CT spine revealed Widening of the C5-6 disc space and fragmentation of the anterior bridging osteophyte at this level.
Pt presented to ED s/p cardiac arrest x2, s/p resuscitation for ~10 mins, ROSC achieved. cardiac arrest again in ambulance s/p CPR ~5mins and ROSC achieved. initially extubated 1/13. CTH (-). CXR (-). CT Chest revealed 4.4 cm retroesophageal soft tissue density within the thorax may represent hematoma. CT spine revealed Widening of the C5-6 disc space and fragmentation of the anterior bridging osteophyte at this level. Esophagram was recommended, however esophageal perforation was unlikely so no esophagram completed. Pt unable to tolerate MRI.
pt s/p right radial pseudoaneurysm repair; Pt presented to ED s/p cardiac arrest x2, s/p resuscitation for ~10 mins, ROSC achieved. cardiac arrest again in ambulance s/p CPR ~5mins and ROSC achieved. CTH (-) CT Chest revealed 4.4 cm retroesophageal soft tissue density within the thorax may represent hematoma. CT spine revealed Widening of the C5-6 disc space and fragmentation of the anterior bridging osteophyte at this level.
pt s/p right radial pseudoaneurysm repair; Pt presented to ED s/p cardiac arrest x2, s/p resuscitation for ~10 mins, ROSC achieved. cardiac arrest again in ambulance s/p CPR ~5mins and ROSC achieved. CTH (-) CT Chest revealed 4.4 cm retroesophageal soft tissue density within the thorax may represent hematoma. CT spine revealed Widening of the C5-6 disc space and fragmentation of the anterior bridging osteophyte at this level.
pt s/p right radial pseudoaneurysm repair; Pt presented to ED s/p cardiac arrest x2, s/p resuscitation for ~10 mins, ROSC achieved. cardiac arrest again in ambulance s/p CPR ~5mins and ROSC achieved. initially extubated 1/13. CTH (-). CXR (-). CT Chest revealed 4.4 cm retroesophageal soft tissue density within the thorax may represent hematoma. CT spine revealed Widening of the C5-6 disc space and fragmentation of the anterior bridging osteophyte at this level.

## 2020-03-12 NOTE — SWALLOW BEDSIDE ASSESSMENT ADULT - DATE
10-Feb-2020
12-Feb-2020
02-Feb-2020
12-Mar-2020
03-Feb-2020
06-Mar-2020
08-Mar-2020
13-Feb-2020
16-Jan-2020
23-Jan-2020
24-Jan-2020
09-Feb-2020

## 2020-03-12 NOTE — PROGRESS NOTE ADULT - SUBJECTIVE AND OBJECTIVE BOX
INTERVAL HPI/OVERNIGHT EVENTS:  Patient in AF rat controlled, no events noted overnight. Patient denies fever, chills, dizziness, syncope, chest pain, palpitations, SOB, cough, abd pain, n/v/d/c, dysuria. Right groin suture removed.    MEDICATIONS  (STANDING):  aspirin enteric coated 325 milliGRAM(s) Oral daily  atorvastatin 80 milliGRAM(s) Oral at bedtime  BACItracin   Ointment 1 Application(s) Topical every 8 hours  BACItracin   Ointment 1 Application(s) Topical three times a day  BUpivacaine liposome 1.3% Injectable (no eMAR) 20 milliLiter(s) Local Injection once  ceFAZolin   IVPB 1000 milliGRAM(s) IV Intermittent every 8 hours  ceFAZolin   IVPB      chlorhexidine 4% Liquid 1 Application(s) Topical <User Schedule>  colchicine 0.6 milliGRAM(s) Oral every 12 hours  cyanocobalamin 1000 MICROGram(s) Oral daily  famotidine    Tablet 20 milliGRAM(s) Oral two times a day  guaifenesin/dextromethorphan  Syrup 10 milliLiter(s) Oral every 6 hours  heparin  Injectable 5000 Unit(s) SubCutaneous every 12 hours  ipratropium    for Nebulization 500 MICROGram(s) Nebulizer every 6 hours  magnesium sulfate  IVPB 1 Gram(s) IV Intermittent every 12 hours  polyethylene glycol 3350 17 Gram(s) Oral daily  sertraline 50 milliGRAM(s) Oral daily  simethicone 80 milliGRAM(s) Chew every 12 hours  tamsulosin 0.4 milliGRAM(s) Oral at bedtime  thiamine 100 milliGRAM(s) Oral daily    MEDICATIONS  (PRN):  bisacodyl 5 milliGRAM(s) Oral every 12 hours PRN Constipation  glucagon  Injectable 1 milliGRAM(s) IntraMuscular once PRN Glucose LESS THAN 70 milligrams/deciliter  ondansetron  IVPB 4 milliGRAM(s) IV Intermittent once PRN Nausea and/or Vomiting  oxyCODONE    IR 5 milliGRAM(s) Oral every 4 hours PRN Mild Pain (1 - 3)      Allergies  No Known Allergies    REVIEW OF SYSTEMS  A ten-point review of systems was otherwise negative except as noted.      Vital Signs Last 24 Hrs  T(C): 36.4 (12 Mar 2020 07:46), Max: 37 (12 Mar 2020 04:00)  T(F): 97.5 (12 Mar 2020 07:46), Max: 98.6 (12 Mar 2020 04:00)  HR: 82 (12 Mar 2020 08:49) (72 - 105)  BP: 115/67 (12 Mar 2020 07:46) (115/67 - 146/82)  BP(mean): 86 (12 Mar 2020 07:46) (84 - 110)  RR: 36 (12 Mar 2020 08:49) (22 - 43)  SpO2: 98% (12 Mar 2020 08:49) (94% - 100%)    03-11-20 @ 07:01  -  03-12-20 @ 07:00  --------------------------------------------------------  IN: 200 mL / OUT: 730 mL / NET: -530 mL    03-12-20 @ 07:01  -  03-12-20 @ 10:09  --------------------------------------------------------  IN: 240 mL / OUT: 45 mL / NET: 195 mL      Physical Exam  GENERAL: In no apparent distress, well nourished, and hydrated.  HEART: Irregular rate and rhythm; No murmurs, rubs, or gallops.  PULMONARY: Clear to auscultation and perfusion.  No rales, wheezing, or rhonchi bilaterally.  ABDOMEN: Soft, Nontender, Nondistended; Bowel sounds present  EXTREMITIES:  Right groin with ecchymosis that extends to pubic area and right hip. Stable hematoma above incision. 2+ Peripheral Pulses, No clubbing, cyanosis, or edema  NEUROLOGICAL: Grossly nonfocal    LABS:                        9.6    9.39  )-----------( 273      ( 12 Mar 2020 03:20 )             32.2     03-12    137  |  101  |  13  ----------------------------<  101<H>  4.5   |  26  |  0.5<L>    Ca    8.0<L>      12 Mar 2020 03:20  Mg     1.9     03-12      RADIOLOGY & ADDITIONAL TESTS:  < from: 12 Lead ECG (03.12.20 @ 07:57) >  Ventricular Rate 84 BPM    Atrial Rate 227 BPM    QRS Duration 106 ms    Q-T Interval 406 ms    QTC Calculation(Bezet) 479 ms    R Axis -20 degrees    T Axis 10 degrees    Diagnosis Line Atrial fibrillation with occasional ventricular-paced complexes  Incomplete right bundle branch block  Inferior infarct , age undetermined  Cannot rule out Anterior infarct , age undetermined  Abnormal ECG    Confirmed by Andrea Gr ([id number]) on 3/12/2020 8:55:20 AM    < end of copied text >

## 2020-03-12 NOTE — SWALLOW BEDSIDE ASSESSMENT ADULT - SWALLOW EVAL: FEEDING ASSISTANCE
frequent cues/help required
1:1 feed/frequent cues/help required
dependent
1:1 feed/frequent cues/help required
1:1 feed/frequent cues/help required
dependent

## 2020-03-12 NOTE — SWALLOW BEDSIDE ASSESSMENT ADULT - NS ASR SWALLOW FINDINGS DISCUS
DILLAN Grossman and MD soriano
Physician/Nursing/Patient/Family
MARIELLE Streeter
DILLAN Beltran CTU PA/Physician/Nursing/Patient
Patient/Family
Physician/Patient/Nursing
Nursing/RN MD Angie spectra [phone]/Physician/Patient/Family
Physician/Nursing/Patient/Family/RN Sharron javier aware
Physician/Patient/Nursing
Physician/Nursing/RN Linsey Baez MD made aware
Physician/Patient/Nursing/Family

## 2020-03-12 NOTE — SWALLOW BEDSIDE ASSESSMENT ADULT - SWALLOW EVAL: DIAGNOSIS
+ toleration of hard solids w/o overt s/s of penetration/aspiration, + overt s/s of penetration/aspiration w/ thin liquids
No s/s aspiration/penetration for thin liquids and mechanical soft, mild oral dysphagia
+toleration of thins via small cup sips, puree, and soft solids w/o overt s/s aspiration/penetration; +suspected pharyngeal dysphagia for thin liquids via straw sips
No s/s aspiration/penetration for thin liquids. Pt requires max cues to follow strategy of consecutive swallows and small sips. Wife at bedside extensively educated re: swallow strategies. Expressed understanding
+toleration for thin liquids, puree and soft solids w/o overt s/s penetration/aspiration.
+toleration for thin liquids, puree and soft solids w/o overt s/s penetration/aspiration.
+toleration of nectar and puree w/o overt s/s of penetration/aspiration, pt refused soft solids at this time
+toleration of nectar thick liquids w/ use of a consecutive swallow.
+toleration of soft and nectar w/o overt s/s of penetration/aspiration
+toleration of thin and soft solids w/o overt s/s of penetration/aspiration
+overt s/s of penetration/aspiration w/ thin, +toleration of nectar, puree and mech soft w/o overt s/s of penetration/aspiration
Mild oral dysphagia with mechanical soft solids, +toleration for soft solids and nectar thick liquids w/o overt s/s of penetration/aspiration. + over s/s of aspiration with consecutive sips of water via cup/straw.

## 2020-03-12 NOTE — PROGRESS NOTE ADULT - SUBJECTIVE AND OBJECTIVE BOX
OPERATIVE PROCEDURE(s):                POD #                       79yMale  SURGEON(s): MARCY Arreguin  SUBJECTIVE ASSESSMENT:   Vital Signs Last 24 Hrs  T(F): 98.6 (12 Mar 2020 04:00), Max: 98.6 (12 Mar 2020 04:00)  HR: 72 (12 Mar 2020 07:00) (72 - 105)  BP: 121/63 (12 Mar 2020 02:00) (121/63 - 146/82)  BP(mean): 84 (12 Mar 2020 02:00) (84 - 110)  ABP: 143/63 (12 Mar 2020 07:00) (115/50 - 158/80)  ABP(mean): 93 (12 Mar 2020 07:00)  RR: 36 (12 Mar 2020 07:00) (22 - 43)  SpO2: 99% (12 Mar 2020 07:00) (94% - 100%)  CVP(mm Hg): --  CVP(cm H2O): --  CO: --  CI: --  PA: --  SVR: --    I&O's Detail    11 Mar 2020 07:01  -  12 Mar 2020 07:00  --------------------------------------------------------  IN:    Oral Fluid: 200 mL  Total IN: 200 mL    OUT:    Indwelling Catheter - Urethral: 730 mL  Total OUT: 730 mL        Net:   I&O's Detail    10 Mar 2020 07:01  -  11 Mar 2020 07:00  --------------------------------------------------------  Total NET: 10 mL      11 Mar 2020 07:01  -  12 Mar 2020 07:00  --------------------------------------------------------  Total NET: -530 mL        CAPILLARY BLOOD GLUCOSE      POCT Blood Glucose.: 169 mg/dL (12 Mar 2020 07:07)    Physical Exam:  General: NAD; A&Ox3/Patient is intubated and sedated  Cardiac: S1/S2, RRR, no murmur, no rubs  Lungs: unlabored respirations, CTA b/l, no wheeze, no rales, no crackles  Abdomen: Soft/NT/ND; positive bowel sounds x 4  Sternum: Intact, no click, incision healing well with no drainage  Incisions: Incisions clean/dry/intact  Extremities: No edema b/l lower extremities; good capillary refill; no cyanosis; palpable 1+ pedal pulses b/l    Central Venous Catheter: Yes[]  No[] , If Yes indication:           Day #  Mora Catheter: Yes  [] , No  [] , If yes indication:                      Day #  NGT: Yes [] No [] ,    If Yes Placement:                                     Day #  EPICARDIAL WIRES:  [] YES [] NO                                              Day #  BOWEL MOVEMENT:  [] YES [] NO, If No, Timing since last BM:      Day #  CHEST TUBE(Left/Right):  [] YES [] NO, If yes -  AIR LEAKS:  [] YES [] NO        LABS:                        9.6<L>  9.39  )-----------( 273      ( 12 Mar 2020 03:20 )             32.2<L>                        9.3<L>  9.92  )-----------( 290      ( 11 Mar 2020 05:20 )             31.8<L>    03-12    137  |  101  |  13  ----------------------------<  101<H>  4.5   |  26  |  0.5<L>  03-11    140  |  101  |  13  ----------------------------<  111<H>  4.6   |  30  |  0.6<L>    Ca    8.0<L>      12 Mar 2020 03:20  Mg     1.9     03-12          ABG - ( 12 Mar 2020 04:43 )  pH: 7.42  /  pCO2: 47    /  pO2: 72    / HCO3: 30    / Base Excess: 4.9   /  SaO2: 95    /  LA: 1.0              RADIOLOGY & ADDITIONAL TESTS:  CXR:  EKG:  MEDICATIONS  (STANDING):  aspirin enteric coated 325 milliGRAM(s) Oral daily  atorvastatin 80 milliGRAM(s) Oral at bedtime  BACItracin   Ointment 1 Application(s) Topical every 8 hours  BACItracin   Ointment 1 Application(s) Topical three times a day  BUpivacaine liposome 1.3% Injectable (no eMAR) 20 milliLiter(s) Local Injection once  ceFAZolin   IVPB 1000 milliGRAM(s) IV Intermittent every 8 hours  ceFAZolin   IVPB      chlorhexidine 4% Liquid 1 Application(s) Topical <User Schedule>  colchicine 0.6 milliGRAM(s) Oral every 12 hours  cyanocobalamin 1000 MICROGram(s) Oral daily  famotidine    Tablet 20 milliGRAM(s) Oral two times a day  guaifenesin/dextromethorphan  Syrup 10 milliLiter(s) Oral every 6 hours  heparin  Injectable 5000 Unit(s) SubCutaneous every 12 hours  ipratropium    for Nebulization 500 MICROGram(s) Nebulizer every 6 hours  magnesium sulfate  IVPB 1 Gram(s) IV Intermittent every 12 hours  polyethylene glycol 3350 17 Gram(s) Oral daily  sertraline 25 milliGRAM(s) Oral daily  simethicone 80 milliGRAM(s) Chew every 12 hours  tamsulosin 0.4 milliGRAM(s) Oral at bedtime  thiamine 100 milliGRAM(s) Oral daily    MEDICATIONS  (PRN):  bisacodyl 5 milliGRAM(s) Oral every 12 hours PRN Constipation  glucagon  Injectable 1 milliGRAM(s) IntraMuscular once PRN Glucose LESS THAN 70 milligrams/deciliter  ondansetron  IVPB 4 milliGRAM(s) IV Intermittent once PRN Nausea and/or Vomiting  oxyCODONE    IR 5 milliGRAM(s) Oral every 4 hours PRN Mild Pain (1 - 3)    HEPARIN:  [] YES [] NO  Dose: XX UNITS/HR UNITS Q8H  LOVENOX:[] YES [] NO  Dose: XX mg Q24H  COUMADIN: []  YES [] NO  Dose: XX mg  Q24H  SCD's: YES b/l  GI Prophylaxis: Protonix [], Pepcid [], None [], (Contra-indication:.....)    Post-Op Beta-Blockers: Yes [], No[], If No, then contraindication:  Post-Op Aspirin: Yes [],  No [], If No, then contraindication:  Post-Op Statin: Yes [], No[], If No, then contraindication:  Allergies    No Known Allergies    Intolerances      Ambulation/Activity Status:    Assessment/Plan:  79y Male status-post .....  - Case and plan discussed with CTU Intensivist and CT Surgeon - Dr. Shaw/Rodríguez/Samir   - Continue CTU supportive care    - Continue DVT/GI prophylaxis  - Incentive Spirometry 10 times an hour  - Continue to advance physical activity as tolerated and continue PT/OT as directed  1. CAD: Continue ASA, statin, BB  2. HTN:   3. A. Fib:   4. COPD/Hypoxia:   5. DM/Glucose Control:     Social Service Disposition: OPERATIVE PROCEDURE(s):    MICsx1 / PPM      POD # 17  / 1        79yMale  SURGEON(s): MARCY Arreguin  SUBJECTIVE ASSESSMENT: pt seen and examined. no acute complaints, pt got ppm last night  Vital Signs Last 24 Hrs  T(F): 98.6 (12 Mar 2020 04:00), Max: 98.6 (12 Mar 2020 04:00)  HR: 72 (12 Mar 2020 07:00) (72 - 105)  BP: 121/63 (12 Mar 2020 02:00) (121/63 - 146/82)  BP(mean): 84 (12 Mar 2020 02:00) (84 - 110)  ABP: 143/63 (12 Mar 2020 07:00) (115/50 - 158/80)  ABP(mean): 93 (12 Mar 2020 07:00)  RR: 36 (12 Mar 2020 07:00) (22 - 43)  SpO2: 99% (12 Mar 2020 07:00) (94% - 100%)    I&O's Detail    11 Mar 2020 07:01  -  12 Mar 2020 07:00  --------------------------------------------------------  IN:    Oral Fluid: 200 mL  Total IN: 200 mL    OUT:    Indwelling Catheter - Urethral: 730 mL  Total OUT: 730 mL        Net:   I&O's Detail    10 Mar 2020 07:01  -  11 Mar 2020 07:00  --------------------------------------------------------  Total NET: 10 mL      11 Mar 2020 07:01  -  12 Mar 2020 07:00  --------------------------------------------------------  Total NET: -530 mL        CAPILLARY BLOOD GLUCOSE      POCT Blood Glucose.: 169 mg/dL (12 Mar 2020 07:07)    Physical Exam:  General: NAD; A&Ox3  Cardiac: S1/S2, RRR, no murmur, no rubs  Lungs: unlabored respirations, CTA b/l, no wheeze, no rales, no crackles  Abdomen: Soft/NT/ND; positive bowel sounds x 4  Incisions: Incisions clean/dry/intact  Extremities: No edema b/l lower extremities; good capillary refill; no cyanosis; palpable 1+ pedal pulses b/l    Central Venous Catheter: Yes[]  No[x] , If Yes indication:           Day #  Patrick Catheter: Yes  [x] , No  [] , If yes indication:   urinary retention                 Day # 7  NGT: Yes [] No [x] ,    If Yes Placement:                                     Day #  EPICARDIAL WIRES:  [] YES [x] NO                                              Day #  BOWEL MOVEMENT:  [] YES [x] NO, If No, Timing since last BM:      Day #  CHEST TUBE(Left/Right):  [] YES [x] NO, If yes -  AIR LEAKS:  [] YES [] NO            LABS:                        9.6<L>  9.39  )-----------( 273      ( 12 Mar 2020 03:20 )             32.2<L>                        9.3<L>  9.92  )-----------( 290      ( 11 Mar 2020 05:20 )             31.8<L>    03-12    137  |  101  |  13  ----------------------------<  101<H>  4.5   |  26  |  0.5<L>  03-11    140  |  101  |  13  ----------------------------<  111<H>  4.6   |  30  |  0.6<L>    Ca    8.0<L>      12 Mar 2020 03:20  Mg     1.9     03-12          ABG - ( 12 Mar 2020 04:43 )  pH: 7.42  /  pCO2: 47    /  pO2: 72    / HCO3: 30    / Base Excess: 4.9   /  SaO2: 95    /  LA: 1.0              RADIOLOGY & ADDITIONAL TESTS:  CXR: < from: Xray Chest 2 Views PA/Lat (03.12.20 @ 08:41) >    Impression:      Increased left lung opacity/effusion.    < end of copied text >     EKG: < from: 12 Lead ECG (03.12.20 @ 07:57) >    Ventricular Rate 84 BPM    Atrial Rate 227 BPM    QRS Duration 106 ms    Q-T Interval 406 ms    QTC Calculation(Bezet) 479 ms    R Axis -20 degrees    T Axis 10 degrees    Diagnosis Line Atrial fibrillation with occasional ventricular-paced complexes  Incomplete right bundle branch block  Inferior infarct , age undetermined  Cannot rule out Anterior infarct , age undetermined  Abnormal ECG    < end of copied text >    MEDICATIONS  (STANDING):  aspirin enteric coated 325 milliGRAM(s) Oral daily  atorvastatin 80 milliGRAM(s) Oral at bedtime  BACItracin   Ointment 1 Application(s) Topical every 8 hours  BACItracin   Ointment 1 Application(s) Topical three times a day  BUpivacaine liposome 1.3% Injectable (no eMAR) 20 milliLiter(s) Local Injection once  ceFAZolin   IVPB 1000 milliGRAM(s) IV Intermittent every 8 hours  ceFAZolin   IVPB      chlorhexidine 4% Liquid 1 Application(s) Topical <User Schedule>  colchicine 0.6 milliGRAM(s) Oral every 12 hours  cyanocobalamin 1000 MICROGram(s) Oral daily  famotidine    Tablet 20 milliGRAM(s) Oral two times a day  guaifenesin/dextromethorphan  Syrup 10 milliLiter(s) Oral every 6 hours  heparin  Injectable 5000 Unit(s) SubCutaneous every 12 hours  ipratropium    for Nebulization 500 MICROGram(s) Nebulizer every 6 hours  magnesium sulfate  IVPB 1 Gram(s) IV Intermittent every 12 hours  polyethylene glycol 3350 17 Gram(s) Oral daily  sertraline 25 milliGRAM(s) Oral daily  simethicone 80 milliGRAM(s) Chew every 12 hours  tamsulosin 0.4 milliGRAM(s) Oral at bedtime  thiamine 100 milliGRAM(s) Oral daily    MEDICATIONS  (PRN):  bisacodyl 5 milliGRAM(s) Oral every 12 hours PRN Constipation  glucagon  Injectable 1 milliGRAM(s) IntraMuscular once PRN Glucose LESS THAN 70 milligrams/deciliter  ondansetron  IVPB 4 milliGRAM(s) IV Intermittent once PRN Nausea and/or Vomiting  oxyCODONE    IR 5 milliGRAM(s) Oral every 4 hours PRN Mild Pain (1 - 3)    HEPARIN:  [x YES [] NO  Dose: 5000 unitS Q8H  LOVENOX:[] YES [x] NO  Dose: XX mg Q24H  COUMADIN: []  YES [x] NO  Dose: XX mg  Q24H  SCD's: YES b/l  GI Prophylaxis: Protonix [], Pepcid [x], None [], (Contra-indication:.....)    Post-Op Beta-Blockers: Yes [x], No[], If No, then contraindication:  Post-Op Aspirin: Yes [x],  No [], If No, then contraindication:  Post-Op Statin: Yes [x], No[], If No, then contraindication:  Allergies    No Known Allergies    Intolerances      Ambulation/Activity Status: ambulate     Assessment/Plan:  Assessment/Plan:  79y Male status-post MICSx1   POD#17   s/p ppm POD#1  - Case and plan discussed with CTU Intensivist and CT Surgeon - Dr. Shaw/Rodríguez/Samir   - Continue CTU supportive care    - Continue DVT/GI prophylaxis  - Incentive Spirometry 10 times an hour  - Continue to advance physical activity as tolerated and continue PT/OT as directed  1. CAD: Continue ASA, statin, restart BB  2. HTN: cont lopressor  3. A. Fib: rate control, will discuss with Dr Arreguin need for anticoagulation  4. COPD/Hypoxia: cont nebs, mucinex, encourage incentive spirometry  5. urinary retention: cont flomax,, d/c with patrick and follow up with urologist as outpatient  6. bradycardia: s/p ppm, interrogation done, working properly    Social Service Disposition:  [place] OPERATIVE PROCEDURE(s):    MICsx1 / PPM      POD # 17  / 1        79yMale  SURGEON(s): MARCY Arreguin  SUBJECTIVE ASSESSMENT: pt seen and examined. no acute complaints, pt got ppm last night  Vital Signs Last 24 Hrs  T(F): 98.6 (12 Mar 2020 04:00), Max: 98.6 (12 Mar 2020 04:00)  HR: 72 (12 Mar 2020 07:00) (72 - 105)  BP: 121/63 (12 Mar 2020 02:00) (121/63 - 146/82)  BP(mean): 84 (12 Mar 2020 02:00) (84 - 110)  ABP: 143/63 (12 Mar 2020 07:00) (115/50 - 158/80)  ABP(mean): 93 (12 Mar 2020 07:00)  RR: 36 (12 Mar 2020 07:00) (22 - 43)  SpO2: 99% (12 Mar 2020 07:00) (94% - 100%)    I&O's Detail    11 Mar 2020 07:01  -  12 Mar 2020 07:00  --------------------------------------------------------  IN:    Oral Fluid: 200 mL  Total IN: 200 mL    OUT:    Indwelling Catheter - Urethral: 730 mL  Total OUT: 730 mL        Net:   I&O's Detail    10 Mar 2020 07:01  -  11 Mar 2020 07:00  --------------------------------------------------------  Total NET: 10 mL      11 Mar 2020 07:01  -  12 Mar 2020 07:00  --------------------------------------------------------  Total NET: -530 mL        CAPILLARY BLOOD GLUCOSE      POCT Blood Glucose.: 169 mg/dL (12 Mar 2020 07:07)    Physical Exam:  General: NAD; A&Ox3  Cardiac: S1/S2, RRR, no murmur, no rubs  Lungs: unlabored respirations, CTA b/l, no wheeze, no rales, no crackles  Abdomen: Soft/NT/ND; positive bowel sounds x 4  Incisions: Incisions clean/dry/intact  Extremities: No edema b/l lower extremities; good capillary refill; no cyanosis; palpable 1+ pedal pulses b/l    Central Venous Catheter: Yes[]  No[x] , If Yes indication:           Day #  Patrick Catheter: Yes  [x] , No  [] , If yes indication:   urinary retention                 Day # 7  NGT: Yes [] No [x] ,    If Yes Placement:                                     Day #  EPICARDIAL WIRES:  [] YES [x] NO                                              Day #  BOWEL MOVEMENT:  [] YES [x] NO, If No, Timing since last BM:      Day #  CHEST TUBE(Left/Right):  [] YES [x] NO, If yes -  AIR LEAKS:  [] YES [] NO            LABS:                        9.6<L>  9.39  )-----------( 273      ( 12 Mar 2020 03:20 )             32.2<L>                        9.3<L>  9.92  )-----------( 290      ( 11 Mar 2020 05:20 )             31.8<L>    03-12    137  |  101  |  13  ----------------------------<  101<H>  4.5   |  26  |  0.5<L>  03-11    140  |  101  |  13  ----------------------------<  111<H>  4.6   |  30  |  0.6<L>    Ca    8.0<L>      12 Mar 2020 03:20  Mg     1.9     03-12          ABG - ( 12 Mar 2020 04:43 )  pH: 7.42  /  pCO2: 47    /  pO2: 72    / HCO3: 30    / Base Excess: 4.9   /  SaO2: 95    /  LA: 1.0              RADIOLOGY & ADDITIONAL TESTS:  CXR: < from: Xray Chest 2 Views PA/Lat (03.12.20 @ 08:41) >    Impression:      Increased left lung opacity/effusion.    < end of copied text >     EKG: < from: 12 Lead ECG (03.12.20 @ 07:57) >    Ventricular Rate 84 BPM    Atrial Rate 227 BPM    QRS Duration 106 ms    Q-T Interval 406 ms    QTC Calculation(Bezet) 479 ms    R Axis -20 degrees    T Axis 10 degrees    Diagnosis Line Atrial fibrillation with occasional ventricular-paced complexes  Incomplete right bundle branch block  Inferior infarct , age undetermined  Cannot rule out Anterior infarct , age undetermined  Abnormal ECG    < end of copied text >    MEDICATIONS  (STANDING):  aspirin enteric coated 325 milliGRAM(s) Oral daily  atorvastatin 80 milliGRAM(s) Oral at bedtime  BACItracin   Ointment 1 Application(s) Topical every 8 hours  BACItracin   Ointment 1 Application(s) Topical three times a day  BUpivacaine liposome 1.3% Injectable (no eMAR) 20 milliLiter(s) Local Injection once  ceFAZolin   IVPB 1000 milliGRAM(s) IV Intermittent every 8 hours  ceFAZolin   IVPB      chlorhexidine 4% Liquid 1 Application(s) Topical <User Schedule>  colchicine 0.6 milliGRAM(s) Oral every 12 hours  cyanocobalamin 1000 MICROGram(s) Oral daily  famotidine    Tablet 20 milliGRAM(s) Oral two times a day  guaifenesin/dextromethorphan  Syrup 10 milliLiter(s) Oral every 6 hours  heparin  Injectable 5000 Unit(s) SubCutaneous every 12 hours  ipratropium    for Nebulization 500 MICROGram(s) Nebulizer every 6 hours  magnesium sulfate  IVPB 1 Gram(s) IV Intermittent every 12 hours  polyethylene glycol 3350 17 Gram(s) Oral daily  sertraline 25 milliGRAM(s) Oral daily  simethicone 80 milliGRAM(s) Chew every 12 hours  tamsulosin 0.4 milliGRAM(s) Oral at bedtime  thiamine 100 milliGRAM(s) Oral daily    MEDICATIONS  (PRN):  bisacodyl 5 milliGRAM(s) Oral every 12 hours PRN Constipation  glucagon  Injectable 1 milliGRAM(s) IntraMuscular once PRN Glucose LESS THAN 70 milligrams/deciliter  ondansetron  IVPB 4 milliGRAM(s) IV Intermittent once PRN Nausea and/or Vomiting  oxyCODONE    IR 5 milliGRAM(s) Oral every 4 hours PRN Mild Pain (1 - 3)    HEPARIN:  [x YES [] NO  Dose: 5000 unitS Q8H  LOVENOX:[] YES [x] NO  Dose: XX mg Q24H  COUMADIN: []  YES [x] NO  Dose: XX mg  Q24H  SCD's: YES b/l  GI Prophylaxis: Protonix [], Pepcid [x], None [], (Contra-indication:.....)    Post-Op Beta-Blockers: Yes [x], No[], If No, then contraindication:  Post-Op Aspirin: Yes [x],  No [], If No, then contraindication:  Post-Op Statin: Yes [x], No[], If No, then contraindication:  Allergies    No Known Allergies    Intolerances      Ambulation/Activity Status: ambulate       Assessment/Plan:  79y Male status-post MICSx1   POD#17   s/p ppm POD#1  - Case and plan discussed with CTU Intensivist and CT Surgeon - Dr. Shaw/Rodríguez/Samir   - Continue CTU supportive care    - Continue DVT/GI prophylaxis  - Incentive Spirometry 10 times an hour  - Continue to advance physical activity as tolerated and continue PT/OT as directed  1. CAD: Continue ASA, statin, restart BB  2. HTN: cont lopressor  3. A. Fib: rate control, no anticoagulation due fall risk and recent chest wall hematoma  4. COPD/Hypoxia: cont nebs, mucinex, encourage incentive spirometry  5. urinary retention: cont flomax,, d/c with patrick and follow up with urologist as outpatient  6. bradycardia: s/p ppm, interrogation done, working properly    Social Service Disposition:  [place]

## 2020-03-12 NOTE — PROGRESS NOTE ADULT - REASON FOR ADMISSION
Cardiac arrest

## 2020-03-12 NOTE — DISCHARGE NOTE NURSING/CASE MANAGEMENT/SOCIAL WORK - PATIENT PORTAL LINK FT
You can access the FollowMyHealth Patient Portal offered by [facility] by registering at the following website: [email or web address]. By joining [employer]’s FollowMyHealth portal, you will also be able to view your health information using other applications (apps) compatible with our system.

## 2020-03-12 NOTE — PROGRESS NOTE ADULT - ASSESSMENT
PROBLEMS:  I spent 45 minutes of time examining patient, reviewing vitals, labs, medications, imaging and discussing with the team goals of care to prevent life-threatening in this patient who is at high risk for deterioration or death due to:    1.	Postop Hemothorax - resolving   2.	s/p PPM - stable - interrogation CxR, ECHO  3.	CAD - Lopressor 12.5 q 12, , Lipitor 80  4.	Depression - on zoloft up to 50  5.	urinary retention - folley is in  6.	poor ambulation - need PT and rehab  7.	Skin tears - local care    PLAN  Neuro: move all 4 extremities. no sensory or motor deficits  Pain management.   oxyCODONE    IR 5 milliGRAM(s) Oral every 4 hours PRN  sertraline 50 milliGRAM(s) Oral daily    Pulm: Wean off supplemental oxygen as able. Daily CXR. Encourage coughing, deep breathing and use of incentive spirometry.   guaifenesin/dextromethorphan  Syrup 10 milliLiter(s) Oral every 6 hours  ipratropium    for Nebulization 500 MICROGram(s) Nebulizer every 6 hours    Cardio: Monitor telemetry/alarms. Continue supportive care   metoprolol tartrate 25 milliGRAM(s) Oral two times a day  tamsulosin 0.4 milliGRAM(s) Oral at bedtime    GI: Continue stool softeners.    bisacodyl 5 milliGRAM(s) Oral every 12 hours PRN  famotidine    Tablet 20 milliGRAM(s) Oral two times a day  simethicone 80 milliGRAM(s) Chew every 12 hours    Nutrition: Continue present diet  Endocrine and glucose control:   atorvastatin 80 milliGRAM(s) Oral at bedtime  colchicine 0.6 milliGRAM(s) Oral every 12 hours  glucagon  Injectable 1 milliGRAM(s) IntraMuscular once PRN    Renal: monitor urine output, supplement electrolytes as needed,     Vasc: Heparin SC and/or SCDs for DVT prophylaxis  heparin  Injectable 5000 Unit(s) SubCutaneous every 12 hours    ID: Stable, no fever , no chills.   ceFAZolin   IVPB 1000 milliGRAM(s) IV Intermittent every 8 hours  ceFAZolin   IVPB        Therapy: OOB/ambulate  Disposition: start planing discharge home or placement    Pertinent clinical, laboratory, radiographic, hemodynamic, echocardiographic, respiratory data, microbiologic data and chart were reviewed and analyzed frequently throughout the course of the day and night. GI and DVT prophylaxis, glycemic control, head of bed elevation and skin care issues were addressed.  Patient seen, examined and plan discussed with CT Surgery / CTICU team during rounds.    [ ] The patient remains in critical and unstable condition, and requires ICU care and monitoring  [x ] The patient is improving but requires continued monitoring and adjustment of therapy

## 2020-03-12 NOTE — SWALLOW BEDSIDE ASSESSMENT ADULT - SLP GENERAL OBSERVATIONS
Pt received OOB, alert and oriented to person, place and time, on room air
Pt awake, miami J collar in place. OOB to chair, wife at bedside.
Pt received OOB to chair awake and alert on room air w/ spouse at b/s. Pt reportedly tolerating thin liquids
Pt awake and alert, +confusion noted. Pts wife at bedside. MIami J collar in place
Pt received OOB to chair asleep, awake and alert +miami J collar, spouse reports toleration of current diet
Pt received in bed awake and alert on O2 Fartun ECHAVARRIA collar in place
pt received OOB to chair awake alert w/o c/o pain. +Miami J collar in place. +strong voice
Pt received in bed awake and alert on O2 NC, diet upgraded to dys 3 w/ thin, +toleration
Pt received sitting upright in bedside chair, +room air, miami j collar in place. pt's wife at the b/s. no c/o pain
pt awake + confuson noted. pt on o2 via NC, miami j collar in place. pt's wife at the b/s. no c/o pain. low vocal intensity
Pt received awake and alert OOB to chair
Pt. received awake, alert, neck collar in place, o2 via nasal cannula.

## 2020-03-12 NOTE — SWALLOW BEDSIDE ASSESSMENT ADULT - SWALLOW EVAL: RECOMMENDED FEEDING/EATING TECHNIQUES
small sips/bites
consecutive swallow/small sips/bites
no straws/small sips/bites
oral hygiene/position upright (90 degrees)
oral hygiene/position upright (90 degrees)/small sips/bites
position upright (90 degrees)/small sips/bites/oral hygiene
small sips/bites

## 2020-03-12 NOTE — PROGRESS NOTE ADULT - PROVIDER SPECIALTY LIST ADULT
CCU
CT Surgery
Cardiology
Critical Care
Electrophysiology
Internal Medicine
Neurosurgery
Neurosurgery
Physiatry
Pulmonology
Thoracic Surgery
Vascular Surgery
CT Surgery

## 2020-03-12 NOTE — SWALLOW BEDSIDE ASSESSMENT ADULT - DIET PRIOR TO ADMI
regular with thin liquids
regular solids w/ thin liquids

## 2020-03-12 NOTE — PROGRESS NOTE ADULT - SUBJECTIVE AND OBJECTIVE BOX
CTU Attending Progress Daily Note     12 Mar 2020 11:06  Admited 01-12-20, Hospital Day 60d  POD# - 17    HPI:  78 yo M with hx of HTN, A.fib (Eliquis), CHF, HLD brought in by EMS post cardiac arrest. As per wife at bedside, patient at the basement watching movie and was doing fine. Few mins later, patient came up and told the wife that he wasn't feeling right and that he felt funny. Then he crashed. Wife called the EMS who arrived 5 mins later. Upon EMS arrival, patient was found to be in PEA then asystole, s/p resuscitation for ~ 10 mins and ROSC achieved after 1 round of compression and epi. While on the way to hospital, patient had another cardiac arrest on the ambulant s/p CPR and ROSC achieved few mins later. As per family, patient was doing welll and at baseline prior to the episode. As baseline patient is fully functional. Denied any recent infection, recent hospitalization, recent ED visit, fever, chills.     In ED, patient was found to bradycardic and hypotensive. s/p 1 dose of atropine and patient was started on low dose Levophed with improvement in HR and BP. (12 Jan 2020 22:13)    Home Medications:  aspirin 325 mg oral delayed release tablet: 1 tab(s) orally once a day (12 Mar 2020 11:04)  atorvastatin 80 mg oral tablet: 1 tab(s) orally once a day (at bedtime) (12 Mar 2020 11:03)  bacitracin 500 units/g topical ointment: 1 application topically 3 times a day (12 Mar 2020 11:04)  bacitracin 500 units/g topical ointment: 1 application topically every 8 hours (12 Mar 2020 11:04)  cyanocobalamin 1000 mcg oral tablet: 1 tab(s) orally once a day (12 Mar 2020 11:04)  famotidine 20 mg oral tablet: 1 tab(s) orally 2 times a day (12 Mar 2020 11:04)  heparin: 5000 unit(s) subcutaneous 2 times a day (12 Mar 2020 11:03)  ipratropium 500 mcg/2.5 mL inhalation solution: 2.5 milliliter(s) inhaled every 6 hours (12 Mar 2020 11:04)  metoprolol tartrate 25 mg oral tablet: 1 tab(s) orally 2 times a day (12 Mar 2020 11:04)  oxyCODONE 5 mg oral tablet: 1 tab(s) orally every 4 hours, As needed, Mild Pain (1 - 3) (12 Mar 2020 11:03)  polyethylene glycol 3350 oral powder for reconstitution: 17 gram(s) orally once a day (12 Mar 2020 11:04)  sertraline 50 mg oral tablet: 1 tab(s) orally once a day (12 Mar 2020 11:03)  tamsulosin 0.4 mg oral capsule: 1 cap(s) orally once a day (at bedtime) (12 Mar 2020 11:03)  thiamine 100 mg oral tablet: 1 tab(s) orally once a day (12 Mar 2020 11:04)  Vitamin D3: cap(s) orally once a day (16 Jan 2020 12:31)    FAMILY HISTORY:    PAST MEDICAL & SURGICAL HISTORY:  Atrial fibrillation  Neuropathy  HLD (hyperlipidemia)  HTN (hypertension)  History of cholecystectomy  History of cholecystectomy    Interval event for past 24 hr:  MED IVORY  79y had no event.   Current Complains:  MED IVORY has no new complains  Allergies    No Known Allergies    Intolerances      OBJECTIVE:  Vitals last 24 hrs  T(C): 36.4 (03-12-20 @ 07:46), Max: 37 (03-12-20 @ 04:00)  T(F): 97.5 (03-12-20 @ 07:46), Max: 98.6 (03-12-20 @ 04:00)  HR: 82 (03-12-20 @ 08:49) (72 - 105)  BP: 115/67 (03-12-20 @ 07:46) (115/67 - 146/82)  ABP: 148/68 (03-12-20 @ 08:49) (115/50 - 158/80)  ABP(mean): 97 (03-12-20 @ 08:49) (71 - 110)  RR: 36 (03-12-20 @ 08:49) (22 - 43)  SpO2: 98% (03-12-20 @ 08:49) (94% - 100%)      03-11-20 @ 07:01  -  03-12-20 @ 07:00  --------------------------------------------------------  IN:    Oral Fluid: 200 mL  Total IN: 200 mL    OUT:    Indwelling Catheter - Urethral: 730 mL  Total OUT: 730 mL    Total NET: -530 mL          CAPILLARY BLOOD GLUCOSE      POCT Blood Glucose.: 169 mg/dL (12 Mar 2020 07:07)  POCT Blood Glucose.: 108 mg/dL (11 Mar 2020 22:36)    LABS:  ABG - ( 12 Mar 2020 04:43 )  pH, Arterial: 7.42  pH, Blood: x     /  pCO2: 47    /  pO2: 72    / HCO3: 30    / Base Excess: 4.9   /  SaO2: 95              Blood Gas Arterial, Lactate: 1.0 mmoL/L (03-12-20 @ 04:43)                          9.6    9.39  )-----------( 273      ( 12 Mar 2020 03:20 )             32.2     Hemoglobin: 9.6 g/dL (03-12 @ 03:20)  Hemoglobin: 9.3 g/dL (03-11 @ 05:20)    03-12    137  |  101  |  13  ----------------------------<  101<H>  4.5   |  26  |  0.5<L>    Ca    8.0<L>      12 Mar 2020 03:20  Mg     1.9     03-12      Creatinine, Serum: 0.5 mg/dL (03-12 @ 03:20)  Creatinine, Serum: 0.6 mg/dL (03-11 @ 05:20)  Creatinine, Serum: 0.6 mg/dL (03-10 @ 03:11)  Creatinine, Serum: 0.6 mg/dL (03-09 @ 03:00)          HOSPITAL MEDICATIONS:  MEDICATIONS  (STANDING):  aspirin enteric coated 325 milliGRAM(s) Oral daily  atorvastatin 80 milliGRAM(s) Oral at bedtime  BACItracin   Ointment 1 Application(s) Topical every 8 hours  BACItracin   Ointment 1 Application(s) Topical three times a day  BUpivacaine liposome 1.3% Injectable (no eMAR) 20 milliLiter(s) Local Injection once  ceFAZolin   IVPB 1000 milliGRAM(s) IV Intermittent every 8 hours  ceFAZolin   IVPB      chlorhexidine 4% Liquid 1 Application(s) Topical <User Schedule>  colchicine 0.6 milliGRAM(s) Oral every 12 hours  cyanocobalamin 1000 MICROGram(s) Oral daily  famotidine    Tablet 20 milliGRAM(s) Oral two times a day  guaifenesin/dextromethorphan  Syrup 10 milliLiter(s) Oral every 6 hours  heparin  Injectable 5000 Unit(s) SubCutaneous every 12 hours  ipratropium    for Nebulization 500 MICROGram(s) Nebulizer every 6 hours  magnesium sulfate  IVPB 1 Gram(s) IV Intermittent every 12 hours  metoprolol tartrate 25 milliGRAM(s) Oral two times a day  polyethylene glycol 3350 17 Gram(s) Oral daily  sertraline 50 milliGRAM(s) Oral daily  simethicone 80 milliGRAM(s) Chew every 12 hours  tamsulosin 0.4 milliGRAM(s) Oral at bedtime  thiamine 100 milliGRAM(s) Oral daily    MEDICATIONS  (PRN):  bisacodyl 5 milliGRAM(s) Oral every 12 hours PRN Constipation  glucagon  Injectable 1 milliGRAM(s) IntraMuscular once PRN Glucose LESS THAN 70 milligrams/deciliter  ondansetron  IVPB 4 milliGRAM(s) IV Intermittent once PRN Nausea and/or Vomiting  oxyCODONE    IR 5 milliGRAM(s) Oral every 4 hours PRN Mild Pain (1 - 3)      REVIEW OF SYSTEMS:  CONSTITUTIONAL: [X] all negative; [ ] weakness, [ ] fevers, [ ] chills  EYES/ENT: [X] all negative; [ ] visual changes, [ ] vertigo, [ ] throat pain, [ ] eye pain  NECK: [X] all negative; [ ] pain, [ ] stiffness  RESPIRATORY: [ ] all negative, [x ] cough, [ ] wheezing, [ ] hemoptysis, [x ] shortness of breath, [  ] chest pain  CARDIOVASCULAR: [x ] all negative; [ ] anginal chest pain, [ ] palpitations, [ ] orthopnea  GASTROINTESTINAL: [X] all negative; [ ]abdominal pain, [ ] nausea, [ ] vomiting, [ ] hematemesis, [ ] diarrhea, [ ] constipation, [ ] melena, [ ] hematochezia.  GENITOURINARY: [X] all negative; [ ] dysuria, [ ] frequency, [ ] hematuria  NEUROLOGICAL: [X] all negative; [ ] numbness, [ ] weakness, [ ] paresthesias  MUSCULOSKELETAL: [X] all negative, [ ] joint pain, [ ] joint swelling, [ ] joint redness, [ ] bone pain  SKIN: [X] all negative; [ ] itching, [ ] burning, [ ] rashes, [ ] lesions   All other review of systems is negative unless indicated above.    [  ] Unable to assess ROS because     PHYSICAL EXAM:          CONSTITUTIONAL: Well-developed; well-nourished; in no acute distress.   	SKIN: warm, dry, no rashes or lesions  	HEENT: Atraumatic. Normocephalic. PERRL. Moist membranes, no conjunctival injection, sclera clear  	NECK: neck collar on  	CARD: Normal S1, S2. Rate and Rhythm are irregular. No murmurs.  	RESP: Good air entry bilaterally, no wheezes, no rales no rhonchi.  	ABD: Soft, not tender, not distended, no CVA tenderness, no rebound no guarding, bowel sounds present  	EXT: Normal ROM.  No clubbing, no cyanosis, no pedal edema, no calf pain b/l, Peripheral pulses intact.  	LYMPH: No acute cervical adenopathy.  	NEURO: Alert, awake, motor 5/5 R, 5/5 L, sensation intact bilat, CN 2-12 intact,          PSYCH: Cooperative, appropriate. Alert & oriented x 3    RADIOLOGY:  X Reviewed and interpreted by me  CxR from 03-12-20 shows mild congestion, no pneumothorax, left effusion, no cardiomegaly,       ECG:  X Reviewed and interpreted by shelby CASTAÑEDA.Fib 84, QTc - 479

## 2020-03-12 NOTE — SWALLOW BEDSIDE ASSESSMENT ADULT - NS SPL SWALLOW CLINIC TRIAL FT
+ toleration for small bites/sips
+ toleration for small sips
+toleration
+toleration of nectar w/o overt s/s of penetration/aspiration, cues to take smaller sips and consecutive swallows
+ toleration of hard solids w/o s/s of penetration/aspiration
+toleration
+toleration w/o overt s/s of penetration/aspiration for puree and nectar
+overt s/s of penetration/aspiration w/ thin (delayed cough after belching, pt w/ h/o GERD)
+toleration w/o overt s/s penetration/aspiration w/ thin liquids, puree and soft solids
+toleration w/o overt s/s penetration/aspiration w/ thin liquids, puree and soft solids
+ toleration without overt s/s of aspiration/ penetration

## 2020-03-13 RX ORDER — OMEGA-3/DHA/EPA/FISH OIL 35-113.5MG
1000 TABLET,CHEWABLE ORAL
Refills: 0 | Status: ACTIVE | COMMUNITY
Start: 2020-03-13

## 2020-03-13 RX ORDER — ATORVASTATIN CALCIUM 80 MG/1
80 TABLET, FILM COATED ORAL
Refills: 0 | Status: ACTIVE | COMMUNITY
Start: 2020-03-13

## 2020-03-13 RX ORDER — HEPARIN SODIUM 5000 [USP'U]/.5ML
5000 INJECTION, SOLUTION INTRAVENOUS; SUBCUTANEOUS
Refills: 0 | Status: ACTIVE | COMMUNITY
Start: 2020-03-13

## 2020-03-13 RX ORDER — OXYCODONE 5 MG/1
5 TABLET ORAL
Refills: 0 | Status: ACTIVE | COMMUNITY
Start: 2020-03-13

## 2020-03-13 RX ORDER — ASPIRIN 325 MG/1
325 TABLET, FILM COATED ORAL DAILY
Refills: 0 | Status: ACTIVE | COMMUNITY
Start: 2020-03-13

## 2020-03-13 RX ORDER — SERTRALINE HYDROCHLORIDE 50 MG/1
50 TABLET, FILM COATED ORAL DAILY
Refills: 0 | Status: ACTIVE | COMMUNITY
Start: 2020-03-13

## 2020-03-13 RX ORDER — METOPROLOL TARTRATE 25 MG/1
25 TABLET, FILM COATED ORAL
Refills: 0 | Status: ACTIVE | COMMUNITY
Start: 2020-03-13

## 2020-03-13 RX ORDER — FAMOTIDINE 20 MG/1
20 TABLET, FILM COATED ORAL
Refills: 0 | Status: ACTIVE | COMMUNITY
Start: 2020-03-13

## 2020-03-13 RX ORDER — TAMSULOSIN HYDROCHLORIDE 0.4 MG/1
0.4 CAPSULE ORAL AT BEDTIME
Refills: 0 | Status: ACTIVE | COMMUNITY
Start: 2020-03-13

## 2020-03-25 DIAGNOSIS — S12.9XXA FRACTURE OF NECK, UNSPECIFIED, INITIAL ENCOUNTER: ICD-10-CM

## 2020-03-26 NOTE — SWALLOW BEDSIDE ASSESSMENT ADULT - PHARYNGEAL PHASE
Medicare Preventive Visit Patient Instructions  Thank you for completing your Welcome to Medicare Visit or Medicare Annual Wellness Visit today  Your next wellness visit will be due in one year (3/26/2021)  The screening/preventive services that you may require over the next 5-10 years are detailed below  Some tests may not apply to you based off risk factors and/or age  Screening tests ordered at today's visit but not completed yet may show as past due  Also, please note that scanned in results may not display below  Preventive Screenings:  Service Recommendations Previous Testing/Comments   Colorectal Cancer Screening  * Colonoscopy    * Fecal Occult Blood Test (FOBT)/Fecal Immunochemical Test (FIT)  * Fecal DNA/Cologuard Test  * Flexible Sigmoidoscopy Age: 54-65 years old   Colonoscopy: every 10 years (may be performed more frequently if at higher risk)  OR  FOBT/FIT: every 1 year  OR  Cologuard: every 3 years  OR  Sigmoidoscopy: every 5 years  Screening may be recommended earlier than age 48 if at higher risk for colorectal cancer  Also, an individualized decision between you and your healthcare provider will decide whether screening between the ages of 74-80 would be appropriate  Colonoscopy: Not on file  FOBT/FIT: Not on file  Cologuard: Not on file  Sigmoidoscopy: Not on file         Breast Cancer Screening Age: 36 years old  Frequency: every 1-2 years  Not required if history of left and right mastectomy Mammogram: Not on file       Cervical Cancer Screening Between the ages of 21-29, pap smear recommended once every 3 years  Between the ages of 33-67, can perform pap smear with HPV co-testing every 5 years     Recommendations may differ for women with a history of total hysterectomy, cervical cancer, or abnormal pap smears in past  Pap Smear: Not on file    Screening Not Indicated   Hepatitis C Screening Once for adults born between [facility]  More frequently in patients at high risk for Hepatitis C Hep C Antibody: Not on file       Diabetes Screening 1-2 times per year if you're at risk for diabetes or have pre-diabetes Fasting glucose: No results in last 5 years   A1C: No results in last 5 years       Cholesterol Screening Once every 5 years if you don't have a lipid disorder  May order more often based on risk factors  Lipid panel: Not on file    Screening Not Indicated  History Lipid Disorder     Other Preventive Screenings Covered by Medicare:  1  Abdominal Aortic Aneurysm (AAA) Screening: covered once if your at risk  You're considered to be at risk if you have a family history of AAA  2  Lung Cancer Screening: covers low dose CT scan once per year if you meet all of the following conditions: (1) Age 50-69; (2) No signs or symptoms of lung cancer; (3) Current smoker or have quit smoking within the last 15 years; (4) You have a tobacco smoking history of at least 30 pack years (packs per day multiplied by number of years you smoked); (5) You get a written order from a healthcare provider  3  Glaucoma Screening: covered annually if you're considered high risk: (1) You have diabetes OR (2) Family history of glaucoma OR (3)  aged 48 and older OR (3)  American aged 72 and older  3  Osteoporosis Screening: covered every 2 years if you meet one of the following conditions: (1) You're estrogen deficient and at risk for osteoporosis based off medical history and other findings; (2) Have a vertebral abnormality; (3) On glucocorticoid therapy for more than 3 months; (4) Have primary hyperparathyroidism; (5) On osteoporosis medications and need to assess response to drug therapy  · Last bone density test (DXA Scan): Not on file  5  HIV Screening: covered annually if you're between the age of 12-76  Also covered annually if you are younger than 13 and older than 72 with risk factors for HIV infection   For pregnant patients, it is covered up to 3 times per pregnancy  Immunizations:  Immunization Recommendations   Influenza Vaccine Annual influenza vaccination during flu season is recommended for all persons aged >= 6 months who do not have contraindications   Pneumococcal Vaccine (Prevnar and Pneumovax)  * Prevnar = PCV13  * Pneumovax = PPSV23   Adults 25-60 years old: 1-3 doses may be recommended based on certain risk factors  Adults 72 years old: Prevnar (PCV13) vaccine recommended followed by Pneumovax (PPSV23) vaccine  If already received PPSV23 since turning 65, then PCV13 recommended at least one year after PPSV23 dose  Hepatitis B Vaccine 3 dose series if at intermediate or high risk (ex: diabetes, end stage renal disease, liver disease)   Tetanus (Td) Vaccine - COST NOT COVERED BY MEDICARE PART B Following completion of primary series, a booster dose should be given every 10 years to maintain immunity against tetanus  Td may also be given as tetanus wound prophylaxis  Tdap Vaccine - COST NOT COVERED BY MEDICARE PART B Recommended at least once for all adults  For pregnant patients, recommended with each pregnancy  Shingles Vaccine (Shingrix) - COST NOT COVERED BY MEDICARE PART B  2 shot series recommended in those aged 48 and above     Health Maintenance Due:  There are no preventive care reminders to display for this patient  Immunizations Due:      Topic Date Due    DTaP,Tdap,and Td Vaccines (1 - Tdap) 03/07/1949     Advance Directives   What are advance directives? Advance directives are legal documents that state your wishes and plans for medical care  These plans are made ahead of time in case you lose your ability to make decisions for yourself  Advance directives can apply to any medical decision, such as the treatments you want, and if you want to donate organs  What are the types of advance directives? There are many types of advance directives, and each state has rules about how to use them   You may choose a combination of any of the following:  · Living will: This is a written record of the treatment you want  You can also choose which treatments you do not want, which to limit, and which to stop at a certain time  This includes surgery, medicine, IV fluid, and tube feedings  · Durable power of  for healthcare [facility]): This is a written record that states who you want to make healthcare choices for you when you are unable to make them for yourself  This person, called a proxy, is usually a family member or a friend  You may choose more than 1 proxy  · Do not resuscitate (DNR) order:  A DNR order is used in case your heart stops beating or you stop breathing  It is a request not to have certain forms of treatment, such as CPR  A DNR order may be included in other types of advance directives  · Medical directive: This covers the care that you want if you are in a coma, near death, or unable to make decisions for yourself  You can list the treatments you want for each condition  Treatment may include pain medicine, surgery, blood transfusions, dialysis, IV or tube feedings, and a ventilator (breathing machine)  · Values history: This document has questions about your views, beliefs, and how you feel and think about life  This information can help others choose the care that you would choose  Why are advance directives important? An advance directive helps you control your care  Although spoken wishes may be used, it is better to have your wishes written down  Spoken wishes can be misunderstood, or not followed  Treatments may be given even if you do not want them  An advance directive may make it easier for your family to make difficult choices about your care  © Copyright [employer] 2018 Information is for End User's use only and may not be sold, redistributed or otherwise used for commercial purposes   All illustrations and images included in CareNotes® are the copyrighted property of A D [employer] M [employer]  or [employer] Within functional limits

## 2020-03-27 DIAGNOSIS — N39.0 URINARY TRACT INFECTION, SITE NOT SPECIFIED: ICD-10-CM

## 2020-03-27 DIAGNOSIS — G93.41 METABOLIC ENCEPHALOPATHY: ICD-10-CM

## 2020-03-27 DIAGNOSIS — I72.1 ANEURYSM OF ARTERY OF UPPER EXTREMITY: ICD-10-CM

## 2020-03-27 DIAGNOSIS — I46.2 CARDIAC ARREST DUE TO UNDERLYING CARDIAC CONDITION: ICD-10-CM

## 2020-03-27 DIAGNOSIS — S14.0XXA CONCUSSION AND EDEMA OF CERVICAL SPINAL CORD, INITIAL ENCOUNTER: ICD-10-CM

## 2020-03-27 DIAGNOSIS — J96.01 ACUTE RESPIRATORY FAILURE WITH HYPOXIA: ICD-10-CM

## 2020-03-27 DIAGNOSIS — I25.10 ATHEROSCLEROTIC HEART DISEASE OF NATIVE CORONARY ARTERY WITHOUT ANGINA PECTORIS: ICD-10-CM
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
## 2020-03-27 DIAGNOSIS — R57.0 CARDIOGENIC SHOCK: ICD-10-CM

## 2020-03-27 DIAGNOSIS — I70.0 ATHEROSCLEROSIS OF AORTA: ICD-10-CM

## 2020-03-27 DIAGNOSIS — I47.2 VENTRICULAR TACHYCARDIA: ICD-10-CM
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
## 2020-03-27 DIAGNOSIS — T82.898A OTHER SPECIFIED COMPLICATION OF VASCULAR PROSTHETIC DEVICES, IMPLANTS AND GRAFTS, INITIAL ENCOUNTER: ICD-10-CM

## 2020-03-27 DIAGNOSIS — S14.105A UNSPECIFIED INJURY AT C5 LEVEL OF CERVICAL SPINAL CORD, INITIAL ENCOUNTER: ICD-10-CM

## 2020-03-27 DIAGNOSIS — S12.400A UNSPECIFIED DISPLACED FRACTURE OF FIFTH CERVICAL VERTEBRA, INITIAL ENCOUNTER FOR CLOSED FRACTURE: ICD-10-CM

## 2020-03-27 DIAGNOSIS — I49.01 VENTRICULAR FIBRILLATION: ICD-10-CM

## 2020-03-27 DIAGNOSIS — G62.9 POLYNEUROPATHY, UNSPECIFIED: ICD-10-CM

## 2020-03-27 DIAGNOSIS — E78.5 HYPERLIPIDEMIA, UNSPECIFIED: ICD-10-CM

## 2020-03-27 DIAGNOSIS — I48.19 OTHER PERSISTENT ATRIAL FIBRILLATION: ICD-10-CM
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
## 2020-03-27 DIAGNOSIS — E87.0 HYPEROSMOLALITY AND HYPERNATREMIA: ICD-10-CM

## 2020-03-27 DIAGNOSIS — L98.499 NON-PRESSURE CHRONIC ULCER OF SKIN OF OTHER SITES WITH UNSPECIFIED SEVERITY: ICD-10-CM

## 2020-03-27 DIAGNOSIS — I49.5 SICK SINUS SYNDROME: ICD-10-CM

## 2020-03-27 DIAGNOSIS — R33.9 RETENTION OF URINE, UNSPECIFIED: ICD-10-CM

## 2020-03-27 DIAGNOSIS — J94.2 HEMOTHORAX: ICD-10-CM

## 2020-03-27 DIAGNOSIS — R13.10 DYSPHAGIA, UNSPECIFIED: ICD-10-CM

## 2020-03-27 DIAGNOSIS — D62 ACUTE POSTHEMORRHAGIC ANEMIA: ICD-10-CM

## 2020-03-27 DIAGNOSIS — J44.9 CHRONIC OBSTRUCTIVE PULMONARY DISEASE, UNSPECIFIED: ICD-10-CM

## 2020-03-27 DIAGNOSIS — Z79.01 LONG TERM (CURRENT) USE OF ANTICOAGULANTS: ICD-10-CM

## 2020-04-06 ENCOUNTER — NON-APPOINTMENT (OUTPATIENT)
Age: 80
End: 2020-04-06

## 2020-04-06 DIAGNOSIS — Z98.890 OTHER SPECIFIED POSTPROCEDURAL STATES: ICD-10-CM

## 2020-04-07 ENCOUNTER — APPOINTMENT (OUTPATIENT)
Dept: CARDIOTHORACIC SURGERY | Facility: CLINIC | Age: 80
End: 2020-04-07

## 2020-04-29 ENCOUNTER — APPOINTMENT (OUTPATIENT)
Dept: CARDIOLOGY | Facility: CLINIC | Age: 80
End: 2020-04-29

## 2020-09-24 NOTE — PROGRESS NOTE ADULT - SUBJECTIVE AND OBJECTIVE BOX
Sukhi Chaudhry MD VI                       [facility]                         09/24/2020    HPI:  Janusz Montgomery Jr. is a 73 y.o. male with   Patient Active Problem List   Diagnosis    Essential hypertension    Other hyperlipidemia    Coronary artery disease involving native coronary artery of native heart without angina pectoris s/p RCA stent    Obstructive sleep apnea    GERD (gastroesophageal reflux disease)    Benign prostatic hyperplasia with urinary obstruction    Morbid obesity with body mass index of 45.0-49.9 in adult    Prostate cancer    Paroxysmal atrial fibrillation     Major depressive disorder, recurrent episode, mild    Generalized anxiety disorder    History of gout    Herpes simplex keratoconjunctivitis    Decreased range of motion of left ankle    Right leg weakness    Impaired mobility    Balance problems    PVD (peripheral vascular disease)    Bilateral leg edema    Bilateral carotid artery stenosis    Gait abnormality    Left leg weakness    Delayed sleep phase syndrome    Floppy eyelid syndrome    Dyspnea    Insomnia    Restrictive lung disease    Personal history of asbestosis    CAD (coronary artery disease)    Varicose veins of leg with complications    Delayed surgical wound healing    Venous stasis dermatitis of both lower extremities    Acute kidney injury superimposed on chronic kidney disease    CKD (chronic kidney disease) stage 3, GFR 30-59 ml/min    Hypotension    being managed by PCP and specialists who is here today for evaluation of PVD.  Presents as referral for mgmt of LLE wound; history of non healing for which bx obtained showing BCC.  Treating with cream due to poor radiation candidate.  [place] [facility] [place] has no complaints of claudication.  Patient states location is L medial lower leg occurring for several months.  Associated signs and symptoms include discoloration.  Quality is heavy and  "severity is 6/10.  Symptoms began several months ago.  Alleviating factors include wound care.  Worsening factors include dependency.  no rest pain.  + tissue loss.  Patient is not diabetic.  Is not on Pletal.  No previous lower extremity interventions.    no MI  no Stroke  Tobacco use: no  Daily Aspirin: yes  Anticoagulation: no    Past Medical History:   Diagnosis Date    NAT (acute kidney injury) 3/19/2017    ALLERGIC RHINITIS     Anemia     Anxiety     Basal cell carcinoma 10/19/2018    forehead and right medial shoulder    Basal cell carcinoma 01/09/2019    left nasal bridge and left posterior ear    Chronic rhinitis 5/3/2013    Chronic rhinitis 5/3/2013    Coronary artery disease involving native coronary artery of native heart without angina pectoris s/p RCA stent     Cortical cataract of both eyes 3/18/2016    Delayed sleep phase syndrome 3/13/2019    Depression     Erectile dysfunction 3/24/2014    Erectile dysfunction 3/24/2014    Essential hypertension     GERD (gastroesophageal reflux disease) 7/25/2012    Gout, arthritis     Grade III open fracture of left tibia and fibula s/p ex-fix on 7/1/16 and ORIF of left tibia on 7/15 7/6/2016    H/O: iritis     Helicobacter pylori (H. pylori) infection     Treated    Herpes simplex keratoconjunctivitis 9/30/2015    - on acyclovir - followed by opthalmology, Dr. Uribe     Herpes simplex keratoconjunctivitis 9/30/2015    - on acyclovir - followed by opthalmology, Dr. Uribe     Hyperkalemia 2/28/2017    Hyperlipidemia     Hypogonadism male     Hypogonadism male     Mixed anxiety and depressive disorder     Morbid obesity     Obstructive sleep apnea on CPAP     CPAP    Osteoarthritis of left knee 7/25/2012    Paroxysmal atrial fibrillation 7/6/2016    Primary osteoarthritis of left knee 7/25/2012    Prominent aorta 1/25/2016    "RESULTS: THE HEART IS MILDLY ENLARGED WITH A SLIGHTLY PROMINENT AORTA" - Xray Chest PA & Lateral " Subjective:  no major change in his general condition, still he is able to talk, f/u all the questions and requests and commands, but still unable to stand up or walk.  no cardiac complaint.  no arrhythmia     MEDICATIONS  (STANDING):  amLODIPine   Tablet 2.5 milliGRAM(s) Oral daily  aspirin  chewable 81 milliGRAM(s) Oral daily  atorvastatin 80 milliGRAM(s) Oral at bedtime  clonazePAM  Tablet 1 milliGRAM(s) Oral daily  enoxaparin Injectable 100 milliGRAM(s) SubCutaneous every 12 hours  famotidine    Tablet 20 milliGRAM(s) Oral two times a day  losartan 100 milliGRAM(s) Oral daily  multivitamin 1 Tablet(s) Oral daily  nystatin Powder 1 Application(s) Topical two times a day  senna 1 Tablet(s) Oral two times a day  thiamine 100 milliGRAM(s) Oral daily    MEDICATIONS  (PRN):  benzonatate 100 milliGRAM(s) Oral three times a day PRN Cough  bisacodyl 5 milliGRAM(s) Oral every 12 hours PRN Constipation            Vital Signs Last 24 Hrs  T(C): 35.6 (15 Feb 2020 14:12), Max: 36.1 (15 Feb 2020 04:46)  T(F): 96.1 (15 Feb 2020 14:12), Max: 97 (15 Feb 2020 04:46)  HR: 66 (15 Feb 2020 14:12) (66 - 92)  BP: 135/69 (15 Feb 2020 14:12) (134/87 - 145/74)  BP(mean): --  RR: 18 (15 Feb 2020 14:12) (18 - 18)  SpO2: 96% (15 Feb 2020 08:32) (96% - 96%)             REVIEW OF SYSTEMS:  CONSTITUTIONAL: no fever, no chills, no diaphoresis  CARDIOLOGY: no chest pain, no SOB, no palpitation, no diaphoresis, no faint   RESPIRATORY: no dyspnea, no wheeze, no orthopnea, no PND              PHYSICAL EXAM:  · CONSTITUTIONAL: Looks stable, at rest, every time i see him he communicates, talks,   . NECK: Supple, still wears collar   · RESPIRATORY: clear lung, no wheeze or crackle  · CARDIOVASCULAR: S1, A2, P2, no S3  · EXTREMITIES: No cyanosis, no clubbing, no edema  · VASCULAR: Pulses are irregular  in my assessment and talking he is mentally stable, but every time he sees the cardiac surgeons his M/S gets change?!! and in their assessment he has not been mentally stable  	  TELEMETRY: Afib rate controlled    LABS:    02-15    143  |  101  |  12  ----------------------------<  119<H>  4.3   |  34<H>  |  0.7    Ca    8.5      15 Feb 2020 04:30  Mg     1.7     02-15              I&O's Summary    14 Feb 2020 07:01  -  15 Feb 2020 07:00  --------------------------------------------------------  IN: 480 mL / OUT: 200 mL / NET: 280 mL      BNP  RADIOLOGY & ADDITIONAL STUDIES: no new xray or scan    IMPRESSION AND PLAN: "12-     Prostate cancer 2/15/2016    - followed by urology, Dr. Young     Prostate cancer 2/15/2016    - followed by urology, Dr. 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 Conteh    CATARACT EXTRACTION W/  INTRAOCULAR LENS IMPLANT Left 4/12/2016        COLONOSCOPY N/A 7/23/2020    Procedure: COLONOSCOPY;  Surgeon: Nico Lackey MD;  Location: [facility] ([address] [place] [address]);  Service: Endoscopy;  Laterality: N/A;    ESOPHAGOGASTRODUODENOSCOPY N/A 7/23/2020    Procedure: EGD (ESOPHAGOGASTRODUODENOSCOPY);  Surgeon: Nico Lackey MD;  Location: [facility] ([address] [place] [address]);  Service: Endoscopy;  Laterality: N/A;  per Dr Lackey-Will proceed with EGD and colonoscopy on the 2nd floor due to his comorbidities including obesity, sleep apnea, restrictive lung disease, coronary artery disease.  has loop recorder      ok to hold Eliquis 2 days per Dr Sandhu-must remain    EXTERNAL FIXATION TIBIAL FRACTURE Left 07/01/2016    INSERTION OF IMPLANTABLE LOOP RECORDER  04/07/2017    LEFT HEART CATHETERIZATION Left 1/30/2020    Procedure: Left heart cath;  Surgeon: Benjamin Sandhu MD;  Location: [facility] CATH LAB;  Service: Cardiology;  Laterality: Left;  RN PREOP 1/28/20--Pt starting Plavix loading dose today (8pills)- Dr. Sandhu aware.  Pt has a bandaged "non healing area to LLE"--Dr. Sandhu aware.    LEFT HEART CATHETERIZATION Left 2/14/2020    " Procedure: Left heart cath, IVUS guided left main / LAD PCI. Noon start, radial approach;  Surgeon: Miguel Angel Brady MD;  Location: [facility] CATH LAB;  Service: Cardiology;  Laterality: Left;  RN PRE OP 2-7-2020  BMI--45.61    ORIF TIBIA FRACTURE Left 07/15/2016    RADIOACTIVE SEED IMPLANTATION OF PROSTATE N/A 8/8/2018    Procedure: INSERTION, RADIOACTIVE SEED, PROSTATE;  Surgeon: Bipin Thompson MD;  Location: [facility] OR [address] [place] [address];  Service: Urology;  Laterality: N/A;  1 hour    Squamous cell cancer removal x3 with Mohs surgery      TONSILLECTOMY      TOTAL KNEE ARTHROPLASTY  10/2012    trus/bx      ULTRASOUND GUIDANCE  2/14/2020    Procedure: Ultrasound Guidance;  Surgeon: Miguel Angel Brady MD;  Location: [facility] CATH LAB;  Service: Cardiology;;     Family History   Problem Relation Age of Onset    Skin cancer Father     Lung cancer Father     Cancer Father         smoker,     Alzheimer's disease Mother     Hypertension Mother     Cancer Sister         colon, lung cancer     No Known Problems Sister     Cancer Brother         skin cancer, polypectomy     Peripheral vascular disease Unknown     No Known Problems Maternal Aunt     No Known Problems Maternal Uncle     No Known Problems Paternal Aunt     No Known Problems Paternal Uncle     No Known Problems Maternal Grandmother     No Known Problems Maternal Grandfather     No Known Problems Paternal Grandmother     No Known Problems Paternal Grandfather     Melanoma Neg Hx     Psoriasis Neg Hx     Lupus Neg Hx     Eczema Neg Hx     Amblyopia Neg Hx     Blindness Neg Hx     Cataracts Neg Hx     Diabetes Neg Hx     Glaucoma Neg Hx     Macular degeneration Neg Hx     Retinal detachment Neg Hx     Strabismus Neg Hx     Stroke Neg Hx     Thyroid disease Neg Hx     Acne Neg Hx      Social History     Socioeconomic History    Marital status:      Spouse name: Not on file    Number of children: Not on file    Years of education: Not on file     Highest education level: Not on file   Occupational History    Occupation: Retired    Social Needs    Financial resource strain: Not hard at all    Food insecurity     Worry: Never true     Inability: Never true    Transportation needs     Medical: No     Non-medical: No   Tobacco Use    Smoking status: Never Smoker    Smokeless tobacco: Never Used   Substance and Sexual Activity    Alcohol use: Yes     Frequency: 2-4 times a month     Drinks per session: 3 or 4     Binge frequency: Never     Comment: occasionally, beer    Drug use: Never    Sexual activity: Yes     Partners: Female   Lifestyle    Physical activity     Days per week: 0 days     Minutes per session: 0 min    Stress: Only a little   Relationships    Social connections     Talks on phone: Twice a week     Gets together: Once a week     Attends Islam service: More than 4 times per year     Active member of club or organization: Yes     Attends meetings of clubs or organizations: More than 4 times per year     Relationship status:    Other Topics Concern    Not on file   Social History Narrative    Not on file       Current Outpatient Medications:     acyclovir (ZOVIRAX) 800 MG Tab, Take 1 tablet (800 mg total) by mouth 2 (two) times daily., Disp: 180 tablet, Rfl: 6    albuterol (PROVENTIL/VENTOLIN HFA) 90 mcg/actuation inhaler, Inhale 2 puffs into the lungs every 6 (six) hours as needed for Wheezing. Rescue, Disp: 18 g, Rfl: 0    apixaban (ELIQUIS) 5 mg Tab, Take 1 tablet (5 mg total) by mouth 2 (two) times daily., Disp: 180 tablet, Rfl: 4    atorvastatin (LIPITOR) 40 MG tablet, Take 1 tablet (40 mg total) by mouth once daily., Disp: 90 tablet, Rfl: 1    clopidogreL (PLAVIX) 75 mg tablet, Take 1 tablet (75 mg total) by mouth once daily., Disp: 90 tablet, Rfl: 3    CYANOCOBALAMIN, VITAMIN B-12, (VITAMIN B-12 ORAL), Take 2,500 mcg by mouth once daily., Disp: , Rfl:     fluticasone propionate (FLONASE) 50 mcg/actuation  nasal spray, 1 spray (50 mcg total) by Each Nostril route once daily. (Patient taking differently: 1 spray by Each Nostril route once daily. As needed), Disp: 1 Bottle, Rfl: 0    gabapentin (NEURONTIN) 300 MG capsule, Take 1 capsule (300 mg total) by mouth 3 (three) times daily., Disp: 270 capsule, Rfl: 1    melatonin 5 mg Tab, Take 10 mg by mouth nightly., Disp: , Rfl:     metoprolol succinate (TOPROL-XL) 25 MG 24 hr tablet, Take 25 mg by mouth once daily. , Disp: , Rfl:     multivitamin (MULTIPLE VITAMINS) per tablet, Take 1 tablet by mouth once daily., Disp: , Rfl:     omeprazole (PRILOSEC) 20 MG capsule, Take 1 capsule (20 mg total) by mouth daily as needed., Disp: 90 capsule, Rfl: 0    oxybutynin (DITROPAN-XL) 5 MG TR24, Take 1 tablet (5 mg total) by mouth once daily., Disp: 30 tablet, Rfl: 11    tamsulosin (FLOMAX) 0.4 mg Cap, Take 1 capsule (0.4 mg total) by mouth once daily., Disp: 90 capsule, Rfl: 3    venlafaxine (EFFEXOR) 75 MG tablet, Take 2 tablets (150 mg total) by mouth 2 (two) times daily., Disp: 360 tablet, Rfl: 1    vitamin D 1000 units Tab, Take 1 tablet (1,000 Units total) by mouth once daily., Disp: , Rfl:     artificial tears (ISOPTO TEARS) 0.5 % ophthalmic solution, Place 1 drop into both eyes as needed. (Patient not taking: Reported on 9/24/2020), Disp: , Rfl:     fluorouraciL (EFUDEX) 5 % cream, AAA left lower medial leg bid x 4 weeks (Patient not taking: Reported on 9/24/2020), Disp: 40 g, Rfl: 1    nitroGLYCERIN (NITROSTAT) 0.4 MG SL tablet, Place 1 tablet (0.4 mg total) under the tongue every 5 (five) minutes as needed for Chest pain. (Patient not taking: Reported on 9/24/2020), Disp: 25 tablet, Rfl: 11    triamcinolone acetonide 0.1% (KENALOG) 0.1 % cream, Apply topically 2 (two) times daily. Apply to affected area twice daily as directed. (Patient not taking: Reported on 9/24/2020), Disp: 453.6 g, Rfl: 0    REVIEW OF SYSTEMS:  General: No fevers or chills; ENT: No sore  throat; Allergy and Immunology: no persistent infections; Hematological and Lymphatic: No history of bleeding or easy bruising; Endocrine: negative; Respiratory: no cough, shortness of breath, or wheezing; Cardiovascular: no chest pain or dyspnea on exertion; no claudication, no rest pain; Gastrointestinal: no abdominal pain/back, change in bowel habits, or bloody stools; Genito-Urinary: no dysuria, trouble voiding, or hematuria; Musculoskeletal: negative, + wound; Neurological: no TIA or stroke symptoms; Psychiatric: no nervousness, anxiety or depression.    PHYSICAL EXAM:                General appearance:  Alert, well-appearing, and in no distress.  Oriented to person, place, and time                    Neurological: Normal speech, no focal findings noted; CN II - XII grossly intact. RLE with sensation to light touch, LLE with sensation to light touch.            Musculoskeletal: Digits/nail without cyanosis/clubbing.  Strength 5/5 BLE.                    Neck: Supple, no significant adenopathy                  Chest:  Clear to auscultation, no wheezes, rales or rhonchi, symmetric air entry. No use of accessory muscles               Cardiac: Normal rate and regular rhythm, S1 and S2 normal            Abdomen: Soft, nontender, nondistended, no masses or organomegaly, no hernia     No rebound tenderness noted; bowel sounds normal          No groin adenopathy      Extremities:2+ R femoral pulse, 2+ L femoral pulse     2+ R popliteal pulse, 2+ L popliteal pulse     1+ R PT pulse, 1+ L PT pulse     1+ R DP pulse, 1+ L DP pulse     1+ RLE edema, 2+ LLE edema    Skin: RLE no tissue loss; LLE + tissue loss    LAB RESULTS:  No results found for: CBC  Lab Results   Component Value Date    LABPROT 10.5 01/28/2020    INR 1.0 01/28/2020     Lab Results   Component Value Date     09/04/2020    K 5.2 (H) 09/04/2020     (H) 09/04/2020    CO2 23 09/04/2020     09/04/2020    BUN 37 (H) 09/04/2020    CREATININE  1.9 (H) 09/04/2020    CALCIUM 9.0 09/04/2020    ANIONGAP 6 (L) 09/04/2020    EGFRNONAA 34.2 (A) 09/04/2020     Lab Results   Component Value Date    WBC 9.37 08/31/2020    RBC 3.25 (L) 08/31/2020    HGB 10.6 (L) 08/31/2020    HCT 33.3 (L) 08/31/2020     (H) 08/31/2020    MCH 32.6 (H) 08/31/2020    MCHC 31.8 (L) 08/31/2020    RDW 13.3 08/31/2020     (L) 08/31/2020    MPV 11.1 08/31/2020    GRAN 5.9 08/31/2020    GRAN 63.4 08/31/2020    LYMPH 2.4 08/31/2020    LYMPH 25.7 08/31/2020    MONO 0.5 08/31/2020    MONO 5.3 08/31/2020    EOS 0.4 08/31/2020    BASO 0.07 08/31/2020    EOSINOPHIL 4.7 08/31/2020    BASOPHIL 0.7 08/31/2020    DIFFMETHOD Automated 08/31/2020     .  Lab Results   Component Value Date    HGBA1C 5.6 02/11/2020       IMAGING:  All pertinent imaging has been reviewed and interpreted independently.    SAMANTHA 9/2020: 0.8/0.7    6/2020: No venous reflux or DVT    IMP/PLAN:  73 y.o. male with   Patient Active Problem List   Diagnosis    Essential hypertension    Other hyperlipidemia    Coronary artery disease involving native coronary artery of native heart without angina pectoris s/p RCA stent    Obstructive sleep apnea    GERD (gastroesophageal reflux disease)    Benign prostatic hyperplasia with urinary obstruction    Morbid obesity with body mass index of 45.0-49.9 in adult    Prostate cancer    Paroxysmal atrial fibrillation     Major depressive disorder, recurrent episode, mild    Generalized anxiety disorder    History of gout    Herpes simplex keratoconjunctivitis    Decreased range of motion of left ankle    Right leg weakness    Impaired mobility    Balance problems    PVD (peripheral vascular disease)    Bilateral leg edema    Bilateral carotid artery stenosis    Gait abnormality    Left leg weakness    Delayed sleep phase syndrome    Floppy eyelid syndrome    Dyspnea    Insomnia    Restrictive lung disease    Personal history of asbestosis    CAD (coronary  artery disease)    Varicose veins of leg with complications    Delayed surgical wound healing    Venous stasis dermatitis of both lower extremities    Acute kidney injury superimposed on chronic kidney disease    CKD (chronic kidney disease) stage 3, GFR 30-59 ml/min    Hypotension    being managed by PCP and specialists who is here today for evaluation of PVD.    -LLE edema with BCC wound medial lower leg - recommend compression, elevation, dietary changes associated with water and sodium intake discussed at length with patient; cont wound care  -Will repeat venous reflux US to evaluate for etiology of edema; wound is multifactorial likely due to cancer, edema and PVD  -Mild BLE PVD with no claudication, no rest pain, + wound.  Imaging reviewed. - rec daily ASA, perfusion appears adequate to heal wound. Cont wound care   -Exercise  -Heart healthy lifestyle  -RTC 2 weeks    I spent 11 minutes evaluating this patient and greater than 50% of the time was spent counseling, coordinator care and discussing the plan of care.  All questions were answered and patient stated understanding with agreement with the above treatment plan.    Sukhi Chaudhry MD [facility]  Vascular and Endovascular Surgery

## 2020-12-07 NOTE — PROGRESS NOTE ADULT - SUBJECTIVE AND OBJECTIVE BOX
INTERVAL HPI/ EVENTS:  Patient originally seen by EP 2020 see full consult    78 yo M with history of AFib on Eliquis (diagnosed 2017), HTN, HL, admitted for cardiac arrest (not shocked). Patient felt "funny" prior to passing out. Pt found to have PEA arrest and had ROSC after CPR and Epi. Had another cardiac arrest en route to hospital (again no shocks delivered). On tele here, pt has been found to have persistent AFib with pauses of 3-4 sec  Acute MI ruled out, ROSC EKG was non-ischemic. Acute PE ruled-out. Electrolytes WNL on presentation. Duloxetine could cause PEA arrest. No V-tach or ectopy on telemetry  Hospital course:  Cardiac cath revealed oLAD 90% stenosis and LM 20% disease  Deemed poor surgical candidate due to multiple comorbidities  Plan for PCI of LAD once recovers form this hospitalization  Post extubation developed Retroesophageal hematoma, now resolved  Pt with h/o cervical surgery, with pain after fall, CT scan was significant for C5-6 disc anterior and posterior ligamentous injury, now in [facility] for total of 6weeks  Developed Rt radial artery pseudoaneurysm s/p repair     < from: Transthoracic Echocardiogram (20 @ 13:33) >  Summary:   1. LV Ejection Fraction by Awan's Method with a biplane EF of 76 %.   2. Mildly increased LV wall thickness.   3. Moderate to severe mitral valve regurgitation.   4. Moderate tricuspid regurgitation.   5. Mild aortic regurgitation.   6. Sclerotic aortic valve with decreased opening.   7. Estimated pulmonary artery systolic pressure is 49.2 mmHg assuming a right atrial pressure of 5 mmHg, which is consistent with mild pulmonary hypertension.    < end of copied text >    < from: Transthoracic Echocardiogram (20 @ 12:02) >  Summary:   1. Left ventricular ejection fraction, by visual estimation, is 60 to 65%.   2. Normal global left ventricular systolic function.   3. Mild left ventricular hypertrophy.   4. Sclerotic aortic valve with normal opening.   5. Mild aortic regurgitation.   6. Mild-to-moderate tricuspid regurgitation.   7. Estimated pulmonary artery systolic pressure is 42.3 mmHg assuming a right atrial pressure of 8 mmHg, which is consistent with mild pulmonary hypertension.    < end of copied text >    CATH: 20  CATH SUMMARY/FINDINGS:  Coronary circulation: The coronary circulation is right dominant. Left main: The vessel was large sized. Angiography showed a single discrete lesion. Distal left main: There was a 20 % stenosis. LAD: The vessel was large sized. Angiography showed the vessel to wrap around the cardiac apex and multiple discrete lesions. Ostial LAD: There was a 90 % stenosis. This is a likely culprit for the patient's clinical presentation. Proximal LAD: There was a tubular 80 % stenosis. Circumflex: Angiography showed minor luminal irregularities with no flow limiting lesions. RCA: Angiography showed minor luminal irregularities with no flow limiting lesions.     VENOUS DUPLEX SCAN:  < from: VA Duplex Lower Ext Vein Scan, Bilat (20 @ 18:48) >  Impression:  No evidence of deep venous thrombosis in the bilateral lower extremities.    < end of copied text >    PAST MEDICAL & SURGICAL HISTORY:  Atrial fibrillation  Neuropathy  HLD (hyperlipidemia)  HTN (hypertension)  History of cholecystectomy  History of cholecystectomy    FAMILY HISTORY:  No significant SCD    SOCIAL HISTORY:  CIGARETTES: former smoker  ALCOHOL: daily glass     MEDICATIONS  (STANDING):  amLODIPine   Tablet 2.5 milliGRAM(s) Oral daily  aspirin  chewable 81 milliGRAM(s) Oral daily  atorvastatin 80 milliGRAM(s) Oral at bedtime  famotidine    Tablet 20 milliGRAM(s) Oral two times a day  heparin  Injectable 5000 Unit(s) SubCutaneous every 12 hours  losartan 100 milliGRAM(s) Oral daily  multivitamin 1 Tablet(s) Oral daily  senna 1 Tablet(s) Oral two times a day  thiamine 100 milliGRAM(s) Oral daily    MEDICATIONS  (PRN):  bisacodyl 5 milliGRAM(s) Oral every 12 hours PRN Constipation      Allergies    No Known Allergies    Intolerances        REVIEW OF SYSTEMS    [x] A ten-point review of systems was otherwise negative except as noted.      Vital Signs Last 24 Hrs  T(C): 36.4 (2020 06:45), Max: 36.4 (2020 13:44)  T(F): 97.5 (2020 06:45), Max: 97.5 (2020 13:44)  HR: 89 (2020 06:45) (77 - 89)  BP: 153/88 (2020 06:45) (122/70 - 153/88)  BP(mean): --  RR: 20 (2020 06:45) (20 - 20)  SpO2: --      PHYSICAL EXAM:    GENERAL: In no apparent distress, well nourished, and hydrated.  HEART: IRRegular rate and rhythm;  2/6 SM LSB; NO rubs, or gallops.  PULMONARY: diffuse rhonchi b/l  Normal expansion/effort. No rales, wheezing, bilaterally.  ABDOMEN: Soft, Nontender, Nondistended; Bowel sounds present  EXTREMITIES:  Extremities warm, pink, well-perfused, 2+ Peripheral Pulses, No clubbing, cyanosis, or edema, extremely sensitive to touch  NEUROLOGICAL: alert & oriented x 3, no focal deficits, PERRLA, EOMI    LABS:      Urinalysis Basic - ( 2020 15:00 )    Color: Yellow / Appearance: Clear / S.019 / pH: x  Gluc: x / Ketone: Negative  / Bili: Negative / Urobili: 3 mg/dL   Blood: x / Protein: Trace / Nitrite: Negative   Leuk Esterase: Negative / RBC: x / WBC x   Sq Epi: x / Non Sq Epi: x / Bacteria: x    EKG:  < from: 12 Lead ECG (20 @ 08:06) >  Ventricular Rate 91 BPM  Atrial Rate 468 BPM  QRS Duration 98 ms  Q-T Interval 398 ms  QTC Calculation(Bezet) 489 ms  R Axis 6 degrees  T Axis 24 degrees  Diagnosis Line Atrial fibrillation  Prolonged QT  Abnormal ECG  < end of copied text >      RADIOLOGY & ADDITIONAL TESTS:  < from: CT Head No Cont (20 @ 18:59) >  IMPRESSION:  1.  No evidence of acute intracranial pathology.  2.  Chronic microvascular ischemic changes.  3.  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    < end of copied text >    < from: CT Angio Chest, Abd, Pelvis w/ IV Cont (20 @ 03:06) >  IMPRESSION:   1. No central or lobar pulmonary embolus.  2.  Small bibasilar consolidated opacities, possibly representing atelectasis or sequela of prior aspiration event.  3.  A 4.4 cm retroesophageal soft tissue density within the thorax may represent hematoma.  4.  No evidence of acute/inflammatory process within the abdomen or pelvis.  < end of copied text >    < from: CT Cervical Spine No Cont (20 @ 16:48) >  IMPRESSION:  1.  Widening of the C5-6 disc space and fragmentation of the anterior bridging osteophyte at this level. Findings are suspicious for distraction injury through the C5-6 disc with C5-6 anterior osteophyte fracture. Recommend further evaluation with MRI.  2.  Diffuse prevertebral edema as well as edema within the left anterolateral neck soft tissues and supraclavicular region.  3.  Stranding and possible hematoma behind the esophagus, as previously described.  4.  Multilevel anterior bridging osteophytes/syndesmophytes.  5.  Multilevel degenerative changes as described.  < end of copied text >    < from: MR Cervical Spine No Cont (20 @ 14:53) >  IMPRESSION:  Hyperextension injury at C5/C6 with widening of the anterior intervertebral disc space and fracture of the C5 posterior spinous process. 3 column involvement with disruption of the anterior and posterior longitudinal ligaments as well as the ligamentum flavum. Redemonstrated reactive prevertebral soft tissue edema. No evidence of epidural hematoma.  No cord contusion or cord compression.  < end of copied text >    < from: MR Head No Cont (20 @ 14:58) >  IMPRESSION:   No acute infarct, acute intracranial hemorrhage, or mass effect.   < end of copied text > 4

## 2020-12-16 NOTE — SWALLOW BEDSIDE ASSESSMENT ADULT - SWALLOW EVAL: CURRENT DIET
dysphagia 2 with nectar thick liquids
dysphagia 3 with thins
Dysphagia 3 with thin liquids, via consecutive swallows, small sips
dysphagia 3 w/ nectar
NPO
NPO
dys 3 w/ nectar
dysphagia 3 w/ thin
dysphagia 3 w/ thin
dys 3 w/ thin
Residential stability

## 2023-08-09 NOTE — PROCEDURE NOTE - NSPERFORMEDBY_GEN_A_CORE
Myself Patient complaining of right lower quadrant pain associated with nausea vomiting and which began this morning.  Patient denies fevers chills cough chest pain short of breath dysuria hematuria frequency.    Plan labs CT abdomen pelvis IV fluid morphine Zofran    Differential including but not limited to appendicitis diverticula diverticulitis colitis kidney stone UTI

## 2023-08-21 NOTE — ED PROCEDURE NOTE - CPROC ED ANATOMIC LOCATION1
Per request, CM scheduled transport to appointment with dentist 8/22/23 @ 10:15AM.  CM assessed need, arrangements as last resort. right/internal jugular vein

## 2024-06-03 NOTE — BRIEF OPERATIVE NOTE - NSICDXBRIEFOPLAUNCH_GEN_ALL_CORE
<--- Click to Launch ICDx for PreOp, PostOp and Procedure
Yes